# Patient Record
Sex: MALE | Race: WHITE | NOT HISPANIC OR LATINO | URBAN - METROPOLITAN AREA
[De-identification: names, ages, dates, MRNs, and addresses within clinical notes are randomized per-mention and may not be internally consistent; named-entity substitution may affect disease eponyms.]

---

## 2017-10-23 ENCOUNTER — INPATIENT (INPATIENT)
Facility: HOSPITAL | Age: 62
LOS: 9 days | Discharge: HOME | DRG: 435 | End: 2017-11-02
Attending: GENERAL ACUTE CARE HOSPITAL | Admitting: GENERAL ACUTE CARE HOSPITAL
Payer: COMMERCIAL

## 2017-10-23 VITALS
RESPIRATION RATE: 16 BRPM | OXYGEN SATURATION: 97 % | WEIGHT: 216.05 LBS | TEMPERATURE: 98 F | SYSTOLIC BLOOD PRESSURE: 128 MMHG | DIASTOLIC BLOOD PRESSURE: 72 MMHG | HEART RATE: 98 BPM

## 2017-10-23 DIAGNOSIS — R17 UNSPECIFIED JAUNDICE: ICD-10-CM

## 2017-10-23 LAB
ALBUMIN SERPL ELPH-MCNC: 3.7 G/DL — SIGNIFICANT CHANGE UP (ref 3.3–5)
ALP SERPL-CCNC: 927 U/L — HIGH (ref 40–120)
ALT FLD-CCNC: 167 U/L RC — HIGH (ref 10–45)
ANION GAP SERPL CALC-SCNC: 16 MMOL/L — SIGNIFICANT CHANGE UP (ref 5–17)
APPEARANCE UR: CLEAR — SIGNIFICANT CHANGE UP
APTT BLD: 36.2 SEC — SIGNIFICANT CHANGE UP (ref 27.5–37.4)
AST SERPL-CCNC: 154 U/L — HIGH (ref 10–40)
BASOPHILS # BLD AUTO: 0 K/UL — SIGNIFICANT CHANGE UP (ref 0–0.2)
BASOPHILS NFR BLD AUTO: 0.4 % — SIGNIFICANT CHANGE UP (ref 0–2)
BILIRUB DIRECT SERPL-MCNC: 15 MG/DL — HIGH (ref 0–0.2)
BILIRUB INDIRECT FLD-MCNC: 5.6 MG/DL — HIGH (ref 0.2–1)
BILIRUB SERPL-MCNC: 20.6 MG/DL — HIGH (ref 0.2–1.2)
BILIRUB SERPL-MCNC: 20.6 MG/DL — HIGH (ref 0.2–1.2)
BILIRUB UR-MCNC: ABNORMAL
BUN SERPL-MCNC: 15 MG/DL — SIGNIFICANT CHANGE UP (ref 7–23)
CALCIUM SERPL-MCNC: 10.2 MG/DL — SIGNIFICANT CHANGE UP (ref 8.4–10.5)
CHLORIDE SERPL-SCNC: 91 MMOL/L — LOW (ref 96–108)
CO2 SERPL-SCNC: 24 MMOL/L — SIGNIFICANT CHANGE UP (ref 22–31)
COLOR SPEC: YELLOW — SIGNIFICANT CHANGE UP
CREAT SERPL-MCNC: 1.13 MG/DL — SIGNIFICANT CHANGE UP (ref 0.5–1.3)
DIFF PNL FLD: NEGATIVE — SIGNIFICANT CHANGE UP
EOSINOPHIL # BLD AUTO: 0.1 K/UL — SIGNIFICANT CHANGE UP (ref 0–0.5)
EOSINOPHIL NFR BLD AUTO: 0.6 % — SIGNIFICANT CHANGE UP (ref 0–6)
GLUCOSE BLDC GLUCOMTR-MCNC: 255 MG/DL — HIGH (ref 70–99)
GLUCOSE SERPL-MCNC: 263 MG/DL — HIGH (ref 70–99)
GLUCOSE UR QL: 300
HCT VFR BLD CALC: 40.1 % — SIGNIFICANT CHANGE UP (ref 39–50)
HGB BLD-MCNC: 13.6 G/DL — SIGNIFICANT CHANGE UP (ref 13–17)
INR BLD: 1.29 RATIO — HIGH (ref 0.88–1.16)
KETONES UR-MCNC: NEGATIVE — SIGNIFICANT CHANGE UP
LEUKOCYTE ESTERASE UR-ACNC: NEGATIVE — SIGNIFICANT CHANGE UP
LYMPHOCYTES # BLD AUTO: 1.2 K/UL — SIGNIFICANT CHANGE UP (ref 1–3.3)
LYMPHOCYTES # BLD AUTO: 12.7 % — LOW (ref 13–44)
MCHC RBC-ENTMCNC: 32.2 PG — SIGNIFICANT CHANGE UP (ref 27–34)
MCHC RBC-ENTMCNC: 33.8 GM/DL — SIGNIFICANT CHANGE UP (ref 32–36)
MCV RBC AUTO: 95.2 FL — SIGNIFICANT CHANGE UP (ref 80–100)
MONOCYTES # BLD AUTO: 0.8 K/UL — SIGNIFICANT CHANGE UP (ref 0–0.9)
MONOCYTES NFR BLD AUTO: 8.8 % — SIGNIFICANT CHANGE UP (ref 2–14)
NEUTROPHILS # BLD AUTO: 7.5 K/UL — HIGH (ref 1.8–7.4)
NEUTROPHILS NFR BLD AUTO: 77.5 % — HIGH (ref 43–77)
NITRITE UR-MCNC: NEGATIVE — SIGNIFICANT CHANGE UP
PH UR: 6 — SIGNIFICANT CHANGE UP (ref 5–8)
PLATELET # BLD AUTO: 308 K/UL — SIGNIFICANT CHANGE UP (ref 150–400)
POTASSIUM SERPL-MCNC: 4.6 MMOL/L — SIGNIFICANT CHANGE UP (ref 3.5–5.3)
POTASSIUM SERPL-SCNC: 4.6 MMOL/L — SIGNIFICANT CHANGE UP (ref 3.5–5.3)
PROT SERPL-MCNC: 7.3 G/DL — SIGNIFICANT CHANGE UP (ref 6–8.3)
PROT UR-MCNC: NEGATIVE — SIGNIFICANT CHANGE UP
PROTHROM AB SERPL-ACNC: 14 SEC — HIGH (ref 9.8–12.7)
RBC # BLD: 4.21 M/UL — SIGNIFICANT CHANGE UP (ref 4.2–5.8)
RBC # FLD: 13.2 % — SIGNIFICANT CHANGE UP (ref 10.3–14.5)
SODIUM SERPL-SCNC: 131 MMOL/L — LOW (ref 135–145)
SP GR SPEC: 1.01 — LOW (ref 1.01–1.02)
UROBILINOGEN FLD QL: NEGATIVE — SIGNIFICANT CHANGE UP
WBC # BLD: 9.7 K/UL — SIGNIFICANT CHANGE UP (ref 3.8–10.5)
WBC # FLD AUTO: 9.7 K/UL — SIGNIFICANT CHANGE UP (ref 3.8–10.5)

## 2017-10-23 PROCEDURE — 74177 CT ABD & PELVIS W/CONTRAST: CPT | Mod: 26

## 2017-10-23 PROCEDURE — 99285 EMERGENCY DEPT VISIT HI MDM: CPT

## 2017-10-23 RX ORDER — PANTOPRAZOLE SODIUM 20 MG/1
0 TABLET, DELAYED RELEASE ORAL
Qty: 0 | Refills: 0 | COMMUNITY

## 2017-10-23 RX ORDER — DEXTROSE 50 % IN WATER 50 %
25 SYRINGE (ML) INTRAVENOUS ONCE
Qty: 0 | Refills: 0 | Status: DISCONTINUED | OUTPATIENT
Start: 2017-10-23 | End: 2017-11-02

## 2017-10-23 RX ORDER — VALSARTAN 80 MG/1
160 TABLET ORAL DAILY
Qty: 0 | Refills: 0 | Status: DISCONTINUED | OUTPATIENT
Start: 2017-10-23 | End: 2017-11-01

## 2017-10-23 RX ORDER — MOMETASONE FUROATE AND FORMOTEROL FUMARATE DIHYDRATE 200; 5 UG/1; UG/1
0 AEROSOL RESPIRATORY (INHALATION)
Qty: 0 | Refills: 0 | COMMUNITY

## 2017-10-23 RX ORDER — ALBUTEROL 90 UG/1
2 AEROSOL, METERED ORAL EVERY 6 HOURS
Qty: 0 | Refills: 0 | Status: DISCONTINUED | OUTPATIENT
Start: 2017-10-23 | End: 2017-11-02

## 2017-10-23 RX ORDER — SODIUM CHLORIDE 9 MG/ML
1000 INJECTION, SOLUTION INTRAVENOUS
Qty: 0 | Refills: 0 | Status: DISCONTINUED | OUTPATIENT
Start: 2017-10-23 | End: 2017-11-02

## 2017-10-23 RX ORDER — GLUCAGON INJECTION, SOLUTION 0.5 MG/.1ML
1 INJECTION, SOLUTION SUBCUTANEOUS ONCE
Qty: 0 | Refills: 0 | Status: DISCONTINUED | OUTPATIENT
Start: 2017-10-23 | End: 2017-11-02

## 2017-10-23 RX ORDER — INSULIN LISPRO 100/ML
VIAL (ML) SUBCUTANEOUS AT BEDTIME
Qty: 0 | Refills: 0 | Status: DISCONTINUED | OUTPATIENT
Start: 2017-10-23 | End: 2017-11-02

## 2017-10-23 RX ORDER — SODIUM CHLORIDE 9 MG/ML
1000 INJECTION INTRAMUSCULAR; INTRAVENOUS; SUBCUTANEOUS ONCE
Qty: 0 | Refills: 0 | Status: COMPLETED | OUTPATIENT
Start: 2017-10-23 | End: 2017-10-23

## 2017-10-23 RX ORDER — PANTOPRAZOLE SODIUM 20 MG/1
40 TABLET, DELAYED RELEASE ORAL
Qty: 0 | Refills: 0 | Status: DISCONTINUED | OUTPATIENT
Start: 2017-10-23 | End: 2017-11-02

## 2017-10-23 RX ORDER — VALSARTAN 80 MG/1
0 TABLET ORAL
Qty: 0 | Refills: 0 | COMMUNITY

## 2017-10-23 RX ORDER — BUDESONIDE AND FORMOTEROL FUMARATE DIHYDRATE 160; 4.5 UG/1; UG/1
2 AEROSOL RESPIRATORY (INHALATION)
Qty: 0 | Refills: 0 | Status: DISCONTINUED | OUTPATIENT
Start: 2017-10-23 | End: 2017-11-02

## 2017-10-23 RX ORDER — IPRATROPIUM/ALBUTEROL SULFATE 18-103MCG
0 AEROSOL WITH ADAPTER (GRAM) INHALATION
Qty: 0 | Refills: 0 | COMMUNITY

## 2017-10-23 RX ORDER — DEXTROSE 50 % IN WATER 50 %
1 SYRINGE (ML) INTRAVENOUS ONCE
Qty: 0 | Refills: 0 | Status: DISCONTINUED | OUTPATIENT
Start: 2017-10-23 | End: 2017-11-02

## 2017-10-23 RX ORDER — INSULIN LISPRO 100/ML
VIAL (ML) SUBCUTANEOUS
Qty: 0 | Refills: 0 | Status: DISCONTINUED | OUTPATIENT
Start: 2017-10-23 | End: 2017-11-02

## 2017-10-23 RX ORDER — TAMSULOSIN HYDROCHLORIDE 0.4 MG/1
0.4 CAPSULE ORAL AT BEDTIME
Qty: 0 | Refills: 0 | Status: DISCONTINUED | OUTPATIENT
Start: 2017-10-23 | End: 2017-11-02

## 2017-10-23 RX ORDER — DEXTROSE 50 % IN WATER 50 %
12.5 SYRINGE (ML) INTRAVENOUS ONCE
Qty: 0 | Refills: 0 | Status: DISCONTINUED | OUTPATIENT
Start: 2017-10-23 | End: 2017-11-02

## 2017-10-23 RX ADMIN — TAMSULOSIN HYDROCHLORIDE 0.4 MILLIGRAM(S): 0.4 CAPSULE ORAL at 23:53

## 2017-10-23 RX ADMIN — SODIUM CHLORIDE 1000 MILLILITER(S): 9 INJECTION INTRAMUSCULAR; INTRAVENOUS; SUBCUTANEOUS at 16:00

## 2017-10-23 RX ADMIN — Medication 1: at 23:49

## 2017-10-23 NOTE — ED PROVIDER NOTE - MEDICAL DECISION MAKING DETAILS
Misty: Patient with painless jaundice x 2.5 weeks, + weight loss, + juandice on exam, no abdominal pain. + night sweats. will get labs, CT a/p. Pmd sent patient in to be admitted and evaluated GI. will consult GI.

## 2017-10-23 NOTE — ED ADULT NURSE NOTE - OBJECTIVE STATEMENT
61 year old male presents to the ED complaining of jaundice x 2 weeks, mild nausea and abdominal pain and cramping. As per patient he's had negative gall stone and kidney stone work up in the last few months. Pt is A&O x 4, VSS, afebrile, ambulating independently. Pt denies fever, chills, NVD, SOB, or chest pain. IV placed, labs drawn, bed in low position, safety measures in place. Pt is jaundiced, wife at bedside. 61 year old male presents to the ED complaining of jaundice x 2 weeks, mild nausea and abdominal pain and cramping. As per patient he's had negative gall stone and kidney stone work up in the last few months. Pt is A&O x 4, VSS, afebrile, ambulating independently. Pt denies fever, chills, NVD, SOB, or chest pain. IV placed, labs drawn, bed in low position, safety measures in place. Pt is jaundiced, wife at bedside. AS per patient he's had elevated liver enzymes recently and has had night sweats with weight loss.

## 2017-10-23 NOTE — H&P ADULT - HISTORY OF PRESENT ILLNESS
60 y/o  male with hx of DM , HTN admitted for jaundice that has been ongoing for the past two month,.  Pt reports that he had intermittent pain in R flank and  that moved to RUQ and later localized t epigastric area however disappeared over the past two weeks.  No objective fever or chills   no N/V/d   no weight loss   Denies ETOH  recent travel   was seen as o/p and had w/u with blood work and with US .. LFT including bili were elevated howerver US neg for obstruction  no new medications ( though some of his medications were dced including Glyxsambi )    + dark urine (/frequent .  + light colored stool   CT: Mass of the head of the pancreas causing marked distention of the common   bile duct, intrahepatic biliary duct dilatation, and pancreatic ductal   dilatation. Further evaluation with contrast-enhanced MRI is recommended.

## 2017-10-23 NOTE — ED PROVIDER NOTE - OBJECTIVE STATEMENT
61 year old male with pmhx of hld, dm presents to the ER for 2.5 weeks of yellowing of skin, mild nausea, dark urine and pale stools. Patient had occasional flank and abdominal pain few months ago and had multiple US done showing no gallstones at the time per patient and no kidney stones. Currently for the past few weeks denies pain. no vomiting/ diarrhea. Patient has frequent urination. Patient denies cp, no sob.  Saw pmd for repeat blood work and has elevated T-BILI, sent in to be admitted to have GI evaluation.  Patient admits to weight loss and night sweats.

## 2017-10-23 NOTE — H&P ADULT - ASSESSMENT
60 y/o  male with hx of DM , HTN admitted for jaundice that has been ongoing for the past two month,.  Pt reports that he had intermittent pain in R flank and  that moved to RUQ and later localized t epigastric area however disappeared over the past two weeks.  No objective fever or chills   no N/V/d   no weight loss   Denies ETOH  recent travel   was seen as o/p and had w/u with blood work and with US .. LFT including bili were elevated howerver US neg for obstruction  no new medications ( though some of his medications were dced including Glyxsambi )    + dark urine (/frequent .  + light colored stool   CT: Mass of the head of the pancreas causing marked distention of the common   bile duct, intrahepatic biliary duct dilatation, and pancreatic ductal   dilatation. Further evaluation with contrast-enhanced MRI is recommended.  1- obstructive Jaundice : sec to panreatic mass   NPO for ERCP in AM   GI consult ( calleed in ER?)  2- DM bhold PO meds and monitor FS   3- HTN : monitor BP cont meds  discussed with pt and family at length 60 y/o  male with hx of DM , HTN admitted for jaundice that has been ongoing for the past two month,.  Pt reports that he had intermittent pain in R flank and  that moved to RUQ and later localized t epigastric area however disappeared over the past two weeks.  No objective fever or chills   no N/V/d   no weight loss   Denies ETOH  recent travel   was seen as o/p and had w/u with blood work and with US .. LFT including bili were elevated howerver US neg for obstruction  no new medications ( though some of his medications were dced including Glyxsambi )    + dark urine (/frequent .  + light colored stool   CT: Mass of the head of the pancreas causing marked distention of the common   bile duct, intrahepatic biliary duct dilatation, and pancreatic ductal   dilatation. Further evaluation with contrast-enhanced MRI is recommended.  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  hold DVT prophylxis for now   castro needs bx in AM   discussed with pt and family at length

## 2017-10-24 DIAGNOSIS — R17 UNSPECIFIED JAUNDICE: ICD-10-CM

## 2017-10-24 LAB
ALBUMIN SERPL ELPH-MCNC: 3 G/DL — LOW (ref 3.3–5)
ALP SERPL-CCNC: 853 U/L — HIGH (ref 40–120)
ALT FLD-CCNC: 162 U/L — HIGH (ref 10–45)
AMYLASE P1 CFR SERPL: 72 U/L — SIGNIFICANT CHANGE UP (ref 25–125)
ANION GAP SERPL CALC-SCNC: 14 MMOL/L — SIGNIFICANT CHANGE UP (ref 5–17)
AST SERPL-CCNC: 165 U/L — HIGH (ref 10–40)
BASOPHILS # BLD AUTO: 0.03 K/UL — SIGNIFICANT CHANGE UP (ref 0–0.2)
BASOPHILS NFR BLD AUTO: 0.4 % — SIGNIFICANT CHANGE UP (ref 0–2)
BILIRUB DIRECT SERPL-MCNC: 13.9 MG/DL — HIGH (ref 0–0.2)
BILIRUB INDIRECT FLD-MCNC: 4.3 MG/DL — HIGH (ref 0.2–1)
BILIRUB SERPL-MCNC: 18.2 MG/DL — HIGH (ref 0.2–1.2)
BILIRUB SERPL-MCNC: 18.2 MG/DL — HIGH (ref 0.2–1.2)
BUN SERPL-MCNC: 12 MG/DL — SIGNIFICANT CHANGE UP (ref 7–23)
CALCIUM SERPL-MCNC: 9.8 MG/DL — SIGNIFICANT CHANGE UP (ref 8.4–10.5)
CHLORIDE SERPL-SCNC: 96 MMOL/L — SIGNIFICANT CHANGE UP (ref 96–108)
CO2 SERPL-SCNC: 24 MMOL/L — SIGNIFICANT CHANGE UP (ref 22–31)
CREAT SERPL-MCNC: 1.11 MG/DL — SIGNIFICANT CHANGE UP (ref 0.5–1.3)
CULTURE RESULTS: NO GROWTH — SIGNIFICANT CHANGE UP
EOSINOPHIL # BLD AUTO: 0.17 K/UL — SIGNIFICANT CHANGE UP (ref 0–0.5)
EOSINOPHIL NFR BLD AUTO: 2.4 % — SIGNIFICANT CHANGE UP (ref 0–6)
GLUCOSE BLDC GLUCOMTR-MCNC: 188 MG/DL — HIGH (ref 70–99)
GLUCOSE BLDC GLUCOMTR-MCNC: 201 MG/DL — HIGH (ref 70–99)
GLUCOSE BLDC GLUCOMTR-MCNC: 212 MG/DL — HIGH (ref 70–99)
GLUCOSE BLDC GLUCOMTR-MCNC: 257 MG/DL — HIGH (ref 70–99)
GLUCOSE SERPL-MCNC: 197 MG/DL — HIGH (ref 70–99)
HBA1C BLD-MCNC: 8.1 % — HIGH (ref 4–5.6)
HCT VFR BLD CALC: 37.7 % — LOW (ref 39–50)
HGB BLD-MCNC: 12.2 G/DL — LOW (ref 13–17)
IMM GRANULOCYTES NFR BLD AUTO: 0.3 % — SIGNIFICANT CHANGE UP (ref 0–1.5)
LIDOCAIN IGE QN: 128 U/L — HIGH (ref 7–60)
LYMPHOCYTES # BLD AUTO: 1.1 K/UL — SIGNIFICANT CHANGE UP (ref 1–3.3)
LYMPHOCYTES # BLD AUTO: 15.8 % — SIGNIFICANT CHANGE UP (ref 13–44)
MCHC RBC-ENTMCNC: 31.3 PG — SIGNIFICANT CHANGE UP (ref 27–34)
MCHC RBC-ENTMCNC: 32.4 GM/DL — SIGNIFICANT CHANGE UP (ref 32–36)
MCV RBC AUTO: 96.7 FL — SIGNIFICANT CHANGE UP (ref 80–100)
MONOCYTES # BLD AUTO: 0.67 K/UL — SIGNIFICANT CHANGE UP (ref 0–0.9)
MONOCYTES NFR BLD AUTO: 9.6 % — SIGNIFICANT CHANGE UP (ref 2–14)
NEUTROPHILS # BLD AUTO: 4.96 K/UL — SIGNIFICANT CHANGE UP (ref 1.8–7.4)
NEUTROPHILS NFR BLD AUTO: 71.5 % — SIGNIFICANT CHANGE UP (ref 43–77)
PLATELET # BLD AUTO: 311 K/UL — SIGNIFICANT CHANGE UP (ref 150–400)
POTASSIUM SERPL-MCNC: 4.7 MMOL/L — SIGNIFICANT CHANGE UP (ref 3.5–5.3)
POTASSIUM SERPL-SCNC: 4.7 MMOL/L — SIGNIFICANT CHANGE UP (ref 3.5–5.3)
PROT SERPL-MCNC: 6.9 G/DL — SIGNIFICANT CHANGE UP (ref 6–8.3)
RBC # BLD: 3.9 M/UL — LOW (ref 4.2–5.8)
RBC # FLD: 15.1 % — HIGH (ref 10.3–14.5)
SODIUM SERPL-SCNC: 134 MMOL/L — LOW (ref 135–145)
SPECIMEN SOURCE: SIGNIFICANT CHANGE UP
WBC # BLD: 6.95 K/UL — SIGNIFICANT CHANGE UP (ref 3.8–10.5)
WBC # FLD AUTO: 6.95 K/UL — SIGNIFICANT CHANGE UP (ref 3.8–10.5)

## 2017-10-24 PROCEDURE — 99253 IP/OBS CNSLTJ NEW/EST LOW 45: CPT | Mod: GC

## 2017-10-24 PROCEDURE — 71260 CT THORAX DX C+: CPT | Mod: 26

## 2017-10-24 RX ORDER — SODIUM CHLORIDE 9 MG/ML
1000 INJECTION INTRAMUSCULAR; INTRAVENOUS; SUBCUTANEOUS
Qty: 0 | Refills: 0 | Status: DISCONTINUED | OUTPATIENT
Start: 2017-10-24 | End: 2017-10-24

## 2017-10-24 RX ADMIN — SODIUM CHLORIDE 50 MILLILITER(S): 9 INJECTION INTRAMUSCULAR; INTRAVENOUS; SUBCUTANEOUS at 10:55

## 2017-10-24 RX ADMIN — TAMSULOSIN HYDROCHLORIDE 0.4 MILLIGRAM(S): 0.4 CAPSULE ORAL at 21:03

## 2017-10-24 RX ADMIN — Medication 2: at 13:29

## 2017-10-24 RX ADMIN — Medication 3: at 18:14

## 2017-10-24 RX ADMIN — VALSARTAN 160 MILLIGRAM(S): 80 TABLET ORAL at 07:02

## 2017-10-24 RX ADMIN — PANTOPRAZOLE SODIUM 40 MILLIGRAM(S): 20 TABLET, DELAYED RELEASE ORAL at 07:02

## 2017-10-24 RX ADMIN — BUDESONIDE AND FORMOTEROL FUMARATE DIHYDRATE 2 PUFF(S): 160; 4.5 AEROSOL RESPIRATORY (INHALATION) at 18:14

## 2017-10-24 NOTE — CONSULT NOTE ADULT - ATTENDING COMMENTS
Impression:    #1.  Pancreatic head mass, without personal or family history of pancreatitis.  Concern for cancer, dDx includes autoimmune pancreatitis.    #2.  Obstructive jaundice    #3.  Diabetes    Recommendations.    #1.  IgG4 level    #2.  NPO after MN for EUS/FNA/ERCP on 10/25/17.    Patient educated about procedure.  Risks, benefits, alternatives to procedure discussed.  Risks include, but are not limited to, bleeding, infection, injury to internal organs, missed lesion, incomplete procedure, IV line problems, possible need for surgery, and risk of anesthesia.   Patient understands and agrees to proceed.

## 2017-10-24 NOTE — PROGRESS NOTE ADULT - ASSESSMENT
60 y/o  male with hx of DM , HTN admitted for jaundice that has been ongoing for the past two month,.  Pt reports that he had intermittent pain in R flank and  that moved to RUQ and later localized t epigastric area however disappeared over the past two weeks.  No objective fever or chills   no N/V/d   no weight loss   Denies ETOH  recent travel   was seen as o/p and had w/u with blood work and with US .. LFT including bili were elevated howerver US neg for obstruction  no new medications ( though some of his medications were dced including Glyxsambi )    + dark urine (/frequent .  + light colored stool   CT: Mass of the head of the pancreas causing marked distention of the common   bile duct, intrahepatic biliary duct dilatation, and pancreatic ductal   dilatation. Further evaluation with contrast-enhanced MRI is recommended.  1- obstructive Jaundice : sec to panreatic mass .. d/w GI:  - plan for EUS/FNA and ERCP tomorrow  - obtain MRI abd   - patient will need CT chest to evaluate for distant mets  - patient will need surgical consultation for possible resection - GI to call.  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  hold DVT prophylxis for now   castro needs bx in AM   discussed with pt and NP

## 2017-10-24 NOTE — CONSULT NOTE ADULT - ASSESSMENT
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

## 2017-10-24 NOTE — PROGRESS NOTE ADULT - SUBJECTIVE AND OBJECTIVE BOX
Patient is a 61y old  Male who presents with a chief complaint of jaundice (23 Oct 2017 21:50)                                                               INTERVAL HPI/OVERNIGHT EVENTS:    REVIEW OF SYSTEMS:     CONSTITUTIONAL: No weakness, fevers or chills  EYES/ENT: icterus  RESPIRATORY: No cough, wheezing,  No shortness of breath  CARDIOVASCULAR: No chest pain or palpitations  GASTROINTESTINAL: No abdominal pain  . No nausea, vomiting, or hematemesis; No diarrhea or constipation. No melena or hematochezia.  GENITOURINARY: No dysuria, frequency or hematuria  NEUROLOGICAL: No numbness or weakness  SKIN: jaundice                                                                                                                                                                                                                                                                                  Medications:  MEDICATIONS  (STANDING):  buDESOnide  80 MICROgram(s)/formoterol 4.5 MICROgram(s) Inhaler 2 Puff(s) Inhalation two times a day  dextrose 5%. 1000 milliLiter(s) (50 mL/Hr) IV Continuous <Continuous>  dextrose 50% Injectable 12.5 Gram(s) IV Push once  dextrose 50% Injectable 25 Gram(s) IV Push once  dextrose 50% Injectable 25 Gram(s) IV Push once  insulin lispro (HumaLOG) corrective regimen sliding scale   SubCutaneous three times a day before meals  insulin lispro (HumaLOG) corrective regimen sliding scale   SubCutaneous at bedtime  pantoprazole    Tablet 40 milliGRAM(s) Oral before breakfast  tamsulosin 0.4 milliGRAM(s) Oral at bedtime  valsartan 160 milliGRAM(s) Oral daily    MEDICATIONS  (PRN):  ALBUTerol    90 MICROgram(s) HFA Inhaler 2 Puff(s) Inhalation every 6 hours PRN sob  dextrose Gel 1 Dose(s) Oral once PRN Blood Glucose LESS THAN 70 milliGRAM(s)/deciliter  glucagon  Injectable 1 milliGRAM(s) IntraMuscular once PRN Glucose LESS THAN 70 milligrams/deciliter       Allergies    No Known Allergies    Intolerances      Vital Signs Last 24 Hrs  T(C): 36.8 (24 Oct 2017 16:34), Max: 36.9 (23 Oct 2017 21:11)  T(F): 98.3 (24 Oct 2017 16:34), Max: 98.5 (23 Oct 2017 23:52)  HR: 88 (24 Oct 2017 16:34) (81 - 89)  BP: 103/66 (24 Oct 2017 16:34) (91/47 - 113/67)  BP(mean): --  RR: 16 (24 Oct 2017 16:34) (16 - 18)  SpO2: 97% (24 Oct 2017 16:34) (95% - 97%)  CAPILLARY BLOOD GLUCOSE  212 (24 Oct 2017 11:55)  201 (24 Oct 2017 06:59)      POCT Blood Glucose.: 257 mg/dL (24 Oct 2017 16:34)  POCT Blood Glucose.: 212 mg/dL (24 Oct 2017 11:55)  POCT Blood Glucose.: 201 mg/dL (24 Oct 2017 06:28)  POCT Blood Glucose.: 255 mg/dL (23 Oct 2017 22:25)      10-24 @ 07:01  -  10-24 @ 19:04  --------------------------------------------------------  IN: 350 mL / OUT: 300 mL / NET: 50 mL      Physical Exam:    Daily Height in cm: 165.1 (23 Oct 2017 21:11)    General: icterus   HEENT:  Nonicteric, PERRLA  CV:  RRR, S1S2   Lungs:  CTA B/L, no wheezes, rales, rhonchi  Abdomen:  Soft, non-tender, no distended, positive BS  Extremities:  2+ pulses, no c/c, no edema  Skin:  jaundiced   :  No newberry  Neuro:  AAOx3, non-focal, grossly intact                                                                                                                                                                                                                                                                                                LABS:                               12.2   6.95  )-----------( 311      ( 24 Oct 2017 08:55 )             37.7                      10-24    134<L>  |  96  |  12  ----------------------------<  197<H>  4.7   |  24  |  1.11    Ca    9.8      24 Oct 2017 08:51    TPro  6.9  /  Alb  3.0<L>  /  TBili  18.2<H>  /  DBili  13.9<H>  /  AST  165<H>  /  ALT  162<H>  /  AlkPhos  853<H>  10-24

## 2017-10-25 ENCOUNTER — RESULT REVIEW (OUTPATIENT)
Age: 62
End: 2017-10-25

## 2017-10-25 LAB
ALBUMIN SERPL ELPH-MCNC: 3.1 G/DL — LOW (ref 3.3–5)
ALP SERPL-CCNC: 843 U/L — HIGH (ref 40–120)
ALT FLD-CCNC: 157 U/L — HIGH (ref 10–45)
ANION GAP SERPL CALC-SCNC: 16 MMOL/L — SIGNIFICANT CHANGE UP (ref 5–17)
AST SERPL-CCNC: 150 U/L — HIGH (ref 10–40)
BILIRUB SERPL-MCNC: 18.1 MG/DL — HIGH (ref 0.2–1.2)
BUN SERPL-MCNC: 19 MG/DL — SIGNIFICANT CHANGE UP (ref 7–23)
CALCIUM SERPL-MCNC: 9.9 MG/DL — SIGNIFICANT CHANGE UP (ref 8.4–10.5)
CEA SERPL-MCNC: 3.6 NG/ML — SIGNIFICANT CHANGE UP (ref 0–3.8)
CHLORIDE SERPL-SCNC: 95 MMOL/L — LOW (ref 96–108)
CO2 SERPL-SCNC: 22 MMOL/L — SIGNIFICANT CHANGE UP (ref 22–31)
CREAT SERPL-MCNC: 1.12 MG/DL — SIGNIFICANT CHANGE UP (ref 0.5–1.3)
GLUCOSE BLDC GLUCOMTR-MCNC: 197 MG/DL — HIGH (ref 70–99)
GLUCOSE BLDC GLUCOMTR-MCNC: 217 MG/DL — HIGH (ref 70–99)
GLUCOSE BLDC GLUCOMTR-MCNC: 243 MG/DL — HIGH (ref 70–99)
GLUCOSE BLDC GLUCOMTR-MCNC: 255 MG/DL — HIGH (ref 70–99)
GLUCOSE BLDC GLUCOMTR-MCNC: 265 MG/DL — HIGH (ref 70–99)
GLUCOSE SERPL-MCNC: 195 MG/DL — HIGH (ref 70–99)
HCT VFR BLD CALC: 38.5 % — LOW (ref 39–50)
HGB BLD-MCNC: 11.9 G/DL — LOW (ref 13–17)
MCHC RBC-ENTMCNC: 30.6 PG — SIGNIFICANT CHANGE UP (ref 27–34)
MCHC RBC-ENTMCNC: 30.9 GM/DL — LOW (ref 32–36)
MCV RBC AUTO: 99 FL — SIGNIFICANT CHANGE UP (ref 80–100)
PLATELET # BLD AUTO: 315 K/UL — SIGNIFICANT CHANGE UP (ref 150–400)
POTASSIUM SERPL-MCNC: 4.7 MMOL/L — SIGNIFICANT CHANGE UP (ref 3.5–5.3)
POTASSIUM SERPL-SCNC: 4.7 MMOL/L — SIGNIFICANT CHANGE UP (ref 3.5–5.3)
PROT SERPL-MCNC: 6.9 G/DL — SIGNIFICANT CHANGE UP (ref 6–8.3)
RBC # BLD: 3.89 M/UL — LOW (ref 4.2–5.8)
RBC # FLD: 15.7 % — HIGH (ref 10.3–14.5)
SODIUM SERPL-SCNC: 133 MMOL/L — LOW (ref 135–145)
WBC # BLD: 8.23 K/UL — SIGNIFICANT CHANGE UP (ref 3.8–10.5)
WBC # FLD AUTO: 8.23 K/UL — SIGNIFICANT CHANGE UP (ref 3.8–10.5)

## 2017-10-25 PROCEDURE — 88173 CYTOPATH EVAL FNA REPORT: CPT | Mod: 26

## 2017-10-25 PROCEDURE — 88305 TISSUE EXAM BY PATHOLOGIST: CPT | Mod: 26

## 2017-10-25 PROCEDURE — 43274 ERCP DUCT STENT PLACEMENT: CPT | Mod: GC

## 2017-10-25 PROCEDURE — 43242 EGD US FINE NEEDLE BX/ASPIR: CPT | Mod: 59,GC

## 2017-10-25 PROCEDURE — 74328 X-RAY BILE DUCT ENDOSCOPY: CPT | Mod: 26,GC

## 2017-10-25 RX ORDER — PIPERACILLIN AND TAZOBACTAM 4; .5 G/20ML; G/20ML
3.38 INJECTION, POWDER, LYOPHILIZED, FOR SOLUTION INTRAVENOUS EVERY 8 HOURS
Qty: 0 | Refills: 0 | Status: DISCONTINUED | OUTPATIENT
Start: 2017-10-25 | End: 2017-11-02

## 2017-10-25 RX ORDER — PIPERACILLIN AND TAZOBACTAM 4; .5 G/20ML; G/20ML
3.38 INJECTION, POWDER, LYOPHILIZED, FOR SOLUTION INTRAVENOUS ONCE
Qty: 0 | Refills: 0 | Status: COMPLETED | OUTPATIENT
Start: 2017-10-25 | End: 2017-10-27

## 2017-10-25 RX ORDER — OXYCODONE HYDROCHLORIDE 5 MG/1
5 TABLET ORAL ONCE
Qty: 0 | Refills: 0 | Status: DISCONTINUED | OUTPATIENT
Start: 2017-10-25 | End: 2017-10-25

## 2017-10-25 RX ADMIN — BUDESONIDE AND FORMOTEROL FUMARATE DIHYDRATE 2 PUFF(S): 160; 4.5 AEROSOL RESPIRATORY (INHALATION) at 06:02

## 2017-10-25 RX ADMIN — BUDESONIDE AND FORMOTEROL FUMARATE DIHYDRATE 2 PUFF(S): 160; 4.5 AEROSOL RESPIRATORY (INHALATION) at 18:30

## 2017-10-25 RX ADMIN — Medication 2: at 12:53

## 2017-10-25 RX ADMIN — Medication 1: at 22:13

## 2017-10-25 RX ADMIN — OXYCODONE HYDROCHLORIDE 5 MILLIGRAM(S): 5 TABLET ORAL at 20:00

## 2017-10-25 RX ADMIN — OXYCODONE HYDROCHLORIDE 5 MILLIGRAM(S): 5 TABLET ORAL at 18:54

## 2017-10-25 RX ADMIN — Medication 2: at 18:30

## 2017-10-25 RX ADMIN — TAMSULOSIN HYDROCHLORIDE 0.4 MILLIGRAM(S): 0.4 CAPSULE ORAL at 22:07

## 2017-10-25 RX ADMIN — PIPERACILLIN AND TAZOBACTAM 25 GRAM(S): 4; .5 INJECTION, POWDER, LYOPHILIZED, FOR SOLUTION INTRAVENOUS at 22:06

## 2017-10-25 NOTE — DIETITIAN INITIAL EVALUATION ADULT. - ENERGY NEEDS
ht: 5'5", wt:209.8 pounds, BMI:34.9 kg/m2, IBW:136 pounds +/- 10%     pt admitted c jaundice, noted per MD: CT: Mass of the head of the pancreas causing marked distention of the common   bile duct, intrahepatic biliary duct dilatation, and pancreatic ductal   dilatation. Noted plan for MRI abdomen c contrast later today.

## 2017-10-25 NOTE — PROGRESS NOTE ADULT - SUBJECTIVE AND OBJECTIVE BOX
Pre-Endoscopy Evaluation      Referring Physician:  Dr. CAMELIA Wyatt                               Procedure: EUS/FNA/ERCP    Indication for Procedure: Pancreatic Mass, Obstructive Jaundice    Pertinent History:  61 year old male with PMH below presenting with intermittent epigastric  pain, decreased appetite, early satiety and progressive jaundice. In the ED he was found to have a Bilirubin of 20.6 and CT revealed Pancreatic Head Mass with CBD obstruction/dilation.      PAST MEDICAL & SURGICAL HISTORY:  HTN (hypertension)  Diabetes  High cholesterol  No significant past surgical history      PMH of Gastroparesis [ ]  Gastric Surgery [ ]  Gastric Outlet Obstruction [ ]    Allergies:    No Known Allergies    Intolerances:    Latex allergy: [ ] yes [X] no    Medications:MEDICATIONS  (STANDING):  buDESOnide  80 MICROgram(s)/formoterol 4.5 MICROgram(s) Inhaler 2 Puff(s) Inhalation two times a day  dextrose 5%. 1000 milliLiter(s) (50 mL/Hr) IV Continuous <Continuous>  dextrose 50% Injectable 12.5 Gram(s) IV Push once  dextrose 50% Injectable 25 Gram(s) IV Push once  dextrose 50% Injectable 25 Gram(s) IV Push once  insulin lispro (HumaLOG) corrective regimen sliding scale   SubCutaneous three times a day before meals  insulin lispro (HumaLOG) corrective regimen sliding scale   SubCutaneous at bedtime  pantoprazole    Tablet 40 milliGRAM(s) Oral before breakfast  tamsulosin 0.4 milliGRAM(s) Oral at bedtime  valsartan 160 milliGRAM(s) Oral daily    MEDICATIONS  (PRN):  ALBUTerol    90 MICROgram(s) HFA Inhaler 2 Puff(s) Inhalation every 6 hours PRN sob  dextrose Gel 1 Dose(s) Oral once PRN Blood Glucose LESS THAN 70 milliGRAM(s)/deciliter  glucagon  Injectable 1 milliGRAM(s) IntraMuscular once PRN Glucose LESS THAN 70 milligrams/deciliter      Smoking: [ ] yes  [X] no    AICD/PPM: [ ] yes   [X] no    Pertinent lab data:                        11.9   8.23  )-----------( 315      ( 25 Oct 2017 07:40 )             38.5     10-25    133<L>  |  95<L>  |  19  ----------------------------<  195<H>  4.7   |  22  |  1.12    Ca    9.9      25 Oct 2017 09:00    TPro  6.9  /  Alb  3.1<L>  /  TBili  18.1<H>  /  DBili  x   /  AST  150<H>  /  ALT  157<H>  /  AlkPhos  843<H>  10-25    PT/INR - ( 23 Oct 2017 16:12 )   PT: 14.0 sec;   INR: 1.29 ratio      PTT - ( 23 Oct 2017 16:12 )  PTT:36.2 sec      Physical Examination:  Daily     Daily Weight in k.1 (25 Oct 2017 09:20)  Vital Signs Last 24 Hrs  T(C): 36.9 (25 Oct 2017 08:44), Max: 37.1 (24 Oct 2017 23:58)  T(F): 98.4 (25 Oct 2017 08:44), Max: 98.7 (24 Oct 2017 23:58)  HR: 87 (25 Oct 2017 08:44) (87 - 88)  BP: 96/57 (25 Oct 2017 08:44) (96/57 - 103/66)  BP(mean): --  RR: 18 (25 Oct 2017 08:44) (16 - 18)  SpO2: 93% (25 Oct 2017 08:44) (93% - 97%)    Constitutional: NAD    Neck:  No JVD    Respiratory: CTAB/L    Cardiovascular: S1 and S2    Gastrointestinal: BS+, soft, NT/ND    Extremities: No peripheral edema    Neurological: A/O x 3    Psychiatric: Normal mood, normal affect    : No Mariscal    Skin: No rashes    Comments:    ASA Class: I [ ]  II [ ]  III [X]  IV [ ]    The patient is a suitable candidate for the planned procedure unless box checked [ ]  No, explain:

## 2017-10-25 NOTE — DIETITIAN INITIAL EVALUATION ADULT. - OTHER INFO
pt seen for consult for unintentional wt loss. pt reports his wt has been stable at 220 pounds for a long time and only over the last few weeks has he lost some wt. Reports NKFA. No micronutrient coverage noted at home. Reports he has been having gray stool for a few weeks now. pt c some questions regarding DM and difference between type 1 and type 2, also able to state he knows he needs to make dietary changes. pt currently concerned about what is happening with his pancreas, diet education at this time may not be fruitful. Discussed basic dietary information and made pt aware RD remains available for further education as pt is ready.

## 2017-10-25 NOTE — CHART NOTE - NSCHARTNOTEFT_GEN_A_CORE
S/P ERCP. One plastic stent was placed into the ventral pancreatic duct. Biliary cannulation not possible despite various attmepts and variations in technique.                              Spoke with Dr. Cannon( GI). ERCP to be repeated on Friday. May resume diet. Zosyn started for prophylaxis as recommended by GI.   NPO after MN on Thursday. Dr. Wyatt aware.    Addis Adrian NP-C  #[id number]

## 2017-10-25 NOTE — CONSULT NOTE ADULT - ASSESSMENT
Patient with a pancreatic mass concerning for a primary pancreatic carcinoma.  he did have an EUS and ERCP today with stent placement.  he does have marked hyperbilirubinemia.  He did have a CT chest.  he is ordered for an MRCP. Based upon the current studies it does appear that the disease is localized, which hopefully would allow for a Whipple once the pathology does confirm an adenocarcinoma but need to await pathology and MRI results prior to making final recommendations.  Discussed with patient and family that at this time there are no definitive results of a malignancy.  Would trend the bilirubin.   Reportedly he still needs another stent or possibly a percutaneous drain for the liver ductal dilatation.    he has a mild anemia which is likely AOCD and for now would just monitor.

## 2017-10-25 NOTE — DIETITIAN INITIAL EVALUATION ADULT. - ADHERENCE
fair/consistent CHO, DASH diet- reports salt is used in cooking, does use sugar at times; reports he has not been checking his Finger sticks for a few months now; reports his last A1C in September 2017 was about 7.5%

## 2017-10-25 NOTE — DIETITIAN INITIAL EVALUATION ADULT. - FACTORS AFF FOOD INTAKE
pt reports yesterday he ate c good appetite in house; no chewing/swallowing issues; reports last night he had 2 BMs, he had some cramping and BMs were slightly loose; states he had more of a regular BM this AM

## 2017-10-25 NOTE — DIETITIAN INITIAL EVALUATION ADULT. - NS AS NUTRI INTERV ED CONTENT
Answered all of pt's questions as able. Discussed importance of balanced eating and avoiding concentrated sweets.

## 2017-10-25 NOTE — DIETITIAN INITIAL EVALUATION ADULT. - PHYSICAL APPEARANCE
pt agreeable to nutrition focused physical exam, no muscle or fat loss noted at this time/well nourished

## 2017-10-25 NOTE — PROGRESS NOTE ADULT - ASSESSMENT
60 y/o  male with hx of DM , HTN admitted for jaundice that has been ongoing for the past two month,.  Pt reports that he had intermittent pain in R flank and  that moved to RUQ and later localized t epigastric area however disappeared over the past two weeks.  No objective fever or chills   no N/V/d   no weight loss   Denies ETOH  recent travel   was seen as o/p and had w/u with blood work and with US .. LFT including bili were elevated howerver US neg for obstruction  no new medications ( though some of his medications were dced including Glyxsambi )    + dark urine (/frequent .  + light colored stool   CT: Mass of the head of the pancreas causing marked distention of the common   bile duct, intrahepatic biliary duct dilatation, and pancreatic ductal   dilatation. Further evaluation with contrast-enhanced MRI is recommended.  1- obstructive Jaundice : sec to panreatic mass ..  - plan for EUS/FNA and ERCP today  - f/u  MRI abd   - CT chest to evaluate for distant mets: negative   - patient will need surgical consultation for possible resection  - d/w Dr Dumont : will await official consult   2- DM: hold PO meds and monitor FS   3- HTN : monitor BP cont meds  4- HLD : off lipitor due to myoipathy >?  hold DVT prophylxis for now     discussed with pt   d/w with NP

## 2017-10-25 NOTE — DIETITIAN INITIAL EVALUATION ADULT. - NS AS NUTRI INTERV MEALS SNACK
As medically feasible, after procedure, consider resume po diet- consistent CHO, DASH as medically appropriate.

## 2017-10-25 NOTE — PROGRESS NOTE ADULT - SUBJECTIVE AND OBJECTIVE BOX
Patient is a 61y old  Male who presents with a chief complaint of jaundice (23 Oct 2017 21:50)                                                               INTERVAL HPI/OVERNIGHT EVENTS: none     REVIEW OF SYSTEMS:     CONSTITUTIONAL: No weakness, fevers or chills  RESPIRATORY: No cough, wheezing,  No shortness of breath  CARDIOVASCULAR: No chest pain or palpitations  GASTROINTESTINAL: No abdominal pain  . No nausea, vomiting, or hematemesis; light colored stool  GENITOURINARY: No dysuria, frequency or hematuria  NEUROLOGICAL: No numbness or weakness  SKIN: Jaundice   no itching                                                                                                                                                                                                                                                                             Medications:  MEDICATIONS  (STANDING):  buDESOnide  80 MICROgram(s)/formoterol 4.5 MICROgram(s) Inhaler 2 Puff(s) Inhalation two times a day  dextrose 5%. 1000 milliLiter(s) (50 mL/Hr) IV Continuous <Continuous>  dextrose 50% Injectable 12.5 Gram(s) IV Push once  dextrose 50% Injectable 25 Gram(s) IV Push once  dextrose 50% Injectable 25 Gram(s) IV Push once  insulin lispro (HumaLOG) corrective regimen sliding scale   SubCutaneous three times a day before meals  insulin lispro (HumaLOG) corrective regimen sliding scale   SubCutaneous at bedtime  pantoprazole    Tablet 40 milliGRAM(s) Oral before breakfast  piperacillin/tazobactam IVPB. 3.375 Gram(s) IV Intermittent once  piperacillin/tazobactam IVPB. 3.375 Gram(s) IV Intermittent every 8 hours  tamsulosin 0.4 milliGRAM(s) Oral at bedtime  valsartan 160 milliGRAM(s) Oral daily    MEDICATIONS  (PRN):  ALBUTerol    90 MICROgram(s) HFA Inhaler 2 Puff(s) Inhalation every 6 hours PRN sob  dextrose Gel 1 Dose(s) Oral once PRN Blood Glucose LESS THAN 70 milliGRAM(s)/deciliter  glucagon  Injectable 1 milliGRAM(s) IntraMuscular once PRN Glucose LESS THAN 70 milligrams/deciliter       Allergies    No Known Allergies    Intolerances      Vital Signs Last 24 Hrs  T(C): 37.2 (25 Oct 2017 19:22), Max: 37.2 (25 Oct 2017 19:22)  T(F): 99 (25 Oct 2017 19:22), Max: 99 (25 Oct 2017 19:22)  HR: 93 (25 Oct 2017 19:22) (87 - 93)  BP: 98/61 (25 Oct 2017 19:22) (96/57 - 100/62)  BP(mean): --  RR: 18 (25 Oct 2017 19:22) (16 - 18)  SpO2: 94% (25 Oct 2017 19:22) (93% - 95%)  CAPILLARY BLOOD GLUCOSE      POCT Blood Glucose.: 265 mg/dL (25 Oct 2017 22:09)  POCT Blood Glucose.: 255 mg/dL (25 Oct 2017 19:21)  POCT Blood Glucose.: 217 mg/dL (25 Oct 2017 17:31)  POCT Blood Glucose.: 243 mg/dL (25 Oct 2017 12:03)  POCT Blood Glucose.: 197 mg/dL (25 Oct 2017 07:58)      10-24 @ :  -  10-25 @ 07:00  --------------------------------------------------------  IN: 870 mL / OUT: 300 mL / NET: 570 mL    10-25 @ :  -  10-25 @ 22:28  --------------------------------------------------------  IN: 500 mL / OUT: 2 mL / NET: 498 mL      Physical Exam:   Daily Weight in k.1 (25 Oct 2017 09:20)  General:  Well appearing, NAD  HEENT:  Nonicteric, PERRLA  CV:  RRR, S1S2   Lungs:  CTA B/L, no wheezes, rales, rhonchi  Abdomen:  Soft, non-tender, no distended, positive BS  Extremities:  2+ pulses, no c/c, no edema  Skin:  Warm and dry, no rashes  :  No newberry  Neuro:  AAOx3, non-focal, grossly intact                                                                                                                                                                                                                                                                                                LABS:                               11.9   8.23  )-----------( 315      ( 25 Oct 2017 07:40 )             38.5                      10-25    133<L>  |  95<L>  |  19  ----------------------------<  195<H>  4.7   |  22  |  1.12    Ca    9.9      25 Oct 2017 09:00    TPro  6.9  /  Alb  3.1<L>  /  TBili  18.1<H>  /  DBili  x   /  AST  150<H>  /  ALT  157<H>  /  AlkPhos  843<H>  10-25                       RADIOLOGY & ADDITIONAL TESTS         I personally reviewed: [  ]EKG   [  ]CXR    [  ] CT      A/P:         Discussed with :     Oniel consultants' Notes   Time spent : Patient is a 61y old  Male who presents with a chief complaint of jaundice (23 Oct 2017 21:50)                                                               INTERVAL HPI/OVERNIGHT EVENTS: none     REVIEW OF SYSTEMS:     CONSTITUTIONAL: No weakness, fevers or chills  RESPIRATORY: No cough, wheezing,  No shortness of breath  CARDIOVASCULAR: No chest pain or palpitations  GASTROINTESTINAL: No abdominal pain  . No nausea, vomiting, or hematemesis; light colored stool  GENITOURINARY: No dysuria, frequency or hematuria  NEUROLOGICAL: No numbness or weakness  SKIN: Jaundice   no itching                                                                                                                                                                                                                                                                             Medications:  MEDICATIONS  (STANDING):  buDESOnide  80 MICROgram(s)/formoterol 4.5 MICROgram(s) Inhaler 2 Puff(s) Inhalation two times a day  dextrose 5%. 1000 milliLiter(s) (50 mL/Hr) IV Continuous <Continuous>  dextrose 50% Injectable 12.5 Gram(s) IV Push once  dextrose 50% Injectable 25 Gram(s) IV Push once  dextrose 50% Injectable 25 Gram(s) IV Push once  insulin lispro (HumaLOG) corrective regimen sliding scale   SubCutaneous three times a day before meals  insulin lispro (HumaLOG) corrective regimen sliding scale   SubCutaneous at bedtime  pantoprazole    Tablet 40 milliGRAM(s) Oral before breakfast  piperacillin/tazobactam IVPB. 3.375 Gram(s) IV Intermittent once  piperacillin/tazobactam IVPB. 3.375 Gram(s) IV Intermittent every 8 hours  tamsulosin 0.4 milliGRAM(s) Oral at bedtime  valsartan 160 milliGRAM(s) Oral daily    MEDICATIONS  (PRN):  ALBUTerol    90 MICROgram(s) HFA Inhaler 2 Puff(s) Inhalation every 6 hours PRN sob  dextrose Gel 1 Dose(s) Oral once PRN Blood Glucose LESS THAN 70 milliGRAM(s)/deciliter  glucagon  Injectable 1 milliGRAM(s) IntraMuscular once PRN Glucose LESS THAN 70 milligrams/deciliter       Allergies    No Known Allergies    Intolerances      Vital Signs Last 24 Hrs  T(C): 37.2 (25 Oct 2017 19:22), Max: 37.2 (25 Oct 2017 19:22)  T(F): 99 (25 Oct 2017 19:22), Max: 99 (25 Oct 2017 19:22)  HR: 93 (25 Oct 2017 19:22) (87 - 93)  BP: 98/61 (25 Oct 2017 19:22) (96/57 - 100/62)  BP(mean): --  RR: 18 (25 Oct 2017 19:22) (16 - 18)  SpO2: 94% (25 Oct 2017 19:22) (93% - 95%)  CAPILLARY BLOOD GLUCOSE      POCT Blood Glucose.: 265 mg/dL (25 Oct 2017 22:09)  POCT Blood Glucose.: 255 mg/dL (25 Oct 2017 19:21)  POCT Blood Glucose.: 217 mg/dL (25 Oct 2017 17:31)  POCT Blood Glucose.: 243 mg/dL (25 Oct 2017 12:03)  POCT Blood Glucose.: 197 mg/dL (25 Oct 2017 07:58)      10-24 @ :  -  10-25 @ 07:00  --------------------------------------------------------  IN: 870 mL / OUT: 300 mL / NET: 570 mL    10-25 @ :  -  10-25 @ 22:28  --------------------------------------------------------  IN: 500 mL / OUT: 2 mL / NET: 498 mL      Physical Exam:   Daily Weight in k.1 (25 Oct 2017 09:20)  General:  Well appearing, NAD  HEENT:  icterus  CV:  RRR, S1S2   Lungs:  CTA B/L, no wheezes, rales, rhonchi  Abdomen:  Soft, non-tender, no distended, positive BS  Extremities:  2+ pulses, no c/c, no edema  Skin:  Warm and dry, no rashes  :  No newberry  Neuro:  AAOx3, non-focal, grossly intact                                                                                                                                                                                                                                                                                                LABS:                               11.9   8.23  )-----------( 315      ( 25 Oct 2017 07:40 )             38.5                      10-25    133<L>  |  95<L>  |  19  ----------------------------<  195<H>  4.7   |  22  |  1.12    Ca    9.9      25 Oct 2017 09:00    TPro  6.9  /  Alb  3.1<L>  /  TBili  18.1<H>  /  DBili  x   /  AST  150<H>  /  ALT  157<H>  /  AlkPhos  843<H>  10-25                       RADIOLOGY & ADDITIONAL TESTS         I personally reviewed: [  ]EKG   [  ]CXR    [  ] CT      A/P:         Discussed with :     Oniel consultants' Notes   Time spent :

## 2017-10-25 NOTE — DIETITIAN INITIAL EVALUATION ADULT. - ORAL INTAKE PTA
pt reports over the last week or two his appetite was variable, at first he wasn't eating too well due to having reflux and feeling "backed up," reports giving himself an enema and feeling better and right before admission he was eating better and appetite was better; reports usual intake: breakfast: English muffin or cereal; lunch: turkey on an English muffin; dinner: meat/poultry c vegetables; reports he has been using some sugar or Splenda in his coffee lately and does consume concentrated sweets at home

## 2017-10-26 LAB
ALBUMIN SERPL ELPH-MCNC: 2.8 G/DL — LOW (ref 3.3–5)
ALP SERPL-CCNC: 728 U/L — HIGH (ref 40–120)
ALT FLD-CCNC: 143 U/L — HIGH (ref 10–45)
ANION GAP SERPL CALC-SCNC: 15 MMOL/L — SIGNIFICANT CHANGE UP (ref 5–17)
AST SERPL-CCNC: 135 U/L — HIGH (ref 10–40)
BILIRUB SERPL-MCNC: 16.8 MG/DL — HIGH (ref 0.2–1.2)
BUN SERPL-MCNC: 17 MG/DL — SIGNIFICANT CHANGE UP (ref 7–23)
CALCIUM SERPL-MCNC: 9.4 MG/DL — SIGNIFICANT CHANGE UP (ref 8.4–10.5)
CANCER AG19-9 SERPL-ACNC: 1052.2 U/ML — HIGH
CHLORIDE SERPL-SCNC: 96 MMOL/L — SIGNIFICANT CHANGE UP (ref 96–108)
CO2 SERPL-SCNC: 23 MMOL/L — SIGNIFICANT CHANGE UP (ref 22–31)
CREAT SERPL-MCNC: 1.18 MG/DL — SIGNIFICANT CHANGE UP (ref 0.5–1.3)
GLUCOSE BLDC GLUCOMTR-MCNC: 217 MG/DL — HIGH (ref 70–99)
GLUCOSE BLDC GLUCOMTR-MCNC: 261 MG/DL — HIGH (ref 70–99)
GLUCOSE BLDC GLUCOMTR-MCNC: 274 MG/DL — HIGH (ref 70–99)
GLUCOSE BLDC GLUCOMTR-MCNC: 310 MG/DL — HIGH (ref 70–99)
GLUCOSE SERPL-MCNC: 204 MG/DL — HIGH (ref 70–99)
HCT VFR BLD CALC: 37.7 % — LOW (ref 39–50)
HGB BLD-MCNC: 11.8 G/DL — LOW (ref 13–17)
MCHC RBC-ENTMCNC: 31.3 GM/DL — LOW (ref 32–36)
MCHC RBC-ENTMCNC: 31.3 PG — SIGNIFICANT CHANGE UP (ref 27–34)
MCV RBC AUTO: 100 FL — SIGNIFICANT CHANGE UP (ref 80–100)
PLATELET # BLD AUTO: 303 K/UL — SIGNIFICANT CHANGE UP (ref 150–400)
POTASSIUM SERPL-MCNC: 4.6 MMOL/L — SIGNIFICANT CHANGE UP (ref 3.5–5.3)
POTASSIUM SERPL-SCNC: 4.6 MMOL/L — SIGNIFICANT CHANGE UP (ref 3.5–5.3)
PROT SERPL-MCNC: 6.5 G/DL — SIGNIFICANT CHANGE UP (ref 6–8.3)
RBC # BLD: 3.77 M/UL — LOW (ref 4.2–5.8)
RBC # FLD: 15.8 % — HIGH (ref 10.3–14.5)
SODIUM SERPL-SCNC: 134 MMOL/L — LOW (ref 135–145)
WBC # BLD: 8.4 K/UL — SIGNIFICANT CHANGE UP (ref 3.8–10.5)
WBC # FLD AUTO: 8.4 K/UL — SIGNIFICANT CHANGE UP (ref 3.8–10.5)

## 2017-10-26 PROCEDURE — 74183 MRI ABD W/O CNTR FLWD CNTR: CPT | Mod: 26

## 2017-10-26 PROCEDURE — 99232 SBSQ HOSP IP/OBS MODERATE 35: CPT | Mod: GC

## 2017-10-26 RX ORDER — SIMETHICONE 80 MG/1
80 TABLET, CHEWABLE ORAL
Qty: 0 | Refills: 0 | Status: DISCONTINUED | OUTPATIENT
Start: 2017-10-26 | End: 2017-11-02

## 2017-10-26 RX ORDER — POLYETHYLENE GLYCOL 3350 17 G/17G
17 POWDER, FOR SOLUTION ORAL ONCE
Qty: 0 | Refills: 0 | Status: COMPLETED | OUTPATIENT
Start: 2017-10-26 | End: 2017-10-26

## 2017-10-26 RX ADMIN — PIPERACILLIN AND TAZOBACTAM 25 GRAM(S): 4; .5 INJECTION, POWDER, LYOPHILIZED, FOR SOLUTION INTRAVENOUS at 22:22

## 2017-10-26 RX ADMIN — PIPERACILLIN AND TAZOBACTAM 25 GRAM(S): 4; .5 INJECTION, POWDER, LYOPHILIZED, FOR SOLUTION INTRAVENOUS at 13:36

## 2017-10-26 RX ADMIN — Medication 1: at 22:21

## 2017-10-26 RX ADMIN — POLYETHYLENE GLYCOL 3350 17 GRAM(S): 17 POWDER, FOR SOLUTION ORAL at 22:22

## 2017-10-26 RX ADMIN — Medication 3: at 11:57

## 2017-10-26 RX ADMIN — Medication 4: at 17:00

## 2017-10-26 RX ADMIN — BUDESONIDE AND FORMOTEROL FUMARATE DIHYDRATE 2 PUFF(S): 160; 4.5 AEROSOL RESPIRATORY (INHALATION) at 05:20

## 2017-10-26 RX ADMIN — PANTOPRAZOLE SODIUM 40 MILLIGRAM(S): 20 TABLET, DELAYED RELEASE ORAL at 09:01

## 2017-10-26 RX ADMIN — Medication 2: at 08:59

## 2017-10-26 RX ADMIN — BUDESONIDE AND FORMOTEROL FUMARATE DIHYDRATE 2 PUFF(S): 160; 4.5 AEROSOL RESPIRATORY (INHALATION) at 17:03

## 2017-10-26 RX ADMIN — TAMSULOSIN HYDROCHLORIDE 0.4 MILLIGRAM(S): 0.4 CAPSULE ORAL at 22:21

## 2017-10-26 RX ADMIN — PIPERACILLIN AND TAZOBACTAM 25 GRAM(S): 4; .5 INJECTION, POWDER, LYOPHILIZED, FOR SOLUTION INTRAVENOUS at 05:20

## 2017-10-26 RX ADMIN — VALSARTAN 160 MILLIGRAM(S): 80 TABLET ORAL at 05:21

## 2017-10-26 NOTE — PROGRESS NOTE ADULT - ASSESSMENT
Pancreatic mass suspected malignancy, had a bx and results pending.  Had an ERCP with stent but requires more drainage.  Scheduled for tomorrow, but per GI they may delay procedure until pathology is obtained.  Tren bilirubin which is slightly lower today.  If he does have a malignancy, the evaluation thus far would suggest that it is localized so that hopefully he could have a Whipple.  he has been seen by surgical oncology.      Anemia likely AOCD.  Mild and would monitor for now.

## 2017-10-26 NOTE — PROGRESS NOTE ADULT - SUBJECTIVE AND OBJECTIVE BOX
Chief Complaint:  Patient is a 61y old  Male who presents with a chief complaint of jaundice (23 Oct 2017 21:50)      Interval Events: Patient had EUS with FNA yesterday. ERCP was technically difficult due to distorted anatomy. PD stent was placed but CBD could not be cannulated.     Patient with epigastric soreness today. No fevers, chills, nausea or vomiting. He tolerated liquids this AM.     Allergies:  No Known Allergies      Hospital Medications:  ALBUTerol    90 MICROgram(s) HFA Inhaler 2 Puff(s) Inhalation every 6 hours PRN  buDESOnide  80 MICROgram(s)/formoterol 4.5 MICROgram(s) Inhaler 2 Puff(s) Inhalation two times a day  dextrose 5%. 1000 milliLiter(s) IV Continuous <Continuous>  dextrose 50% Injectable 12.5 Gram(s) IV Push once  dextrose 50% Injectable 25 Gram(s) IV Push once  dextrose 50% Injectable 25 Gram(s) IV Push once  dextrose Gel 1 Dose(s) Oral once PRN  glucagon  Injectable 1 milliGRAM(s) IntraMuscular once PRN  insulin lispro (HumaLOG) corrective regimen sliding scale   SubCutaneous three times a day before meals  insulin lispro (HumaLOG) corrective regimen sliding scale   SubCutaneous at bedtime  pantoprazole    Tablet 40 milliGRAM(s) Oral before breakfast  piperacillin/tazobactam IVPB. 3.375 Gram(s) IV Intermittent once  piperacillin/tazobactam IVPB. 3.375 Gram(s) IV Intermittent every 8 hours  tamsulosin 0.4 milliGRAM(s) Oral at bedtime  valsartan 160 milliGRAM(s) Oral daily      PMHX/PSHX:  HTN (hypertension)  Diabetes  High cholesterol  No significant past surgical history      Family history:  Family history of colon cancer (Mother)  No pertinent family history in first degree relatives      ROS:     General:  No wt loss, fevers, chills, night sweats, fatigue   Eyes:  Good vision, no reported pain  ENT:  (+) sore throat, pain, runny nose  CV:  No chest pain, palpitations  Resp:  No cough, wheezing, SOB  GI:  See HPI  :  No pain, bleeding, incontinence, nocturia  Muscle:  No pain, weakness  Neuro:  No weakness, tingling, memory problems  Psych:  No fatigue, insomnia, mood problems, depression  Endocrine:  No cold/heat intolerance  Heme:  No petechiae, ecchymosis, easy bruising  Skin:  jaundice      PHYSICAL EXAM:   Vital Signs:  Vital Signs Last 24 Hrs  T(C): 36.6 (26 Oct 2017 07:52), Max: 37.2 (25 Oct 2017 19:22)  T(F): 97.8 (26 Oct 2017 07:52), Max: 99 (25 Oct 2017 19:22)  HR: 78 (26 Oct 2017 07:52) (78 - 94)  BP: 104/59 (26 Oct 2017 07:52) (98/61 - 119/70)  BP(mean): --  RR: 18 (26 Oct 2017 07:52) (16 - 18)  SpO2: 94% (26 Oct 2017 07:52) (94% - 97%)  Daily     Daily     GENERAL:  NAD  HEENT:  scleral icterus  CHEST:  no respiratory distress, CTAB  HEART:  RRR, no MRG, no edema  ABDOMEN:  Soft, non-tender, non-distended, no masses , no hepato-splenomegaly,   EXTREMITIES:  no cyanosis, no edema  SKIN:  Jaundice  NEURO:  Alert, oriented x3    LABS:                        11.8   8.4   )-----------( 303      ( 26 Oct 2017 08:40 )             37.7     10-26    134<L>  |  96  |  17  ----------------------------<  204<H>  4.6   |  23  |  1.18    Ca    9.4      26 Oct 2017 08:32    TPro  6.5  /  Alb  2.8<L>  /  TBili  16.8<H>  /  DBili  x   /  AST  135<H>  /  ALT  143<H>  /  AlkPhos  728<H>  10-26    LIVER FUNCTIONS - ( 26 Oct 2017 08:32 )  Alb: 2.8 g/dL / Pro: 6.5 g/dL / ALK PHOS: 728 U/L / ALT: 143 U/L / AST: 135 U/L / GGT: x                 Imaging:

## 2017-10-26 NOTE — PROGRESS NOTE ADULT - SUBJECTIVE AND OBJECTIVE BOX
Patient is a 61y old  Male who presents with a chief complaint of jaundice (23 Oct 2017 21:50)                                                               INTERVAL HPI/OVERNIGHT EVENTS:    REVIEW OF SYSTEMS:     CONSTITUTIONAL: No weakness, fevers or chills  EYES/ENT: No visual changes ,   RESPIRATORY: No cough, wheezing,  No shortness of breath  CARDIOVASCULAR: No chest pain or palpitations  GASTROINTESTINAL: No abdominal pain  . No nausea, vomiting, no BM today   GENITOURINARY: No dysuria, frequency or hematuria  NEUROLOGICAL: No numbness or weakness                                                                                                                                                                                                                                                                                    Medications:  MEDICATIONS  (STANDING):  buDESOnide  80 MICROgram(s)/formoterol 4.5 MICROgram(s) Inhaler 2 Puff(s) Inhalation two times a day  dextrose 5%. 1000 milliLiter(s) (50 mL/Hr) IV Continuous <Continuous>  dextrose 50% Injectable 12.5 Gram(s) IV Push once  dextrose 50% Injectable 25 Gram(s) IV Push once  dextrose 50% Injectable 25 Gram(s) IV Push once  insulin lispro (HumaLOG) corrective regimen sliding scale   SubCutaneous three times a day before meals  insulin lispro (HumaLOG) corrective regimen sliding scale   SubCutaneous at bedtime  pantoprazole    Tablet 40 milliGRAM(s) Oral before breakfast  piperacillin/tazobactam IVPB. 3.375 Gram(s) IV Intermittent once  piperacillin/tazobactam IVPB. 3.375 Gram(s) IV Intermittent every 8 hours  tamsulosin 0.4 milliGRAM(s) Oral at bedtime  valsartan 160 milliGRAM(s) Oral daily    MEDICATIONS  (PRN):  ALBUTerol    90 MICROgram(s) HFA Inhaler 2 Puff(s) Inhalation every 6 hours PRN sob  dextrose Gel 1 Dose(s) Oral once PRN Blood Glucose LESS THAN 70 milliGRAM(s)/deciliter  glucagon  Injectable 1 milliGRAM(s) IntraMuscular once PRN Glucose LESS THAN 70 milligrams/deciliter  simethicone 80 milliGRAM(s) Chew four times a day PRN Gas       Allergies    No Known Allergies    Intolerances      Vital Signs Last 24 Hrs  T(C): 36.7 (26 Oct 2017 15:06), Max: 37.2 (25 Oct 2017 19:22)  T(F): 98.1 (26 Oct 2017 15:06), Max: 99 (25 Oct 2017 19:22)  HR: 83 (26 Oct 2017 15:06) (78 - 94)  BP: 105/64 (26 Oct 2017 15:06) (98/61 - 119/70)  BP(mean): --  RR: 18 (26 Oct 2017 15:06) (16 - 18)  SpO2: 95% (26 Oct 2017 15:06) (94% - 97%)  CAPILLARY BLOOD GLUCOSE  261 (26 Oct 2017 11:50)  217 (26 Oct 2017 07:52)      POCT Blood Glucose.: 310 mg/dL (26 Oct 2017 16:57)  POCT Blood Glucose.: 261 mg/dL (26 Oct 2017 11:50)  POCT Blood Glucose.: 217 mg/dL (26 Oct 2017 07:52)  POCT Blood Glucose.: 265 mg/dL (25 Oct 2017 22:09)  POCT Blood Glucose.: 255 mg/dL (25 Oct 2017 19:21)      10-25 @ 07:01  -  10-26 @ 07:00  --------------------------------------------------------  IN: 940 mL / OUT: 302 mL / NET: 638 mL    10-26 @ 07:01  -  10-26 @ 19:15  --------------------------------------------------------  IN: 350 mL / OUT: 0 mL / NET: 350 mL      Physical Exam:   General:  Well appearing, NAD  HEENT:  icterus  PERRLA  CV:  RRR, S1S2   Lungs:  CTA B/L, no wheezes, rales, rhonchi  Abdomen:  Soft, non-tender, no distended, positive BS  Extremities:  2+ pulses, no c/c, no edema  Skin:  Warm and dry, no rashes  :  No newberry  Neuro:  AAOx3, non-focal, grossly intact                                                                                                                                                                                                                                                                                                LABS:                               11.8   8.4   )-----------( 303      ( 26 Oct 2017 08:40 )             37.7                      10-26    134<L>  |  96  |  17  ----------------------------<  204<H>  4.6   |  23  |  1.18    Ca    9.4      26 Oct 2017 08:32    TPro  6.5  /  Alb  2.8<L>  /  TBili  16.8<H>  /  DBili  x   /  AST  135<H>  /  ALT  143<H>  /  AlkPhos  728<H>  10-26

## 2017-10-26 NOTE — PROGRESS NOTE ADULT - SUBJECTIVE AND OBJECTIVE BOX
Patient is a 61y old  Male who presents with a chief complaint of jaundice (23 Oct 2017 21:50)    No new complaints today.  eating.  Urine still dark.    Denies fevers, chills, sweats, HA, dizziness, nosebleeds, sore throat, CP, SOB, cough, wheeze, N/V/D, dysuria, leg pains, swelling      Medication:   ALBUTerol    90 MICROgram(s) HFA Inhaler 2 Puff(s) Inhalation every 6 hours PRN  buDESOnide  80 MICROgram(s)/formoterol 4.5 MICROgram(s) Inhaler 2 Puff(s) Inhalation two times a day  dextrose 5%. 1000 milliLiter(s) IV Continuous <Continuous>  dextrose 50% Injectable 12.5 Gram(s) IV Push once  dextrose 50% Injectable 25 Gram(s) IV Push once  dextrose 50% Injectable 25 Gram(s) IV Push once  dextrose Gel 1 Dose(s) Oral once PRN  glucagon  Injectable 1 milliGRAM(s) IntraMuscular once PRN  insulin lispro (HumaLOG) corrective regimen sliding scale   SubCutaneous three times a day before meals  insulin lispro (HumaLOG) corrective regimen sliding scale   SubCutaneous at bedtime  pantoprazole    Tablet 40 milliGRAM(s) Oral before breakfast  piperacillin/tazobactam IVPB. 3.375 Gram(s) IV Intermittent once  piperacillin/tazobactam IVPB. 3.375 Gram(s) IV Intermittent every 8 hours  simethicone 80 milliGRAM(s) Chew four times a day PRN  tamsulosin 0.4 milliGRAM(s) Oral at bedtime  valsartan 160 milliGRAM(s) Oral daily      Physical exam    T(C): 36.7 (10-26-17 @ 15:06), Max: 36.9 (10-25-17 @ 23:57)  HR: 83 (10-26-17 @ 15:06) (78 - 94)  BP: 105/64 (10-26-17 @ 15:06) (104/59 - 119/70)  RR: 18 (10-26-17 @ 15:06) (16 - 18)  SpO2: 95% (10-26-17 @ 15:06) (94% - 97%)  Wt(kg): --    alert NAD  EOMI scleral icterus  Juandice skin  Neck Supple No LNA  Cv s1 S2 RRR  Lungs clear B/L  abd soft NT ND +BS  No LE edema or tenderness    Labs                        11.8   8.4   )-----------( 303      ( 26 Oct 2017 08:40 )             37.7       10-26    134<L>  |  96  |  17  ----------------------------<  204<H>  4.6   |  23  |  1.18    Ca    9.4      26 Oct 2017 08:32    TPro  6.5  /  Alb  2.8<L>  /  TBili  16.8<H>  /  DBili  x   /  AST  135<H>  /  ALT  143<H>  /  AlkPhos  728<H>  10-26      LIVER FUNCTIONS - ( 26 Oct 2017 08:32 )  Alb: 2.8 g/dL / Pro: 6.5 g/dL / ALK PHOS: 728 U/L / ALT: 143 U/L / AST: 135 U/L / GGT: x             [id number]

## 2017-10-26 NOTE — PROGRESS NOTE ADULT - ASSESSMENT
60 y/o  male with hx of DM , HTN admitted for jaundice that has been ongoing for the past two month,.  Pt reports that he had intermittent pain in R flank and  that moved to RUQ and later localized t epigastric area however disappeared over the past two weeks.  No objective fever or chills   no N/V/d   no weight loss   Denies ETOH  recent travel   was seen as o/p and had w/u with blood work and with US .. LFT including bili were elevated howerver US neg for obstruction  no new medications ( though some of his medications were dced including Glyxsambi )    + dark urine (/frequent .  + light colored stool   CT: Mass of the head of the pancreas causing marked distention of the common   bile duct, intrahepatic biliary duct dilatation, and pancreatic ductal   dilatation. Further evaluation with contrast-enhanced MRI is recommended.  1- obstructive Jaundice : sec to panreatic mass ..  - MRI abd: < from: MRI Abdomen w/wo Cont (10.26.17 @ 14:14) >  A long segment distal CBD stricture with severe biliary dilatation highly   suspicious for neoplasm.    - CT chest to evaluate for distant mets: negative     s/p ERCP : < from: ERCP (10.25.17 @ 16:10) >  - One plastic stent was placed into the ventral pancreatic duct. Biliary                        cannulation not possible despite various attmepts and variations in technique.  Recommendation:      - Repeat ERCP for retreatment. planned tommrrow                     - Consider EUS guided rendez vous versus external IR drain.                       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 and now with elevated LFT   hold DVT prophylxis for now     discussed with pt   d/w with NP

## 2017-10-26 NOTE — PROGRESS NOTE ADULT - PROBLEM SELECTOR PLAN 1
- f/u pathology from [facility]  - diet today as tolerated  - NPO after midnight for EUS rondezvous procedure - f/u pathology from [facility]NA  - diet today as tolerated  - NPO after midnight for EUS Rendezvous procedure

## 2017-10-26 NOTE — PROGRESS NOTE ADULT - ASSESSMENT
60 yo man with jaundice, found to have pancreatic head mass s/p EUS/FNA and ERCP with placement of PD stent but CBD could not be accessed due to distorted anatomy. Plan for EUS guided rondezvous procedure tomorrow. 60 yo man with jaundice, found to have pancreatic head mass s/p EUS/FNA and ERCP with placement of PD stent but CBD could not be accessed due to distorted anatomy. Plan for EUS guided Rendezvous procedure tomorrow.

## 2017-10-26 NOTE — CONSULT NOTE ADULT - ASSESSMENT
61M presents with two months of painless jaundice found to have a pancreatic head mass    -plan for repeat ERCP tomorrow to re-attempt biliary cannulation  -awaiting results of cytology, specimen received  -possibly candidate for a Whipple procedure pending medical clearance and cytology results  -discussed with Dr. Curry, will review imaging and pathology when it arrives  -surgical oncology will follow 61M presents with two months of painless jaundice found to have a pancreatic head mass    -plan for rendezvous procedure tomorrow  -awaiting results of cytology, specimen received  -possibly candidate for a Whipple procedure pending medical clearance and cytology results  -discussed with Dr. Curry, will review imaging and pathology when it arrives  -surgical oncology will follow

## 2017-10-27 LAB
ALBUMIN SERPL ELPH-MCNC: 3.1 G/DL — LOW (ref 3.3–5)
ALP SERPL-CCNC: 722 U/L — HIGH (ref 40–120)
ALT FLD-CCNC: 149 U/L — HIGH (ref 10–45)
ANION GAP SERPL CALC-SCNC: 14 MMOL/L — SIGNIFICANT CHANGE UP (ref 5–17)
AST SERPL-CCNC: 139 U/L — HIGH (ref 10–40)
BILIRUB SERPL-MCNC: 17.3 MG/DL — HIGH (ref 0.2–1.2)
BUN SERPL-MCNC: 13 MG/DL — SIGNIFICANT CHANGE UP (ref 7–23)
CALCIUM SERPL-MCNC: 9.4 MG/DL — SIGNIFICANT CHANGE UP (ref 8.4–10.5)
CHLORIDE SERPL-SCNC: 98 MMOL/L — SIGNIFICANT CHANGE UP (ref 96–108)
CO2 SERPL-SCNC: 24 MMOL/L — SIGNIFICANT CHANGE UP (ref 22–31)
CREAT SERPL-MCNC: 1.01 MG/DL — SIGNIFICANT CHANGE UP (ref 0.5–1.3)
GLUCOSE BLDC GLUCOMTR-MCNC: 178 MG/DL — HIGH (ref 70–99)
GLUCOSE BLDC GLUCOMTR-MCNC: 207 MG/DL — HIGH (ref 70–99)
GLUCOSE BLDC GLUCOMTR-MCNC: 214 MG/DL — HIGH (ref 70–99)
GLUCOSE BLDC GLUCOMTR-MCNC: 218 MG/DL — HIGH (ref 70–99)
GLUCOSE SERPL-MCNC: 210 MG/DL — HIGH (ref 70–99)
HCT VFR BLD CALC: 37.1 % — LOW (ref 39–50)
HGB BLD-MCNC: 11.6 G/DL — LOW (ref 13–17)
MCHC RBC-ENTMCNC: 30.9 PG — SIGNIFICANT CHANGE UP (ref 27–34)
MCHC RBC-ENTMCNC: 31.3 GM/DL — LOW (ref 32–36)
MCV RBC AUTO: 98.7 FL — SIGNIFICANT CHANGE UP (ref 80–100)
PLATELET # BLD AUTO: 326 K/UL — SIGNIFICANT CHANGE UP (ref 150–400)
POTASSIUM SERPL-MCNC: 4.2 MMOL/L — SIGNIFICANT CHANGE UP (ref 3.5–5.3)
POTASSIUM SERPL-SCNC: 4.2 MMOL/L — SIGNIFICANT CHANGE UP (ref 3.5–5.3)
PROT SERPL-MCNC: 6.7 G/DL — SIGNIFICANT CHANGE UP (ref 6–8.3)
RBC # BLD: 3.76 M/UL — LOW (ref 4.2–5.8)
RBC # FLD: 15.1 % — HIGH (ref 10.3–14.5)
SODIUM SERPL-SCNC: 136 MMOL/L — SIGNIFICANT CHANGE UP (ref 135–145)
WBC # BLD: 7.78 K/UL — SIGNIFICANT CHANGE UP (ref 3.8–10.5)
WBC # FLD AUTO: 7.78 K/UL — SIGNIFICANT CHANGE UP (ref 3.8–10.5)

## 2017-10-27 PROCEDURE — 99232 SBSQ HOSP IP/OBS MODERATE 35: CPT | Mod: GC

## 2017-10-27 RX ORDER — ENOXAPARIN SODIUM 100 MG/ML
40 INJECTION SUBCUTANEOUS DAILY
Qty: 0 | Refills: 0 | Status: COMPLETED | OUTPATIENT
Start: 2017-10-27 | End: 2017-10-29

## 2017-10-27 RX ADMIN — BUDESONIDE AND FORMOTEROL FUMARATE DIHYDRATE 2 PUFF(S): 160; 4.5 AEROSOL RESPIRATORY (INHALATION) at 18:31

## 2017-10-27 RX ADMIN — PANTOPRAZOLE SODIUM 40 MILLIGRAM(S): 20 TABLET, DELAYED RELEASE ORAL at 18:31

## 2017-10-27 RX ADMIN — PIPERACILLIN AND TAZOBACTAM 25 GRAM(S): 4; .5 INJECTION, POWDER, LYOPHILIZED, FOR SOLUTION INTRAVENOUS at 21:33

## 2017-10-27 RX ADMIN — PIPERACILLIN AND TAZOBACTAM 200 GRAM(S): 4; .5 INJECTION, POWDER, LYOPHILIZED, FOR SOLUTION INTRAVENOUS at 21:38

## 2017-10-27 RX ADMIN — Medication 2: at 08:38

## 2017-10-27 RX ADMIN — ENOXAPARIN SODIUM 40 MILLIGRAM(S): 100 INJECTION SUBCUTANEOUS at 18:35

## 2017-10-27 RX ADMIN — Medication 2: at 18:34

## 2017-10-27 RX ADMIN — VALSARTAN 160 MILLIGRAM(S): 80 TABLET ORAL at 05:59

## 2017-10-27 RX ADMIN — TAMSULOSIN HYDROCHLORIDE 0.4 MILLIGRAM(S): 0.4 CAPSULE ORAL at 21:33

## 2017-10-27 RX ADMIN — PIPERACILLIN AND TAZOBACTAM 25 GRAM(S): 4; .5 INJECTION, POWDER, LYOPHILIZED, FOR SOLUTION INTRAVENOUS at 05:59

## 2017-10-27 RX ADMIN — BUDESONIDE AND FORMOTEROL FUMARATE DIHYDRATE 2 PUFF(S): 160; 4.5 AEROSOL RESPIRATORY (INHALATION) at 05:59

## 2017-10-27 RX ADMIN — PIPERACILLIN AND TAZOBACTAM 25 GRAM(S): 4; .5 INJECTION, POWDER, LYOPHILIZED, FOR SOLUTION INTRAVENOUS at 16:05

## 2017-10-27 RX ADMIN — Medication 2: at 11:56

## 2017-10-27 NOTE — PROGRESS NOTE ADULT - ASSESSMENT
60 y/o M with obstructive jaundice.    Impression:  1) Obstructive Jaundice - due to pancreatic head mass s/p EUS/FNA and ERCP with placement of PD stent, but CBD could not be accessed due to distorted anatomy.     Plan:  - F/u cytology/path results  - Plan for EUS assisted ERCP (Rendezvous) ~ tentatively on Mon or Tues  - Keep npo after midnight on Sunday night for possible procedure  - Monitor liver enzymes

## 2017-10-27 NOTE — PROGRESS NOTE ADULT - SUBJECTIVE AND OBJECTIVE BOX
Patient is a 61y old  Male who presents with a chief complaint of jaundice (23 Oct 2017 21:50)    Feels about the same today.  Mild soreness in abdomen.  NPO today for possible biliary stent or drain placement.  Urine still dark, but slightly lighter.    Denies fevers, chill, sweats, HA, dizziness, nosebleeds, sore throat, CP, palps, SOB, cough, wheeze, hemoptysis, N/V/D, BRBPR, dysuria, hematuria, leg pain, swelling, pruritis.      Medication:   ALBUTerol    90 MICROgram(s) HFA Inhaler 2 Puff(s) Inhalation every 6 hours PRN  buDESOnide  80 MICROgram(s)/formoterol 4.5 MICROgram(s) Inhaler 2 Puff(s) Inhalation two times a day  dextrose 5%. 1000 milliLiter(s) IV Continuous <Continuous>  dextrose 50% Injectable 12.5 Gram(s) IV Push once  dextrose 50% Injectable 25 Gram(s) IV Push once  dextrose 50% Injectable 25 Gram(s) IV Push once  dextrose Gel 1 Dose(s) Oral once PRN  glucagon  Injectable 1 milliGRAM(s) IntraMuscular once PRN  insulin lispro (HumaLOG) corrective regimen sliding scale   SubCutaneous three times a day before meals  insulin lispro (HumaLOG) corrective regimen sliding scale   SubCutaneous at bedtime  pantoprazole    Tablet 40 milliGRAM(s) Oral before breakfast  piperacillin/tazobactam IVPB. 3.375 Gram(s) IV Intermittent once  piperacillin/tazobactam IVPB. 3.375 Gram(s) IV Intermittent every 8 hours  simethicone 80 milliGRAM(s) Chew four times a day PRN  tamsulosin 0.4 milliGRAM(s) Oral at bedtime  valsartan 160 milliGRAM(s) Oral daily      Physical exam    T(C): 37 (10-27-17 @ 08:19), Max: 37 (10-27-17 @ 08:19)  HR: 77 (10-27-17 @ 08:19) (72 - 83)  BP: 93/51 (10-27-17 @ 08:19) (93/51 - 113/65)  RR: 18 (10-27-17 @ 08:19) (18 - 18)  SpO2: 95% (10-27-17 @ 08:19) (94% - 95%)  Wt(kg): --    alert NAD  skin jaundiced  EOMI scleral icterus  Neck Supple No LNA  Cv s1 S2 RRR  Lungs clear B/L  abd soft NT ND +BS  No LE edema or tenderness    Labs                      11.6   7.78  )-----------( 326      ( 27 Oct 2017 08:26 )             37.1       10-26    134<L>  |  96  |  17  ----------------------------<  204<H>  4.6   |  23  |  1.18    Ca    9.4      26 Oct 2017 08:32    TPro  6.5  /  Alb  2.8<L>  /  TBili  16.8<H>  /  DBili  x   /  AST  135<H>  /  ALT  143<H>  /  AlkPhos  728<H>  10-26      LIVER FUNCTIONS - ( 26 Oct 2017 08:32 )  Alb: 2.8 g/dL / Pro: 6.5 g/dL / ALK PHOS: 728 U/L / ALT: 143 U/L / AST: 135 U/L / GGT: x             [id number]

## 2017-10-27 NOTE — PROGRESS NOTE ADULT - ASSESSMENT
62 y/o  male with hx of DM , HTN admitted for jaundice that has been ongoing for the past two month,.  Pt reports that he had intermittent pain in R flank and  that moved to RUQ and later localized t epigastric area however disappeared over the past two weeks.  No objective fever or chills   no N/V/d   no weight loss   Denies ETOH  recent travel   was seen as o/p and had w/u with blood work and with US .. LFT including bili were elevated howerver US neg for obstruction  no new medications ( though some of his medications were dced including Glyxsambi )    + dark urine (/frequent .  + light colored stool   CT: Mass of the head of the pancreas causing marked distention of the common   bile duct, intrahepatic biliary duct dilatation, and pancreatic ductal   dilatation. Further evaluation with contrast-enhanced MRI is recommended.  1- obstructive Jaundice : sec to panreatic mass ..  - MRI abd: < from: MRI Abdomen w/wo Cont (10.26.17 @ 14:14) >  A long segment distal CBD stricture with severe biliary dilatation highly   suspicious for neoplasm.    - CT chest to evaluate for distant mets: negative     s/p ERCP : < from: ERCP (10.25.17 @ 16:10) >  - One plastic stent was placed into the ventral pancreatic duct. Biliary                        cannulation not possible despite various attmepts and variations in technique.  Recommendation:      - Repeat ERCP for retreatment. planned tommrrow                     - EUS guided rendezvous planned .. d/w Dr. Verdugo at length .. will hold off till path report since this will likley change approach ( plastic stent vs metal vs no stent)                        - PPI for now                       - cpnt  Zosyn IV                   Surgical consult appreciated : plan for rendezvous procedure tomorrow  -awaiting results of cytology, specimen received  -possibly candidate for a Whipple procedure pending medical clearance and cytology results  - d/w Dr Dumont : appreeciate input    f/u pathology     2- DM: hold PO meds and monitor FS   3- HTN : monitor BP cont meds  4- HLD : off lipitor due to myoipathy >? and now with elevated LFT    DVT prophylxis   discussed with pt at length and with daughter on phone    answered all concerns/ questions   d/w with NP

## 2017-10-27 NOTE — PROGRESS NOTE ADULT - SUBJECTIVE AND OBJECTIVE BOX
Patient is a 61y old  Male who presents with a chief complaint of jaundice (23 Oct 2017 21:50)                                                               INTERVAL HPI/OVERNIGHT EVENTS:    REVIEW OF SYSTEMS:     CONSTITUTIONAL: No weakness, fevers or chills  RESPIRATORY: No cough, wheezing,  No shortness of breath  CARDIOVASCULAR: No chest pain or palpitations  GASTROINTESTINAL: No abdominal pain  . No nausea, vomiting, or hematemesis; No BM today   GENITOURINARY: No dysuria, frequency or hematuria  NEUROLOGICAL: No numbness or weakness  SKIN: No itching, burning, rashes, or lesions                                                                                                                                                                                                                                                                                   Medications:  MEDICATIONS  (STANDING):  buDESOnide  80 MICROgram(s)/formoterol 4.5 MICROgram(s) Inhaler 2 Puff(s) Inhalation two times a day  dextrose 5%. 1000 milliLiter(s) (50 mL/Hr) IV Continuous <Continuous>  dextrose 50% Injectable 12.5 Gram(s) IV Push once  dextrose 50% Injectable 25 Gram(s) IV Push once  dextrose 50% Injectable 25 Gram(s) IV Push once  insulin lispro (HumaLOG) corrective regimen sliding scale   SubCutaneous three times a day before meals  insulin lispro (HumaLOG) corrective regimen sliding scale   SubCutaneous at bedtime  pantoprazole    Tablet 40 milliGRAM(s) Oral before breakfast  piperacillin/tazobactam IVPB. 3.375 Gram(s) IV Intermittent once  piperacillin/tazobactam IVPB. 3.375 Gram(s) IV Intermittent every 8 hours  tamsulosin 0.4 milliGRAM(s) Oral at bedtime  valsartan 160 milliGRAM(s) Oral daily    MEDICATIONS  (PRN):  ALBUTerol    90 MICROgram(s) HFA Inhaler 2 Puff(s) Inhalation every 6 hours PRN sob  dextrose Gel 1 Dose(s) Oral once PRN Blood Glucose LESS THAN 70 milliGRAM(s)/deciliter  glucagon  Injectable 1 milliGRAM(s) IntraMuscular once PRN Glucose LESS THAN 70 milligrams/deciliter  simethicone 80 milliGRAM(s) Chew four times a day PRN Gas       Allergies    No Known Allergies    Intolerances      Vital Signs Last 24 Hrs  T(C): 36.7 (27 Oct 2017 16:38), Max: 37 (27 Oct 2017 08:19)  T(F): 98 (27 Oct 2017 16:38), Max: 98.6 (27 Oct 2017 08:19)  HR: 86 (27 Oct 2017 16:38) (72 - 86)  BP: 113/73 (27 Oct 2017 16:38) (93/51 - 113/73)  BP(mean): --  RR: 18 (27 Oct 2017 16:38) (18 - 18)  SpO2: 95% (27 Oct 2017 16:38) (94% - 95%)  CAPILLARY BLOOD GLUCOSE  214 (27 Oct 2017 11:46)  218 (27 Oct 2017 08:19)  257 (26 Oct 2017 22:06)      POCT Blood Glucose.: 214 mg/dL (27 Oct 2017 11:46)  POCT Blood Glucose.: 218 mg/dL (27 Oct 2017 08:19)  POCT Blood Glucose.: 274 mg/dL (26 Oct 2017 21:55)      10-26 @ 07:01  -  10-27 @ 07:00  --------------------------------------------------------  IN: 350 mL / OUT: 0 mL / NET: 350 mL    10-27 @ 07:01  -  10-27 @ 17:18  --------------------------------------------------------  IN: 500 mL / OUT: 450 mL / NET: 50 mL      Physical Exam:    General:  Well appearing, NAD.   Icterus   HEENT:  Nonicteric, PERRLA  CV:  RRR, S1S2   Lungs:  CTA B/L, no wheezes, rales, rhonchi  Abdomen:  Soft, non-tender, no distended, positive BS  Extremities:  2+ pulses, no c/c, no edema  Skin:  Warm and dry, no rashes  :  No newberry  Neuro:  AAOx3, non-focal, grossly intact                                                                                                                                                                                                                                                                                                LABS:                               11.6   7.78  )-----------( 326      ( 27 Oct 2017 08:26 )             37.1                      10-27    136  |  98  |  13  ----------------------------<  210<H>  4.2   |  24  |  1.01    Ca    9.4      27 Oct 2017 08:42    TPro  6.7  /  Alb  3.1<L>  /  TBili  17.3<H>  /  DBili  x   /  AST  139<H>  /  ALT  149<H>  /  AlkPhos  722<H>  10-27                       RADIOLOGY & ADDITIONAL TESTS         I personally reviewed: [  ]EKG   [  ]CXR    [  ] CT      A/P:         Discussed with :     Oniel consultants' Notes   Time spent :

## 2017-10-27 NOTE — PROGRESS NOTE ADULT - SUBJECTIVE AND OBJECTIVE BOX
SUBJECTIVE:  - Pt denies fever or chills  - Denies abdominal pain, nausea or vomiting    OBJECTIVE:    Vital Signs Last 24 Hrs  T(C): 36.7 (27 Oct 2017 16:38), Max: 37 (27 Oct 2017 08:19)  T(F): 98 (27 Oct 2017 16:38), Max: 98.6 (27 Oct 2017 08:19)  HR: 86 (27 Oct 2017 16:38) (72 - 86)  BP: 113/73 (27 Oct 2017 16:38) (93/51 - 113/73)  BP(mean): --  RR: 18 (27 Oct 2017 16:38) (18 - 18)  SpO2: 95% (27 Oct 2017 16:38) (94% - 95%)    PHYSICAL EXAM:  Constitutional: no acute distress  Eyes: icterus  Neck: no masses, no LAD  Respiratory: normal inspiratory effort; no wheezing or crackles  Cardiovascular: RRR, normal S1/S2, no murmurs/rubs/gallops  Gastrointestinal: soft, nondistended, nontender, +BS  Extremities: no LE edema  Neurological: AAOx3, no asterixis  Skin: jaundice    LABS:                        11.6   7.78  )-----------( 326      ( 27 Oct 2017 08:26 )             37.1     136  |  98  |  13  ----------------------------<  210<H>  4.2   |  24  |  1.01    Ca    9.4      27 Oct 2017 08:42  TPro  6.7  /  Alb  3.1<L>  /  TBili  17.3<H>  /  DBili  x   /  AST  139<H>  /  ALT  149<H>  /  AlkPhos  722<H>  10-27  LIVER FUNCTIONS - ( 27 Oct 2017 08:42 )  Alb: 3.1 g/dL / Pro: 6.7 g/dL / ALK PHOS: 722 U/L / ALT: 149 U/L / AST: 139 U/L / GGT: x

## 2017-10-27 NOTE — PROGRESS NOTE ADULT - ASSESSMENT
Patient with obstructive juandice, pancreatic head mass, suspected malignancy but still awaiting pathology of bx.  If he does have pancreatic cancer he appears to have localized disease and will have a possible Whipple.  Surg Onc is following.  Still needs further bilary drainage.  GI is following.  Anemia likely AOCD.  Hgb adequate and would monitor.

## 2017-10-28 LAB
GLUCOSE BLDC GLUCOMTR-MCNC: 185 MG/DL — HIGH (ref 70–99)
GLUCOSE BLDC GLUCOMTR-MCNC: 193 MG/DL — HIGH (ref 70–99)
GLUCOSE BLDC GLUCOMTR-MCNC: 245 MG/DL — HIGH (ref 70–99)
GLUCOSE BLDC GLUCOMTR-MCNC: 270 MG/DL — HIGH (ref 70–99)

## 2017-10-28 RX ORDER — IBUPROFEN 200 MG
400 TABLET ORAL ONCE
Qty: 0 | Refills: 0 | Status: COMPLETED | OUTPATIENT
Start: 2017-10-28 | End: 2017-10-28

## 2017-10-28 RX ADMIN — VALSARTAN 160 MILLIGRAM(S): 80 TABLET ORAL at 05:49

## 2017-10-28 RX ADMIN — Medication 400 MILLIGRAM(S): at 22:55

## 2017-10-28 RX ADMIN — PANTOPRAZOLE SODIUM 40 MILLIGRAM(S): 20 TABLET, DELAYED RELEASE ORAL at 05:49

## 2017-10-28 RX ADMIN — Medication 2: at 13:16

## 2017-10-28 RX ADMIN — Medication 2: at 17:29

## 2017-10-28 RX ADMIN — BUDESONIDE AND FORMOTEROL FUMARATE DIHYDRATE 2 PUFF(S): 160; 4.5 AEROSOL RESPIRATORY (INHALATION) at 17:04

## 2017-10-28 RX ADMIN — BUDESONIDE AND FORMOTEROL FUMARATE DIHYDRATE 2 PUFF(S): 160; 4.5 AEROSOL RESPIRATORY (INHALATION) at 05:49

## 2017-10-28 RX ADMIN — PIPERACILLIN AND TAZOBACTAM 25 GRAM(S): 4; .5 INJECTION, POWDER, LYOPHILIZED, FOR SOLUTION INTRAVENOUS at 13:17

## 2017-10-28 RX ADMIN — PIPERACILLIN AND TAZOBACTAM 25 GRAM(S): 4; .5 INJECTION, POWDER, LYOPHILIZED, FOR SOLUTION INTRAVENOUS at 22:42

## 2017-10-28 RX ADMIN — TAMSULOSIN HYDROCHLORIDE 0.4 MILLIGRAM(S): 0.4 CAPSULE ORAL at 22:42

## 2017-10-28 RX ADMIN — ENOXAPARIN SODIUM 40 MILLIGRAM(S): 100 INJECTION SUBCUTANEOUS at 11:52

## 2017-10-28 RX ADMIN — PIPERACILLIN AND TAZOBACTAM 25 GRAM(S): 4; .5 INJECTION, POWDER, LYOPHILIZED, FOR SOLUTION INTRAVENOUS at 05:49

## 2017-10-28 RX ADMIN — Medication 1: at 09:03

## 2017-10-28 RX ADMIN — Medication 400 MILLIGRAM(S): at 22:42

## 2017-10-28 NOTE — PROGRESS NOTE ADULT - SUBJECTIVE AND OBJECTIVE BOX
Patient is a 61y old  Male who presents with a chief complaint of jaundice (23 Oct 2017 21:50)    No new issues.  has mild right sided abdominal discomfort and back pain with eating.  urine remains dark.  he is ambulating.    Denies fevers, chills, sweats, HA, dizziness, nosebleeds, sore throat, CP, SOB, cough, wheeze, hemoptysis, N/V/D, dysuria, hematuria, leg pain, swelling, pruritis.      Medication:   ALBUTerol    90 MICROgram(s) HFA Inhaler 2 Puff(s) Inhalation every 6 hours PRN  buDESOnide  80 MICROgram(s)/formoterol 4.5 MICROgram(s) Inhaler 2 Puff(s) Inhalation two times a day  dextrose 5%. 1000 milliLiter(s) IV Continuous <Continuous>  dextrose 50% Injectable 12.5 Gram(s) IV Push once  dextrose 50% Injectable 25 Gram(s) IV Push once  dextrose 50% Injectable 25 Gram(s) IV Push once  dextrose Gel 1 Dose(s) Oral once PRN  enoxaparin Injectable 40 milliGRAM(s) SubCutaneous daily  glucagon  Injectable 1 milliGRAM(s) IntraMuscular once PRN  insulin lispro (HumaLOG) corrective regimen sliding scale   SubCutaneous three times a day before meals  insulin lispro (HumaLOG) corrective regimen sliding scale   SubCutaneous at bedtime  pantoprazole    Tablet 40 milliGRAM(s) Oral before breakfast  piperacillin/tazobactam IVPB. 3.375 Gram(s) IV Intermittent every 8 hours  simethicone 80 milliGRAM(s) Chew four times a day PRN  tamsulosin 0.4 milliGRAM(s) Oral at bedtime  valsartan 160 milliGRAM(s) Oral daily      Physical exam    T(C): 36.6 (10-28-17 @ 15:47), Max: 37.1 (10-28-17 @ 00:04)  HR: 84 (10-28-17 @ 15:47) (69 - 84)  BP: 102/62 (10-28-17 @ 15:47) (99/61 - 102/62)  RR: 18 (10-28-17 @ 15:47) (16 - 18)  SpO2: 97% (10-28-17 @ 15:47) (97% - 98%)  Wt(kg): --    alert NAD  Juandice  EOMI icteric sclera  Neck Supple No LNA  Cv s1 S2 RRR  Lungs clear B/L  abd soft NT ND +BS  No LE edema or tenderness    Labs                        11.6   7.78  )-----------( 326      ( 27 Oct 2017 08:26 )             37.1       10-27    136  |  98  |  13  ----------------------------<  210<H>  4.2   |  24  |  1.01    Ca    9.4      27 Oct 2017 08:42    TPro  6.7  /  Alb  3.1<L>  /  TBili  17.3<H>  /  DBili  x   /  AST  139<H>  /  ALT  149<H>  /  AlkPhos  722<H>  10-27      LIVER FUNCTIONS - ( 27 Oct 2017 08:42 )  Alb: 3.1 g/dL / Pro: 6.7 g/dL / ALK PHOS: 722 U/L / ALT: 149 U/L / AST: 139 U/L / GGT: x           CA 19-9 1052    [id number]

## 2017-10-28 NOTE — PROGRESS NOTE ADULT - ASSESSMENT
61M with pancreatic mass  - please obtain cardiac and medical clearance  - awaiting FNA, rendezvous ERCP monday  - surgical planning pending medical/cardiac clearance    Dr. Cain  [phone]

## 2017-10-28 NOTE — PROGRESS NOTE ADULT - ASSESSMENT
pancreatic mass duct stricture had ERCP awaiting pathology has elevated CA 19-9, likely malignancy but await confirmation.  Possibly a surgical candidate but needs lower bilirubin.  Likely for stent on Monday.  Trend LFT's.  Mild anemia AOCD would monitor periodic CBC

## 2017-10-28 NOTE — PROGRESS NOTE ADULT - SUBJECTIVE AND OBJECTIVE BOX
Patient is a 61y old  Male who presents with a chief complaint of jaundice (23 Oct 2017 21:50)                                                               INTERVAL HPI/OVERNIGHT EVENTS:    REVIEW OF SYSTEMS:     CONSTITUTIONAL: No weakness, fevers or chills  EYES/ENT: No visual changes  NECK: No pain or stiffness  RESPIRATORY: No cough, wheezing,  No shortness of breath  CARDIOVASCULAR: No chest pain or palpitations  GASTROINTESTINAL: mild abdominal pain  discomfort . No nausea, vomiting, or hematemesis; No diarrhea or constipation. No melena or hematochezia.  GENITOURINARY: No dysuria, frequency or hematuria  NEUROLOGICAL: No numbness or weakness                                                                                                                                                                                                                                                                                Medications:  MEDICATIONS  (STANDING):  buDESOnide  80 MICROgram(s)/formoterol 4.5 MICROgram(s) Inhaler 2 Puff(s) Inhalation two times a day  dextrose 5%. 1000 milliLiter(s) (50 mL/Hr) IV Continuous <Continuous>  dextrose 50% Injectable 12.5 Gram(s) IV Push once  dextrose 50% Injectable 25 Gram(s) IV Push once  dextrose 50% Injectable 25 Gram(s) IV Push once  enoxaparin Injectable 40 milliGRAM(s) SubCutaneous daily  insulin lispro (HumaLOG) corrective regimen sliding scale   SubCutaneous three times a day before meals  insulin lispro (HumaLOG) corrective regimen sliding scale   SubCutaneous at bedtime  pantoprazole    Tablet 40 milliGRAM(s) Oral before breakfast  piperacillin/tazobactam IVPB. 3.375 Gram(s) IV Intermittent every 8 hours  tamsulosin 0.4 milliGRAM(s) Oral at bedtime  valsartan 160 milliGRAM(s) Oral daily    MEDICATIONS  (PRN):  ALBUTerol    90 MICROgram(s) HFA Inhaler 2 Puff(s) Inhalation every 6 hours PRN sob  dextrose Gel 1 Dose(s) Oral once PRN Blood Glucose LESS THAN 70 milliGRAM(s)/deciliter  glucagon  Injectable 1 milliGRAM(s) IntraMuscular once PRN Glucose LESS THAN 70 milligrams/deciliter  simethicone 80 milliGRAM(s) Chew four times a day PRN Gas       Allergies    No Known Allergies    Intolerances      Vital Signs Last 24 Hrs  T(C): 36.6 (28 Oct 2017 15:47), Max: 37.1 (28 Oct 2017 00:04)  T(F): 97.8 (28 Oct 2017 15:47), Max: 98.8 (28 Oct 2017 00:04)  HR: 84 (28 Oct 2017 15:47) (69 - 84)  BP: 102/62 (28 Oct 2017 15:47) (99/61 - 102/62)  BP(mean): --  RR: 18 (28 Oct 2017 15:47) (16 - 18)  SpO2: 97% (28 Oct 2017 15:47) (97% - 98%)  CAPILLARY BLOOD GLUCOSE      POCT Blood Glucose.: 245 mg/dL (28 Oct 2017 17:18)  POCT Blood Glucose.: 270 mg/dL (28 Oct 2017 12:02)  POCT Blood Glucose.: 185 mg/dL (28 Oct 2017 08:33)  POCT Blood Glucose.: 178 mg/dL (27 Oct 2017 21:40)      10-27 @ 07:01  -  10-28 @ 07:00  --------------------------------------------------------  IN: 500 mL / OUT: 450 mL / NET: 50 mL    10-28 @ 07:01  -  10-28 @ 21:34  --------------------------------------------------------  IN: 500 mL / OUT: 2 mL / NET: 498 mL      Physical Exam:    General:  Well appearing, NAD  HEENT:  icterus   CV:  RRR, S1S2   Lungs:  CTA B/L, no wheezes, rales, rhonchi  Abdomen:  Soft, non-tender, no distended, positive BS  Extremities:  2+ pulses, no c/c, no edema  Skin:  Warm and dry, no rashes  :  No newberry  Neuro:  AAOx3, non-focal, grossly intact                                                                                                                                                                                                                                                                                                LABS:                               11.6   7.78  )-----------( 326      ( 27 Oct 2017 08:26 )             37.1                      10-27    136  |  98  |  13  ----------------------------<  210<H>  4.2   |  24  |  1.01    Ca    9.4      27 Oct 2017 08:42    TPro  6.7  /  Alb  3.1<L>  /  TBili  17.3<H>  /  DBili  x   /  AST  139<H>  /  ALT  149<H>  /  AlkPhos  722<H>  10-27

## 2017-10-28 NOTE — PROGRESS NOTE ADULT - SUBJECTIVE AND OBJECTIVE BOX
Blue surgery (Surg onc)    Patient feeling well. Tolerating diet. Ambulating. good GI function.    HPI:  60 y/o  male with hx of DM , HTN admitted for jaundice that has been ongoing for the past two month,.  Pt reports that he had intermittent pain in R flank and  that moved to RUQ and later localized t epigastric area however disappeared over the past two weeks.  No objective fever or chills   no N/V/d   no weight loss   Denies ETOH  recent travel   was seen as o/p and had w/u with blood work and with US .. LFT including bili were elevated howerver US neg for obstruction  no new medications ( though some of his medications were dced including Glyxsambi )    + dark urine (/frequent .  + light colored stool   CT: Mass of the head of the pancreas causing marked distention of the common   bile duct, intrahepatic biliary duct dilatation, and pancreatic ductal   dilatation. Further evaluation with contrast-enhanced MRI is recommended. (23 Oct 2017 21:50)    Vital Signs Last 24 Hrs  T(C): 36.9 (28 Oct 2017 07:39), Max: 37.1 (28 Oct 2017 00:04)  T(F): 98.4 (28 Oct 2017 07:39), Max: 98.8 (28 Oct 2017 00:04)  HR: 69 (28 Oct 2017 07:39) (69 - 86)  BP: 101/65 (28 Oct 2017 07:39) (99/61 - 113/73)  BP(mean): --  RR: 18 (28 Oct 2017 07:39) (16 - 18)  SpO2: 98% (28 Oct 2017 07:39) (95% - 98%)  Daily     Daily     PE  icterus  RRR  CTAB  soft NTND                        11.6   7.78  )-----------( 326      ( 27 Oct 2017 08:26 )             37.1     10-27    136  |  98  |  13  ----------------------------<  210<H>  4.2   |  24  |  1.01    Ca    9.4      27 Oct 2017 08:42    TPro  6.7  /  Alb  3.1<L>  /  TBili  17.3<H>  /  DBili  x   /  AST  139<H>  /  ALT  149<H>  /  AlkPhos  722<H>  10-27

## 2017-10-28 NOTE — PROGRESS NOTE ADULT - ASSESSMENT
60 y/o  male with hx of DM , HTN admitted for jaundice that has been ongoing for the past two month,.  Pt reports that he had intermittent pain in R flank and  that moved to RUQ and later localized t epigastric area however disappeared over the past two weeks.  No objective fever or chills   no N/V/d   no weight loss   Denies ETOH  recent travel   was seen as o/p and had w/u with blood work and with US .. LFT including bili were elevated howerver US neg for obstruction  no new medications ( though some of his medications were dced including Glyxsambi )    + dark urine (/frequent .  + light colored stool   CT: Mass of the head of the pancreas causing marked distention of the common   bile duct, intrahepatic biliary duct dilatation, and pancreatic ductal   dilatation. Further evaluation with contrast-enhanced MRI is recommended.  1- obstructive Jaundice : sec to panreatic mass ..  - MRI abd: < from: MRI Abdomen w/wo Cont (10.26.17 @ 14:14) >  A long segment distal CBD stricture with severe biliary dilatation highly   suspicious for neoplasm.    - CT chest to evaluate for distant mets: negative     s/p ERCP : < from: ERCP (10.25.17 @ 16:10) >  - One plastic stent was placed into the ventral pancreatic duct. Biliary                        cannulation not possible despite various attmepts and variations in technique.  Recommendation:      - Repeat ERCP for retreatment. planned tommrrow                     - EUS guided rendezvous planned ... will hold off till path report since this will likley change approach? ( plastic stent vs metal vs no stent)                        - PPI for now                       - cpnt  Zosyn IV                   Surgical consult appreciated : plan for rendezvous procedure   -awaiting results of cytology, specimen received  -possibly candidate for a Whipple procedure pending medical clearance and cytology results  - d/w Dr Manzanares :    f/u pathology     2- DM: hold PO meds and monitor FS   3- HTN : monitor BP cont meds  4- HLD : off lipitor due to myoipathy >? and now with elevated LFT    DVT prophylxis   discussed with pt at length and with daughter / family members at bedside .. answered all their questions and concerns   d/w Dr. Manzanares

## 2017-10-29 LAB
ALBUMIN SERPL ELPH-MCNC: 3 G/DL — LOW (ref 3.3–5)
ALP SERPL-CCNC: 702 U/L — HIGH (ref 40–120)
ALT FLD-CCNC: 145 U/L — HIGH (ref 10–45)
ANION GAP SERPL CALC-SCNC: 13 MMOL/L — SIGNIFICANT CHANGE UP (ref 5–17)
AST SERPL-CCNC: 138 U/L — HIGH (ref 10–40)
BILIRUB SERPL-MCNC: 18.6 MG/DL — HIGH (ref 0.2–1.2)
BUN SERPL-MCNC: 17 MG/DL — SIGNIFICANT CHANGE UP (ref 7–23)
CALCIUM SERPL-MCNC: 10.3 MG/DL — SIGNIFICANT CHANGE UP (ref 8.4–10.5)
CHLORIDE SERPL-SCNC: 94 MMOL/L — LOW (ref 96–108)
CO2 SERPL-SCNC: 22 MMOL/L — SIGNIFICANT CHANGE UP (ref 22–31)
CREAT SERPL-MCNC: 1.2 MG/DL — SIGNIFICANT CHANGE UP (ref 0.5–1.3)
GLUCOSE BLDC GLUCOMTR-MCNC: 168 MG/DL — HIGH (ref 70–99)
GLUCOSE BLDC GLUCOMTR-MCNC: 189 MG/DL — HIGH (ref 70–99)
GLUCOSE BLDC GLUCOMTR-MCNC: 209 MG/DL — HIGH (ref 70–99)
GLUCOSE BLDC GLUCOMTR-MCNC: 264 MG/DL — HIGH (ref 70–99)
GLUCOSE SERPL-MCNC: 228 MG/DL — HIGH (ref 70–99)
POTASSIUM SERPL-MCNC: 4.4 MMOL/L — SIGNIFICANT CHANGE UP (ref 3.5–5.3)
POTASSIUM SERPL-SCNC: 4.4 MMOL/L — SIGNIFICANT CHANGE UP (ref 3.5–5.3)
PROT SERPL-MCNC: 6.4 G/DL — SIGNIFICANT CHANGE UP (ref 6–8.3)
SODIUM SERPL-SCNC: 129 MMOL/L — LOW (ref 135–145)

## 2017-10-29 RX ADMIN — TAMSULOSIN HYDROCHLORIDE 0.4 MILLIGRAM(S): 0.4 CAPSULE ORAL at 22:16

## 2017-10-29 RX ADMIN — PANTOPRAZOLE SODIUM 40 MILLIGRAM(S): 20 TABLET, DELAYED RELEASE ORAL at 05:39

## 2017-10-29 RX ADMIN — BUDESONIDE AND FORMOTEROL FUMARATE DIHYDRATE 2 PUFF(S): 160; 4.5 AEROSOL RESPIRATORY (INHALATION) at 05:37

## 2017-10-29 RX ADMIN — PIPERACILLIN AND TAZOBACTAM 25 GRAM(S): 4; .5 INJECTION, POWDER, LYOPHILIZED, FOR SOLUTION INTRAVENOUS at 14:38

## 2017-10-29 RX ADMIN — Medication 1: at 17:22

## 2017-10-29 RX ADMIN — BUDESONIDE AND FORMOTEROL FUMARATE DIHYDRATE 2 PUFF(S): 160; 4.5 AEROSOL RESPIRATORY (INHALATION) at 20:01

## 2017-10-29 RX ADMIN — VALSARTAN 160 MILLIGRAM(S): 80 TABLET ORAL at 05:37

## 2017-10-29 RX ADMIN — PIPERACILLIN AND TAZOBACTAM 25 GRAM(S): 4; .5 INJECTION, POWDER, LYOPHILIZED, FOR SOLUTION INTRAVENOUS at 22:16

## 2017-10-29 RX ADMIN — Medication 2: at 08:45

## 2017-10-29 RX ADMIN — PIPERACILLIN AND TAZOBACTAM 25 GRAM(S): 4; .5 INJECTION, POWDER, LYOPHILIZED, FOR SOLUTION INTRAVENOUS at 05:37

## 2017-10-29 RX ADMIN — Medication 3: at 12:37

## 2017-10-29 NOTE — PROGRESS NOTE ADULT - ASSESSMENT
62 y/o  male with hx of DM , HTN admitted for jaundice that has been ongoing for the past two month,.  Pt reports that he had intermittent pain in R flank and  that moved to RUQ and later localized t epigastric area however disappeared over the past two weeks.  No objective fever or chills   no N/V/d   no weight loss   Denies ETOH  recent travel   was seen as o/p and had w/u with blood work and with US .. LFT including bili were elevated howerver US neg for obstruction  no new medications ( though some of his medications were dced including Glyxsambi )    + dark urine (/frequent .  + light colored stool   CT: Mass of the head of the pancreas causing marked distention of the common   bile duct, intrahepatic biliary duct dilatation, and pancreatic ductal   dilatation. Further evaluation with contrast-enhanced MRI is recommended.  1- obstructive Jaundice : sec to panreatic mass ..  - MRI abd: < from: MRI Abdomen w/wo Cont (10.26.17 @ 14:14) >  A long segment distal CBD stricture with severe biliary dilatation highly   suspicious for neoplasm.    - CT chest to evaluate for distant mets: negative     s/p ERCP : < from: ERCP (10.25.17 @ 16:10) >  - One plastic stent was placed into the ventral pancreatic duct. Biliary                        cannulation not possible despite various attmepts and variations in technique.  Recommendation:      - Repeat ERCP for retreatment. planned tommrrow                     - EUS guided rendezvous planned ... will hold off till path report since this will likley change approach? ( plastic stent vs metal vs no stent)                        - PPI for now                       - cpnt  Zosyn IV                   Surgical consult appreciated : plan for rendezvous procedure   -awaiting results of cytology, specimen received  -possibly candidate for a Whipple procedure pending medical clearance and cytology results    2- DM: hold PO meds and monitor FS   3- HTN : monitor BP cont meds  4- HLD : off lipitor due to myoipathy >? and now with elevated LFT    DVT prophylxis   discussed with pt at length and with daughter / family members at bedside .. answered all their questions and concerns   d/w Dr. Manzanares

## 2017-10-29 NOTE — PROGRESS NOTE ADULT - SUBJECTIVE AND OBJECTIVE BOX
Patient is a 61y old  Male who presents with a chief complaint of jaundice (23 Oct 2017 21:50)    No new issues today. Urine still dark.  Less abd and back discomfort.  eating and ambulating.    Denies fevers, chills, sweats, HA, dizziness, nosebleeds, sore throat, CP, SOB, cough, wheeze, hemoptysis, N/V/D, BRBPR, dysuria, hematuria, leg pain, swelling.      Medication:   ALBUTerol    90 MICROgram(s) HFA Inhaler 2 Puff(s) Inhalation every 6 hours PRN  buDESOnide  80 MICROgram(s)/formoterol 4.5 MICROgram(s) Inhaler 2 Puff(s) Inhalation two times a day  dextrose 5%. 1000 milliLiter(s) IV Continuous <Continuous>  dextrose 50% Injectable 12.5 Gram(s) IV Push once  dextrose 50% Injectable 25 Gram(s) IV Push once  dextrose 50% Injectable 25 Gram(s) IV Push once  dextrose Gel 1 Dose(s) Oral once PRN  glucagon  Injectable 1 milliGRAM(s) IntraMuscular once PRN  insulin lispro (HumaLOG) corrective regimen sliding scale   SubCutaneous three times a day before meals  insulin lispro (HumaLOG) corrective regimen sliding scale   SubCutaneous at bedtime  pantoprazole    Tablet 40 milliGRAM(s) Oral before breakfast  piperacillin/tazobactam IVPB. 3.375 Gram(s) IV Intermittent every 8 hours  simethicone 80 milliGRAM(s) Chew four times a day PRN  tamsulosin 0.4 milliGRAM(s) Oral at bedtime  valsartan 160 milliGRAM(s) Oral daily      Physical exam    T(C): 36.7 (10-29-17 @ 07:47), Max: 37.1 (10-29-17 @ 00:14)  HR: 69 (10-29-17 @ 07:47) (69 - 84)  BP: 98/65 (10-29-17 @ 07:47) (98/65 - 102/62)  RR: 18 (10-29-17 @ 07:47) (18 - 18)  SpO2: 98% (10-29-17 @ 07:47) (96% - 98%)  Wt(kg): --    alert NAD  Juandice  EOMI icteric sclera  Neck Supple No LNA  Cv s1 S2 RRR  Lungs clear B/L  abd soft NT ND +BS  No LE edema or tenderness    Labs    10-29    129<L>  |  94<L>  |  17  ----------------------------<  228<H>  4.4   |  22  |  1.20    Ca    10.3      29 Oct 2017 08:51    TPro  6.4  /  Alb  3.0<L>  /  TBili  18.6<H>  /  DBili  x   /  AST  138<H>  /  ALT  145<H>  /  AlkPhos  702<H>  10-29      LIVER FUNCTIONS - ( 29 Oct 2017 08:51 )  Alb: 3.0 g/dL / Pro: 6.4 g/dL / ALK PHOS: 702 U/L / ALT: 145 U/L / AST: 138 U/L / GGT: x             [id number]

## 2017-10-29 NOTE — PROGRESS NOTE ADULT - SUBJECTIVE AND OBJECTIVE BOX
Patient is a 61y old  Male who presents with a chief complaint of jaundice (23 Oct 2017 21:50)                                                               INTERVAL HPI/OVERNIGHT EVENTS:    REVIEW OF SYSTEMS:     CONSTITUTIONAL: No weakness, fevers or chills  RESPIRATORY: No cough, wheezing,  No shortness of breath  CARDIOVASCULAR: No chest pain or palpitations  GASTROINTESTINAL: No abdominal pain  . No nausea, vomiting,   GENITOURINARY: No dysuria, frequency or hematuria  NEUROLOGICAL: No numbness or weakness                                                                                                                                                                                                                                                                                 Medications:  MEDICATIONS  (STANDING):  buDESOnide  80 MICROgram(s)/formoterol 4.5 MICROgram(s) Inhaler 2 Puff(s) Inhalation two times a day  dextrose 5%. 1000 milliLiter(s) (50 mL/Hr) IV Continuous <Continuous>  dextrose 50% Injectable 12.5 Gram(s) IV Push once  dextrose 50% Injectable 25 Gram(s) IV Push once  dextrose 50% Injectable 25 Gram(s) IV Push once  insulin lispro (HumaLOG) corrective regimen sliding scale   SubCutaneous three times a day before meals  insulin lispro (HumaLOG) corrective regimen sliding scale   SubCutaneous at bedtime  pantoprazole    Tablet 40 milliGRAM(s) Oral before breakfast  piperacillin/tazobactam IVPB. 3.375 Gram(s) IV Intermittent every 8 hours  tamsulosin 0.4 milliGRAM(s) Oral at bedtime  valsartan 160 milliGRAM(s) Oral daily    MEDICATIONS  (PRN):  ALBUTerol    90 MICROgram(s) HFA Inhaler 2 Puff(s) Inhalation every 6 hours PRN sob  dextrose Gel 1 Dose(s) Oral once PRN Blood Glucose LESS THAN 70 milliGRAM(s)/deciliter  glucagon  Injectable 1 milliGRAM(s) IntraMuscular once PRN Glucose LESS THAN 70 milligrams/deciliter  simethicone 80 milliGRAM(s) Chew four times a day PRN Gas       Allergies    No Known Allergies    Intolerances      Vital Signs Last 24 Hrs  T(C): 36.6 (29 Oct 2017 16:02), Max: 37.1 (29 Oct 2017 00:14)  T(F): 97.8 (29 Oct 2017 16:02), Max: 98.7 (29 Oct 2017 00:14)  HR: 74 (29 Oct 2017 16:02) (69 - 75)  BP: 102/58 (29 Oct 2017 16:02) (98/65 - 102/58)  BP(mean): --  RR: 18 (29 Oct 2017 16:02) (18 - 18)  SpO2: 96% (29 Oct 2017 16:02) (96% - 98%)  CAPILLARY BLOOD GLUCOSE      POCT Blood Glucose.: 189 mg/dL (29 Oct 2017 16:50)  POCT Blood Glucose.: 264 mg/dL (29 Oct 2017 11:57)  POCT Blood Glucose.: 209 mg/dL (29 Oct 2017 07:47)  POCT Blood Glucose.: 193 mg/dL (28 Oct 2017 22:00)      10-28 @ 07:01  -  10-29 @ 07:00  --------------------------------------------------------  IN: 620 mL / OUT: 2 mL / NET: 618 mL      Physical Exam:    General:  icterus   HEENT:  Nonicteric, PERRLA  CV:  RRR, S1S2   Lungs:  CTA B/L, no wheezes, rales, rhonchi  Abdomen:  Soft, non-tender, no distended, positive BS  Extremities:  2+ pulses, no c/c, no edema  Skin:  Warm and dry, no rashes  :  No newberry  Neuro:  AAOx3, non-focal, grossly intact                                                                                                                                                                                                                                                                                                LABS:                            10-29    129<L>  |  94<L>  |  17  ----------------------------<  228<H>  4.4   |  22  |  1.20    Ca    10.3      29 Oct 2017 08:51    TPro  6.4  /  Alb  3.0<L>  /  TBili  18.6<H>  /  DBili  x   /  AST  138<H>  /  ALT  145<H>  /  AlkPhos  702<H>  10-29

## 2017-10-29 NOTE — PROGRESS NOTE ADULT - ASSESSMENT
Pancreatic mass with biliary dilatation and obstructive juandice.  Had an ERCP with stent placement but requires further stenting.  Bilirubin remains elevated.  Suspect primary pancreatic adenocarcinoma but await pathology.  Being followed by Surg onc and if confirmed to have primary pancreatic adeno he appears to be a good surgical candidate.      Mild anemia has AOCD and would monitor.      New hyponatremia and would repeat the BMP in AM.

## 2017-10-29 NOTE — CHART NOTE - NSCHARTNOTEFT_GEN_A_CORE
GI CHART NOTE    Patient planned for EUS tomorrow.    Please keep patient NPO after midnight tonight.    Further recommendations per previous GI consult/progress notes.

## 2017-10-30 LAB
ALBUMIN SERPL ELPH-MCNC: 3 G/DL — LOW (ref 3.3–5)
ALBUMIN SERPL ELPH-MCNC: 3.1 G/DL — LOW (ref 3.3–5)
ALP SERPL-CCNC: 691 U/L — HIGH (ref 40–120)
ALP SERPL-CCNC: 717 U/L — HIGH (ref 40–120)
ALT FLD-CCNC: 150 U/L — HIGH (ref 10–45)
ALT FLD-CCNC: 153 U/L RC — HIGH (ref 10–45)
ANION GAP SERPL CALC-SCNC: 13 MMOL/L — SIGNIFICANT CHANGE UP (ref 5–17)
AST SERPL-CCNC: 140 U/L — HIGH (ref 10–40)
AST SERPL-CCNC: 141 U/L — HIGH (ref 10–40)
BILIRUB DIRECT SERPL-MCNC: 13.5 MG/DL — HIGH (ref 0–0.2)
BILIRUB DIRECT SERPL-MCNC: >20 MG/DL — HIGH (ref 0–0.2)
BILIRUB INDIRECT FLD-MCNC: 4.4 MG/DL — HIGH (ref 0.2–1)
BILIRUB SERPL-MCNC: 17.6 MG/DL — HIGH (ref 0.2–1.2)
BILIRUB SERPL-MCNC: 17.9 MG/DL — HIGH (ref 0.2–1.2)
BUN SERPL-MCNC: 16 MG/DL — SIGNIFICANT CHANGE UP (ref 7–23)
CALCIUM SERPL-MCNC: 9.4 MG/DL — SIGNIFICANT CHANGE UP (ref 8.4–10.5)
CHLORIDE SERPL-SCNC: 96 MMOL/L — SIGNIFICANT CHANGE UP (ref 96–108)
CO2 SERPL-SCNC: 23 MMOL/L — SIGNIFICANT CHANGE UP (ref 22–31)
CREAT SERPL-MCNC: 1.13 MG/DL — SIGNIFICANT CHANGE UP (ref 0.5–1.3)
GLUCOSE BLDC GLUCOMTR-MCNC: 197 MG/DL — HIGH (ref 70–99)
GLUCOSE BLDC GLUCOMTR-MCNC: 198 MG/DL — HIGH (ref 70–99)
GLUCOSE BLDC GLUCOMTR-MCNC: 217 MG/DL — HIGH (ref 70–99)
GLUCOSE BLDC GLUCOMTR-MCNC: 246 MG/DL — HIGH (ref 70–99)
GLUCOSE SERPL-MCNC: 193 MG/DL — HIGH (ref 70–99)
HCT VFR BLD CALC: 36.5 % — LOW (ref 39–50)
HGB BLD-MCNC: 11.5 G/DL — LOW (ref 13–17)
IGG SERPL-MCNC: 911 MG/DL — SIGNIFICANT CHANGE UP (ref 767–1590)
IGG1 SER-MCNC: 567 MG/DL — SIGNIFICANT CHANGE UP (ref 341–894)
IGG2 SER-MCNC: 332 MG/DL — SIGNIFICANT CHANGE UP (ref 171–632)
IGG3 SER-MCNC: 31.3 MG/DL — SIGNIFICANT CHANGE UP (ref 18.4–106)
IGG4 SER-MCNC: 78.8 MG/DL — SIGNIFICANT CHANGE UP (ref 2.4–121)
MCHC RBC-ENTMCNC: 31.3 PG — SIGNIFICANT CHANGE UP (ref 27–34)
MCHC RBC-ENTMCNC: 31.5 GM/DL — LOW (ref 32–36)
MCV RBC AUTO: 99.2 FL — SIGNIFICANT CHANGE UP (ref 80–100)
PLATELET # BLD AUTO: 294 K/UL — SIGNIFICANT CHANGE UP (ref 150–400)
POTASSIUM SERPL-MCNC: 4.4 MMOL/L — SIGNIFICANT CHANGE UP (ref 3.5–5.3)
POTASSIUM SERPL-SCNC: 4.4 MMOL/L — SIGNIFICANT CHANGE UP (ref 3.5–5.3)
PROT SERPL-MCNC: 6.4 G/DL — SIGNIFICANT CHANGE UP (ref 6–8.3)
PROT SERPL-MCNC: 6.5 G/DL — SIGNIFICANT CHANGE UP (ref 6–8.3)
PSA FLD-MCNC: 0.35 NG/ML — SIGNIFICANT CHANGE UP (ref 0–4)
RBC # BLD: 3.68 M/UL — LOW (ref 4.2–5.8)
RBC # FLD: 15.9 % — HIGH (ref 10.3–14.5)
SODIUM SERPL-SCNC: 132 MMOL/L — LOW (ref 135–145)
TSH SERPL-MCNC: 1.55 UIU/ML — SIGNIFICANT CHANGE UP (ref 0.27–4.2)
WBC # BLD: 6.92 K/UL — SIGNIFICANT CHANGE UP (ref 3.8–10.5)
WBC # FLD AUTO: 6.92 K/UL — SIGNIFICANT CHANGE UP (ref 3.8–10.5)

## 2017-10-30 PROCEDURE — 99232 SBSQ HOSP IP/OBS MODERATE 35: CPT | Mod: GC

## 2017-10-30 PROCEDURE — 93010 ELECTROCARDIOGRAM REPORT: CPT

## 2017-10-30 RX ADMIN — Medication 2: at 09:17

## 2017-10-30 RX ADMIN — PIPERACILLIN AND TAZOBACTAM 25 GRAM(S): 4; .5 INJECTION, POWDER, LYOPHILIZED, FOR SOLUTION INTRAVENOUS at 21:25

## 2017-10-30 RX ADMIN — TAMSULOSIN HYDROCHLORIDE 0.4 MILLIGRAM(S): 0.4 CAPSULE ORAL at 21:25

## 2017-10-30 RX ADMIN — BUDESONIDE AND FORMOTEROL FUMARATE DIHYDRATE 2 PUFF(S): 160; 4.5 AEROSOL RESPIRATORY (INHALATION) at 17:33

## 2017-10-30 RX ADMIN — PIPERACILLIN AND TAZOBACTAM 25 GRAM(S): 4; .5 INJECTION, POWDER, LYOPHILIZED, FOR SOLUTION INTRAVENOUS at 05:40

## 2017-10-30 RX ADMIN — BUDESONIDE AND FORMOTEROL FUMARATE DIHYDRATE 2 PUFF(S): 160; 4.5 AEROSOL RESPIRATORY (INHALATION) at 05:40

## 2017-10-30 RX ADMIN — Medication 2: at 17:33

## 2017-10-30 RX ADMIN — Medication 1: at 13:21

## 2017-10-30 RX ADMIN — PIPERACILLIN AND TAZOBACTAM 25 GRAM(S): 4; .5 INJECTION, POWDER, LYOPHILIZED, FOR SOLUTION INTRAVENOUS at 13:26

## 2017-10-30 RX ADMIN — PANTOPRAZOLE SODIUM 40 MILLIGRAM(S): 20 TABLET, DELAYED RELEASE ORAL at 05:41

## 2017-10-30 NOTE — PROGRESS NOTE ADULT - SUBJECTIVE AND OBJECTIVE BOX
Patient is a 61y old  Male who presents with a chief complaint of jaundice (23 Oct 2017 21:50)                                                               INTERVAL HPI/OVERNIGHT EVENTS:    REVIEW OF SYSTEMS:     CONSTITUTIONAL: No weakness, fevers or chills  RESPIRATORY: No cough, wheezing,  No shortness of breath  CARDIOVASCULAR: No chest pain or palpitations  GASTROINTESTINAL: No abdominal pain  . No nausea, vomiting, or hematemesis; No diarrhea or constipation. No melena or hematochezia.  GENITOURINARY: No dysuria, frequency  NEUROLOGICAL: No numbness or weakness  SKIN: No itching,                                                                                                                                                                                                                                                                              Medications:  MEDICATIONS  (STANDING):  buDESOnide  80 MICROgram(s)/formoterol 4.5 MICROgram(s) Inhaler 2 Puff(s) Inhalation two times a day  dextrose 5%. 1000 milliLiter(s) (50 mL/Hr) IV Continuous <Continuous>  dextrose 50% Injectable 12.5 Gram(s) IV Push once  dextrose 50% Injectable 25 Gram(s) IV Push once  dextrose 50% Injectable 25 Gram(s) IV Push once  insulin lispro (HumaLOG) corrective regimen sliding scale   SubCutaneous three times a day before meals  insulin lispro (HumaLOG) corrective regimen sliding scale   SubCutaneous at bedtime  pantoprazole    Tablet 40 milliGRAM(s) Oral before breakfast  piperacillin/tazobactam IVPB. 3.375 Gram(s) IV Intermittent every 8 hours  tamsulosin 0.4 milliGRAM(s) Oral at bedtime  valsartan 160 milliGRAM(s) Oral daily    MEDICATIONS  (PRN):  ALBUTerol    90 MICROgram(s) HFA Inhaler 2 Puff(s) Inhalation every 6 hours PRN sob  dextrose Gel 1 Dose(s) Oral once PRN Blood Glucose LESS THAN 70 milliGRAM(s)/deciliter  glucagon  Injectable 1 milliGRAM(s) IntraMuscular once PRN Glucose LESS THAN 70 milligrams/deciliter  simethicone 80 milliGRAM(s) Chew four times a day PRN Gas       Allergies    No Known Allergies    Intolerances      Vital Signs Last 24 Hrs  T(C): 36.8 (30 Oct 2017 08:07), Max: 36.9 (29 Oct 2017 23:55)  T(F): 98.2 (30 Oct 2017 08:07), Max: 98.4 (29 Oct 2017 23:55)  HR: 82 (30 Oct 2017 08:07) (71 - 82)  BP: 96/56 (30 Oct 2017 09:22) (90/60 - 102/58)  BP(mean): --  RR: 18 (30 Oct 2017 08:07) (18 - 18)  SpO2: 94% (30 Oct 2017 08:07) (94% - 97%)  CAPILLARY BLOOD GLUCOSE      POCT Blood Glucose.: 217 mg/dL (30 Oct 2017 09:06)  POCT Blood Glucose.: 168 mg/dL (29 Oct 2017 22:21)  POCT Blood Glucose.: 189 mg/dL (29 Oct 2017 16:50)      10-30 @ 07:01  -  10-30 @ 12:10  --------------------------------------------------------  IN: 0 mL / OUT: 0 mL / NET: 0 mL      Physical Exam:    General:  Well appearing, NAD, /jaundiced icteric   HEENT: icteric, PERRLA  CV:  RRR, S1S2  2/6 SM   Lungs:  CTA B/L, no wheezes, rales, rhonchi  Abdomen:  Soft, non-tender, no distended, positive BS  Extremities:  2+ pulses, no c/c, no edema  Skin:  Warm and dry, no rashes  :  No newberry  Neuro:  AAOx3, non-focal, grossly intact                                                                                                                                                                                                                                                                                                LABS:                               11.5   6.92  )-----------( 294      ( 30 Oct 2017 09:08 )             36.5                      10-30    132<L>  |  96  |  16  ----------------------------<  193<H>  4.4   |  23  |  1.13    Ca    9.4      30 Oct 2017 08:59    TPro  6.5  /  Alb  3.0<L>  /  TBili  17.6<H>  /  DBili  x   /  AST  141<H>  /  ALT  150<H>  /  AlkPhos  691<H>  10-30

## 2017-10-30 NOTE — PROGRESS NOTE ADULT - ASSESSMENT
61M with pancreatic mass  - please obtain cardiac and medical clearance  - awaiting results of FNA,   - [facility] ERCP today  - surgical planning pending medical/cardiac clearance

## 2017-10-30 NOTE — PROGRESS NOTE ADULT - SUBJECTIVE AND OBJECTIVE BOX
SURGICAL ONCOLOGY Progress Note     S: No events overnight. Patient resting comfortably awaiting procedure with gastroenterology today. Not currently in any pain. Had been tolerating a diet prior to being made NPO at midnight.     MEDICATIONS  (STANDING):  buDESOnide  80 MICROgram(s)/formoterol 4.5 MICROgram(s) Inhaler 2 Puff(s) Inhalation two times a day  dextrose 5%. 1000 milliLiter(s) (50 mL/Hr) IV Continuous <Continuous>  dextrose 50% Injectable 12.5 Gram(s) IV Push once  dextrose 50% Injectable 25 Gram(s) IV Push once  dextrose 50% Injectable 25 Gram(s) IV Push once  insulin lispro (HumaLOG) corrective regimen sliding scale   SubCutaneous three times a day before meals  insulin lispro (HumaLOG) corrective regimen sliding scale   SubCutaneous at bedtime  pantoprazole    Tablet 40 milliGRAM(s) Oral before breakfast  piperacillin/tazobactam IVPB. 3.375 Gram(s) IV Intermittent every 8 hours  tamsulosin 0.4 milliGRAM(s) Oral at bedtime  valsartan 160 milliGRAM(s) Oral daily    MEDICATIONS  (PRN):  ALBUTerol    90 MICROgram(s) HFA Inhaler 2 Puff(s) Inhalation every 6 hours PRN sob  dextrose Gel 1 Dose(s) Oral once PRN Blood Glucose LESS THAN 70 milliGRAM(s)/deciliter  glucagon  Injectable 1 milliGRAM(s) IntraMuscular once PRN Glucose LESS THAN 70 milligrams/deciliter  simethicone 80 milliGRAM(s) Chew four times a day PRN Gas      Physical Exam:    Vital Signs Last 24 Hrs  T(C): 36.9 (29 Oct 2017 23:55), Max: 36.9 (29 Oct 2017 23:55)  T(F): 98.4 (29 Oct 2017 23:55), Max: 98.4 (29 Oct 2017 23:55)  HR: 71 (29 Oct 2017 23:55) (69 - 74)  BP: 94/57 (30 Oct 2017 05:38) (90/60 - 102/58)  BP(mean): --  RR: 18 (29 Oct 2017 23:55) (18 - 18)  SpO2: 97% (29 Oct 2017 23:55) (96% - 98%)      Gen: No acute distress, alert and oriented  HEENT: scleral icterus  Abdominal: Soft, minimally distended, non-tender    LABS:    10-29    129<L>  |  94<L>  |  17  ----------------------------<  228<H>  4.4   |  22  |  1.20    Ca    10.3      29 Oct 2017 08:51    TPro  6.4  /  Alb  3.0<L>  /  TBili  18.6<H>  /  DBili  x   /  AST  138<H>  /  ALT  145<H>  /  AlkPhos  702<H>  10-29

## 2017-10-30 NOTE — PROGRESS NOTE ADULT - ASSESSMENT
62 y/o man with obstructive jaundice. s/p ERCP. Imaging with mass in head of pancreas.  - await pathology  -surg onc following   mild anemia- h/h adequate- monitor cbc. 60 y/o man with obstructive jaundice. s/p ERCP. Imaging with mass in head of pancreas.  - await pathology  -surg onc following  - f/u with GI.   mild anemia- h/h adequate- monitor cbc.

## 2017-10-30 NOTE — PROGRESS NOTE ADULT - ATTENDING COMMENTS
As above.
As above.    Impression;  #1.  Pancreatic head mass, s/p EUS/fine needle biopsy without definitive diagnosis  #2.  Obstructive jaundice, s/p failed ERCP    Recommendations:  #1.  EUS-assisted ERCP tomorrow 10/31/17  #2.  NPO after MN.
As above.  s/p EUS/FNA of pancreas head mass yesterday.  s/p failed ERCP yesterday.    Recommend:  Await cytopathology.  EUS-assisted ERCP, possibly tomorrow or Monday.  NPO after midnight.

## 2017-10-30 NOTE — PROGRESS NOTE ADULT - SUBJECTIVE AND OBJECTIVE BOX
JENIFER NORTH  MRN-[id number]    Patient is a 61y old  Male who presents with a chief complaint of jaundice (23 Oct 2017 21:50)      Review of System    Comfortable. family at bedside    General:	Denies fatigue, fevers, chills, sweats, decreased appetite.    Skin/Breast: denies pruritis, rash  	  Ophthalmologic: no change in vision or blurring  	  HEENT	Denies dry mouth, oral sores, dysphagia,  change in hearing.    Respiratory and Thorax:  cough, sob, wheeze, hemoptysis  	  Cardiovascular:	no cp , palp, orthopnea    Gastrointestinal:	no n/v/d constipation    Genitourinary:	no dysuria of frequency, no hematuria, no flank pain    Musculoskeletal:	no bone or joint pain. no muscle aches.     Neurological:	no change in sensory or motor function. no headache. no weakness.     Psychiatric:	no depression, no anxiety, insomnia.     Hematology/Lymphatics:	no bleeding or bruising        Current Meds  MEDICATIONS  (STANDING):  buDESOnide  80 MICROgram(s)/formoterol 4.5 MICROgram(s) Inhaler 2 Puff(s) Inhalation two times a day  dextrose 5%. 1000 milliLiter(s) (50 mL/Hr) IV Continuous <Continuous>  dextrose 50% Injectable 12.5 Gram(s) IV Push once  dextrose 50% Injectable 25 Gram(s) IV Push once  dextrose 50% Injectable 25 Gram(s) IV Push once  insulin lispro (HumaLOG) corrective regimen sliding scale   SubCutaneous three times a day before meals  insulin lispro (HumaLOG) corrective regimen sliding scale   SubCutaneous at bedtime  pantoprazole    Tablet 40 milliGRAM(s) Oral before breakfast  piperacillin/tazobactam IVPB. 3.375 Gram(s) IV Intermittent every 8 hours  tamsulosin 0.4 milliGRAM(s) Oral at bedtime  valsartan 160 milliGRAM(s) Oral daily    MEDICATIONS  (PRN):  ALBUTerol    90 MICROgram(s) HFA Inhaler 2 Puff(s) Inhalation every 6 hours PRN sob  dextrose Gel 1 Dose(s) Oral once PRN Blood Glucose LESS THAN 70 milliGRAM(s)/deciliter  glucagon  Injectable 1 milliGRAM(s) IntraMuscular once PRN Glucose LESS THAN 70 milligrams/deciliter  simethicone 80 milliGRAM(s) Chew four times a day PRN Gas      Vitals  Vital Signs Last 24 Hrs  T(C): 36.8 (30 Oct 2017 08:07), Max: 36.9 (29 Oct 2017 23:55)  T(F): 98.2 (30 Oct 2017 08:07), Max: 98.4 (29 Oct 2017 23:55)  HR: 82 (30 Oct 2017 08:07) (71 - 82)  BP: 96/56 (30 Oct 2017 09:22) (90/60 - 96/56)  BP(mean): --  RR: 18 (30 Oct 2017 08:07) (18 - 18)  SpO2: 94% (30 Oct 2017 08:07) (94% - 97%)    Physical Exam    Constitutional: NAD    Eyes: PERRLA EOMI, anicteric sclera    Heent :No oral sores, no pharyngeal injection. moist mucosa.    Neck: supple, no jvd, no LAD    Respiratory: CTA b/l     Cardiovascular: s1s2, no m/g/r    Gastrointestinal: soft, nt, nd, + BS    Extremities: no c/c/e    Neurological:A&O x 3 moves all ext.    Skin: no rash on exposed skin    Lymph Nodes: no lymphadenopathy.      Lab  CBC Full  -  ( 30 Oct 2017 09:08 )  WBC Count : 6.92 K/uL  Hemoglobin : 11.5 g/dL  Hematocrit : 36.5 %  Platelet Count - Automated : 294 K/uL  Mean Cell Volume : 99.2 fl  Mean Cell Hemoglobin : 31.3 pg  Mean Cell Hemoglobin Concentration : 31.5 gm/dL  Auto Neutrophil # : x  Auto Lymphocyte # : x  Auto Monocyte # : x  Auto Eosinophil # : x  Auto Basophil # : x  Auto Neutrophil % : x  Auto Lymphocyte % : x  Auto Monocyte % : x  Auto Eosinophil % : x  Auto Basophil % : x    10-30    132<L>  |  96  |  16  ----------------------------<  193<H>  4.4   |  23  |  1.13    Ca    9.4      30 Oct 2017 08:59    TPro  6.5  /  Alb  3.0<L>  /  TBili  17.6<H>  /  DBili  x   /  AST  141<H>  /  ALT  150<H>  /  AlkPhos  691<H>  10-30        Rad:    Assessment/Plan

## 2017-10-30 NOTE — PROGRESS NOTE ADULT - SUBJECTIVE AND OBJECTIVE BOX
SUBJECTIVE:  - Pt denies fever or chills  - Denies abdominal pain, nausea or vomiting  - He is tolerating regular diet at this time  - He is receiving zosyn at this time  - Prelim path shows atypical cells but not definitively within the tissue cell block    OBJECTIVE:    MEDICATIONS  (STANDING):  buDESOnide  80 MICROgram(s)/formoterol 4.5 MICROgram(s) Inhaler 2 Puff(s) Inhalation two times a day  dextrose 5%. 1000 milliLiter(s) (50 mL/Hr) IV Continuous <Continuous>  dextrose 50% Injectable 12.5 Gram(s) IV Push once  dextrose 50% Injectable 25 Gram(s) IV Push once  dextrose 50% Injectable 25 Gram(s) IV Push once  insulin lispro (HumaLOG) corrective regimen sliding scale   SubCutaneous three times a day before meals  insulin lispro (HumaLOG) corrective regimen sliding scale   SubCutaneous at bedtime  pantoprazole    Tablet 40 milliGRAM(s) Oral before breakfast  piperacillin/tazobactam IVPB. 3.375 Gram(s) IV Intermittent every 8 hours  tamsulosin 0.4 milliGRAM(s) Oral at bedtime  valsartan 160 milliGRAM(s) Oral daily      Vital Signs Last 24 Hrs  T(C): 36.7 (27 Oct 2017 16:38), Max: 37 (27 Oct 2017 08:19)  T(F): 98 (27 Oct 2017 16:38), Max: 98.6 (27 Oct 2017 08:19)  HR: 86 (27 Oct 2017 16:38) (72 - 86)  BP: 113/73 (27 Oct 2017 16:38) (93/51 - 113/73)  BP(mean): --  RR: 18 (27 Oct 2017 16:38) (18 - 18)  SpO2: 95% (27 Oct 2017 16:38) (94% - 95%)    PHYSICAL EXAM:  Constitutional: no acute distress  Eyes: icterus  Neck: no masses, no LAD  Respiratory: normal inspiratory effort; no wheezing or crackles  Cardiovascular: RRR, normal S1/S2, no murmurs/rubs/gallops  Gastrointestinal: soft, nondistended, nontender, +BS  Extremities: no LE edema  Neurological: AAOx3, no asterixis  Skin: jaundice    LABS:                                   11.5   6.92  )-----------( 294      ( 30 Oct 2017 09:08 )             36.5     10-30    132<L>  |  96  |  16  ----------------------------<  193<H>  4.4   |  23  |  1.13    Ca    9.4      30 Oct 2017 08:59    TPro  6.5  /  Alb  3.0<L>  /  TBili  17.6<H>  /  DBili  x   /  AST  141<H>  /  ALT  150<H>  /  AlkPhos  691<H>  10-30

## 2017-10-30 NOTE — PROGRESS NOTE ADULT - ASSESSMENT
60 y/o M with obstructive jaundice.    Impression:  1) Obstructive Jaundice - due to pancreatic head mass s/p EUS/FNA and ERCP with placement of PD stent, but CBD could not be accessed due to distorted anatomy.     Plan:  - F/u cytology/path results  - Plan for EUS assisted ERCP ([facility]) tomorrow, Tuesday in the PM  - Keep npo after midnight tonight night for procedure tomorrow  - Monitor liver enzymes

## 2017-10-30 NOTE — PROGRESS NOTE ADULT - ASSESSMENT
62 y/o  male with hx of DM , HTN admitted for jaundice that has been ongoing for the past two month,.  Pt reports that he had intermittent pain in R flank and  that moved to RUQ and later localized t epigastric area however disappeared over the past two weeks.  No objective fever or chills   no N/V/d   no weight loss   Denies ETOH  recent travel   was seen as o/p and had w/u with blood work and with US .. LFT including bili were elevated howerver US neg for obstruction  no new medications ( though some of his medications were dced including Glyxsambi )    + dark urine (/frequent .  + light colored stool   CT: Mass of the head of the pancreas causing marked distention of the common   bile duct, intrahepatic biliary duct dilatation, and pancreatic ductal   dilatation. Further evaluation with contrast-enhanced MRI is recommended.    1- obstructive Jaundice : sec to panreatic mass ..  - MRI abd: < from: MRI Abdomen w/wo Cont (10.26.17 @ 14:14) >  A long segment distal CBD stricture with severe biliary dilatation highly suspicious for neoplasm.    - CT chest to evaluate for distant mets: negative     s/p ERCP : < from: ERCP (10.25.17 @ 16:10) >  - One plastic stent was placed into the ventral pancreatic duct. Biliary                        cannulation not possible despite various attmepts and variations in technique.  Recommendation:      - Repeat ERCP for retreatment. planned to[place]row                     - EUS guided rendezvous planned ... will hold off till path report since this will likley change approach? ( plastic stent vs metal vs no stent)                        - PPI for now                       - cpnt  Zosyn IV                   -- d/w Dr. Verdugo: plan for rendezvous procedure   -awaiting results of cytology: called pathologist : atypical cells on background of chronic pancreatitis found however ? adequecy ..   -possibly candidate for a Whipple procedure pending medical clearance and cytology results. D/wDr. Somekh for clearance .  will check Echo and EKG ..    2- DM: hold PO meds and monitor FS   3- HTN : monitor BP cont meds    4- HLD : off lipitor due to myoipathy >? and now with elevated LFT    DVT prophylxis   discussed with pt at length and with sister in law at bedside   d/w NP and Nursing staff

## 2017-10-31 ENCOUNTER — RESULT REVIEW (OUTPATIENT)
Age: 62
End: 2017-10-31

## 2017-10-31 LAB
ALBUMIN SERPL ELPH-MCNC: 3.2 G/DL — LOW (ref 3.3–5)
ALP SERPL-CCNC: 790 U/L — HIGH (ref 40–120)
ALT FLD-CCNC: 175 U/L RC — HIGH (ref 10–45)
ANION GAP SERPL CALC-SCNC: 14 MMOL/L — SIGNIFICANT CHANGE UP (ref 5–17)
AST SERPL-CCNC: 162 U/L — HIGH (ref 10–40)
BILIRUB SERPL-MCNC: 19.6 MG/DL — HIGH (ref 0.2–1.2)
BUN SERPL-MCNC: 17 MG/DL — SIGNIFICANT CHANGE UP (ref 7–23)
CALCIUM SERPL-MCNC: 10.1 MG/DL — SIGNIFICANT CHANGE UP (ref 8.4–10.5)
CHLORIDE SERPL-SCNC: 96 MMOL/L — SIGNIFICANT CHANGE UP (ref 96–108)
CO2 SERPL-SCNC: 22 MMOL/L — SIGNIFICANT CHANGE UP (ref 22–31)
CREAT SERPL-MCNC: 1.13 MG/DL — SIGNIFICANT CHANGE UP (ref 0.5–1.3)
GLUCOSE BLDC GLUCOMTR-MCNC: 168 MG/DL — HIGH (ref 70–99)
GLUCOSE BLDC GLUCOMTR-MCNC: 224 MG/DL — HIGH (ref 70–99)
GLUCOSE SERPL-MCNC: 188 MG/DL — HIGH (ref 70–99)
HCT VFR BLD CALC: 37.4 % — LOW (ref 39–50)
HGB BLD-MCNC: 13 G/DL — SIGNIFICANT CHANGE UP (ref 13–17)
INR BLD: 1.39 RATIO — HIGH (ref 0.88–1.16)
MCHC RBC-ENTMCNC: 33 PG — SIGNIFICANT CHANGE UP (ref 27–34)
MCHC RBC-ENTMCNC: 34.7 GM/DL — SIGNIFICANT CHANGE UP (ref 32–36)
MCV RBC AUTO: 95.2 FL — SIGNIFICANT CHANGE UP (ref 80–100)
NON-GYNECOLOGICAL CYTOLOGY STUDY: SIGNIFICANT CHANGE UP
PLATELET # BLD AUTO: 295 K/UL — SIGNIFICANT CHANGE UP (ref 150–400)
POTASSIUM SERPL-MCNC: 4.2 MMOL/L — SIGNIFICANT CHANGE UP (ref 3.5–5.3)
POTASSIUM SERPL-SCNC: 4.2 MMOL/L — SIGNIFICANT CHANGE UP (ref 3.5–5.3)
PROT SERPL-MCNC: 7.1 G/DL — SIGNIFICANT CHANGE UP (ref 6–8.3)
PROTHROM AB SERPL-ACNC: 15.1 SEC — HIGH (ref 9.8–12.7)
RBC # BLD: 3.93 M/UL — LOW (ref 4.2–5.8)
RBC # FLD: 14.2 % — SIGNIFICANT CHANGE UP (ref 10.3–14.5)
SODIUM SERPL-SCNC: 132 MMOL/L — LOW (ref 135–145)
WBC # BLD: 8.6 K/UL — SIGNIFICANT CHANGE UP (ref 3.8–10.5)
WBC # FLD AUTO: 8.6 K/UL — SIGNIFICANT CHANGE UP (ref 3.8–10.5)

## 2017-10-31 PROCEDURE — 88305 TISSUE EXAM BY PATHOLOGIST: CPT | Mod: 26

## 2017-10-31 PROCEDURE — 93018 CV STRESS TEST I&R ONLY: CPT

## 2017-10-31 PROCEDURE — 93010 ELECTROCARDIOGRAM REPORT: CPT

## 2017-10-31 PROCEDURE — 74330 X-RAY BILE/PANC ENDOSCOPY: CPT | Mod: 26,GC

## 2017-10-31 PROCEDURE — 93016 CV STRESS TEST SUPVJ ONLY: CPT

## 2017-10-31 PROCEDURE — 43275 ERCP REMOVE FORGN BODY DUCT: CPT | Mod: 59,GC

## 2017-10-31 PROCEDURE — 78452 HT MUSCLE IMAGE SPECT MULT: CPT | Mod: 26

## 2017-10-31 PROCEDURE — 88173 CYTOPATH EVAL FNA REPORT: CPT | Mod: 26

## 2017-10-31 PROCEDURE — 43242 EGD US FINE NEEDLE BX/ASPIR: CPT | Mod: GC

## 2017-10-31 PROCEDURE — 43274 ERCP DUCT STENT PLACEMENT: CPT | Mod: GC

## 2017-10-31 RX ORDER — OXYCODONE AND ACETAMINOPHEN 5; 325 MG/1; MG/1
1 TABLET ORAL ONCE
Qty: 0 | Refills: 0 | Status: DISCONTINUED | OUTPATIENT
Start: 2017-10-31 | End: 2017-10-31

## 2017-10-31 RX ADMIN — BUDESONIDE AND FORMOTEROL FUMARATE DIHYDRATE 2 PUFF(S): 160; 4.5 AEROSOL RESPIRATORY (INHALATION) at 05:29

## 2017-10-31 RX ADMIN — VALSARTAN 160 MILLIGRAM(S): 80 TABLET ORAL at 05:29

## 2017-10-31 RX ADMIN — OXYCODONE AND ACETAMINOPHEN 1 TABLET(S): 5; 325 TABLET ORAL at 21:32

## 2017-10-31 RX ADMIN — PIPERACILLIN AND TAZOBACTAM 25 GRAM(S): 4; .5 INJECTION, POWDER, LYOPHILIZED, FOR SOLUTION INTRAVENOUS at 05:29

## 2017-10-31 RX ADMIN — PIPERACILLIN AND TAZOBACTAM 25 GRAM(S): 4; .5 INJECTION, POWDER, LYOPHILIZED, FOR SOLUTION INTRAVENOUS at 21:32

## 2017-10-31 RX ADMIN — PANTOPRAZOLE SODIUM 40 MILLIGRAM(S): 20 TABLET, DELAYED RELEASE ORAL at 05:29

## 2017-10-31 RX ADMIN — TAMSULOSIN HYDROCHLORIDE 0.4 MILLIGRAM(S): 0.4 CAPSULE ORAL at 21:32

## 2017-10-31 RX ADMIN — Medication 1: at 09:16

## 2017-10-31 NOTE — CHART NOTE - NSCHARTNOTEFT_GEN_A_CORE
Per GI, pt c Obstructive Jaundice - due to pancreatic head mass s/p EUS/FNA and ERCP with placement of PD stent, but CBD could not be accessed due to distorted anatomy; Plan for EUS assisted ERCP (Rendezvous). Concern for pancreatic adenocarcinoma; noted possibility of Whipple procedure if needed. pt seen for nutrition follow up.     Source: Patient [x]      Diet : NPO for procedure; previously on consistent CHO c snack diet       Patient reports [x] other: he has been eating well during the last week, mostly having foods in house, at times some foods from home/outside. Reports he has been having BMs.     Current Weight: admit: 209.5->10/25: 199.9 pounds- further decline noted, also noted concern for malignancy, would continue to monitor; remains well nourished       Pertinent Medications: MEDICATIONS  (STANDING):  buDESOnide  80 MICROgram(s)/formoterol 4.5 MICROgram(s) Inhaler 2 Puff(s) Inhalation two times a day  dextrose 5%. 1000 milliLiter(s) (50 mL/Hr) IV Continuous <Continuous>  dextrose 50% Injectable 12.5 Gram(s) IV Push once  dextrose 50% Injectable 25 Gram(s) IV Push once  dextrose 50% Injectable 25 Gram(s) IV Push once  insulin lispro (HumaLOG) corrective regimen sliding scale   SubCutaneous three times a day before meals  insulin lispro (HumaLOG) corrective regimen sliding scale   SubCutaneous at bedtime  pantoprazole    Tablet 40 milliGRAM(s) Oral before breakfast  piperacillin/tazobactam IVPB. 3.375 Gram(s) IV Intermittent every 8 hours  tamsulosin 0.4 milliGRAM(s) Oral at bedtime  valsartan 160 milliGRAM(s) Oral daily    MEDICATIONS  (PRN):  ALBUTerol    90 MICROgram(s) HFA Inhaler 2 Puff(s) Inhalation every 6 hours PRN sob  dextrose Gel 1 Dose(s) Oral once PRN Blood Glucose LESS THAN 70 milliGRAM(s)/deciliter  glucagon  Injectable 1 milliGRAM(s) IntraMuscular once PRN Glucose LESS THAN 70 milligrams/deciliter  simethicone 80 milliGRAM(s) Chew four times a day PRN Gas    Pertinent Labs:  10-30 Na132 mmol/L<L> Glu 193 mg/dL<H> K+ 4.4 mmol/L Cr  1.13 mg/dL BUN 16 mg/dL Phos n/a   Alb 3.0 g/dL<L> PAB n/a     POCT glucose: 10/30-31: 168-246    Skin: intact, no edema noted at this time     Estimated Needs:   [x] no change since previous assessment      Previous Nutrition Diagnosis:      [x] Unintended Weight Loss     Nutrition Diagnosis is [x] ongoing           New Nutrition Diagnosis: [x] not applicable        Recommend    [x] As medically feasible, resume consistent CHO diet; made pt aware RD remains available for diet education as needed pending procedure/possible Whipple procedure. Monitor wt as pt c some wt loss in house and PTA.        Monitoring and Evaluation:     [x] PO intake [x] Tolerance to diet prescription [x] weights [x] follow up per protocol    RD remains available.   Jodie Pérez, MS, RD, , [facility] #[phone]

## 2017-10-31 NOTE — PROGRESS NOTE ADULT - SUBJECTIVE AND OBJECTIVE BOX
JENIFER [facility]  MRN-[id number]    Patient is a 61y old  Male who presents with a chief complaint of jaundice (23 Oct 2017 21:50)      Review of System    No new events    Current Meds  MEDICATIONS  (STANDING):  buDESOnide  80 MICROgram(s)/formoterol 4.5 MICROgram(s) Inhaler 2 Puff(s) Inhalation two times a day  dextrose 5%. 1000 milliLiter(s) (50 mL/Hr) IV Continuous <Continuous>  dextrose 50% Injectable 12.5 Gram(s) IV Push once  dextrose 50% Injectable 25 Gram(s) IV Push once  dextrose 50% Injectable 25 Gram(s) IV Push once  insulin lispro (HumaLOG) corrective regimen sliding scale   SubCutaneous three times a day before meals  insulin lispro (HumaLOG) corrective regimen sliding scale   SubCutaneous at bedtime  pantoprazole    Tablet 40 milliGRAM(s) Oral before breakfast  piperacillin/tazobactam IVPB. 3.375 Gram(s) IV Intermittent every 8 hours  tamsulosin 0.4 milliGRAM(s) Oral at bedtime  valsartan 160 milliGRAM(s) Oral daily    MEDICATIONS  (PRN):  ALBUTerol    90 MICROgram(s) HFA Inhaler 2 Puff(s) Inhalation every 6 hours PRN sob  dextrose Gel 1 Dose(s) Oral once PRN Blood Glucose LESS THAN 70 milliGRAM(s)/deciliter  glucagon  Injectable 1 milliGRAM(s) IntraMuscular once PRN Glucose LESS THAN 70 milligrams/deciliter  simethicone 80 milliGRAM(s) Chew four times a day PRN Gas      Vitals  Vital Signs Last 24 Hrs  T(C): 36.6 (30 Oct 2017 21:38), Max: 36.8 (30 Oct 2017 08:07)  T(F): 97.8 (30 Oct 2017 21:38), Max: 98.2 (30 Oct 2017 08:07)  HR: 90 (30 Oct 2017 21:38) (82 - 90)  BP: 106/65 (30 Oct 2017 21:38) (93/53 - 106/65)  BP(mean): --  RR: 16 (30 Oct 2017 21:38) (16 - 18)  SpO2: 97% (30 Oct 2017 21:38) (94% - 97%)    Physical Exam      Constitutional: NAD      Lab  CBC Full  -  ( 30 Oct 2017 09:08 )  WBC Count : 6.92 K/uL  Hemoglobin : 11.5 g/dL  Hematocrit : 36.5 %  Platelet Count - Automated : 294 K/uL  Mean Cell Volume : 99.2 fl  Mean Cell Hemoglobin : 31.3 pg  Mean Cell Hemoglobin Concentration : 31.5 gm/dL  Auto Neutrophil # : x  Auto Lymphocyte # : x  Auto Monocyte # : x  Auto Eosinophil # : x  Auto Basophil # : x  Auto Neutrophil % : x  Auto Lymphocyte % : x  Auto Monocyte % : x  Auto Eosinophil % : x  Auto Basophil % : x    10-30    132<L>  |  96  |  16  ----------------------------<  193<H>  4.4   |  23  |  1.13    Ca    9.4      30 Oct 2017 08:59    TPro  6.5  /  Alb  3.0<L>  /  TBili  17.6<H>  /  DBili  x   /  AST  141<H>  /  ALT  150<H>  /  AlkPhos  691<H>  10-30        Rad:    Assessment/Plan

## 2017-10-31 NOTE — PROGRESS NOTE ADULT - ASSESSMENT
60 y/o  male with hx of DM , HTN admitted for jaundice that has been ongoing for the past two month,.  Pt reports that he had intermittent pain in R flank and  that moved to RUQ and later localized t epigastric area however disappeared over the past two weeks.  No objective fever or chills   no N/V/d   no weight loss   Denies ETOH  recent travel   was seen as o/p and had w/u with blood work and with US .. LFT including bili were elevated howerver US neg for obstruction  no new medications ( though some of his medications were dced including Glyxsambi )    + dark urine (/frequent .  + light colored stool   CT: Mass of the head of the pancreas causing marked distention of the common   bile duct, intrahepatic biliary duct dilatation, and pancreatic ductal   dilatation. Further evaluation with contrast-enhanced MRI is recommended.    1- obstructive Jaundice : sec to panreatic mass ..  - MRI abd: < from: MRI Abdomen w/wo Cont (10.26.17 @ 14:14) >  A long segment distal CBD stricture with severe biliary dilatation highly suspicious for neoplasm.    - CT chest to evaluate for distant mets: negative     s/p ERCP : < from: ERCP (10.25.17 @ 16:10) >  - One plastic stent was placed into the ventral pancreatic duct. Biliary                        cannulation not possible despite various attmepts and variations in technique.     - EUS guided rendezvous planned today       - PPI for now                     - cpnt  Zosyn IV                 -awaiting results of cytology: called pathologist : atypical cells on background of chronic pancreatitis found however ? adequecy ..   f/u official report   -possibly candidate for a Whipple procedure pending medical clearance . D/wDr. Somekh for clearance : given risk factors and pt now reported exertional sx will check stress test.  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 and now with elevated LFT    DVT prophylxis   discussed with pt at length bedside   d/w GI   d/w significant other  on phone   d/w NP

## 2017-10-31 NOTE — PROGRESS NOTE ADULT - ASSESSMENT
62 y/o man with obstructive jaundice. s/p ERCP. Imaging with mass in head of pancreas.  - await pathology  -surg onc following  - f/u with GI. ERCP   mild anemia- h/h adequate- monitor cbc.

## 2017-10-31 NOTE — PROGRESS NOTE ADULT - SUBJECTIVE AND OBJECTIVE BOX
Pre-Endoscopy Evaluation      Referring Physician:  Dr. CAMELIA Wyatt                                 Procedure: EUS/ERCP    Indication for Procedure: Biliary Obstruction    Pertinent History: 61 year old male with Obstructive Jaundice - due to pancreatic head mass s/p EUS/FNA and ERCP with placement of PD stent, but CBD could not be accessed due to distorted anatomy.       PAST MEDICAL & SURGICAL HISTORY:  HTN (hypertension)  Diabetes  High cholesterol  No significant past surgical history      PMH of Gastroparesis [ ]  Gastric Surgery [ ]  Gastric Outlet Obstruction [ ]    Allergies:    No Known Allergies    Intolerances;    Latex allergy: [ ] yes [x] no    Medications:MEDICATIONS  (STANDING):  buDESOnide  80 MICROgram(s)/formoterol 4.5 MICROgram(s) Inhaler 2 Puff(s) Inhalation two times a day  dextrose 5%. 1000 milliLiter(s) (50 mL/Hr) IV Continuous <Continuous>  dextrose 50% Injectable 12.5 Gram(s) IV Push once  dextrose 50% Injectable 25 Gram(s) IV Push once  dextrose 50% Injectable 25 Gram(s) IV Push once  insulin lispro (HumaLOG) corrective regimen sliding scale   SubCutaneous three times a day before meals  insulin lispro (HumaLOG) corrective regimen sliding scale   SubCutaneous at bedtime  pantoprazole    Tablet 40 milliGRAM(s) Oral before breakfast  piperacillin/tazobactam IVPB. 3.375 Gram(s) IV Intermittent every 8 hours  tamsulosin 0.4 milliGRAM(s) Oral at bedtime  valsartan 160 milliGRAM(s) Oral daily    MEDICATIONS  (PRN):  ALBUTerol    90 MICROgram(s) HFA Inhaler 2 Puff(s) Inhalation every 6 hours PRN sob  dextrose Gel 1 Dose(s) Oral once PRN Blood Glucose LESS THAN 70 milliGRAM(s)/deciliter  glucagon  Injectable 1 milliGRAM(s) IntraMuscular once PRN Glucose LESS THAN 70 milligrams/deciliter  simethicone 80 milliGRAM(s) Chew four times a day PRN Gas      Smoking: [ ] yes  [x] no    AICD/PPM: [ ] yes   [x] no    Pertinent lab data:                        13.0   8.6   )-----------( 295      ( 31 Oct 2017 10:11 )             37.4     10-31    132<L>  |  96  |  17  ----------------------------<  188<H>  4.2   |  22  |  1.13    Ca    10.1      31 Oct 2017 10:11    TPro  7.1  /  Alb  3.2<L>  /  TBili  19.6<H>  /  DBili  x   /  AST  162<H>  /  ALT  175<H>  /  AlkPhos  790<H>  10-31    PT/INR - ( 31 Oct 2017 12:00 )   PT: 15.1 sec;   INR: 1.39 ratio        Physical Examination:      Daily   Vital Signs Last 24 Hrs  T(C): 36.9 (31 Oct 2017 07:15), Max: 36.9 (31 Oct 2017 07:15)  T(F): 98.5 (31 Oct 2017 07:15), Max: 98.5 (31 Oct 2017 07:15)  HR: 74 (31 Oct 2017 07:15) (74 - 90)  BP: 108/62 (31 Oct 2017 07:15) (106/65 - 108/62)  BP(mean): --  RR: 18 (31 Oct 2017 07:15) (16 - 18)  SpO2: 98% (31 Oct 2017 07:15) (97% - 98%)  POC blood glucose test 168mg/dl at 0848    Constitutional: NAD    Neck:  No JVD    Respiratory: CTAB/L    Cardiovascular: S1 and S2    Gastrointestinal: BS+, soft, NT/ND    Extremities: No peripheral edema    Neurological: A/O x 3, no focal deficits    : No Mariscal    Skin: No rashes    Comments:    ASA Class: I [ ]  II [ ]  III [ ]  IV [x]    The patient is a suitable candidate for the planned procedure unless box checked [ ]  No, explain: Pre-Endoscopy Evaluation      Referring Physician:  Dr. CAMELIA Wyatt                                 Procedure: EUS/ERCP    Indication for Procedure: Biliary Obstruction    Pertinent History: 61 year old male with Obstructive Jaundice - due to pancreatic head mass s/p EUS/FNA and ERCP with placement of PD stent, but CBD could not be accessed due to distorted anatomy.       PAST MEDICAL & SURGICAL HISTORY:  HTN (hypertension)  Diabetes  High cholesterol  No significant past surgical history      PMH of Gastroparesis [ ]  Gastric Surgery [ ]  Gastric Outlet Obstruction [ ]    Allergies:    No Known Allergies    Intolerances;    Latex allergy: [ ] yes [x] no    Medications:MEDICATIONS  (STANDING):  buDESOnide  80 MICROgram(s)/formoterol 4.5 MICROgram(s) Inhaler 2 Puff(s) Inhalation two times a day  dextrose 5%. 1000 milliLiter(s) (50 mL/Hr) IV Continuous <Continuous>  dextrose 50% Injectable 12.5 Gram(s) IV Push once  dextrose 50% Injectable 25 Gram(s) IV Push once  dextrose 50% Injectable 25 Gram(s) IV Push once  insulin lispro (HumaLOG) corrective regimen sliding scale   SubCutaneous three times a day before meals  insulin lispro (HumaLOG) corrective regimen sliding scale   SubCutaneous at bedtime  pantoprazole    Tablet 40 milliGRAM(s) Oral before breakfast  piperacillin/tazobactam IVPB. 3.375 Gram(s) IV Intermittent every 8 hours  tamsulosin 0.4 milliGRAM(s) Oral at bedtime  valsartan 160 milliGRAM(s) Oral daily    MEDICATIONS  (PRN):  ALBUTerol    90 MICROgram(s) HFA Inhaler 2 Puff(s) Inhalation every 6 hours PRN sob  dextrose Gel 1 Dose(s) Oral once PRN Blood Glucose LESS THAN 70 milliGRAM(s)/deciliter  glucagon  Injectable 1 milliGRAM(s) IntraMuscular once PRN Glucose LESS THAN 70 milligrams/deciliter  simethicone 80 milliGRAM(s) Chew four times a day PRN Gas      Smoking: [ ] yes  [x] no    AICD/PPM: [ ] yes   [x] no    Pertinent lab data:                        13.0   8.6   )-----------( 295      ( 31 Oct 2017 10:11 )             37.4     10-31    132<L>  |  96  |  17  ----------------------------<  188<H>  4.2   |  22  |  1.13    Ca    10.1      31 Oct 2017 10:11    TPro  7.1  /  Alb  3.2<L>  /  TBili  19.6<H>  /  DBili  x   /  AST  162<H>  /  ALT  175<H>  /  AlkPhos  790<H>  10-31    PT/INR - ( 31 Oct 2017 12:00 )   PT: 15.1 sec;   INR: 1.39 ratio        Physical Examination:      Daily   Vital Signs Last 24 Hrs  T(C): 36.9 (31 Oct 2017 07:15), Max: 36.9 (31 Oct 2017 07:15)  T(F): 98.5 (31 Oct 2017 07:15), Max: 98.5 (31 Oct 2017 07:15)  HR: 74 (31 Oct 2017 07:15) (74 - 90)  BP: 108/62 (31 Oct 2017 07:15) (106/65 - 108/62)  BP(mean): --  RR: 18 (31 Oct 2017 07:15) (16 - 18)  SpO2: 98% (31 Oct 2017 07:15) (97% - 98%)  POC blood glucose test 168mg/dl at 0848    Constitutional: NAD    Neck:  No JVD    Respiratory: CTAB/L    Cardiovascular: S1 and S2    Gastrointestinal: BS+, soft, NT/ND    Extremities: No peripheral edema    Neurological: A/O x 3, no focal deficits    : No Mariscal    Skin: No rashes    Comments: Nuclear Stress Test mildly abnormal. Discussed with Cardiologist can proceed with ERCP patient at negligible risk    ASA Class: I [ ]  II [ ]  III [ ]  IV [x]    The patient is a suitable candidate for the planned procedure unless box checked [ ]  No, explain:

## 2017-10-31 NOTE — PROGRESS NOTE ADULT - SUBJECTIVE AND OBJECTIVE BOX
Patient is a 61y old  Male who presents with a chief complaint of jaundice (23 Oct 2017 21:50)                                                               INTERVAL HPI/OVERNIGHT EVENTS:    REVIEW OF SYSTEMS:     CONSTITUTIONAL: No weakness, fevers or chills  RESPIRATORY: No cough, wheezing,  No shortness of breath  CARDIOVASCULAR: No chest pain or palpitations  GASTROINTESTINAL: No abdominal pain  . No nausea, vomiting  GENITOURINARY: No dysuria, frequency or hematuria  NEUROLOGICAL: No numbness or weakness                                                                                                                                                                                                                                                                                  Medications:  MEDICATIONS  (STANDING):  buDESOnide  80 MICROgram(s)/formoterol 4.5 MICROgram(s) Inhaler 2 Puff(s) Inhalation two times a day  dextrose 5%. 1000 milliLiter(s) (50 mL/Hr) IV Continuous <Continuous>  dextrose 50% Injectable 12.5 Gram(s) IV Push once  dextrose 50% Injectable 25 Gram(s) IV Push once  dextrose 50% Injectable 25 Gram(s) IV Push once  insulin lispro (HumaLOG) corrective regimen sliding scale   SubCutaneous three times a day before meals  insulin lispro (HumaLOG) corrective regimen sliding scale   SubCutaneous at bedtime  pantoprazole    Tablet 40 milliGRAM(s) Oral before breakfast  piperacillin/tazobactam IVPB. 3.375 Gram(s) IV Intermittent every 8 hours  tamsulosin 0.4 milliGRAM(s) Oral at bedtime  valsartan 160 milliGRAM(s) Oral daily    MEDICATIONS  (PRN):  ALBUTerol    90 MICROgram(s) HFA Inhaler 2 Puff(s) Inhalation every 6 hours PRN sob  dextrose Gel 1 Dose(s) Oral once PRN Blood Glucose LESS THAN 70 milliGRAM(s)/deciliter  glucagon  Injectable 1 milliGRAM(s) IntraMuscular once PRN Glucose LESS THAN 70 milligrams/deciliter  simethicone 80 milliGRAM(s) Chew four times a day PRN Gas       Allergies    No Known Allergies    Intolerances      Vital Signs Last 24 Hrs  T(C): 36.9 (31 Oct 2017 07:15), Max: 36.9 (31 Oct 2017 07:15)  T(F): 98.5 (31 Oct 2017 07:15), Max: 98.5 (31 Oct 2017 07:15)  HR: 74 (31 Oct 2017 07:15) (74 - 90)  BP: 108/62 (31 Oct 2017 07:15) (106/65 - 108/62)  BP(mean): --  RR: 18 (31 Oct 2017 07:15) (16 - 18)  SpO2: 98% (31 Oct 2017 07:15) (97% - 98%)  CAPILLARY BLOOD GLUCOSE  197 (30 Oct 2017 21:38)      POCT Blood Glucose.: 168 mg/dL (31 Oct 2017 08:48)  POCT Blood Glucose.: 197 mg/dL (30 Oct 2017 21:38)  POCT Blood Glucose.: 246 mg/dL (30 Oct 2017 16:49)      10-30 @ 07:01  -  10-31 @ 07:00  --------------------------------------------------------  IN: 100 mL / OUT: 0 mL / NET: 100 mL    10-31 @ 07:01  -  10-31 @ 13:28  --------------------------------------------------------  IN: 0 mL / OUT: 0 mL / NET: 0 mL      Physical Exam:    General:  Well appearing, NAD, not cachetic  HEENT:  icteric, PERRLA  CV:  RRR, S1S2   Lungs:  CTA B/L, no wheezes, rales, rhonchi  Abdomen:  Soft, non-tender, no distended, positive BS  Extremities:  2+ pulses, no c/c, no edema  Skin:  Warm and dry, no rashes  :  No newberry  Neuro:  AAOx3, non-focal, grossly intact                                                                                                                                                                                                                                                                                                LABS:                               13.0   8.6   )-----------( 295      ( 31 Oct 2017 10:11 )             37.4                      10-31    132<L>  |  96  |  17  ----------------------------<  188<H>  4.2   |  22  |  1.13    Ca    10.1      31 Oct 2017 10:11    TPro  7.1  /  Alb  3.2<L>  /  TBili  19.6<H>  /  DBili  x   /  AST  162<H>  /  ALT  175<H>  /  AlkPhos  790<H>  10-31                       RADIOLOGY & ADDITIONAL TESTS         I personally reviewed: [  ]EKG   [  ]CXR    [  ] CT

## 2017-11-01 PROBLEM — Z00.00 ENCOUNTER FOR PREVENTIVE HEALTH EXAMINATION: Status: ACTIVE | Noted: 2017-11-01

## 2017-11-01 PROBLEM — E78.00 PURE HYPERCHOLESTEROLEMIA, UNSPECIFIED: Chronic | Status: ACTIVE | Noted: 2017-10-23

## 2017-11-01 LAB
ALBUMIN SERPL ELPH-MCNC: 3 G/DL — LOW (ref 3.3–5)
ALP SERPL-CCNC: 679 U/L — HIGH (ref 40–120)
ALT FLD-CCNC: 150 U/L — HIGH (ref 10–45)
ANION GAP SERPL CALC-SCNC: 13 MMOL/L — SIGNIFICANT CHANGE UP (ref 5–17)
AST SERPL-CCNC: 132 U/L — HIGH (ref 10–40)
BILIRUB SERPL-MCNC: 12.6 MG/DL — HIGH (ref 0.2–1.2)
BUN SERPL-MCNC: 19 MG/DL — SIGNIFICANT CHANGE UP (ref 7–23)
CALCIUM SERPL-MCNC: 9.6 MG/DL — SIGNIFICANT CHANGE UP (ref 8.4–10.5)
CHLORIDE SERPL-SCNC: 95 MMOL/L — LOW (ref 96–108)
CO2 SERPL-SCNC: 23 MMOL/L — SIGNIFICANT CHANGE UP (ref 22–31)
CREAT SERPL-MCNC: 1.1 MG/DL — SIGNIFICANT CHANGE UP (ref 0.5–1.3)
GLUCOSE BLDC GLUCOMTR-MCNC: 194 MG/DL — HIGH (ref 70–99)
GLUCOSE BLDC GLUCOMTR-MCNC: 235 MG/DL — HIGH (ref 70–99)
GLUCOSE BLDC GLUCOMTR-MCNC: 245 MG/DL — HIGH (ref 70–99)
GLUCOSE BLDC GLUCOMTR-MCNC: 310 MG/DL — HIGH (ref 70–99)
GLUCOSE BLDC GLUCOMTR-MCNC: 317 MG/DL — HIGH (ref 70–99)
GLUCOSE SERPL-MCNC: 180 MG/DL — HIGH (ref 70–99)
POTASSIUM SERPL-MCNC: 4.4 MMOL/L — SIGNIFICANT CHANGE UP (ref 3.5–5.3)
POTASSIUM SERPL-SCNC: 4.4 MMOL/L — SIGNIFICANT CHANGE UP (ref 3.5–5.3)
PROT SERPL-MCNC: 6.8 G/DL — SIGNIFICANT CHANGE UP (ref 6–8.3)
SODIUM SERPL-SCNC: 131 MMOL/L — LOW (ref 135–145)

## 2017-11-01 RX ORDER — SODIUM CHLORIDE 0.65 %
1 AEROSOL, SPRAY (ML) NASAL THREE TIMES A DAY
Qty: 0 | Refills: 0 | Status: DISCONTINUED | OUTPATIENT
Start: 2017-11-01 | End: 2017-11-02

## 2017-11-01 RX ORDER — METOPROLOL TARTRATE 50 MG
25 TABLET ORAL DAILY
Qty: 0 | Refills: 0 | Status: DISCONTINUED | OUTPATIENT
Start: 2017-11-01 | End: 2017-11-02

## 2017-11-01 RX ORDER — VALSARTAN 80 MG/1
80 TABLET ORAL DAILY
Qty: 0 | Refills: 0 | Status: DISCONTINUED | OUTPATIENT
Start: 2017-11-01 | End: 2017-11-02

## 2017-11-01 RX ADMIN — BUDESONIDE AND FORMOTEROL FUMARATE DIHYDRATE 2 PUFF(S): 160; 4.5 AEROSOL RESPIRATORY (INHALATION) at 17:37

## 2017-11-01 RX ADMIN — SIMETHICONE 80 MILLIGRAM(S): 80 TABLET, CHEWABLE ORAL at 14:48

## 2017-11-01 RX ADMIN — VALSARTAN 160 MILLIGRAM(S): 80 TABLET ORAL at 05:34

## 2017-11-01 RX ADMIN — TAMSULOSIN HYDROCHLORIDE 0.4 MILLIGRAM(S): 0.4 CAPSULE ORAL at 21:49

## 2017-11-01 RX ADMIN — Medication 1: at 08:56

## 2017-11-01 RX ADMIN — BUDESONIDE AND FORMOTEROL FUMARATE DIHYDRATE 2 PUFF(S): 160; 4.5 AEROSOL RESPIRATORY (INHALATION) at 05:34

## 2017-11-01 RX ADMIN — Medication 1 SPRAY(S): at 14:47

## 2017-11-01 RX ADMIN — SIMETHICONE 80 MILLIGRAM(S): 80 TABLET, CHEWABLE ORAL at 18:00

## 2017-11-01 RX ADMIN — Medication 2: at 17:37

## 2017-11-01 RX ADMIN — Medication 2: at 12:39

## 2017-11-01 RX ADMIN — PIPERACILLIN AND TAZOBACTAM 25 GRAM(S): 4; .5 INJECTION, POWDER, LYOPHILIZED, FOR SOLUTION INTRAVENOUS at 05:34

## 2017-11-01 RX ADMIN — PANTOPRAZOLE SODIUM 40 MILLIGRAM(S): 20 TABLET, DELAYED RELEASE ORAL at 05:34

## 2017-11-01 RX ADMIN — Medication 2: at 21:48

## 2017-11-01 RX ADMIN — PIPERACILLIN AND TAZOBACTAM 25 GRAM(S): 4; .5 INJECTION, POWDER, LYOPHILIZED, FOR SOLUTION INTRAVENOUS at 14:47

## 2017-11-01 RX ADMIN — VALSARTAN 80 MILLIGRAM(S): 80 TABLET ORAL at 14:47

## 2017-11-01 RX ADMIN — SIMETHICONE 80 MILLIGRAM(S): 80 TABLET, CHEWABLE ORAL at 09:38

## 2017-11-01 RX ADMIN — PIPERACILLIN AND TAZOBACTAM 25 GRAM(S): 4; .5 INJECTION, POWDER, LYOPHILIZED, FOR SOLUTION INTRAVENOUS at 21:43

## 2017-11-01 RX ADMIN — Medication 25 MILLIGRAM(S): at 14:47

## 2017-11-01 NOTE — PROGRESS NOTE ADULT - ASSESSMENT
62 y/o man with obstructive jaundice. s/p repeatERCP. Imaging with mass in head of pancreas.  - await pathology before being able to proceed with additional medical onolocgic planning  -surg onc and GI following  - f/u cmp

## 2017-11-01 NOTE — PROGRESS NOTE ADULT - SUBJECTIVE AND OBJECTIVE BOX
SURGICAL ONCOLOGY / BLUE TEAM SURGERY (#9004) PROGRESS NOTE  ---------------------------------------------------------------------------------------------     CC: pancreatic head mass.     INTERVAL EVENTS: patient now s/p Biliary stent placement yesterday with removal of pancreatic stent.  Also had cardiac stress test yesterday.      SUBJECTIVE: Pt seen + examined.  Patient has mild abdominal pain, improving overnight.  Tolerating diet, BM this morning.  Denies nausea/emesis.      ---------------------------------------------------------------------------------------------   VITALS  T(C): 37.1 (11-01-17 @ 08:07), Max: 37.1 (11-01-17 @ 08:07)  HR: 81 (11-01-17 @ 08:07) (80 - 81)  BP: 107/69 (11-01-17 @ 08:07) (107/69 - 110/84)  RR: 18 (11-01-17 @ 08:07) (18 - 18)  SpO2: 96% (11-01-17 @ 08:07) (96% - 99%)  POCT Blood Glucose.: 194 mg/dL (01 Nov 2017 08:23)  POCT Blood Glucose.: 224 mg/dL (31 Oct 2017 21:18)    ---------------------------------------------------------------------------------------------   PHYSICAL EXAM:   General: NAD, Sitting in bed comfortably  Neuro: alert, oriented x3  GI/Abd: Soft, NT/ND, no rebound/guarding    ---------------------------------------------------------------------------------------------   LABS  CBC (10-31 @ 10:11)                              13.0                           8.6     )----------------(  295        --    % Neutrophils, --    % Lymphocytes, ANC: --                                  37.4<L>    BMP (11-01 @ 08:40)             131<L>  |  95<L>   |  19    		Ca++ --      Ca 9.6                ---------------------------------( 180<H>		Mg --                 4.4     |  23      |  1.10  			Ph --      BMP (10-31 @ 10:11)             132<L>  |  96      |  17    		Ca++ --      Ca 10.1               ---------------------------------( 188<H>		Mg --                 4.2     |  22      |  1.13  			Ph --        LFTs (11-01 @ 08:40)      TPro 6.8 / Alb 3.0<L> / TBili 12.6<H> / DBili -- / <H> / <H> / AlkPhos 679<H>  LFTs (10-31 @ 10:11)      TPro 7.1 / Alb 3.2<L> / TBili 19.6<H> / DBili -- / <H> / <H> / AlkPhos 790<H>    Coags (10-31 @ 12:00)  aPTT -- / INR 1.39<H> / PT 15.1<H>     ---------------------------------------------------------------------------------------------     < from: ERCP (10.31.17 @ 18:04) >  Impression:          1. Obstructive jaundice s/p ERCP with placement of fully covered metal stent                        in bile ductand removal of pancreatic duct stent.                       2. Pancreatic head mass s/p FNA and core biopsies.    < end of copied text >    ---------------------------------------------------------------------------------------------

## 2017-11-01 NOTE — PROGRESS NOTE ADULT - SUBJECTIVE AND OBJECTIVE BOX
JENIFER NORTH  MRN-[id number]    Patient is a 61y old  Male who presents with a chief complaint of jaundice (23 Oct 2017 21:50)      Review of System    General:	Denies fatigue, fevers, chills, sweats, decreased appetite.    Skin/Breast: denies pruritis, rash  	  Ophthalmologic: no change in vision or blurring  	  HEENT	Denies dry mouth, oral sores, dysphagia,  change in hearing.    Respiratory and Thorax:  no cough, sob, wheeze, hemoptysis  	  Cardiovascular:	no cp , palp, orthopnea    Gastrointestinal:	no n/v/d constipation    Genitourinary:	no dysuria of frequency, no hematuria, no flank pain    Musculoskeletal:	no bone or joint pain. no muscle aches.     Neurological:	no change in sensory or motor function. no headache. no weakness.     Psychiatric:	no depression, no anxiety, insomnia.     Hematology/Lymphatics:	no bleeding or bruising        Current Meds  MEDICATIONS  (STANDING):  buDESOnide  80 MICROgram(s)/formoterol 4.5 MICROgram(s) Inhaler 2 Puff(s) Inhalation two times a day  dextrose 5%. 1000 milliLiter(s) (50 mL/Hr) IV Continuous <Continuous>  dextrose 50% Injectable 12.5 Gram(s) IV Push once  dextrose 50% Injectable 25 Gram(s) IV Push once  dextrose 50% Injectable 25 Gram(s) IV Push once  insulin lispro (HumaLOG) corrective regimen sliding scale   SubCutaneous three times a day before meals  insulin lispro (HumaLOG) corrective regimen sliding scale   SubCutaneous at bedtime  pantoprazole    Tablet 40 milliGRAM(s) Oral before breakfast  piperacillin/tazobactam IVPB. 3.375 Gram(s) IV Intermittent every 8 hours  tamsulosin 0.4 milliGRAM(s) Oral at bedtime  valsartan 160 milliGRAM(s) Oral daily    MEDICATIONS  (PRN):  ALBUTerol    90 MICROgram(s) HFA Inhaler 2 Puff(s) Inhalation every 6 hours PRN sob  dextrose Gel 1 Dose(s) Oral once PRN Blood Glucose LESS THAN 70 milliGRAM(s)/deciliter  glucagon  Injectable 1 milliGRAM(s) IntraMuscular once PRN Glucose LESS THAN 70 milligrams/deciliter  simethicone 80 milliGRAM(s) Chew four times a day PRN Gas      Vitals  Vital Signs Last 24 Hrs  T(C): 36.5 (31 Oct 2017 21:11), Max: 36.9 (31 Oct 2017 07:15)  T(F): 97.7 (31 Oct 2017 21:11), Max: 98.5 (31 Oct 2017 07:15)  HR: 80 (31 Oct 2017 21:11) (74 - 80)  BP: 110/84 (31 Oct 2017 21:11) (108/62 - 110/84)  BP(mean): --  RR: 18 (31 Oct 2017 21:11) (18 - 18)  SpO2: 99% (31 Oct 2017 21:11) (98% - 99%)    Physical Exam    Constitutional: NAD    Eyes: PERRLA EOMI, anicteric sclera    Heent :No oral sores, no pharyngeal injection. moist mucosa.    Neck: supple, no jvd, no LAD    Respiratory: CTA b/l     Cardiovascular: s1s2, no m/g/r    Gastrointestinal: soft, nt, nd, + BS    Extremities: no c/c/e    Neurological:A&O x 3 moves all ext.    Skin: no rash on exposed skin    Lymph Nodes: no lymphadenopathy.      Lab  CBC Full  -  ( 31 Oct 2017 10:11 )  WBC Count : 8.6 K/uL  Hemoglobin : 13.0 g/dL  Hematocrit : 37.4 %  Platelet Count - Automated : 295 K/uL  Mean Cell Volume : 95.2 fl  Mean Cell Hemoglobin : 33.0 pg  Mean Cell Hemoglobin Concentration : 34.7 gm/dL  Auto Neutrophil # : x  Auto Lymphocyte # : x  Auto Monocyte # : x  Auto Eosinophil # : x  Auto Basophil # : x  Auto Neutrophil % : x  Auto Lymphocyte % : x  Auto Monocyte % : x  Auto Eosinophil % : x  Auto Basophil % : x    10-31    132<L>  |  96  |  17  ----------------------------<  188<H>  4.2   |  22  |  1.13    Ca    10.1      31 Oct 2017 10:11    TPro  7.1  /  Alb  3.2<L>  /  TBili  19.6<H>  /  DBili  x   /  AST  162<H>  /  ALT  175<H>  /  AlkPhos  790<H>  10-31    PT/INR - ( 31 Oct 2017 12:00 )   PT: 15.1 sec;   INR: 1.39 ratio             Rad:    Assessment/Plan JENIFER NORTH  [id number]    Patient is a 61y old  Male who presents with a chief complaint of jaundice (23 Oct 2017 21:50)      Review of System    General:	Denies fatigue, fevers, chills, sweats, decreased appetite.    Skin/Breast: denies pruritis, rash  	  Ophthalmologic: no change in vision or blurring  	  HEENT	Denies dry mouth, oral sores, dysphagia,  change in hearing.    Respiratory and Thorax:  no cough, sob, wheeze, hemoptysis  	  Cardiovascular:	no cp , palp, orthopnea    Gastrointestinal:	no n/v/d. + contipation- but now s/p bm. Also with abdominal soreness.    Genitourinary:	no dysuria of frequency, no hematuria, no flank pain    Musculoskeletal:	no bone or joint pain. no muscle aches.     Neurological:	no change in sensory or motor function. no headache. no weakness.     Psychiatric:	no depression, no anxiety, insomnia.     Hematology/Lymphatics:	no bleeding or bruising    Current Meds  MEDICATIONS  (STANDING):  buDESOnide  80 MICROgram(s)/formoterol 4.5 MICROgram(s) Inhaler 2 Puff(s) Inhalation two times a day  dextrose 5%. 1000 milliLiter(s) (50 mL/Hr) IV Continuous <Continuous>  dextrose 50% Injectable 12.5 Gram(s) IV Push once  dextrose 50% Injectable 25 Gram(s) IV Push once  dextrose 50% Injectable 25 Gram(s) IV Push once  insulin lispro (HumaLOG) corrective regimen sliding scale   SubCutaneous three times a day before meals  insulin lispro (HumaLOG) corrective regimen sliding scale   SubCutaneous at bedtime  pantoprazole    Tablet 40 milliGRAM(s) Oral before breakfast  piperacillin/tazobactam IVPB. 3.375 Gram(s) IV Intermittent every 8 hours  tamsulosin 0.4 milliGRAM(s) Oral at bedtime  valsartan 160 milliGRAM(s) Oral daily    MEDICATIONS  (PRN):  ALBUTerol    90 MICROgram(s) HFA Inhaler 2 Puff(s) Inhalation every 6 hours PRN sob  dextrose Gel 1 Dose(s) Oral once PRN Blood Glucose LESS THAN 70 milliGRAM(s)/deciliter  glucagon  Injectable 1 milliGRAM(s) IntraMuscular once PRN Glucose LESS THAN 70 milligrams/deciliter  simethicone 80 milliGRAM(s) Chew four times a day PRN Gas      Vitals  Vital Signs Last 24 Hrs  T(C): 36.5 (31 Oct 2017 21:11), Max: 36.9 (31 Oct 2017 07:15)  T(F): 97.7 (31 Oct 2017 21:11), Max: 98.5 (31 Oct 2017 07:15)  HR: 80 (31 Oct 2017 21:11) (74 - 80)  BP: 110/84 (31 Oct 2017 21:11) (108/62 - 110/84)  BP(mean): --  RR: 18 (31 Oct 2017 21:11) (18 - 18)  SpO2: 99% (31 Oct 2017 21:11) (98% - 99%)    Physical Exam    Constitutional: NAD    Eyes: PERRLA EOMI, anicteric sclera    Heent :No oral sores, no pharyngeal injection. moist mucosa.    Neck: supple, no jvd, no LAD    Respiratory: CTA b/l     Cardiovascular: s1s2, no m/g/r    Gastrointestinal: soft, nt, nd, + BS    Extremities: no c/c/e    Neurological:A&O x 3 moves all ext.    Skin: no rash on exposed skin    Lymph Nodes: no lymphadenopathy.      Lab  CBC Full  -  ( 31 Oct 2017 10:11 )  WBC Count : 8.6 K/uL  Hemoglobin : 13.0 g/dL  Hematocrit : 37.4 %  Platelet Count - Automated : 295 K/uL  Mean Cell Volume : 95.2 fl  Mean Cell Hemoglobin : 33.0 pg  Mean Cell Hemoglobin Concentration : 34.7 gm/dL  Auto Neutrophil # : x  Auto Lymphocyte # : x  Auto Monocyte # : x  Auto Eosinophil # : x  Auto Basophil # : x  Auto Neutrophil % : x  Auto Lymphocyte % : x  Auto Monocyte % : x  Auto Eosinophil % : x  Auto Basophil % : x    10-31    132<L>  |  96  |  17  ----------------------------<  188<H>  4.2   |  22  |  1.13    Ca    10.1      31 Oct 2017 10:11    TPro  7.1  /  Alb  3.2<L>  /  TBili  19.6<H>  /  DBili  x   /  AST  162<H>  /  ALT  175<H>  /  AlkPhos  790<H>  10-31    PT/INR - ( 31 Oct 2017 12:00 )   PT: 15.1 sec;   INR: 1.39 ratio             Rad:    Assessment/Plan

## 2017-11-01 NOTE — PROGRESS NOTE ADULT - SUBJECTIVE AND OBJECTIVE BOX
SUBJECTIVE:  - Pt underwent ERCP with successful placement of CBD stent yesterday  - patient with mild abd pain but no nausea or vomiting today  - no fevers overnight  - Path from first EUS shows atypical cells but not definitively within the tissue cell block    OBJECTIVE:    MEDICATIONS  (STANDING):  buDESOnide  80 MICROgram(s)/formoterol 4.5 MICROgram(s) Inhaler 2 Puff(s) Inhalation two times a day  dextrose 5%. 1000 milliLiter(s) (50 mL/Hr) IV Continuous <Continuous>  dextrose 50% Injectable 12.5 Gram(s) IV Push once  dextrose 50% Injectable 25 Gram(s) IV Push once  dextrose 50% Injectable 25 Gram(s) IV Push once  insulin lispro (HumaLOG) corrective regimen sliding scale   SubCutaneous three times a day before meals  insulin lispro (HumaLOG) corrective regimen sliding scale   SubCutaneous at bedtime  pantoprazole    Tablet 40 milliGRAM(s) Oral before breakfast  piperacillin/tazobactam IVPB. 3.375 Gram(s) IV Intermittent every 8 hours  tamsulosin 0.4 milliGRAM(s) Oral at bedtime  valsartan 160 milliGRAM(s) Oral daily      Vital Signs Last 24 Hrs  T(C): 37.1 (01 Nov 2017 08:07), Max: 37.1 (01 Nov 2017 08:07)  T(F): 98.8 (01 Nov 2017 08:07), Max: 98.8 (01 Nov 2017 08:07)  HR: 81 (01 Nov 2017 08:07) (80 - 81)  BP: 107/69 (01 Nov 2017 08:07) (107/69 - 110/84)  BP(mean): --  RR: 18 (01 Nov 2017 08:07) (18 - 18)  SpO2: 96% (01 Nov 2017 08:07) (96% - 99%)    PHYSICAL EXAM:  Constitutional: no acute distress  Eyes: icterus  Neck: no masses, no LAD  Respiratory: normal inspiratory effort; no wheezing or crackles  Cardiovascular: RRR, normal S1/S2, no murmurs/rubs/gallops  Gastrointestinal: soft, nondistended, nontender, +BS  Extremities: no LE edema  Neurological: AAOx3, no asterixis  Skin: jaundice    LABS:                                              13.0   8.6   )-----------( 295      ( 31 Oct 2017 10:11 )             37.4     11-01    131<L>  |  95<L>  |  19  ----------------------------<  180<H>  4.4   |  23  |  1.10    Ca    9.6      01 Nov 2017 08:40    TPro  6.8  /  Alb  3.0<L>  /  TBili  12.6<H>  /  DBili  x   /  AST  132<H>  /  ALT  150<H>  /  AlkPhos  679<H>  11-01    Path: Cell block displays a  fragments of pancreatic parenchyma  with chronic inflammation and mild fibrosis. Rare, isolated  atypical cells present with hyperchromatic nuclei, irregular  nuclear contour and and prominent nucleoli associated with  inflammation.

## 2017-11-01 NOTE — PROGRESS NOTE ADULT - ASSESSMENT
62 y/o  male with hx of DM , HTN admitted for jaundice that has been ongoing for the past two month,.  Pt reports that he had intermittent pain in R flank and  that moved to RUQ and later localized t epigastric area however disappeared over the past two weeks.  No objective fever or chills   no N/V/d   no weight loss   Denies ETOH  recent travel   was seen as o/p and had w/u with blood work and with US .. LFT including bili were elevated howerver US neg for obstruction  no new medications ( though some of his medications were dced including Glyxsambi )    + dark urine (/frequent .  + light colored stool   CT: Mass of the head of the pancreas causing marked distention of the common   bile duct, intrahepatic biliary duct dilatation, and pancreatic ductal   dilatation. Further evaluation with contrast-enhanced MRI is recommended.    1- obstructive Jaundice : sec to panreatic mass ..  - MRI abd: < from: MRI Abdomen w/wo Cont (10.26.17 @ 14:14) >  A long segment distal CBD stricture with severe biliary dilatation highly suspicious for neoplasm.    - CT chest to evaluate for distant mets: negative     s/p ERCP : < from: ERCP (10.25.17 @ 16:10) >  - One plastic stent was placed into the ventral pancreatic duct. Biliary cannulation not possible despite various attmepts and variations in technique.     - EUS:  < from: Upper EUS (10.25.17 @ 14:38) >  There was dilation in the common bile duct and in the cystic duct which                        measured up to 22 mm.                       - A mass was identified in the pancreatic head. Thiswas poorly defined and                        the planes of all the vessels were preserved. The SMA, SMV, and PV were                        normal. A core biopsy was performed.    Repeat ERCP : < from: ERCP (10.31.17 @ 18:04) >  Impression:          1. Obstructive jaundice s/p ERCP with placement of fully covered metal stent                        in bile ductand removal of pancreatic duct stent.                       2. Pancreatic head mass s/p FNA and core biopsies.                      -path from initial ercp : atypical cells on background of chronic pancreatitis found however ? adequecy ..  f/u repeat bx from sec ercp   -likely will undergo  Whipple procedure 2- DM: hold PO meds and monitor FS   3- HTN : monitor BP cont meds  4- HLD : off lipitor due to myoipathy >? and now with elevated LFT   5- abn stress : likely pursue medical management    will d/w Dr. Mae     would avoid asa at this time and will d/w surgery    DVT prophylxis   discussed with pt at length bedside   d/w with [facility] on phone   d/w GI fellow   d/w NP   will observe and if LFT trending down and improved pain likely will dc tmmrw

## 2017-11-01 NOTE — PROGRESS NOTE ADULT - SUBJECTIVE AND OBJECTIVE BOX
CARDIOLOGY FOLLOW UP NOTE - DR. BREANNE ARITA    Patient states no new complaints.    MEDICATIONS  (STANDING):  buDESOnide  80 MICROgram(s)/formoterol 4.5 MICROgram(s) Inhaler 2 Puff(s) Inhalation two times a day  dextrose 5%. 1000 milliLiter(s) (50 mL/Hr) IV Continuous <Continuous>  dextrose 50% Injectable 12.5 Gram(s) IV Push once  dextrose 50% Injectable 25 Gram(s) IV Push once  dextrose 50% Injectable 25 Gram(s) IV Push once  insulin lispro (HumaLOG) corrective regimen sliding scale   SubCutaneous three times a day before meals  insulin lispro (HumaLOG) corrective regimen sliding scale   SubCutaneous at bedtime  metoprolol succinate ER 25 milliGRAM(s) Oral daily  pantoprazole    Tablet 40 milliGRAM(s) Oral before breakfast  piperacillin/tazobactam IVPB. 3.375 Gram(s) IV Intermittent every 8 hours  tamsulosin 0.4 milliGRAM(s) Oral at bedtime  valsartan 80 milliGRAM(s) Oral daily        PHYSICAL EXAM:  Vital Signs Last 24 Hrs  T(C): 37.1 (01 Nov 2017 08:07), Max: 37.1 (01 Nov 2017 08:07)  T(F): 98.8 (01 Nov 2017 08:07), Max: 98.8 (01 Nov 2017 08:07)  HR: 81 (01 Nov 2017 08:07) (80 - 81)  BP: 107/69 (01 Nov 2017 08:07) (107/69 - 110/84)  BP(mean): --  RR: 18 (01 Nov 2017 08:07) (18 - 18)  SpO2: 96% (01 Nov 2017 08:07) (96% - 99%)  I&O's Summary    31 Oct 2017 07:01  -  01 Nov 2017 07:00  --------------------------------------------------------  IN: 0 mL / OUT: 0 mL / NET: 0 mL      General: NAD, A+Ox3	  HEENT:   No JVD	  Cardiovascular: RRR  Extremities: No edema      	    LABS:                          13.0   8.6   )-----------( 295      ( 31 Oct 2017 10:11 )             37.4     11-01    131<L>  |  95<L>  |  19  ----------------------------<  180<H>  4.4   |  23  |  1.10    Ca    9.6      01 Nov 2017 08:40    TPro  6.8  /  Alb  3.0<L>  /  TBili  12.6<H>  /  DBili  x   /  AST  132<H>  /  ALT  150<H>  /  AlkPhos  679<H>  11-01    PT/INR - ( 31 Oct 2017 12:00 )   PT: 15.1 sec;   INR: 1.39 ratio         < from: Nuclear Stress Test-Exercise (10.31.17 @ 15:05) >  IMPRESSIONS:Abnormal Study  * Exercise capacity: 7 METS, Poor for age and gender.  * Chest Pain: No chest pain with exercise.  * Symptom: No Symptoms.  * HR Response: Appropriate.  * BP Response: Appropriate.  * Heart Rhythm: Sinus Rhythm - Occassional VPD's - 97 BPM.  * Conduction defects: RAD.  * Baseline ECG: Nonspecific ST-T wave abnormality.  * ECG Changes: ST Depression: 0.5 mm downsloping in leads  II, III, aVF and 0.5 mm upsloping in leads V5-V6 started  at 03:00 min of exercise at HR of 137 bpm.  The changes in  leads V5 and V6 resolved by 1:00 min in recovery.  The  changes in leads II, III, aVF persisted at least 15:00 min  in recovery.  * Arrhythmia: Occasional VPDs occurred during rest, stress  and recovery.  * Review of raw data shows: Extensive splanchnic uptake.  There is a focal region of increased tracer uptake of  uncertain etiology in the regionof the intestines;  clinical correlation and correlation with radiographic  imaging is suggested as clinically indicated.  * The left ventricle was normal in size. There is a small,  mild to moderate defect in the inferoapical wall that is  mostly fixed suggestive of scar with minimal ischemia.  * There is a small, mild defect in the distal  anterolateral wall that is reversible suggestive of mild  ischemia.  * There are also mild defects in the inferior and  inferoseptal walls that are mostly fixed, predominantly  correct with prone imaging, and demonstrate normal wall  motion suggestive of attenuation artifact.  * Mildly increased right ventricular tracer uptake is  noted on stress imaging.  * Post-stress gated wall motion analysis was performed  (LVEF = 66 %;LVEDV = 90 ml.) revealing reduced systolic  thickening of the inferoapical wall with preserved overall  left ventricular ejection fraction.    < end of copied text >      HEALTH ISSUES - PROBLEM Dx:  Jaundice: Jaundice        Assessment and Plan:  62 y/o  male with hx of DM, HTN, HLD, FmHx of early CAD, with a pancreatic mass - planned for likely whipple surgery  1. Pre-operative evaluation - the patient has multiple risk factors for CAD, and has some change in exertional symptoms. He underwent an exercise nuclear stress test yesterday, which showed small area of mild ischemia, and a possible old silent MI. I discussed options with the patient. Since the ischemia is a small territory and is mild, and since he will likely need to undergo and extensive surgery very soon, of his options, his best option is to minimize his cardiac surgical risk medically. Undergoing cardiac cath and having a stent, if necessary, will delay surgery and increase surgical bleeding risk. He understands this and agrees with the plan.  2. HTN - Will decrease valsartan to 80mg and start Toprol.

## 2017-11-01 NOTE — PROGRESS NOTE ADULT - ASSESSMENT
ASSESSMENT  61y male with pancreatic head mass s/p ERCP and biliary stent with FNA and core biopsy    PLAN  - f/u Cardiology for preop risk stratification and optimization  - f/u FNA biopsy results  - Will plan for surgical resection, likely Whipple procedure, timing to be determined  - Cardiac and medical clearance.  - Will d/w attending, Dr. Curry.      José Luis Thompson MD PGY3  [facility], #[id number]

## 2017-11-01 NOTE — PROGRESS NOTE ADULT - SUBJECTIVE AND OBJECTIVE BOX
Patient is a 61y old  Male who presents with a chief complaint of jaundice (23 Oct 2017 21:50)                                                               INTERVAL HPI/OVERNIGHT EVENTS:    REVIEW OF SYSTEMS:     CONSTITUTIONAL: No weakness, fevers or chills  EYES/ENT: No visual changes , no ear ache   NECK: No pain or stiffness  RESPIRATORY: No cough, wheezing,  No shortness of breath  CARDIOVASCULAR: No chest pain or palpitations  GASTROINTESTINAL: mild  abdominal pain in epigastric/ RUQ    improved since yesterday  . No nausea, vomiting,  had bm today GENITOURINARY: No dysuria, frequency or hematuria  NEUROLOGICAL: No numbness or weakness                                                                                                                                                                                                                                                                               Medications:  MEDICATIONS  (STANDING):  buDESOnide  80 MICROgram(s)/formoterol 4.5 MICROgram(s) Inhaler 2 Puff(s) Inhalation two times a day  dextrose 5%. 1000 milliLiter(s) (50 mL/Hr) IV Continuous <Continuous>  dextrose 50% Injectable 12.5 Gram(s) IV Push once  dextrose 50% Injectable 25 Gram(s) IV Push once  dextrose 50% Injectable 25 Gram(s) IV Push once  insulin lispro (HumaLOG) corrective regimen sliding scale   SubCutaneous three times a day before meals  insulin lispro (HumaLOG) corrective regimen sliding scale   SubCutaneous at bedtime  metoprolol succinate ER 25 milliGRAM(s) Oral daily  pantoprazole    Tablet 40 milliGRAM(s) Oral before breakfast  piperacillin/tazobactam IVPB. 3.375 Gram(s) IV Intermittent every 8 hours  tamsulosin 0.4 milliGRAM(s) Oral at bedtime  valsartan 80 milliGRAM(s) Oral daily    MEDICATIONS  (PRN):  ALBUTerol    90 MICROgram(s) HFA Inhaler 2 Puff(s) Inhalation every 6 hours PRN sob  dextrose Gel 1 Dose(s) Oral once PRN Blood Glucose LESS THAN 70 milliGRAM(s)/deciliter  glucagon  Injectable 1 milliGRAM(s) IntraMuscular once PRN Glucose LESS THAN 70 milligrams/deciliter  simethicone 80 milliGRAM(s) Chew four times a day PRN Gas  sodium chloride 0.65% Nasal 1 Spray(s) Both Nostrils three times a day PRN Nasal Congestion       Allergies    No Known Allergies    Intolerances      Vital Signs Last 24 Hrs  T(C): 36.3 (2017 15:35), Max: 37.1 (2017 08:07)  T(F): 97.4 (2017 15:35), Max: 98.8 (2017 08:07)  HR: 75 (2017 15:35) (75 - 81)  BP: 109/60 (2017 15:35) (107/69 - 110/84)  BP(mean): --  RR: 18 (2017 15:35) (18 - 18)  SpO2: 99% (2017 15:35) (96% - 99%)  CAPILLARY BLOOD GLUCOSE      POCT Blood Glucose.: 245 mg/dL (2017 16:09)  POCT Blood Glucose.: 235 mg/dL (2017 12:11)  POCT Blood Glucose.: 194 mg/dL (2017 08:23)  POCT Blood Glucose.: 224 mg/dL (31 Oct 2017 21:18)      10-31 @ : @ 07:00  --------------------------------------------------------  IN: 0 mL / OUT: 0 mL / NET: 0 mL     @ : @ 17:21  --------------------------------------------------------  IN: 740 mL / OUT: 400 mL / NET: 340 mL      Physical Exam:    Daily Weight in k (2017 08:59)  General:  Well appearing, NAD  HEENT:  icteric, PERRLA  CV:  RRR, S1S2   Lungs:  CTA B/L, no wheezes, rales, rhonchi  Abdomen:  Soft, non-tender, no distended, positive BS  Extremities:  2+ pulses, no c/c, no edema  Skin:  Warm and dry, no rashes  :  No newberry  Neuro:  AAOx3, non-focal, grossly intact                                                                                                                                                                                                                                                                                                LABS:                               13.0   8.6   )-----------( 295      ( 31 Oct 2017 10:11 )             37.4                          131<L>  |  95<L>  |  19  ----------------------------<  180<H>  4.4   |  23  |  1.10    Ca    9.6      2017 08:40    TPro  6.8  /  Alb  3.0<L>  /  TBili  12.6<H>  /  DBili  x   /  AST  132<H>  /  ALT  150<H>  /  AlkPhos  679<H>

## 2017-11-01 NOTE — PROGRESS NOTE ADULT - ASSESSMENT
60 y/o M with obstructive jaundice.    Impression:  1) Obstructive Jaundice - due to pancreatic head mass s/p EUS/FNA and ERCP with placement of covered metal stent.     Plan:  - F/u repeat cytology/path results  - diet as tolerated  - follow-up with Surgical oncology team regarding surgery

## 2017-11-02 ENCOUNTER — TRANSCRIPTION ENCOUNTER (OUTPATIENT)
Age: 62
End: 2017-11-02

## 2017-11-02 VITALS
OXYGEN SATURATION: 99 % | TEMPERATURE: 99 F | HEART RATE: 89 BPM | DIASTOLIC BLOOD PRESSURE: 66 MMHG | SYSTOLIC BLOOD PRESSURE: 108 MMHG | RESPIRATION RATE: 18 BRPM

## 2017-11-02 DIAGNOSIS — E11.65 TYPE 2 DIABETES MELLITUS WITH HYPERGLYCEMIA: ICD-10-CM

## 2017-11-02 LAB
ALBUMIN SERPL ELPH-MCNC: 3.1 G/DL — LOW (ref 3.3–5)
ALP SERPL-CCNC: 603 U/L — HIGH (ref 40–120)
ALT FLD-CCNC: 135 U/L — HIGH (ref 10–45)
ANION GAP SERPL CALC-SCNC: 13 MMOL/L — SIGNIFICANT CHANGE UP (ref 5–17)
AST SERPL-CCNC: 116 U/L — HIGH (ref 10–40)
BILIRUB SERPL-MCNC: 9.5 MG/DL — HIGH (ref 0.2–1.2)
BUN SERPL-MCNC: 16 MG/DL — SIGNIFICANT CHANGE UP (ref 7–23)
CALCIUM SERPL-MCNC: 9.5 MG/DL — SIGNIFICANT CHANGE UP (ref 8.4–10.5)
CHLORIDE SERPL-SCNC: 95 MMOL/L — LOW (ref 96–108)
CO2 SERPL-SCNC: 24 MMOL/L — SIGNIFICANT CHANGE UP (ref 22–31)
CREAT SERPL-MCNC: 0.96 MG/DL — SIGNIFICANT CHANGE UP (ref 0.5–1.3)
GLUCOSE BLDC GLUCOMTR-MCNC: 222 MG/DL — HIGH (ref 70–99)
GLUCOSE BLDC GLUCOMTR-MCNC: 254 MG/DL — HIGH (ref 70–99)
GLUCOSE BLDC GLUCOMTR-MCNC: 280 MG/DL — HIGH (ref 70–99)
GLUCOSE SERPL-MCNC: 199 MG/DL — HIGH (ref 70–99)
HCT VFR BLD CALC: 37.9 % — LOW (ref 39–50)
HGB BLD-MCNC: 11.6 G/DL — LOW (ref 13–17)
MCHC RBC-ENTMCNC: 30.3 PG — SIGNIFICANT CHANGE UP (ref 27–34)
MCHC RBC-ENTMCNC: 30.6 GM/DL — LOW (ref 32–36)
MCV RBC AUTO: 99 FL — SIGNIFICANT CHANGE UP (ref 80–100)
PLATELET # BLD AUTO: 317 K/UL — SIGNIFICANT CHANGE UP (ref 150–400)
POTASSIUM SERPL-MCNC: 4.4 MMOL/L — SIGNIFICANT CHANGE UP (ref 3.5–5.3)
POTASSIUM SERPL-SCNC: 4.4 MMOL/L — SIGNIFICANT CHANGE UP (ref 3.5–5.3)
PROT SERPL-MCNC: 7 G/DL — SIGNIFICANT CHANGE UP (ref 6–8.3)
RBC # BLD: 3.83 M/UL — LOW (ref 4.2–5.8)
RBC # FLD: 15.7 % — HIGH (ref 10.3–14.5)
SODIUM SERPL-SCNC: 132 MMOL/L — LOW (ref 135–145)
WBC # BLD: 6.71 K/UL — SIGNIFICANT CHANGE UP (ref 3.8–10.5)
WBC # FLD AUTO: 6.71 K/UL — SIGNIFICANT CHANGE UP (ref 3.8–10.5)

## 2017-11-02 PROCEDURE — 82378 CARCINOEMBRYONIC ANTIGEN: CPT

## 2017-11-02 PROCEDURE — 82947 ASSAY GLUCOSE BLOOD QUANT: CPT

## 2017-11-02 PROCEDURE — 99285 EMERGENCY DEPT VISIT HI MDM: CPT | Mod: 25

## 2017-11-02 PROCEDURE — 88305 TISSUE EXAM BY PATHOLOGIST: CPT

## 2017-11-02 PROCEDURE — 85014 HEMATOCRIT: CPT

## 2017-11-02 PROCEDURE — 85610 PROTHROMBIN TIME: CPT

## 2017-11-02 PROCEDURE — 80053 COMPREHEN METABOLIC PANEL: CPT

## 2017-11-02 PROCEDURE — 80076 HEPATIC FUNCTION PANEL: CPT

## 2017-11-02 PROCEDURE — 71260 CT THORAX DX C+: CPT

## 2017-11-02 PROCEDURE — 84132 ASSAY OF SERUM POTASSIUM: CPT

## 2017-11-02 PROCEDURE — 94640 AIRWAY INHALATION TREATMENT: CPT

## 2017-11-02 PROCEDURE — 81003 URINALYSIS AUTO W/O SCOPE: CPT

## 2017-11-02 PROCEDURE — 82330 ASSAY OF CALCIUM: CPT

## 2017-11-02 PROCEDURE — 83690 ASSAY OF LIPASE: CPT

## 2017-11-02 PROCEDURE — 82247 BILIRUBIN TOTAL: CPT

## 2017-11-02 PROCEDURE — A9500: CPT

## 2017-11-02 PROCEDURE — 84443 ASSAY THYROID STIM HORMONE: CPT

## 2017-11-02 PROCEDURE — 74177 CT ABD & PELVIS W/CONTRAST: CPT

## 2017-11-02 PROCEDURE — A9585: CPT

## 2017-11-02 PROCEDURE — 82248 BILIRUBIN DIRECT: CPT

## 2017-11-02 PROCEDURE — 83036 HEMOGLOBIN GLYCOSYLATED A1C: CPT

## 2017-11-02 PROCEDURE — 88173 CYTOPATH EVAL FNA REPORT: CPT

## 2017-11-02 PROCEDURE — 93005 ELECTROCARDIOGRAM TRACING: CPT

## 2017-11-02 PROCEDURE — 78452 HT MUSCLE IMAGE SPECT MULT: CPT

## 2017-11-02 PROCEDURE — C1874: CPT

## 2017-11-02 PROCEDURE — 85027 COMPLETE CBC AUTOMATED: CPT

## 2017-11-02 PROCEDURE — 93017 CV STRESS TEST TRACING ONLY: CPT

## 2017-11-02 PROCEDURE — C1769: CPT

## 2017-11-02 PROCEDURE — 83605 ASSAY OF LACTIC ACID: CPT

## 2017-11-02 PROCEDURE — 85730 THROMBOPLASTIN TIME PARTIAL: CPT

## 2017-11-02 PROCEDURE — 82150 ASSAY OF AMYLASE: CPT

## 2017-11-02 PROCEDURE — 82787 IGG 1 2 3 OR 4 EACH: CPT

## 2017-11-02 PROCEDURE — 82803 BLOOD GASES ANY COMBINATION: CPT

## 2017-11-02 PROCEDURE — G0103: CPT

## 2017-11-02 PROCEDURE — 74183 MRI ABD W/O CNTR FLWD CNTR: CPT

## 2017-11-02 PROCEDURE — 82962 GLUCOSE BLOOD TEST: CPT

## 2017-11-02 PROCEDURE — 87086 URINE CULTURE/COLONY COUNT: CPT

## 2017-11-02 PROCEDURE — 84295 ASSAY OF SERUM SODIUM: CPT

## 2017-11-02 PROCEDURE — 82435 ASSAY OF BLOOD CHLORIDE: CPT

## 2017-11-02 PROCEDURE — 86301 IMMUNOASSAY TUMOR CA 19-9: CPT

## 2017-11-02 RX ORDER — ISOPROPYL ALCOHOL, BENZOCAINE .7; .06 ML/ML; ML/ML
0 SWAB TOPICAL
Qty: 100 | Refills: 0 | OUTPATIENT
Start: 2017-11-02

## 2017-11-02 RX ORDER — LINAGLIPTIN 5 MG/1
1 TABLET, FILM COATED ORAL
Qty: 0 | Refills: 0 | COMMUNITY

## 2017-11-02 RX ORDER — VALSARTAN 80 MG/1
1 TABLET ORAL
Qty: 0 | Refills: 0 | COMMUNITY

## 2017-11-02 RX ORDER — INSULIN GLARGINE 100 [IU]/ML
10 INJECTION, SOLUTION SUBCUTANEOUS
Qty: 100 | Refills: 0 | OUTPATIENT
Start: 2017-11-02 | End: 2017-12-02

## 2017-11-02 RX ORDER — ASPIRIN/CALCIUM CARB/MAGNESIUM 324 MG
1 TABLET ORAL
Qty: 0 | Refills: 0 | COMMUNITY

## 2017-11-02 RX ORDER — METOPROLOL TARTRATE 50 MG
1 TABLET ORAL
Qty: 30 | Refills: 0 | OUTPATIENT
Start: 2017-11-02 | End: 2017-12-02

## 2017-11-02 RX ORDER — INSULIN LISPRO 100/ML
1 VIAL (ML) SUBCUTANEOUS
Qty: 100 | Refills: 0 | OUTPATIENT
Start: 2017-11-02 | End: 2017-12-02

## 2017-11-02 RX ORDER — INSULIN GLARGINE 100 [IU]/ML
10 INJECTION, SOLUTION SUBCUTANEOUS AT BEDTIME
Qty: 0 | Refills: 0 | Status: DISCONTINUED | OUTPATIENT
Start: 2017-11-02 | End: 2017-11-02

## 2017-11-02 RX ORDER — VALSARTAN 80 MG/1
1 TABLET ORAL
Qty: 30 | Refills: 0 | OUTPATIENT
Start: 2017-11-02 | End: 2017-12-02

## 2017-11-02 RX ADMIN — Medication: at 17:00

## 2017-11-02 RX ADMIN — PANTOPRAZOLE SODIUM 40 MILLIGRAM(S): 20 TABLET, DELAYED RELEASE ORAL at 06:27

## 2017-11-02 RX ADMIN — PIPERACILLIN AND TAZOBACTAM 25 GRAM(S): 4; .5 INJECTION, POWDER, LYOPHILIZED, FOR SOLUTION INTRAVENOUS at 13:33

## 2017-11-02 RX ADMIN — BUDESONIDE AND FORMOTEROL FUMARATE DIHYDRATE 2 PUFF(S): 160; 4.5 AEROSOL RESPIRATORY (INHALATION) at 06:28

## 2017-11-02 RX ADMIN — PIPERACILLIN AND TAZOBACTAM 25 GRAM(S): 4; .5 INJECTION, POWDER, LYOPHILIZED, FOR SOLUTION INTRAVENOUS at 06:26

## 2017-11-02 RX ADMIN — Medication 3: at 13:29

## 2017-11-02 RX ADMIN — Medication 25 MILLIGRAM(S): at 06:26

## 2017-11-02 RX ADMIN — Medication: at 10:02

## 2017-11-02 RX ADMIN — VALSARTAN 80 MILLIGRAM(S): 80 TABLET ORAL at 06:31

## 2017-11-02 NOTE — CONSULT NOTE ADULT - CONSULT REASON
Jaundice
Uncontrolled Diabetes.  Pancreatic head mass
pancreatic mass
pancreatic mass
Pre-operative evaluation

## 2017-11-02 NOTE — CONSULT NOTE ADULT - PROBLEM SELECTOR RECOMMENDATION 9
No oral agents upon D/C.  Teach patient to use insulin pens.  Lantus insulin 10 units at bedtime.  Humalog insulin low scale.

## 2017-11-02 NOTE — PROGRESS NOTE ADULT - SUBJECTIVE AND OBJECTIVE BOX
Patient is a 61y old  Male who presents with a chief complaint of jaundice (02 Nov 2017 11:40)                                                               INTERVAL HPI/OVERNIGHT EVENTS:    REVIEW OF SYSTEMS:     CONSTITUTIONAL: No weakness, fevers or chills  EYES/ENT: No visual changes , no ear ache   NECK: No pain or stiffness  RESPIRATORY: No cough, wheezing,  CARDIOVASCULAR: No chest pain or palpitations  GASTROINTESTINAL: improved  abdominal discomfort . No nausea, vomiting, +BM   GENITOURINARY: No dysuria, frequency   NEUROLOGICAL: No numbness or weakness                                                                                                                                                                                                                                                                                    Medications:  MEDICATIONS  (STANDING):  buDESOnide  80 MICROgram(s)/formoterol 4.5 MICROgram(s) Inhaler 2 Puff(s) Inhalation two times a day  dextrose 5%. 1000 milliLiter(s) (50 mL/Hr) IV Continuous <Continuous>  dextrose 50% Injectable 12.5 Gram(s) IV Push once  dextrose 50% Injectable 25 Gram(s) IV Push once  dextrose 50% Injectable 25 Gram(s) IV Push once  insulin glargine Injectable (LANTUS) 10 Unit(s) SubCutaneous at bedtime  insulin lispro (HumaLOG) corrective regimen sliding scale   SubCutaneous three times a day before meals  insulin lispro (HumaLOG) corrective regimen sliding scale   SubCutaneous at bedtime  metoprolol succinate ER 25 milliGRAM(s) Oral daily  pantoprazole    Tablet 40 milliGRAM(s) Oral before breakfast  piperacillin/tazobactam IVPB. 3.375 Gram(s) IV Intermittent every 8 hours  tamsulosin 0.4 milliGRAM(s) Oral at bedtime  valsartan 80 milliGRAM(s) Oral daily    MEDICATIONS  (PRN):  ALBUTerol    90 MICROgram(s) HFA Inhaler 2 Puff(s) Inhalation every 6 hours PRN sob  dextrose Gel 1 Dose(s) Oral once PRN Blood Glucose LESS THAN 70 milliGRAM(s)/deciliter  glucagon  Injectable 1 milliGRAM(s) IntraMuscular once PRN Glucose LESS THAN 70 milligrams/deciliter  simethicone 80 milliGRAM(s) Chew four times a day PRN Gas  sodium chloride 0.65% Nasal 1 Spray(s) Both Nostrils three times a day PRN Nasal Congestion       Allergies    No Known Allergies    Intolerances      Vital Signs Last 24 Hrs  T(C): 37 (02 Nov 2017 15:13), Max: 37.1 (02 Nov 2017 09:43)  T(F): 98.6 (02 Nov 2017 15:13), Max: 98.8 (02 Nov 2017 09:43)  HR: 89 (02 Nov 2017 15:13) (80 - 89)  BP: 108/66 (02 Nov 2017 15:13) (99/62 - 108/66)  BP(mean): --  RR: 18 (02 Nov 2017 15:13) (18 - 18)  SpO2: 99% (02 Nov 2017 15:13) (96% - 99%)  CAPILLARY BLOOD GLUCOSE      POCT Blood Glucose.: 254 mg/dL (02 Nov 2017 16:13)  POCT Blood Glucose.: 280 mg/dL (02 Nov 2017 12:19)  POCT Blood Glucose.: 222 mg/dL (02 Nov 2017 08:29)      11-01 @ 07:01  -  11-02 @ 07:00  --------------------------------------------------------  IN: 740 mL / OUT: 400 mL / NET: 340 mL    11-02 @ 07:01  -  11-02 @ 23:33  --------------------------------------------------------  IN: 700 mL / OUT: 0 mL / NET: 700 mL      Physical Exam:  General:  Well appearing, NAD  HEENT:  icteric, PERRLA  CV:  RRR, S1S2   Lungs:  CTA B/L, no wheezes, rales, rhonchi  Abdomen:  Soft, non-tender, no distended, positive BS  Extremities:  2+ pulses, no c/c, no edema  Skin:  Warm and dry, no rashes  :  No newberry  Neuro:  AAOx3, non-focal, grossly intact                                                                                                                                                                                                                                                                                                LABS:                               11.6   6.71  )-----------( 317      ( 02 Nov 2017 08:40 )             37.9                      11-02    132<L>  |  95<L>  |  16  ----------------------------<  199<H>  4.4   |  24  |  0.96    Ca    9.5      02 Nov 2017 08:52    TPro  7.0  /  Alb  3.1<L>  /  TBili  9.5<H>  /  DBili  x   /  AST  116<H>  /  ALT  135<H>  /  AlkPhos  603<H>  11-02                       RADIOLOGY & ADDITIONAL TESTS         I personally reviewed: [  ]EKG   [  ]CXR    [  ] CT      A/P:         Discussed with :     Oniel consultants' Notes   Time spent :

## 2017-11-02 NOTE — DISCHARGE NOTE ADULT - MEDICATION SUMMARY - MEDICATIONS TO CHANGE
I will SWITCH the dose or number of times a day I take the medications listed below when I get home from the hospital:  None I will SWITCH the dose or number of times a day I take the medications listed below when I get home from the hospital:    valsartan 160 mg oral tablet  -- 1 tab(s) by mouth once a day

## 2017-11-02 NOTE — CONSULT NOTE ADULT - SUBJECTIVE AND OBJECTIVE BOX
CARDIOLOGY CONSULT NOTE  PROVIDER: Jose Mae MD  DATE: 10/31/17  REASON: Pre-operative evaluation    HPI:  60 y/o  male with hx of DM, HTN, HLD, FmHx of early CAD, admitted for jaundice that has been ongoing for the past two month.  He was found to have a pancreatic mass causing CBD obstruction. He is planned for likely Whipple surgery. He states that over the past few months he has noted that he has become more tired/fatigued. States he has been having some CHRISTENSEN during this time, but still able to do all his ADLs. Sometimes it is associated with dizziness. Denies CP, palpitations, diaphoresis, PND, orthopnea, leg swelling. No objective fever or chills, no N/V/D. Complains of dark urine and light stool.      PAST MEDICAL & SURGICAL HISTORY:  HTN (hypertension)  Diabetes  High cholesterol  No significant past surgical history        MEDICATIONS:  MEDICATIONS  (STANDING):  buDESOnide  80 MICROgram(s)/formoterol 4.5 MICROgram(s) Inhaler 2 Puff(s) Inhalation two times a day  dextrose 5%. 1000 milliLiter(s) (50 mL/Hr) IV Continuous <Continuous>  dextrose 50% Injectable 12.5 Gram(s) IV Push once  dextrose 50% Injectable 25 Gram(s) IV Push once  dextrose 50% Injectable 25 Gram(s) IV Push once  insulin lispro (HumaLOG) corrective regimen sliding scale   SubCutaneous three times a day before meals  insulin lispro (HumaLOG) corrective regimen sliding scale   SubCutaneous at bedtime  pantoprazole    Tablet 40 milliGRAM(s) Oral before breakfast  piperacillin/tazobactam IVPB. 3.375 Gram(s) IV Intermittent every 8 hours  tamsulosin 0.4 milliGRAM(s) Oral at bedtime  valsartan 160 milliGRAM(s) Oral daily    MEDICATIONS  (PRN):  ALBUTerol    90 MICROgram(s) HFA Inhaler 2 Puff(s) Inhalation every 6 hours PRN sob  dextrose Gel 1 Dose(s) Oral once PRN Blood Glucose LESS THAN 70 milliGRAM(s)/deciliter  glucagon  Injectable 1 milliGRAM(s) IntraMuscular once PRN Glucose LESS THAN 70 milligrams/deciliter  simethicone 80 milliGRAM(s) Chew four times a day PRN Gas      FAMILY HISTORY:  Family history of colon cancer (Mother)  Father  of MI at age 52      SOCIAL HISTORY:  no tobacco or drugs; occasional alcohol      Allergies    No Known Allergies  	      REVIEW OF SYSTEMS:  CONSTITUTIONAL: No fever, weight loss, or fatigue  EYES: No eye pain, visual disturbances, or discharge  ENMT:  No difficulty hearing, tinnitus, vertigo; No sinus or throat pain  NECK: No pain or stiffness  RESPIRATORY: No cough, wheezing, chills or hemoptysis; No Shortness of Breath  CARDIOVASCULAR: No chest pain, palpitations, loss of consciousness, dizziness, or leg swelling  GASTROINTESTINAL: No abdominal or epigastric pain. No nausea, vomiting, or hematemesis; No diarrhea or constipation. No melena or hematochezia.  GENITOURINARY: No dysuria, frequency, hematuria, or incontinence  NEUROLOGICAL: No headaches, memory loss, loss of strength, numbness, or tremors  SKIN: No itching, burning, rashes, or lesions   	    PHYSICAL EXAM:  Vital Signs Last 24 Hrs  T(C): 36.9 (31 Oct 2017 07:15), Max: 36.9 (31 Oct 2017 07:15)  T(F): 98.5 (31 Oct 2017 07:15), Max: 98.5 (31 Oct 2017 07:15)  HR: 74 (31 Oct 2017 07:15) (74 - 90)  BP: 108/62 (31 Oct 2017 07:15) (106/65 - 108/62)  BP(mean): --  RR: 18 (31 Oct 2017 07:15) (16 - 18)  SpO2: 98% (31 Oct 2017 07:15) (97% - 98%)  I&O's Summary    30 Oct 2017 07:  -  31 Oct 2017 07:00  --------------------------------------------------------  IN: 100 mL / OUT: 0 mL / NET: 100 mL    31 Oct 2017 07:  -  31 Oct 2017 11:31  --------------------------------------------------------  IN: 0 mL / OUT: 0 mL / NET: 0 mL        General: NAD, A+Ox3	  HEENT:   No JVD, Normal oral mucosa, EOMI	  Lymphatic: No lymphadenopathy  Cardiovascular: RRR, Normal S1 and S2, No murmurs/gallops/rubs  Respiratory: Lungs clear to auscultation	  Gastrointestinal:  Soft, Non-tender, + BS	  Skin: No rashes, No ecchymoses, No cyanosis	  Neurologic: Non-focal  Extremities: No clubbing, cyanosis or edema  Vascular: normal peripheral pulses palpable bilaterally    ECG: ordered     	  LABS:	 	                          13.0   8.6   )-----------( 295      ( 31 Oct 2017 10:11 )             37.4     10-31    132<L>  |  96  |  17  ----------------------------<  188<H>  4.2   |  22  |  1.13    Ca    10.1      31 Oct 2017 10:11    TPro  7.1  /  Alb  3.2<L>  /  TBili  19.6<H>  /  DBili  x   /  AST  162<H>  /  ALT  175<H>  /  AlkPhos  790<H>  10-31        HEALTH ISSUES - PROBLEM Dx:  Jaundice: Jaundice  HTN  HLD  DM  Dyspnea  Dizziness        ASSESSMENT/PLAN: 	  60 y/o  male with hx of DM, HTN, HLD, FmHx of early CAD, with a pancreatic mass - planned for likely whipple surgery  1. Pre-operative evaluation - the patient has multiple risk factors for CAD, and has some change in exertional symptoms. Will evaluate with an exercise nuclear stress test.  2. HTN - controlled on current regimen.  3. DM - per Dr. Wyatt.
Chief Complaint:  Patient is a 61y old  Male who presents with a chief complaint of jaundice (23 Oct 2017 21:50)      HPI: 60 yo man presenting with 2-3 weeks of intermittent epigastric abd pain, decreased appetite, early satiety and progressive jaundice. He also notes dark urine and acholic stool. He denies pruritis. He denies fevers or chills. He saw his PCP who ran a battery of blood tests and got a RUQ US. He sent him into the ED given the results. In the ED he was found to have a bilirubin of 20.6 and CT shows a mass in the head of the pancreas and CBD obstruction/dilation.    Allergies:  No Known Allergies      Home Medications:    Hospital Medications:  ALBUTerol    90 MICROgram(s) HFA Inhaler 2 Puff(s) Inhalation every 6 hours PRN  buDESOnide  80 MICROgram(s)/formoterol 4.5 MICROgram(s) Inhaler 2 Puff(s) Inhalation two times a day  dextrose 5%. 1000 milliLiter(s) IV Continuous <Continuous>  dextrose 50% Injectable 12.5 Gram(s) IV Push once  dextrose 50% Injectable 25 Gram(s) IV Push once  dextrose 50% Injectable 25 Gram(s) IV Push once  dextrose Gel 1 Dose(s) Oral once PRN  glucagon  Injectable 1 milliGRAM(s) IntraMuscular once PRN  insulin lispro (HumaLOG) corrective regimen sliding scale   SubCutaneous three times a day before meals  insulin lispro (HumaLOG) corrective regimen sliding scale   SubCutaneous at bedtime  pantoprazole    Tablet 40 milliGRAM(s) Oral before breakfast  tamsulosin 0.4 milliGRAM(s) Oral at bedtime  valsartan 160 milliGRAM(s) Oral daily      PMHX/PSHX:  HTN (hypertension)  Diabetes  High cholesterol  No significant past surgical history      Family history:  Family history of colon cancer (Mother)  No pertinent family history in first degree relatives      Social History:     ROS:     General:  (+) wt loss, fevers, chills, night sweats, fatigue   Eyes:  Good vision, no reported pain  ENT:  No sore throat, pain, runny nose  CV:  No chest pain, SOB, palpitations, orthopnea  Resp:  No cough, SOB, wheezing  GI:  See HPI  :  No pain, bleeding, incontinence, nocturia  Muscle:  No pain, weakness  Neuro:  No weakness, tingling, memory problems  Psych:  No fatigue, insomnia, mood problems, depression  Endocrine:  No cold/heat intolerance  Heme:  No petechiae, ecchymosis, easy bruising  Skin:  No rash, edema      PHYSICAL EXAM:     GENERAL: NAD  HEENT: scleral icterus  CHEST: CTAB  HEART:  RRR, no MRG, no edema  ABDOMEN:  Soft, non-tender, non-distended, no hepatosplenomegaly  EXTREMITIES:  no cyanosis, or edema  SKIN:  jaundice  NEURO:  Alert, orientedx3     Vital Signs:  Vital Signs Last 24 Hrs  T(C): 36.8 (24 Oct 2017 08:19), Max: 36.9 (23 Oct 2017 18:43)  T(F): 98.3 (24 Oct 2017 08:19), Max: 98.5 (23 Oct 2017 18:43)  HR: 83 (24 Oct 2017 08:19) (81 - 98)  BP: 91/47 (24 Oct 2017 08:19) (91/47 - 128/72)  BP(mean): --  RR: 18 (24 Oct 2017 08:19) (16 - 18)  SpO2: 95% (24 Oct 2017 08:19) (95% - 97%)  Daily Height in cm: 165.1 (23 Oct 2017 21:11)    Daily     LABS:                        12.2   6.95  )-----------( 311      ( 24 Oct 2017 08:55 )             37.7     10-24    134<L>  |  96  |  12  ----------------------------<  197<H>  4.7   |  24  |  1.11    Ca    9.8      24 Oct 2017 08:51    TPro  6.9  /  Alb  3.0<L>  /  TBili  18.2<H>  /  DBili  13.9<H>  /  AST  165<H>  /  ALT  162<H>  /  AlkPhos  853<H>  10-24    LIVER FUNCTIONS - ( 24 Oct 2017 08:51 )  Alb: 3.0 g/dL / Pro: 6.9 g/dL / ALK PHOS: 853 U/L / ALT: 162 U/L / AST: 165 U/L / GGT: x           PT/INR - ( 23 Oct 2017 16:12 )   PT: 14.0 sec;   INR: 1.29 ratio         PTT - ( 23 Oct 2017 16:12 )  PTT:36.2 sec  Urinalysis Basic - ( 23 Oct 2017 18:15 )    Color: Yellow / Appearance: Clear / S.007 / pH: x  Gluc: x / Ketone: Negative  / Bili: Small / Urobili: Negative   Blood: x / Protein: Negative / Nitrite: Negative   Leuk Esterase: Negative / RBC: x / WBC x   Sq Epi: x / Non Sq Epi: x / Bacteria: x      Amylase Serum72      Lipase [id number]       Ammonia--      Imaging:  < from: CT Abdomen and Pelvis w/ Oral Cont and w/ IV Cont (10.23.17 @ 19:33) >  IMPRESSION:   Mass of the head of the pancreas causing marked distention of the common   bile duct, intrahepatic biliary duct dilatation, and pancreatic ductal   dilatation. Further evaluation with contrast-enhanced MRI is recommended.    < end of copied text >
HPI:  62 y/o  male with hx of DM , HTN admitted for jaundice that has been ongoing for the past two month,.  Pt reports that he had intermittent pain in R flank and  that moved to RUQ and later localized t epigastric area however disappeared over the past two weeks.  No objective fever or chills   no N/V/d   no weight loss   Denies ETOH  recent travel   was seen as o/p and had w/u with blood work and with US .. LFT including bili were elevated howerver US neg for obstruction  no new medications ( though some of his medications were dced including Glyxsambi )    + dark urine (/frequent .  + light colored stool   CT: Mass of the head of the pancreas causing marked distention of the common   bile duct, intrahepatic biliary duct dilatation, and pancreatic ductal   dilatation. Further evaluation with contrast-enhanced MRI is recommended. (23 Oct 2017 21:50)      Admit Diagnosis  Jaundice      ENDOCRINE HPI: 62 y/o Type 2 diabetes for >5 years with extensive family history, admitted with abdominal pain and recent jaundice noted with pancreatic mass.    Type 2 diabetes for 5 years treated with Glyxsambi 2.5/5 and actos 30 mg daily. HbA1c noted to be elevated recently, 7.4% in September and Glyxsambi was changed to tradjenta. Actos was d/bear mid October due to elevated LFT's. HbAic 8.1% on admission with elevated LFT's. Lorenza meds held, and patient started on low scale humalog. -250 mg/dL, up to over 300 after meals.   Pt. reports weight loss of 20 lbs in the past two months with anorexia. Abdominal CT noted a  mass in the pancreatic head with secondary dilatation of billiary tract. He had 2 stents placed. Bilirubin trending down now.      PAST MEDICAL & SURGICAL HISTORY:  HTN (hypertension)  Diabetes  High cholesterol  No significant past surgical history      FAMILY HISTORY:amily history of colon cancer (Mother)      Social History: Denies smoking or ETOH.    Outpatient Medications:    MEDICATIONS  (STANDING):  buDESOnide  80 MICROgram(s)/formoterol 4.5 MICROgram(s) Inhaler 2 Puff(s) Inhalation two times a day  dextrose 5%. 1000 milliLiter(s) (50 mL/Hr) IV Continuous <Continuous>  dextrose 50% Injectable 12.5 Gram(s) IV Push once  dextrose 50% Injectable 25 Gram(s) IV Push once  dextrose 50% Injectable 25 Gram(s) IV Push once  insulin lispro (HumaLOG) corrective regimen sliding scale   SubCutaneous three times a day before meals  insulin lispro (HumaLOG) corrective regimen sliding scale   SubCutaneous at bedtime    metoprolol succinate ER 25 milliGRAM(s) Oral daily  pantoprazole    Tablet 40 milliGRAM(s) Oral before breakfast  piperacillin/tazobactam IVPB. 3.375 Gram(s) IV Intermittent every 8 hours  tamsulosin 0.4 milliGRAM(s) Oral at bedtime  valsartan 80 milliGRAM(s) Oral daily    MEDICATIONS  (PRN):  ALBUTerol    90 MICROgram(s) HFA Inhaler 2 Puff(s) Inhalation every 6 hours PRN sob  dextrose Gel 1 Dose(s) Oral once PRN Blood Glucose LESS THAN 70 milliGRAM(s)/deciliter  glucagon  Injectable 1 milliGRAM(s) IntraMuscular once PRN Glucose LESS THAN 70 milligrams/deciliter  simethicone 80 milliGRAM(s) Chew four times a day PRN Gas  sodium chloride 0.65% Nasal 1 Spray(s) Both Nostrils three times a day PRN Nasal Congestion      Allergies    No Known Allergies    Intolerances        Review of Systems:  Constitutional: No fever, jaundice, weight loss 20 lbs.  Eyes: No blurry vision  Neuro: No tremors  HEENT: No pain  Cardiovascular: No chest pain, palpitations  Respiratory: No SOB, no cough  GI: No nausea, Belching, abdominal pain- epigastric  : No dysuria  Skin: no rash, jaundice  Psych: no depression  Endocrine: no polyuria, polydipsia      ALL OTHER SYSTEMS REVIEWED AND NEGATIVE    PHYSICAL EXAM:  VITALS: T(C): 37 (11-02-17 @ 15:13)  T(F): 98.6 (11-02-17 @ 15:13), Max: 98.8 (11-02-17 @ 09:43)  HR: 89 (11-02-17 @ 15:13) (80 - 89)  BP: 108/66 (11-02-17 @ 15:13) (99/62 - 108/66)  RR:  (18 - 18)  SpO2:  (96% - 99%)  Wt(lbs): --198  GENERAL: NAD, well-groomed, well-developed  EYES: icteric  Atraumatic, Normocephalic, moist mucous membranes  THYROID: Normal size, no palpable nodules  RESPIRATORY: Clear to auscultation bilaterally; No rales, rhonchi, wheezing  CARDIOVASCULAR: Regular rate and rhythm; No murmurs; no peripheral edema  GI: Soft, mild epigastric tenderness, non distended, normal bowel sounds  SKIN: Dry, intact, Jaundice, No rashes or lesions  MUSCULOSKELETAL: Full range of motion, normal strength  NEURO: sensation intact, extraocular movements intact, no tremor  PSYCH: Alert and oriented x 3, normal affect, normal mood    POCT Blood Glucose.: 280 mg/dL (11-02-17 @ 12:19)  POCT Blood Glucose.: 222 mg/dL (11-02-17 @ 08:29)  POCT Blood Glucose.: 317 mg/dL (11-01-17 @ 21:47)  POCT Blood Glucose.: 310 mg/dL (11-01-17 @ 21:17)  POCT Blood Glucose.: 245 mg/dL (11-01-17 @ 16:09)  POCT Blood Glucose.: 235 mg/dL (11-01-17 @ 12:11)  POCT Blood Glucose.: 194 mg/dL (11-01-17 @ 08:23)  POCT Blood Glucose.: 224 mg/dL (10-31-17 @ 21:18)  POCT Blood Glucose.: 168 mg/dL (10-31-17 @ 08:48)  POCT Blood Glucose.: 197 mg/dL (10-30-17 @ 21:38)  POCT Blood Glucose.: 246 mg/dL (10-30-17 @ 16:49)                            11.6   6.71  )-----------( 317      ( 02 Nov 2017 08:40 )             37.9       11-02    132<L>  |  95<L>  |  16  ----------------------------<  199<H>  4.4   |  24  |  0.96    Ca    9.5      02 Nov 2017 08:52    TPro  7.0  /  Alb  3.1<L>  /  TBili  9.5<H>  /  DBili  x   /  AST  116<H>  /  ALT  135<H>  /  AlkPhos  603<H>  11-02      Thyroid Function Tests:  10-30 @ 08:59 TSH 1.55 FreeT4 -- T3 -- Anti TPO -- Anti Thyroglobulin Ab -- TSI --      Hemoglobin A1C, Whole Blood: 8.1 % <H> [4.0 - 5.6] (10-24-17 @ 08:55)
Chief Complaint:  Patient is a 61y old  Male who presents with a chief complaint of jaundice (23 Oct 2017 21:50)      HPI:  Patient is a 61-year-old male who was well up until a few weeks ago at which time he noted that he was becoming yellow and had dark urine.  he also developed some abd and back pain.  he lost his appetite and did lose about 5-10 pounds.  He did see his PCP and he was noted with elevated bilirubin and he was sent to the ED. he did have a CT of the A/P and was noted with a mass in the head of the pancreas with marked ductal dilatation.  He did have a CT scan of the chest as well.  He does not appear to have any distant disease.  he did have an ERCP and EUS today with bx and the vessels were normal per report.    Medications:  ALBUTerol    90 MICROgram(s) HFA Inhaler 2 Puff(s) Inhalation every 6 hours PRN  buDESOnide  80 MICROgram(s)/formoterol 4.5 MICROgram(s) Inhaler 2 Puff(s) Inhalation two times a day  dextrose 5%. 1000 milliLiter(s) IV Continuous <Continuous>  dextrose 50% Injectable 12.5 Gram(s) IV Push once  dextrose 50% Injectable 25 Gram(s) IV Push once  dextrose 50% Injectable 25 Gram(s) IV Push once  dextrose Gel 1 Dose(s) Oral once PRN  glucagon  Injectable 1 milliGRAM(s) IntraMuscular once PRN  insulin lispro (HumaLOG) corrective regimen sliding scale   SubCutaneous three times a day before meals  insulin lispro (HumaLOG) corrective regimen sliding scale   SubCutaneous at bedtime  pantoprazole    Tablet 40 milliGRAM(s) Oral before breakfast  piperacillin/tazobactam IVPB. 3.375 Gram(s) IV Intermittent once  piperacillin/tazobactam IVPB. 3.375 Gram(s) IV Intermittent every 8 hours  tamsulosin 0.4 milliGRAM(s) Oral at bedtime  valsartan 160 milliGRAM(s) Oral daily    Allergies:  No Known Allergies    FAMILY HISTORY:  Family history of NHL cancer (Mother)      PMH/PSurghx:    PAST MEDICAL & SURGICAL HISTORY:  HTN (hypertension)  Diabetes  High cholesterol  No significant past surgical history    REVIEW OF SYSTEMS      General: has fatigue.  occasional night sweats.  No fevers or chills.    Skin/Breast: has yellow skin.  No rash, bruising, or pruritis.  	  Ophthalmologic: No vision changes  	  ENMT:	No [place], earaches, nosebleeds, sore throat, gum bleeding.    Respiratory and Thorax: No cough, SOB, wheeze, hemoptysis  	  Cardiovascular:	No CP, orthopnea, or palpitations.    Gastrointestinal:	No N/V/D, BRBPR. has some constipation.  Mild abd soreness.    Genitourinary:	Positive dark urine,.  No hematuria, or dysuria.    Musculoskeletal:	 No leg pain, joint pain, swelling.    Neurological:	No HA, dizziness, numbness, tingling.  	    Vitals:  Vital Signs Last 24 Hrs  T(C): 37.2 (25 Oct 2017 19:22), Max: 37.2 (25 Oct 2017 19:22)  T(F): 99 (25 Oct 2017 19:22), Max: 99 (25 Oct 2017 19:22)  HR: 93 (25 Oct 2017 19:22) (87 - 93)  BP: 98/61 (25 Oct 2017 19:22) (96/57 - 100/62)  BP(mean): --  RR: 18 (25 Oct 2017 19:22) (16 - 18)  SpO2: 94% (25 Oct 2017 19:22) (93% - 95%)    Pex:  alert NAD  Skin juandiced  EOMI Positive scleral icterus  Neck Supple No LNA  Cv s1 S2 RRR  Lungs clear B/L  abd soft NT ND +BS  No LE edema or tenderness    Labs:                        11.9   8.23  )-----------( 315      ( 25 Oct 2017 07:40 )             38.5     CBC Full  -  ( 25 Oct 2017 07:40 )  WBC Count : 8.23 K/uL  Hemoglobin : 11.9 g/dL  Hematocrit : 38.5 %  Platelet Count - Automated : 315 K/uL  Mean Cell Volume : 99.0 fl  Mean Cell Hemoglobin : 30.6 pg  Mean Cell Hemoglobin Concentration : 30.9 gm/dL  Auto Neutrophil # : x  Auto Lymphocyte # : x  Auto Monocyte # : x  Auto Eosinophil # : x  Auto Basophil # : x  Auto Neutrophil % : x  Auto Lymphocyte % : x  Auto Monocyte % : x  Auto Eosinophil % : x  Auto Basophil % : x    10-25    133<L>  |  95<L>  |  19  ----------------------------<  195<H>  4.7   |  22  |  1.12    Ca    9.9      25 Oct 2017 09:00    TPro  6.9  /  Alb  3.1<L>  /  TBili  18.1<H>  /  DBili  x   /  AST  150<H>  /  ALT  157<H>  /  AlkPhos  843<H>  10-25      [id number]
SURGICAL ONCOLOGY CONSULT NOTE    61M with DM and HTN, surgery consulted for pancreatic mass. Patient reports for the past two months, he has had vague abdominal pain and fullness radiating to the back. He attests to loss of appetite and about a ten pound weight loss over this time. Three weeks ago he began noticing that his skin became yellow, his urine became dark, and his stools became light. The patient also reports constipation, for which he self administered an enema. He has had no associated vomiting or diarrhea. Has never had a colonoscopy. He went to his primary care doctor on Friday, who brandyn labs and then called him to come into the hospital when he got the results on Monday. The patient denies any history of alcohol, cigarettes, or illicit drug use.     So far, the patient has had a CT abdomen and pelvis showing a mass in the head of the pancreas measuring 3x3.6 cm with common bile duct, intrahepatic, and pancreatic ductal dilatation (10/23). He has had a CT of the chest showing no metastatic disease (10/24). He had an ERCP on 10/25 during which a stent was placed in the ventral pancreatic duct, but biliary cannulation was impossible at that time.    Today, patient reports some vague discomfort and fullness as well as constipation. He is still stunned that he has a mass and repeats that we are not sure yet that it is cancer. He seems anxious and dismayed. Labs today show T bili 16.8, Alk phos 728, , . Abdomen mildly distended with no tenderness to palpation.       HPI:  60 y/o  male with hx of DM , HTN admitted for jaundice that has been ongoing for the past two month,.  Pt reports that he had intermittent pain in R flank and  that moved to RUQ and later localized t epigastric area however disappeared over the past two weeks.  No objective fever or chills   no N/V/d   no weight loss   Denies ETOH  recent travel   was seen as o/p and had w/u with blood work and with US .. LFT including bili were elevated howerver US neg for obstruction  no new medications ( though some of his medications were dced including Glyxsambi )    + dark urine (/frequent .  + light colored stool   CT: Mass of the head of the pancreas causing marked distention of the common   bile duct, intrahepatic biliary duct dilatation, and pancreatic ductal   dilatation. Further evaluation with contrast-enhanced MRI is recommended. (23 Oct 2017 21:50)    PAST MEDICAL & SURGICAL HISTORY:  HTN (hypertension)  Diabetes  High cholesterol  No significant past surgical history    FAMILY HISTORY:  Family history of colon cancer (Mother)    SOCIAL HISTORY:    MEDICATIONS  (STANDING):  buDESOnide  80 MICROgram(s)/formoterol 4.5 MICROgram(s) Inhaler 2 Puff(s) Inhalation two times a day  dextrose 5%. 1000 milliLiter(s) (50 mL/Hr) IV Continuous <Continuous>  dextrose 50% Injectable 12.5 Gram(s) IV Push once  dextrose 50% Injectable 25 Gram(s) IV Push once  dextrose 50% Injectable 25 Gram(s) IV Push once  insulin lispro (HumaLOG) corrective regimen sliding scale   SubCutaneous three times a day before meals  insulin lispro (HumaLOG) corrective regimen sliding scale   SubCutaneous at bedtime  pantoprazole    Tablet 40 milliGRAM(s) Oral before breakfast  piperacillin/tazobactam IVPB. 3.375 Gram(s) IV Intermittent once  piperacillin/tazobactam IVPB. 3.375 Gram(s) IV Intermittent every 8 hours  tamsulosin 0.4 milliGRAM(s) Oral at bedtime  valsartan 160 milliGRAM(s) Oral daily    MEDICATIONS  (PRN):  ALBUTerol    90 MICROgram(s) HFA Inhaler 2 Puff(s) Inhalation every 6 hours PRN sob  dextrose Gel 1 Dose(s) Oral once PRN Blood Glucose LESS THAN 70 milliGRAM(s)/deciliter  glucagon  Injectable 1 milliGRAM(s) IntraMuscular once PRN Glucose LESS THAN 70 milligrams/deciliter    Allergies    No Known Allergies    Intolerances    PHYSICAL EXAM    Vital Signs Last 24 Hrs  T(C): 36.6 (26 Oct 2017 07:52), Max: 37.2 (25 Oct 2017 19:22)  T(F): 97.8 (26 Oct 2017 07:52), Max: 99 (25 Oct 2017 19:22)  HR: 78 (26 Oct 2017 07:52) (78 - 94)  BP: 104/59 (26 Oct 2017 07:52) (98/61 - 119/70)  BP(mean): --  RR: 18 (26 Oct 2017 07:52) (16 - 18)  SpO2: 94% (26 Oct 2017 07:52) (94% - 97%)  Daily     Daily     General: WN/WD NAD, visibly jaundiced  Neurology: A&Ox3, nonfocal, [place] x 4  Head:  Normocephalic, atraumatic  ENT:  Mucosa moist, no ulcerations, scleral icterus  Neck:  Supple, no sinuses or palpable masses  Lymphatic:  No palpable cervical, supraclavicular, axillary or inguinal adenopathy  Respiratory: CTA B/L  CV: RRR, S1S2, no murmur  Abdominal: Soft, NT, mildly distended no palpable mass  MSK: No edema, + peripheral pulses, FROM all 4 extremity                              11.8   8.4   )-----------( 303      ( 26 Oct 2017 08:40 )             37.7     10-26    134<L>  |  96  |  17  ----------------------------<  204<H>  4.6   |  23  |  1.18    Ca    9.4      26 Oct 2017 08:32    TPro  6.5  /  Alb  2.8<L>  /  TBili  16.8<H>  /  DBili  x   /  AST  135<H>  /  ALT  143<H>  /  AlkPhos  728<H>  10-26          IMAGING STUDIES:

## 2017-11-02 NOTE — DISCHARGE NOTE ADULT - PROVIDER TOKENS
TOKAMI:'[id number]',ALESSANDRA:'[id number]' TOKEN:'[id number]:'[id number]'

## 2017-11-02 NOTE — PROGRESS NOTE ADULT - SUBJECTIVE AND OBJECTIVE BOX
Surgical Oncology Progress Note     S: Patient notes some discomfort overnight from procedure. Able to tolerate some PO. No fevers or chills.     MEDICATIONS  (STANDING):  buDESOnide  80 MICROgram(s)/formoterol 4.5 MICROgram(s) Inhaler 2 Puff(s) Inhalation two times a day  dextrose 5%. 1000 milliLiter(s) (50 mL/Hr) IV Continuous <Continuous>  dextrose 50% Injectable 12.5 Gram(s) IV Push once  dextrose 50% Injectable 25 Gram(s) IV Push once  dextrose 50% Injectable 25 Gram(s) IV Push once  insulin lispro (HumaLOG) corrective regimen sliding scale   SubCutaneous three times a day before meals  insulin lispro (HumaLOG) corrective regimen sliding scale   SubCutaneous at bedtime  metoprolol succinate ER 25 milliGRAM(s) Oral daily  pantoprazole    Tablet 40 milliGRAM(s) Oral before breakfast  piperacillin/tazobactam IVPB. 3.375 Gram(s) IV Intermittent every 8 hours  tamsulosin 0.4 milliGRAM(s) Oral at bedtime  valsartan 80 milliGRAM(s) Oral daily    MEDICATIONS  (PRN):  ALBUTerol    90 MICROgram(s) HFA Inhaler 2 Puff(s) Inhalation every 6 hours PRN sob  dextrose Gel 1 Dose(s) Oral once PRN Blood Glucose LESS THAN 70 milliGRAM(s)/deciliter  glucagon  Injectable 1 milliGRAM(s) IntraMuscular once PRN Glucose LESS THAN 70 milligrams/deciliter  simethicone 80 milliGRAM(s) Chew four times a day PRN Gas  sodium chloride 0.65% Nasal 1 Spray(s) Both Nostrils three times a day PRN Nasal Congestion      Physical Exam:    Vital Signs Last 24 Hrs  T(C): 36.7 (01 Nov 2017 23:44), Max: 37.1 (01 Nov 2017 08:07)  T(F): 98.1 (01 Nov 2017 23:44), Max: 98.8 (01 Nov 2017 08:07)  HR: 85 (02 Nov 2017 06:21) (75 - 85)  BP: 104/61 (02 Nov 2017 06:21) (99/63 - 109/60)  BP(mean): --  RR: 18 (01 Nov 2017 23:44) (18 - 18)  SpO2: 97% (01 Nov 2017 23:44) (96% - 99%)    11-01-17 @ 07:01  -  11-02-17 @ 07:00  --------------------------------------------------------  IN: 740 mL / OUT: 400 mL / NET: 340 mL        Gen: No acute distress, alert and oriented   Abdominal: Soft, minimally distended, mild diffuse tenderness    LABS:                        13.0   8.6   )-----------( 295      ( 31 Oct 2017 10:11 )             37.4     11-01    131<L>  |  95<L>  |  19  ----------------------------<  180<H>  4.4   |  23  |  1.10    Ca    9.6      01 Nov 2017 08:40    TPro  6.8  /  Alb  3.0<L>  /  TBili  12.6<H>  /  DBili  x   /  AST  132<H>  /  ALT  150<H>  /  AlkPhos  679<H>  11-01

## 2017-11-02 NOTE — DISCHARGE NOTE ADULT - MEDICATION SUMMARY - MEDICATIONS TO TAKE
I will START or STAY ON the medications listed below when I get home from the hospital:    insulin syringe  -- Indication: For Dm2    alcohol swabs  -- Indication: For Dm2    Cialis 20 mg oral tablet  -- 1 tab(s) by mouth once a day, As Needed  -- Indication: For bph     valsartan 160 mg oral tablet  -- 1 tab(s) by mouth once a day  -- Indication: For HTN (hypertension)    Flomax 0.4 mg oral capsule  -- 1 cap(s) by mouth once a day  -- Indication: For bph    insulin lispro 100 units/mL subcutaneous solution  -- 1 unit(s) subcutaneous 3 times a day (before meals)  ; 1 Unit(s) if Glucose 151 - 200 2 Unit(s) if Glucose 201 - 250 3 Unit(s) if Glucose 251 - 300 4 Unit(s) if Glucose 301 - 350 5 Unit(s) if Glucose 351 - 400 6 Unit(s) if Glucose Greater Than 400   -- Indication: For Diabetes    Lantus 100 units/mL subcutaneous solution  -- 10 unit(s) subcutaneous once a day (at bedtime)   -- Do not drink alcoholic beverages when taking this medication.  It is very important that you take or use this exactly as directed.  Do not skip doses or discontinue unless directed by your doctor.  Keep in refrigerator.  Do not freeze.    -- Indication: For Diabetes    metoprolol succinate 25 mg oral tablet, extended release  -- 1 tab(s) by mouth once a day  -- Indication: For High cholesterol    Dulera 100 mcg-5 mcg/inh inhalation aerosol  -- 2 puff(s) inhaled 2 times a day  -- Indication: For asthma     Proventil HFA 90 mcg/inh inhalation aerosol  -- puff(s) inhaled , As Needed  -- Indication: For asthma     azelastine 137 mcg/inh (0.1%) nasal spray  -- 2 spray(s) into nose once a day (in the evening)  -- Indication: For asthma     pantoprazole 40 mg oral delayed release tablet  -- 1 tab(s) by mouth once a day  -- Indication: For gerd I will START or STAY ON the medications listed below when I get home from the hospital:    insulin syringe  -- Indication: For Dm2    alcohol swabs  -- Indication: For Dm2    glucometer meter  -- Indication: For Diabetes    lancet  -- ( FS AC)  -- Indication: For Diabetes    glucose strips  -- 1   -- Indication: For Diabetes    Cialis 20 mg oral tablet  -- 1 tab(s) by mouth once a day, As Needed  -- Indication: For bph     valsartan 80 mg oral tablet  -- 1 tab(s) by mouth once a day  -- Indication: For HTN (hypertension)    Flomax 0.4 mg oral capsule  -- 1 cap(s) by mouth once a day  -- Indication: For bph    insulin lispro 100 units/mL subcutaneous solution  -- 1 unit(s) subcutaneous 3 times a day (before meals)  ; 1 Unit(s) if Glucose 151 - 200 2 Unit(s) if Glucose 201 - 250 3 Unit(s) if Glucose 251 - 300 4 Unit(s) if Glucose 301 - 350 5 Unit(s) if Glucose 351 - 400 6 Unit(s) if Glucose Greater Than 400   -- Indication: For Diabetes    Lantus 100 units/mL subcutaneous solution  -- 10 unit(s) subcutaneous once a day (at bedtime)   -- Do not drink alcoholic beverages when taking this medication.  It is very important that you take or use this exactly as directed.  Do not skip doses or discontinue unless directed by your doctor.  Keep in refrigerator.  Do not freeze.    -- Indication: For Diabetes    metoprolol succinate 25 mg oral tablet, extended release  -- 1 tab(s) by mouth once a day  -- Indication: For High cholesterol    Dulera 100 mcg-5 mcg/inh inhalation aerosol  -- 2 puff(s) inhaled 2 times a day  -- Indication: For asthma     Proventil HFA 90 mcg/inh inhalation aerosol  -- puff(s) inhaled , As Needed  -- Indication: For asthma     azelastine 137 mcg/inh (0.1%) nasal spray  -- 2 spray(s) into nose once a day (in the evening)  -- Indication: For asthma     pantoprazole 40 mg oral delayed release tablet  -- 1 tab(s) by mouth once a day  -- Indication: For gerd

## 2017-11-02 NOTE — DISCHARGE NOTE ADULT - CARE PROVIDERS DIRECT ADDRESSES
[email or web address],DirectAddress_Unknown,DirectAddress_Unknown,DirectAddress_Unknown

## 2017-11-02 NOTE — DISCHARGE NOTE ADULT - MEDICATION SUMMARY - MEDICATIONS TO STOP TAKING
I will STOP taking the medications listed below when I get home from the hospital:    Tradjenta 5 mg oral tablet  -- 1 tab(s) by mouth once a day I will STOP taking the medications listed below when I get home from the hospital:    Tradjenta 5 mg oral tablet  -- 1 tab(s) by mouth once a day    aspirin 81 mg oral tablet  -- 1 tab(s) by mouth once a day

## 2017-11-02 NOTE — DISCHARGE NOTE ADULT - PATIENT PORTAL LINK FT
“You can access the [employer] Patient Portal, offered by [facility], by registering with the following website: [email or web address]”

## 2017-11-02 NOTE — PROGRESS NOTE ADULT - ASSESSMENT
61y male with pancreatic head mass s/p ERCP and biliary stent with FNA and core biopsy    PLAN  - f/u Cardiology for preop risk stratification and optimization  - f/u FNA biopsy results  - Will plan for surgical resection, likely Whipple procedure, likely as an outpatient. Patient already has an appointment with Dr. Curry in his [place] office for November 8.   - Cardiac and medical clearance  - Will d/w attending, Dr. Curry

## 2017-11-02 NOTE — DISCHARGE NOTE ADULT - PLAN OF CARE
Jaundice/LFT improved ---s/p ERCP with bx Follow-up with Dr. Curry on November 8, 2017 for FNA biopsy results and  plan for surgical resection, likely Whipple procedure as an outpatient-------- continue home medication Low salt diet  Activity as tolerated.  Take all medication as prescribed.  Follow up with your medical doctor for routine blood pressure monitoring at your next visit.  Notify your doctor if you have any of the following symptoms:   Dizziness, Lightheadedness, Blurry vision, Headache, Chest pain, Shortness of breath Follow-up with Dr. Curry on November 8, 2017 for FNA biopsy results and  plan for surgical resection, likely Whipple procedure as an outpatient--------  Follow-up with Oncology --call for appointment Follow-up with Dr. Curry on November 8, 2017 for FNA biopsy results and  plan for surgical resection, likely Whipple procedure as an outpatient--------  follow-up with Gastroenterology Dr. Baird next week for possible stent removal and biopsy results ---call for appointment    Follow-up with Oncology --call for appointment continue insulin regimen as prescribed Home care arrange to reinforce diabetic  teaching   Follow-up with Endocrinologist  Dr. Zarate ---Address: [address] [place] [address] [address], [place] [address]  Phone: [phone]  HgA1C 10/24/17:  8.1  Make sure you get your HgA1c checked every three months.  If you take oral diabetes medications, check your blood glucose two times a day.  If you take insulin, check your blood glucose before meals and at bedtime.  It's important not to skip any meals.  Keep a log of your blood glucose results and always take it with you to your doctor appointments.  Keep a list of your current medications including injectables and over the counter medications and bring this medication list with you to all your doctor appointments.  If you have not seen your ophthalmologist this year call for appointment.  Check your feet daily for redness, sores, or openings. Do not self treat. If no improvement in two days call your primary care physician for an appointment.  Low blood sugar (hypoglycemia) is a blood sugar below 70mg/dl. Check your blood sugar if you feel signs/symptoms of hypoglycemia. If your blood sugar is below 70 take 15 grams of carbohydrates (ex 4 oz of apple juice, 3-4 glucose tablets, or 4-6 oz of regular soda) wait 15 minutes and repeat blood sugar to make sure it comes up above 70.  If your blood sugar is above 70 and you are due for a meal, have a meal.  If you are not due for a meal have a snack.  This snack helps keeps your blood sugar at a la range. Home care arrange to reinforce diabetic  teaching   Follow-up with Endocrinologist  Dr. Zarate ---Address: [address] [place] [address] [address], [place] [address]  Phone: [phone]  HgA1C 10/24/17:  8.1  Make sure you get your HgA1c checked every three months.  If you take oral diabetes medications, If you take insulin, check your blood glucose before meals and at bedtime.It's important not to skip any meals.  Keep a log of your blood glucose results and always take it with you to your doctor appointments.  Keep a list of your current medications including injectables and over the counter medications and bring this medication list with you to all your doctor appointments.  If you have not seen your ophthalmologist this year call for appointment.  Check your feet daily for redness, sores, or openings. Do not self treat. If no improvement in two days call your primary care physician for an appointment.  Low blood sugar (hypoglycemia) is a blood sugar below 70mg/dl. Check your blood sugar if you feel signs/symptoms of hypoglycemia. If your blood sugar is below 70 take 15 grams of carbohydrates (ex 4 oz of apple juice, 3-4 glucose tablets, or 4-6 oz of regular soda) wait 15 minutes and repeat blood sugar to make sure it comes up above 70.  If your blood sugar is above 70 and you are due for a meal, have a meal.  If you are not due for a meal have a snack.  This snack helps keeps your blood sugar at a la range.

## 2017-11-02 NOTE — DISCHARGE NOTE ADULT - CARE PROVIDER_API CALL
Rene Curry), ColonRectal Surgery; Surgery  [address] [place] [address]  Phone: [phone]  Fax: [phone]    Eyal Davison), Hematology; Medical Oncology  [address] [place] [address]  Phone: [phone]  Fax: [phone] Rene Curry), ColonRectal Surgery; Surgery  [address] [place] [address]  Phone: [phone]  Fax: [phone]    Eyal Davison (MD), Hematology; Medical Oncology  [address] [facility]  [address]  [place] [address]  Phone: [phone]  Fax: [phone]    Jose Mae (MD), Cardiovascular Disease; Internal Medicine  [address] [facility] [address] [place] [address]  Phone: [phone]  Fax: [phone]    Peri Baird (MD), Gastroenterology  [address] [place] [address]  Phone: [phone]

## 2017-11-02 NOTE — PROGRESS NOTE ADULT - ASSESSMENT
60 y/o  male with hx of DM , HTN admitted for jaundice that has been ongoing for the past two month,.  Pt reports that he had intermittent pain in R flank and  that moved to RUQ and later localized t epigastric area however disappeared over the past two weeks.  No objective fever or chills   no N/V/d   no weight loss   Denies ETOH  recent travel   was seen as o/p and had w/u with blood work and with US .. LFT including bili were elevated howerver US neg for obstruction  no new medications ( though some of his medications were dced including Glyxsambi )    + dark urine (/frequent .  + light colored stool   CT: Mass of the head of the pancreas causing marked distention of the common   bile duct, intrahepatic biliary duct dilatation, and pancreatic ductal   dilatation. Further evaluation with contrast-enhanced MRI is recommended.    1- obstructive Jaundice : sec to panreatic mass ..  - MRI abd: < from: MRI Abdomen w/wo Cont (10.26.17 @ 14:14) >  A long segment distal CBD stricture with severe biliary dilatation highly suspicious for neoplasm.    - CT chest to evaluate for distant mets: negative     s/p ERCP : < from: ERCP (10.25.17 @ 16:10) >  - One plastic stent was placed into the ventral pancreatic duct. Biliary cannulation not possible despite various attmepts and variations in technique.     - EUS:  < from: Upper EUS (10.25.17 @ 14:38) >  There was dilation in the common bile duct and in the cystic duct which                        measured up to 22 mm.                       - A mass was identified in the pancreatic head. Thiswas poorly defined and                        the planes of all the vessels were preserved. The SMA, SMV, and PV were                        normal. A core biopsy was performed.    Repeat ERCP : < from: ERCP (10.31.17 @ 18:04) >  Impression:          1. Obstructive jaundice s/p ERCP with placement of fully covered metal stent                        in bile ductand removal of pancreatic duct stent.                       2. Pancreatic head mass s/p FNA and core biopsies.                      -path from initial ercp : atypical cells on background of chronic pancreatitis found however ? adequecy ..  f/u repeat bx from sec ercp   -likely will undergo  Whipple procedure 2- DM: hold PO meds and monitor FS   discusesd with  : f/u as o/p next week for discussion/decision re : surgery pending cytology   d/w Kartik : no further need for abx   cont to monitor LFT as o/p.    3- HTN : monitor BP cont meds  4- HLD : off lipitor due to myoipathy >? and now with elevated LFT   5- abn stress : cont bb , ARB  hold off on asa and lipitor    d/w Dr Mae   f/u as o./p  6: DM uncontorolled with A1c of 8    discussed with    : due to elevated LFT and pacreatic lesion /Pancreatitits : will dc on insulin and f/u as o/p   discussed with pt at length bedside   d/w with Alvarado on phone   d/w NP

## 2017-11-02 NOTE — CONSULT NOTE ADULT - ASSESSMENT
62 y/o Type 2 diabetes 5 years with extensive family history of diabetes.  Patient with recent 20 lbs. weight loss and obstructive jaundice, noted with a mass in the pancreatic head, undergoing evaluation.  LFT's still elevated, although coming down after second stent placement.  Due to that insulin is the only option for treatment of diabetes at this stage.

## 2017-11-02 NOTE — PROGRESS NOTE ADULT - PROVIDER SPECIALTY LIST ADULT
Cardiology
Gastroenterology
Heme/Onc
Internal Medicine
Surgery
Internal Medicine

## 2017-11-02 NOTE — PROGRESS NOTE ADULT - SUBJECTIVE AND OBJECTIVE BOX
JENIFER NORTH  MRN-[id number]    Patient is a 61y old  Male who presents with a chief complaint of jaundice (23 Oct 2017 21:50)      Review of System    Resting comfortably    Current Meds  MEDICATIONS  (STANDING):  buDESOnide  80 MICROgram(s)/formoterol 4.5 MICROgram(s) Inhaler 2 Puff(s) Inhalation two times a day  dextrose 5%. 1000 milliLiter(s) (50 mL/Hr) IV Continuous <Continuous>  dextrose 50% Injectable 12.5 Gram(s) IV Push once  dextrose 50% Injectable 25 Gram(s) IV Push once  dextrose 50% Injectable 25 Gram(s) IV Push once  insulin lispro (HumaLOG) corrective regimen sliding scale   SubCutaneous three times a day before meals  insulin lispro (HumaLOG) corrective regimen sliding scale   SubCutaneous at bedtime  metoprolol succinate ER 25 milliGRAM(s) Oral daily  pantoprazole    Tablet 40 milliGRAM(s) Oral before breakfast  piperacillin/tazobactam IVPB. 3.375 Gram(s) IV Intermittent every 8 hours  tamsulosin 0.4 milliGRAM(s) Oral at bedtime  valsartan 80 milliGRAM(s) Oral daily    MEDICATIONS  (PRN):  ALBUTerol    90 MICROgram(s) HFA Inhaler 2 Puff(s) Inhalation every 6 hours PRN sob  dextrose Gel 1 Dose(s) Oral once PRN Blood Glucose LESS THAN 70 milliGRAM(s)/deciliter  glucagon  Injectable 1 milliGRAM(s) IntraMuscular once PRN Glucose LESS THAN 70 milligrams/deciliter  simethicone 80 milliGRAM(s) Chew four times a day PRN Gas  sodium chloride 0.65% Nasal 1 Spray(s) Both Nostrils three times a day PRN Nasal Congestion      Vitals  Vital Signs Last 24 Hrs  T(C): 36.7 (01 Nov 2017 23:44), Max: 37.1 (01 Nov 2017 08:07)  T(F): 98.1 (01 Nov 2017 23:44), Max: 98.8 (01 Nov 2017 08:07)  HR: 85 (02 Nov 2017 06:21) (75 - 85)  BP: 104/61 (02 Nov 2017 06:21) (99/63 - 109/60)  BP(mean): --  RR: 18 (01 Nov 2017 23:44) (18 - 18)  SpO2: 97% (01 Nov 2017 23:44) (96% - 99%)    Physical Exam    Constitutional: NAD      Lab  CBC Full  -  ( 31 Oct 2017 10:11 )  WBC Count : 8.6 K/uL  Hemoglobin : 13.0 g/dL  Hematocrit : 37.4 %  Platelet Count - Automated : 295 K/uL  Mean Cell Volume : 95.2 fl  Mean Cell Hemoglobin : 33.0 pg  Mean Cell Hemoglobin Concentration : 34.7 gm/dL  Auto Neutrophil # : x  Auto Lymphocyte # : x  Auto Monocyte # : x  Auto Eosinophil # : x  Auto Basophil # : x  Auto Neutrophil % : x  Auto Lymphocyte % : x  Auto Monocyte % : x  Auto Eosinophil % : x  Auto Basophil % : x    11-01    131<L>  |  95<L>  |  19  ----------------------------<  180<H>  4.4   |  23  |  1.10    Ca    9.6      01 Nov 2017 08:40    TPro  6.8  /  Alb  3.0<L>  /  TBili  12.6<H>  /  DBili  x   /  AST  132<H>  /  ALT  150<H>  /  AlkPhos  679<H>  11-01    PT/INR - ( 31 Oct 2017 12:00 )   PT: 15.1 sec;   INR: 1.39 ratio             Rad:    Assessment/Plan

## 2017-11-02 NOTE — DISCHARGE NOTE ADULT - CARE PLAN
Principal Discharge DX:	Obstructive jaundice  Goal:	Jaundice/LFT improved ---s/p ERCP with bx  Instructions for follow-up, activity and diet:	Follow-up with Dr. Curry on November 8, 2017 for FNA biopsy results and  plan for surgical resection, likely Whipple procedure as an outpatient--------  Secondary Diagnosis:	HTN (hypertension)  Goal:	continue home medication  Instructions for follow-up, activity and diet:	Low salt diet  Activity as tolerated.  Take all medication as prescribed.  Follow up with your medical doctor for routine blood pressure monitoring at your next visit.  Notify your doctor if you have any of the following symptoms:   Dizziness, Lightheadedness, Blurry vision, Headache, Chest pain, Shortness of breath  Secondary Diagnosis:	Pancreatic mass  Instructions for follow-up, activity and diet:	Follow-up with Dr. Curry on November 8, 2017 for FNA biopsy results and  plan for surgical resection, likely Whipple procedure as an outpatient--------  Follow-up with Oncology --call for appointment Principal Discharge DX:	Obstructive jaundice  Goal:	Jaundice/LFT improved ---s/p ERCP with bx  Instructions for follow-up, activity and diet:	Follow-up with Dr. Curry on November 8, 2017 for FNA biopsy results and  plan for surgical resection, likely Whipple procedure as an outpatient--------  Secondary Diagnosis:	HTN (hypertension)  Goal:	continue home medication  Instructions for follow-up, activity and diet:	Low salt diet  Activity as tolerated.  Take all medication as prescribed.  Follow up with your medical doctor for routine blood pressure monitoring at your next visit.  Notify your doctor if you have any of the following symptoms:   Dizziness, Lightheadedness, Blurry vision, Headache, Chest pain, Shortness of breath  Secondary Diagnosis:	Pancreatic mass  Instructions for follow-up, activity and diet:	Follow-up with Dr. Curry on November 8, 2017 for FNA biopsy results and  plan for surgical resection, likely Whipple procedure as an outpatient--------  follow-up with Gastroenterology Dr. Baird next week for possible stent removal and biopsy results ---call for appointment    Follow-up with Oncology --call for appointment Principal Discharge DX:	Obstructive jaundice  Goal:	Jaundice/LFT improved ---s/p ERCP with bx  Instructions for follow-up, activity and diet:	Follow-up with Dr. Curry on November 8, 2017 for FNA biopsy results and  plan for surgical resection, likely Whipple procedure as an outpatient--------  Secondary Diagnosis:	HTN (hypertension)  Goal:	continue home medication  Instructions for follow-up, activity and diet:	Low salt diet  Activity as tolerated.  Take all medication as prescribed.  Follow up with your medical doctor for routine blood pressure monitoring at your next visit.  Notify your doctor if you have any of the following symptoms:   Dizziness, Lightheadedness, Blurry vision, Headache, Chest pain, Shortness of breath  Secondary Diagnosis:	Pancreatic mass  Instructions for follow-up, activity and diet:	Follow-up with Dr. Curry on November 8, 2017 for FNA biopsy results and  plan for surgical resection, likely Whipple procedure as an outpatient--------  follow-up with Gastroenterology Dr. Baird next week for possible stent removal and biopsy results ---call for appointment    Follow-up with Oncology --call for appointment  Secondary Diagnosis:	Diabetes  Goal:	continue insulin regimen as prescribed  Instructions for follow-up, activity and diet:	Home care arrange to reinforce diabetic  teaching   Follow-up with Endocrinologist  Dr. Zarate ---Address: [address] [place] [address] [address] [address]  Phone: [phone]  HgA1C 10/24/17:  8.1  Make sure you get your HgA1c checked every three months.  If you take oral diabetes medications, check your blood glucose two times a day.  If you take insulin, check your blood glucose before meals and at bedtime.  It's important not to skip any meals.  Keep a log of your blood glucose results and always take it with you to your doctor appointments.  Keep a list of your current medications including injectables and over the counter medications and bring this medication list with you to all your doctor appointments.  If you have not seen your ophthalmologist this year call for appointment.  Check your feet daily for redness, sores, or openings. Do not self treat. If no improvement in two days call your primary care physician for an appointment.  Low blood sugar (hypoglycemia) is a blood sugar below 70mg/dl. Check your blood sugar if you feel signs/symptoms of hypoglycemia. If your blood sugar is below 70 take 15 grams of carbohydrates (ex 4 oz of apple juice, 3-4 glucose tablets, or 4-6 oz of regular soda) wait 15 minutes and repeat blood sugar to make sure it comes up above 70.  If your blood sugar is above 70 and you are due for a meal, have a meal.  If you are not due for a meal have a snack.  This snack helps keeps your blood sugar at a la range. Principal Discharge DX:	Obstructive jaundice  Goal:	Jaundice/LFT improved ---s/p ERCP with bx  Instructions for follow-up, activity and diet:	Follow-up with Dr. Curry on November 8, 2017 for FNA biopsy results and  plan for surgical resection, likely Whipple procedure as an outpatient--------  Secondary Diagnosis:	HTN (hypertension)  Goal:	continue home medication  Instructions for follow-up, activity and diet:	Low salt diet  Activity as tolerated.  Take all medication as prescribed.  Follow up with your medical doctor for routine blood pressure monitoring at your next visit.  Notify your doctor if you have any of the following symptoms:   Dizziness, Lightheadedness, Blurry vision, Headache, Chest pain, Shortness of breath  Secondary Diagnosis:	Pancreatic mass  Instructions for follow-up, activity and diet:	Follow-up with Dr. Curry on November 8, 2017 for FNA biopsy results and  plan for surgical resection, likely Whipple procedure as an outpatient--------  follow-up with Gastroenterology Dr. Baird next week for possible stent removal and biopsy results ---call for appointment    Follow-up with Oncology --call for appointment  Secondary Diagnosis:	Diabetes  Goal:	continue insulin regimen as prescribed  Instructions for follow-up, activity and diet:	Home care arrange to reinforce diabetic  teaching   Follow-up with Endocrinologist  Dr. Zarate ---Address: [address] [place] [address] [address] [address]  Phone: [phone]  HgA1C 10/24/17:  8.1  Make sure you get your HgA1c checked every three months.  If you take oral diabetes medications, If you take insulin, check your blood glucose before meals and at bedtime.It's important not to skip any meals.  Keep a log of your blood glucose results and always take it with you to your doctor appointments.  Keep a list of your current medications including injectables and over the counter medications and bring this medication list with you to all your doctor appointments.  If you have not seen your ophthalmologist this year call for appointment.  Check your feet daily for redness, sores, or openings. Do not self treat. If no improvement in two days call your primary care physician for an appointment.  Low blood sugar (hypoglycemia) is a blood sugar below 70mg/dl. Check your blood sugar if you feel signs/symptoms of hypoglycemia. If your blood sugar is below 70 take 15 grams of carbohydrates (ex 4 oz of apple juice, 3-4 glucose tablets, or 4-6 oz of regular soda) wait 15 minutes and repeat blood sugar to make sure it comes up above 70.  If your blood sugar is above 70 and you are due for a meal, have a meal.  If you are not due for a meal have a snack.  This snack helps keeps your blood sugar at a la range.

## 2017-11-02 NOTE — PROGRESS NOTE ADULT - ASSESSMENT
62 y/o man with obstructive jaundice. s/p repeatERCP. Imaging with mass in head of pancreas.  - await pathology before being able to proceed with additional medical onolocgic planning  -surg onc and GI following. For possible Whipple, outpatient.  - f/u cmp, yesterdays lab work shows improvement

## 2017-11-02 NOTE — DISCHARGE NOTE ADULT - ADDITIONAL INSTRUCTIONS
Follow-up with Dr. Curry on November 8, 2017 for FNA biopsy results and  plan for surgical resection, likely Whipple procedure as an outpatient-------- Follow-up with Dr. Curry on November 8, 2017 for FNA biopsy results and  plan for surgical resection, likely Whipple procedure as an outpatient--------  follow-up with Gastroenterology Dr. Baird next week for possible stent removal and biopsy results ---call for appointment    Follow-p with Cardiology Dr. Singleton  for outpatient ECHO next week Follow-up with Dr. Curry on November 8, 2017 for FNA biopsy results and  plan for surgical resection, likely Whipple procedure as an outpatient--------  follow-up with Gastroenterology Dr. Baird next week for possible stent removal and biopsy results ---call for appointment    Follow-p with Cardiology Dr. Mae  for outpatient ECHO next week

## 2017-11-03 LAB — NON-GYNECOLOGICAL CYTOLOGY STUDY: SIGNIFICANT CHANGE UP

## 2017-11-08 ENCOUNTER — APPOINTMENT (OUTPATIENT)
Dept: SURGICAL ONCOLOGY | Facility: CLINIC | Age: 62
End: 2017-11-08
Payer: COMMERCIAL

## 2017-11-08 VITALS
BODY MASS INDEX: 32.15 KG/M2 | HEART RATE: 103 BPM | TEMPERATURE: 98.8 F | WEIGHT: 193 LBS | HEIGHT: 65 IN | DIASTOLIC BLOOD PRESSURE: 75 MMHG | SYSTOLIC BLOOD PRESSURE: 123 MMHG | RESPIRATION RATE: 12 BRPM | OXYGEN SATURATION: 100 %

## 2017-11-08 DIAGNOSIS — J45.909 UNSPECIFIED ASTHMA, UNCOMPLICATED: ICD-10-CM

## 2017-11-08 DIAGNOSIS — E11.9 TYPE 2 DIABETES MELLITUS W/OUT COMPLICATIONS: ICD-10-CM

## 2017-11-08 DIAGNOSIS — N40.0 BENIGN PROSTATIC HYPERPLASIA WITHOUT LOWER URINARY TRACT SYMPMS: ICD-10-CM

## 2017-11-08 DIAGNOSIS — R01.1 CARDIAC MURMUR, UNSPECIFIED: ICD-10-CM

## 2017-11-08 DIAGNOSIS — Z80.2 FAMILY HISTORY OF MALIGNANT NEOPLASM OF OTHER RESPIRATORY AND INTRATHORACIC ORGANS: ICD-10-CM

## 2017-11-08 DIAGNOSIS — I10 ESSENTIAL (PRIMARY) HYPERTENSION: ICD-10-CM

## 2017-11-08 PROCEDURE — 99245 OFF/OP CONSLTJ NEW/EST HI 55: CPT

## 2017-11-11 ENCOUNTER — INPATIENT (INPATIENT)
Facility: HOSPITAL | Age: 62
LOS: 2 days | Discharge: HOME | DRG: 445 | End: 2017-11-14
Attending: GENERAL ACUTE CARE HOSPITAL | Admitting: GENERAL ACUTE CARE HOSPITAL
Payer: COMMERCIAL

## 2017-11-11 VITALS
SYSTOLIC BLOOD PRESSURE: 140 MMHG | RESPIRATION RATE: 18 BRPM | TEMPERATURE: 99 F | DIASTOLIC BLOOD PRESSURE: 81 MMHG | HEIGHT: 65 IN | WEIGHT: 195.11 LBS | OXYGEN SATURATION: 98 % | HEART RATE: 96 BPM

## 2017-11-11 DIAGNOSIS — A41.9 SEPSIS, UNSPECIFIED ORGANISM: ICD-10-CM

## 2017-11-11 LAB
ALBUMIN SERPL ELPH-MCNC: 3.8 G/DL — SIGNIFICANT CHANGE UP (ref 3.3–5)
ALP SERPL-CCNC: 411 U/L — HIGH (ref 40–120)
ALT FLD-CCNC: 141 U/L RC — HIGH (ref 10–45)
ANION GAP SERPL CALC-SCNC: 13 MMOL/L — SIGNIFICANT CHANGE UP (ref 5–17)
APPEARANCE UR: CLEAR — SIGNIFICANT CHANGE UP
AST SERPL-CCNC: 99 U/L — HIGH (ref 10–40)
BASE EXCESS BLDV CALC-SCNC: 5.1 MMOL/L — HIGH (ref -2–2)
BASOPHILS # BLD AUTO: 0.1 K/UL — SIGNIFICANT CHANGE UP (ref 0–0.2)
BASOPHILS NFR BLD AUTO: 0.5 % — SIGNIFICANT CHANGE UP (ref 0–2)
BILIRUB DIRECT SERPL-MCNC: 3.3 MG/DL — HIGH (ref 0–0.2)
BILIRUB SERPL-MCNC: 6.5 MG/DL — HIGH (ref 0.2–1.2)
BILIRUB UR-MCNC: NEGATIVE — SIGNIFICANT CHANGE UP
BUN SERPL-MCNC: 21 MG/DL — SIGNIFICANT CHANGE UP (ref 7–23)
CA-I SERPL-SCNC: 1.19 MMOL/L — SIGNIFICANT CHANGE UP (ref 1.12–1.3)
CALCIUM SERPL-MCNC: 9.7 MG/DL — SIGNIFICANT CHANGE UP (ref 8.4–10.5)
CHLORIDE BLDV-SCNC: 99 MMOL/L — SIGNIFICANT CHANGE UP (ref 96–108)
CHLORIDE SERPL-SCNC: 96 MMOL/L — SIGNIFICANT CHANGE UP (ref 96–108)
CO2 BLDV-SCNC: 31 MMOL/L — HIGH (ref 22–30)
CO2 SERPL-SCNC: 25 MMOL/L — SIGNIFICANT CHANGE UP (ref 22–31)
COLOR SPEC: YELLOW — SIGNIFICANT CHANGE UP
CREAT SERPL-MCNC: 1.12 MG/DL — SIGNIFICANT CHANGE UP (ref 0.5–1.3)
DIFF PNL FLD: NEGATIVE — SIGNIFICANT CHANGE UP
EOSINOPHIL # BLD AUTO: 0 K/UL — SIGNIFICANT CHANGE UP (ref 0–0.5)
EOSINOPHIL NFR BLD AUTO: 0.2 % — SIGNIFICANT CHANGE UP (ref 0–6)
GAS PNL BLDV: 133 MMOL/L — LOW (ref 136–145)
GAS PNL BLDV: SIGNIFICANT CHANGE UP
GAS PNL BLDV: SIGNIFICANT CHANGE UP
GLUCOSE BLDC GLUCOMTR-MCNC: 186 MG/DL — HIGH (ref 70–99)
GLUCOSE BLDV-MCNC: 177 MG/DL — HIGH (ref 70–99)
GLUCOSE SERPL-MCNC: 189 MG/DL — HIGH (ref 70–99)
GLUCOSE UR QL: NEGATIVE — SIGNIFICANT CHANGE UP
HCO3 BLDV-SCNC: 30 MMOL/L — HIGH (ref 21–29)
HCT VFR BLD CALC: 28.9 % — LOW (ref 39–50)
HCT VFR BLDA CALC: 32 % — LOW (ref 39–50)
HGB BLD CALC-MCNC: 10.5 G/DL — LOW (ref 13–17)
HGB BLD-MCNC: 10.3 G/DL — LOW (ref 13–17)
KETONES UR-MCNC: NEGATIVE — SIGNIFICANT CHANGE UP
LACTATE BLDV-MCNC: 1.1 MMOL/L — SIGNIFICANT CHANGE UP (ref 0.7–2)
LEUKOCYTE ESTERASE UR-ACNC: NEGATIVE — SIGNIFICANT CHANGE UP
LIDOCAIN IGE QN: 39 U/L — SIGNIFICANT CHANGE UP (ref 7–60)
LYMPHOCYTES # BLD AUTO: 1.4 K/UL — SIGNIFICANT CHANGE UP (ref 1–3.3)
LYMPHOCYTES # BLD AUTO: 9.5 % — LOW (ref 13–44)
MCHC RBC-ENTMCNC: 34.4 PG — HIGH (ref 27–34)
MCHC RBC-ENTMCNC: 35.6 GM/DL — SIGNIFICANT CHANGE UP (ref 32–36)
MCV RBC AUTO: 96.6 FL — SIGNIFICANT CHANGE UP (ref 80–100)
MONOCYTES # BLD AUTO: 1.1 K/UL — HIGH (ref 0–0.9)
MONOCYTES NFR BLD AUTO: 7.7 % — SIGNIFICANT CHANGE UP (ref 2–14)
NEUTROPHILS # BLD AUTO: 12.1 K/UL — HIGH (ref 1.8–7.4)
NEUTROPHILS NFR BLD AUTO: 82.1 % — HIGH (ref 43–77)
NITRITE UR-MCNC: NEGATIVE — SIGNIFICANT CHANGE UP
PCO2 BLDV: 45 MMHG — SIGNIFICANT CHANGE UP (ref 35–50)
PH BLDV: 7.43 — SIGNIFICANT CHANGE UP (ref 7.35–7.45)
PH UR: 6 — SIGNIFICANT CHANGE UP (ref 5–8)
PLATELET # BLD AUTO: 331 K/UL — SIGNIFICANT CHANGE UP (ref 150–400)
PO2 BLDV: 20 MMHG — LOW (ref 25–45)
POTASSIUM BLDV-SCNC: 3.9 MMOL/L — SIGNIFICANT CHANGE UP (ref 3.5–5)
POTASSIUM SERPL-MCNC: 4.1 MMOL/L — SIGNIFICANT CHANGE UP (ref 3.5–5.3)
POTASSIUM SERPL-SCNC: 4.1 MMOL/L — SIGNIFICANT CHANGE UP (ref 3.5–5.3)
PROT SERPL-MCNC: 7 G/DL — SIGNIFICANT CHANGE UP (ref 6–8.3)
PROT UR-MCNC: SIGNIFICANT CHANGE UP
RAPID RVP RESULT: SIGNIFICANT CHANGE UP
RBC # BLD: 2.99 M/UL — LOW (ref 4.2–5.8)
RBC # FLD: 16.3 % — HIGH (ref 10.3–14.5)
SAO2 % BLDV: 28 % — LOW (ref 67–88)
SODIUM SERPL-SCNC: 134 MMOL/L — LOW (ref 135–145)
SP GR SPEC: 1.01 — SIGNIFICANT CHANGE UP (ref 1.01–1.02)
UROBILINOGEN FLD QL: NEGATIVE — SIGNIFICANT CHANGE UP
WBC # BLD: 14.7 K/UL — HIGH (ref 3.8–10.5)
WBC # FLD AUTO: 14.7 K/UL — HIGH (ref 3.8–10.5)

## 2017-11-11 PROCEDURE — 71010: CPT | Mod: 26

## 2017-11-11 PROCEDURE — 76705 ECHO EXAM OF ABDOMEN: CPT | Mod: 26,RT

## 2017-11-11 PROCEDURE — 99285 EMERGENCY DEPT VISIT HI MDM: CPT

## 2017-11-11 RX ORDER — INSULIN LISPRO 100/ML
VIAL (ML) SUBCUTANEOUS AT BEDTIME
Qty: 0 | Refills: 0 | Status: DISCONTINUED | OUTPATIENT
Start: 2017-11-11 | End: 2017-11-11

## 2017-11-11 RX ORDER — BUDESONIDE AND FORMOTEROL FUMARATE DIHYDRATE 160; 4.5 UG/1; UG/1
2 AEROSOL RESPIRATORY (INHALATION)
Qty: 0 | Refills: 0 | Status: DISCONTINUED | OUTPATIENT
Start: 2017-11-11 | End: 2017-11-14

## 2017-11-11 RX ORDER — INSULIN LISPRO 100/ML
VIAL (ML) SUBCUTANEOUS
Qty: 0 | Refills: 0 | Status: DISCONTINUED | OUTPATIENT
Start: 2017-11-11 | End: 2017-11-11

## 2017-11-11 RX ORDER — GLUCAGON INJECTION, SOLUTION 0.5 MG/.1ML
1 INJECTION, SOLUTION SUBCUTANEOUS ONCE
Qty: 0 | Refills: 0 | Status: DISCONTINUED | OUTPATIENT
Start: 2017-11-11 | End: 2017-11-14

## 2017-11-11 RX ORDER — VANCOMYCIN HCL 1 G
1000 VIAL (EA) INTRAVENOUS ONCE
Qty: 0 | Refills: 0 | Status: COMPLETED | OUTPATIENT
Start: 2017-11-11 | End: 2017-11-11

## 2017-11-11 RX ORDER — SIMETHICONE 80 MG/1
80 TABLET, CHEWABLE ORAL EVERY 6 HOURS
Qty: 0 | Refills: 0 | Status: DISCONTINUED | OUTPATIENT
Start: 2017-11-11 | End: 2017-11-14

## 2017-11-11 RX ORDER — ACETAMINOPHEN 500 MG
650 TABLET ORAL EVERY 6 HOURS
Qty: 0 | Refills: 0 | Status: DISCONTINUED | OUTPATIENT
Start: 2017-11-11 | End: 2017-11-14

## 2017-11-11 RX ORDER — SIMETHICONE 80 MG/1
160 TABLET, CHEWABLE ORAL ONCE
Qty: 0 | Refills: 0 | Status: DISCONTINUED | OUTPATIENT
Start: 2017-11-11 | End: 2017-11-14

## 2017-11-11 RX ORDER — INSULIN LISPRO 100/ML
VIAL (ML) SUBCUTANEOUS AT BEDTIME
Qty: 0 | Refills: 0 | Status: DISCONTINUED | OUTPATIENT
Start: 2017-11-11 | End: 2017-11-14

## 2017-11-11 RX ORDER — SODIUM CHLORIDE 9 MG/ML
1000 INJECTION, SOLUTION INTRAVENOUS
Qty: 0 | Refills: 0 | Status: DISCONTINUED | OUTPATIENT
Start: 2017-11-11 | End: 2017-11-14

## 2017-11-11 RX ORDER — ALBUTEROL 90 UG/1
0 AEROSOL, METERED ORAL
Qty: 0 | Refills: 0 | COMMUNITY

## 2017-11-11 RX ORDER — AZELASTINE 137 UG/1
2 SPRAY, METERED NASAL
Qty: 0 | Refills: 0 | COMMUNITY

## 2017-11-11 RX ORDER — TAMSULOSIN HYDROCHLORIDE 0.4 MG/1
0.4 CAPSULE ORAL AT BEDTIME
Qty: 0 | Refills: 0 | Status: DISCONTINUED | OUTPATIENT
Start: 2017-11-11 | End: 2017-11-14

## 2017-11-11 RX ORDER — DEXTROSE 50 % IN WATER 50 %
25 SYRINGE (ML) INTRAVENOUS ONCE
Qty: 0 | Refills: 0 | Status: DISCONTINUED | OUTPATIENT
Start: 2017-11-11 | End: 2017-11-14

## 2017-11-11 RX ORDER — METOPROLOL TARTRATE 50 MG
25 TABLET ORAL DAILY
Qty: 0 | Refills: 0 | Status: DISCONTINUED | OUTPATIENT
Start: 2017-11-11 | End: 2017-11-14

## 2017-11-11 RX ORDER — HEPARIN SODIUM 5000 [USP'U]/ML
5000 INJECTION INTRAVENOUS; SUBCUTANEOUS EVERY 8 HOURS
Qty: 0 | Refills: 0 | Status: DISCONTINUED | OUTPATIENT
Start: 2017-11-11 | End: 2017-11-14

## 2017-11-11 RX ORDER — PIPERACILLIN AND TAZOBACTAM 4; .5 G/20ML; G/20ML
3.38 INJECTION, POWDER, LYOPHILIZED, FOR SOLUTION INTRAVENOUS EVERY 8 HOURS
Qty: 0 | Refills: 0 | Status: DISCONTINUED | OUTPATIENT
Start: 2017-11-11 | End: 2017-11-14

## 2017-11-11 RX ORDER — TADALAFIL 10 MG/1
1 TABLET, FILM COATED ORAL
Qty: 0 | Refills: 0 | COMMUNITY

## 2017-11-11 RX ORDER — ASPIRIN/CALCIUM CARB/MAGNESIUM 324 MG
81 TABLET ORAL DAILY
Qty: 0 | Refills: 0 | Status: DISCONTINUED | OUTPATIENT
Start: 2017-11-11 | End: 2017-11-14

## 2017-11-11 RX ORDER — INSULIN GLARGINE 100 [IU]/ML
5 INJECTION, SOLUTION SUBCUTANEOUS AT BEDTIME
Qty: 0 | Refills: 0 | Status: DISCONTINUED | OUTPATIENT
Start: 2017-11-11 | End: 2017-11-14

## 2017-11-11 RX ORDER — INSULIN LISPRO 100/ML
VIAL (ML) SUBCUTANEOUS
Qty: 0 | Refills: 0 | Status: DISCONTINUED | OUTPATIENT
Start: 2017-11-11 | End: 2017-11-14

## 2017-11-11 RX ORDER — DEXTROSE 50 % IN WATER 50 %
12.5 SYRINGE (ML) INTRAVENOUS ONCE
Qty: 0 | Refills: 0 | Status: DISCONTINUED | OUTPATIENT
Start: 2017-11-11 | End: 2017-11-14

## 2017-11-11 RX ORDER — PIPERACILLIN AND TAZOBACTAM 4; .5 G/20ML; G/20ML
3.38 INJECTION, POWDER, LYOPHILIZED, FOR SOLUTION INTRAVENOUS ONCE
Qty: 0 | Refills: 0 | Status: COMPLETED | OUTPATIENT
Start: 2017-11-11 | End: 2017-11-11

## 2017-11-11 RX ORDER — DEXTROSE 50 % IN WATER 50 %
1 SYRINGE (ML) INTRAVENOUS ONCE
Qty: 0 | Refills: 0 | Status: DISCONTINUED | OUTPATIENT
Start: 2017-11-11 | End: 2017-11-14

## 2017-11-11 RX ORDER — PANTOPRAZOLE SODIUM 20 MG/1
40 TABLET, DELAYED RELEASE ORAL
Qty: 0 | Refills: 0 | Status: DISCONTINUED | OUTPATIENT
Start: 2017-11-11 | End: 2017-11-14

## 2017-11-11 RX ADMIN — SIMETHICONE 80 MILLIGRAM(S): 80 TABLET, CHEWABLE ORAL at 23:02

## 2017-11-11 RX ADMIN — PIPERACILLIN AND TAZOBACTAM 25 GRAM(S): 4; .5 INJECTION, POWDER, LYOPHILIZED, FOR SOLUTION INTRAVENOUS at 21:54

## 2017-11-11 RX ADMIN — PIPERACILLIN AND TAZOBACTAM 200 GRAM(S): 4; .5 INJECTION, POWDER, LYOPHILIZED, FOR SOLUTION INTRAVENOUS at 17:21

## 2017-11-11 RX ADMIN — INSULIN GLARGINE 5 UNIT(S): 100 INJECTION, SOLUTION SUBCUTANEOUS at 23:02

## 2017-11-11 RX ADMIN — HEPARIN SODIUM 5000 UNIT(S): 5000 INJECTION INTRAVENOUS; SUBCUTANEOUS at 21:54

## 2017-11-11 RX ADMIN — Medication 250 MILLIGRAM(S): at 17:21

## 2017-11-11 RX ADMIN — TAMSULOSIN HYDROCHLORIDE 0.4 MILLIGRAM(S): 0.4 CAPSULE ORAL at 21:54

## 2017-11-11 NOTE — CONSULT NOTE ADULT - ASSESSMENT
Newly diagnosed pancreatic cancer had obstructive juandice which is improving s/p stent.  He appears to have localized disease and he is scheduled for a Whipple later this month.  However, he is now admitted with fevers to 104 and chills.  Concern for cholangitis and he is on antibiotics.  Await cultures.  Trend bilirubin.  Anemia AOCD now lower with infection.  Hgb still adequate and would monitor.

## 2017-11-11 NOTE — ED PROVIDER NOTE - MEDICAL DECISION MAKING DETAILS
Passi, PGY-3: Admit for sepsis. Concern for GI etiology given recent ERCP w/ stent placement. F/up cultures. Empirically cover w/ broad spectrum abx pending culture results.

## 2017-11-11 NOTE — ED PROVIDER NOTE - PLAN OF CARE
Treatment Admission for sepsis in setting of recent biliary manipulation (ERCP w/ stent placement). Cultures pending. Empiric coverage w/ zosyn.  RUQ US pending. GI eval pending.

## 2017-11-11 NOTE — H&P ADULT - HISTORY OF PRESENT ILLNESS
60 y/o  male with hx of DM , HTN, BPH  admitted recently for jaundice found to have pnacreatic adenocarcinoma on bx . during that admission pt One plastic stent was placed into the ventral pancreatic duct then placement of fully covered metal stent  in bile ductand removal of pancreatic duct stent. . pt had a stress test which was abnormal and plan for f/u as o/p post surgical intervention by Dr. Curry.  since dc pt overall has been doing well except one episode of hypotension where his diovan was stopped ..   pt now admitted with acute onset of fever of 104 , chills , N/V and mild abd pain the day prior to admission ( he had called me at the time and was instructed to come right away to Er however chose not to at the time )   abd pain is mild and has been on and off since d/c , mostly localized to epig r/uq area with no radiation and no apprent exacerbating or reliveing factors...   had one episode of N/V yest  no urinary sx but feels somewhat bloated   no diarreah   no cough , CP /SOB

## 2017-11-11 NOTE — ED ADULT NURSE NOTE - OBJECTIVE STATEMENT
Patient  is  alert  and  oriented  x3.  Color is jaundiced   and  skin  warm to touch.   He  had  a fever  last  night.  Patient  had    hepaic and  pancreatic  stents  inserted   10  days  ago.

## 2017-11-11 NOTE — ED PROVIDER NOTE - PMH
Diabetes    High cholesterol    HTN (hypertension) Adenocarcinoma of pancreas    BPH (benign prostatic hyperplasia)    Diabetes    High cholesterol    HTN (hypertension)

## 2017-11-11 NOTE — CONSULT NOTE ADULT - ASSESSMENT
60 y/o  male with hx of DMII (on insulin), HTN, HLD, recently diagnosed pancreatic adenocarcinoma (10/31/17) s/p ERCP w/ bile duct stent placement (Dr. Moura, 10/31) awaiting Whipple surgery (Dr. Curry, 11/31/17) presenting from home with fever x 1 day.      1) SIRS (unclear etiology at this time)  2) Pancreatic adenocarcinoma s/p ERCP and stent awaiting whipple  3) HTN, HLD, DMII    -please obtain infectious workup--CXR, Bcx, UA, Ucx  -low threshold to start empiric IV abx if continue to be febrile with elevated WBC  -He has no GI symptoms at this time that would suggest an infection 2/2 complications from previous ERCP--no evidence of any abdominal pain, peritonitis signs on exam, diarrhea  -GI will follow 62 y/o  male with hx of DMII (on insulin), HTN, HLD, recently diagnosed pancreatic adenocarcinoma (10/31/17) s/p ERCP w/ bile duct stent placement (Dr. Moura, 10/31) awaiting Whipple surgery (Dr. Curry, 11/31/17) presenting from home with fever x 1 day.      1) SIRS (unclear etiology at this time)  2) Pancreatic adenocarcinoma s/p ERCP and stent awaiting whipple  3) HTN, HLD, DMII    -please obtain CTAP   -HIDA scan done read still pending  -please obtain infectious workup--CXR, Bcx, UA, Ucx  -low threshold to start empiric IV abx if continue to be febrile with elevated WBC  -He has no GI symptoms at this time that would suggest an infection 2/2 complications from previous ERCP--no evidence of any abdominal pain, peritonitis signs on exam, diarrhea  -GI will follow 60 y/o  male with hx of DMII (on insulin), HTN, HLD, recently diagnosed pancreatic adenocarcinoma (10/31/17) s/p ERCP w/ bile duct stent placement (Dr. Moura, 10/31) awaiting Whipple surgery (Dr. Curry, 11/31/17) presenting from home with fever x 1 day.      1) SIRS (unclear etiology at this time)  2) Pancreatic adenocarcinoma s/p ERCP and stent awaiting whipple  3) HTN, HLD, DMII    -please obtain CTAP to r/o possible perforation following recent procedure  -HIDA scan done read still pending  -please obtain infectious workup--CXR, Bcx, UA, Ucx  -low threshold to start empiric IV abx if continue to be febrile with elevated WBC  -Concern for possible perforation following procedure. He has no GI symptoms at this time that would suggest an infection 2/2 complications from previous ERCP--no evidence of any abdominal pain, back pain, peritonitis signs on exam, diarrhea   -GI will follow

## 2017-11-11 NOTE — ED PROVIDER NOTE - OBJECTIVE STATEMENT
60 y/o  male with hx of DMII (on insulin), HTN, HLD, recently diagnosed pancreatic adenocarcinoma (10/31/17) s/p ERCP w/ bile duct stent placement (Dr. Moura, 10/31) awaiting Whipple surgery (Dr. Curry, 11/31/17) presenting from home with fever x 1 day.  Of note, pt recently hospitalized (10/23 - 11/2/17) for obstructive jaundice; during that admission pt had  ERCP performed (Dr. Moura, on 10/31/17) w/ placement of fully covered metal stent in bile duct and removal of pancreatic duct stent and s/p FNA and core biopsies of pancreatic head mass (bx consistent with adenocarcinoma). Following discharge on 11/2, pt reports feeling well. Yesterday, pt reports onset of fever (T: 100.4) , shaking chills, night sweats and episode of bilious, non-bloody emesis x 1 after he was doing some heavy lifting outside. Pt denies any abdominal pain, however says his abdomen is more distended than normal. Reports improved scleral icterus and jaundice. No acholic stool, and urine is a lighter orange as compared to last admission. No current nausea, diarrhea, denies any blood in his stool. No cough, or any other URI symptoms, denies any dysuria, no urgency/frequency, no hematuria and no other localizing signs of infection. Pt tolerating PO.

## 2017-11-11 NOTE — ED PROVIDER NOTE - CARE PLAN
Principal Discharge DX:	Sepsis  Goal:	Treatment  Instructions for follow-up, activity and diet:	Admission for sepsis in setting of recent biliary manipulation (ERCP w/ stent placement). Cultures pending. Empiric coverage w/ zosyn.  RUQ US pending. GI eval pending.

## 2017-11-11 NOTE — ED PROVIDER NOTE - ATTENDING CONTRIBUTION TO CARE
I performed a history and physical exam of the patient and discussed their management with the resident. I reviewed the resident's note and agree with the documented findings and plan of care.     Patient is a 62 yo M w/ hx of recently diagnosed I performed a history and physical exam of the patient and discussed their management with the resident. I reviewed the resident's note and agree with the documented findings and plan of care.     Patient is a 62 yo M w/ hx of recently diagnosed with pancreatic adenocarcinoma s/p ERCP and bile duct stent placement on 10/31 now p/w complaint of abdominal pain and fever. Patient reports abdominal discomfort since the placement of the stent. He states he was doing some heavy lifting yesterday and felt very weak afterwards. Shortly afterwards, he reports abdominal pain and vomiting x 1. He took his temp yesterday and had a fever of 100.4. Patient with a temp of 103 today. He c/o abdominal distention and generalized discomfort. No dysuria though patient states he noticed a smell in his urine. No cough, sob.   VS noted  Gen. no acute distress, Non toxic   HEENT: EOMI, mmm, bilateral scleral icterus  Lungs: CTAB/L no C/ W /R   CVS: RRR   Abd; Soft non tender, non distended no rebound or guarding  Ext: no edema  Skin: no rash, jaundiced skin  Neuro AAOx3 non focal clear speech  a/p: fever s/p recent bile duct stent placement and new dx of pancreatic adenocarcinoma. pt scheduled to have whipple surgery at the end of the month. Given recent hospitalization - will do sepsis workup - ivf, abx, labs and cultures. Will get RUQ US to check stent placement. Patient to be admitted.

## 2017-11-11 NOTE — H&P ADULT - PMH
Adenocarcinoma of pancreas    BPH (benign prostatic hyperplasia)    Diabetes    High cholesterol    HTN (hypertension)

## 2017-11-11 NOTE — H&P ADULT - ASSESSMENT
60 y/o  male with hx of DM , HTN, BPH  admitted recently for jaundice found to have pnacreatic adenocarcinoma on bx . during that admission pt One plastic stent was placed into the ventral pancreatic duct then placement of fully covered metal stent  in bile ductand removal of pancreatic duct stent. . pt had a stress test which was abnormal and plan for f/u as o/p post surgical intervention by Dr. Curry.  since dc pt overall has been doing well except one episode of hypotension where his diovan was stopped ..   pt now admitted with acute onset of fever of 104 , chills , N/V and mild abd pain the day prior to admission ( he had called me at the time and was instructed to come right away to Er however chose not to at the time )   abd pain is mild and has been on and off since d/c , mostly localized to epig r/uq area with no radiation and no apprent exacerbating or reliveing factors...   had one episode of N/V yest  no urinary sx but feels somewhat bloated   no diarreah   no cough , CP /SOB   1- sepsis : will need to presume sec to ascending cholnagitis for now.. US : Bile duct stent is not visualized and therefore cannot exclude   possibility of stent migration. However, biliary ducts appear decompressed. Nonspecific gallbladder wall thickening.  will cont zofran , empiric abx and monitor LFT   called GI and surg onco   d/w Dr. Manzanares   2- abn stress in past : f/u with Dr. Mae   3- HTN : off diovan for hypotension   cont bb   4- BPH on flomax   $- DM: decrease Lantus since on clears at this time and if tolerates po can increase to o/p dose   d/w pt and family at length at bedside

## 2017-11-12 LAB
ALBUMIN SERPL ELPH-MCNC: 3.5 G/DL — SIGNIFICANT CHANGE UP (ref 3.3–5)
ALP SERPL-CCNC: 390 U/L — HIGH (ref 40–120)
ALT FLD-CCNC: 127 U/L — HIGH (ref 10–45)
AMYLASE P1 CFR SERPL: 40 U/L — SIGNIFICANT CHANGE UP (ref 25–125)
ANION GAP SERPL CALC-SCNC: 15 MMOL/L — SIGNIFICANT CHANGE UP (ref 5–17)
AST SERPL-CCNC: 91 U/L — HIGH (ref 10–40)
BASOPHILS # BLD AUTO: 0.06 K/UL — SIGNIFICANT CHANGE UP (ref 0–0.2)
BASOPHILS NFR BLD AUTO: 0.6 % — SIGNIFICANT CHANGE UP (ref 0–2)
BILIRUB SERPL-MCNC: 5.9 MG/DL — HIGH (ref 0.2–1.2)
BUN SERPL-MCNC: 16 MG/DL — SIGNIFICANT CHANGE UP (ref 7–23)
CALCIUM SERPL-MCNC: 10.3 MG/DL — SIGNIFICANT CHANGE UP (ref 8.4–10.5)
CHLORIDE SERPL-SCNC: 96 MMOL/L — SIGNIFICANT CHANGE UP (ref 96–108)
CO2 SERPL-SCNC: 25 MMOL/L — SIGNIFICANT CHANGE UP (ref 22–31)
CREAT SERPL-MCNC: 1.2 MG/DL — SIGNIFICANT CHANGE UP (ref 0.5–1.3)
CULTURE RESULTS: NO GROWTH — SIGNIFICANT CHANGE UP
EOSINOPHIL # BLD AUTO: 0.16 K/UL — SIGNIFICANT CHANGE UP (ref 0–0.5)
EOSINOPHIL NFR BLD AUTO: 1.6 % — SIGNIFICANT CHANGE UP (ref 0–6)
GLUCOSE BLDC GLUCOMTR-MCNC: 164 MG/DL — HIGH (ref 70–99)
GLUCOSE BLDC GLUCOMTR-MCNC: 164 MG/DL — HIGH (ref 70–99)
GLUCOSE BLDC GLUCOMTR-MCNC: 183 MG/DL — HIGH (ref 70–99)
GLUCOSE BLDC GLUCOMTR-MCNC: 191 MG/DL — HIGH (ref 70–99)
GLUCOSE BLDC GLUCOMTR-MCNC: 199 MG/DL — HIGH (ref 70–99)
GLUCOSE SERPL-MCNC: 169 MG/DL — HIGH (ref 70–99)
HCT VFR BLD CALC: 29 % — LOW (ref 39–50)
HGB BLD-MCNC: 9.7 G/DL — LOW (ref 13–17)
IMM GRANULOCYTES NFR BLD AUTO: 0.7 % — SIGNIFICANT CHANGE UP (ref 0–1.5)
LIDOCAIN IGE QN: 40 U/L — SIGNIFICANT CHANGE UP (ref 7–60)
LYMPHOCYTES # BLD AUTO: 1.6 K/UL — SIGNIFICANT CHANGE UP (ref 1–3.3)
LYMPHOCYTES # BLD AUTO: 16.2 % — SIGNIFICANT CHANGE UP (ref 13–44)
MCHC RBC-ENTMCNC: 31.6 PG — SIGNIFICANT CHANGE UP (ref 27–34)
MCHC RBC-ENTMCNC: 33.4 GM/DL — SIGNIFICANT CHANGE UP (ref 32–36)
MCV RBC AUTO: 94.5 FL — SIGNIFICANT CHANGE UP (ref 80–100)
MONOCYTES # BLD AUTO: 1.23 K/UL — HIGH (ref 0–0.9)
MONOCYTES NFR BLD AUTO: 12.5 % — SIGNIFICANT CHANGE UP (ref 2–14)
NEUTROPHILS # BLD AUTO: 6.75 K/UL — SIGNIFICANT CHANGE UP (ref 1.8–7.4)
NEUTROPHILS NFR BLD AUTO: 68.4 % — SIGNIFICANT CHANGE UP (ref 43–77)
PLATELET # BLD AUTO: 340 K/UL — SIGNIFICANT CHANGE UP (ref 150–400)
POTASSIUM SERPL-MCNC: 4.2 MMOL/L — SIGNIFICANT CHANGE UP (ref 3.5–5.3)
POTASSIUM SERPL-SCNC: 4.2 MMOL/L — SIGNIFICANT CHANGE UP (ref 3.5–5.3)
PROCALCITONIN SERPL-MCNC: 1.37 NG/ML — HIGH (ref 0–0.04)
PROT SERPL-MCNC: 7.3 G/DL — SIGNIFICANT CHANGE UP (ref 6–8.3)
RBC # BLD: 3.07 M/UL — LOW (ref 4.2–5.8)
RBC # FLD: 17.4 % — HIGH (ref 10.3–14.5)
SODIUM SERPL-SCNC: 136 MMOL/L — SIGNIFICANT CHANGE UP (ref 135–145)
SPECIMEN SOURCE: SIGNIFICANT CHANGE UP
WBC # BLD: 9.87 K/UL — SIGNIFICANT CHANGE UP (ref 3.8–10.5)
WBC # FLD AUTO: 9.87 K/UL — SIGNIFICANT CHANGE UP (ref 3.8–10.5)

## 2017-11-12 PROCEDURE — 74176 CT ABD & PELVIS W/O CONTRAST: CPT | Mod: 26

## 2017-11-12 RX ORDER — DOCUSATE SODIUM 100 MG
100 CAPSULE ORAL THREE TIMES A DAY
Qty: 0 | Refills: 0 | Status: DISCONTINUED | OUTPATIENT
Start: 2017-11-12 | End: 2017-11-14

## 2017-11-12 RX ORDER — SENNA PLUS 8.6 MG/1
2 TABLET ORAL AT BEDTIME
Qty: 0 | Refills: 0 | Status: DISCONTINUED | OUTPATIENT
Start: 2017-11-12 | End: 2017-11-14

## 2017-11-12 RX ADMIN — SIMETHICONE 80 MILLIGRAM(S): 80 TABLET, CHEWABLE ORAL at 12:16

## 2017-11-12 RX ADMIN — SIMETHICONE 80 MILLIGRAM(S): 80 TABLET, CHEWABLE ORAL at 05:45

## 2017-11-12 RX ADMIN — INSULIN GLARGINE 5 UNIT(S): 100 INJECTION, SOLUTION SUBCUTANEOUS at 22:14

## 2017-11-12 RX ADMIN — PIPERACILLIN AND TAZOBACTAM 25 GRAM(S): 4; .5 INJECTION, POWDER, LYOPHILIZED, FOR SOLUTION INTRAVENOUS at 22:14

## 2017-11-12 RX ADMIN — Medication 1: at 12:20

## 2017-11-12 RX ADMIN — PANTOPRAZOLE SODIUM 40 MILLIGRAM(S): 20 TABLET, DELAYED RELEASE ORAL at 06:15

## 2017-11-12 RX ADMIN — HEPARIN SODIUM 5000 UNIT(S): 5000 INJECTION INTRAVENOUS; SUBCUTANEOUS at 14:38

## 2017-11-12 RX ADMIN — PIPERACILLIN AND TAZOBACTAM 25 GRAM(S): 4; .5 INJECTION, POWDER, LYOPHILIZED, FOR SOLUTION INTRAVENOUS at 14:51

## 2017-11-12 RX ADMIN — Medication 1: at 08:36

## 2017-11-12 RX ADMIN — HEPARIN SODIUM 5000 UNIT(S): 5000 INJECTION INTRAVENOUS; SUBCUTANEOUS at 22:14

## 2017-11-12 RX ADMIN — Medication 81 MILLIGRAM(S): at 12:16

## 2017-11-12 RX ADMIN — SIMETHICONE 80 MILLIGRAM(S): 80 TABLET, CHEWABLE ORAL at 17:22

## 2017-11-12 RX ADMIN — Medication 1: at 17:21

## 2017-11-12 RX ADMIN — SIMETHICONE 80 MILLIGRAM(S): 80 TABLET, CHEWABLE ORAL at 22:14

## 2017-11-12 RX ADMIN — HEPARIN SODIUM 5000 UNIT(S): 5000 INJECTION INTRAVENOUS; SUBCUTANEOUS at 05:45

## 2017-11-12 RX ADMIN — PIPERACILLIN AND TAZOBACTAM 25 GRAM(S): 4; .5 INJECTION, POWDER, LYOPHILIZED, FOR SOLUTION INTRAVENOUS at 05:45

## 2017-11-12 RX ADMIN — TAMSULOSIN HYDROCHLORIDE 0.4 MILLIGRAM(S): 0.4 CAPSULE ORAL at 22:14

## 2017-11-12 NOTE — PROGRESS NOTE ADULT - ASSESSMENT
60 y/o  male with hx of DM , HTN, BPH  admitted recently for jaundice found to have pnacreatic adenocarcinoma on bx . during that admission pt One plastic stent was placed into the ventral pancreatic duct then placement of fully covered metal stent  in bile ductand removal of pancreatic duct stent. . pt had a stress test which was abnormal and plan for f/u as o/p post surgical intervention by Dr. Curry.  since dc pt overall has been doing well except one episode of hypotension where his diovan was stopped ..   pt now admitted with acute onset of fever of 104 , chills , N/V and mild abd pain the day prior to admission ( he had called me at the time and was instructed to come right away to Er however chose not to at the time )   abd pain is mild and has been on and off since d/c , mostly localized to epig r/uq area with no radiation and no apprent exacerbating or reliveing factors...   had one episode of N/V yest  no urinary sx but feels somewhat bloated   no diarreah   no cough , CP /SOB   1- sepsis : improved WBC however elevated procalcitonin ..LFT trending down .   will need to presume sec to ascending cholnagitis for now.. US : Bile duct stent is not visualized and therefore cannot exclude   possibility of stent migration. However, biliary ducts appear decompressed. Nonspecific gallbladder wall thickening.  will cont zofran , empiric abx and monitor LFT .. check CT abd   f/u with GI  f/u with sug /onco   f/u with med onco Dr. Manzanares   2- abn stress in past : f/u with Dr. Mae   3- HTN : off diovan for hypotension   cont bb   4- BPH on flomax   $- DM: decreased  Lantus.. will increase based on FS   Discussed with pt and family

## 2017-11-12 NOTE — PROGRESS NOTE ADULT - ASSESSMENT
Patient with newly diagnosed pancreatic cancer appears to be local.  He is aware and he is scheduled for a Whipple procedure in a few weeks.  He is admitted with fevers and chills.  He was started on antibiotics and fever curve is lower.  The leucocytosis is resolved.  ID is following.  Cultures are pending.    Anemia AOCD the Hgb is adequate and can monitor for now.

## 2017-11-12 NOTE — PROGRESS NOTE ADULT - SUBJECTIVE AND OBJECTIVE BOX
Patient is a 61y old  Male who presents with a chief complaint of                                                              INTERVAL HPI/OVERNIGHT EVENTS:    REVIEW OF SYSTEMS:     CONSTITUTIONAL: No weakness, fevers or chills  RESPIRATORY: No cough, wheezing,  No shortness of breath  CARDIOVASCULAR: No chest pain or palpitations  GASTROINTESTINAL: mild abdominal /epig discomfort   . No nausea, vomiting, . + constipation  GENITOURINARY: No dysuria, frequency   NEUROLOGICAL: No numbness or weakness                                                                                                                                                                                                                                                                                  Medications:  MEDICATIONS  (STANDING):  aspirin  chewable 81 milliGRAM(s) Oral daily  buDESOnide  80 MICROgram(s)/formoterol 4.5 MICROgram(s) Inhaler 2 Puff(s) Inhalation two times a day  dextrose 5%. 1000 milliLiter(s) (50 mL/Hr) IV Continuous <Continuous>  dextrose 50% Injectable 12.5 Gram(s) IV Push once  dextrose 50% Injectable 25 Gram(s) IV Push once  dextrose 50% Injectable 25 Gram(s) IV Push once  heparin  Injectable 5000 Unit(s) SubCutaneous every 8 hours  insulin glargine Injectable (LANTUS) 5 Unit(s) SubCutaneous at bedtime  insulin lispro (HumaLOG) corrective regimen sliding scale   SubCutaneous three times a day before meals  insulin lispro (HumaLOG) corrective regimen sliding scale   SubCutaneous at bedtime  metoprolol succinate ER 25 milliGRAM(s) Oral daily  pantoprazole    Tablet 40 milliGRAM(s) Oral before breakfast  piperacillin/tazobactam IVPB. 3.375 Gram(s) IV Intermittent every 8 hours  simethicone 160 milliGRAM(s) Chew once  simethicone 80 milliGRAM(s) Chew every 6 hours  tamsulosin 0.4 milliGRAM(s) Oral at bedtime    MEDICATIONS  (PRN):  acetaminophen   Tablet. 650 milliGRAM(s) Oral every 6 hours PRN Mild Pain (1 - 3)  dextrose Gel 1 Dose(s) Oral once PRN Blood Glucose LESS THAN 70 milliGRAM(s)/deciliter  glucagon  Injectable 1 milliGRAM(s) IntraMuscular once PRN Glucose LESS THAN 70 milligrams/deciliter       Allergies    No Known Allergies    Intolerances      Vital Signs Last 24 Hrs  T(C): 36.4 (12 Nov 2017 21:07), Max: 37 (12 Nov 2017 00:25)  T(F): 97.6 (12 Nov 2017 21:07), Max: 98.6 (12 Nov 2017 00:25)  HR: 106 (12 Nov 2017 21:07) (78 - 106)  BP: 103/65 (12 Nov 2017 21:07) (100/64 - 110/73)  BP(mean): --  RR: 18 (12 Nov 2017 21:07) (18 - 20)  SpO2: 98% (12 Nov 2017 21:07) (97% - 98%)  CAPILLARY BLOOD GLUCOSE      POCT Blood Glucose.: 183 mg/dL (12 Nov 2017 21:31)  POCT Blood Glucose.: 199 mg/dL (12 Nov 2017 16:40)  POCT Blood Glucose.: 191 mg/dL (12 Nov 2017 12:11)  POCT Blood Glucose.: 164 mg/dL (12 Nov 2017 08:03)  POCT Blood Glucose.: 164 mg/dL (12 Nov 2017 02:04)      11-11 @ 07:01  -  11-12 @ 07:00  --------------------------------------------------------  IN: 920 mL / OUT: 0 mL / NET: 920 mL    11-12 @ 07:01  -  11-12 @ 22:30  --------------------------------------------------------  IN: 620 mL / OUT: 0 mL / NET: 620 mL      Physical Exam:    General:  NAD   HEENT:  Nonicteric, PERRLA  CV:  RRR, S1S2   Lungs:  CTA B/L, no wheezes, rales, rhonchi  Abdomen:  Soft, mild epig tenderness to deep palpation   Extremities:  2+ pulses, no c/c, no edema  Skin:  Warm and dry, no rashes  :  No newberry  Neuro:  AAOx3, non-focal, grossly intact                                                                                                                                                                                                                                                                                                LABS:                               9.7    9.87  )-----------( 340      ( 12 Nov 2017 08:31 )             29.0                      11-12    136  |  96  |  16  ----------------------------<  169<H>  4.2   |  25  |  1.20    Ca    10.3      12 Nov 2017 08:27    TPro  7.3  /  Alb  3.5  /  TBili  5.9<H>  /  DBili  x   /  AST  91<H>  /  ALT  127<H>  /  AlkPhos  390<H>  11-12

## 2017-11-12 NOTE — PROGRESS NOTE ADULT - SUBJECTIVE AND OBJECTIVE BOX
Patient is a 61y old  Male who presents with a chief complaint of fevers and chills.    Feels okay today.  On PO clears and tolerates well.  Mild abdominal soreness.  Urine less dark.    Denies HA, dizziness, nosebleeds, sore throat, CP, SOB, cough, wheeze, hemoptysis, N/V/D, BRBPR, dysuria, hematuria, leg pain, swelling.    Medication:   acetaminophen   Tablet. 650 milliGRAM(s) Oral every 6 hours PRN  aspirin  chewable 81 milliGRAM(s) Oral daily  buDESOnide  80 MICROgram(s)/formoterol 4.5 MICROgram(s) Inhaler 2 Puff(s) Inhalation two times a day  dextrose 5%. 1000 milliLiter(s) IV Continuous <Continuous>  dextrose 50% Injectable 12.5 Gram(s) IV Push once  dextrose 50% Injectable 25 Gram(s) IV Push once  dextrose 50% Injectable 25 Gram(s) IV Push once  dextrose Gel 1 Dose(s) Oral once PRN  glucagon  Injectable 1 milliGRAM(s) IntraMuscular once PRN  heparin  Injectable 5000 Unit(s) SubCutaneous every 8 hours  insulin glargine Injectable (LANTUS) 5 Unit(s) SubCutaneous at bedtime  insulin lispro (HumaLOG) corrective regimen sliding scale   SubCutaneous three times a day before meals  insulin lispro (HumaLOG) corrective regimen sliding scale   SubCutaneous at bedtime  metoprolol succinate ER 25 milliGRAM(s) Oral daily  pantoprazole    Tablet 40 milliGRAM(s) Oral before breakfast  piperacillin/tazobactam IVPB. 3.375 Gram(s) IV Intermittent every 8 hours  simethicone 160 milliGRAM(s) Chew once  simethicone 80 milliGRAM(s) Chew every 6 hours  tamsulosin 0.4 milliGRAM(s) Oral at bedtime      Physical exam    T(C): 36.8 (11-12-17 @ 04:53), Max: 38 (11-11-17 @ 21:31)  HR: 78 (11-12-17 @ 04:53) (78 - 98)  BP: 100/64 (11-12-17 @ 05:42) (99/59 - 140/81)  RR: 18 (11-12-17 @ 04:53) (18 - 20)  SpO2: 97% (11-12-17 @ 04:53) (96% - 99%)  Wt(kg): --    alert NAD  EOMI   juandiced  Neck Supple No LNA  Cv s1 S2 RRR  Lungs clear B/L  abd soft NT ND +BS  No LE edema or tenderness    Labs                        9.7    9.87  )-----------( 340      ( 12 Nov 2017 08:31 )             29.0       11-12    136  |  96  |  16  ----------------------------<  169<H>  4.2   |  25  |  1.20    Ca    10.3      12 Nov 2017 08:27    TPro  7.3  /  Alb  3.5  /  TBili  5.9<H>  /  DBili  x   /  AST  91<H>  /  ALT  127<H>  /  AlkPhos  390<H>  11-12      LIVER FUNCTIONS - ( 12 Nov 2017 08:27 )  Alb: 3.5 g/dL / Pro: 7.3 g/dL / ALK PHOS: 390 U/L / ALT: 127 U/L / AST: 91 U/L / GGT: x             [id number]

## 2017-11-12 NOTE — CONSULT NOTE ADULT - ASSESSMENT
Recent dx of pancreatic ca, biliary stent, returns with shaking chills, fever, leukocytosis, concern for sepsis.  Despite stent, bili still elevated, and thus, risk for cholangitis.  No clues to alternative focus as present.  Now afebrile, WBC normal, on Zosyn.    Plan:  Continue Zosyn pending results of Bld Cxs  Follow temps and CBC/diff   Bili, stent, as per GI

## 2017-11-13 LAB
ALBUMIN SERPL ELPH-MCNC: 3.5 G/DL — SIGNIFICANT CHANGE UP (ref 3.3–5)
ALP SERPL-CCNC: 83 U/L — SIGNIFICANT CHANGE UP (ref 40–120)
ALT FLD-CCNC: 34 U/L — SIGNIFICANT CHANGE UP (ref 10–45)
ANION GAP SERPL CALC-SCNC: 14 MMOL/L — SIGNIFICANT CHANGE UP (ref 5–17)
AST SERPL-CCNC: 82 U/L — HIGH (ref 10–40)
BILIRUB DIRECT SERPL-MCNC: 0.1 MG/DL — SIGNIFICANT CHANGE UP (ref 0–0.2)
BILIRUB INDIRECT FLD-MCNC: 0.1 MG/DL — LOW (ref 0.2–1)
BILIRUB SERPL-MCNC: 0.2 MG/DL — SIGNIFICANT CHANGE UP (ref 0.2–1.2)
BUN SERPL-MCNC: 8 MG/DL — SIGNIFICANT CHANGE UP (ref 7–23)
CALCIUM SERPL-MCNC: 8.8 MG/DL — SIGNIFICANT CHANGE UP (ref 8.4–10.5)
CANCER AG125 SERPL-ACNC: 12 U/ML — SIGNIFICANT CHANGE UP
CHLORIDE SERPL-SCNC: 102 MMOL/L — SIGNIFICANT CHANGE UP (ref 96–108)
CO2 SERPL-SCNC: 19 MMOL/L — LOW (ref 22–31)
CREAT SERPL-MCNC: 0.68 MG/DL — SIGNIFICANT CHANGE UP (ref 0.5–1.3)
GLUCOSE BLDC GLUCOMTR-MCNC: 145 MG/DL — HIGH (ref 70–99)
GLUCOSE BLDC GLUCOMTR-MCNC: 196 MG/DL — HIGH (ref 70–99)
GLUCOSE BLDC GLUCOMTR-MCNC: 221 MG/DL — HIGH (ref 70–99)
GLUCOSE BLDC GLUCOMTR-MCNC: 244 MG/DL — HIGH (ref 70–99)
GLUCOSE SERPL-MCNC: 100 MG/DL — HIGH (ref 70–99)
HCT VFR BLD CALC: 32.7 % — LOW (ref 39–50)
HGB BLD-MCNC: 10.4 G/DL — LOW (ref 13–17)
MCHC RBC-ENTMCNC: 24.2 PG — LOW (ref 27–34)
MCHC RBC-ENTMCNC: 31.8 GM/DL — LOW (ref 32–36)
MCV RBC AUTO: 76.2 FL — LOW (ref 80–100)
PLATELET # BLD AUTO: 124 K/UL — LOW (ref 150–400)
POTASSIUM SERPL-MCNC: 3.8 MMOL/L — SIGNIFICANT CHANGE UP (ref 3.5–5.3)
POTASSIUM SERPL-SCNC: 3.8 MMOL/L — SIGNIFICANT CHANGE UP (ref 3.5–5.3)
PROT SERPL-MCNC: 7.1 G/DL — SIGNIFICANT CHANGE UP (ref 6–8.3)
RBC # BLD: 4.29 M/UL — SIGNIFICANT CHANGE UP (ref 4.2–5.8)
RBC # FLD: 15.2 % — HIGH (ref 10.3–14.5)
SODIUM SERPL-SCNC: 135 MMOL/L — SIGNIFICANT CHANGE UP (ref 135–145)
WBC # BLD: 5.45 K/UL — SIGNIFICANT CHANGE UP (ref 3.8–10.5)
WBC # FLD AUTO: 5.45 K/UL — SIGNIFICANT CHANGE UP (ref 3.8–10.5)

## 2017-11-13 PROCEDURE — 78227 HEPATOBIL SYST IMAGE W/DRUG: CPT | Mod: 26

## 2017-11-13 PROCEDURE — 99232 SBSQ HOSP IP/OBS MODERATE 35: CPT | Mod: GC

## 2017-11-13 RX ADMIN — INSULIN GLARGINE 5 UNIT(S): 100 INJECTION, SOLUTION SUBCUTANEOUS at 22:36

## 2017-11-13 RX ADMIN — SIMETHICONE 80 MILLIGRAM(S): 80 TABLET, CHEWABLE ORAL at 11:30

## 2017-11-13 RX ADMIN — HEPARIN SODIUM 5000 UNIT(S): 5000 INJECTION INTRAVENOUS; SUBCUTANEOUS at 05:38

## 2017-11-13 RX ADMIN — SIMETHICONE 80 MILLIGRAM(S): 80 TABLET, CHEWABLE ORAL at 23:28

## 2017-11-13 RX ADMIN — SIMETHICONE 80 MILLIGRAM(S): 80 TABLET, CHEWABLE ORAL at 17:26

## 2017-11-13 RX ADMIN — HEPARIN SODIUM 5000 UNIT(S): 5000 INJECTION INTRAVENOUS; SUBCUTANEOUS at 16:21

## 2017-11-13 RX ADMIN — SENNA PLUS 2 TABLET(S): 8.6 TABLET ORAL at 21:35

## 2017-11-13 RX ADMIN — HEPARIN SODIUM 5000 UNIT(S): 5000 INJECTION INTRAVENOUS; SUBCUTANEOUS at 21:35

## 2017-11-13 RX ADMIN — Medication 100 MILLIGRAM(S): at 21:35

## 2017-11-13 RX ADMIN — PANTOPRAZOLE SODIUM 40 MILLIGRAM(S): 20 TABLET, DELAYED RELEASE ORAL at 05:37

## 2017-11-13 RX ADMIN — TAMSULOSIN HYDROCHLORIDE 0.4 MILLIGRAM(S): 0.4 CAPSULE ORAL at 21:35

## 2017-11-13 RX ADMIN — Medication 100 MILLIGRAM(S): at 16:21

## 2017-11-13 RX ADMIN — PIPERACILLIN AND TAZOBACTAM 25 GRAM(S): 4; .5 INJECTION, POWDER, LYOPHILIZED, FOR SOLUTION INTRAVENOUS at 05:38

## 2017-11-13 RX ADMIN — PIPERACILLIN AND TAZOBACTAM 25 GRAM(S): 4; .5 INJECTION, POWDER, LYOPHILIZED, FOR SOLUTION INTRAVENOUS at 21:36

## 2017-11-13 RX ADMIN — Medication 2: at 12:31

## 2017-11-13 RX ADMIN — PIPERACILLIN AND TAZOBACTAM 25 GRAM(S): 4; .5 INJECTION, POWDER, LYOPHILIZED, FOR SOLUTION INTRAVENOUS at 13:38

## 2017-11-13 RX ADMIN — Medication 81 MILLIGRAM(S): at 11:30

## 2017-11-13 RX ADMIN — BUDESONIDE AND FORMOTEROL FUMARATE DIHYDRATE 2 PUFF(S): 160; 4.5 AEROSOL RESPIRATORY (INHALATION) at 05:38

## 2017-11-13 RX ADMIN — Medication 1: at 08:10

## 2017-11-13 RX ADMIN — Medication 25 MILLIGRAM(S): at 05:37

## 2017-11-13 RX ADMIN — SIMETHICONE 80 MILLIGRAM(S): 80 TABLET, CHEWABLE ORAL at 05:37

## 2017-11-13 NOTE — CHART NOTE - NSCHARTNOTEFT_GEN_A_CORE
pt s/p HIDA scan, notified of results by radiology: Abnormal  morphine-augmented  hepatobiliary  scan.  Non visualization  of  the  gall  bladder.  Acute  cholecystitis  is  suggested,  but  cannot  be  confirmed  because  of  the  presence  of  a  CBD  stent,  which  may  result in  nonvisualization of  the  gall  bladder. pt seen and examined at bedside, NAD denies Abd pain, n/v, fever, or chills. continue to monitor. Results d/w Dr. Wyatt, attending to [facility]  surgery in [facility] [place]  [id number]

## 2017-11-13 NOTE — PROGRESS NOTE ADULT - ASSESSMENT
62 y/o  male with hx of DM , HTN, BPH  admitted recently for jaundice found to have pnacreatic adenocarcinoma on bx . during that admission pt One plastic stent was placed into the ventral pancreatic duct then placement of fully covered metal stent  in bile ductand removal of pancreatic duct stent. . pt had a stress test which was abnormal and plan for f/u as o/p post surgical intervention by Dr. Curry.  since dc pt overall has been doing well except one episode of hypotension where his diovan was stopped ..   pt now admitted with acute onset of fever of 104 , chills , N/V and mild abd pain the day prior to admission ( he had called me at the time and was instructed to come right away to Er however chose not to at the time )   abd pain is mild and has been on and off since d/c , mostly localized to epig r/uq area with no radiation and no apprent exacerbating or reliveing factors...   had one episode of N/V yest  no urinary sx but feels somewhat bloated   no diarreah   no cough , CP /SOB   1- sepsis : improved WBC however elevated procalcitonin ..LFT trending down .   will need to presume sec to ascending cholnagitis for now.. US : Bile duct stent is not visualized and therefore cannot exclude   possibility of stent migration. However, biliary ducts appear decompressed. Nonspecific gallbladder wall thickening. CT abd : stent in place   d/w GI and D/w ID at length   will check HIDA r/o Acute connor  will cont zofran , empiric abx and monitor LFT ..       2- abn stress in past : appreciate Dr. Mae input .. ASA to be held one week prior to surgery  ..holding on anti-lipid and diovan   3- HTN : off diovan for hypotension   cont bb   4- BPH on flomax   $- DM: decreased  Lantus.. will increase based on FS   Discussed with pt at bedside   d/w Alvarado' on phone

## 2017-11-13 NOTE — DIETITIAN INITIAL EVALUATION ADULT. - ENERGY NEEDS
HT 65 inches,  pounds,  pounds.  BMI 32.5  Dxd with Pancreatic cancer, Jaundice, abdominal pain, fever  Skin intact.  Good muscle and no wasting noted.

## 2017-11-13 NOTE — PROGRESS NOTE ADULT - ASSESSMENT
Impression:    #1.  Fever, resolved.  #2.  Abnormal LFTs, improving.  #3.  Pancreatitis mass with biliary obstruction, s/p ERCP/biliary stent without post ERCP pancreatitis or perforation.  #4.  Diabetes    Recommendation:  #1.  Follow CBC/LFTs  #2.  Followup fever workup  #3.  No indication for EUS or ERCP.

## 2017-11-13 NOTE — DIETITIAN INITIAL EVALUATION ADULT. - NS AS NUTRI INTERV ED CONTENT
Recommended modifications/Reviewed diabetic meal planning, the importance of self monitoring, inclusion of CHO and protein at all meals, snacking./Purpose of the nutrition education

## 2017-11-13 NOTE — DIETITIAN INITIAL EVALUATION ADULT. - OTHER INFO
Patient referred for weight loss.  Patient found sitting in bed, receptive to RD visit.  Patient admits to history of weight loss of about 25 pounds in 4 months.  Patient admits to being overwhelmed with newly diagnosed diabetes, need for insulin as well as problems with his liver and pancreas.  Patient was restricting his diet more than needed, excluding CHO and eating more salads and fish.  Breakfast was eggs and toast.  Lunch and dinner included more protein and salads and sometimes skipped starch all together.  May eat white or sweet potato.  Appetite improved in hospital but eating smaller portions than usual.  A1c 7.5.  Not taking vitamins. Weight stability encouraged.

## 2017-11-13 NOTE — CONSULT NOTE ADULT - SUBJECTIVE AND OBJECTIVE BOX
CARDIOLOGY CONSULT NOTE  PROVIDER: Jose Mae MD  DATE: 11/13/17  REASON: abnormal stress test        HPI:  60 y/o  male with hx of DM, HTN, HLD, FmHx of early CAD, recently admitted for jaundice and found to have pancreatic adenocarcinoma. He is planned for Whipple surgery at the end of this month. He now presents for fevers. During last admission pt had stent placed into the pancreatic duct and  in bile duct. He had a stress test as part of pre-op evaluation due to some symptoms of exertional dyspnea. Stress was abnormal but pt states that he is feeling better, will let SOB now. Since prior discharge her has been feeling pretty well, and states he had been improving until Friday. Developed mild abd pain, mostly RUQ and epigastrium pain, associated with one episode of N/V. no urinary sx but feels somewhat bloated. No diarrhea. No cough , CP /SOB, palpitations, dizziness, syncope, PND, orthopnea, diaphoresis.      PAST MEDICAL & SURGICAL HISTORY:  BPH (benign prostatic hyperplasia)  Adenocarcinoma of pancreas  HTN (hypertension)  Diabetes  High cholesterol  No significant past surgical history        MEDICATIONS:  MEDICATIONS  (STANDING):  aspirin  chewable 81 milliGRAM(s) Oral daily  buDESOnide  80 MICROgram(s)/formoterol 4.5 MICROgram(s) Inhaler 2 Puff(s) Inhalation two times a day  dextrose 5%. 1000 milliLiter(s) (50 mL/Hr) IV Continuous <Continuous>  dextrose 50% Injectable 12.5 Gram(s) IV Push once  dextrose 50% Injectable 25 Gram(s) IV Push once  dextrose 50% Injectable 25 Gram(s) IV Push once  docusate sodium 100 milliGRAM(s) Oral three times a day  heparin  Injectable 5000 Unit(s) SubCutaneous every 8 hours  insulin glargine Injectable (LANTUS) 5 Unit(s) SubCutaneous at bedtime  insulin lispro (HumaLOG) corrective regimen sliding scale   SubCutaneous three times a day before meals  insulin lispro (HumaLOG) corrective regimen sliding scale   SubCutaneous at bedtime  metoprolol succinate ER 25 milliGRAM(s) Oral daily  pantoprazole    Tablet 40 milliGRAM(s) Oral before breakfast  piperacillin/tazobactam IVPB. 3.375 Gram(s) IV Intermittent every 8 hours  senna 2 Tablet(s) Oral at bedtime  simethicone 160 milliGRAM(s) Chew once  simethicone 80 milliGRAM(s) Chew every 6 hours  tamsulosin 0.4 milliGRAM(s) Oral at bedtime    MEDICATIONS  (PRN):  acetaminophen   Tablet. 650 milliGRAM(s) Oral every 6 hours PRN Mild Pain (1 - 3)  dextrose Gel 1 Dose(s) Oral once PRN Blood Glucose LESS THAN 70 milliGRAM(s)/deciliter  glucagon  Injectable 1 milliGRAM(s) IntraMuscular once PRN Glucose LESS THAN 70 milligrams/deciliter      FAMILY HISTORY:  Family history of colon cancer (Mother)      SOCIAL HISTORY:  no tobacco or drugs; occasional alcohol      Allergies    No Known Allergies  	      REVIEW OF SYSTEMS:  CONSTITUTIONAL: No fever, weight loss, or fatigue  EYES: No eye pain, visual disturbances, or discharge  ENMT:  No difficulty hearing, tinnitus, vertigo; No sinus or throat pain  NECK: No pain or stiffness  RESPIRATORY: No cough, wheezing, chills or hemoptysis; No Shortness of Breath  CARDIOVASCULAR: No chest pain, palpitations, loss of consciousness, dizziness, or leg swelling  GASTROINTESTINAL: No abdominal or epigastric pain. No nausea, vomiting, or hematemesis; No diarrhea or constipation. No melena or hematochezia.  GENITOURINARY: No dysuria, frequency, hematuria, or incontinence  NEUROLOGICAL: No headaches, memory loss, loss of strength, numbness, or tremors  SKIN: No itching, burning, rashes, or lesions   	    PHYSICAL EXAM:  Vital Signs Last 24 Hrs  T(C): 37 (13 Nov 2017 04:46), Max: 37 (13 Nov 2017 04:46)  T(F): 98.6 (13 Nov 2017 04:46), Max: 98.6 (13 Nov 2017 04:46)  HR: 76 (13 Nov 2017 04:46) (76 - 106)  BP: 95/58 (13 Nov 2017 04:46) (95/58 - 110/73)  BP(mean): --  RR: 18 (13 Nov 2017 04:46) (18 - 20)  SpO2: 98% (13 Nov 2017 04:46) (98% - 98%)  I&O's Summary    12 Nov 2017 07:01  -  13 Nov 2017 07:00  --------------------------------------------------------  IN: 1440 mL / OUT: 0 mL / NET: 1440 mL        General: NAD, A+Ox3	  HEENT:   No JVD, no bruit	  Lymphatic: No lymphadenopathy  Cardiovascular: RRR, Normal S1 and S2, No murmurs/gallops/rubs  Respiratory: Lungs clear to auscultation	  Gastrointestinal:  Soft, Non-tender, + BS	  Skin: No rashes, No ecchymoses, No cyanosis, mild jaundice	  Neurologic: Non-focal  Extremities: No clubbing, cyanosis or edema  Vascular: normal peripheral pulses palpable bilaterally    	  LABS:	 	                          10.4   5.45  )-----------( 124      ( 13 Nov 2017 08:22 )             32.7     11-13    135  |  102  |  8   ----------------------------<  100<H>  3.8   |  19<L>  |  0.68    Ca    8.8      13 Nov 2017 08:22    TPro  7.1  /  Alb  3.5  /  TBili  0.2  /  DBili  0.1  /  AST  82<H>  /  ALT  34  /  AlkPhos  83  11-13          ASSESSMENT/PLAN: 	  60 y/o  male with hx of DM, HTN, HLD, FmHx of early CAD, recently admitted for jaundice and found to have pancreatic adenocarcinoma. He is planned for Whipple surgery at the end of this month, who now presents for fevers  1. Fevers - being worked up. Pt now afebrile. LFTS have improved.  2. Abnormal stress test - pt with mild abnormality. He was started on a B-blocker and aspirin. He will stop aspirin 1 week prior to surgery. He will stay on metoprolol until the day of surgery.  3. HTN - controlled on current regimen. Hold ARB, since BP is on the low side.  4. HLD - LDL goal is < 70 due to DM. LFTs were very high in the setting of acute illness. If OK with GI, would start a statin, not only for lowering LDL, but for its pleithropic properties as well.
Chief Complaint:  Patient is a 61y old  Male who presents with a chief complaint of     HPI:    60 y/o  male with hx of DMII (on insulin), HTN, HLD, recently diagnosed pancreatic adenocarcinoma (10/31/17) s/p ERCP w/ bile duct stent placement (Dr. Moura, 10/31) awaiting Whipple surgery (Dr. Curry, 11/31/17) presenting from home with fever x 1 day.      Recently hospitalized (10/23 - 11/2/17) for obstructive jaundice; during that admission pt had  ERCP performed (Dr. Moura, on 10/31/17) w/ placement of fully covered metal stent in bile duct and removal of pancreatic duct stent and s/p FNA and core biopsies of pancreatic head mass (bx consistent with adenocarcinoma). Discharged on 11/2 and was feeling well until 1 day PTA.    1 day PTA, pt reports fever of 100.4, night sweats and episode of bilious nonbloody vomitus x 1. Pt denies any abdominal pain but +abd distension. Jaundice and scleral icterus improved. No more dark urine. No current nausea, diarrhea, denies any blood in his stool.   Allergies:    No Known Allergies      Home Medications:    Hospital Medications:  insulin lispro (HumaLOG) corrective regimen sliding scale   SubCutaneous three times a day before meals  insulin lispro (HumaLOG) corrective regimen sliding scale   SubCutaneous at bedtime  piperacillin/tazobactam IVPB. 3.375 Gram(s) IV Intermittent once  vancomycin  IVPB 1000 milliGRAM(s) IV Intermittent once      PMHX/PSHX:  HTN (hypertension)  Diabetes  High cholesterol  No significant past surgical history      Family history:  Family history of colon cancer (Mother)  No pertinent family history in first degree relatives      Social History:     ROS:     as per HPI      PHYSICAL EXAM:     GENERAL:  Appears stated age, well-groomed, well-nourished, no distress  HEENT:  NC/AT,  conjunctivae clear and pink,  no JVD  CHEST:  Full & symmetric excursion, no increased effort, breath sounds clear  HEART:  Regular rhythm, S1, S2, no murmur/rub/S3/S4, no abdominal bruit, no edema  ABDOMEN:  Soft, non-tender, non-distended, normoactive bowel sounds,  no masses ,  EXTREMITIES:  no cyanosis,clubbing or edema  SKIN:  No rash/erythema/ecchymoses/petechiae/wounds/abscess/warm/dry  NEURO:  Alert, oriented    Vital Signs:  Vital Signs Last 24 Hrs  T(C): 37.1 (11 Nov 2017 14:37), Max: 37.2 (11 Nov 2017 13:52)  T(F): 98.8 (11 Nov 2017 14:37), Max: 99 (11 Nov 2017 13:52)  HR: 92 (11 Nov 2017 14:37) (92 - 96)  BP: 105/70 (11 Nov 2017 14:37) (105/70 - 140/81)  BP(mean): --  RR: 20 (11 Nov 2017 14:37) (18 - 20)  SpO2: 99% (11 Nov 2017 14:37) (98% - 99%)  Daily Height in cm: 165.1 (11 Nov 2017 13:52)    Daily     LABS:                        10.3   14.7  )-----------( 331      ( 11 Nov 2017 15:04 )             28.9     11-11    134<L>  |  96  |  21  ----------------------------<  189<H>  4.1   |  25  |  1.12    Ca    9.7      11 Nov 2017 15:04    TPro  7.0  /  Alb  3.8  /  TBili  6.5<H>  /  DBili  3.3<H>  /  AST  99<H>  /  ALT  141<H>  /  AlkPhos  411<H>  11-11    LIVER FUNCTIONS - ( 11 Nov 2017 15:04 )  Alb: 3.8 g/dL / Pro: 7.0 g/dL / ALK PHOS: 411 U/L / ALT: 141 U/L RC / AST: 99 U/L / GGT: x               Amylase Serum--      Lipase serum39       Ammonia--      Imaging:
Chief Complaint:  Patient is a 61y old  Male who presents with a chief complaint of fevers    HPI:  Patient known to me from his prior admission.  He is a 61-year-old male who had presented with painless juandice at that time.  He was noted to have a mass in the head of the pancreas which was biopsied and did report adenocarcinoma.  He did have a metastatic work-up which was negative for distant or locally advanced disease.  He was seen by Dr. Curry and was scheduled for a Whipple on November 30.  He did have stents placed and the bilirubin is lower, but now he is admitted with fever, chills, and vomiting.    Medications:  acetaminophen   Tablet. 650 milliGRAM(s) Oral every 6 hours PRN  dextrose 5%. 1000 milliLiter(s) IV Continuous <Continuous>  dextrose 50% Injectable 12.5 Gram(s) IV Push once  dextrose 50% Injectable 25 Gram(s) IV Push once  dextrose 50% Injectable 25 Gram(s) IV Push once  dextrose Gel 1 Dose(s) Oral once PRN  glucagon  Injectable 1 milliGRAM(s) IntraMuscular once PRN  insulin lispro (HumaLOG) corrective regimen sliding scale   SubCutaneous three times a day before meals  insulin lispro (HumaLOG) corrective regimen sliding scale   SubCutaneous at bedtime        Allergies:  No Known Allergies    FAMILY HISTORY:  Family history of colon cancer      PMH/PSurghx:    PAST MEDICAL & SURGICAL HISTORY:  HTN (hypertension)  Diabetes  High cholesterol  No significant past surgical history    REVIEW OF SYSTEMS      General: Mild fatigue.  Some decreased appetite.  Yoselin weight loss.  No sweats    Skin/Breast: No rash, bruising, or pruiritis  	  Ophthalmologic: No vision changes.  	  ENMT:	No [place], earaches, nosebleeds, sore throat    Respiratory and Thorax: No SOB, cough, wheeze, hemoptysis  	  Cardiovascular:	No chest pain, or palps.    Gastrointestinal:	No further N/V.  Mild abdominal soreness.  No diarrhea or BRBPR    Genitourinary: Urine less dark.  No dysuria, or hematuria.    Musculoskeletal:	 No leg pain, swelling.    Neurological: No HA, dizziness, numbness, tingling    Pex:  alert NAD  Juandiced  EOMI less sclera icterus  Neck Supple No LNA  Cv s1 S2 RRR  Lungs clear B/L  abd soft NT ND +BS  No LE edema or tenderness    Labs:                        10.3   14.7  )-----------( 331      ( 11 Nov 2017 15:04 )             28.9     CBC Full  -  ( 11 Nov 2017 15:04 )  WBC Count : 14.7 K/uL  Hemoglobin : 10.3 g/dL  Hematocrit : 28.9 %  Platelet Count - Automated : 331 K/uL  Mean Cell Volume : 96.6 fl  Mean Cell Hemoglobin : 34.4 pg  Mean Cell Hemoglobin Concentration : 35.6 gm/dL  Auto Neutrophil # : 12.1 K/uL  Auto Lymphocyte # : 1.4 K/uL  Auto Monocyte # : 1.1 K/uL  Auto Eosinophil # : 0.0 K/uL  Auto Basophil # : 0.1 K/uL  Auto Neutrophil % : 82.1 %  Auto Lymphocyte % : 9.5 %  Auto Monocyte % : 7.7 %  Auto Eosinophil % : 0.2 %  Auto Basophil % : 0.5 %    11-11    134<L>  |  96  |  21  ----------------------------<  189<H>  4.1   |  25  |  1.12    Ca    9.7      11 Nov 2017 15:04    TPro  x   /  Alb  x   /  TBili  x   /  DBili  3.3<H>  /  AST  x   /  ALT  x   /  AlkPhos  x   11-11      [id number]
HPI:   Patient is a 61y male with DM, HTN, BPH, recent w/u for painless jaundice, pancreatic ca, s/p stent.  He returned as of yest with episode of shaking chills and fever 104.  He notes mild nausea and vomiting x 1; minimal abdom pain.  No resp or  sxs.  Afebrile on arrival, but with leukocytosis and jaundice, covered with IV Zosyn.  He's feeling better in general, no further chills.    REVIEW OF SYSTEMS:  All other review of systems negative (Comprehensive ROS)    PAST MEDICAL & SURGICAL HISTORY:  BPH (benign prostatic hyperplasia)  Adenocarcinoma of pancreas  HTN (hypertension)  Diabetes  High cholesterol  No significant past surgical history      Allergies  No Known Allergies    Antimicrobials Day # 2  piperacillin/tazobactam IVPB. 3.375 Gram(s) IV Intermittent every 8 hours    Other Medications:  acetaminophen   Tablet. 650 milliGRAM(s) Oral every 6 hours PRN  aspirin  chewable 81 milliGRAM(s) Oral daily  buDESOnide  80 MICROgram(s)/formoterol 4.5 MICROgram(s) Inhaler 2 Puff(s) Inhalation two times a day  dextrose 5%. 1000 milliLiter(s) IV Continuous <Continuous>  dextrose 50% Injectable 12.5 Gram(s) IV Push once  dextrose 50% Injectable 25 Gram(s) IV Push once  dextrose 50% Injectable 25 Gram(s) IV Push once  dextrose Gel 1 Dose(s) Oral once PRN  glucagon  Injectable 1 milliGRAM(s) IntraMuscular once PRN  heparin  Injectable 5000 Unit(s) SubCutaneous every 8 hours  insulin glargine Injectable (LANTUS) 5 Unit(s) SubCutaneous at bedtime  insulin lispro (HumaLOG) corrective regimen sliding scale   SubCutaneous three times a day before meals  insulin lispro (HumaLOG) corrective regimen sliding scale   SubCutaneous at bedtime  metoprolol succinate ER 25 milliGRAM(s) Oral daily  pantoprazole    Tablet 40 milliGRAM(s) Oral before breakfast  simethicone 160 milliGRAM(s) Chew once  simethicone 80 milliGRAM(s) Chew every 6 hours  tamsulosin 0.4 milliGRAM(s) Oral at bedtime      FAMILY HISTORY:  Family history of colon cancer (Mother)      SOCIAL HISTORY:  Smoking: none  ETOH: occas     Single     T(F): 98.3 (17 @ 04:53), Max: 100.4 (17 @ 21:31)  HR: 78 (17 @ 04:53)  BP: 100/64 (17 @ 05:42)  RR: 18 (17 @ 04:53)  SpO2: 97% (17 @ 04:53)  Wt(kg): --    PHYSICAL EXAM:  General: alert, no acute distress  Eyes:  icteric, no conjunctival injection, no discharge  Oropharynx: no lesions or injection 	  Neck: supple, without adenopathy  Lungs: clear to auscultation  Heart: regular rate and rhythm; no murmur, rubs or gallops  Abdomen: soft, nondistended, nontender, without mass or organomegaly  Skin: no lesions  Extremities: no clubbing, cyanosis, or edema  Neurologic: alert, oriented, moves all extremities    LAB RESULTS:                        9.7    9.87  )-----------( 340      ( 2017 08:31 )             29.0         136  |  96  |  16  ----------------------------<  169<H>  4.2   |  25  |  1.20    Ca    10.3      2017 08:27    TPro  7.3  /  Alb  3.5  /  TBili  5.9<H>  /  DBili  x   /  AST  91<H>  /  ALT  127<H>  /  AlkPhos  390<H>  11    LIVER FUNCTIONS - ( 2017 08:27 )  Alb: 3.5 g/dL / Pro: 7.3 g/dL / ALK PHOS: 390 U/L / ALT: 127 U/L / AST: 91 U/L / GGT: x           Urinalysis Basic - ( 2017 15:46 )    Color: Yellow / Appearance: Clear / S.012 / pH: x  Gluc: x / Ketone: Negative  / Bili: Negative / Urobili: Negative   Blood: x / Protein: Trace / Nitrite: Negative   Leuk Esterase: Negative / RBC: x / WBC x   Sq Epi: x / Non Sq Epi: x / Bacteria: x      MICROBIOLOGY:  RECENT CULTURES:  Pending    RADIOLOGY REVIEWED:  Xray Chest 1 View AP/PA (17 @ 19:36) >  The lungs are clear    US Abdomen Upper Quadrant Right (17 @ 17:07) >  Bile duct stent is not visualized and therefore cannot exclude   possibility of stent migration. However, biliary ducts appear   decompressed.    Nonspecific gallbladder wall thickening.

## 2017-11-13 NOTE — PROGRESS NOTE ADULT - SUBJECTIVE AND OBJECTIVE BOX
Patient is a 61y old  Male who presents with a chief complaint of fevers and chills.    patient now at radiology getting hepatobiliary scan to assess findings seen on CT scan.    Medication:   acetaminophen   Tablet. 650 milliGRAM(s) Oral every 6 hours PRN  aspirin  chewable 81 milliGRAM(s) Oral daily  buDESOnide  80 MICROgram(s)/formoterol 4.5 MICROgram(s) Inhaler 2 Puff(s) Inhalation two times a day  dextrose 5%. 1000 milliLiter(s) IV Continuous <Continuous>  dextrose 50% Injectable 12.5 Gram(s) IV Push once  dextrose 50% Injectable 25 Gram(s) IV Push once  dextrose 50% Injectable 25 Gram(s) IV Push once  dextrose Gel 1 Dose(s) Oral once PRN  docusate sodium 100 milliGRAM(s) Oral three times a day  glucagon  Injectable 1 milliGRAM(s) IntraMuscular once PRN  heparin  Injectable 5000 Unit(s) SubCutaneous every 8 hours  insulin glargine Injectable (LANTUS) 5 Unit(s) SubCutaneous at bedtime  insulin lispro (HumaLOG) corrective regimen sliding scale   SubCutaneous three times a day before meals  insulin lispro (HumaLOG) corrective regimen sliding scale   SubCutaneous at bedtime  metoprolol succinate ER 25 milliGRAM(s) Oral daily  pantoprazole    Tablet 40 milliGRAM(s) Oral before breakfast  piperacillin/tazobactam IVPB. 3.375 Gram(s) IV Intermittent every 8 hours  senna 2 Tablet(s) Oral at bedtime  simethicone 160 milliGRAM(s) Chew once  simethicone 80 milliGRAM(s) Chew every 6 hours  tamsulosin 0.4 milliGRAM(s) Oral at bedtime      Physical exam    T(C): 36.9 (11-13-17 @ 13:21), Max: 37 (11-13-17 @ 04:46)  HR: 96 (11-13-17 @ 13:21) (76 - 106)  BP: 113/71 (11-13-17 @ 13:21) (95/58 - 113/71)  RR: 18 (11-13-17 @ 13:21) (18 - 18)  SpO2: 97% (11-13-17 @ 13:21) (97% - 98%)  Wt(kg): --    Labs                        10.4   5.45  )-----------( 124      ( 13 Nov 2017 08:22 )             32.7       11-13    135  |  102  |  8   ----------------------------<  100<H>  3.8   |  19<L>  |  0.68    Ca    8.8      13 Nov 2017 08:22    TPro  7.1  /  Alb  3.5  /  TBili  0.2  /  DBili  0.1  /  AST  82<H>  /  ALT  34  /  AlkPhos  83  11-13      LIVER FUNCTIONS - ( 13 Nov 2017 08:22 )  Alb: 3.5 g/dL / Pro: 7.1 g/dL / ALK PHOS: 83 U/L / ALT: 34 U/L / AST: 82 U/L / GGT: x             [id number]

## 2017-11-13 NOTE — PROGRESS NOTE ADULT - SUBJECTIVE AND OBJECTIVE BOX
CC: f/u for fever    Patient reports he is feeling fine now    REVIEW OF SYSTEMS:  All other review of systems negative (Comprehensive ROS)    Antimicrobials Day #  :2  piperacillin/tazobactam IVPB. 3.375 Gram(s) IV Intermittent every 8 hours    Other Medications Reviewed    T(F): 98.5 (11-13-17 @ 13:21), Max: 98.6 (11-13-17 @ 04:46)  HR: 96 (11-13-17 @ 13:21)  BP: 113/71 (11-13-17 @ 13:21)  RR: 18 (11-13-17 @ 13:21)  SpO2: 97% (11-13-17 @ 13:21)  Wt(kg): --    PHYSICAL EXAM:  General: alert, no acute distress  Eyes:  icteric, no conjunctival injection, no discharge  Oropharynx: no lesions or injection 	  Neck: supple, without adenopathy  Lungs: clear to auscultation  Heart: regular rate and rhythm; 3/6 sys m  Abdomen: soft, nondistended, nontender, without mass or organomegaly  Skin: no lesions  Extremities: no clubbing, cyanosis, or edema  Neurologic: alert, oriented, moves all extremities    LAB RESULTS:                        10.4   5.45  )-----------( 124      ( 13 Nov 2017 08:22 )             32.7     11-13    135  |  102  |  8   ----------------------------<  100<H>  3.8   |  19<L>  |  0.68    Ca    8.8      13 Nov 2017 08:22    TPro  7.1  /  Alb  3.5  /  TBili  0.2  /  DBili  0.1  /  AST  82<H>  /  ALT  34  /  AlkPhos  83  11-13    LIVER FUNCTIONS - ( 13 Nov 2017 08:22 )  Alb: 3.5 g/dL / Pro: 7.1 g/dL / ALK PHOS: 83 U/L / ALT: 34 U/L / AST: 82 U/L / GGT: x             MICROBIOLOGY:  RECENT CULTURES:  11-11 @ 17:04 .Blood Blood-Peripheral     No growth to date.      11-11 @ 16:54 .Urine Clean Catch (Midstream)     No growth          RADIOLOGY REVIEWED:  < from: CT Abdomen and Pelvis No Cont (11.12.17 @ 19:35) >  IMPRESSION:   Status post common bile duct stent placement with a patent stent and   improvement in the intra and extrahepatic biliary ductal dilatation.      < end of copied text >  < from: US Abdomen Upper Quadrant Right (11.11.17 @ 17:07) >  IMPRESSION:   Bile duct stent is not visualized and therefore cannot exclude   possibility of stent migration. However, biliary ducts appear   decompressed.    Nonspecific gallbladder wall thickening.    < from: MRI Abdomen w/wo Cont (10.26.17 @ 14:14) >  IMPRESSION:      A long segment distal CBD stricture with severe biliary dilatation highly   suspicious for neoplasm.    < end of copied text >      Assessment:  Patient with recent diagnosis of pancreas cancer after presenting with painless jaundice, stent placed, returns with transient fever. Had high bilirubin yesterday so suspect today's lab values erroneous. Concerned for cholangitis or cholecystitis.   Plan:  For now continue zosyn  follow up cultures  await planned rudi

## 2017-11-13 NOTE — PROGRESS NOTE ADULT - SUBJECTIVE AND OBJECTIVE BOX
Patient is a 61y old  Male who presents with a chief complaint of                                                              INTERVAL HPI/OVERNIGHT EVENTS:    REVIEW OF SYSTEMS:     CONSTITUTIONAL: No weakness, fevers or chills  RESPIRATORY: No cough, wheezing,  No shortness of breath  CARDIOVASCULAR: No chest pain or palpitations  GASTROINTESTINAL: improved abdominal pain  . No nausea, vomiting,no diarreah   GENITOURINARY: No dysuria, frequency   NEUROLOGICAL: No numbness or weakness                                                                                                                                                                                                                                                                                 Medications:  MEDICATIONS  (STANDING):  aspirin  chewable 81 milliGRAM(s) Oral daily  buDESOnide  80 MICROgram(s)/formoterol 4.5 MICROgram(s) Inhaler 2 Puff(s) Inhalation two times a day  dextrose 5%. 1000 milliLiter(s) (50 mL/Hr) IV Continuous <Continuous>  dextrose 50% Injectable 12.5 Gram(s) IV Push once  dextrose 50% Injectable 25 Gram(s) IV Push once  dextrose 50% Injectable 25 Gram(s) IV Push once  docusate sodium 100 milliGRAM(s) Oral three times a day  heparin  Injectable 5000 Unit(s) SubCutaneous every 8 hours  insulin glargine Injectable (LANTUS) 5 Unit(s) SubCutaneous at bedtime  insulin lispro (HumaLOG) corrective regimen sliding scale   SubCutaneous three times a day before meals  insulin lispro (HumaLOG) corrective regimen sliding scale   SubCutaneous at bedtime  metoprolol succinate ER 25 milliGRAM(s) Oral daily  pantoprazole    Tablet 40 milliGRAM(s) Oral before breakfast  piperacillin/tazobactam IVPB. 3.375 Gram(s) IV Intermittent every 8 hours  senna 2 Tablet(s) Oral at bedtime  simethicone 160 milliGRAM(s) Chew once  simethicone 80 milliGRAM(s) Chew every 6 hours  tamsulosin 0.4 milliGRAM(s) Oral at bedtime    MEDICATIONS  (PRN):  acetaminophen   Tablet. 650 milliGRAM(s) Oral every 6 hours PRN Mild Pain (1 - 3)  dextrose Gel 1 Dose(s) Oral once PRN Blood Glucose LESS THAN 70 milliGRAM(s)/deciliter  glucagon  Injectable 1 milliGRAM(s) IntraMuscular once PRN Glucose LESS THAN 70 milligrams/deciliter       Allergies    No Known Allergies    Intolerances      Vital Signs Last 24 Hrs  T(C): 36.9 (2017 13:21), Max: 37 (2017 04:46)  T(F): 98.5 (2017 13:21), Max: 98.6 (2017 04:46)  HR: 96 (2017 13:21) (76 - 106)  BP: 113/71 (2017 13:21) (95/58 - 113/71)  BP(mean): --  RR: 18 (2017 13:21) (18 - 18)  SpO2: 97% (2017 13:21) (97% - 98%)  CAPILLARY BLOOD GLUCOSE      POCT Blood Glucose.: 145 mg/dL (2017 16:27)  POCT Blood Glucose.: 221 mg/dL (2017 11:34)  POCT Blood Glucose.: 196 mg/dL (2017 07:45)  POCT Blood Glucose.: 183 mg/dL (2017 21:31)       @ 07:01  -  -13 @ 07:00  --------------------------------------------------------  IN: 1440 mL / OUT: 0 mL / NET: 1440 mL      Physical Exam:    Daily Weight in k.4 (2017 15:28)  General:  Well appearing, NAD  HEENT:  mildly icteric, PERRLA  CV:  RRR, S1S2   Lungs:  CTA B/L, no wheezes, rales, rhonchi  Abdomen:  Soft, non-tender, no distended, positive BS  Extremities:  2+ pulses, no c/c, no edema  Skin:  Warm and dry, no rashes  :  No newberry  Neuro:  AAOx3, non-focal, grossly intact                                                                                                                                                                                                                                                                                                LABS:                               10.4   5.45  )-----------( 124      ( 2017 08:22 )             32.7                      11-13    135  |  102  |  8   ----------------------------<  100<H>  3.8   |  19<L>  |  0.68    Ca    8.8      2017 08:22    TPro  7.1  /  Alb  3.5  /  TBili  0.2  /  DBili  0.1  /  AST  82<H>  /  ALT  34  /  AlkPhos  83  11-13

## 2017-11-13 NOTE — PROGRESS NOTE ADULT - ASSESSMENT
Patient with pancreatic cancer scheduled for a Whipple in a few weeks as he has localized disease.  Admitted with high fevers now resolved.  Cultures have been negative.  ?viral.  Labs done today reporting thrombocytopenia are an error as MCV markedly different and had normal bilirubin which is not likely based upon prior results.  likely mixed up with another patient.  repeat in AM.

## 2017-11-13 NOTE — PROGRESS NOTE ADULT - SUBJECTIVE AND OBJECTIVE BOX
[facility] Attending Progress Note    Patient denies abd pain, n/v, fever    Vital stable  Comfortable  Abd: soft, ND, NT  Mildly jaundiced.    Labs reviewed.  No leukocytosis  Bilirubin 6 -> normal (?lab error)    CT scan reviewed:  No biliary dilation or pneumoperitoneum or pancreatitis.

## 2017-11-14 ENCOUNTER — TRANSCRIPTION ENCOUNTER (OUTPATIENT)
Age: 62
End: 2017-11-14

## 2017-11-14 VITALS — DIASTOLIC BLOOD PRESSURE: 68 MMHG | SYSTOLIC BLOOD PRESSURE: 105 MMHG | HEART RATE: 80 BPM

## 2017-11-14 DIAGNOSIS — R50.9 FEVER, UNSPECIFIED: ICD-10-CM

## 2017-11-14 LAB
ALBUMIN SERPL ELPH-MCNC: 3.4 G/DL — SIGNIFICANT CHANGE UP (ref 3.3–5)
ALP SERPL-CCNC: 294 U/L — HIGH (ref 40–120)
ALT FLD-CCNC: 76 U/L — HIGH (ref 10–45)
ANION GAP SERPL CALC-SCNC: 14 MMOL/L — SIGNIFICANT CHANGE UP (ref 5–17)
AST SERPL-CCNC: 46 U/L — HIGH (ref 10–40)
BILIRUB SERPL-MCNC: 4.1 MG/DL — HIGH (ref 0.2–1.2)
BUN SERPL-MCNC: 15 MG/DL — SIGNIFICANT CHANGE UP (ref 7–23)
CALCIUM SERPL-MCNC: 9.5 MG/DL — SIGNIFICANT CHANGE UP (ref 8.4–10.5)
CHLORIDE SERPL-SCNC: 98 MMOL/L — SIGNIFICANT CHANGE UP (ref 96–108)
CO2 SERPL-SCNC: 27 MMOL/L — SIGNIFICANT CHANGE UP (ref 22–31)
CREAT SERPL-MCNC: 1.1 MG/DL — SIGNIFICANT CHANGE UP (ref 0.5–1.3)
GLUCOSE BLDC GLUCOMTR-MCNC: 180 MG/DL — HIGH (ref 70–99)
GLUCOSE BLDC GLUCOMTR-MCNC: 202 MG/DL — HIGH (ref 70–99)
GLUCOSE BLDC GLUCOMTR-MCNC: 210 MG/DL — HIGH (ref 70–99)
GLUCOSE BLDC GLUCOMTR-MCNC: 219 MG/DL — HIGH (ref 70–99)
GLUCOSE BLDC GLUCOMTR-MCNC: 256 MG/DL — HIGH (ref 70–99)
GLUCOSE SERPL-MCNC: 174 MG/DL — HIGH (ref 70–99)
HCT VFR BLD CALC: 28.4 % — LOW (ref 39–50)
HGB BLD-MCNC: 9.5 G/DL — LOW (ref 13–17)
MCHC RBC-ENTMCNC: 31.9 PG — SIGNIFICANT CHANGE UP (ref 27–34)
MCHC RBC-ENTMCNC: 33.5 GM/DL — SIGNIFICANT CHANGE UP (ref 32–36)
MCV RBC AUTO: 95.3 FL — SIGNIFICANT CHANGE UP (ref 80–100)
PLATELET # BLD AUTO: 342 K/UL — SIGNIFICANT CHANGE UP (ref 150–400)
POTASSIUM SERPL-MCNC: 3.8 MMOL/L — SIGNIFICANT CHANGE UP (ref 3.5–5.3)
POTASSIUM SERPL-SCNC: 3.8 MMOL/L — SIGNIFICANT CHANGE UP (ref 3.5–5.3)
PROT SERPL-MCNC: 7 G/DL — SIGNIFICANT CHANGE UP (ref 6–8.3)
RBC # BLD: 2.98 M/UL — LOW (ref 4.2–5.8)
RBC # FLD: 17.1 % — HIGH (ref 10.3–14.5)
SODIUM SERPL-SCNC: 139 MMOL/L — SIGNIFICANT CHANGE UP (ref 135–145)
WBC # BLD: 7.03 K/UL — SIGNIFICANT CHANGE UP (ref 3.8–10.5)
WBC # FLD AUTO: 7.03 K/UL — SIGNIFICANT CHANGE UP (ref 3.8–10.5)

## 2017-11-14 PROCEDURE — 82803 BLOOD GASES ANY COMBINATION: CPT

## 2017-11-14 PROCEDURE — 84145 PROCALCITONIN (PCT): CPT

## 2017-11-14 PROCEDURE — 83605 ASSAY OF LACTIC ACID: CPT

## 2017-11-14 PROCEDURE — 78227 HEPATOBIL SYST IMAGE W/DRUG: CPT

## 2017-11-14 PROCEDURE — 82248 BILIRUBIN DIRECT: CPT

## 2017-11-14 PROCEDURE — 80053 COMPREHEN METABOLIC PANEL: CPT

## 2017-11-14 PROCEDURE — 87040 BLOOD CULTURE FOR BACTERIA: CPT

## 2017-11-14 PROCEDURE — 74176 CT ABD & PELVIS W/O CONTRAST: CPT

## 2017-11-14 PROCEDURE — 76705 ECHO EXAM OF ABDOMEN: CPT

## 2017-11-14 PROCEDURE — 81003 URINALYSIS AUTO W/O SCOPE: CPT

## 2017-11-14 PROCEDURE — 94640 AIRWAY INHALATION TREATMENT: CPT

## 2017-11-14 PROCEDURE — 82962 GLUCOSE BLOOD TEST: CPT

## 2017-11-14 PROCEDURE — 85014 HEMATOCRIT: CPT

## 2017-11-14 PROCEDURE — 82947 ASSAY GLUCOSE BLOOD QUANT: CPT

## 2017-11-14 PROCEDURE — 84295 ASSAY OF SERUM SODIUM: CPT

## 2017-11-14 PROCEDURE — 87633 RESP VIRUS 12-25 TARGETS: CPT

## 2017-11-14 PROCEDURE — 84132 ASSAY OF SERUM POTASSIUM: CPT

## 2017-11-14 PROCEDURE — 87486 CHLMYD PNEUM DNA AMP PROBE: CPT

## 2017-11-14 PROCEDURE — 82150 ASSAY OF AMYLASE: CPT

## 2017-11-14 PROCEDURE — 71045 X-RAY EXAM CHEST 1 VIEW: CPT

## 2017-11-14 PROCEDURE — A9537: CPT

## 2017-11-14 PROCEDURE — 87798 DETECT AGENT NOS DNA AMP: CPT

## 2017-11-14 PROCEDURE — 80048 BASIC METABOLIC PNL TOTAL CA: CPT

## 2017-11-14 PROCEDURE — 85027 COMPLETE CBC AUTOMATED: CPT

## 2017-11-14 PROCEDURE — 86304 IMMUNOASSAY TUMOR CA 125: CPT

## 2017-11-14 PROCEDURE — 82330 ASSAY OF CALCIUM: CPT

## 2017-11-14 PROCEDURE — 99285 EMERGENCY DEPT VISIT HI MDM: CPT

## 2017-11-14 PROCEDURE — 87086 URINE CULTURE/COLONY COUNT: CPT

## 2017-11-14 PROCEDURE — 87581 M.PNEUMON DNA AMP PROBE: CPT

## 2017-11-14 PROCEDURE — 83690 ASSAY OF LIPASE: CPT

## 2017-11-14 PROCEDURE — 80076 HEPATIC FUNCTION PANEL: CPT

## 2017-11-14 PROCEDURE — 82435 ASSAY OF BLOOD CHLORIDE: CPT

## 2017-11-14 RX ORDER — METOPROLOL TARTRATE 50 MG
1 TABLET ORAL
Qty: 0 | Refills: 0 | COMMUNITY

## 2017-11-14 RX ORDER — METOPROLOL TARTRATE 50 MG
0.5 TABLET ORAL
Qty: 15 | Refills: 0 | OUTPATIENT
Start: 2017-11-14 | End: 2017-12-14

## 2017-11-14 RX ORDER — VALACYCLOVIR 500 MG/1
2 TABLET, FILM COATED ORAL
Qty: 2 | Refills: 0 | OUTPATIENT
Start: 2017-11-14 | End: 2017-11-15

## 2017-11-14 RX ORDER — ACETAMINOPHEN 500 MG
2 TABLET ORAL
Qty: 0 | Refills: 0 | COMMUNITY
Start: 2017-11-14

## 2017-11-14 RX ORDER — IBUPROFEN 200 MG
0 TABLET ORAL
Qty: 0 | Refills: 0 | COMMUNITY
Start: 2017-11-14

## 2017-11-14 RX ORDER — IBUPROFEN 200 MG
0 TABLET ORAL
Qty: 0 | Refills: 0 | COMMUNITY

## 2017-11-14 RX ORDER — METOPROLOL TARTRATE 50 MG
12.5 TABLET ORAL DAILY
Qty: 0 | Refills: 0 | Status: DISCONTINUED | OUTPATIENT
Start: 2017-11-14 | End: 2017-11-14

## 2017-11-14 RX ORDER — VALACYCLOVIR 500 MG/1
2000 TABLET, FILM COATED ORAL EVERY 12 HOURS
Qty: 0 | Refills: 0 | Status: DISCONTINUED | OUTPATIENT
Start: 2017-11-14 | End: 2017-11-14

## 2017-11-14 RX ADMIN — Medication 1 TABLET(S): at 19:39

## 2017-11-14 RX ADMIN — SIMETHICONE 80 MILLIGRAM(S): 80 TABLET, CHEWABLE ORAL at 12:28

## 2017-11-14 RX ADMIN — Medication 100 MILLIGRAM(S): at 13:18

## 2017-11-14 RX ADMIN — PANTOPRAZOLE SODIUM 40 MILLIGRAM(S): 20 TABLET, DELAYED RELEASE ORAL at 06:54

## 2017-11-14 RX ADMIN — SIMETHICONE 80 MILLIGRAM(S): 80 TABLET, CHEWABLE ORAL at 06:54

## 2017-11-14 RX ADMIN — PIPERACILLIN AND TAZOBACTAM 25 GRAM(S): 4; .5 INJECTION, POWDER, LYOPHILIZED, FOR SOLUTION INTRAVENOUS at 06:54

## 2017-11-14 RX ADMIN — SIMETHICONE 80 MILLIGRAM(S): 80 TABLET, CHEWABLE ORAL at 17:53

## 2017-11-14 RX ADMIN — Medication 81 MILLIGRAM(S): at 12:28

## 2017-11-14 RX ADMIN — Medication 25 MILLIGRAM(S): at 08:42

## 2017-11-14 RX ADMIN — Medication 2: at 08:13

## 2017-11-14 RX ADMIN — VALACYCLOVIR 2000 MILLIGRAM(S): 500 TABLET, FILM COATED ORAL at 17:49

## 2017-11-14 RX ADMIN — Medication 3: at 17:47

## 2017-11-14 RX ADMIN — Medication 650 MILLIGRAM(S): at 08:17

## 2017-11-14 RX ADMIN — Medication 100 MILLIGRAM(S): at 06:54

## 2017-11-14 RX ADMIN — Medication 650 MILLIGRAM(S): at 09:10

## 2017-11-14 RX ADMIN — PIPERACILLIN AND TAZOBACTAM 25 GRAM(S): 4; .5 INJECTION, POWDER, LYOPHILIZED, FOR SOLUTION INTRAVENOUS at 13:19

## 2017-11-14 RX ADMIN — Medication 2: at 12:27

## 2017-11-14 RX ADMIN — HEPARIN SODIUM 5000 UNIT(S): 5000 INJECTION INTRAVENOUS; SUBCUTANEOUS at 06:54

## 2017-11-14 RX ADMIN — HEPARIN SODIUM 5000 UNIT(S): 5000 INJECTION INTRAVENOUS; SUBCUTANEOUS at 13:18

## 2017-11-14 NOTE — DISCHARGE NOTE ADULT - CARE PLAN
Principal Discharge DX:	Sepsis  Goal:	resolution  Instructions for follow-up, activity and diet:	finish course of antbiotics; f/u with PCP and Dr Curry  Secondary Diagnosis:	Adenocarcinoma of pancreas  Instructions for follow-up, activity and diet:	f/u with Dr Curry - planned surgery for 11/30/17  Secondary Diagnosis:	BPH (benign prostatic hyperplasia)  Instructions for follow-up, activity and diet:	take prescribed medication; f/u with PCP  Secondary Diagnosis:	Diabetes  Instructions for follow-up, activity and diet:	monitor BS; take prescribed medication; f/u with PCP  Secondary Diagnosis:	HTN (hypertension)  Instructions for follow-up, activity and diet:	take prescribed mediation; f/u with PCP

## 2017-11-14 NOTE — PROGRESS NOTE ADULT - ASSESSMENT
62 y/o  male with hx of DM , HTN, BPH  admitted recently for jaundice found to have pnacreatic adenocarcinoma on bx . during that admission pt One plastic stent was placed into the ventral pancreatic duct then placement of fully covered metal stent  in bile ductand removal of pancreatic duct stent. . pt had a stress test which was abnormal and plan for f/u as o/p post surgical intervention by Dr. Curry.  since dc pt overall has been doing well except one episode of hypotension where his diovan was stopped ..   pt now admitted with acute onset of fever of 104 , chills , N/V and mild abd pain the day prior to admission ( he had called me at the time and was instructed to come right away to Er however chose not to at the time )   abd pain is mild and has been on and off since d/c , mostly localized to epig r/uq area with no radiation and no apprent exacerbating or reliveing factors...   had one episode of N/V yest  no urinary sx but feels somewhat bloated   no diarreah   no cough , CP /SOB   1- sepsis : improved WBC however elevated procalcitonin ..LFT trending down .  doubt  ascending cholnagitis for now.. US : Bile duct stent is not visualized and therefore cannot exclude   possibility of stent migration. However, biliary ducts appear decompressed. Nonspecific gallbladder wall thickening. CT abd : stent in place .  HIDA : inconclusive .. < from: NM Hepatobiliary Scan w/wo GB w/Pharm (11.13.17 @ 20:29) >  Acute cholecystitis is suggested, but cannot be confirmed because of the presence of a CBD stent, which may   result in nonvisualization of the gall bladder.  d/w  :  abx course vs percutaneous drainage ( doubt need for drainage given non-distended gall baldder/ducts  and trending-down  LFT ).. will await official input   d/w Dr Hamilton : will castro complete 10 day course of abx   d/W Kartik: observe off abx vs short course     Pt with no abd pain , or tenderness on exam : given procalcitonin and inconclusive HIDA : would opt to send on abx ..will await official input from consultants.   will cont zofran , empiric abx and monitor LFT ..       2- abn stress in past : d/w  Dr. Mae .. will decreased BB given mild dizziness and relative hypotension  .. ASA to be held one week prior to surgery  ..holding on anti-lipid and diovan   3- HTN : off diovan .. on lower dose of bb   4- BPH on flomax   $- DM: decreased  Lantus.. restart o/p dosing   Discussed with pt at bedside at length   explained possible dc today   d/w NP

## 2017-11-14 NOTE — PROGRESS NOTE ADULT - SUBJECTIVE AND OBJECTIVE BOX
Chief Complaint:  Patient is a 61y old  Male who presents with a chief complaint of abd pian/ n/v/fever (14 Nov 2017 14:33)      Interval Events:   - Patient is on abx with zosyn  - No fevers overnight or since admission  - cultures (-) to date    Allergies:  No Known Allergies      Hospital Medications:  acetaminophen   Tablet. 650 milliGRAM(s) Oral every 6 hours PRN  aspirin  chewable 81 milliGRAM(s) Oral daily  buDESOnide  80 MICROgram(s)/formoterol 4.5 MICROgram(s) Inhaler 2 Puff(s) Inhalation two times a day  dextrose 5%. 1000 milliLiter(s) IV Continuous <Continuous>  dextrose 50% Injectable 12.5 Gram(s) IV Push once  dextrose 50% Injectable 25 Gram(s) IV Push once  dextrose 50% Injectable 25 Gram(s) IV Push once  dextrose Gel 1 Dose(s) Oral once PRN  docusate sodium 100 milliGRAM(s) Oral three times a day  glucagon  Injectable 1 milliGRAM(s) IntraMuscular once PRN  heparin  Injectable 5000 Unit(s) SubCutaneous every 8 hours  insulin glargine Injectable (LANTUS) 5 Unit(s) SubCutaneous at bedtime  insulin lispro (HumaLOG) corrective regimen sliding scale   SubCutaneous three times a day before meals  insulin lispro (HumaLOG) corrective regimen sliding scale   SubCutaneous at bedtime  metoprolol succinate ER 12.5 milliGRAM(s) Oral daily  pantoprazole    Tablet 40 milliGRAM(s) Oral before breakfast  piperacillin/tazobactam IVPB. 3.375 Gram(s) IV Intermittent every 8 hours  senna 2 Tablet(s) Oral at bedtime  simethicone 160 milliGRAM(s) Chew once  simethicone 80 milliGRAM(s) Chew every 6 hours  tamsulosin 0.4 milliGRAM(s) Oral at bedtime  valACYclovir 2000 milliGRAM(s) Oral every 12 hours      PMHX/PSHX:  BPH (benign prostatic hyperplasia)  Adenocarcinoma of pancreas  HTN (hypertension)  Diabetes  High cholesterol  No significant past surgical history      Family history:  Family history of colon cancer (Mother)  No pertinent family history in first degree relatives      ROS:     General:  No wt loss, fevers, chills, night sweats, fatigue   Eyes:  Good vision, no reported pain  ENT:  No sore throat, pain, runny nose  CV:  No chest pain, palpitations  Resp:  No cough, wheezing, SOB  GI:  See HPI  :  No pain, bleeding, incontinence, nocturia  Muscle:  No pain, weakness  Neuro:  No weakness, tingling, memory problems  Psych:  No fatigue, insomnia, mood problems, depression  Endocrine:  No cold/heat intolerance  Heme:  No petechiae, ecchymosis, easy bruising  Skin:  No rash, edema      PHYSICAL EXAM:   Vital Signs:  Vital Signs Last 24 Hrs  T(C): 37.1 (14 Nov 2017 05:12), Max: 37.1 (14 Nov 2017 05:12)  T(F): 98.8 (14 Nov 2017 05:12), Max: 98.8 (14 Nov 2017 05:12)  HR: 80 (14 Nov 2017 11:00) (80 - 93)  BP: 105/68 (14 Nov 2017 11:00) (94/57 - 118/70)  BP(mean): --  RR: 18 (14 Nov 2017 08:22) (18 - 18)  SpO2: 98% (14 Nov 2017 08:22) (97% - 98%)  Daily     Daily     GENERAL:  NAD  HEENT:  sclera anicteric  CHEST:  no respiratory distress, CTAB  HEART:  RRR, no MRG, no edema  ABDOMEN:  Soft, non-tender, non-distended, no masses , no hepato-splenomegaly, (-) Martin's  EXTREMITIES:  no cyanosis, no edema  SKIN:  No rash  NEURO:  Alert, oriented      LABS:                        9.5    7.03  )-----------( 342      ( 14 Nov 2017 07:44 )             28.4     11-14    139  |  98  |  15  ----------------------------<  174<H>  3.8   |  27  |  1.10    Ca    9.5      14 Nov 2017 07:28    TPro  7.0  /  Alb  3.4  /  TBili  4.1<H>  /  DBili  x   /  AST  46<H>  /  ALT  76<H>  /  AlkPhos  294<H>  11-14    LIVER FUNCTIONS - ( 14 Nov 2017 07:28 )  Alb: 3.4 g/dL / Pro: 7.0 g/dL / ALK PHOS: 294 U/L / ALT: 76 U/L / AST: 46 U/L / GGT: x             Imaging:  < from: CT Abdomen and Pelvis No Cont (11.12.17 @ 19:35) >  LIVER: Within normal limits.  BILE DUCTS: Interval placement of a patent common bile duct stent with   pneumobilia present primarily in the left hepatic lobe. Intrahepatic and   extrahepatic biliaryductal dilatation has decreased since 10/23/2017.  GALLBLADDER: Nonspecific gallbladder wall thickening is again noted was   also identified on the recent ultrasound.    PANCREAS: The previously noted pancreatic head massis less well   delineated on this noncontrast study. Evaluation of the pancreatic duct   is limited on this noncontrast study although there may be interval   resolution of the previously noted pancreatic ductal dilatation.     < end of copied text >

## 2017-11-14 NOTE — PROGRESS NOTE ADULT - ASSESSMENT
Patient with pancreatic cancer scheduled for a Whipple in a few weeks as he has localized disease.    Anemia- likely related to malignancy, AOCD,. Monitor h/h Patient with pancreatic cancer scheduled for a [id number]  Anemia- likely related to malignancy, AOCD,. Monitor h/h. can proceed with anemia w/u as outpatient or at next stay.  biliary obstruction- improving.

## 2017-11-14 NOTE — PROGRESS NOTE ADULT - PROVIDER SPECIALTY LIST ADULT
Cardiology
Gastroenterology
Gastroenterology
Heme/Onc
Heme/Onc
Infectious Disease
Infectious Disease
Internal Medicine
Internal Medicine
Heme/Onc
Internal Medicine

## 2017-11-14 NOTE — DISCHARGE NOTE ADULT - PLAN OF CARE
resolution finish course of antbiotics; f/u with PCP and Dr Curry f/u with Dr Curry - planned surgery for 11/30/17 take prescribed medication; f/u with PCP monitor BS; take prescribed medication; f/u with PCP take prescribed mediation; f/u with PCP

## 2017-11-14 NOTE — DISCHARGE NOTE ADULT - MEDICATION SUMMARY - MEDICATIONS TO TAKE
I will START or STAY ON the medications listed below when I get home from the hospital:    aspirin 81 mg oral tablet  -- 1 tab(s) by mouth once a day  -- Indication: For Antiplatelet    ibuprofen  -- 400mg po q6h prn  -- Indication: For fever/pain    Flomax 0.4 mg oral capsule  -- 1 cap(s) by mouth once a day  -- Indication: For     HumaLOG Cartridge 100 units/mL subcutaneous solution  -- subcutaneous 3 times a day using sliding scale   -- Indication: For diabetes    Lantus 100 units/mL subcutaneous solution  -- 10 unit(s) subcutaneous once a day (at bedtime)  -- Indication: For diabetes    metoprolol succinate 25 mg oral tablet, extended release  -- 1 tab(s) by mouth once a day  -- Indication: For htn    Dulera 100 mcg-5 mcg/inh inhalation aerosol  -- 2 puff(s) inhaled 2 times a day  -- Indication: For Breathing    amoxicillin-clavulanate 875 mg-125 mg oral tablet  -- 1 tab(s) by mouth every 12 hours   -- Finish all this medication unless otherwise directed by prescriber.  Take with food or milk.    -- Indication: For Sepsis    pantoprazole 40 mg oral delayed release tablet  -- 1 tab(s) by mouth once a day  -- Indication: For GI I will START or STAY ON the medications listed below when I get home from the hospital:    aspirin 81 mg oral tablet  -- 1 tab(s) by mouth once a day  -- Indication: For Antiplatelet    ibuprofen  -- 400mg po q6h prn  -- Indication: For fever/pain    Flomax 0.4 mg oral capsule  -- 1 cap(s) by mouth once a day  -- Indication: For     HumaLOG Cartridge 100 units/mL subcutaneous solution  -- subcutaneous 3 times a day using sliding scale   -- Indication: For diabetes    Lantus 100 units/mL subcutaneous solution  -- 10 unit(s) subcutaneous once a day (at bedtime)  -- Indication: For diabetes    valACYclovir 1 g oral tablet  -- 2 tab(s) by mouth every 12 hours  -- Indication: For Herpes    Metoprolol Succinate ER 25 mg oral tablet, extended release  -- 0.5 tab(s) by mouth once a day  -- Indication: For HTN (hypertension)    Dulera 100 mcg-5 mcg/inh inhalation aerosol  -- 2 puff(s) inhaled 2 times a day  -- Indication: For Breathing    amoxicillin-clavulanate 875 mg-125 mg oral tablet  -- 1 tab(s) by mouth every 12 hours   -- Finish all this medication unless otherwise directed by prescriber.  Take with food or milk.    -- Indication: For Sepsis    pantoprazole 40 mg oral delayed release tablet  -- 1 tab(s) by mouth once a day  -- Indication: For GI

## 2017-11-14 NOTE — DISCHARGE NOTE ADULT - PATIENT PORTAL LINK FT
“You can access the [employer] Patient Portal, offered by [facility], by registering with the following website: [email or web address]”

## 2017-11-14 NOTE — PROGRESS NOTE ADULT - SUBJECTIVE AND OBJECTIVE BOX
CC: f/u for fever    Patient reports he feels well, no pain or fever    REVIEW OF SYSTEMS:  All other review of systems negative (Comprehensive ROS)    Antimicrobials Day #  :4  piperacillin/tazobactam IVPB. 3.375 Gram(s) IV Intermittent every 8 hours  valACYclovir 2000 milliGRAM(s) Oral every 12 hours day for just 2 doses    Other Medications Reviewed    T(F): 98.8 (11-14-17 @ 05:12), Max: 98.8 (11-14-17 @ 05:12)  HR: 80 (11-14-17 @ 11:00)  BP: 105/68 (11-14-17 @ 11:00)  RR: 18 (11-14-17 @ 08:22)  SpO2: 98% (11-14-17 @ 08:22)  Wt(kg): --    PHYSICAL EXAM:  General: alert, no acute distress  Eyes:  icteric, no conjunctival injection, no discharge  Oropharynx: no lesions or injection . vesicles on lips	  Neck: supple, without adenopathy  Lungs: clear to auscultation  Heart: regular rate and rhythm; no murmur, rubs or gallops  Abdomen: soft, nondistended, nontender, without mass or organomegaly  Skin: no lesions  Extremities: no clubbing, cyanosis, or edema  Neurologic: alert, oriented, moves all extremities    LAB RESULTS:                        9.5    7.03  )-----------( 342      ( 14 Nov 2017 07:44 )             28.4     11-14    139  |  98  |  15  ----------------------------<  174<H>  3.8   |  27  |  1.10    Ca    9.5      14 Nov 2017 07:28    TPro  7.0  /  Alb  3.4  /  TBili  4.1<H>  /  DBili  x   /  AST  46<H>  /  ALT  76<H>  /  AlkPhos  294<H>  11-14    LIVER FUNCTIONS - ( 14 Nov 2017 07:28 )  Alb: 3.4 g/dL / Pro: 7.0 g/dL / ALK PHOS: 294 U/L / ALT: 76 U/L / AST: 46 U/L / GGT: x             MICROBIOLOGY:  RECENT CULTURES:  11-11 @ 17:04 .Blood Blood-Peripheral     No growth to date.      11-11 @ 16:54 .Urine Clean Catch (Midstream)     No growth          RADIOLOGY REVIEWED:    < from: NM Hepatobiliary Scan w/wo GB w/Pharm (11.13.17 @ 20:29) >    IMPRESSION: Abnormal morphine-augmented hepatobiliary scan. Non   visualization of the gall bladder. Acute cholecystitis is suggested, but   cannot be confirmed because of the presence of a CBD stent, which may   result in nonvisualization of the gall bladder.    OUSMANE Harper was informed of these results by Dr. Clark with read   back at about 9:05 PM on 11/13/2017      < end of copied text >    < from: Xray Chest 1 View AP/PA (11.11.17 @ 19:36) >  Impression:    The heart is slightly enlarged. The lungs are clear. Degenerative changes   of the thoracic spine.        < end of copied text >  < from: CT Abdomen and Pelvis No Cont (11.12.17 @ 19:35) >  NTERPRETATION:  CLINICAL INFORMATION: Epigastric pain, evaluate gastric   stent position.    COMPARISON: CT abdomen and pelvis 10/23/2017. MR abdomen 10/26/2017.   Right upper quadrant ultrasound from 11/11/2017.    PROCEDURE:   CT of the Abdomen and Pelvis was performed without intravenous contrast.   Intravenous contrast: None.  Oral contrast: None.  Sagittal and coronal reformats were performed.    FINDINGS:    LOWER CHEST: Within normal limits.    LIVER: Within normal limits.  BILE DUCTS: Interval placement of a patent common bile duct stent with   pneumobilia present primarily in the left hepatic lobe. Intrahepatic and   extrahepatic biliaryductal dilatation has decreased since 10/23/2017.  GALLBLADDER: Nonspecific gallbladder wall thickening is again noted was   also identified on the recent ultrasound.  SPLEEN: Within normal limits.  PANCREAS: The previously noted pancreatic head massis less well   delineated on this noncontrast study. Evaluation of the pancreatic duct   is limited on this noncontrast study although there may be interval   resolution of the previously noted pancreatic ductal dilatation.   ADRENALS: Within normal limits.  KIDNEYS/URETERS: Horseshoe kidney without evidence of obstruction.    BLADDER: Within normal limits.  REPRODUCTIVE ORGANS: The prostate is within normal limits.    BOWEL: No bowel obstruction. Appendix is normal  PERITONEUM: No ascites.  VESSELS:  Atheromatous change of the aorta and its branches.  RETROPERITONEUM: Nonspecific subcentimeter para-aortic lymph nodes.    ABDOMINAL WALL: Small bilateral fat-containing inguinal hernias.  BONES: Mild degenerative spinal changes.    IMPRESSION:   Status post common bile duct stent placement with a patent stent and   improvement in the intra and extrahepatic biliary ductal dilatation.      < end of copied text >          Assessment:  Patient with newly diagnosed obstruction pancreas cancer s/p stent returned to hospital with transient fever and found to have thick gb raising concern for cholecystitis. hida shows nonvisualization so not clear if from stent or true connor. Patient to have whipple including cholecystectomy in the near future anyway and he feels well now. He also has bad hsv on lips.   Plan: can change to augmentin  await evaluation by dr cardona for gb  give valtrex 2 g q 12 h for 2 doses for oral hsv  r/w Dr. Wyatt

## 2017-11-14 NOTE — PROGRESS NOTE ADULT - SUBJECTIVE AND OBJECTIVE BOX
Patient is a 61y old  Male who presents with a chief complaint of abd pian/ n/v/fever (14 Nov 2017 14:33)                                                               INTERVAL HPI/OVERNIGHT EVENTS:    REVIEW OF SYSTEMS:     CONSTITUTIONAL: No weakness, fevers or chills  RESPIRATORY: No cough, wheezing,  No shortness of breath  CARDIOVASCULAR: No chest pain or palpitations  GASTROINTESTINAL: No abdominal pain  . No nausea, vomiting,   GENITOURINARY: No dysuria, frequency   NEUROLOGICAL: No numbness or weakness                                                                                                                                                                                                                                                                                  Medications:  MEDICATIONS  (STANDING):  aspirin  chewable 81 milliGRAM(s) Oral daily  buDESOnide  80 MICROgram(s)/formoterol 4.5 MICROgram(s) Inhaler 2 Puff(s) Inhalation two times a day  dextrose 5%. 1000 milliLiter(s) (50 mL/Hr) IV Continuous <Continuous>  dextrose 50% Injectable 12.5 Gram(s) IV Push once  dextrose 50% Injectable 25 Gram(s) IV Push once  dextrose 50% Injectable 25 Gram(s) IV Push once  docusate sodium 100 milliGRAM(s) Oral three times a day  heparin  Injectable 5000 Unit(s) SubCutaneous every 8 hours  insulin glargine Injectable (LANTUS) 5 Unit(s) SubCutaneous at bedtime  insulin lispro (HumaLOG) corrective regimen sliding scale   SubCutaneous three times a day before meals  insulin lispro (HumaLOG) corrective regimen sliding scale   SubCutaneous at bedtime  metoprolol succinate ER 12.5 milliGRAM(s) Oral daily  pantoprazole    Tablet 40 milliGRAM(s) Oral before breakfast  piperacillin/tazobactam IVPB. 3.375 Gram(s) IV Intermittent every 8 hours  senna 2 Tablet(s) Oral at bedtime  simethicone 160 milliGRAM(s) Chew once  simethicone 80 milliGRAM(s) Chew every 6 hours  tamsulosin 0.4 milliGRAM(s) Oral at bedtime    MEDICATIONS  (PRN):  acetaminophen   Tablet. 650 milliGRAM(s) Oral every 6 hours PRN Mild Pain (1 - 3)  dextrose Gel 1 Dose(s) Oral once PRN Blood Glucose LESS THAN 70 milliGRAM(s)/deciliter  glucagon  Injectable 1 milliGRAM(s) IntraMuscular once PRN Glucose LESS THAN 70 milligrams/deciliter       Allergies    No Known Allergies    Intolerances      Vital Signs Last 24 Hrs  T(C): 37.1 (14 Nov 2017 05:12), Max: 37.1 (14 Nov 2017 05:12)  T(F): 98.8 (14 Nov 2017 05:12), Max: 98.8 (14 Nov 2017 05:12)  HR: 80 (14 Nov 2017 11:00) (80 - 93)  BP: 105/68 (14 Nov 2017 11:00) (94/57 - 118/70)  BP(mean): --  RR: 18 (14 Nov 2017 08:22) (18 - 18)  SpO2: 98% (14 Nov 2017 08:22) (97% - 98%)  CAPILLARY BLOOD GLUCOSE      POCT Blood Glucose.: 210 mg/dL (14 Nov 2017 11:58)  POCT Blood Glucose.: 202 mg/dL (14 Nov 2017 08:00)  POCT Blood Glucose.: 219 mg/dL (14 Nov 2017 01:35)  POCT Blood Glucose.: 244 mg/dL (13 Nov 2017 22:01)  POCT Blood Glucose.: 145 mg/dL (13 Nov 2017 16:27)      11-13 @ 07:01 - 11-14 @ 07:00  --------------------------------------------------------  IN: 1260 mL / OUT: 0 mL / NET: 1260 mL    11-14 @ 07:01 - 11-14 @ 15:39  --------------------------------------------------------  IN: 600 mL / OUT: 0 mL / NET: 600 mL      Physical Exam:   General:  NAD   HEENT: mildly icteric, PERRLA  CV:  RRR, S1S2   Lungs:  CTA B/L, no wheezes, rales, rhonchi  Abdomen:  Soft, non-tender, no distended, positive BS  Extremities:  2+ pulses, no c/c, no edema  Skin:  Warm and dry, no rashes  :  No newberry  Neuro:  AAOx3, non-focal, grossly intact                                                                                                                                                                                                                                                                                                LABS:                               9.5    7.03  )-----------( 342      ( 14 Nov 2017 07:44 )             28.4                      11-14    139  |  98  |  15  ----------------------------<  174<H>  3.8   |  27  |  1.10    Ca    9.5      14 Nov 2017 07:28    TPro  7.0  /  Alb  3.4  /  TBili  4.1<H>  /  DBili  x   /  AST  46<H>  /  ALT  76<H>  /  AlkPhos  294<H>  11-14                       RADIOLOGY & ADDITIONAL TESTS         I personally reviewed: [  ]EKG   [  ]CXR    [  ] CT

## 2017-11-14 NOTE — DISCHARGE NOTE ADULT - CARE PROVIDERS DIRECT ADDRESSES
[email or web address]

## 2017-11-14 NOTE — PROGRESS NOTE ADULT - SUBJECTIVE AND OBJECTIVE BOX
JENIFER NORTH  MRN-[id number]    Patient is a 61y old  Male who presents with a chief complaint of abd pian/ n/v/fever (14 Nov 2017 14:33)      Review of System  REVIEW OF SYSTEMS      General:	Denies fatigue, fevers, chills, sweats, decreased appetite.    Skin/Breast: denies pruritis, rash  	  Ophthalmologic: no change in vision or blurring  	  HEENT	Denies dry mouth, oral sores, dysphagia,  change in hearing.    Respiratory and Thorax:  cough, sob, wheeze, hemoptysis  	  Cardiovascular:	no cp , palp, orthopnea    Gastrointestinal:	no n/v/d constipation    Genitourinary:	no dysuria of frequency, no hematuria, no flank pain    Musculoskeletal:	no bone or joint pain. no muscle aches.     Neurological:	no change in sensory or motor function. no headache. no weakness.     Psychiatric:	no depression, no anxiety, insomnia.     Hematology/Lymphatics:	no bleeding or bruising        Current Meds  MEDICATIONS  (STANDING):  aspirin  chewable 81 milliGRAM(s) Oral daily  buDESOnide  80 MICROgram(s)/formoterol 4.5 MICROgram(s) Inhaler 2 Puff(s) Inhalation two times a day  dextrose 5%. 1000 milliLiter(s) (50 mL/Hr) IV Continuous <Continuous>  dextrose 50% Injectable 12.5 Gram(s) IV Push once  dextrose 50% Injectable 25 Gram(s) IV Push once  dextrose 50% Injectable 25 Gram(s) IV Push once  docusate sodium 100 milliGRAM(s) Oral three times a day  heparin  Injectable 5000 Unit(s) SubCutaneous every 8 hours  insulin glargine Injectable (LANTUS) 5 Unit(s) SubCutaneous at bedtime  insulin lispro (HumaLOG) corrective regimen sliding scale   SubCutaneous three times a day before meals  insulin lispro (HumaLOG) corrective regimen sliding scale   SubCutaneous at bedtime  metoprolol succinate ER 12.5 milliGRAM(s) Oral daily  pantoprazole    Tablet 40 milliGRAM(s) Oral before breakfast  piperacillin/tazobactam IVPB. 3.375 Gram(s) IV Intermittent every 8 hours  senna 2 Tablet(s) Oral at bedtime  simethicone 160 milliGRAM(s) Chew once  simethicone 80 milliGRAM(s) Chew every 6 hours  tamsulosin 0.4 milliGRAM(s) Oral at bedtime  valACYclovir 2000 milliGRAM(s) Oral every 12 hours    MEDICATIONS  (PRN):  acetaminophen   Tablet. 650 milliGRAM(s) Oral every 6 hours PRN Mild Pain (1 - 3)  dextrose Gel 1 Dose(s) Oral once PRN Blood Glucose LESS THAN 70 milliGRAM(s)/deciliter  glucagon  Injectable 1 milliGRAM(s) IntraMuscular once PRN Glucose LESS THAN 70 milligrams/deciliter      Vitals  Vital Signs Last 24 Hrs  T(C): 37.1 (14 Nov 2017 05:12), Max: 37.1 (14 Nov 2017 05:12)  T(F): 98.8 (14 Nov 2017 05:12), Max: 98.8 (14 Nov 2017 05:12)  HR: 80 (14 Nov 2017 11:00) (80 - 93)  BP: 105/68 (14 Nov 2017 11:00) (94/57 - 118/70)  BP(mean): --  RR: 18 (14 Nov 2017 08:22) (18 - 18)  SpO2: 98% (14 Nov 2017 08:22) (97% - 98%)    Physical Exam  PHYSICAL EXAM:      Constitutional: NAD    Eyes: PERRLA EOMI, anicteric sclera    Heent :No oral sores, no pharyngeal injection. moist mucosa.    Neck: supple, no jvd, no LAD    Respiratory: CTA b/l     Cardiovascular: s1s2, no m/g/r    Gastrointestinal: soft, nt, nd, + BS    Extremities: no c/c/e    Neurological:A&O x 3 moves all ext.    Skin: no rash on exposed skin    Lymph Nodes: no lymphadenopathy.              Lab  CBC Full  -  ( 14 Nov 2017 07:44 )  WBC Count : 7.03 K/uL  Hemoglobin : 9.5 g/dL  Hematocrit : 28.4 %  Platelet Count - Automated : 342 K/uL  Mean Cell Volume : 95.3 fl  Mean Cell Hemoglobin : 31.9 pg  Mean Cell Hemoglobin Concentration : 33.5 gm/dL  Auto Neutrophil # : x  Auto Lymphocyte # : x  Auto Monocyte # : x  Auto Eosinophil # : x  Auto Basophil # : x  Auto Neutrophil % : x  Auto Lymphocyte % : x  Auto Monocyte % : x  Auto Eosinophil % : x  Auto Basophil % : x    11-14    139  |  98  |  15  ----------------------------<  174<H>  3.8   |  27  |  1.10    Ca    9.5      14 Nov 2017 07:28    TPro  7.0  /  Alb  3.4  /  TBili  4.1<H>  /  DBili  x   /  AST  46<H>  /  ALT  76<H>  /  AlkPhos  294<H>  11-14        Rad:    Assessment/Plan JENIFER NORTH  [id number]    Patient is a 61y old  Male who presents with a chief complaint of abd pian/ n/v/fever (14 Nov 2017 14:33)      Review of System    General:	Denies fatigue, fevers, chills, sweats, decreased appetite.    Skin/Breast: denies pruritis, rash  	  Ophthalmologic: no change in vision or blurring  	  HEENT	Denies dry mouth, oral sores, dysphagia,  change in hearing.    Respiratory and Thorax:  no cough, sob, wheeze, hemoptysis  	  Cardiovascular:	no cp , palp, orthopnea    Gastrointestinal:	no n/v/d constipation    Genitourinary:	no dysuria of frequency, no hematuria, no flank pain    Musculoskeletal:	no bone or joint pain. no muscle aches.     Neurological:	no change in sensory or motor function. no headache. no weakness.     Psychiatric:	no depression, no anxiety, insomnia.     Hematology/Lymphatics:	no bleeding or bruising        Current Meds  MEDICATIONS  (STANDING):  aspirin  chewable 81 milliGRAM(s) Oral daily  buDESOnide  80 MICROgram(s)/formoterol 4.5 MICROgram(s) Inhaler 2 Puff(s) Inhalation two times a day  dextrose 5%. 1000 milliLiter(s) (50 mL/Hr) IV Continuous <Continuous>  dextrose 50% Injectable 12.5 Gram(s) IV Push once  dextrose 50% Injectable 25 Gram(s) IV Push once  dextrose 50% Injectable 25 Gram(s) IV Push once  docusate sodium 100 milliGRAM(s) Oral three times a day  heparin  Injectable 5000 Unit(s) SubCutaneous every 8 hours  insulin glargine Injectable (LANTUS) 5 Unit(s) SubCutaneous at bedtime  insulin lispro (HumaLOG) corrective regimen sliding scale   SubCutaneous three times a day before meals  insulin lispro (HumaLOG) corrective regimen sliding scale   SubCutaneous at bedtime  metoprolol succinate ER 12.5 milliGRAM(s) Oral daily  pantoprazole    Tablet 40 milliGRAM(s) Oral before breakfast  piperacillin/tazobactam IVPB. 3.375 Gram(s) IV Intermittent every 8 hours  senna 2 Tablet(s) Oral at bedtime  simethicone 160 milliGRAM(s) Chew once  simethicone 80 milliGRAM(s) Chew every 6 hours  tamsulosin 0.4 milliGRAM(s) Oral at bedtime  valACYclovir 2000 milliGRAM(s) Oral every 12 hours    MEDICATIONS  (PRN):  acetaminophen   Tablet. 650 milliGRAM(s) Oral every 6 hours PRN Mild Pain (1 - 3)  dextrose Gel 1 Dose(s) Oral once PRN Blood Glucose LESS THAN 70 milliGRAM(s)/deciliter  glucagon  Injectable 1 milliGRAM(s) IntraMuscular once PRN Glucose LESS THAN 70 milligrams/deciliter      Vitals  Vital Signs Last 24 Hrs  T(C): 37.1 (14 Nov 2017 05:12), Max: 37.1 (14 Nov 2017 05:12)  T(F): 98.8 (14 Nov 2017 05:12), Max: 98.8 (14 Nov 2017 05:12)  HR: 80 (14 Nov 2017 11:00) (80 - 93)  BP: 105/68 (14 Nov 2017 11:00) (94/57 - 118/70)  BP(mean): --  RR: 18 (14 Nov 2017 08:22) (18 - 18)  SpO2: 98% (14 Nov 2017 08:22) (97% - 98%)    Physical Exam  PHYSICAL EXAM:      Constitutional: NAD    Eyes: PERRLA EOMI, anicteric sclera    Heent :No oral sores, no pharyngeal injection. moist mucosa.    Neck: supple, no jvd, no LAD    Respiratory: CTA b/l     Cardiovascular: s1s2, no m/g/r    Gastrointestinal: soft, nt, nd, + BS    Extremities: no c/c/e    Neurological:A&O x 3 moves all ext.    Skin: no rash on exposed skin    Lymph Nodes: no lymphadenopathy.              Lab  CBC Full  -  ( 14 Nov 2017 07:44 )  WBC Count : 7.03 K/uL  Hemoglobin : 9.5 g/dL  Hematocrit : 28.4 %  Platelet Count - Automated : 342 K/uL  Mean Cell Volume : 95.3 fl  Mean Cell Hemoglobin : 31.9 pg  Mean Cell Hemoglobin Concentration : 33.5 gm/dL  Auto Neutrophil # : x  Auto Lymphocyte # : x  Auto Monocyte # : x  Auto Eosinophil # : x  Auto Basophil # : x  Auto Neutrophil % : x  Auto Lymphocyte % : x  Auto Monocyte % : x  Auto Eosinophil % : x  Auto Basophil % : x    11-14    139  |  98  |  15  ----------------------------<  174<H>  3.8   |  27  |  1.10    Ca    9.5      14 Nov 2017 07:28    TPro  7.0  /  Alb  3.4  /  TBili  4.1<H>  /  DBili  x   /  AST  46<H>  /  ALT  76<H>  /  AlkPhos  294<H>  11-14        Rad:    Assessment/Plan

## 2017-11-14 NOTE — PROGRESS NOTE ADULT - ASSESSMENT
60 yo man with pancreatic adenoCa, planned for Whipple on 11/30, presented with fever to 104 with no localizing sx, cultures to date (-). CT from 11/12 shows gallbladder wall edema but patient without any RUQ tenderness on exam, no fevers and normal WBC. He had leukocytosis on presentation and may have had cholecystitis that resolved with abx treatment. At this time he is doing well clinically.

## 2017-11-14 NOTE — PROGRESS NOTE ADULT - SUBJECTIVE AND OBJECTIVE BOX
CARDIOLOGY FOLLOW UP NOTE - DR. BREANNE ARITA    Patient states he felt a little dizzy when his BP was low.    MEDICATIONS  (STANDING):  aspirin  chewable 81 milliGRAM(s) Oral daily  buDESOnide  80 MICROgram(s)/formoterol 4.5 MICROgram(s) Inhaler 2 Puff(s) Inhalation two times a day  dextrose 5%. 1000 milliLiter(s) (50 mL/Hr) IV Continuous <Continuous>  dextrose 50% Injectable 12.5 Gram(s) IV Push once  dextrose 50% Injectable 25 Gram(s) IV Push once  dextrose 50% Injectable 25 Gram(s) IV Push once  docusate sodium 100 milliGRAM(s) Oral three times a day  heparin  Injectable 5000 Unit(s) SubCutaneous every 8 hours  insulin glargine Injectable (LANTUS) 5 Unit(s) SubCutaneous at bedtime  insulin lispro (HumaLOG) corrective regimen sliding scale   SubCutaneous three times a day before meals  insulin lispro (HumaLOG) corrective regimen sliding scale   SubCutaneous at bedtime  metoprolol succinate ER 12.5 milliGRAM(s) Oral daily  pantoprazole    Tablet 40 milliGRAM(s) Oral before breakfast  piperacillin/tazobactam IVPB. 3.375 Gram(s) IV Intermittent every 8 hours  senna 2 Tablet(s) Oral at bedtime  simethicone 160 milliGRAM(s) Chew once  simethicone 80 milliGRAM(s) Chew every 6 hours  tamsulosin 0.4 milliGRAM(s) Oral at bedtime        PHYSICAL EXAM:  Vital Signs Last 24 Hrs  T(C): 37.1 (14 Nov 2017 05:12), Max: 37.1 (14 Nov 2017 05:12)  T(F): 98.8 (14 Nov 2017 05:12), Max: 98.8 (14 Nov 2017 05:12)  HR: 90 (14 Nov 2017 08:37) (82 - 96)  BP: 108/64 (14 Nov 2017 08:37) (94/57 - 118/70)  BP(mean): --  RR: 18 (14 Nov 2017 08:22) (18 - 18)  SpO2: 98% (14 Nov 2017 08:22) (97% - 98%)  I&O's Summary    13 Nov 2017 07:01  -  14 Nov 2017 07:00  --------------------------------------------------------  IN: 1260 mL / OUT: 0 mL / NET: 1260 mL      General: NAD	  HEENT:   No JVD  Cardiovascular: RRR, Normal S1 and S2, No murmurs/gallops/rubs  Respiratory: Lungs clear to auscultation	  Gastrointestinal:  Soft, Non-tender, + BS	  Extremities: No edema      	    LABS:                          9.5    7.03  )-----------( 342      ( 14 Nov 2017 07:44 )             28.4     11-13    135  |  102  |  8   ----------------------------<  100<H>  3.8   |  19<L>  |  0.68    Ca    8.8      13 Nov 2017 08:22    TPro  7.1  /  Alb  3.5  /  TBili  0.2  /  DBili  0.1  /  AST  82<H>  /  ALT  34  /  AlkPhos  83  11-13          Assessment and Plan:  60 y/o  male with hx of DM, HTN, HLD, FmHx of early CAD, recently admitted for jaundice and found to have pancreatic adenocarcinoma. He is planned for Whipple surgery at the end of this month, who now presents for fevers  1. Fevers - being worked up. Pt now afebrile - on ABx. LFTS improving. ID on board.  2. Abnormal stress test - pt with mild abnormality. He was started on a B-blocker and aspirin. He will stop aspirin 1 week prior to surgery. He will stay on metoprolol until the day of surgery.  3. HTN - since BP is on the low side, and pt felt dizziness x 1, will decrease Toprol dose to 12.5mg daily. Holding ARB, since BP is on the low side.  4. HLD - LDL goal is < 70 due to DM. LFTs were very high in the setting of acute illness - have improved, but likely not really normalized (lab error). When it will be OK with GI, would start a statin, not only for lowering LDL, but for its pleiothropic properties as well.

## 2017-11-14 NOTE — PROVIDER CONTACT NOTE (OTHER) - ASSESSMENT
Pt A&Ox4, with VS as charted. /73 electronic, HR 91 at 0823. /64 manual, HR 90 at 0837. Pt sitting in bed, asymptomatic.

## 2017-11-14 NOTE — DISCHARGE NOTE ADULT - HOSPITAL COURSE
60 y/o  male with hx of DM , HTN, BPH  admitted recently for jaundice found to have pnacreatic adenocarcinoma on bx . during that admission pt One plastic stent was placed into the ventral pancreatic duct then placement of fully covered metal stent  in bile ductand removal of pancreatic duct stent. . pt had a stress test which was abnormal and plan for f/u as o/p post surgical intervention by Dr. Curry. 60 y/o  male with hx of DM , HTN, BPH  admitted recently for jaundice found to have pnacreatic adenocarcinoma on bx . during that admission pt One plastic stent was placed into the ventral pancreatic duct then placement of fully covered metal stent  in bile ductand removal of pancreatic duct stent. . pt had a stress test which was abnormal and plan for f/u as o/p post surgical intervention by Dr. Curry.Pt was treated with iv antibiotics and d/c home on po augmentin for another 6 days. LFT's trended during admisssion

## 2017-11-14 NOTE — PROVIDER CONTACT NOTE (OTHER) - BACKGROUND
Pt admitted w/ fever chills, sweats, emesis. Dx of sepsis w/ PMH of DM2, HTN, HLD, Pancreatic CA s/p ERCP w/ bile duct stent placement, pending Whipple surgery 11/31/17.

## 2017-11-15 ENCOUNTER — OUTPATIENT (OUTPATIENT)
Dept: OUTPATIENT SERVICES | Facility: HOSPITAL | Age: 62
LOS: 1 days | End: 2017-11-15

## 2017-11-15 VITALS
HEIGHT: 64.25 IN | WEIGHT: 194.01 LBS | SYSTOLIC BLOOD PRESSURE: 122 MMHG | OXYGEN SATURATION: 98 % | DIASTOLIC BLOOD PRESSURE: 70 MMHG | TEMPERATURE: 97 F | HEART RATE: 92 BPM | RESPIRATION RATE: 14 BRPM

## 2017-11-15 DIAGNOSIS — Z98.890 OTHER SPECIFIED POSTPROCEDURAL STATES: Chronic | ICD-10-CM

## 2017-11-15 DIAGNOSIS — C25.9 MALIGNANT NEOPLASM OF PANCREAS, UNSPECIFIED: ICD-10-CM

## 2017-11-15 DIAGNOSIS — C80.1 MALIGNANT (PRIMARY) NEOPLASM, UNSPECIFIED: ICD-10-CM

## 2017-11-15 DIAGNOSIS — J45.909 UNSPECIFIED ASTHMA, UNCOMPLICATED: ICD-10-CM

## 2017-11-15 DIAGNOSIS — E11.9 TYPE 2 DIABETES MELLITUS WITHOUT COMPLICATIONS: ICD-10-CM

## 2017-11-15 LAB
ALBUMIN SERPL ELPH-MCNC: 3.9 G/DL — SIGNIFICANT CHANGE UP (ref 3.3–5)
ALP SERPL-CCNC: 297 U/L — HIGH (ref 40–120)
ALT FLD-CCNC: 73 U/L — HIGH (ref 4–41)
APTT BLD: 32.2 SEC — SIGNIFICANT CHANGE UP (ref 27.5–37.4)
AST SERPL-CCNC: 52 U/L — HIGH (ref 4–40)
BILIRUB SERPL-MCNC: 4.3 MG/DL — HIGH (ref 0.2–1.2)
BLD GP AB SCN SERPL QL: NEGATIVE — SIGNIFICANT CHANGE UP
BUN SERPL-MCNC: 14 MG/DL — SIGNIFICANT CHANGE UP (ref 7–23)
CALCIUM SERPL-MCNC: 9.5 MG/DL — SIGNIFICANT CHANGE UP (ref 8.4–10.5)
CHLORIDE SERPL-SCNC: 97 MMOL/L — LOW (ref 98–107)
CO2 SERPL-SCNC: 24 MMOL/L — SIGNIFICANT CHANGE UP (ref 22–31)
CREAT SERPL-MCNC: 1.07 MG/DL — SIGNIFICANT CHANGE UP (ref 0.5–1.3)
GLUCOSE SERPL-MCNC: 165 MG/DL — HIGH (ref 70–99)
HBA1C BLD-MCNC: 6.9 % — HIGH (ref 4–5.6)
HCT VFR BLD CALC: 31.4 % — LOW (ref 39–50)
HGB BLD-MCNC: 10.2 G/DL — LOW (ref 13–17)
INR BLD: 1.14 — SIGNIFICANT CHANGE UP (ref 0.88–1.17)
MCHC RBC-ENTMCNC: 31.6 PG — SIGNIFICANT CHANGE UP (ref 27–34)
MCHC RBC-ENTMCNC: 32.5 % — SIGNIFICANT CHANGE UP (ref 32–36)
MCV RBC AUTO: 97.2 FL — SIGNIFICANT CHANGE UP (ref 80–100)
NRBC # FLD: 0.02 — SIGNIFICANT CHANGE UP
PLATELET # BLD AUTO: 393 K/UL — SIGNIFICANT CHANGE UP (ref 150–400)
PMV BLD: 11 FL — SIGNIFICANT CHANGE UP (ref 7–13)
POTASSIUM SERPL-MCNC: 4.1 MMOL/L — SIGNIFICANT CHANGE UP (ref 3.5–5.3)
POTASSIUM SERPL-SCNC: 4.1 MMOL/L — SIGNIFICANT CHANGE UP (ref 3.5–5.3)
PROT SERPL-MCNC: 7.3 G/DL — SIGNIFICANT CHANGE UP (ref 6–8.3)
PROTHROM AB SERPL-ACNC: 12.8 SEC — SIGNIFICANT CHANGE UP (ref 9.8–13.1)
RBC # BLD: 3.23 M/UL — LOW (ref 4.2–5.8)
RBC # FLD: 17.9 % — HIGH (ref 10.3–14.5)
RH IG SCN BLD-IMP: POSITIVE — SIGNIFICANT CHANGE UP
SODIUM SERPL-SCNC: 137 MMOL/L — SIGNIFICANT CHANGE UP (ref 135–145)
WBC # BLD: 8.59 K/UL — SIGNIFICANT CHANGE UP (ref 3.8–10.5)
WBC # FLD AUTO: 8.59 K/UL — SIGNIFICANT CHANGE UP (ref 3.8–10.5)

## 2017-11-15 RX ORDER — TAMSULOSIN HYDROCHLORIDE 0.4 MG/1
1 CAPSULE ORAL
Qty: 0 | Refills: 0 | COMMUNITY

## 2017-11-15 RX ORDER — PANTOPRAZOLE SODIUM 20 MG/1
1 TABLET, DELAYED RELEASE ORAL
Qty: 0 | Refills: 0 | COMMUNITY

## 2017-11-15 RX ORDER — ASPIRIN/CALCIUM CARB/MAGNESIUM 324 MG
1 TABLET ORAL
Qty: 0 | Refills: 0 | COMMUNITY

## 2017-11-15 NOTE — H&P PST ADULT - ATTENDING COMMENTS
Planned procedure including risks and benefits discussed with patient.    Past Medical History:  Adenocarcinoma of pancreas    Asthma    BPH (benign prostatic hyperplasia)    Bronchitis  1 month ago  Diabetes   @ 10 :40 this am  Fever  104  11/11/17 ; pt admitted to [facility]  GERD (gastroesophageal reflux disease)    Herpes  lips  High cholesterol    HTN (hypertension).     Past Surgical History:  H/O circumcision    S/P ERCP  11/17.

## 2017-11-15 NOTE — H&P PST ADULT - RS GEN PE MLT RESP DETAILS PC
good air movement/breath sounds equal/respirations non-labored/clear to auscultation bilaterally/airway patent

## 2017-11-15 NOTE — H&P PST ADULT - NSANTHOSAYNRD_GEN_A_CORE
No. BERNARDO screening performed.  STOP BANG Legend: 0-2 = LOW Risk; 3-4 = INTERMEDIATE Risk; 5-8 = HIGH Risk

## 2017-11-15 NOTE — H&P PST ADULT - HISTORY OF PRESENT ILLNESS
Pt is a 61 y.o. male ; pt states 9/17 ; noted back pain ; pt to pcp ; a sonogram and a c/t scam were done ; pt and partner spouse ; jaundice noted 3 weeks ago ; " there is an elevation of liver enzymes " Pt admitted to [facility] ; a w/u was done " there was a blockage in the pancreas; an ERCP was done ; a biopsy was done ; pt to surgeon ; pt now presents for Whipple     Pt reports initial decrease in appetite ; pt reports 25 lb weight loss

## 2017-11-15 NOTE — H&P PST ADULT - PROBLEM SELECTOR PLAN 1
Ernestina    pre op instructions including Pepcid and Hibiclens given to pt pt appears to have a good understanding of pre op instructions    Pt to Dr Grider for pre op m/c     pt to  Ernestina    pre op instructions including Pantaprazole and Hibiclens given to pt pt appears to have a good understanding of pre op instructions    Pt to Dr Grider for pre op m/c     Pt to Dr Mae for pre op cardiac clearance ; echo scheduled 11/17/17    Pt reports FH factor V Leiden ; sisters x 2; call to pcp message left with Kassandra regarding pre op hematology evaluation  Call to Dr Bui office message left with Boyd ; Boyd to make surgeon aware Ernestina    pre op instructions including Pantaprazole and Hibiclens given to pt pt appears to have a good understanding of pre op instructions    Pt to Dr Grider for pre op m/c ; pt to review pre op asa plan with Dr Grider    Pt to Dr Mae for pre op cardiac clearance ; echo scheduled 11/17/17    Pt reports FH factor V Leiden ; sisters x 2; call to pcp message left with Kassandra regarding pre op hematology evaluation  Call to Dr Bui office message left with Boyd Nix to make surgeon aware Ernestina    Pre op instructions including Pantoprazole and Hibiclens given to pt pt appears to have a good understanding of pre op instructions    Pt to Dr Grider for pre op m/c ; pt to review pre op asa plan with Dr Grider    Pt to Dr Mae for pre op cardiac clearance ; echo scheduled 11/17/17    Pt reports FH factor V Leiden ; sisters x 2; call to pcp message left with Kassandra regarding pre op hematology evaluation  Call to Dr Bui office message left with Boyd Nix to make surgeon aware Ernestina    Pre op instructions including Pantoprazole and Hibiclens given to pt pt appears to have a good understanding of pre op instructions    Pt to Dr Grider for pre op m/c ; pt to review pre op asa plan with Dr Grider    Pt to Dr Mae for pre op cardiac clearance ; echo scheduled 11/17/17        Pt reports FH factor V Leiden ; sisters x 2; call to pcp message left with Kassandra regarding pre op hematology evaluation  Call to Dr Bui office message left with Boyd Nix to make surgeon aware    Pt to review asa plan with pcp/ cardiologist/ hematologist

## 2017-11-15 NOTE — H&P PST ADULT - LYMPHATIC
anterior cervical L/supraclavicular L/posterior cervical L/posterior cervical R/anterior cervical R/supraclavicular R

## 2017-11-15 NOTE — H&P PST ADULT - PROBLEM SELECTOR PLAN 3
KORY, HGBA1C done     Pt to hold Novolog dos   regarding lantus as per guidelines ; pt to decrease by 20 % evening prior to surgery  pt to review plan with Dr Cheo DELATORRE dos

## 2017-11-15 NOTE — H&P PST ADULT - PMH
Adenocarcinoma of pancreas    Asthma    BPH (benign prostatic hyperplasia)    Bronchitis  1 month ago  Diabetes   @ 10 :40 this am  Fever  104  11/11/17 ; pt admitted to [facility]  GERD (gastroesophageal reflux disease)    Herpes  lips  High cholesterol    HTN (hypertension)

## 2017-11-16 LAB
CANCER AG19-9 SERPL-ACNC: 217.8 U/ML — HIGH
CULTURE RESULTS: SIGNIFICANT CHANGE UP
CULTURE RESULTS: SIGNIFICANT CHANGE UP
SPECIMEN SOURCE: SIGNIFICANT CHANGE UP
SPECIMEN SOURCE: SIGNIFICANT CHANGE UP

## 2017-11-20 ENCOUNTER — OUTPATIENT (OUTPATIENT)
Dept: OUTPATIENT SERVICES | Facility: HOSPITAL | Age: 62
LOS: 1 days | Discharge: HOME | End: 2017-11-20

## 2017-11-20 DIAGNOSIS — Z98.890 OTHER SPECIFIED POSTPROCEDURAL STATES: Chronic | ICD-10-CM

## 2017-11-20 DIAGNOSIS — D68.51 ACTIVATED PROTEIN C RESISTANCE: ICD-10-CM

## 2017-11-21 ENCOUNTER — APPOINTMENT (OUTPATIENT)
Dept: HEMATOLOGY ONCOLOGY | Facility: CLINIC | Age: 62
End: 2017-11-21
Payer: COMMERCIAL

## 2017-11-21 ENCOUNTER — RESULT REVIEW (OUTPATIENT)
Age: 62
End: 2017-11-21

## 2017-11-21 ENCOUNTER — OUTPATIENT (OUTPATIENT)
Dept: OUTPATIENT SERVICES | Facility: HOSPITAL | Age: 62
LOS: 1 days | End: 2017-11-21
Payer: COMMERCIAL

## 2017-11-21 VITALS
HEIGHT: 64.17 IN | OXYGEN SATURATION: 99 % | WEIGHT: 195.33 LBS | HEART RATE: 93 BPM | SYSTOLIC BLOOD PRESSURE: 121 MMHG | DIASTOLIC BLOOD PRESSURE: 72 MMHG | BODY MASS INDEX: 33.35 KG/M2 | TEMPERATURE: 98.1 F

## 2017-11-21 DIAGNOSIS — Z63.4 DISAPPEARANCE AND DEATH OF FAMILY MEMBER: ICD-10-CM

## 2017-11-21 DIAGNOSIS — Z98.890 OTHER SPECIFIED POSTPROCEDURAL STATES: Chronic | ICD-10-CM

## 2017-11-21 DIAGNOSIS — C25.9 MALIGNANT NEOPLASM OF PANCREAS, UNSPECIFIED: ICD-10-CM

## 2017-11-21 DIAGNOSIS — D68.51 ACTIVATED PROTEIN C RESISTANCE: ICD-10-CM

## 2017-11-21 LAB
BASOPHILS # BLD AUTO: 0.1 K/UL — SIGNIFICANT CHANGE UP (ref 0–0.2)
BASOPHILS NFR BLD AUTO: 0.9 % — SIGNIFICANT CHANGE UP (ref 0–2)
EOSINOPHIL # BLD AUTO: 0.1 K/UL — SIGNIFICANT CHANGE UP (ref 0–0.5)
EOSINOPHIL NFR BLD AUTO: 1.4 % — SIGNIFICANT CHANGE UP (ref 0–6)
HCT VFR BLD CALC: 35.6 % — LOW (ref 39–50)
HGB BLD-MCNC: 12.1 G/DL — LOW (ref 13–17)
LYMPHOCYTES # BLD AUTO: 1.7 K/UL — SIGNIFICANT CHANGE UP (ref 1–3.3)
LYMPHOCYTES # BLD AUTO: 20.1 % — SIGNIFICANT CHANGE UP (ref 13–44)
MCHC RBC-ENTMCNC: 33.3 PG — SIGNIFICANT CHANGE UP (ref 27–34)
MCHC RBC-ENTMCNC: 34 GM/DL — SIGNIFICANT CHANGE UP (ref 32–36)
MCV RBC AUTO: 98 FL — SIGNIFICANT CHANGE UP (ref 80–100)
MONOCYTES # BLD AUTO: 0.7 K/UL — SIGNIFICANT CHANGE UP (ref 0–0.9)
MONOCYTES NFR BLD AUTO: 8.6 % — SIGNIFICANT CHANGE UP (ref 2–14)
NEUTROPHILS # BLD AUTO: 5.7 K/UL — SIGNIFICANT CHANGE UP (ref 1.8–7.4)
NEUTROPHILS NFR BLD AUTO: 69 % — SIGNIFICANT CHANGE UP (ref 43–77)
PLATELET # BLD AUTO: 350 K/UL — SIGNIFICANT CHANGE UP (ref 150–400)
RBC # BLD: 3.63 M/UL — LOW (ref 4.2–5.8)
RBC # FLD: 18.4 % — HIGH (ref 10.3–14.5)
WBC # BLD: 8.2 K/UL — SIGNIFICANT CHANGE UP (ref 3.8–10.5)
WBC # FLD AUTO: 8.2 K/UL — SIGNIFICANT CHANGE UP (ref 3.8–10.5)

## 2017-11-21 PROCEDURE — 81241 F5 GENE: CPT

## 2017-11-21 PROCEDURE — G0452: CPT | Mod: 26

## 2017-11-21 PROCEDURE — 99205 OFFICE O/P NEW HI 60 MIN: CPT

## 2017-11-21 RX ORDER — MONTELUKAST 10 MG/1
10 TABLET, FILM COATED ORAL
Qty: 30 | Refills: 0 | Status: DISCONTINUED | COMMUNITY
Start: 2017-10-10

## 2017-11-21 RX ORDER — LINAGLIPTIN 5 MG/1
5 TABLET, FILM COATED ORAL
Qty: 90 | Refills: 0 | Status: DISCONTINUED | COMMUNITY
Start: 2017-05-18

## 2017-11-21 RX ORDER — CYCLOBENZAPRINE HYDROCHLORIDE 5 MG/1
5 TABLET, FILM COATED ORAL
Qty: 30 | Refills: 0 | Status: DISCONTINUED | COMMUNITY
Start: 2017-08-24

## 2017-11-21 RX ORDER — LEVOFLOXACIN 500 MG/1
500 TABLET, FILM COATED ORAL
Qty: 10 | Refills: 0 | Status: DISCONTINUED | COMMUNITY
Start: 2017-10-10

## 2017-11-21 RX ORDER — VALSARTAN 160 MG/1
160 TABLET, COATED ORAL
Qty: 90 | Refills: 0 | Status: DISCONTINUED | COMMUNITY
Start: 2017-08-11

## 2017-11-21 RX ORDER — METOPROLOL SUCCINATE 25 MG/1
25 TABLET, EXTENDED RELEASE ORAL
Qty: 30 | Refills: 0 | Status: DISCONTINUED | COMMUNITY
Start: 2017-11-02

## 2017-11-21 RX ORDER — NAPROXEN 500 MG/1
500 TABLET ORAL
Qty: 30 | Refills: 0 | Status: DISCONTINUED | COMMUNITY
Start: 2017-08-24

## 2017-11-21 RX ORDER — VALSARTAN 80 MG/1
80 TABLET, COATED ORAL
Refills: 0 | Status: DISCONTINUED | COMMUNITY
End: 2017-11-21

## 2017-11-21 RX ORDER — AMOXICILLIN AND CLAVULANATE POTASSIUM 875; 125 MG/1; MG/1
875-125 TABLET, COATED ORAL
Qty: 12 | Refills: 0 | Status: DISCONTINUED | COMMUNITY
Start: 2017-11-14

## 2017-11-21 RX ORDER — BLOOD-GLUCOSE METER
W/DEVICE EACH MISCELLANEOUS
Qty: 1 | Refills: 0 | Status: DISCONTINUED | COMMUNITY
Start: 2017-11-02

## 2017-11-21 RX ORDER — TADALAFIL 20 MG/1
20 TABLET, FILM COATED ORAL
Qty: 10 | Refills: 0 | Status: DISCONTINUED | COMMUNITY
Start: 2017-01-23

## 2017-11-21 RX ORDER — ATORVASTATIN CALCIUM 40 MG/1
40 TABLET, FILM COATED ORAL
Qty: 90 | Refills: 0 | Status: DISCONTINUED | COMMUNITY
Start: 2017-08-09

## 2017-11-21 RX ORDER — VALACYCLOVIR 1 G/1
1 TABLET, FILM COATED ORAL
Qty: 2 | Refills: 0 | Status: DISCONTINUED | COMMUNITY
Start: 2017-11-14

## 2017-11-21 RX ORDER — PIOGLITAZONE HYDROCHLORIDE 30 MG/1
30 TABLET ORAL
Qty: 90 | Refills: 0 | Status: DISCONTINUED | COMMUNITY
Start: 2017-05-18

## 2017-11-21 RX ORDER — SODIUM SULFATE, POTASSIUM SULFATE, MAGNESIUM SULFATE 17.5; 3.13; 1.6 G/ML; G/ML; G/ML
17.5-3.13-1.6 SOLUTION, CONCENTRATE ORAL
Qty: 354 | Refills: 0 | Status: DISCONTINUED | COMMUNITY
Start: 2017-09-13

## 2017-11-21 RX ORDER — BLOOD SUGAR DIAGNOSTIC
STRIP MISCELLANEOUS
Qty: 100 | Refills: 0 | Status: DISCONTINUED | COMMUNITY
Start: 2017-11-02

## 2017-11-21 RX ORDER — LANCETS 33 GAUGE
EACH MISCELLANEOUS
Qty: 100 | Refills: 0 | Status: DISCONTINUED | COMMUNITY
Start: 2017-11-02

## 2017-11-21 SDOH — SOCIAL STABILITY - SOCIAL INSECURITY: DISSAPEARANCE AND DEATH OF FAMILY MEMBER: Z63.4

## 2017-11-29 NOTE — ASU PATIENT PROFILE, ADULT - PMH
Adenocarcinoma of pancreas    Asthma    BPH (benign prostatic hyperplasia)    Bronchitis  1 month ago  Diabetes   @ 10 :40 this am  Fever  104  11/11/17 ; pt admitted to [facility]  GERD (gastroesophageal reflux disease)    Herpes  lips  High cholesterol    HTN (hypertension)

## 2017-11-30 ENCOUNTER — RESULT REVIEW (OUTPATIENT)
Age: 62
End: 2017-11-30

## 2017-11-30 ENCOUNTER — INPATIENT (INPATIENT)
Facility: HOSPITAL | Age: 62
LOS: 14 days | Discharge: HOME HEALTH | End: 2017-12-15
Attending: SPECIALIST | Admitting: SPECIALIST
Payer: COMMERCIAL

## 2017-11-30 ENCOUNTER — APPOINTMENT (OUTPATIENT)
Dept: SURGICAL ONCOLOGY | Facility: HOSPITAL | Age: 62
End: 2017-11-30

## 2017-11-30 VITALS
RESPIRATION RATE: 16 BRPM | HEIGHT: 64.25 IN | TEMPERATURE: 98 F | WEIGHT: 194.01 LBS | SYSTOLIC BLOOD PRESSURE: 115 MMHG | DIASTOLIC BLOOD PRESSURE: 72 MMHG | HEART RATE: 99 BPM | OXYGEN SATURATION: 97 %

## 2017-11-30 DIAGNOSIS — Z98.890 OTHER SPECIFIED POSTPROCEDURAL STATES: Chronic | ICD-10-CM

## 2017-11-30 DIAGNOSIS — C25.9 MALIGNANT NEOPLASM OF PANCREAS, UNSPECIFIED: ICD-10-CM

## 2017-11-30 LAB
ALBUMIN SERPL ELPH-MCNC: 3.2 G/DL — LOW (ref 3.3–5)
ALP SERPL-CCNC: 90 U/L — SIGNIFICANT CHANGE UP (ref 40–120)
ALT FLD-CCNC: 38 U/L — SIGNIFICANT CHANGE UP (ref 4–41)
AST SERPL-CCNC: 41 U/L — HIGH (ref 4–40)
BASE EXCESS BLDA CALC-SCNC: -0.1 MMOL/L — SIGNIFICANT CHANGE UP
BASE EXCESS BLDA CALC-SCNC: -0.2 MMOL/L — SIGNIFICANT CHANGE UP
BASE EXCESS BLDA CALC-SCNC: -1.6 MMOL/L — SIGNIFICANT CHANGE UP
BASE EXCESS BLDA CALC-SCNC: 0 MMOL/L — SIGNIFICANT CHANGE UP
BASE EXCESS BLDA CALC-SCNC: 0.1 MMOL/L — SIGNIFICANT CHANGE UP
BASE EXCESS BLDA CALC-SCNC: 1.3 MMOL/L — SIGNIFICANT CHANGE UP
BILIRUB SERPL-MCNC: 2.8 MG/DL — HIGH (ref 0.2–1.2)
BUN SERPL-MCNC: 10 MG/DL — SIGNIFICANT CHANGE UP (ref 7–23)
CA-I BLDA-SCNC: 1.13 MMOL/L — LOW (ref 1.15–1.29)
CA-I BLDA-SCNC: 1.19 MMOL/L — SIGNIFICANT CHANGE UP (ref 1.15–1.29)
CA-I BLDA-SCNC: 1.2 MMOL/L — SIGNIFICANT CHANGE UP (ref 1.15–1.29)
CA-I BLDA-SCNC: 1.23 MMOL/L — SIGNIFICANT CHANGE UP (ref 1.15–1.29)
CA-I BLDA-SCNC: 1.36 MMOL/L — HIGH (ref 1.15–1.29)
CALCIUM SERPL-MCNC: 8.7 MG/DL — SIGNIFICANT CHANGE UP (ref 8.4–10.5)
CHLORIDE SERPL-SCNC: 102 MMOL/L — SIGNIFICANT CHANGE UP (ref 98–107)
CO2 SERPL-SCNC: 22 MMOL/L — SIGNIFICANT CHANGE UP (ref 22–31)
CREAT SERPL-MCNC: 0.92 MG/DL — SIGNIFICANT CHANGE UP (ref 0.5–1.3)
GLUCOSE BLDA-MCNC: 105 MG/DL — HIGH (ref 70–99)
GLUCOSE BLDA-MCNC: 140 MG/DL — HIGH (ref 70–99)
GLUCOSE BLDA-MCNC: 174 MG/DL — HIGH (ref 70–99)
GLUCOSE BLDA-MCNC: 197 MG/DL — HIGH (ref 70–99)
GLUCOSE BLDA-MCNC: 203 MG/DL — HIGH (ref 70–99)
GLUCOSE BLDC GLUCOMTR-MCNC: 124 MG/DL — HIGH (ref 70–99)
GLUCOSE SERPL-MCNC: 130 MG/DL — HIGH (ref 70–99)
HCO3 BLDA-SCNC: 23 MMOL/L — SIGNIFICANT CHANGE UP (ref 22–26)
HCO3 BLDA-SCNC: 25 MMOL/L — SIGNIFICANT CHANGE UP (ref 22–26)
HCO3 BLDA-SCNC: 26 MMOL/L — SIGNIFICANT CHANGE UP (ref 22–26)
HCT VFR BLD CALC: 35.9 % — LOW (ref 39–50)
HCT VFR BLDA CALC: 32.4 % — LOW (ref 39–51)
HCT VFR BLDA CALC: 33 % — LOW (ref 39–51)
HCT VFR BLDA CALC: 33.7 % — LOW (ref 39–51)
HCT VFR BLDA CALC: 36.5 % — LOW (ref 39–51)
HCT VFR BLDA CALC: 38.2 % — LOW (ref 39–51)
HGB BLD-MCNC: 12.5 G/DL — LOW (ref 13–17)
HGB BLDA-MCNC: 10.5 G/DL — LOW (ref 13–17)
HGB BLDA-MCNC: 10.7 G/DL — LOW (ref 13–17)
HGB BLDA-MCNC: 10.9 G/DL — LOW (ref 13–17)
HGB BLDA-MCNC: 11.9 G/DL — LOW (ref 13–17)
HGB BLDA-MCNC: 12.4 G/DL — LOW (ref 13–17)
LACTATE BLDA-SCNC: 2.3 MMOL/L — HIGH (ref 0.5–2)
MCHC RBC-ENTMCNC: 33.6 PG — SIGNIFICANT CHANGE UP (ref 27–34)
MCHC RBC-ENTMCNC: 34.8 % — SIGNIFICANT CHANGE UP (ref 32–36)
MCV RBC AUTO: 96.5 FL — SIGNIFICANT CHANGE UP (ref 80–100)
NRBC # FLD: 0 — SIGNIFICANT CHANGE UP
PCO2 BLDA: 32 MMHG — LOW (ref 35–48)
PCO2 BLDA: 35 MMHG — SIGNIFICANT CHANGE UP (ref 35–48)
PCO2 BLDA: 37 MMHG — SIGNIFICANT CHANGE UP (ref 35–48)
PCO2 BLDA: 37 MMHG — SIGNIFICANT CHANGE UP (ref 35–48)
PH BLDA: 7.4 PH — SIGNIFICANT CHANGE UP (ref 7.35–7.45)
PH BLDA: 7.43 PH — SIGNIFICANT CHANGE UP (ref 7.35–7.45)
PH BLDA: 7.44 PH — SIGNIFICANT CHANGE UP (ref 7.35–7.45)
PH BLDA: 7.45 PH — SIGNIFICANT CHANGE UP (ref 7.35–7.45)
PH BLDA: 7.47 PH — HIGH (ref 7.35–7.45)
PH BLDA: 7.47 PH — HIGH (ref 7.35–7.45)
PLATELET # BLD AUTO: 208 K/UL — SIGNIFICANT CHANGE UP (ref 150–400)
PMV BLD: 10.2 FL — SIGNIFICANT CHANGE UP (ref 7–13)
PO2 BLDA: 108 MMHG — SIGNIFICANT CHANGE UP (ref 83–108)
PO2 BLDA: 155 MMHG — HIGH (ref 83–108)
PO2 BLDA: 180 MMHG — HIGH (ref 83–108)
PO2 BLDA: 211 MMHG — HIGH (ref 83–108)
PO2 BLDA: 217 MMHG — HIGH (ref 83–108)
PO2 BLDA: 278 MMHG — HIGH (ref 83–108)
POTASSIUM BLDA-SCNC: 3.2 MMOL/L — LOW (ref 3.4–4.5)
POTASSIUM BLDA-SCNC: 3.5 MMOL/L — SIGNIFICANT CHANGE UP (ref 3.4–4.5)
POTASSIUM BLDA-SCNC: 3.5 MMOL/L — SIGNIFICANT CHANGE UP (ref 3.4–4.5)
POTASSIUM BLDA-SCNC: 3.6 MMOL/L — SIGNIFICANT CHANGE UP (ref 3.4–4.5)
POTASSIUM BLDA-SCNC: 3.6 MMOL/L — SIGNIFICANT CHANGE UP (ref 3.4–4.5)
POTASSIUM SERPL-MCNC: 4 MMOL/L — SIGNIFICANT CHANGE UP (ref 3.5–5.3)
POTASSIUM SERPL-SCNC: 4 MMOL/L — SIGNIFICANT CHANGE UP (ref 3.5–5.3)
PROT SERPL-MCNC: 5.3 G/DL — LOW (ref 6–8.3)
RBC # BLD: 3.72 M/UL — LOW (ref 4.2–5.8)
RBC # FLD: 18 % — HIGH (ref 10.3–14.5)
RH IG SCN BLD-IMP: POSITIVE — SIGNIFICANT CHANGE UP
SAO2 % BLDA: 100 % — HIGH (ref 95–99)
SAO2 % BLDA: 100 % — HIGH (ref 95–99)
SAO2 % BLDA: 98.8 % — SIGNIFICANT CHANGE UP (ref 95–99)
SAO2 % BLDA: 99.6 % — HIGH (ref 95–99)
SAO2 % BLDA: 99.8 % — HIGH (ref 95–99)
SAO2 % BLDA: 99.8 % — HIGH (ref 95–99)
SODIUM BLDA-SCNC: 136 MMOL/L — SIGNIFICANT CHANGE UP (ref 136–146)
SODIUM BLDA-SCNC: 136 MMOL/L — SIGNIFICANT CHANGE UP (ref 136–146)
SODIUM BLDA-SCNC: 137 MMOL/L — SIGNIFICANT CHANGE UP (ref 136–146)
SODIUM BLDA-SCNC: 138 MMOL/L — SIGNIFICANT CHANGE UP (ref 136–146)
SODIUM BLDA-SCNC: 139 MMOL/L — SIGNIFICANT CHANGE UP (ref 136–146)
SODIUM SERPL-SCNC: 137 MMOL/L — SIGNIFICANT CHANGE UP (ref 135–145)
WBC # BLD: 8.34 K/UL — SIGNIFICANT CHANGE UP (ref 3.8–10.5)
WBC # FLD AUTO: 8.34 K/UL — SIGNIFICANT CHANGE UP (ref 3.8–10.5)

## 2017-11-30 PROCEDURE — 88300 SURGICAL PATH GROSS: CPT | Mod: 26,59

## 2017-11-30 PROCEDURE — 49203: CPT

## 2017-11-30 PROCEDURE — 38747 REMOVE ABDOMINAL LYMPH NODES: CPT | Mod: 59

## 2017-11-30 PROCEDURE — 44015 INSERT NEEDLE CATH BOWEL: CPT

## 2017-11-30 PROCEDURE — 88309 TISSUE EXAM BY PATHOLOGIST: CPT | Mod: 26

## 2017-11-30 PROCEDURE — 44120 REMOVAL OF SMALL INTESTINE: CPT | Mod: 59

## 2017-11-30 PROCEDURE — 48150 PARTIAL REMOVAL OF PANCREAS: CPT

## 2017-11-30 PROCEDURE — 88304 TISSUE EXAM BY PATHOLOGIST: CPT | Mod: 26

## 2017-11-30 PROCEDURE — 88307 TISSUE EXAM BY PATHOLOGIST: CPT | Mod: 26

## 2017-11-30 PROCEDURE — 88305 TISSUE EXAM BY PATHOLOGIST: CPT | Mod: 26

## 2017-11-30 PROCEDURE — 88331 PATH CONSLTJ SURG 1 BLK 1SPC: CPT | Mod: 26

## 2017-11-30 PROCEDURE — 93010 ELECTROCARDIOGRAM REPORT: CPT

## 2017-11-30 RX ORDER — ONDANSETRON 8 MG/1
4 TABLET, FILM COATED ORAL EVERY 6 HOURS
Qty: 0 | Refills: 0 | Status: DISCONTINUED | OUTPATIENT
Start: 2017-11-30 | End: 2017-12-04

## 2017-11-30 RX ORDER — SODIUM CHLORIDE 9 MG/ML
1000 INJECTION, SOLUTION INTRAVENOUS ONCE
Qty: 0 | Refills: 0 | Status: COMPLETED | OUTPATIENT
Start: 2017-11-30 | End: 2017-11-30

## 2017-11-30 RX ORDER — FENTANYL CITRATE 50 UG/ML
25 INJECTION INTRAVENOUS
Qty: 0 | Refills: 0 | Status: DISCONTINUED | OUTPATIENT
Start: 2017-11-30 | End: 2017-12-01

## 2017-11-30 RX ORDER — SODIUM CHLORIDE 9 MG/ML
1000 INJECTION, SOLUTION INTRAVENOUS
Qty: 0 | Refills: 0 | Status: DISCONTINUED | OUTPATIENT
Start: 2017-11-30 | End: 2017-12-01

## 2017-11-30 RX ORDER — SODIUM CHLORIDE 9 MG/ML
1000 INJECTION, SOLUTION INTRAVENOUS
Qty: 0 | Refills: 0 | Status: DISCONTINUED | OUTPATIENT
Start: 2017-11-30 | End: 2017-12-04

## 2017-11-30 RX ORDER — GLUCAGON INJECTION, SOLUTION 0.5 MG/.1ML
1 INJECTION, SOLUTION SUBCUTANEOUS ONCE
Qty: 0 | Refills: 0 | Status: DISCONTINUED | OUTPATIENT
Start: 2017-11-30 | End: 2017-12-15

## 2017-11-30 RX ORDER — ACETAMINOPHEN 500 MG
1000 TABLET ORAL ONCE
Qty: 0 | Refills: 0 | Status: COMPLETED | OUTPATIENT
Start: 2017-11-30 | End: 2017-11-30

## 2017-11-30 RX ORDER — CEFOTETAN DISODIUM 1 G
2 VIAL (EA) INJECTION ONCE
Qty: 0 | Refills: 0 | Status: COMPLETED | OUTPATIENT
Start: 2017-11-30 | End: 2017-11-30

## 2017-11-30 RX ORDER — DEXTROSE 50 % IN WATER 50 %
1 SYRINGE (ML) INTRAVENOUS ONCE
Qty: 0 | Refills: 0 | Status: DISCONTINUED | OUTPATIENT
Start: 2017-11-30 | End: 2017-12-15

## 2017-11-30 RX ORDER — HYDROMORPHONE HYDROCHLORIDE 2 MG/ML
0.5 INJECTION INTRAMUSCULAR; INTRAVENOUS; SUBCUTANEOUS
Qty: 0 | Refills: 0 | Status: DISCONTINUED | OUTPATIENT
Start: 2017-11-30 | End: 2017-12-01

## 2017-11-30 RX ORDER — HEPARIN SODIUM 5000 [USP'U]/ML
5000 INJECTION INTRAVENOUS; SUBCUTANEOUS ONCE
Qty: 0 | Refills: 0 | Status: COMPLETED | OUTPATIENT
Start: 2017-11-30 | End: 2017-11-30

## 2017-11-30 RX ORDER — INSULIN LISPRO 100/ML
VIAL (ML) SUBCUTANEOUS EVERY 6 HOURS
Qty: 0 | Refills: 0 | Status: DISCONTINUED | OUTPATIENT
Start: 2017-11-30 | End: 2017-12-06

## 2017-11-30 RX ORDER — DEXTROSE 50 % IN WATER 50 %
25 SYRINGE (ML) INTRAVENOUS ONCE
Qty: 0 | Refills: 0 | Status: DISCONTINUED | OUTPATIENT
Start: 2017-11-30 | End: 2017-12-15

## 2017-11-30 RX ORDER — PANTOPRAZOLE SODIUM 20 MG/1
40 TABLET, DELAYED RELEASE ORAL DAILY
Qty: 0 | Refills: 0 | Status: DISCONTINUED | OUTPATIENT
Start: 2017-11-30 | End: 2017-12-12

## 2017-11-30 RX ORDER — ONDANSETRON 8 MG/1
4 TABLET, FILM COATED ORAL ONCE
Qty: 0 | Refills: 0 | Status: DISCONTINUED | OUTPATIENT
Start: 2017-11-30 | End: 2017-12-15

## 2017-11-30 RX ORDER — NALOXONE HYDROCHLORIDE 4 MG/.1ML
0.1 SPRAY NASAL
Qty: 0 | Refills: 0 | Status: DISCONTINUED | OUTPATIENT
Start: 2017-11-30 | End: 2017-12-04

## 2017-11-30 RX ORDER — METOPROLOL TARTRATE 50 MG
5 TABLET ORAL EVERY 6 HOURS
Qty: 0 | Refills: 0 | Status: DISCONTINUED | OUTPATIENT
Start: 2017-11-30 | End: 2017-12-12

## 2017-11-30 RX ORDER — INSULIN LISPRO 100/ML
VIAL (ML) SUBCUTANEOUS
Qty: 0 | Refills: 0 | Status: DISCONTINUED | OUTPATIENT
Start: 2017-11-30 | End: 2017-11-30

## 2017-11-30 RX ORDER — DEXTROSE 50 % IN WATER 50 %
12.5 SYRINGE (ML) INTRAVENOUS ONCE
Qty: 0 | Refills: 0 | Status: DISCONTINUED | OUTPATIENT
Start: 2017-11-30 | End: 2017-12-15

## 2017-11-30 RX ORDER — HEPARIN SODIUM 5000 [USP'U]/ML
5000 INJECTION INTRAVENOUS; SUBCUTANEOUS EVERY 8 HOURS
Qty: 0 | Refills: 0 | Status: DISCONTINUED | OUTPATIENT
Start: 2017-11-30 | End: 2017-12-01

## 2017-11-30 RX ADMIN — Medication 100 GRAM(S): at 19:45

## 2017-11-30 RX ADMIN — SODIUM CHLORIDE 100 MILLILITER(S): 9 INJECTION, SOLUTION INTRAVENOUS at 16:10

## 2017-11-30 RX ADMIN — Medication 400 MILLIGRAM(S): at 21:40

## 2017-11-30 RX ADMIN — Medication 1000 MILLIGRAM(S): at 22:20

## 2017-11-30 RX ADMIN — SODIUM CHLORIDE 2000 MILLILITER(S): 9 INJECTION, SOLUTION INTRAVENOUS at 19:39

## 2017-11-30 RX ADMIN — HEPARIN SODIUM 5000 UNIT(S): 5000 INJECTION INTRAVENOUS; SUBCUTANEOUS at 22:17

## 2017-11-30 RX ADMIN — SODIUM CHLORIDE 2000 MILLILITER(S): 9 INJECTION, SOLUTION INTRAVENOUS at 20:50

## 2017-11-30 RX ADMIN — Medication 5 MILLIGRAM(S): at 23:02

## 2017-11-30 NOTE — ASU PREOP CHECKLIST - SELECT TESTS ORDERED
Type and Screen/Type and Cross/CBC/BMP/EKG Type and Screen/BMP/CBC/Type and Cross/EKG/POCT Blood Glucose/124

## 2017-11-30 NOTE — CHART NOTE - NSCHARTNOTEFT_GEN_A_CORE
Surgery Post-Op Note    Pre-Op Dx:   Procedure: Whipple   Surgeon:     Subjective:   Pt seen and examined at the bedside. Pt w/ no complaints. Denies F/C/N/V. Pain controlled with medication.     Vital Signs Last 24 Hrs  T(C): 37 (30 Nov 2017 19:30), Max: 37.2 (30 Nov 2017 18:00)  T(F): 98.6 (30 Nov 2017 19:30), Max: 99 (30 Nov 2017 18:00)  HR: 106 (30 Nov 2017 20:30) (98 - 120)  BP: 110/65 (30 Nov 2017 20:30) (94/56 - 116/76)  BP(mean): --  RR: 22 (30 Nov 2017 20:30) (12 - 26)  SpO2: 97% (30 Nov 2017 20:30) (96% - 100%)    Physical Exam:  General: NAD, resting comfortably in bed  Neuro: A/O x 3, no focal deficits  Pulmonary: Nonlabored breathing, no respiratory distress, on 2L NC  Cardiovascular: sinus tachycardia   Abdominal: NGT collecting bilious fluid, appropriately tender, moderately distended   Incision: laparotomy with some strikethrough   Drains: R MUNA collecting minimal serosanguinous fluid   Extremities: WWP        LABS:                        12.5   8.34  )-----------( 208      ( 30 Nov 2017 16:30 )             35.9     11-30    137  |  102  |  10  ----------------------------<  130<H>  4.0   |  22  |  0.92    Ca    8.7      30 Nov 2017 16:30    TPro  5.3<L>  /  Alb  3.2<L>  /  TBili  2.8<H>  /  DBili  x   /  AST  41<H>  /  ALT  38  /  AlkPhos  90  11-30      CAPILLARY BLOOD GLUCOSE      POCT Blood Glucose.: 124 mg/dL (30 Nov 2017 06:43)      LIVER FUNCTIONS - ( 30 Nov 2017 16:30 )  Alb: 3.2 g/dL / Pro: 5.3 g/dL / ALK PHOS: 90 u/L / ALT: 38 u/L / AST: 41 u/L / GGT: x           ABO Interpretation: A (11-30 @ 06:42)        Radiology and Additional Studies:    Assessment:61y Male s/p above procedure    Plan:  IVF, Diet: NPO/NGT  Pain/nausea control PRN  Incentive spirometer/OOB/Ambulate  H/H stable  EKG - sinus tachycardia  1L LR bolus ordered, UOP 40cc in last hour   Pain control  will continue to monitor in PACU

## 2017-12-01 PROBLEM — D68.51 FACTOR 5 LEIDEN MUTATION, HETEROZYGOUS: Status: ACTIVE | Noted: 2017-12-01

## 2017-12-01 LAB
ALBUMIN SERPL ELPH-MCNC: 3.1 G/DL — LOW (ref 3.3–5)
ALP SERPL-CCNC: 86 U/L — SIGNIFICANT CHANGE UP (ref 40–120)
ALT FLD-CCNC: 45 U/L — HIGH (ref 4–41)
APTT BLD: 32.5 SEC — SIGNIFICANT CHANGE UP (ref 27.5–37.4)
APTT BLD: 38.6 SEC — HIGH (ref 27.5–37.4)
AST SERPL-CCNC: 50 U/L — HIGH (ref 4–40)
BASOPHILS # BLD AUTO: 0.03 K/UL — SIGNIFICANT CHANGE UP (ref 0–0.2)
BASOPHILS NFR BLD AUTO: 0.4 % — SIGNIFICANT CHANGE UP (ref 0–2)
BILIRUB DIRECT SERPL-MCNC: 0.8 MG/DL — HIGH (ref 0.1–0.2)
BILIRUB SERPL-MCNC: 2.6 MG/DL — HIGH (ref 0.2–1.2)
BUN SERPL-MCNC: 10 MG/DL — SIGNIFICANT CHANGE UP (ref 7–23)
BUN SERPL-MCNC: 11 MG/DL — SIGNIFICANT CHANGE UP (ref 7–23)
CALCIUM SERPL-MCNC: 8.7 MG/DL — SIGNIFICANT CHANGE UP (ref 8.4–10.5)
CALCIUM SERPL-MCNC: 8.8 MG/DL — SIGNIFICANT CHANGE UP (ref 8.4–10.5)
CHLORIDE SERPL-SCNC: 101 MMOL/L — SIGNIFICANT CHANGE UP (ref 98–107)
CHLORIDE SERPL-SCNC: 101 MMOL/L — SIGNIFICANT CHANGE UP (ref 98–107)
CO2 SERPL-SCNC: 27 MMOL/L — SIGNIFICANT CHANGE UP (ref 22–31)
CO2 SERPL-SCNC: 29 MMOL/L — SIGNIFICANT CHANGE UP (ref 22–31)
CREAT SERPL-MCNC: 1.08 MG/DL — SIGNIFICANT CHANGE UP (ref 0.5–1.3)
CREAT SERPL-MCNC: 1.11 MG/DL — SIGNIFICANT CHANGE UP (ref 0.5–1.3)
EOSINOPHIL # BLD AUTO: 0 K/UL — SIGNIFICANT CHANGE UP (ref 0–0.5)
EOSINOPHIL NFR BLD AUTO: 0 % — SIGNIFICANT CHANGE UP (ref 0–6)
GLUCOSE BLDC GLUCOMTR-MCNC: 119 MG/DL — HIGH (ref 70–99)
GLUCOSE BLDC GLUCOMTR-MCNC: 120 MG/DL — HIGH (ref 70–99)
GLUCOSE BLDC GLUCOMTR-MCNC: 137 MG/DL — HIGH (ref 70–99)
GLUCOSE BLDC GLUCOMTR-MCNC: 146 MG/DL — HIGH (ref 70–99)
GLUCOSE SERPL-MCNC: 133 MG/DL — HIGH (ref 70–99)
GLUCOSE SERPL-MCNC: 151 MG/DL — HIGH (ref 70–99)
HCT VFR BLD CALC: 38.2 % — LOW (ref 39–50)
HCT VFR BLD CALC: 39.9 % — SIGNIFICANT CHANGE UP (ref 39–50)
HGB BLD-MCNC: 12.4 G/DL — LOW (ref 13–17)
HGB BLD-MCNC: 13 G/DL — SIGNIFICANT CHANGE UP (ref 13–17)
IMM GRANULOCYTES # BLD AUTO: 0.03 # — SIGNIFICANT CHANGE UP
IMM GRANULOCYTES NFR BLD AUTO: 0.4 % — SIGNIFICANT CHANGE UP (ref 0–1.5)
INR BLD: 1.4 — HIGH (ref 0.88–1.17)
INR BLD: 1.48 — HIGH (ref 0.88–1.17)
LYMPHOCYTES # BLD AUTO: 0.86 K/UL — LOW (ref 1–3.3)
LYMPHOCYTES # BLD AUTO: 10.2 % — LOW (ref 13–44)
MAGNESIUM SERPL-MCNC: 1.4 MG/DL — LOW (ref 1.6–2.6)
MCHC RBC-ENTMCNC: 32.3 PG — SIGNIFICANT CHANGE UP (ref 27–34)
MCHC RBC-ENTMCNC: 32.3 PG — SIGNIFICANT CHANGE UP (ref 27–34)
MCHC RBC-ENTMCNC: 32.5 % — SIGNIFICANT CHANGE UP (ref 32–36)
MCHC RBC-ENTMCNC: 32.6 % — SIGNIFICANT CHANGE UP (ref 32–36)
MCV RBC AUTO: 99.3 FL — SIGNIFICANT CHANGE UP (ref 80–100)
MCV RBC AUTO: 99.5 FL — SIGNIFICANT CHANGE UP (ref 80–100)
MONOCYTES # BLD AUTO: 0.57 K/UL — SIGNIFICANT CHANGE UP (ref 0–0.9)
MONOCYTES NFR BLD AUTO: 6.8 % — SIGNIFICANT CHANGE UP (ref 2–14)
NEUTROPHILS # BLD AUTO: 6.94 K/UL — SIGNIFICANT CHANGE UP (ref 1.8–7.4)
NEUTROPHILS NFR BLD AUTO: 82.2 % — HIGH (ref 43–77)
NRBC # FLD: 0 — SIGNIFICANT CHANGE UP
NRBC # FLD: 0 — SIGNIFICANT CHANGE UP
PHOSPHATE SERPL-MCNC: 4.8 MG/DL — HIGH (ref 2.5–4.5)
PLATELET # BLD AUTO: 200 K/UL — SIGNIFICANT CHANGE UP (ref 150–400)
PLATELET # BLD AUTO: 236 K/UL — SIGNIFICANT CHANGE UP (ref 150–400)
PMV BLD: 10.1 FL — SIGNIFICANT CHANGE UP (ref 7–13)
PMV BLD: 10.2 FL — SIGNIFICANT CHANGE UP (ref 7–13)
POTASSIUM SERPL-MCNC: 4.2 MMOL/L — SIGNIFICANT CHANGE UP (ref 3.5–5.3)
POTASSIUM SERPL-MCNC: 4.6 MMOL/L — SIGNIFICANT CHANGE UP (ref 3.5–5.3)
POTASSIUM SERPL-SCNC: 4.2 MMOL/L — SIGNIFICANT CHANGE UP (ref 3.5–5.3)
POTASSIUM SERPL-SCNC: 4.6 MMOL/L — SIGNIFICANT CHANGE UP (ref 3.5–5.3)
PROT SERPL-MCNC: 5.4 G/DL — LOW (ref 6–8.3)
PROTHROM AB SERPL-ACNC: 15.8 SEC — HIGH (ref 9.8–13.1)
PROTHROM AB SERPL-ACNC: 16.7 SEC — HIGH (ref 9.8–13.1)
RBC # BLD: 3.84 M/UL — LOW (ref 4.2–5.8)
RBC # BLD: 4.02 M/UL — LOW (ref 4.2–5.8)
RBC # FLD: 18.3 % — HIGH (ref 10.3–14.5)
RBC # FLD: 18.5 % — HIGH (ref 10.3–14.5)
SODIUM SERPL-SCNC: 136 MMOL/L — SIGNIFICANT CHANGE UP (ref 135–145)
SODIUM SERPL-SCNC: 139 MMOL/L — SIGNIFICANT CHANGE UP (ref 135–145)
WBC # BLD: 8.43 K/UL — SIGNIFICANT CHANGE UP (ref 3.8–10.5)
WBC # BLD: 9.25 K/UL — SIGNIFICANT CHANGE UP (ref 3.8–10.5)
WBC # FLD AUTO: 8.43 K/UL — SIGNIFICANT CHANGE UP (ref 3.8–10.5)
WBC # FLD AUTO: 9.25 K/UL — SIGNIFICANT CHANGE UP (ref 3.8–10.5)

## 2017-12-01 PROCEDURE — 74000: CPT | Mod: 26

## 2017-12-01 RX ORDER — HYDROMORPHONE HYDROCHLORIDE 2 MG/ML
0.5 INJECTION INTRAMUSCULAR; INTRAVENOUS; SUBCUTANEOUS
Qty: 0 | Refills: 0 | Status: DISCONTINUED | OUTPATIENT
Start: 2017-12-01 | End: 2017-12-04

## 2017-12-01 RX ORDER — MAGNESIUM SULFATE 500 MG/ML
2 VIAL (ML) INJECTION ONCE
Qty: 0 | Refills: 0 | Status: COMPLETED | OUTPATIENT
Start: 2017-12-01 | End: 2017-12-01

## 2017-12-01 RX ORDER — SODIUM CHLORIDE 9 MG/ML
1000 INJECTION, SOLUTION INTRAVENOUS
Qty: 0 | Refills: 0 | Status: DISCONTINUED | OUTPATIENT
Start: 2017-12-01 | End: 2017-12-03

## 2017-12-01 RX ORDER — ACETAMINOPHEN 500 MG
1000 TABLET ORAL EVERY 6 HOURS
Qty: 0 | Refills: 0 | Status: COMPLETED | OUTPATIENT
Start: 2017-12-01 | End: 2017-12-04

## 2017-12-01 RX ORDER — PHYTONADIONE (VIT K1) 5 MG
5 TABLET ORAL ONCE
Qty: 0 | Refills: 0 | Status: COMPLETED | OUTPATIENT
Start: 2017-12-01 | End: 2017-12-01

## 2017-12-01 RX ORDER — HEPARIN SODIUM 5000 [USP'U]/ML
5000 INJECTION INTRAVENOUS; SUBCUTANEOUS EVERY 8 HOURS
Qty: 0 | Refills: 0 | Status: DISCONTINUED | OUTPATIENT
Start: 2017-12-01 | End: 2017-12-04

## 2017-12-01 RX ORDER — SIMETHICONE 80 MG/1
80 TABLET, CHEWABLE ORAL DAILY
Qty: 0 | Refills: 0 | Status: DISCONTINUED | OUTPATIENT
Start: 2017-12-01 | End: 2017-12-05

## 2017-12-01 RX ORDER — SODIUM CHLORIDE 9 MG/ML
1000 INJECTION, SOLUTION INTRAVENOUS ONCE
Qty: 0 | Refills: 0 | Status: COMPLETED | OUTPATIENT
Start: 2017-12-01 | End: 2017-12-01

## 2017-12-01 RX ADMIN — HEPARIN SODIUM 5000 UNIT(S): 5000 INJECTION INTRAVENOUS; SUBCUTANEOUS at 05:02

## 2017-12-01 RX ADMIN — SODIUM CHLORIDE 100 MILLILITER(S): 9 INJECTION, SOLUTION INTRAVENOUS at 17:58

## 2017-12-01 RX ADMIN — Medication 400 MILLIGRAM(S): at 12:35

## 2017-12-01 RX ADMIN — HEPARIN SODIUM 5000 UNIT(S): 5000 INJECTION INTRAVENOUS; SUBCUTANEOUS at 22:30

## 2017-12-01 RX ADMIN — Medication 5 MILLIGRAM(S): at 08:11

## 2017-12-01 RX ADMIN — Medication 5 MILLIGRAM(S): at 12:35

## 2017-12-01 RX ADMIN — HEPARIN SODIUM 5000 UNIT(S): 5000 INJECTION INTRAVENOUS; SUBCUTANEOUS at 15:32

## 2017-12-01 RX ADMIN — Medication 400 MILLIGRAM(S): at 17:58

## 2017-12-01 RX ADMIN — SODIUM CHLORIDE 1000 MILLILITER(S): 9 INJECTION, SOLUTION INTRAVENOUS at 11:29

## 2017-12-01 RX ADMIN — SODIUM CHLORIDE 100 MILLILITER(S): 9 INJECTION, SOLUTION INTRAVENOUS at 00:58

## 2017-12-01 RX ADMIN — PANTOPRAZOLE SODIUM 40 MILLIGRAM(S): 20 TABLET, DELAYED RELEASE ORAL at 12:35

## 2017-12-01 RX ADMIN — SODIUM CHLORIDE 100 MILLILITER(S): 9 INJECTION, SOLUTION INTRAVENOUS at 08:11

## 2017-12-01 RX ADMIN — Medication 1000 MILLIGRAM(S): at 18:00

## 2017-12-01 RX ADMIN — Medication 101 MILLIGRAM(S): at 10:54

## 2017-12-01 RX ADMIN — Medication 50 GRAM(S): at 05:03

## 2017-12-01 NOTE — PROGRESS NOTE ADULT - SUBJECTIVE AND OBJECTIVE BOX
Surgery Team D (Surgery Oncology) Progress Note:    Post-Operative Day: 1    Subjective: Pt seen this AM. Pain controlled. Denies N/V. Remains afebrile.           Objective:  T(C): 37.1 (12-01-17 @ 05:02), Max: 37.4 (12-01-17 @ 00:00)  HR: 113 (12-01-17 @ 05:02) (98 - 120)  BP: 107/71 (12-01-17 @ 05:02) (94/56 - 121/60)  RR: 19 (12-01-17 @ 05:02) (12 - 26)  SpO2: 95% (12-01-17 @ 05:02) (91% - 100%)    Labs:                      13.0   8.43  )-----------( 200      ( 01 Dec 2017 05:19 )             39.9       12-01    136  |  101  |  10  ----------------------------<  133<H>  4.2   |  27  |  1.08    Ca    8.8      01 Dec 2017 05:19  Phos  4.8     12-01  Mg     1.4     12-01    TPro  5.4<L>  /  Alb  3.1<L>  /  TBili  2.6<H>  /  DBili  0.8<H>  /  AST  50<H>  /  ALT  45<H>  /  AlkPhos  86  12-01      I&O's Detail    30 Nov 2017 07:01  -  01 Dec 2017 07:00  --------------------------------------------------------  IN:    Lactated Ringers IV Bolus: 1000 mL    lactated ringers.: 1150 mL    Solution: 50 mL  Total IN: 2200 mL    OUT:    Bulb: 70 mL    Indwelling Catheter - Urethral: 1020 mL    Nasoenteral Tube: 750 mL  Total OUT: 1840 mL    Total NET: 360 mL          Focused Physical Exam:  General: NAD; mildy jaundice   Respiratory: Nonlabored breathing  Abdomen: soft, appropriately tender, mildy distended. No rebound/ guarding  Extremities: FROM x 4  : Mariscal in place    Medications:  dextrose 5%. 1000 milliLiter(s) IV Continuous <Continuous>  dextrose 50% Injectable 12.5 Gram(s) IV Push once  dextrose 50% Injectable 25 Gram(s) IV Push once  dextrose 50% Injectable 25 Gram(s) IV Push once  dextrose Gel 1 Dose(s) Oral once PRN  fentaNYL    Injectable 25 MICROGram(s) IV Push every 5 minutes PRN  glucagon  Injectable 1 milliGRAM(s) IntraMuscular once PRN  heparin  Injectable 5000 Unit(s) SubCutaneous every 8 hours  HYDROmorphone  Injectable 0.5 milliGRAM(s) IV Push every 10 minutes PRN  HYDROmorphone (10 MICROgram(s)/mL) + BUpivacaine 0.0625% in 0.9% Sodium Chloride PCEA 250 milliLiter(s) Epidural PCA Continuous  HYDROmorphone (10 MICROgram(s)/mL) + BUpivacaine 0.0625% in 0.9% Sodium Chloride PCEA Rescue Clinician  Bolus 4 milliLiter(s) Epidural every 15 minutes PRN  insulin lispro (HumaLOG) corrective regimen sliding scale   SubCutaneous every 6 hours  lactated ringers. 1000 milliLiter(s) IV Continuous <Continuous>  metoprolol    tartrate Injectable 5 milliGRAM(s) IV Push every 6 hours  naloxone Injectable 0.1 milliGRAM(s) IV Push every 3 minutes PRN  ondansetron Injectable 4 milliGRAM(s) IV Push every 6 hours PRN  ondansetron Injectable 4 milliGRAM(s) IV Push once PRN  pantoprazole  Injectable 40 milliGRAM(s) IV Push daily

## 2017-12-01 NOTE — CONSULT NOTE ADULT - ASSESSMENT
Patient with pancreatic cancer , now s/p Whipple  - f/u pathology.  -further oncologic management will take place as outpatient  Anemia- likely related to malignancy, AOCD,. Monitor h/h.  Transfusional support as needed    Management as per surgery.

## 2017-12-01 NOTE — CONSULT NOTE ADULT - ASSESSMENT
60 y/o  male with hx of DM , HTN, BPH  admitted recently for jaundice found to have pnacreatic adenocarcinoma on bx . during that admission pt One plastic stent was placed into the ventral pancreatic duct then placement of fully covered metal stent  in bile duct and removal of pancreatic duct stent. . pt had a stress test which was abnormal and plan for f/u as o/p post surgical intervention by Dr. Curry.  Pt is a  pt now presents for Whipple and s/p surgical intervention   1- Pancreatic CA : s/p whipple   oob , npo for now and diet per surgery    IS   / DVT proph  called DR. Dumont from onco for f/u    2- DM: monitor FS while npo ...     3- HTN: had epiisodes of hypotension while on ARB/ACE... now only on BB cont current meds     4- abn stress : need f/u as o/p.   5-BPH: flomax

## 2017-12-01 NOTE — PROGRESS NOTE ADULT - ASSESSMENT
62 yo M s/p Whipple Procedure, POD1:  - NPO/IVF  - Pain control w/ PCEA  - J tube study  - Trend LFTs   - F/u UOP  - OOB/ambulate

## 2017-12-01 NOTE — PROGRESS NOTE ADULT - SUBJECTIVE AND OBJECTIVE BOX
Day __2_ of Anesthesia Pain Management Service    Allergies    No Known Allergies    Intolerances        SUBJECTIVE: " I'm in pain when I move"  Pain Scale Score	At rest: __5_ 	With Activity: __9_ 	[  ] Refer to charted pain scores    THERAPY:  [X] Epidural Bupivacaine 0.0625% and Hydromorphone  		[X] 10 micrograms/mL	[ ] 5 micrograms/mL  [ ] Epidural Bupivacaine 0.0625% and Fentanyl - 2 micrograms/mL  [ ] Epidural Ropivacaine 0.1% plain – 1 mg/mL  [ ] Patient Controlled Regional Anesthesia (PCRA) Ropivacaine  		[ ] 0.2%			[ ] 0.1%    Demand dose _3mL_ lockout _15min_ (minutes) Continuous Rate _6mL_ Total: _138.75ml__ Daily      MEDICATIONS  (STANDING):  acetaminophen  IVPB. 1000 milliGRAM(s) IV Intermittent every 6 hours  dextrose 5%. 1000 milliLiter(s) (50 mL/Hr) IV Continuous <Continuous>  dextrose 50% Injectable 12.5 Gram(s) IV Push once  dextrose 50% Injectable 25 Gram(s) IV Push once  dextrose 50% Injectable 25 Gram(s) IV Push once  heparin  Injectable 5000 Unit(s) SubCutaneous every 8 hours  HYDROmorphone (10 MICROgram(s)/mL) + BUpivacaine 0.0625% in 0.9% Sodium Chloride PCEA 250 milliLiter(s) Epidural PCA Continuous  insulin lispro (HumaLOG) corrective regimen sliding scale   SubCutaneous every 6 hours  lactated ringers. 1000 milliLiter(s) (100 mL/Hr) IV Continuous <Continuous>  metoprolol    tartrate Injectable 5 milliGRAM(s) IV Push every 6 hours  pantoprazole  Injectable 40 milliGRAM(s) IV Push daily  phytonadione  IVPB 5 milliGRAM(s) IV Intermittent once    MEDICATIONS  (PRN):  dextrose Gel 1 Dose(s) Oral once PRN Blood Glucose LESS THAN 70 milliGRAM(s)/deciliter  fentaNYL    Injectable 25 MICROGram(s) IV Push every 5 minutes PRN Moderate Pain  glucagon  Injectable 1 milliGRAM(s) IntraMuscular once PRN Glucose LESS THAN 70 milligrams/deciliter  HYDROmorphone  Injectable 0.5 milliGRAM(s) IV Push every 10 minutes PRN Severe Pain (7 - 10)  HYDROmorphone (10 MICROgram(s)/mL) + BUpivacaine 0.0625% in 0.9% Sodium Chloride PCEA Rescue Clinician  Bolus 4 milliLiter(s) Epidural every 15 minutes PRN for Pain Score greater than 6  naloxone Injectable 0.1 milliGRAM(s) IV Push every 3 minutes PRN For ANY of the following changes in patient status:  A. RR LESS THAN 10 breaths per minute, B. Oxygen saturation LESS THAN 90%, C. Sedation score of 6  ondansetron Injectable 4 milliGRAM(s) IV Push every 6 hours PRN Nausea  ondansetron Injectable 4 milliGRAM(s) IV Push once PRN Nausea and/or Vomiting      OBJECTIVE: Patient A&Ox3, NAD,  supine in bed.    Assessment of Catheter Site:	[ ] Left	[ ] Right  [X] Epidural 	[ ] Femoral	      [ ] Saphenous   [ ] Supraclavicular   [ ] Other:    [X] Dressing intact	[X] Site non-tender	[X] Site without erythema, discharge, edema  [ ] Epidural tubing and connection checked	[X] Gross neurological exam within normal limits  [ ] Catheter removed – tip intact		    [X ] Temperatue:  ___ [TMax: ]    Sedation Score:	[X] Alert	[ ] Drowsy	[ ] Arousable	[ ] Asleep	[ ] Unresponsive    Side Effects:	[X] None	[ ] Nausea	[ ] Vomiting	[ ] Pruritus  		[ ] Weakness		[ ] Numbness	[ ] Other:    PT/INR - ( 01 Dec 2017 08:46 )   PT: 16.7 SEC;   INR: 1.48          PTT - ( 01 Dec 2017 08:46 )  PTT:32.5 SEC                          13.0   8.43  )-----------( 200      ( 01 Dec 2017 05:19 )             39.9       12-01    136  |  101  |  10  ----------------------------<  133<H>  4.2   |  27  |  1.08    Ca    8.8      01 Dec 2017 05:19  Phos  4.8     12-01  Mg     1.4     12-01    TPro  5.4<L>  /  Alb  3.1<L>  /  TBili  2.6<H>  /  DBili  0.8<H>  /  AST  50<H>  /  ALT  45<H>  /  AlkPhos  86  12-01      ASSESSMENT/ PLAN:    Therapy to  be:	[X] Continue   [ ] Discontinued   [ ] Change to prn Analgesics    Documentation and Verification of current medications:  [X] Done	[ ] Not done, not eligible  [ ] Not done, reason not given    [ ]  [place]  Reviewed and Copied to Chart    COMMENTS: pt.denies any numbness or tingling in lower extremities, basal rate increased to 6ml/hr,Team D made aware of elevated INR and to order stat dose of vit. K IV to bring down INR. Nurse made aware of nuero checks Q2hrs until INR is is below 1.3  Dressing reinforced.  systemic anticoagulant sign placed above bed.

## 2017-12-01 NOTE — PROGRESS NOTE ADULT - SUBJECTIVE AND OBJECTIVE BOX
Day 2___ of Anesthesia Pain Management Service    SUBJECTIVE:    Therapy:	  [ ] IV PCA	   [ x] Epidural           [ ] s/p Spinal Opoid              [ ] Postpartum infusion	  [ ] Patient controlled regional anesthesia (PCRA)    [ ] prn Analgesics    OBJECTIVE:   [x ] No new signs     [ ] Other:    Side Effects:  [x ] None			[ ] Other:    Assessment of Catheter Site:		[x ] Intact		[ ] Other:    ASSESSMENT/PLAN  [x ] Continue current therapy    [ ] Therapy changed to:    [ ] IV PCA       [ ] Epidural     [ ] prn Analgesics     Comments:

## 2017-12-01 NOTE — PROVIDER CONTACT NOTE (OTHER) - SITUATION
Per pain management NP increase PCEA to 6ml/hr continuously and give patient bolus. Pain NP increased PCEA pump.

## 2017-12-02 LAB
ALBUMIN SERPL ELPH-MCNC: 2.9 G/DL — LOW (ref 3.3–5)
ALP SERPL-CCNC: 85 U/L — SIGNIFICANT CHANGE UP (ref 40–120)
ALT FLD-CCNC: 43 U/L — HIGH (ref 4–41)
APTT BLD: 32.7 SEC — SIGNIFICANT CHANGE UP (ref 27.5–37.4)
AST SERPL-CCNC: 41 U/L — HIGH (ref 4–40)
BILIRUB SERPL-MCNC: 1.9 MG/DL — HIGH (ref 0.2–1.2)
BUN SERPL-MCNC: 12 MG/DL — SIGNIFICANT CHANGE UP (ref 7–23)
CALCIUM SERPL-MCNC: 8.7 MG/DL — SIGNIFICANT CHANGE UP (ref 8.4–10.5)
CHLORIDE SERPL-SCNC: 100 MMOL/L — SIGNIFICANT CHANGE UP (ref 98–107)
CO2 SERPL-SCNC: 27 MMOL/L — SIGNIFICANT CHANGE UP (ref 22–31)
CREAT SERPL-MCNC: 0.95 MG/DL — SIGNIFICANT CHANGE UP (ref 0.5–1.3)
GLUCOSE BLDC GLUCOMTR-MCNC: 139 MG/DL — HIGH (ref 70–99)
GLUCOSE BLDC GLUCOMTR-MCNC: 146 MG/DL — HIGH (ref 70–99)
GLUCOSE BLDC GLUCOMTR-MCNC: 146 MG/DL — HIGH (ref 70–99)
GLUCOSE BLDC GLUCOMTR-MCNC: 150 MG/DL — HIGH (ref 70–99)
GLUCOSE SERPL-MCNC: 164 MG/DL — HIGH (ref 70–99)
HCT VFR BLD CALC: 34.8 % — LOW (ref 39–50)
HGB BLD-MCNC: 11.8 G/DL — LOW (ref 13–17)
INR BLD: 1.39 — HIGH (ref 0.88–1.17)
MAGNESIUM SERPL-MCNC: 1.9 MG/DL — SIGNIFICANT CHANGE UP (ref 1.6–2.6)
MCHC RBC-ENTMCNC: 33.7 PG — SIGNIFICANT CHANGE UP (ref 27–34)
MCHC RBC-ENTMCNC: 33.9 % — SIGNIFICANT CHANGE UP (ref 32–36)
MCV RBC AUTO: 99.4 FL — SIGNIFICANT CHANGE UP (ref 80–100)
NRBC # FLD: 0 — SIGNIFICANT CHANGE UP
PHOSPHATE SERPL-MCNC: 2.2 MG/DL — LOW (ref 2.5–4.5)
PLATELET # BLD AUTO: 188 K/UL — SIGNIFICANT CHANGE UP (ref 150–400)
PMV BLD: 10.7 FL — SIGNIFICANT CHANGE UP (ref 7–13)
POTASSIUM SERPL-MCNC: 3.7 MMOL/L — SIGNIFICANT CHANGE UP (ref 3.5–5.3)
POTASSIUM SERPL-SCNC: 3.7 MMOL/L — SIGNIFICANT CHANGE UP (ref 3.5–5.3)
PROT SERPL-MCNC: 5.6 G/DL — LOW (ref 6–8.3)
PROTHROM AB SERPL-ACNC: 15.7 SEC — HIGH (ref 9.8–13.1)
RBC # BLD: 3.5 M/UL — LOW (ref 4.2–5.8)
RBC # FLD: 17.4 % — HIGH (ref 10.3–14.5)
SODIUM SERPL-SCNC: 138 MMOL/L — SIGNIFICANT CHANGE UP (ref 135–145)
WBC # BLD: 8.79 K/UL — SIGNIFICANT CHANGE UP (ref 3.8–10.5)
WBC # FLD AUTO: 8.79 K/UL — SIGNIFICANT CHANGE UP (ref 3.8–10.5)

## 2017-12-02 RX ADMIN — HEPARIN SODIUM 5000 UNIT(S): 5000 INJECTION INTRAVENOUS; SUBCUTANEOUS at 22:30

## 2017-12-02 RX ADMIN — Medication 1000 MILLIGRAM(S): at 12:26

## 2017-12-02 RX ADMIN — Medication 400 MILLIGRAM(S): at 00:19

## 2017-12-02 RX ADMIN — Medication 400 MILLIGRAM(S): at 05:42

## 2017-12-02 RX ADMIN — HEPARIN SODIUM 5000 UNIT(S): 5000 INJECTION INTRAVENOUS; SUBCUTANEOUS at 12:26

## 2017-12-02 RX ADMIN — HEPARIN SODIUM 5000 UNIT(S): 5000 INJECTION INTRAVENOUS; SUBCUTANEOUS at 05:42

## 2017-12-02 RX ADMIN — Medication 5 MILLIGRAM(S): at 12:26

## 2017-12-02 RX ADMIN — Medication 1000 MILLIGRAM(S): at 17:48

## 2017-12-02 RX ADMIN — Medication 5 MILLIGRAM(S): at 00:19

## 2017-12-02 RX ADMIN — Medication 1000 MILLIGRAM(S): at 05:43

## 2017-12-02 RX ADMIN — Medication 400 MILLIGRAM(S): at 12:26

## 2017-12-02 RX ADMIN — PANTOPRAZOLE SODIUM 40 MILLIGRAM(S): 20 TABLET, DELAYED RELEASE ORAL at 12:26

## 2017-12-02 RX ADMIN — Medication 5 MILLIGRAM(S): at 17:48

## 2017-12-02 RX ADMIN — Medication 400 MILLIGRAM(S): at 17:48

## 2017-12-02 RX ADMIN — SODIUM CHLORIDE 100 MILLILITER(S): 9 INJECTION, SOLUTION INTRAVENOUS at 08:40

## 2017-12-02 RX ADMIN — Medication 1000 MILLIGRAM(S): at 00:51

## 2017-12-02 NOTE — PROGRESS NOTE ADULT - ATTENDING COMMENTS
Recuperating from a Whipple procedure.    Afebrile.  Slight tachycardia, heart rate approximately 110.  Normotensive.  Adequate urine output.  Nasogastric tube drainage 550cc.  Abdominal drain approximately 140 cc    White blood cell count 8.8 hematocrit 35% electrolytes normal.    Increase activity as tolerated, await return of gastrointestinal function Recuperating from a Whipple procedure.    Awake, comfortable    Afebrile.  Slight tachycardia, heart rate approximately 110.  Normotensive.  Adequate urine output.  Nasogastric tube drainage 550cc.  Abdominal drain approximately 140 cc    Abd is nontender    White blood cell count 8.8 hematocrit 35% electrolytes normal.    Increase activity as tolerated, await return of gastrointestinal function

## 2017-12-02 NOTE — PROGRESS NOTE ADULT - SUBJECTIVE AND OBJECTIVE BOX
Patient is a 62y old  Male who presents with a chief complaint of "I have a growth on my pancreas" (15 Nov 2017 14:39)      REVIEW OF SYSTEMS:     CONSTITUTIONAL: No weakness, fevers or chills  RESPIRATORY: No cough, wheezing,  No shortness of breath  CARDIOVASCULAR: No chest pain or palpitations  GASTROINTESTINAL:  abdominal pain under erasonable control  . No nausea, vomiting,no flutuance or BM  GENITOURINARY: newberry in place   NEUROLOGICAL: No numbness or weakness                                                                                                                                                                                                                                                                               Medications:  MEDICATIONS  (STANDING):  acetaminophen  IVPB. 1000 milliGRAM(s) IV Intermittent every 6 hours  dextrose 5% + sodium chloride 0.45%. 1000 milliLiter(s) (75 mL/Hr) IV Continuous <Continuous>  dextrose 5%. 1000 milliLiter(s) (50 mL/Hr) IV Continuous <Continuous>  dextrose 50% Injectable 12.5 Gram(s) IV Push once  dextrose 50% Injectable 25 Gram(s) IV Push once  dextrose 50% Injectable 25 Gram(s) IV Push once  heparin  Injectable 5000 Unit(s) SubCutaneous every 8 hours  HYDROmorphone (10 MICROgram(s)/mL) + BUpivacaine 0.0625% in 0.9% Sodium Chloride PCEA 250 milliLiter(s) Epidural PCA Continuous  insulin lispro (HumaLOG) corrective regimen sliding scale   SubCutaneous every 6 hours  metoprolol    tartrate Injectable 5 milliGRAM(s) IV Push every 6 hours  pantoprazole  Injectable 40 milliGRAM(s) IV Push daily    MEDICATIONS  (PRN):  dextrose Gel 1 Dose(s) Oral once PRN Blood Glucose LESS THAN 70 milliGRAM(s)/deciliter  glucagon  Injectable 1 milliGRAM(s) IntraMuscular once PRN Glucose LESS THAN 70 milligrams/deciliter  HYDROmorphone  Injectable 0.5 milliGRAM(s) IV Push every 3 hours PRN Severe Pain unrelieved by PCEA  HYDROmorphone (10 MICROgram(s)/mL) + BUpivacaine 0.0625% in 0.9% Sodium Chloride PCEA Rescue Clinician  Bolus 4 milliLiter(s) Epidural every 15 minutes PRN for Pain Score greater than 6  naloxone Injectable 0.1 milliGRAM(s) IV Push every 3 minutes PRN For ANY of the following changes in patient status:  A. RR LESS THAN 10 breaths per minute, B. Oxygen saturation LESS THAN 90%, C. Sedation score of 6  ondansetron Injectable 4 milliGRAM(s) IV Push every 6 hours PRN Nausea  ondansetron Injectable 4 milliGRAM(s) IV Push once PRN Nausea and/or Vomiting  simethicone 80 milliGRAM(s) Chew daily PRN Gas       Allergies    No Known Allergies    Intolerances      Vital Signs Last 24 Hrs  T(C): 36.7 (02 Dec 2017 20:35), Max: 36.9 (02 Dec 2017 04:53)  T(F): 98 (02 Dec 2017 20:35), Max: 98.5 (02 Dec 2017 08:29)  HR: 104 (02 Dec 2017 20:35) (104 - 124)  BP: 104/64 (02 Dec 2017 20:35) (101/61 - 122/70)  BP(mean): --  RR: 18 (02 Dec 2017 20:35) (18 - 18)  SpO2: 93% (02 Dec 2017 20:35) (93% - 98%)  CAPILLARY BLOOD GLUCOSE      POCT Blood Glucose.: 146 mg/dL (02 Dec 2017 16:46)  POCT Blood Glucose.: 139 mg/dL (02 Dec 2017 12:02)  POCT Blood Glucose.: 150 mg/dL (02 Dec 2017 05:43)  POCT Blood Glucose.: 146 mg/dL (02 Dec 2017 00:35)       @ :  -   @ 07:00  --------------------------------------------------------  IN: 3560 mL / OUT: 1922.5 mL / NET: 1637.5 mL     @ 07:  -   @ 21:44  --------------------------------------------------------  IN: 1545 mL / OUT: 1520 mL / NET: 25 mL      Physical Exam:    Daily Weight in k.9 (02 Dec 2017 13:47)  General: NAD   HEENT:  Nonicteric, PERRLA  NGT in plcae   CV:  RRR, S1S2   Lungs:  CTA B/L, no wheezes, rales, rhonchi  Abdomen:  Soft, mod distention post op    tendern at site of incision   Extremities: no edema   Skin:  Warm and dry, no rashes  :  rohith  Neuro:  AAOx3, non-focal, grossly intact                                                                                                                                                                                                                                                                                                LABS:                               11.8   8.79  )-----------( 188      ( 02 Dec 2017 05:39 )             34.8                      12    138  |  100  |  12  ----------------------------<  164<H>  3.7   |  27  |  0.95    Ca    8.7      02 Dec 2017 05:39  Phos  2.2     12  Mg     1.9         TPro  5.6<L>  /  Alb  2.9<L>  /  TBili  1.9<H>  /  DBili  x   /  AST  41<H>  /  ALT  43<H>  /  AlkPhos  85

## 2017-12-02 NOTE — PROGRESS NOTE ADULT - SUBJECTIVE AND OBJECTIVE BOX
Surgery Team D (Surgery Oncology) Progress Note:    Subjective: Pt seen this AM. Pain controlled. No events overnight. Tolerating TFs via J tube. Denies N/V. + OOB.          Objective:  T(C): 36.7 (12-02-17 @ 00:17), Max: 37.2 (12-01-17 @ 12:04)  HR: 124 (12-02-17 @ 00:17) (94 - 124)  BP: 117/74 (12-02-17 @ 00:17) (98/66 - 127/70)  RR: 18 (12-02-17 @ 00:17) (18 - 19)  SpO2: 98% (12-02-17 @ 00:17) (90% - 98%)    Labs:                      13.0   8.43  )-----------( 200      ( 01 Dec 2017 05:19 )             39.9       12-01    136  |  101  |  10  ----------------------------<  133<H>  4.2   |  27  |  1.08    Ca    8.8      01 Dec 2017 05:19  Phos  4.8     12-01  Mg     1.4     12-01    TPro  5.4<L>  /  Alb  3.1<L>  /  TBili  2.6<H>  /  DBili  0.8<H>  /  AST  50<H>  /  ALT  45<H>  /  AlkPhos  86  12-01      I&O's Detail    30 Nov 2017 07:01  -  01 Dec 2017 07:00  --------------------------------------------------------  IN:    Lactated Ringers IV Bolus: 1000 mL    lactated ringers.: 1150 mL    Solution: 50 mL  Total IN: 2200 mL    OUT:    Bulb: 70 mL    Indwelling Catheter - Urethral: 1020 mL    Nasoenteral Tube: 750 mL  Total OUT: 1840 mL    Total NET: 360 mL      01 Dec 2017 07:01  -  02 Dec 2017 03:42  --------------------------------------------------------  IN:    dextrose 5% + sodium chloride 0.45%.: 1000 mL    Glucerna: 90 mL    IV PiggyBack: 1250 mL    lactated ringers.: 1000 mL  Total IN: 3340 mL    OUT:    Bulb: 67.5 mL    Indwelling Catheter - Urethral: 1235 mL    Nasoenteral Tube: 500 mL  Total OUT: 1802.5 mL    Total NET: 1537.5 mL          Focused Physical Exam:  General: NAD  Respiratory: Nonlabored breathing  Abdomen: soft, appropriately tender.   Extremities:    Medications:  acetaminophen  IVPB. 1000 milliGRAM(s) IV Intermittent every 6 hours  dextrose 5% + sodium chloride 0.45%. 1000 milliLiter(s) IV Continuous <Continuous>  dextrose 5%. 1000 milliLiter(s) IV Continuous <Continuous>  dextrose 50% Injectable 12.5 Gram(s) IV Push once  dextrose 50% Injectable 25 Gram(s) IV Push once  dextrose 50% Injectable 25 Gram(s) IV Push once  dextrose Gel 1 Dose(s) Oral once PRN  glucagon  Injectable 1 milliGRAM(s) IntraMuscular once PRN  heparin  Injectable 5000 Unit(s) SubCutaneous every 8 hours  HYDROmorphone  Injectable 0.5 milliGRAM(s) IV Push every 3 hours PRN  HYDROmorphone (10 MICROgram(s)/mL) + BUpivacaine 0.0625% in 0.9% Sodium Chloride PCEA 250 milliLiter(s) Epidural PCA Continuous  HYDROmorphone (10 MICROgram(s)/mL) + BUpivacaine 0.0625% in 0.9% Sodium Chloride PCEA Rescue Clinician  Bolus 4 milliLiter(s) Epidural every 15 minutes PRN  insulin lispro (HumaLOG) corrective regimen sliding scale   SubCutaneous every 6 hours  metoprolol    tartrate Injectable 5 milliGRAM(s) IV Push every 6 hours  naloxone Injectable 0.1 milliGRAM(s) IV Push every 3 minutes PRN  ondansetron Injectable 4 milliGRAM(s) IV Push every 6 hours PRN  ondansetron Injectable 4 milliGRAM(s) IV Push once PRN  pantoprazole  Injectable 40 milliGRAM(s) IV Push daily  simethicone 80 milliGRAM(s) Chew daily PRN

## 2017-12-02 NOTE — DIETITIAN INITIAL EVALUATION ADULT. - SIGNS/SYMPTOMS
as evidenced by whipple procedure, new J-tube, NPO with suctioning as evidenced by >7.5% weight loss in 3 months, <50% nutrition needs >5 days, 2+ edema

## 2017-12-02 NOTE — PROGRESS NOTE ADULT - ASSESSMENT
62 y/o  male with hx of DM , HTN, BPH  admitted recently for jaundice found to have pnacreatic adenocarcinoma on bx . during that admission pt One plastic stent was placed into the ventral pancreatic duct then placement of fully covered metal stent  in bile duct and removal of pancreatic duct stent. . pt had a stress test which was abnormal and plan for f/u as o/p post surgical intervention by Dr. Curry.  Pt is a  pt now presents for Whipple and s/p surgical intervention   1- Pancreatic CA : s/p whipple   oob , npo for now and diet per surgery    IS   / DVT proph  f/u with Dr. Manzanares  : likely adjuvant chemo     2- DM: monitor FS while npo ... acceptable FS     3- HTN: had episodes of hypotension while on ARB/ACE... now only on BB cont current meds     4- abn stress : need f/u as o/p.   5-BPH: flomax

## 2017-12-02 NOTE — PROGRESS NOTE ADULT - ASSESSMENT
Pancreatic cancer s/p Whipple await final pathology for further Oncology plan but he will likely require adjuvant chemo.      Mild anemia AOCD and post-op.  hgb adequate for now and would monitor.    Care per surgical team.

## 2017-12-02 NOTE — PROGRESS NOTE ADULT - ASSESSMENT
62 yo M s/p Whipple Procedure, POD2:  - NPO/IVF  - Cont TFs via J tube  - Pain control  - Trend LFTs   - Will need Lovenox for factor V leiden; potentially after PCEA removed  - Moniter INR; treat if supra/sub therapy  - F/u MUNA output   - F/u UOP  - OOB/ambulate

## 2017-12-02 NOTE — DIETITIAN INITIAL EVALUATION ADULT. - FACTORS AFF FOOD INTAKE
pancreatic cancer, s/p Whipple procedure, s/p new J-tube placement with NGT in place for suctioning/other (specify)

## 2017-12-02 NOTE — DIETITIAN INITIAL EVALUATION ADULT. - NS AS NUTRI INTERV ENTERAL NUTRITION3
Rate/Composition/1) When medically feasible, recommend gradual advancement of Glucerna 1.2 J-tube feeds to reach a goal rate of 63ml/hr x 24 hours. This would provide a total volume of 1512ml, 1814 kcal, 90.7g protein to meet estimated nutritional needs for weight maintenance.     2) Monitor weights, labs, BM's, skin integrity, TF tolerance and reconsult RD for further recommendations as needed    3) Further adjustments to rate/volume/duration/free water provision of enteral feeds will be determined in accordance with long term patient tolerance, needs and weight trends./Concentration/Volume

## 2017-12-02 NOTE — DIETITIAN INITIAL EVALUATION ADULT. - DIET TYPE
Glucerna 1.2 via J-tube @10ml/hr x 18 hours = 180ml total volume, 216 kcal, 10.8g protein/NPO with tube feedings

## 2017-12-02 NOTE — DIETITIAN INITIAL EVALUATION ADULT. - OTHER INFO
Nutrition consult received for unintentional weight loss; admitted for Whipple Procedure. Met with patient, reports appetite/PO was good for meals up until late October when he experienced changes in appetite. Reports h/o DM diagnosed in his "30's". Weight loss of "over 30 pounds" total cumulative from previous multiple hospital admissions per patient (see Dietitian Initial Evaluations from 11/13 and 10/25). At this time patient is only receiving J-tube feeds @10ml/hr x 18 hours. Patient states that he is more comfortable with very slow TF advancement as he has "all these tubes sticking out of me" and in overall uncomfortable state. No gastric residuals or remarkable discomfort reported at this time, although patient c/o heartburn and throat/nose discomfort due to NGT for suctioning.

## 2017-12-02 NOTE — PROGRESS NOTE ADULT - SUBJECTIVE AND OBJECTIVE BOX
Anesthesia Pain Management Service: Day 2__ of Epidural    SUBJECTIVE: Patient doing well with PCEA and no problems.  Pain Scale Score:   Refer to charted pain scores    THERAPY:  [x ] Epidural Bupivacaine 0.0625% and Hydromorphone  		x[ ] 10 micrograms/mL	[ ] 5 micrograms/mL  [ ] Epidural Bupivacaine 0.0625% and Fentanyl - 2 micrograms/mL  [ ] Epidural Ropivacaine 0.1% plain – 1 mg/mL  [ ] Patient Controlled Regional Anesthesia (PCRA) Ropivacaine  		[ ] 0.2%			[ ] 0.1%    Demand dose __3_ lockout __15_ (minutes) Continuous Rate 6___ Total: 145cc___ Daily      MEDICATIONS  (STANDING):  acetaminophen  IVPB. 1000 milliGRAM(s) IV Intermittent every 6 hours  dextrose 5% + sodium chloride 0.45%. 1000 milliLiter(s) (100 mL/Hr) IV Continuous <Continuous>  dextrose 5%. 1000 milliLiter(s) (50 mL/Hr) IV Continuous <Continuous>  dextrose 50% Injectable 12.5 Gram(s) IV Push once  dextrose 50% Injectable 25 Gram(s) IV Push once  dextrose 50% Injectable 25 Gram(s) IV Push once  heparin  Injectable 5000 Unit(s) SubCutaneous every 8 hours  HYDROmorphone (10 MICROgram(s)/mL) + BUpivacaine 0.0625% in 0.9% Sodium Chloride PCEA 250 milliLiter(s) Epidural PCA Continuous  insulin lispro (HumaLOG) corrective regimen sliding scale   SubCutaneous every 6 hours  metoprolol    tartrate Injectable 5 milliGRAM(s) IV Push every 6 hours  pantoprazole  Injectable 40 milliGRAM(s) IV Push daily    MEDICATIONS  (PRN):  dextrose Gel 1 Dose(s) Oral once PRN Blood Glucose LESS THAN 70 milliGRAM(s)/deciliter  glucagon  Injectable 1 milliGRAM(s) IntraMuscular once PRN Glucose LESS THAN 70 milligrams/deciliter  HYDROmorphone  Injectable 0.5 milliGRAM(s) IV Push every 3 hours PRN Severe Pain unrelieved by PCEA  HYDROmorphone (10 MICROgram(s)/mL) + BUpivacaine 0.0625% in 0.9% Sodium Chloride PCEA Rescue Clinician  Bolus 4 milliLiter(s) Epidural every 15 minutes PRN for Pain Score greater than 6  naloxone Injectable 0.1 milliGRAM(s) IV Push every 3 minutes PRN For ANY of the following changes in patient status:  A. RR LESS THAN 10 breaths per minute, B. Oxygen saturation LESS THAN 90%, C. Sedation score of 6  ondansetron Injectable 4 milliGRAM(s) IV Push every 6 hours PRN Nausea  ondansetron Injectable 4 milliGRAM(s) IV Push once PRN Nausea and/or Vomiting  simethicone 80 milliGRAM(s) Chew daily PRN Gas      OBJECTIVE:    Assessment of Catheter Site:	[ ] Left	[ ] Right  [x ] Epidural 	[ ] Femoral	      [ ] Saphenous   [ ] Supraclavicular   [ ] Other:    [x ] Dressing intact	[x ] Site non-tender	[ x] Site without erythema, discharge, edema  [x ] Epidural tubing and connection checked	[x] Gross neurological exam within normal limits  [ ] Catheter removed – tip intact		[x ] Afebrile	[ ] Febrile: ___    PT/INR - ( 02 Dec 2017 05:39 )   PT: 15.7 SEC;   INR: 1.39          PTT - ( 02 Dec 2017 05:39 )  PTT:32.7 SEC                      11.8   8.79  )-----------( 188      ( 02 Dec 2017 05:39 )             34.8     Vital Signs Last 24 Hrs  T(C): 36.9 (12-02-17 @ 08:29), Max: 37.2 (12-01-17 @ 12:04)  T(F): 98.5 (12-02-17 @ 08:29), Max: 99 (12-01-17 @ 12:04)  HR: 110 (12-02-17 @ 08:29) (94 - 124)  BP: 116/72 (12-02-17 @ 08:29) (101/61 - 127/70)  BP(mean): --  RR: 18 (12-02-17 @ 08:29) (18 - 18)  SpO2: 97% (12-02-17 @ 08:29) (91% - 98%)      Sedation Score:	[x ] Alert	[ ] Drowsy	[ ] Arousable	[ ] Asleep	[ ] Unresponsive    Side Effects:	[x ] None	[ ] Nausea	[ ] Vomiting	[ ] Pruritus  		[ ] Weakness		[ ] Numbness	[ ] Other:    ASSESSMENT/ PLAN:    Therapy to  be:	[x ] Continue   [ ] Discontinued   [ ] Change to prn Analgesics    Documentation and Verification of current medications:  [ X ] Done	[ ] Not done, not eligible, reason:    Comments: Doing OK with epidural and may continue. some improvement in coags noted

## 2017-12-02 NOTE — PROGRESS NOTE ADULT - SUBJECTIVE AND OBJECTIVE BOX
Patient is a 62y old  Male who presents with a chief complaint of "I have a growth on my pancreas" (15 Nov 2017 14:39)    has some abd soreness but mild.  he is on Tube feeds.  No N/V/  No CP or SOB.  No fevers or chills.      Medication:   acetaminophen  IVPB. 1000 milliGRAM(s) IV Intermittent every 6 hours  dextrose 5% + sodium chloride 0.45%. 1000 milliLiter(s) IV Continuous <Continuous>  dextrose 5%. 1000 milliLiter(s) IV Continuous <Continuous>  dextrose 50% Injectable 12.5 Gram(s) IV Push once  dextrose 50% Injectable 25 Gram(s) IV Push once  dextrose 50% Injectable 25 Gram(s) IV Push once  dextrose Gel 1 Dose(s) Oral once PRN  glucagon  Injectable 1 milliGRAM(s) IntraMuscular once PRN  heparin  Injectable 5000 Unit(s) SubCutaneous every 8 hours  HYDROmorphone  Injectable 0.5 milliGRAM(s) IV Push every 3 hours PRN  HYDROmorphone (10 MICROgram(s)/mL) + BUpivacaine 0.0625% in 0.9% Sodium Chloride PCEA 250 milliLiter(s) Epidural PCA Continuous  HYDROmorphone (10 MICROgram(s)/mL) + BUpivacaine 0.0625% in 0.9% Sodium Chloride PCEA Rescue Clinician  Bolus 4 milliLiter(s) Epidural every 15 minutes PRN  insulin lispro (HumaLOG) corrective regimen sliding scale   SubCutaneous every 6 hours  metoprolol    tartrate Injectable 5 milliGRAM(s) IV Push every 6 hours  naloxone Injectable 0.1 milliGRAM(s) IV Push every 3 minutes PRN  ondansetron Injectable 4 milliGRAM(s) IV Push every 6 hours PRN  ondansetron Injectable 4 milliGRAM(s) IV Push once PRN  pantoprazole  Injectable 40 milliGRAM(s) IV Push daily  simethicone 80 milliGRAM(s) Chew daily PRN      Physical exam    T(C): 36.8 (12-02-17 @ 16:13), Max: 36.9 (12-02-17 @ 04:53)  HR: 110 (12-02-17 @ 17:38) (107 - 124)  BP: 112/70 (12-02-17 @ 17:38) (101/61 - 122/70)  RR: 18 (12-02-17 @ 17:38) (18 - 18)  SpO2: 94% (12-02-17 @ 17:38) (93% - 98%)  Wt(kg): --    alert NAD  EOMI anicteric sclera  Cv  RRR  NGT to suction    Labs                        11.8   8.79  )-----------( 188      ( 02 Dec 2017 05:39 )             34.8       12-02    138  |  100  |  12  ----------------------------<  164<H>  3.7   |  27  |  0.95    Ca    8.7      02 Dec 2017 05:39  Phos  2.2     12-02  Mg     1.9     12-02    TPro  5.6<L>  /  Alb  2.9<L>  /  TBili  1.9<H>  /  DBili  x   /  AST  41<H>  /  ALT  43<H>  /  AlkPhos  85  12-02      LIVER FUNCTIONS - ( 02 Dec 2017 05:39 )  Alb: 2.9 g/dL / Pro: 5.6 g/dL / ALK PHOS: 85 u/L / ALT: 43 u/L / AST: 41 u/L / GGT: x             [id number]

## 2017-12-02 NOTE — CHART NOTE - NSCHARTNOTEFT_GEN_A_CORE
NUTRITION SERVICES                                                                                  MALNUTRITION ALERT     Attention Health Care Provider: Upon nutritional assessment by the Registered Dietitian your patient was determined to meet criteria / has evidence of the following diagnosis/diagnoses:    [ ] Mild Protein Calorie Malnutrition   [ ] Moderate Protein Calorie Malnutrition   [ X ] Severe Protein Calorie Malnutrition   [ ] Unspecified Protein Calorie Malnutrition   [ ] Underweight / BMI <19  [ ] Morbid Obesity / BMI >40      By signing this assessment you are acknowledging the diagnosis/diagnoses.       PLAN OF CARE: Refer to Initial Dietitian Evaluation or Nutrition Follow-Up Documentation for Nutritional Recommendations.     1) When medically feasible, recommend gradual advancement of Glucerna 1.2 J-tube feeds to reach a goal rate of 63ml/hr x 24 hours. This would provide a total volume of 1512ml, 1814 kcal, 90.7g protein to meet estimated nutritional needs for weight maintenance.         2) Monitor weights, labs, BM's, skin integrity, TF tolerance and reconsult RD for further recommendations as needed        3) Further adjustments to rate/volume/duration/free water provision of enteral feeds will be determined in accordance with long term patient tolerance, needs and weight trends.

## 2017-12-03 LAB
ALBUMIN SERPL ELPH-MCNC: 2.6 G/DL — LOW (ref 3.3–5)
ALP SERPL-CCNC: 92 U/L — SIGNIFICANT CHANGE UP (ref 40–120)
ALT FLD-CCNC: 28 U/L — SIGNIFICANT CHANGE UP (ref 4–41)
APTT BLD: 30.4 SEC — SIGNIFICANT CHANGE UP (ref 27.5–37.4)
AST SERPL-CCNC: 22 U/L — SIGNIFICANT CHANGE UP (ref 4–40)
BILIRUB DIRECT SERPL-MCNC: 0.5 MG/DL — HIGH (ref 0.1–0.2)
BILIRUB SERPL-MCNC: 1.2 MG/DL — SIGNIFICANT CHANGE UP (ref 0.2–1.2)
BUN SERPL-MCNC: 10 MG/DL — SIGNIFICANT CHANGE UP (ref 7–23)
CALCIUM SERPL-MCNC: 8.4 MG/DL — SIGNIFICANT CHANGE UP (ref 8.4–10.5)
CHLORIDE SERPL-SCNC: 100 MMOL/L — SIGNIFICANT CHANGE UP (ref 98–107)
CO2 SERPL-SCNC: 24 MMOL/L — SIGNIFICANT CHANGE UP (ref 22–31)
CREAT SERPL-MCNC: 0.82 MG/DL — SIGNIFICANT CHANGE UP (ref 0.5–1.3)
GLUCOSE BLDC GLUCOMTR-MCNC: 133 MG/DL — HIGH (ref 70–99)
GLUCOSE BLDC GLUCOMTR-MCNC: 134 MG/DL — HIGH (ref 70–99)
GLUCOSE BLDC GLUCOMTR-MCNC: 134 MG/DL — HIGH (ref 70–99)
GLUCOSE BLDC GLUCOMTR-MCNC: 160 MG/DL — HIGH (ref 70–99)
GLUCOSE SERPL-MCNC: 154 MG/DL — HIGH (ref 70–99)
HCT VFR BLD CALC: 34.9 % — LOW (ref 39–50)
HGB BLD-MCNC: 11.2 G/DL — LOW (ref 13–17)
INR BLD: 1.13 — SIGNIFICANT CHANGE UP (ref 0.88–1.17)
MAGNESIUM SERPL-MCNC: 2 MG/DL — SIGNIFICANT CHANGE UP (ref 1.6–2.6)
MCHC RBC-ENTMCNC: 32 PG — SIGNIFICANT CHANGE UP (ref 27–34)
MCHC RBC-ENTMCNC: 32.1 % — SIGNIFICANT CHANGE UP (ref 32–36)
MCV RBC AUTO: 99.7 FL — SIGNIFICANT CHANGE UP (ref 80–100)
NRBC # FLD: 0 — SIGNIFICANT CHANGE UP
PHOSPHATE SERPL-MCNC: 2.1 MG/DL — LOW (ref 2.5–4.5)
PLATELET # BLD AUTO: 214 K/UL — SIGNIFICANT CHANGE UP (ref 150–400)
PMV BLD: 10.6 FL — SIGNIFICANT CHANGE UP (ref 7–13)
POTASSIUM SERPL-MCNC: 3.6 MMOL/L — SIGNIFICANT CHANGE UP (ref 3.5–5.3)
POTASSIUM SERPL-SCNC: 3.6 MMOL/L — SIGNIFICANT CHANGE UP (ref 3.5–5.3)
PROT SERPL-MCNC: 5.7 G/DL — LOW (ref 6–8.3)
PROTHROM AB SERPL-ACNC: 12.7 SEC — SIGNIFICANT CHANGE UP (ref 9.8–13.1)
RBC # BLD: 3.5 M/UL — LOW (ref 4.2–5.8)
RBC # FLD: 17 % — HIGH (ref 10.3–14.5)
SODIUM SERPL-SCNC: 137 MMOL/L — SIGNIFICANT CHANGE UP (ref 135–145)
WBC # BLD: 7.98 K/UL — SIGNIFICANT CHANGE UP (ref 3.8–10.5)
WBC # FLD AUTO: 7.98 K/UL — SIGNIFICANT CHANGE UP (ref 3.8–10.5)

## 2017-12-03 RX ORDER — SODIUM CHLORIDE 9 MG/ML
1000 INJECTION, SOLUTION INTRAVENOUS
Qty: 0 | Refills: 0 | Status: DISCONTINUED | OUTPATIENT
Start: 2017-12-03 | End: 2017-12-05

## 2017-12-03 RX ORDER — POTASSIUM PHOSPHATE, MONOBASIC POTASSIUM PHOSPHATE, DIBASIC 236; 224 MG/ML; MG/ML
15 INJECTION, SOLUTION INTRAVENOUS ONCE
Qty: 0 | Refills: 0 | Status: COMPLETED | OUTPATIENT
Start: 2017-12-03 | End: 2017-12-03

## 2017-12-03 RX ADMIN — Medication 5 MILLIGRAM(S): at 11:39

## 2017-12-03 RX ADMIN — Medication 1000 MILLIGRAM(S): at 18:29

## 2017-12-03 RX ADMIN — Medication 1000 MILLIGRAM(S): at 01:33

## 2017-12-03 RX ADMIN — Medication 400 MILLIGRAM(S): at 17:25

## 2017-12-03 RX ADMIN — HEPARIN SODIUM 5000 UNIT(S): 5000 INJECTION INTRAVENOUS; SUBCUTANEOUS at 05:22

## 2017-12-03 RX ADMIN — Medication 1: at 12:34

## 2017-12-03 RX ADMIN — HEPARIN SODIUM 5000 UNIT(S): 5000 INJECTION INTRAVENOUS; SUBCUTANEOUS at 13:19

## 2017-12-03 RX ADMIN — HEPARIN SODIUM 5000 UNIT(S): 5000 INJECTION INTRAVENOUS; SUBCUTANEOUS at 22:13

## 2017-12-03 RX ADMIN — Medication 1000 MILLIGRAM(S): at 12:33

## 2017-12-03 RX ADMIN — Medication 1000 MILLIGRAM(S): at 05:30

## 2017-12-03 RX ADMIN — Medication 400 MILLIGRAM(S): at 05:22

## 2017-12-03 RX ADMIN — SODIUM CHLORIDE 75 MILLILITER(S): 9 INJECTION, SOLUTION INTRAVENOUS at 13:18

## 2017-12-03 RX ADMIN — Medication 400 MILLIGRAM(S): at 00:47

## 2017-12-03 RX ADMIN — Medication 5 MILLIGRAM(S): at 05:22

## 2017-12-03 RX ADMIN — POTASSIUM PHOSPHATE, MONOBASIC POTASSIUM PHOSPHATE, DIBASIC 62.5 MILLIMOLE(S): 236; 224 INJECTION, SOLUTION INTRAVENOUS at 11:39

## 2017-12-03 RX ADMIN — Medication 400 MILLIGRAM(S): at 11:38

## 2017-12-03 RX ADMIN — PANTOPRAZOLE SODIUM 40 MILLIGRAM(S): 20 TABLET, DELAYED RELEASE ORAL at 11:39

## 2017-12-03 RX ADMIN — SODIUM CHLORIDE 75 MILLILITER(S): 9 INJECTION, SOLUTION INTRAVENOUS at 19:23

## 2017-12-03 RX ADMIN — Medication 5 MILLIGRAM(S): at 00:46

## 2017-12-03 NOTE — PROGRESS NOTE ADULT - ATTENDING COMMENTS
Recuperating from Whipple procedure    Afebrile with stable vital signs.  Tolerating tube feeds.  Nasogastric drainage 425cc yesterday  Urine output 1485 cc.  Volume from abdominal drain 355 cc    White blood cell count 8.0.  Hematocrit stable at 35%.  No significant electrolyte abnormalities    Awaiting return of gastrointestinal function.  Continue present care. Recuperating from Whipple procedure  Awake, alert and comfortable    Afebrile with stable vital signs.  Tolerating tube feeds.  Nasogastric drainage 425cc yesterday  Urine output 1485 cc.  Volume from abdominal drain 355 cc - serosanguinous    White blood cell count 8.0.  Hematocrit stable at 35%.  No significant electrolyte abnormalities    Awaiting return of gastrointestinal function.  Continue present care.

## 2017-12-03 NOTE — PROGRESS NOTE ADULT - SUBJECTIVE AND OBJECTIVE BOX
JENIFER NORTH  MRN-[id number]    Patient is a 62y old  Male who presents with a chief complaint of "I have a growth on my pancreas" (15 Nov 2017 14:39)      Review of System      General:	Denies fatigue, fevers, chills, sweats, decreased appetite.    Skin/Breast: denies pruritis, rash  	  Ophthalmologic: no change in vision or blurring  	  HEENT	NGT in place.     Respiratory and Thorax:  cough, sob, wheeze, hemoptysis  	  Cardiovascular:	no cp , palp, orthopnea    Gastrointestinal:	no n/v/d constipation. + pain at wound site.     Genitourinary:	no dysuria of frequency, no hematuria, no flank pain    Musculoskeletal:	no bone or joint pain. no muscle aches.     Neurological:	no change in sensory or motor function. no headache. no weakness.     Psychiatric:	no depression, no anxiety, insomnia.     Hematology/Lymphatics:	no bleeding or bruising        Current Meds  MEDICATIONS  (STANDING):  acetaminophen  IVPB. 1000 milliGRAM(s) IV Intermittent every 6 hours  dextrose 5% + sodium chloride 0.45%. 1000 milliLiter(s) (75 mL/Hr) IV Continuous <Continuous>  dextrose 5%. 1000 milliLiter(s) (50 mL/Hr) IV Continuous <Continuous>  dextrose 50% Injectable 12.5 Gram(s) IV Push once  dextrose 50% Injectable 25 Gram(s) IV Push once  dextrose 50% Injectable 25 Gram(s) IV Push once  heparin  Injectable 5000 Unit(s) SubCutaneous every 8 hours  HYDROmorphone (10 MICROgram(s)/mL) + BUpivacaine 0.0625% in 0.9% Sodium Chloride PCEA 250 milliLiter(s) Epidural PCA Continuous  insulin lispro (HumaLOG) corrective regimen sliding scale   SubCutaneous every 6 hours  metoprolol    tartrate Injectable 5 milliGRAM(s) IV Push every 6 hours  pantoprazole  Injectable 40 milliGRAM(s) IV Push daily    MEDICATIONS  (PRN):  dextrose Gel 1 Dose(s) Oral once PRN Blood Glucose LESS THAN 70 milliGRAM(s)/deciliter  glucagon  Injectable 1 milliGRAM(s) IntraMuscular once PRN Glucose LESS THAN 70 milligrams/deciliter  HYDROmorphone  Injectable 0.5 milliGRAM(s) IV Push every 3 hours PRN Severe Pain unrelieved by PCEA  HYDROmorphone (10 MICROgram(s)/mL) + BUpivacaine 0.0625% in 0.9% Sodium Chloride PCEA Rescue Clinician  Bolus 4 milliLiter(s) Epidural every 15 minutes PRN for Pain Score greater than 6  naloxone Injectable 0.1 milliGRAM(s) IV Push every 3 minutes PRN For ANY of the following changes in patient status:  A. RR LESS THAN 10 breaths per minute, B. Oxygen saturation LESS THAN 90%, C. Sedation score of 6  ondansetron Injectable 4 milliGRAM(s) IV Push every 6 hours PRN Nausea  ondansetron Injectable 4 milliGRAM(s) IV Push once PRN Nausea and/or Vomiting  simethicone 80 milliGRAM(s) Chew daily PRN Gas      Vitals  Vital Signs Last 24 Hrs  T(C): 36.7 (03 Dec 2017 08:50), Max: 37.1 (03 Dec 2017 00:44)  T(F): 98.1 (03 Dec 2017 08:50), Max: 98.7 (03 Dec 2017 00:44)  HR: 95 (03 Dec 2017 08:50) (95 - 114)  BP: 115/73 (03 Dec 2017 08:50) (104/64 - 122/70)  BP(mean): --  RR: 18 (03 Dec 2017 08:50) (18 - 18)  SpO2: 100% (03 Dec 2017 08:50) (91% - 100%)    Physical Exam    Constitutional: NAD    Eyes: PERRLA EOMI, anicteric sclera    Heent :No oral sores, no pharyngeal injection. moist mucosa.    Neck: supple, no jvd, no LAD    Respiratory: CTA b/l     Cardiovascular: s1s2, no m/g/r    Gastrointestinal: soft, nt, nd, + BS    Extremities: no c/c/e    Neurological:A&O x 3 moves all ext.    Skin: no rash on exposed skin    Lymph Nodes: no lymphadenopathy.      Lab  CBC Full  -  ( 03 Dec 2017 05:31 )  WBC Count : 7.98 K/uL  Hemoglobin : 11.2 g/dL  Hematocrit : 34.9 %  Platelet Count - Automated : 214 K/uL  Mean Cell Volume : 99.7 fL  Mean Cell Hemoglobin : 32.0 pg  Mean Cell Hemoglobin Concentration : 32.1 %  Auto Neutrophil # : x  Auto Lymphocyte # : x  Auto Monocyte # : x  Auto Eosinophil # : x  Auto Basophil # : x  Auto Neutrophil % : x  Auto Lymphocyte % : x  Auto Monocyte % : x  Auto Eosinophil % : x  Auto Basophil % : x    12-03    137  |  100  |  10  ----------------------------<  154<H>  3.6   |  24  |  0.82    Ca    8.4      03 Dec 2017 05:31  Phos  2.1     12-03  Mg     2.0     12-03    TPro  5.7<L>  /  Alb  2.6<L>  /  TBili  1.2  /  DBili  0.5<H>  /  AST  22  /  ALT  28  /  AlkPhos  92  12-03    PT/INR - ( 03 Dec 2017 05:31 )   PT: 12.7 SEC;   INR: 1.13          PTT - ( 03 Dec 2017 05:31 )  PTT:30.4 SEC    Rad:    Assessment/Plan

## 2017-12-03 NOTE — PROGRESS NOTE ADULT - SUBJECTIVE AND OBJECTIVE BOX
Patient is a 62y old  Male who presents with a chief complaint of "I have a growth on my pancreas" (15 Nov 2017 14:39)      REVIEW OF SYSTEMS:     CONSTITUTIONAL: No weakness, fevers or chills  RESPIRATORY: No cough, wheezing,  No shortness of breath  CARDIOVASCULAR: No chest pain or palpitations  GASTROINTESTINAL:  abdominal pain reasonablly controlled with pain meds  . No nausea, vomiting, no flutuance   GENITOURINARY: No dysuria, frequency   NEUROLOGICAL: No numbness or weakness                                                                                                                                                                                                                                                                                  Medications:  MEDICATIONS  (STANDING):  acetaminophen  IVPB. 1000 milliGRAM(s) IV Intermittent every 6 hours  dextrose 5% + sodium chloride 0.3%. 1000 milliLiter(s) (75 mL/Hr) IV Continuous <Continuous>  dextrose 5%. 1000 milliLiter(s) (50 mL/Hr) IV Continuous <Continuous>  dextrose 50% Injectable 12.5 Gram(s) IV Push once  dextrose 50% Injectable 25 Gram(s) IV Push once  dextrose 50% Injectable 25 Gram(s) IV Push once  heparin  Injectable 5000 Unit(s) SubCutaneous every 8 hours  HYDROmorphone (10 MICROgram(s)/mL) + BUpivacaine 0.0625% in 0.9% Sodium Chloride PCEA 250 milliLiter(s) Epidural PCA Continuous  insulin lispro (HumaLOG) corrective regimen sliding scale   SubCutaneous every 6 hours  metoprolol    tartrate Injectable 5 milliGRAM(s) IV Push every 6 hours  pantoprazole  Injectable 40 milliGRAM(s) IV Push daily    MEDICATIONS  (PRN):  dextrose Gel 1 Dose(s) Oral once PRN Blood Glucose LESS THAN 70 milliGRAM(s)/deciliter  glucagon  Injectable 1 milliGRAM(s) IntraMuscular once PRN Glucose LESS THAN 70 milligrams/deciliter  HYDROmorphone  Injectable 0.5 milliGRAM(s) IV Push every 3 hours PRN Severe Pain unrelieved by PCEA  HYDROmorphone (10 MICROgram(s)/mL) + BUpivacaine 0.0625% in 0.9% Sodium Chloride PCEA Rescue Clinician  Bolus 4 milliLiter(s) Epidural every 15 minutes PRN for Pain Score greater than 6  naloxone Injectable 0.1 milliGRAM(s) IV Push every 3 minutes PRN For ANY of the following changes in patient status:  A. RR LESS THAN 10 breaths per minute, B. Oxygen saturation LESS THAN 90%, C. Sedation score of 6  ondansetron Injectable 4 milliGRAM(s) IV Push every 6 hours PRN Nausea  ondansetron Injectable 4 milliGRAM(s) IV Push once PRN Nausea and/or Vomiting  simethicone 80 milliGRAM(s) Chew daily PRN Gas       Allergies    No Known Allergies    Intolerances      Vital Signs Last 24 Hrs  T(C): 36.3 (03 Dec 2017 16:40), Max: 37.1 (03 Dec 2017 00:44)  T(F): 97.3 (03 Dec 2017 16:40), Max: 98.7 (03 Dec 2017 00:44)  HR: 94 (03 Dec 2017 16:40) (94 - 104)  BP: 100/75 (03 Dec 2017 16:40) (100/75 - 120/82)  BP(mean): --  RR: 18 (03 Dec 2017 16:40) (18 - 18)  SpO2: 99% (03 Dec 2017 16:40) (91% - 100%)  CAPILLARY BLOOD GLUCOSE      POCT Blood Glucose.: 133 mg/dL (03 Dec 2017 16:43)  POCT Blood Glucose.: 160 mg/dL (03 Dec 2017 12:21)  POCT Blood Glucose.: 134 mg/dL (03 Dec 2017 01:27)      12-02 @ 07:01  -  12-03 @ 07:00  --------------------------------------------------------  IN: 2585 mL / OUT: 2265 mL / NET: 320 mL    12-03 @ 07:01  -  12-03 @ 20:58  --------------------------------------------------------  IN: 1505 mL / OUT: 1135 mL / NET: 370 mL      Physical Exam:    General:  NAD   HEENT:  Nonicteric, PERRLA NGT to succtioning   CV:  RRR, S1S2   Lungs:  CTA B/L  Abdomen:  soft tender over the incision sites  otherwise + BS   Extremities:  no edema   Skin:  Warm and dry, no rashes  :  No newberry  Neuro:  AAOx3, non-focal, grossly intact                                                                                                                                                                                                                                                                                                LABS:                               11.2   7.98  )-----------( 214      ( 03 Dec 2017 05:31 )             34.9                      12-03    137  |  100  |  10  ----------------------------<  154<H>  3.6   |  24  |  0.82    Ca    8.4      03 Dec 2017 05:31  Phos  2.1     12-03  Mg     2.0     12-03    TPro  5.7<L>  /  Alb  2.6<L>  /  TBili  1.2  /  DBili  0.5<H>  /  AST  22  /  ALT  28  /  AlkPhos  92  12-03

## 2017-12-03 NOTE — PROGRESS NOTE ADULT - SUBJECTIVE AND OBJECTIVE BOX
Surgery Team D (Surgery Oncology) Progress Note:    Post-Operative Day: 3    Subjective: Pt seen this AM. Tolerating TFs at 30, did not advance higher because of noted distention. Denies N/V. Remains afebrile. -/- GI function.          Objective:  T(C): 36.7 (12-03-17 @ 08:50), Max: 37.1 (12-03-17 @ 00:44)  HR: 95 (12-03-17 @ 08:50) (95 - 114)  BP: 115/73 (12-03-17 @ 08:50) (104/64 - 122/70)  RR: 18 (12-03-17 @ 08:50) (18 - 18)  SpO2: 100% (12-03-17 @ 08:50) (91% - 100%)    Labs:                      11.2   7.98  )-----------( 214      ( 03 Dec 2017 05:31 )             34.9       12-03    137  |  100  |  10  ----------------------------<  154<H>  3.6   |  24  |  0.82    Ca    8.4      03 Dec 2017 05:31  Phos  2.1     12-03  Mg     2.0     12-03    TPro  5.7<L>  /  Alb  2.6<L>  /  TBili  1.2  /  DBili  0.5<H>  /  AST  22  /  ALT  28  /  AlkPhos  92  12-03      I&O's Detail    02 Dec 2017 07:01  -  03 Dec 2017 07:00  --------------------------------------------------------  IN:    dextrose 5% + sodium chloride 0.45%.: 1725 mL    Glucerna: 460 mL    IV PiggyBack: 400 mL  Total IN: 2585 mL    OUT:    Bulb: 355 mL    Indwelling Catheter - Urethral: 1485 mL    Nasoenteral Tube: 425 mL  Total OUT: 2265 mL    Total NET: 320 mL      03 Dec 2017 07:01  -  03 Dec 2017 11:03  --------------------------------------------------------  IN:    dextrose 5% + sodium chloride 0.45%.: 150 mL    Glucerna: 60 mL  Total IN: 210 mL    OUT:    Bulb: 30 mL    Indwelling Catheter - Urethral: 200 mL    Nasoenteral Tube: 100 mL  Total OUT: 330 mL    Total NET: -120 mL          Focused Physical Exam:  General: NAD  Respiratory: Nonlabored breathing  Abdomen: soft, appropriately tender, mildy distended. No rebound/ guarding  Extremities: FROM x 4  : Mariscal in place    Medications:  acetaminophen  IVPB. 1000 milliGRAM(s) IV Intermittent every 6 hours  dextrose 5% + sodium chloride 0.45%. 1000 milliLiter(s) IV Continuous <Continuous>  dextrose 5%. 1000 milliLiter(s) IV Continuous <Continuous>  dextrose 50% Injectable 12.5 Gram(s) IV Push once  dextrose 50% Injectable 25 Gram(s) IV Push once  dextrose 50% Injectable 25 Gram(s) IV Push once  dextrose Gel 1 Dose(s) Oral once PRN  glucagon  Injectable 1 milliGRAM(s) IntraMuscular once PRN  heparin  Injectable 5000 Unit(s) SubCutaneous every 8 hours  HYDROmorphone  Injectable 0.5 milliGRAM(s) IV Push every 3 hours PRN  HYDROmorphone (10 MICROgram(s)/mL) + BUpivacaine 0.0625% in 0.9% Sodium Chloride PCEA 250 milliLiter(s) Epidural PCA Continuous  HYDROmorphone (10 MICROgram(s)/mL) + BUpivacaine 0.0625% in 0.9% Sodium Chloride PCEA Rescue Clinician  Bolus 4 milliLiter(s) Epidural every 15 minutes PRN  insulin lispro (HumaLOG) corrective regimen sliding scale   SubCutaneous every 6 hours  metoprolol    tartrate Injectable 5 milliGRAM(s) IV Push every 6 hours  naloxone Injectable 0.1 milliGRAM(s) IV Push every 3 minutes PRN  ondansetron Injectable 4 milliGRAM(s) IV Push every 6 hours PRN  ondansetron Injectable 4 milliGRAM(s) IV Push once PRN  pantoprazole  Injectable 40 milliGRAM(s) IV Push daily  simethicone 80 milliGRAM(s) Chew daily PRN

## 2017-12-03 NOTE — PROGRESS NOTE ADULT - ASSESSMENT
62 y/o  male with hx of DM , HTN, BPH  admitted recently for jaundice found to have pnacreatic adenocarcinoma on bx . during that admission pt One plastic stent was placed into the ventral pancreatic duct then placement of fully covered metal stent  in bile duct and removal of pancreatic duct stent. . pt had a stress test which was abnormal and plan for f/u as o/p post surgical intervention by Dr. Curry.  Pt is a  pt now presents for Whipple and s/p surgical intervention   1- Pancreatic CA : s/p whipple   oob , npo for now, NGT to suctioning . monitor lytes closely       IS   / DVT proph  f/u with Dr. Manzanares  : likely adjuvant chemo     2- DM: monitor FS while npo ... acceptable FS     3- HTN: had episodes of hypotension while on ARB/ACE... now only on BB cont current meds     4- abn stress : need f/u as o/p.   5-BPH: flomax    6- factor.V. 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     f/u with hem cont heparin sc

## 2017-12-03 NOTE — PROGRESS NOTE ADULT - ASSESSMENT
62 yo M s/p Whipple Procedure, POD3:  - NPO/IVF  - Cont TFs via J tube  - Pain control  - Will need Lovenox for factor V leiden; potentially after PCEA removed  - Moniter INR; treat if supra/sub therapeutic  - F/u MUNA output   - F/u GI function  - F/u UOP  - OOB/ambulate

## 2017-12-03 NOTE — PROGRESS NOTE ADULT - SUBJECTIVE AND OBJECTIVE BOX
Anesthesia Pain Management Service: Day __ of Epidural    SUBJECTIVE: Patient doing well with PCEA and no problems.  Pain Scale Score:   Refer to charted pain scores    THERAPY:  [x ] Epidural Bupivacaine 0.0625% and Hydromorphone  		[ ] 10 micrograms/mL	[ ] 5 micrograms/mL  [ ] Epidural Bupivacaine 0.0625% and Fentanyl - 2 micrograms/mL  [ ] Epidural Ropivacaine 0.1% plain – 1 mg/mL  [ ] Patient Controlled Regional Anesthesia (PCRA) Ropivacaine  		[ ] 0.2%			[ ] 0.1%    Demand dose __3_ lockout __15_ (minutes) Continuous Rate 6__ Total: _136__ Daily      MEDICATIONS  (STANDING):  acetaminophen  IVPB. 1000 milliGRAM(s) IV Intermittent every 6 hours  dextrose 5% + sodium chloride 0.45%. 1000 milliLiter(s) (75 mL/Hr) IV Continuous <Continuous>  dextrose 5%. 1000 milliLiter(s) (50 mL/Hr) IV Continuous <Continuous>  dextrose 50% Injectable 12.5 Gram(s) IV Push once  dextrose 50% Injectable 25 Gram(s) IV Push once  dextrose 50% Injectable 25 Gram(s) IV Push once  heparin  Injectable 5000 Unit(s) SubCutaneous every 8 hours  HYDROmorphone (10 MICROgram(s)/mL) + BUpivacaine 0.0625% in 0.9% Sodium Chloride PCEA 250 milliLiter(s) Epidural PCA Continuous  insulin lispro (HumaLOG) corrective regimen sliding scale   SubCutaneous every 6 hours  metoprolol    tartrate Injectable 5 milliGRAM(s) IV Push every 6 hours  pantoprazole  Injectable 40 milliGRAM(s) IV Push daily    MEDICATIONS  (PRN):  dextrose Gel 1 Dose(s) Oral once PRN Blood Glucose LESS THAN 70 milliGRAM(s)/deciliter  glucagon  Injectable 1 milliGRAM(s) IntraMuscular once PRN Glucose LESS THAN 70 milligrams/deciliter  HYDROmorphone  Injectable 0.5 milliGRAM(s) IV Push every 3 hours PRN Severe Pain unrelieved by PCEA  HYDROmorphone (10 MICROgram(s)/mL) + BUpivacaine 0.0625% in 0.9% Sodium Chloride PCEA Rescue Clinician  Bolus 4 milliLiter(s) Epidural every 15 minutes PRN for Pain Score greater than 6  naloxone Injectable 0.1 milliGRAM(s) IV Push every 3 minutes PRN For ANY of the following changes in patient status:  A. RR LESS THAN 10 breaths per minute, B. Oxygen saturation LESS THAN 90%, C. Sedation score of 6  ondansetron Injectable 4 milliGRAM(s) IV Push every 6 hours PRN Nausea  ondansetron Injectable 4 milliGRAM(s) IV Push once PRN Nausea and/or Vomiting  simethicone 80 milliGRAM(s) Chew daily PRN Gas      OBJECTIVE:    Assessment of Catheter Site:	[ ] Left	[ ] Right  [x ] Epidural 	[ ] Femoral	      [ ] Saphenous   [ ] Supraclavicular   [ ] Other:    [x ] Dressing intact	[x ] Site non-tender	[ x] Site without erythema, discharge, edema  [x ] Epidural tubing and connection checked	[x] Gross neurological exam within normal limits  [ ] Catheter removed – tip intact		[x ] Afebrile	[ ] Febrile: ___    PT/INR - ( 03 Dec 2017 05:31 )   PT: 12.7 SEC;   INR: 1.13          PTT - ( 03 Dec 2017 05:31 )  PTT:30.4 SEC                      11.2   7.98  )-----------( 214      ( 03 Dec 2017 05:31 )             34.9     Vital Signs Last 24 Hrs  T(C): 36.7 (12-03-17 @ 08:50), Max: 37.1 (12-03-17 @ 00:44)  T(F): 98.1 (12-03-17 @ 08:50), Max: 98.7 (12-03-17 @ 00:44)  HR: 95 (12-03-17 @ 08:50) (95 - 114)  BP: 115/73 (12-03-17 @ 08:50) (104/64 - 122/70)  BP(mean): --  RR: 18 (12-03-17 @ 08:50) (18 - 18)  SpO2: 100% (12-03-17 @ 08:50) (91% - 100%)      Sedation Score:	[x ] Alert	[ ] Drowsy	[ ] Arousable	[ ] Asleep	[ ] Unresponsive    Side Effects:	[x ] None	[ ] Nausea	[ ] Vomiting	[ ] Pruritus  		[ ] Weakness		[ ] Numbness	[ ] Other:    ASSESSMENT/ PLAN:    Therapy to  be:	[x ] Continue   [ ] Discontinued   [ ] Change to prn Analgesics    Documentation and Verification of current medications:  [ X ] Done	[ ] Not done, not eligible, reason:    Comments: Doing OK with epidural and may continue.

## 2017-12-04 LAB
ALBUMIN SERPL ELPH-MCNC: 2.8 G/DL — LOW (ref 3.3–5)
ALP SERPL-CCNC: 106 U/L — SIGNIFICANT CHANGE UP (ref 40–120)
ALT FLD-CCNC: 21 U/L — SIGNIFICANT CHANGE UP (ref 4–41)
AMYLASE FLD-CCNC: 13 U/L — SIGNIFICANT CHANGE UP
APTT BLD: 33.3 SEC — SIGNIFICANT CHANGE UP (ref 27.5–37.4)
AST SERPL-CCNC: 17 U/L — SIGNIFICANT CHANGE UP (ref 4–40)
BILIRUB DIRECT SERPL-MCNC: 0.5 MG/DL — HIGH (ref 0.1–0.2)
BILIRUB SERPL-MCNC: 1.2 MG/DL — SIGNIFICANT CHANGE UP (ref 0.2–1.2)
BUN SERPL-MCNC: 9 MG/DL — SIGNIFICANT CHANGE UP (ref 7–23)
CALCIUM SERPL-MCNC: 8.5 MG/DL — SIGNIFICANT CHANGE UP (ref 8.4–10.5)
CHLORIDE SERPL-SCNC: 100 MMOL/L — SIGNIFICANT CHANGE UP (ref 98–107)
CO2 SERPL-SCNC: 25 MMOL/L — SIGNIFICANT CHANGE UP (ref 22–31)
CREAT SERPL-MCNC: 0.79 MG/DL — SIGNIFICANT CHANGE UP (ref 0.5–1.3)
GLUCOSE BLDC GLUCOMTR-MCNC: 151 MG/DL — HIGH (ref 70–99)
GLUCOSE BLDC GLUCOMTR-MCNC: 165 MG/DL — HIGH (ref 70–99)
GLUCOSE SERPL-MCNC: 184 MG/DL — HIGH (ref 70–99)
HCT VFR BLD CALC: 38.2 % — LOW (ref 39–50)
HGB BLD-MCNC: 12.2 G/DL — LOW (ref 13–17)
INR BLD: 1.14 — SIGNIFICANT CHANGE UP (ref 0.88–1.17)
LIDOCAIN IGE QN: 13.4 U/L — SIGNIFICANT CHANGE UP (ref 7–60)
MAGNESIUM SERPL-MCNC: 1.9 MG/DL — SIGNIFICANT CHANGE UP (ref 1.6–2.6)
MCHC RBC-ENTMCNC: 31.9 % — LOW (ref 32–36)
MCHC RBC-ENTMCNC: 31.9 PG — SIGNIFICANT CHANGE UP (ref 27–34)
MCV RBC AUTO: 99.7 FL — SIGNIFICANT CHANGE UP (ref 80–100)
NRBC # FLD: 0 — SIGNIFICANT CHANGE UP
PHOSPHATE SERPL-MCNC: 2.7 MG/DL — SIGNIFICANT CHANGE UP (ref 2.5–4.5)
PLATELET # BLD AUTO: 256 K/UL — SIGNIFICANT CHANGE UP (ref 150–400)
PMV BLD: 10.3 FL — SIGNIFICANT CHANGE UP (ref 7–13)
POTASSIUM SERPL-MCNC: 3.6 MMOL/L — SIGNIFICANT CHANGE UP (ref 3.5–5.3)
POTASSIUM SERPL-SCNC: 3.6 MMOL/L — SIGNIFICANT CHANGE UP (ref 3.5–5.3)
PROT SERPL-MCNC: 6.1 G/DL — SIGNIFICANT CHANGE UP (ref 6–8.3)
PROTHROM AB SERPL-ACNC: 12.8 SEC — SIGNIFICANT CHANGE UP (ref 9.8–13.1)
RBC # BLD: 3.83 M/UL — LOW (ref 4.2–5.8)
RBC # FLD: 16.3 % — HIGH (ref 10.3–14.5)
SODIUM SERPL-SCNC: 137 MMOL/L — SIGNIFICANT CHANGE UP (ref 135–145)
WBC # BLD: 7.38 K/UL — SIGNIFICANT CHANGE UP (ref 3.8–10.5)
WBC # FLD AUTO: 7.38 K/UL — SIGNIFICANT CHANGE UP (ref 3.8–10.5)

## 2017-12-04 RX ORDER — BUTORPHANOL TARTRATE 2 MG/ML
0.25 INJECTION, SOLUTION INTRAMUSCULAR; INTRAVENOUS EVERY 6 HOURS
Qty: 0 | Refills: 0 | Status: DISCONTINUED | OUTPATIENT
Start: 2017-12-04 | End: 2017-12-07

## 2017-12-04 RX ORDER — ENOXAPARIN SODIUM 100 MG/ML
40 INJECTION SUBCUTANEOUS DAILY
Qty: 0 | Refills: 0 | Status: DISCONTINUED | OUTPATIENT
Start: 2017-12-04 | End: 2017-12-15

## 2017-12-04 RX ORDER — ONDANSETRON 8 MG/1
4 TABLET, FILM COATED ORAL EVERY 6 HOURS
Qty: 0 | Refills: 0 | Status: DISCONTINUED | OUTPATIENT
Start: 2017-12-04 | End: 2017-12-12

## 2017-12-04 RX ORDER — HYDROMORPHONE HYDROCHLORIDE 2 MG/ML
0.5 INJECTION INTRAMUSCULAR; INTRAVENOUS; SUBCUTANEOUS
Qty: 0 | Refills: 0 | Status: DISCONTINUED | OUTPATIENT
Start: 2017-12-04 | End: 2017-12-07

## 2017-12-04 RX ORDER — HYDROMORPHONE HYDROCHLORIDE 2 MG/ML
30 INJECTION INTRAMUSCULAR; INTRAVENOUS; SUBCUTANEOUS
Qty: 0 | Refills: 0 | Status: DISCONTINUED | OUTPATIENT
Start: 2017-12-04 | End: 2017-12-07

## 2017-12-04 RX ORDER — POTASSIUM CHLORIDE 20 MEQ
10 PACKET (EA) ORAL
Qty: 0 | Refills: 0 | Status: COMPLETED | OUTPATIENT
Start: 2017-12-04 | End: 2017-12-04

## 2017-12-04 RX ORDER — NALOXONE HYDROCHLORIDE 4 MG/.1ML
0.1 SPRAY NASAL
Qty: 0 | Refills: 0 | Status: DISCONTINUED | OUTPATIENT
Start: 2017-12-04 | End: 2017-12-07

## 2017-12-04 RX ORDER — OXYCODONE HYDROCHLORIDE 5 MG/1
10 TABLET ORAL
Qty: 0 | Refills: 0 | Status: DISCONTINUED | OUTPATIENT
Start: 2017-12-04 | End: 2017-12-06

## 2017-12-04 RX ORDER — OXYCODONE HYDROCHLORIDE 5 MG/1
10 TABLET ORAL
Qty: 0 | Refills: 0 | Status: DISCONTINUED | OUTPATIENT
Start: 2017-12-04 | End: 2017-12-04

## 2017-12-04 RX ORDER — POTASSIUM PHOSPHATE, MONOBASIC POTASSIUM PHOSPHATE, DIBASIC 236; 224 MG/ML; MG/ML
15 INJECTION, SOLUTION INTRAVENOUS ONCE
Qty: 0 | Refills: 0 | Status: COMPLETED | OUTPATIENT
Start: 2017-12-04 | End: 2017-12-04

## 2017-12-04 RX ADMIN — Medication 5 MILLIGRAM(S): at 17:32

## 2017-12-04 RX ADMIN — Medication 1: at 19:01

## 2017-12-04 RX ADMIN — Medication 400 MILLIGRAM(S): at 06:04

## 2017-12-04 RX ADMIN — HYDROMORPHONE HYDROCHLORIDE 0.5 MILLIGRAM(S): 2 INJECTION INTRAMUSCULAR; INTRAVENOUS; SUBCUTANEOUS at 19:01

## 2017-12-04 RX ADMIN — Medication 100 MILLIEQUIVALENT(S): at 10:10

## 2017-12-04 RX ADMIN — Medication 100 MILLIEQUIVALENT(S): at 09:06

## 2017-12-04 RX ADMIN — Medication 1000 MILLIGRAM(S): at 01:00

## 2017-12-04 RX ADMIN — Medication 5 MILLIGRAM(S): at 06:05

## 2017-12-04 RX ADMIN — Medication 1000 MILLIGRAM(S): at 07:00

## 2017-12-04 RX ADMIN — Medication 5 MILLIGRAM(S): at 11:48

## 2017-12-04 RX ADMIN — HEPARIN SODIUM 5000 UNIT(S): 5000 INJECTION INTRAVENOUS; SUBCUTANEOUS at 16:33

## 2017-12-04 RX ADMIN — POTASSIUM PHOSPHATE, MONOBASIC POTASSIUM PHOSPHATE, DIBASIC 62.5 MILLIMOLE(S): 236; 224 INJECTION, SOLUTION INTRAVENOUS at 12:49

## 2017-12-04 RX ADMIN — HYDROMORPHONE HYDROCHLORIDE 30 MILLILITER(S): 2 INJECTION INTRAMUSCULAR; INTRAVENOUS; SUBCUTANEOUS at 19:14

## 2017-12-04 RX ADMIN — SODIUM CHLORIDE 75 MILLILITER(S): 9 INJECTION, SOLUTION INTRAVENOUS at 09:28

## 2017-12-04 RX ADMIN — Medication 5 MILLIGRAM(S): at 00:39

## 2017-12-04 RX ADMIN — Medication 400 MILLIGRAM(S): at 00:39

## 2017-12-04 RX ADMIN — HYDROMORPHONE HYDROCHLORIDE 30 MILLILITER(S): 2 INJECTION INTRAMUSCULAR; INTRAVENOUS; SUBCUTANEOUS at 11:40

## 2017-12-04 RX ADMIN — PANTOPRAZOLE SODIUM 40 MILLIGRAM(S): 20 TABLET, DELAYED RELEASE ORAL at 11:48

## 2017-12-04 RX ADMIN — SODIUM CHLORIDE 75 MILLILITER(S): 9 INJECTION, SOLUTION INTRAVENOUS at 22:16

## 2017-12-04 RX ADMIN — HEPARIN SODIUM 5000 UNIT(S): 5000 INJECTION INTRAVENOUS; SUBCUTANEOUS at 06:05

## 2017-12-04 RX ADMIN — ENOXAPARIN SODIUM 40 MILLIGRAM(S): 100 INJECTION SUBCUTANEOUS at 22:17

## 2017-12-04 RX ADMIN — Medication 1: at 06:05

## 2017-12-04 RX ADMIN — Medication 1: at 12:38

## 2017-12-04 RX ADMIN — Medication 100 MILLIEQUIVALENT(S): at 11:15

## 2017-12-04 NOTE — PROGRESS NOTE ADULT - SUBJECTIVE AND OBJECTIVE BOX
Patient is a 62y old  Male who presents with a chief complaint of "I have a growth on my pancreas" (15 Nov 2017 14:39)    Remains on Tube feed and with NGT in place.  No BM or flatulence.  Has no N/V but has heartburn.  Some abd discomfort but manageable.  No CP, or SOB.      Medication:   dextrose 5% + sodium chloride 0.3%. 1000 milliLiter(s) IV Continuous <Continuous>  dextrose 5%. 1000 milliLiter(s) IV Continuous <Continuous>  dextrose 50% Injectable 12.5 Gram(s) IV Push once  dextrose 50% Injectable 25 Gram(s) IV Push once  dextrose 50% Injectable 25 Gram(s) IV Push once  dextrose Gel 1 Dose(s) Oral once PRN  glucagon  Injectable 1 milliGRAM(s) IntraMuscular once PRN  heparin  Injectable 5000 Unit(s) SubCutaneous every 8 hours  HYDROmorphone  Injectable 0.5 milliGRAM(s) IV Push every 3 hours PRN  HYDROmorphone (10 MICROgram(s)/mL) + BUpivacaine 0.0625% in 0.9% Sodium Chloride PCEA 250 milliLiter(s) Epidural PCA Continuous  HYDROmorphone (10 MICROgram(s)/mL) + BUpivacaine 0.0625% in 0.9% Sodium Chloride PCEA Rescue Clinician  Bolus 4 milliLiter(s) Epidural every 15 minutes PRN  insulin lispro (HumaLOG) corrective regimen sliding scale   SubCutaneous every 6 hours  metoprolol    tartrate Injectable 5 milliGRAM(s) IV Push every 6 hours  naloxone Injectable 0.1 milliGRAM(s) IV Push every 3 minutes PRN  ondansetron Injectable 4 milliGRAM(s) IV Push every 6 hours PRN  ondansetron Injectable 4 milliGRAM(s) IV Push once PRN  pantoprazole  Injectable 40 milliGRAM(s) IV Push daily  simethicone 80 milliGRAM(s) Chew daily PRN      Physical exam    T(C): 37.1 (12-04-17 @ 05:00), Max: 37.6 (12-04-17 @ 00:00)  HR: 99 (12-04-17 @ 05:00) (94 - 112)  BP: 120/67 (12-04-17 @ 05:00) (100/75 - 128/85)  RR: 17 (12-04-17 @ 05:00) (17 - 18)  SpO2: 96% (12-04-17 @ 05:00) (93% - 100%)  Wt(kg): --    alert NAD  EOMI anicteric sclera  NGT in place  Cv RRR  No LE edema or tenderness    Labs                        12.2   7.38  )-----------( 256      ( 04 Dec 2017 06:04 )             38.2       12-04    137  |  100  |  9   ----------------------------<  184<H>  3.6   |  25  |  0.79    Ca    8.5      04 Dec 2017 06:04  Phos  2.7     12-04  Mg     1.9     12-04    TPro  6.1  /  Alb  2.8<L>  /  TBili  1.2  /  DBili  0.5<H>  /  AST  17  /  ALT  21  /  AlkPhos  106  12-04      LIVER FUNCTIONS - ( 04 Dec 2017 06:04 )  Alb: 2.8 g/dL / Pro: 6.1 g/dL / ALK PHOS: 106 u/L / ALT: 21 u/L / AST: 17 u/L / GGT: x             [id number]

## 2017-12-04 NOTE — PROGRESS NOTE ADULT - SUBJECTIVE AND OBJECTIVE BOX
Surgery Team D (Surgery Oncology) Progress Note:    Post-Operative Day: 4    Subjective: Pt seen this AM. No acute events overnight. Pain controlled. -/- GI function.          Objective:  T(C): 37 (12-04-17 @ 07:33), Max: 37.6 (12-04-17 @ 00:00)  HR: 92 (12-04-17 @ 07:33) (92 - 112)  BP: 128/78 (12-04-17 @ 07:33) (100/75 - 128/85)  RR: 18 (12-04-17 @ 07:33) (17 - 18)  SpO2: 96% (12-04-17 @ 07:33) (93% - 100%)    Labs:                      12.2   7.38  )-----------( 256      ( 04 Dec 2017 06:04 )             38.2       12-04    137  |  100  |  9   ----------------------------<  184<H>  3.6   |  25  |  0.79    Ca    8.5      04 Dec 2017 06:04  Phos  2.7     12-04  Mg     1.9     12-04    TPro  6.1  /  Alb  2.8<L>  /  TBili  1.2  /  DBili  0.5<H>  /  AST  17  /  ALT  21  /  AlkPhos  106  12-04      I&O's Detail    03 Dec 2017 07:01  -  04 Dec 2017 07:00  --------------------------------------------------------  IN:    dextrose 5% + sodium chloride 0.3%.: 1125 mL    dextrose 5% + sodium chloride 0.45%.: 375 mL    Glucerna: 600 mL    IV PiggyBack: 350 mL  Total IN: 2450 mL    OUT:    Bulb: 303 mL    Indwelling Catheter - Urethral: 2100 mL    Nasoenteral Tube: 250 mL  Total OUT: 2653 mL    Total NET: -203 mL      04 Dec 2017 07:01  -  04 Dec 2017 07:59  --------------------------------------------------------  IN:  Total IN: 0 mL    OUT:    Bulb: 30 mL  Total OUT: 30 mL    Total NET: -30 mL          Focused Physical Exam:  General: NAD  Respiratory: Nonlabored breathing  Abdomen: soft, appropriately tender, more distended. No rebound/ guarding  Extremities: FROM x 4  : Mariscal in place    Medications:  dextrose 5% + sodium chloride 0.3%. 1000 milliLiter(s) IV Continuous <Continuous>  dextrose 5%. 1000 milliLiter(s) IV Continuous <Continuous>  dextrose 50% Injectable 12.5 Gram(s) IV Push once  dextrose 50% Injectable 25 Gram(s) IV Push once  dextrose 50% Injectable 25 Gram(s) IV Push once  dextrose Gel 1 Dose(s) Oral once PRN  glucagon  Injectable 1 milliGRAM(s) IntraMuscular once PRN  heparin  Injectable 5000 Unit(s) SubCutaneous every 8 hours  HYDROmorphone  Injectable 0.5 milliGRAM(s) IV Push every 3 hours PRN  HYDROmorphone (10 MICROgram(s)/mL) + BUpivacaine 0.0625% in 0.9% Sodium Chloride PCEA 250 milliLiter(s) Epidural PCA Continuous  HYDROmorphone (10 MICROgram(s)/mL) + BUpivacaine 0.0625% in 0.9% Sodium Chloride PCEA Rescue Clinician  Bolus 4 milliLiter(s) Epidural every 15 minutes PRN  insulin lispro (HumaLOG) corrective regimen sliding scale   SubCutaneous every 6 hours  metoprolol    tartrate Injectable 5 milliGRAM(s) IV Push every 6 hours  naloxone Injectable 0.1 milliGRAM(s) IV Push every 3 minutes PRN  ondansetron Injectable 4 milliGRAM(s) IV Push every 6 hours PRN  ondansetron Injectable 4 milliGRAM(s) IV Push once PRN  pantoprazole  Injectable 40 milliGRAM(s) IV Push daily  simethicone 80 milliGRAM(s) Chew daily PRN

## 2017-12-04 NOTE — PROGRESS NOTE ADULT - ASSESSMENT
Pancreatic cancer s/p Whipple.  Await final pathology report.  Further Onc management as out-pt.    Mild anemia AOCD and now Hgb near normal.    Hyperbilirubinemia essentially resolved.    On DVT prophylaxis with SQ Heparin

## 2017-12-04 NOTE — PROGRESS NOTE ADULT - ASSESSMENT
60 y/o  male with hx of DM , HTN, BPH  admitted recently for jaundice found to have pnacreatic adenocarcinoma on bx . during that admission pt One plastic stent was placed into the ventral pancreatic duct then placement of fully covered metal stent  in bile duct and removal of pancreatic duct stent. . pt had a stress test which was abnormal and plan for f/u as o/p post surgical intervention by Dr. Curry.  Pt is a  pt now presents for Whipple and s/p surgical intervention   1- Pancreatic CA : s/p whipple   oob , npo for now, NGT in place . monitor lytes closely       IS   / DVT proph  f/u with Dr. Manzanares  : likely adjuvant chemo     2- DM: monitor FS while npo ... acceptable FS     3- HTN: had episodes of hypotension while on ARB/ACE... now only on BB cont current meds     4- abn stress : need f/u as o/p.   5-BPH: flomax    6- factor.V. 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     f/u with heme  cont DVT prophy  oob to chair   PT   d/w pt at length

## 2017-12-04 NOTE — PROGRESS NOTE ADULT - ASSESSMENT
62 yo M s/p Whipple Procedure, POD4:  - NPO/IVF  - Cont TFs via J tube; will decrease rate to 10 due to increased distention  - Pain control  - Will need Lovenox for factor V leiden; potentially after PCEA removed  - Moniter INR; treat if supra/sub therapeutic  - F/u MUNA output   - F/u GI function  - F/u UOP  - OOB/ambulate 62 yo M s/p Whipple Procedure, POD4:  - NPO/IVF  - Cont TFs via J tube; will decrease rate to 10 due to increased distention  - Pain control  - F/u MUNA amylase   - Will need Lovenox for factor V leiden; potentially after PCEA removed  - Moniter INR; treat if supra/sub therapeutic  - F/u MUNA output   - F/u GI function  - F/u UOP  - OOB/ambulate

## 2017-12-04 NOTE — PROGRESS NOTE ADULT - SUBJECTIVE AND OBJECTIVE BOX
Anesthesia Pain Management Service    SUBJECTIVE: Pt doing well with PCEA removed & on IV PCA & no problems reported.    Therapy:	  [X ] IV PCA	   [ X] Epidural stopped          [ ] s/p Spinal Opoid              [ ] Postpartum infusion	  [ ] Patient controlled regional anesthesia (PCRA)    [ ] prn Analgesics    Allergies    No Known Allergies    Intolerances      MEDICATIONS  (STANDING):  dextrose 5% + sodium chloride 0.3%. 1000 milliLiter(s) (75 mL/Hr) IV Continuous <Continuous>  dextrose 5%. 1000 milliLiter(s) (50 mL/Hr) IV Continuous <Continuous>  dextrose 50% Injectable 12.5 Gram(s) IV Push once  dextrose 50% Injectable 25 Gram(s) IV Push once  dextrose 50% Injectable 25 Gram(s) IV Push once  heparin  Injectable 5000 Unit(s) SubCutaneous every 8 hours  HYDROmorphone PCA (1 mG/mL) 30 milliLiter(s) PCA Continuous PCA Continuous  insulin lispro (HumaLOG) corrective regimen sliding scale   SubCutaneous every 6 hours  metoprolol    tartrate Injectable 5 milliGRAM(s) IV Push every 6 hours  oxyCODONE    Solution 10 milliGRAM(s) Oral <User Schedule>  pantoprazole  Injectable 40 milliGRAM(s) IV Push daily    MEDICATIONS  (PRN):  butorphanol Injectable 0.25 milliGRAM(s) IV Push every 6 hours PRN Pruritus  dextrose Gel 1 Dose(s) Oral once PRN Blood Glucose LESS THAN 70 milliGRAM(s)/deciliter  glucagon  Injectable 1 milliGRAM(s) IntraMuscular once PRN Glucose LESS THAN 70 milligrams/deciliter  HYDROmorphone PCA (1 mG/mL) Rescue Clinician Bolus 0.5 milliGRAM(s) IV Push every 15 minutes PRN for Pain Scale GREATER THAN 6  naloxone Injectable 0.1 milliGRAM(s) IV Push every 3 minutes PRN For ANY of the following changes in patient status:  A. RR LESS THAN 10 breaths per minute, B. Oxygen saturation LESS THAN 90%, C. Sedation score of 6  ondansetron Injectable 4 milliGRAM(s) IV Push every 6 hours PRN Nausea  ondansetron Injectable 4 milliGRAM(s) IV Push once PRN Nausea and/or Vomiting  simethicone 80 milliGRAM(s) Chew daily PRN Gas      OBJECTIVE:   [X] No new signs     [ ] Other:    Side Effects:  [X ] None			[ ] Other:    Assessment of Catheter Site:		[ ] Intact		[ ] Other:    ASSESSMENT/PLAN  [ ] Continue current therapy    [X ] Therapy changed to:    [ ] IV PCA       [ ] Epidural     [ X] prn Analgesics     Comments: Epidural stopped & now to go on oral/IV opioids & adjuvant medication as needed. Pt agrees with plan.    Progress Note written now but Patient was seen earlier.

## 2017-12-04 NOTE — PROGRESS NOTE ADULT - SUBJECTIVE AND OBJECTIVE BOX
Day _5_ of Anesthesia Pain Management Service    Allergies  No Known Allergies    SUBJECTIVE: "I have pain. It's a combination of gas pain and the surgery. I had a small BM this morning, but haven't been walking much. I am sleepy from the medicine."    Pain Scale Score	At rest: _5-6/10_ 	With Activity: ___ 	[  ] Refer to charted pain scores    THERAPY:  [X] Epidural Bupivacaine 0.0625% and Hydromorphone  		[X] 10 micrograms/mL	[ ] 5 micrograms/mL  [ ] Epidural Bupivacaine 0.0625% and Fentanyl - 2 micrograms/mL  [ ] Epidural Ropivacaine 0.1% plain – 1 mg/mL  [ ] Patient Controlled Regional Anesthesia (PCRA) Ropivacaine  		[ ] 0.2%			[ ] 0.1%    Demand dose _3mL_ lockout _15min_ (minutes) Continuous Rate _6mL_ Total: _253ml_ Daily      MEDICATIONS  (STANDING):  dextrose 5% + sodium chloride 0.3%. 1000 milliLiter(s) (75 mL/Hr) IV Continuous <Continuous>  dextrose 5%. 1000 milliLiter(s) (50 mL/Hr) IV Continuous <Continuous>  dextrose 50% Injectable 12.5 Gram(s) IV Push once  dextrose 50% Injectable 25 Gram(s) IV Push once  dextrose 50% Injectable 25 Gram(s) IV Push once  heparin  Injectable 5000 Unit(s) SubCutaneous every 8 hours  HYDROmorphone PCA (1 mG/mL) 30 milliLiter(s) PCA Continuous PCA Continuous  insulin lispro (HumaLOG) corrective regimen sliding scale   SubCutaneous every 6 hours  metoprolol    tartrate Injectable 5 milliGRAM(s) IV Push every 6 hours  oxyCODONE    Solution 10 milliGRAM(s) Oral <User Schedule>  pantoprazole  Injectable 40 milliGRAM(s) IV Push daily  potassium chloride  10 mEq/100 mL IVPB 10 milliEquivalent(s) IV Intermittent every 1 hour  potassium phosphate IVPB 15 milliMole(s) IV Intermittent once    MEDICATIONS  (PRN):  butorphanol Injectable 0.25 milliGRAM(s) IV Push every 6 hours PRN Pruritus  dextrose Gel 1 Dose(s) Oral once PRN Blood Glucose LESS THAN 70 milliGRAM(s)/deciliter  glucagon  Injectable 1 milliGRAM(s) IntraMuscular once PRN Glucose LESS THAN 70 milligrams/deciliter  HYDROmorphone PCA (1 mG/mL) Rescue Clinician Bolus 0.5 milliGRAM(s) IV Push every 15 minutes PRN for Pain Scale GREATER THAN 6  naloxone Injectable 0.1 milliGRAM(s) IV Push every 3 minutes PRN For ANY of the following changes in patient status:  A. RR LESS THAN 10 breaths per minute, B. Oxygen saturation LESS THAN 90%, C. Sedation score of 6  ondansetron Injectable 4 milliGRAM(s) IV Push every 6 hours PRN Nausea  ondansetron Injectable 4 milliGRAM(s) IV Push once PRN Nausea and/or Vomiting  simethicone 80 milliGRAM(s) Chew daily PRN Gas      OBJECTIVE: Patient A&Ox3, NAD, sitting up in chair. Family members at bedside.    Assessment of Catheter Site:	[ ] Left	[ ] Right  [X] Epidural 	[ ] Femoral	      [ ] Saphenous   [ ] Supraclavicular   [ ] Other:    [X] Dressing intact	[X] Site non-tender	[X] Site without erythema, discharge, edema  [x] Epidural tubing and connection checked	[X] Gross neurological exam within normal limits  [x] Catheter removed – tip intact		    [X ] Temperatue:  _37_     Sedation Score:	[X] Alert	[ ] Drowsy	[ ] Arousable	[ ] Asleep	[ ] Unresponsive    Side Effects:	[X] None	[ ] Nausea	[ ] Vomiting	[ ] Pruritus  		[ ] Weakness		[ ] Numbness	[ ] Other:    PT/INR - ( 04 Dec 2017 06:04 )   PT: 12.8 SEC;   INR: 1.14     PTT - ( 04 Dec 2017 06:04 )  PTT:33.3 SEC                          12.2   7.38  )-----------( 256      ( 04 Dec 2017 06:04 )             38.2       12-04    137  |  100  |  9   ----------------------------<  184<H>  3.6   |  25  |  0.79    Ca    8.5      04 Dec 2017 06:04  Phos  2.7     12-04  Mg     1.9     12-04    TPro  6.1  /  Alb  2.8<L>  /  TBili  1.2  /  DBili  0.5<H>  /  AST  17  /  ALT  21  /  AlkPhos  106  12-04      ASSESSMENT/ PLAN:    Therapy to  be:	[] Continue   [x] Discontinued   [X] Change to IV PCA    Documentation and Verification of current medications:  [X] Done	[ ] Not done, not eligible  [ ] Not done, reason not given      COMMENTS:  Changed to IV PCA as discussed with D-Team  Use reviewed with patient and family  Add Oxycodone 10mg via J-Tube at bedtime x 3 doses  Maximize use of non-opioid adjuncts, such as ketorolac or IV Tylenol.

## 2017-12-05 LAB
AMYLASE FLD-CCNC: 7 U/L — SIGNIFICANT CHANGE UP
BUN SERPL-MCNC: 9 MG/DL — SIGNIFICANT CHANGE UP (ref 7–23)
CALCIUM SERPL-MCNC: 8.6 MG/DL — SIGNIFICANT CHANGE UP (ref 8.4–10.5)
CHLORIDE SERPL-SCNC: 101 MMOL/L — SIGNIFICANT CHANGE UP (ref 98–107)
CO2 SERPL-SCNC: 26 MMOL/L — SIGNIFICANT CHANGE UP (ref 22–31)
CREAT SERPL-MCNC: 0.79 MG/DL — SIGNIFICANT CHANGE UP (ref 0.5–1.3)
GLUCOSE BLDC GLUCOMTR-MCNC: 156 MG/DL — HIGH (ref 70–99)
GLUCOSE BLDC GLUCOMTR-MCNC: 159 MG/DL — HIGH (ref 70–99)
GLUCOSE BLDC GLUCOMTR-MCNC: 177 MG/DL — HIGH (ref 70–99)
GLUCOSE BLDC GLUCOMTR-MCNC: 189 MG/DL — HIGH (ref 70–99)
GLUCOSE SERPL-MCNC: 198 MG/DL — HIGH (ref 70–99)
HCT VFR BLD CALC: 38.2 % — LOW (ref 39–50)
HGB BLD-MCNC: 12.7 G/DL — LOW (ref 13–17)
MAGNESIUM SERPL-MCNC: 2 MG/DL — SIGNIFICANT CHANGE UP (ref 1.6–2.6)
MCHC RBC-ENTMCNC: 32.8 PG — SIGNIFICANT CHANGE UP (ref 27–34)
MCHC RBC-ENTMCNC: 33.2 % — SIGNIFICANT CHANGE UP (ref 32–36)
MCV RBC AUTO: 98.7 FL — SIGNIFICANT CHANGE UP (ref 80–100)
NRBC # FLD: 0 — SIGNIFICANT CHANGE UP
PHOSPHATE SERPL-MCNC: 3.2 MG/DL — SIGNIFICANT CHANGE UP (ref 2.5–4.5)
PLATELET # BLD AUTO: 258 K/UL — SIGNIFICANT CHANGE UP (ref 150–400)
PMV BLD: 10.6 FL — SIGNIFICANT CHANGE UP (ref 7–13)
POTASSIUM SERPL-MCNC: 3.9 MMOL/L — SIGNIFICANT CHANGE UP (ref 3.5–5.3)
POTASSIUM SERPL-SCNC: 3.9 MMOL/L — SIGNIFICANT CHANGE UP (ref 3.5–5.3)
RBC # BLD: 3.87 M/UL — LOW (ref 4.2–5.8)
RBC # FLD: 16 % — HIGH (ref 10.3–14.5)
SODIUM SERPL-SCNC: 138 MMOL/L — SIGNIFICANT CHANGE UP (ref 135–145)
WBC # BLD: 8.69 K/UL — SIGNIFICANT CHANGE UP (ref 3.8–10.5)
WBC # FLD AUTO: 8.69 K/UL — SIGNIFICANT CHANGE UP (ref 3.8–10.5)

## 2017-12-05 RX ORDER — DEXTROSE MONOHYDRATE, SODIUM CHLORIDE, AND POTASSIUM CHLORIDE 50; .745; 4.5 G/1000ML; G/1000ML; G/1000ML
1000 INJECTION, SOLUTION INTRAVENOUS
Qty: 0 | Refills: 0 | Status: DISCONTINUED | OUTPATIENT
Start: 2017-12-05 | End: 2017-12-12

## 2017-12-05 RX ORDER — SIMETHICONE 80 MG/1
80 TABLET, CHEWABLE ORAL
Qty: 0 | Refills: 0 | Status: DISCONTINUED | OUTPATIENT
Start: 2017-12-05 | End: 2017-12-15

## 2017-12-05 RX ORDER — ACETAMINOPHEN 500 MG
1000 TABLET ORAL EVERY 6 HOURS
Qty: 0 | Refills: 0 | Status: COMPLETED | OUTPATIENT
Start: 2017-12-05 | End: 2017-12-06

## 2017-12-05 RX ORDER — FUROSEMIDE 40 MG
20 TABLET ORAL ONCE
Qty: 0 | Refills: 0 | Status: COMPLETED | OUTPATIENT
Start: 2017-12-05 | End: 2017-12-05

## 2017-12-05 RX ADMIN — HYDROMORPHONE HYDROCHLORIDE 30 MILLILITER(S): 2 INJECTION INTRAMUSCULAR; INTRAVENOUS; SUBCUTANEOUS at 07:10

## 2017-12-05 RX ADMIN — Medication 400 MILLIGRAM(S): at 11:10

## 2017-12-05 RX ADMIN — HYDROMORPHONE HYDROCHLORIDE 30 MILLILITER(S): 2 INJECTION INTRAMUSCULAR; INTRAVENOUS; SUBCUTANEOUS at 19:04

## 2017-12-05 RX ADMIN — Medication 400 MILLIGRAM(S): at 18:23

## 2017-12-05 RX ADMIN — Medication 1000 MILLIGRAM(S): at 11:40

## 2017-12-05 RX ADMIN — Medication 5 MILLIGRAM(S): at 11:10

## 2017-12-05 RX ADMIN — Medication 5 MILLIGRAM(S): at 00:08

## 2017-12-05 RX ADMIN — DEXTROSE MONOHYDRATE, SODIUM CHLORIDE, AND POTASSIUM CHLORIDE 30 MILLILITER(S): 50; .745; 4.5 INJECTION, SOLUTION INTRAVENOUS at 22:24

## 2017-12-05 RX ADMIN — DEXTROSE MONOHYDRATE, SODIUM CHLORIDE, AND POTASSIUM CHLORIDE 30 MILLILITER(S): 50; .745; 4.5 INJECTION, SOLUTION INTRAVENOUS at 14:07

## 2017-12-05 RX ADMIN — Medication 5 MILLIGRAM(S): at 18:22

## 2017-12-05 RX ADMIN — Medication 1: at 12:05

## 2017-12-05 RX ADMIN — Medication 1: at 18:23

## 2017-12-05 RX ADMIN — PANTOPRAZOLE SODIUM 40 MILLIGRAM(S): 20 TABLET, DELAYED RELEASE ORAL at 11:10

## 2017-12-05 RX ADMIN — OXYCODONE HYDROCHLORIDE 10 MILLIGRAM(S): 5 TABLET ORAL at 00:38

## 2017-12-05 RX ADMIN — Medication 20 MILLIGRAM(S): at 14:05

## 2017-12-05 RX ADMIN — ENOXAPARIN SODIUM 40 MILLIGRAM(S): 100 INJECTION SUBCUTANEOUS at 11:11

## 2017-12-05 RX ADMIN — SIMETHICONE 80 MILLIGRAM(S): 80 TABLET, CHEWABLE ORAL at 01:18

## 2017-12-05 RX ADMIN — ONDANSETRON 4 MILLIGRAM(S): 8 TABLET, FILM COATED ORAL at 00:37

## 2017-12-05 RX ADMIN — OXYCODONE HYDROCHLORIDE 10 MILLIGRAM(S): 5 TABLET ORAL at 00:08

## 2017-12-05 RX ADMIN — Medication 1: at 00:38

## 2017-12-05 RX ADMIN — Medication 5 MILLIGRAM(S): at 06:22

## 2017-12-05 RX ADMIN — Medication 1: at 06:22

## 2017-12-05 RX ADMIN — Medication 1000 MILLIGRAM(S): at 18:44

## 2017-12-05 NOTE — PROGRESS NOTE ADULT - ASSESSMENT
62 y/o  male with hx of DM , HTN, BPH  admitted recently for jaundice found to have pnacreatic adenocarcinoma on bx . during that admission pt One plastic stent was placed into the ventral pancreatic duct then placement of fully covered metal stent  in bile duct and removal of pancreatic duct stent. . pt had a stress test which was abnormal and plan for f/u as o/p post surgical intervention by Dr. Curry.  Pt is a  pt now presents for Whipple and s/p surgical intervention   1- Pancreatic CA : s/p whipple   oob , npo for now, NGT in place  now clamped  . monitor lytes closely       IS   / DVT proph  f/u with Dr. Manzanares  : likely adjuvant chemo     2- DM: monitor FS while npo ... acceptable FS     3- HTN: had episodes of hypotension while on ARB/ACE... now only on BB cont current meds     4- abn stress : need f/u as o/p.   5-BPH: flomax    6- factor.V. 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     f/u with heme  7 fever : low grade ...d/w surgical resident ... would hold off on abx .. would check CXR , blood cutlure   UA and US duplex r/o dvt   also please note position of NGT on CXR when done   cont DVT prophy  oob to chair   PT   d/w pt at length at bedside , d/w his Fiance' on phone   d/w nurse and sugery resident

## 2017-12-05 NOTE — PROGRESS NOTE ADULT - SUBJECTIVE AND OBJECTIVE BOX
D TEAM SURGERY DAILY PROGRESS NOTE:    S: Patient seen and examined. Overnight, the J tube was clogged and we were able to open it with a central line catheter set this morning. Tube feeds can be re-started. He is currently positive for flatus and bowel movements.     O:  Vital Signs Last 24 Hrs  T(C): 36.2 (05 Dec 2017 09:14), Max: 37.9 (05 Dec 2017 00:07)  T(F): 97.2 (05 Dec 2017 09:14), Max: 100.2 (05 Dec 2017 00:07)  HR: 95 (05 Dec 2017 09:14) (95 - 107)  BP: 127/72 (05 Dec 2017 09:14) (124/73 - 140/85)  BP(mean): --  RR: 20 (05 Dec 2017 09:14) (18 - 20)  SpO2: 97% (05 Dec 2017 09:14) (95% - 98%)    I&O's Detail    04 Dec 2017 07:01  -  05 Dec 2017 07:00  --------------------------------------------------------  IN:    dextrose 5% + sodium chloride 0.3%.: 1725 mL    Glucerna: 190 mL    IV PiggyBack: 550 mL  Total IN: 2465 mL    OUT:    Bulb: 414 mL    Indwelling Catheter - Urethral: 1875 mL    Nasoenteral Tube: 150 mL  Total OUT: 2439 mL    Total NET: 26 mL      05 Dec 2017 07:01  -  05 Dec 2017 10:09  --------------------------------------------------------  IN:  Total IN: 0 mL    OUT:    Bulb: 50 mL    Voided: 700 mL  Total OUT: 750 mL    Total NET: -750 mL    MEDICATIONS  (STANDING):  acetaminophen  IVPB. 1000 milliGRAM(s) IV Intermittent every 6 hours  dextrose 5% + sodium chloride 0.3%. 1000 milliLiter(s) (75 mL/Hr) IV Continuous <Continuous>  dextrose 50% Injectable 12.5 Gram(s) IV Push once  dextrose 50% Injectable 25 Gram(s) IV Push once  dextrose 50% Injectable 25 Gram(s) IV Push once  enoxaparin Injectable 40 milliGRAM(s) SubCutaneous daily  HYDROmorphone PCA (1 mG/mL) 30 milliLiter(s) PCA Continuous PCA Continuous  insulin lispro (HumaLOG) corrective regimen sliding scale   SubCutaneous every 6 hours  metoprolol    tartrate Injectable 5 milliGRAM(s) IV Push every 6 hours  oxyCODONE    Solution 10 milliGRAM(s) Oral <User Schedule>  pantoprazole  Injectable 40 milliGRAM(s) IV Push daily    MEDICATIONS  (PRN):  butorphanol Injectable 0.25 milliGRAM(s) IV Push every 6 hours PRN Pruritus  dextrose Gel 1 Dose(s) Oral once PRN Blood Glucose LESS THAN 70 milliGRAM(s)/deciliter  glucagon  Injectable 1 milliGRAM(s) IntraMuscular once PRN Glucose LESS THAN 70 milligrams/deciliter  HYDROmorphone PCA (1 mG/mL) Rescue Clinician Bolus 0.5 milliGRAM(s) IV Push every 15 minutes PRN for Pain Scale GREATER THAN 6  naloxone Injectable 0.1 milliGRAM(s) IV Push every 3 minutes PRN For ANY of the following changes in patient status:  A. RR LESS THAN 10 breaths per minute, B. Oxygen saturation LESS THAN 90%, C. Sedation score of 6  ondansetron Injectable 4 milliGRAM(s) IV Push every 6 hours PRN Nausea  ondansetron Injectable 4 milliGRAM(s) IV Push once PRN Nausea and/or Vomiting  simethicone 80 milliGRAM(s) Chew daily PRN Gas                        12.7   8.69  )-----------( 258      ( 05 Dec 2017 06:04 )             38.2     12-05    138  |  101  |  9   ----------------------------<  198<H>  3.9   |  26  |  0.79    Ca    8.6      05 Dec 2017 06:04  Phos  3.2     12-05  Mg     2.0     12-05    TPro  6.1  /  Alb  2.8<L>  /  TBili  1.2  /  DBili  0.5<H>  /  AST  17  /  ALT  21  /  AlkPhos  106  12-04    Physical Exam:  Gen: Laying in bed, NAD, alert  Resp: Unlabored breathing  Abd: softly distended, serous drain fluid    Lines:   IV: patent, in place.

## 2017-12-05 NOTE — PROGRESS NOTE ADULT - SUBJECTIVE AND OBJECTIVE BOX
Anesthesia Pain Management Service- Attending Addendum    SUBJECTIVE: Pt doing well with IV PCA without problems reported.    Therapy:	  [ X] IV PCA	   [ ] Epidural           [ ] s/p Spinal Opoid              [ ] Postpartum infusion	  [ ] Patient controlled regional anesthesia (PCRA)    [ ] prn Analgesics    Allergies    No Known Allergies    Intolerances      MEDICATIONS  (STANDING):  acetaminophen  IVPB. 1000 milliGRAM(s) IV Intermittent every 6 hours  dextrose 5% + sodium chloride 0.45% with potassium chloride 20 mEq/L 1000 milliLiter(s) (30 mL/Hr) IV Continuous <Continuous>  dextrose 50% Injectable 12.5 Gram(s) IV Push once  dextrose 50% Injectable 25 Gram(s) IV Push once  dextrose 50% Injectable 25 Gram(s) IV Push once  enoxaparin Injectable 40 milliGRAM(s) SubCutaneous daily  HYDROmorphone PCA (1 mG/mL) 30 milliLiter(s) PCA Continuous PCA Continuous  insulin lispro (HumaLOG) corrective regimen sliding scale   SubCutaneous every 6 hours  metoprolol    tartrate Injectable 5 milliGRAM(s) IV Push every 6 hours  oxyCODONE    Solution 10 milliGRAM(s) Oral <User Schedule>  pantoprazole  Injectable 40 milliGRAM(s) IV Push daily    MEDICATIONS  (PRN):  butorphanol Injectable 0.25 milliGRAM(s) IV Push every 6 hours PRN Pruritus  dextrose Gel 1 Dose(s) Oral once PRN Blood Glucose LESS THAN 70 milliGRAM(s)/deciliter  glucagon  Injectable 1 milliGRAM(s) IntraMuscular once PRN Glucose LESS THAN 70 milligrams/deciliter  HYDROmorphone PCA (1 mG/mL) Rescue Clinician Bolus 0.5 milliGRAM(s) IV Push every 15 minutes PRN for Pain Scale GREATER THAN 6  naloxone Injectable 0.1 milliGRAM(s) IV Push every 3 minutes PRN For ANY of the following changes in patient status:  A. RR LESS THAN 10 breaths per minute, B. Oxygen saturation LESS THAN 90%, C. Sedation score of 6  ondansetron Injectable 4 milliGRAM(s) IV Push every 6 hours PRN Nausea  ondansetron Injectable 4 milliGRAM(s) IV Push once PRN Nausea and/or Vomiting  simethicone 80 milliGRAM(s) Chew two times a day PRN Gas      OBJECTIVE:   [X] No new signs     [ ] Other:    Side Effects:  [X ] None			[ ] Other:    Assessment of Catheter Site:		[ ] Intact		[ ] Other:    ASSESSMENT/PLAN  [ X] Continue current therapy    [ ] Therapy changed to:    [ ] IV PCA       [ ] Epidural     [ ] prn Analgesics     Comments:

## 2017-12-05 NOTE — PROGRESS NOTE ADULT - ASSESSMENT
A: 62 yo M s/p Whipple Procedure, POD#5:    P:  - NPO/NGT- clamp trial  - Cont TFs via J tube  - Pain control  - Transitioned to Lovenox after PCEA removed  - F/u GI function  - Due to void  - RTC IV Tylenol  - OOB/ambulate

## 2017-12-05 NOTE — PROGRESS NOTE ADULT - SUBJECTIVE AND OBJECTIVE BOX
Patient is a 62y old  Male who presents with a chief complaint of "I have a growth on my pancreas" (15 Nov 2017 14:39)    On Tube feeds.  No BM yet.  has abd gas pains on and off.  No N/V.  urinating okay.  No CP or SOB.      Medication:   acetaminophen  IVPB. 1000 milliGRAM(s) IV Intermittent every 6 hours  butorphanol Injectable 0.25 milliGRAM(s) IV Push every 6 hours PRN  dextrose 5% + sodium chloride 0.3%. 1000 milliLiter(s) IV Continuous <Continuous>  dextrose 50% Injectable 12.5 Gram(s) IV Push once  dextrose 50% Injectable 25 Gram(s) IV Push once  dextrose 50% Injectable 25 Gram(s) IV Push once  dextrose Gel 1 Dose(s) Oral once PRN  enoxaparin Injectable 40 milliGRAM(s) SubCutaneous daily  glucagon  Injectable 1 milliGRAM(s) IntraMuscular once PRN  HYDROmorphone PCA (1 mG/mL) 30 milliLiter(s) PCA Continuous PCA Continuous  HYDROmorphone PCA (1 mG/mL) Rescue Clinician Bolus 0.5 milliGRAM(s) IV Push every 15 minutes PRN  insulin lispro (HumaLOG) corrective regimen sliding scale   SubCutaneous every 6 hours  metoprolol    tartrate Injectable 5 milliGRAM(s) IV Push every 6 hours  naloxone Injectable 0.1 milliGRAM(s) IV Push every 3 minutes PRN  ondansetron Injectable 4 milliGRAM(s) IV Push every 6 hours PRN  ondansetron Injectable 4 milliGRAM(s) IV Push once PRN  oxyCODONE    Solution 10 milliGRAM(s) Oral <User Schedule>  pantoprazole  Injectable 40 milliGRAM(s) IV Push daily  simethicone 80 milliGRAM(s) Chew two times a day PRN      Physical exam    T(C): 36.2 (12-05-17 @ 09:14), Max: 37.9 (12-05-17 @ 00:07)  HR: 95 (12-05-17 @ 09:14) (95 - 107)  BP: 127/72 (12-05-17 @ 09:14) (124/73 - 140/85)  RR: 20 (12-05-17 @ 09:14) (18 - 20)  SpO2: 97% (12-05-17 @ 09:14) (95% - 98%)  Wt(kg): --    alert NAD  EOMI anicteric sclera  Cv s1 S2 RRR  Lungs clear B/L  abd soft     Labs                        12.7   8.69  )-----------( 258      ( 05 Dec 2017 06:04 )             38.2       12-05    138  |  101  |  9   ----------------------------<  198<H>  3.9   |  26  |  0.79    Ca    8.6      05 Dec 2017 06:04  Phos  3.2     12-05  Mg     2.0     12-05    TPro  6.1  /  Alb  2.8<L>  /  TBili  1.2  /  DBili  0.5<H>  /  AST  17  /  ALT  21  /  AlkPhos  106  12-04      LIVER FUNCTIONS - ( 04 Dec 2017 06:04 )  Alb: 2.8 g/dL / Pro: 6.1 g/dL / ALK PHOS: 106 u/L / ALT: 21 u/L / AST: 17 u/L / GGT: x             [id number]

## 2017-12-05 NOTE — PROGRESS NOTE ADULT - SUBJECTIVE AND OBJECTIVE BOX
Day _6__ of Anesthesia Pain Management Service    Allergies    No Known Allergies    Intolerances        SUBJECTIVE: "I'm doing ok, the pump helps"    Pain Scale Score	At rest: _3__ 	With Activity: ___ 	[ ] Refer to charted pain scores    THERAPY:    [ ] IV PCA Morphine		[ ] 5 mg/mL	[ ] 1 mg/mL  [X] IV PCA Hydromorphone	[ ] 5 mg/mL	[X] 1 mg/mL  [ ] IV PCA Fentanyl		[ ] 50 micrograms/mL    Demand dose _0.2mg_ lockout _6_ (minutes) Continuous Rate _0_ Total: __7.10mg_  Daily      MEDICATIONS  (STANDING):  acetaminophen  IVPB. 1000 milliGRAM(s) IV Intermittent every 6 hours  dextrose 5% + sodium chloride 0.3%. 1000 milliLiter(s) (75 mL/Hr) IV Continuous <Continuous>  dextrose 50% Injectable 12.5 Gram(s) IV Push once  dextrose 50% Injectable 25 Gram(s) IV Push once  dextrose 50% Injectable 25 Gram(s) IV Push once  enoxaparin Injectable 40 milliGRAM(s) SubCutaneous daily  HYDROmorphone PCA (1 mG/mL) 30 milliLiter(s) PCA Continuous PCA Continuous  insulin lispro (HumaLOG) corrective regimen sliding scale   SubCutaneous every 6 hours  metoprolol    tartrate Injectable 5 milliGRAM(s) IV Push every 6 hours  oxyCODONE    Solution 10 milliGRAM(s) Oral <User Schedule>  pantoprazole  Injectable 40 milliGRAM(s) IV Push daily    MEDICATIONS  (PRN):  butorphanol Injectable 0.25 milliGRAM(s) IV Push every 6 hours PRN Pruritus  dextrose Gel 1 Dose(s) Oral once PRN Blood Glucose LESS THAN 70 milliGRAM(s)/deciliter  glucagon  Injectable 1 milliGRAM(s) IntraMuscular once PRN Glucose LESS THAN 70 milligrams/deciliter  HYDROmorphone PCA (1 mG/mL) Rescue Clinician Bolus 0.5 milliGRAM(s) IV Push every 15 minutes PRN for Pain Scale GREATER THAN 6  naloxone Injectable 0.1 milliGRAM(s) IV Push every 3 minutes PRN For ANY of the following changes in patient status:  A. RR LESS THAN 10 breaths per minute, B. Oxygen saturation LESS THAN 90%, C. Sedation score of 6  ondansetron Injectable 4 milliGRAM(s) IV Push every 6 hours PRN Nausea  ondansetron Injectable 4 milliGRAM(s) IV Push once PRN Nausea and/or Vomiting  simethicone 80 milliGRAM(s) Chew two times a day PRN Gas      OBJECTIVE: A&Ox3, NAD, sitting up in chair    Sedation Score:	[X] Alert	[ ] Drowsy	[ ] Arousable	[ ] Asleep	[ ] Unresponsive    Side Effects:	[X] None	[ ] Nausea	[ ] Vomiting	[ ] Pruritus  		  [ ] Weakness		[ ] Numbness	[ ] Other:    PT/INR - ( 04 Dec 2017 06:04 )   PT: 12.8 SEC;   INR: 1.14          PTT - ( 04 Dec 2017 06:04 )  PTT:33.3 SEC                          12.7   8.69  )-----------( 258      ( 05 Dec 2017 06:04 )             38.2       12-05    138  |  101  |  9   ----------------------------<  198<H>  3.9   |  26  |  0.79    Ca    8.6      05 Dec 2017 06:04  Phos  3.2     12-05  Mg     2.0     12-05    TPro  6.1  /  Alb  2.8<L>  /  TBili  1.2  /  DBili  0.5<H>  /  AST  17  /  ALT  21  /  AlkPhos  106  12-04      ASSESSMENT/ PLAN    Therapy to  be:	[X] Continue   [ ] Discontinued   [ ] Change to prn Analgesics    Documentation and Verification of current medications:  [X] Done	[ ] Not done, not eligible  [ ] Not done, reason not given    [ ]  [place]  Reviewed and Copied to Chart    Comments: NPO continue current therapy

## 2017-12-05 NOTE — PROGRESS NOTE ADULT - ASSESSMENT
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  plan for further out-pt management with possibility of adjuvant treatment.    Post-op care per surgical team.    Anemia and hyperbilirubinemia are better

## 2017-12-05 NOTE — PROVIDER CONTACT NOTE (OTHER) - SITUATION
after diluting ordered oxycodone with sterile water and administering it via J tube, I attempted to flush remaining small amount and noted resistance in J tube.

## 2017-12-05 NOTE — PROGRESS NOTE ADULT - SUBJECTIVE AND OBJECTIVE BOX
Patient is a 62y old  Male who presents with a chief complaint of "I have a growth on my pancreas" (15 Nov 2017 14:39)                                                               INTERVAL HPI/OVERNIGHT EVENTS: low grade fever  cough     REVIEW OF SYSTEMS:     CONSTITUTIONAL: No weakness, fevers or chills  RESPIRATORY:mild cough   had one episode of coughing sputum ( clear ) with small amout of blood   CARDIOVASCULAR: No chest pain or palpitations  GASTROINTESTINAL: abd pain under reasonable control    otherwise no N/V   no flutuance today   GENITOURINARY: No dysuria, frequency   NEUROLOGICAL: No numbness or weakness                                                                                                                                                                                                                                                                                 Medications:  MEDICATIONS  (STANDING):  acetaminophen  IVPB. 1000 milliGRAM(s) IV Intermittent every 6 hours  dextrose 5% + sodium chloride 0.3%. 1000 milliLiter(s) (75 mL/Hr) IV Continuous <Continuous>  dextrose 50% Injectable 12.5 Gram(s) IV Push once  dextrose 50% Injectable 25 Gram(s) IV Push once  dextrose 50% Injectable 25 Gram(s) IV Push once  enoxaparin Injectable 40 milliGRAM(s) SubCutaneous daily  HYDROmorphone PCA (1 mG/mL) 30 milliLiter(s) PCA Continuous PCA Continuous  insulin lispro (HumaLOG) corrective regimen sliding scale   SubCutaneous every 6 hours  metoprolol    tartrate Injectable 5 milliGRAM(s) IV Push every 6 hours  oxyCODONE    Solution 10 milliGRAM(s) Oral <User Schedule>  pantoprazole  Injectable 40 milliGRAM(s) IV Push daily    MEDICATIONS  (PRN):  butorphanol Injectable 0.25 milliGRAM(s) IV Push every 6 hours PRN Pruritus  dextrose Gel 1 Dose(s) Oral once PRN Blood Glucose LESS THAN 70 milliGRAM(s)/deciliter  glucagon  Injectable 1 milliGRAM(s) IntraMuscular once PRN Glucose LESS THAN 70 milligrams/deciliter  HYDROmorphone PCA (1 mG/mL) Rescue Clinician Bolus 0.5 milliGRAM(s) IV Push every 15 minutes PRN for Pain Scale GREATER THAN 6  naloxone Injectable 0.1 milliGRAM(s) IV Push every 3 minutes PRN For ANY of the following changes in patient status:  A. RR LESS THAN 10 breaths per minute, B. Oxygen saturation LESS THAN 90%, C. Sedation score of 6  ondansetron Injectable 4 milliGRAM(s) IV Push every 6 hours PRN Nausea  ondansetron Injectable 4 milliGRAM(s) IV Push once PRN Nausea and/or Vomiting  simethicone 80 milliGRAM(s) Chew daily PRN Gas       Allergies    No Known Allergies    Intolerances      Vital Signs Last 24 Hrs  T(C): 36.2 (05 Dec 2017 09:14), Max: 37.9 (05 Dec 2017 00:07)  T(F): 97.2 (05 Dec 2017 09:14), Max: 100.2 (05 Dec 2017 00:07)  HR: 95 (05 Dec 2017 09:14) (95 - 107)  BP: 127/72 (05 Dec 2017 09:14) (124/73 - 140/85)  BP(mean): --  RR: 20 (05 Dec 2017 09:14) (18 - 20)  SpO2: 97% (05 Dec 2017 09:14) (95% - 98%)  CAPILLARY BLOOD GLUCOSE      POCT Blood Glucose.: 159 mg/dL (05 Dec 2017 06:20)  POCT Blood Glucose.: 177 mg/dL (05 Dec 2017 00:36)  POCT Blood Glucose.: 165 mg/dL (04 Dec 2017 19:00)  POCT Blood Glucose.: 151 mg/dL (04 Dec 2017 12:01)      12-04 @ 07:01  -  12-05 @ 07:00  --------------------------------------------------------  IN: 2465 mL / OUT: 2439 mL / NET: 26 mL    12-05 @ 07:01  -  12-05 @ 09:26  --------------------------------------------------------  IN: 0 mL / OUT: 750 mL / NET: -750 mL      Physical Exam:   General:  NAD   HEENT:  Nonicteric, PERRLA  CV:  RRR, S1S2   Lungs:  decreased BS on L base otheriwse CTAB   Abdomen:  Soft, tender over incision otherwise nt   Extremities:  2+ pulses, no c/c, no edema  :  No newberry  Neuro:  AAOx3, non-focal, grossly intact                                                                                                                                                                                                                                                                                                LABS:                               12.7   8.69  )-----------( 258      ( 05 Dec 2017 06:04 )             38.2                      12-05    138  |  101  |  9   ----------------------------<  198<H>  3.9   |  26  |  0.79    Ca    8.6      05 Dec 2017 06:04  Phos  3.2     12-05  Mg     2.0     12-05    TPro  6.1  /  Alb  2.8<L>  /  TBili  1.2  /  DBili  0.5<H>  /  AST  17  /  ALT  21  /  AlkPhos  106  12-04

## 2017-12-05 NOTE — PROVIDER CONTACT NOTE (OTHER) - RECOMMENDATIONS
will recheck temp in an hour. Zofran given for nausea. oxycodone 10mg via jejunostomy tube given. Simethicone given also. will monitor
Continue to monitor. If pt has decreased output or feels bloated team will revisit options
Give 0600 lopressor IV now sine pt is within parameters, continue to monitor.
as per MD hold tube feedings until team re-evaluates in AM.
hold off on bolus

## 2017-12-06 LAB
ALBUMIN SERPL ELPH-MCNC: 2.7 G/DL — LOW (ref 3.3–5)
ALP SERPL-CCNC: 135 U/L — HIGH (ref 40–120)
ALT FLD-CCNC: 21 U/L — SIGNIFICANT CHANGE UP (ref 4–41)
AST SERPL-CCNC: 28 U/L — SIGNIFICANT CHANGE UP (ref 4–40)
BILIRUB DIRECT SERPL-MCNC: 0.5 MG/DL — HIGH (ref 0.1–0.2)
BILIRUB SERPL-MCNC: 1.3 MG/DL — HIGH (ref 0.2–1.2)
BUN SERPL-MCNC: 14 MG/DL — SIGNIFICANT CHANGE UP (ref 7–23)
CALCIUM SERPL-MCNC: 8.7 MG/DL — SIGNIFICANT CHANGE UP (ref 8.4–10.5)
CHLORIDE SERPL-SCNC: 100 MMOL/L — SIGNIFICANT CHANGE UP (ref 98–107)
CO2 SERPL-SCNC: 29 MMOL/L — SIGNIFICANT CHANGE UP (ref 22–31)
CREAT SERPL-MCNC: 0.93 MG/DL — SIGNIFICANT CHANGE UP (ref 0.5–1.3)
GLUCOSE BLDC GLUCOMTR-MCNC: 135 MG/DL — HIGH (ref 70–99)
GLUCOSE BLDC GLUCOMTR-MCNC: 151 MG/DL — HIGH (ref 70–99)
GLUCOSE BLDC GLUCOMTR-MCNC: 169 MG/DL — HIGH (ref 70–99)
GLUCOSE BLDC GLUCOMTR-MCNC: 169 MG/DL — HIGH (ref 70–99)
GLUCOSE BLDC GLUCOMTR-MCNC: 182 MG/DL — HIGH (ref 70–99)
GLUCOSE SERPL-MCNC: 185 MG/DL — HIGH (ref 70–99)
HCT VFR BLD CALC: 38 % — LOW (ref 39–50)
HGB BLD-MCNC: 12.2 G/DL — LOW (ref 13–17)
MAGNESIUM SERPL-MCNC: 2 MG/DL — SIGNIFICANT CHANGE UP (ref 1.6–2.6)
MCHC RBC-ENTMCNC: 31.6 PG — SIGNIFICANT CHANGE UP (ref 27–34)
MCHC RBC-ENTMCNC: 32.1 % — SIGNIFICANT CHANGE UP (ref 32–36)
MCV RBC AUTO: 98.4 FL — SIGNIFICANT CHANGE UP (ref 80–100)
NRBC # FLD: 0 — SIGNIFICANT CHANGE UP
PHOSPHATE SERPL-MCNC: 3.4 MG/DL — SIGNIFICANT CHANGE UP (ref 2.5–4.5)
PLATELET # BLD AUTO: 290 K/UL — SIGNIFICANT CHANGE UP (ref 150–400)
PMV BLD: 10.5 FL — SIGNIFICANT CHANGE UP (ref 7–13)
POTASSIUM SERPL-MCNC: 4.8 MMOL/L — SIGNIFICANT CHANGE UP (ref 3.5–5.3)
POTASSIUM SERPL-SCNC: 4.8 MMOL/L — SIGNIFICANT CHANGE UP (ref 3.5–5.3)
PROT SERPL-MCNC: 5.9 G/DL — LOW (ref 6–8.3)
RBC # BLD: 3.86 M/UL — LOW (ref 4.2–5.8)
RBC # FLD: 16.3 % — HIGH (ref 10.3–14.5)
SODIUM SERPL-SCNC: 139 MMOL/L — SIGNIFICANT CHANGE UP (ref 135–145)
WBC # BLD: 9.18 K/UL — SIGNIFICANT CHANGE UP (ref 3.8–10.5)
WBC # FLD AUTO: 9.18 K/UL — SIGNIFICANT CHANGE UP (ref 3.8–10.5)

## 2017-12-06 RX ORDER — TAMSULOSIN HYDROCHLORIDE 0.4 MG/1
0.4 CAPSULE ORAL AT BEDTIME
Qty: 0 | Refills: 0 | Status: DISCONTINUED | OUTPATIENT
Start: 2017-12-06 | End: 2017-12-15

## 2017-12-06 RX ORDER — INSULIN LISPRO 100/ML
VIAL (ML) SUBCUTANEOUS AT BEDTIME
Qty: 0 | Refills: 0 | Status: DISCONTINUED | OUTPATIENT
Start: 2017-12-06 | End: 2017-12-15

## 2017-12-06 RX ORDER — MAGNESIUM SULFATE 500 MG/ML
2 VIAL (ML) INJECTION ONCE
Qty: 0 | Refills: 0 | Status: COMPLETED | OUTPATIENT
Start: 2017-12-06 | End: 2017-12-06

## 2017-12-06 RX ORDER — INSULIN LISPRO 100/ML
VIAL (ML) SUBCUTANEOUS
Qty: 0 | Refills: 0 | Status: DISCONTINUED | OUTPATIENT
Start: 2017-12-06 | End: 2017-12-08

## 2017-12-06 RX ADMIN — Medication 400 MILLIGRAM(S): at 12:07

## 2017-12-06 RX ADMIN — ENOXAPARIN SODIUM 40 MILLIGRAM(S): 100 INJECTION SUBCUTANEOUS at 12:08

## 2017-12-06 RX ADMIN — HYDROMORPHONE HYDROCHLORIDE 30 MILLILITER(S): 2 INJECTION INTRAMUSCULAR; INTRAVENOUS; SUBCUTANEOUS at 19:05

## 2017-12-06 RX ADMIN — Medication 400 MILLIGRAM(S): at 05:16

## 2017-12-06 RX ADMIN — Medication: at 12:14

## 2017-12-06 RX ADMIN — Medication 1000 MILLIGRAM(S): at 05:16

## 2017-12-06 RX ADMIN — PANTOPRAZOLE SODIUM 40 MILLIGRAM(S): 20 TABLET, DELAYED RELEASE ORAL at 12:08

## 2017-12-06 RX ADMIN — Medication 1000 MILLIGRAM(S): at 12:40

## 2017-12-06 RX ADMIN — Medication 50 GRAM(S): at 10:56

## 2017-12-06 RX ADMIN — Medication 400 MILLIGRAM(S): at 01:05

## 2017-12-06 RX ADMIN — Medication: at 06:36

## 2017-12-06 RX ADMIN — OXYCODONE HYDROCHLORIDE 10 MILLIGRAM(S): 5 TABLET ORAL at 01:06

## 2017-12-06 RX ADMIN — TAMSULOSIN HYDROCHLORIDE 0.4 MILLIGRAM(S): 0.4 CAPSULE ORAL at 21:35

## 2017-12-06 RX ADMIN — Medication 5 MILLIGRAM(S): at 05:16

## 2017-12-06 RX ADMIN — Medication 5 MILLIGRAM(S): at 01:06

## 2017-12-06 RX ADMIN — Medication 400 MILLIGRAM(S): at 17:45

## 2017-12-06 RX ADMIN — Medication 5 MILLIGRAM(S): at 12:08

## 2017-12-06 RX ADMIN — HYDROMORPHONE HYDROCHLORIDE 30 MILLILITER(S): 2 INJECTION INTRAMUSCULAR; INTRAVENOUS; SUBCUTANEOUS at 07:08

## 2017-12-06 RX ADMIN — Medication 5 MILLIGRAM(S): at 17:44

## 2017-12-06 RX ADMIN — Medication 1000 MILLIGRAM(S): at 19:16

## 2017-12-06 RX ADMIN — Medication 1: at 01:06

## 2017-12-06 RX ADMIN — Medication 1000 MILLIGRAM(S): at 01:06

## 2017-12-06 NOTE — PROGRESS NOTE ADULT - SUBJECTIVE AND OBJECTIVE BOX
D TEAM SURGERY DAILY PROGRESS NOTE:    S: Patient seen and examined. His NGT was removed yesterday and J tube was un-clogged. He has a PCA for pain control. We could possibly try to advance his diet today.    O:  Vital Signs Last 24 Hrs  T(C): 36.9 (06 Dec 2017 01:16), Max: 37.2 (05 Dec 2017 21:38)  T(F): 98.5 (06 Dec 2017 01:16), Max: 98.9 (05 Dec 2017 21:38)  HR: 88 (06 Dec 2017 01:16) (81 - 96)  BP: 125/82 (06 Dec 2017 01:16) (114/68 - 127/74)  BP(mean): --  RR: 18 (06 Dec 2017 01:16) (18 - 20)  SpO2: 93% (06 Dec 2017 01:16) (93% - 97%)    I&O's Detail    04 Dec 2017 07:01  -  05 Dec 2017 07:00  --------------------------------------------------------  IN:    dextrose 5% + sodium chloride 0.33%: 1725 mL    Glucerna: 190 mL    IV PiggyBack: 550 mL  Total IN: 2465 mL    OUT:    Bulb: 414 mL    Indwelling Catheter - Urethral: 1875 mL    Nasoenteral Tube: 150 mL  Total OUT: 2439 mL    Total NET: 26 mL      05 Dec 2017 07:01  -  06 Dec 2017 04:00  --------------------------------------------------------  IN:    dextrose 5% + sodium chloride 0.33%: 375 mL    dextrose 5% + sodium chloride 0.45% with potassium chloride 20 mEq/L: 240 mL    Glucerna: 210 mL  Total IN: 825 mL    OUT:    Bulb: 170 mL    Voided: 1900 mL  Total OUT: 2070 mL    Total NET: -1245 mL    MEDICATIONS  (STANDING):  acetaminophen  IVPB. 1000 milliGRAM(s) IV Intermittent every 6 hours  dextrose 5% + sodium chloride 0.45% with potassium chloride 20 mEq/L 1000 milliLiter(s) (30 mL/Hr) IV Continuous <Continuous>  dextrose 50% Injectable 12.5 Gram(s) IV Push once  dextrose 50% Injectable 25 Gram(s) IV Push once  dextrose 50% Injectable 25 Gram(s) IV Push once  enoxaparin Injectable 40 milliGRAM(s) SubCutaneous daily  HYDROmorphone PCA (1 mG/mL) 30 milliLiter(s) PCA Continuous PCA Continuous  insulin lispro (HumaLOG) corrective regimen sliding scale   SubCutaneous every 6 hours  metoprolol    tartrate Injectable 5 milliGRAM(s) IV Push every 6 hours  pantoprazole  Injectable 40 milliGRAM(s) IV Push daily    MEDICATIONS  (PRN):  butorphanol Injectable 0.25 milliGRAM(s) IV Push every 6 hours PRN Pruritus  dextrose Gel 1 Dose(s) Oral once PRN Blood Glucose LESS THAN 70 milliGRAM(s)/deciliter  glucagon  Injectable 1 milliGRAM(s) IntraMuscular once PRN Glucose LESS THAN 70 milligrams/deciliter  HYDROmorphone PCA (1 mG/mL) Rescue Clinician Bolus 0.5 milliGRAM(s) IV Push every 15 minutes PRN for Pain Scale GREATER THAN 6  naloxone Injectable 0.1 milliGRAM(s) IV Push every 3 minutes PRN For ANY of the following changes in patient status:  A. RR LESS THAN 10 breaths per minute, B. Oxygen saturation LESS THAN 90%, C. Sedation score of 6  ondansetron Injectable 4 milliGRAM(s) IV Push every 6 hours PRN Nausea  ondansetron Injectable 4 milliGRAM(s) IV Push once PRN Nausea and/or Vomiting  simethicone 80 milliGRAM(s) Chew two times a day PRN Gas    12-05    138  |  101  |  9   ----------------------------<  198<H>  3.9   |  26  |  0.79    Ca    8.6      05 Dec 2017 06:04  Phos  3.2     12-05  Mg     2.0     12-05    TPro  6.1  /  Alb  2.8<L>  /  TBili  1.2  /  DBili  0.5<H>  /  AST  17  /  ALT  21  /  AlkPhos  106  12-04    Physical Exam:  Gen: Laying in bed, NAD, alert  Resp: Unlabored breathing  Abd: softly distended, serous drain fluid    Lines:   IV: patent, in place.

## 2017-12-06 NOTE — PROGRESS NOTE ADULT - SUBJECTIVE AND OBJECTIVE BOX
Patient is a 62y old  Male who presents with a chief complaint of "I have a growth on my pancreas" (15 Nov 2017 14:39)                                                       REVIEW OF SYSTEMS:     CONSTITUTIONAL: No weakness, fevers or chills  RESPIRATORY: No cough,   No shortness of breath  CARDIOVASCULAR: No chest pain or palpitations  GASTROINTESTINAL: abdominal pain at incision site   . No nausea, vomiting, + flutuance   GENITOURINARY: No dysuria, frequency                                                                                                                                                                                                                                                                                Medications:  MEDICATIONS  (STANDING):  acetaminophen  IVPB. 1000 milliGRAM(s) IV Intermittent every 6 hours  dextrose 5% + sodium chloride 0.45% with potassium chloride 20 mEq/L 1000 milliLiter(s) (30 mL/Hr) IV Continuous <Continuous>  dextrose 50% Injectable 12.5 Gram(s) IV Push once  dextrose 50% Injectable 25 Gram(s) IV Push once  dextrose 50% Injectable 25 Gram(s) IV Push once  enoxaparin Injectable 40 milliGRAM(s) SubCutaneous daily  HYDROmorphone PCA (1 mG/mL) 30 milliLiter(s) PCA Continuous PCA Continuous  insulin lispro (HumaLOG) corrective regimen sliding scale   SubCutaneous every 6 hours  metoprolol    tartrate Injectable 5 milliGRAM(s) IV Push every 6 hours  pantoprazole  Injectable 40 milliGRAM(s) IV Push daily    MEDICATIONS  (PRN):  butorphanol Injectable 0.25 milliGRAM(s) IV Push every 6 hours PRN Pruritus  dextrose Gel 1 Dose(s) Oral once PRN Blood Glucose LESS THAN 70 milliGRAM(s)/deciliter  glucagon  Injectable 1 milliGRAM(s) IntraMuscular once PRN Glucose LESS THAN 70 milligrams/deciliter  HYDROmorphone PCA (1 mG/mL) Rescue Clinician Bolus 0.5 milliGRAM(s) IV Push every 15 minutes PRN for Pain Scale GREATER THAN 6  naloxone Injectable 0.1 milliGRAM(s) IV Push every 3 minutes PRN For ANY of the following changes in patient status:  A. RR LESS THAN 10 breaths per minute, B. Oxygen saturation LESS THAN 90%, C. Sedation score of 6  ondansetron Injectable 4 milliGRAM(s) IV Push every 6 hours PRN Nausea  ondansetron Injectable 4 milliGRAM(s) IV Push once PRN Nausea and/or Vomiting  simethicone 80 milliGRAM(s) Chew two times a day PRN Gas       Allergies    No Known Allergies    Intolerances      Vital Signs Last 24 Hrs  T(C): 36.8 (06 Dec 2017 05:13), Max: 37.2 (05 Dec 2017 21:38)  T(F): 98.3 (06 Dec 2017 05:13), Max: 98.9 (05 Dec 2017 21:38)  HR: 89 (06 Dec 2017 05:13) (81 - 92)  BP: 142/84 (06 Dec 2017 05:13) (114/68 - 142/84)  BP(mean): --  RR: 18 (06 Dec 2017 05:13) (18 - 18)  SpO2: 96% (06 Dec 2017 05:13) (93% - 97%)  CAPILLARY BLOOD GLUCOSE      POCT Blood Glucose.: 169 mg/dL (06 Dec 2017 06:15)  POCT Blood Glucose.: 169 mg/dL (06 Dec 2017 00:34)  POCT Blood Glucose.: 156 mg/dL (05 Dec 2017 18:13)  POCT Blood Glucose.: 189 mg/dL (05 Dec 2017 12:19)      12-05 @ 07:01  -  12-06 @ 07:00  --------------------------------------------------------  IN: 1175 mL / OUT: 2730 mL / NET: -1555 mL      Physical Exam:    General:  Well appearing, NAD  HEENT:  Nonicteric, PERRLA  CV:  RRR, S1S2   Lungs:  CTA  Abdomen:  Soft, non-tender, no distended, positive BS  Extremities:  2+ pulses, no c/c, no edema  Skin:  Warm and dry, no rashes  Neuro:  AAOx3, non-focal, grossly intact                                                                                                                                                                                                                                                                                                LABS:                               12.2   9.18  )-----------( 290      ( 06 Dec 2017 06:13 )             38.0                      12-06    139  |  100  |  14  ----------------------------<  185<H>  4.8   |  29  |  0.93    Ca    8.7      06 Dec 2017 06:13  Phos  3.4     12-06  Mg     2.0     12-06    TPro  5.9<L>  /  Alb  2.7<L>  /  TBili  1.3<H>  /  DBili  0.5<H>  /  AST  28  /  ALT  21  /  AlkPhos  135<H>  12-06

## 2017-12-06 NOTE — PROGRESS NOTE ADULT - SUBJECTIVE AND OBJECTIVE BOX
Day _7_ of Anesthesia Pain Management Service    Allergies  No Known Allergies      SUBJECTIVE: "When I wake up, I have pain. I also have heartburn."    Pain Scale Score	At rest: _4-5/10_ 	With Activity: ___ 	[ ] Refer to charted pain scores    THERAPY:    [ ] IV PCA Morphine		[ ] 5 mg/mL	[ ] 1 mg/mL  [X] IV PCA Hydromorphone	[ ] 5 mg/mL	[X] 1 mg/mL  [ ] IV PCA Fentanyl		[ ] 50 micrograms/mL    Demand dose _0.2mg_ lockout _6_ (minutes) Continuous Rate _0_ Total: _9.2mg_  Daily      MEDICATIONS  (STANDING):  acetaminophen  IVPB. 1000 milliGRAM(s) IV Intermittent every 6 hours  dextrose 5% + sodium chloride 0.45% with potassium chloride 20 mEq/L 1000 milliLiter(s) (30 mL/Hr) IV Continuous <Continuous>  dextrose 50% Injectable 12.5 Gram(s) IV Push once  dextrose 50% Injectable 25 Gram(s) IV Push once  dextrose 50% Injectable 25 Gram(s) IV Push once  enoxaparin Injectable 40 milliGRAM(s) SubCutaneous daily  HYDROmorphone PCA (1 mG/mL) 30 milliLiter(s) PCA Continuous PCA Continuous  insulin lispro (HumaLOG) corrective regimen sliding scale   SubCutaneous every 6 hours  metoprolol    tartrate Injectable 5 milliGRAM(s) IV Push every 6 hours  pantoprazole  Injectable 40 milliGRAM(s) IV Push daily    MEDICATIONS  (PRN):  butorphanol Injectable 0.25 milliGRAM(s) IV Push every 6 hours PRN Pruritus  dextrose Gel 1 Dose(s) Oral once PRN Blood Glucose LESS THAN 70 milliGRAM(s)/deciliter  glucagon  Injectable 1 milliGRAM(s) IntraMuscular once PRN Glucose LESS THAN 70 milligrams/deciliter  HYDROmorphone PCA (1 mG/mL) Rescue Clinician Bolus 0.5 milliGRAM(s) IV Push every 15 minutes PRN for Pain Scale GREATER THAN 6  naloxone Injectable 0.1 milliGRAM(s) IV Push every 3 minutes PRN For ANY of the following changes in patient status:  A. RR LESS THAN 10 breaths per minute, B. Oxygen saturation LESS THAN 90%, C. Sedation score of 6  ondansetron Injectable 4 milliGRAM(s) IV Push every 6 hours PRN Nausea  ondansetron Injectable 4 milliGRAM(s) IV Push once PRN Nausea and/or Vomiting  simethicone 80 milliGRAM(s) Chew two times a day PRN Gas      OBJECTIVE: A&Ox3, NAD, sitting up in chair    Sedation Score:	[X] Alert	[ ] Drowsy	[ ] Arousable	[ ] Asleep	[ ] Unresponsive    Side Effects:	[X] None	[ ] Nausea	[ ] Vomiting	[ ] Pruritus  		  [ ] Weakness		[ ] Numbness	[ ] Other:                            12.2   9.18  )-----------( 290      ( 06 Dec 2017 06:13 )             38.0       12-06    139  |  100  |  14  ----------------------------<  185<H>  4.8   |  29  |  0.93    Ca    8.7      06 Dec 2017 06:13  Phos  3.4     12-06  Mg     2.0     12-06    TPro  5.9<L>  /  Alb  2.7<L>  /  TBili  1.3<H>  /  DBili  0.5<H>  /  AST  28  /  ALT  21  /  AlkPhos  135<H>  12-06      ASSESSMENT/ PLAN    Therapy to  be:	[X] Continue   [ ] Discontinued   [ ] Change to prn Analgesics    Documentation and Verification of current medications:  [X] Done	[ ] Not done, not eligible  [ ] Not done, reason not given    Comments:  Advanced to clear liquids  Oral analgesics 12/7, if liquids tolerated without n/v  Continue IV Tylenol as ordered.

## 2017-12-06 NOTE — PROGRESS NOTE ADULT - ASSESSMENT
60 y/o  male with hx of DM , HTN, BPH  admitted recently for jaundice found to have pnacreatic adenocarcinoma on bx . during that admission pt One plastic stent was placed into the ventral pancreatic duct then placement of fully covered metal stent  in bile duct and removal of pancreatic duct stent. . pt had a stress test which was abnormal and plan for f/u as o/p post surgical intervention by Dr. Curry.  Pt is a  pt now presents for Whipple and s/p surgical intervention   1- Pancreatic CA : s/p whipple   oob , ngt removed        diet per surgery    . monitor lytes closely       IS   / DVT proph  f/u with Dr. Manzanares  : likely adjuvant chemo     2- DM: monitor FS while npo ... acceptable FS .. would start NPH in the next 24 hours based on requirements  pt was on lantus as o/p.    3- HTN: had episodes of hypotension while on ARB/ACE... now only on BB cont current meds     4- abn stress : need f/u as o/p.   5-BPH: flomax    6- factor.V. 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     f/u with heme  7 fever : no further episodes   .. would hold off on abx . if recurrs  would check CXR , blood cutlure   UA and US duplex r/o dvt     cont DVT prophy  oob to chair   PT   d/w pt at length at bedside ,

## 2017-12-06 NOTE — PROGRESS NOTE ADULT - ASSESSMENT
A: 62 yo M s/p Whipple Procedure, POD#6:    P:  - Consider advancing to [facility]D  - Cont TFs via J tube  - Pain control  - F/u GI function  - RTC IV Tylenol  - OOB/ambulate

## 2017-12-06 NOTE — PROGRESS NOTE ADULT - SUBJECTIVE AND OBJECTIVE BOX
Anesthesia Pain Management Service- Attending Addendum    SUBJECTIVE: Pt doing well with IV PCA without problems reported.    Therapy:	  [ X] IV PCA	   [ ] Epidural           [ ] s/p Spinal Opoid              [ ] Postpartum infusion	  [ ] Patient controlled regional anesthesia (PCRA)    [ ] prn Analgesics    Allergies    No Known Allergies    Intolerances      MEDICATIONS  (STANDING):  dextrose 5% + sodium chloride 0.45% with potassium chloride 20 mEq/L 1000 milliLiter(s) (30 mL/Hr) IV Continuous <Continuous>  dextrose 50% Injectable 12.5 Gram(s) IV Push once  dextrose 50% Injectable 25 Gram(s) IV Push once  dextrose 50% Injectable 25 Gram(s) IV Push once  enoxaparin Injectable 40 milliGRAM(s) SubCutaneous daily  HYDROmorphone PCA (1 mG/mL) 30 milliLiter(s) PCA Continuous PCA Continuous  insulin lispro (HumaLOG) corrective regimen sliding scale   SubCutaneous every 6 hours  metoprolol    tartrate Injectable 5 milliGRAM(s) IV Push every 6 hours  pantoprazole  Injectable 40 milliGRAM(s) IV Push daily  tamsulosin 0.4 milliGRAM(s) Oral at bedtime    MEDICATIONS  (PRN):  butorphanol Injectable 0.25 milliGRAM(s) IV Push every 6 hours PRN Pruritus  dextrose Gel 1 Dose(s) Oral once PRN Blood Glucose LESS THAN 70 milliGRAM(s)/deciliter  glucagon  Injectable 1 milliGRAM(s) IntraMuscular once PRN Glucose LESS THAN 70 milligrams/deciliter  HYDROmorphone PCA (1 mG/mL) Rescue Clinician Bolus 0.5 milliGRAM(s) IV Push every 15 minutes PRN for Pain Scale GREATER THAN 6  naloxone Injectable 0.1 milliGRAM(s) IV Push every 3 minutes PRN For ANY of the following changes in patient status:  A. RR LESS THAN 10 breaths per minute, B. Oxygen saturation LESS THAN 90%, C. Sedation score of 6  ondansetron Injectable 4 milliGRAM(s) IV Push every 6 hours PRN Nausea  ondansetron Injectable 4 milliGRAM(s) IV Push once PRN Nausea and/or Vomiting  simethicone 80 milliGRAM(s) Chew two times a day PRN Gas      OBJECTIVE:   [X] No new signs     [ ] Other:    Side Effects:  [X ] None			[ ] Other:    Assessment of Catheter Site:		[ ] Intact		[ ] Other:    ASSESSMENT/PLAN  [ X] Continue current therapy    [ ] Therapy changed to:    [ ] IV PCA       [ ] Epidural     [ ] prn Analgesics     Comments:

## 2017-12-07 LAB
ALBUMIN SERPL ELPH-MCNC: 2.9 G/DL — LOW (ref 3.3–5)
ALP SERPL-CCNC: 148 U/L — HIGH (ref 40–120)
ALT FLD-CCNC: 15 U/L — SIGNIFICANT CHANGE UP (ref 4–41)
AMYLASE FLD-CCNC: SIGNIFICANT CHANGE UP U/L
AST SERPL-CCNC: 19 U/L — SIGNIFICANT CHANGE UP (ref 4–40)
BILIRUB SERPL-MCNC: 1.3 MG/DL — HIGH (ref 0.2–1.2)
BUN SERPL-MCNC: 11 MG/DL — SIGNIFICANT CHANGE UP (ref 7–23)
CALCIUM SERPL-MCNC: 8.7 MG/DL — SIGNIFICANT CHANGE UP (ref 8.4–10.5)
CHLORIDE SERPL-SCNC: 98 MMOL/L — SIGNIFICANT CHANGE UP (ref 98–107)
CO2 SERPL-SCNC: 28 MMOL/L — SIGNIFICANT CHANGE UP (ref 22–31)
CREAT SERPL-MCNC: 0.87 MG/DL — SIGNIFICANT CHANGE UP (ref 0.5–1.3)
GLUCOSE BLDC GLUCOMTR-MCNC: 152 MG/DL — HIGH (ref 70–99)
GLUCOSE BLDC GLUCOMTR-MCNC: 152 MG/DL — HIGH (ref 70–99)
GLUCOSE BLDC GLUCOMTR-MCNC: 160 MG/DL — HIGH (ref 70–99)
GLUCOSE BLDC GLUCOMTR-MCNC: 185 MG/DL — HIGH (ref 70–99)
GLUCOSE SERPL-MCNC: 174 MG/DL — HIGH (ref 70–99)
POTASSIUM SERPL-MCNC: 3.9 MMOL/L — SIGNIFICANT CHANGE UP (ref 3.5–5.3)
POTASSIUM SERPL-SCNC: 3.9 MMOL/L — SIGNIFICANT CHANGE UP (ref 3.5–5.3)
PROT SERPL-MCNC: 6.1 G/DL — SIGNIFICANT CHANGE UP (ref 6–8.3)
SODIUM SERPL-SCNC: 137 MMOL/L — SIGNIFICANT CHANGE UP (ref 135–145)

## 2017-12-07 RX ORDER — POTASSIUM CHLORIDE 20 MEQ
20 PACKET (EA) ORAL EVERY 4 HOURS
Qty: 0 | Refills: 0 | Status: COMPLETED | OUTPATIENT
Start: 2017-12-07 | End: 2017-12-08

## 2017-12-07 RX ORDER — FUROSEMIDE 40 MG
20 TABLET ORAL ONCE
Qty: 0 | Refills: 0 | Status: COMPLETED | OUTPATIENT
Start: 2017-12-07 | End: 2017-12-07

## 2017-12-07 RX ORDER — GABAPENTIN 400 MG/1
100 CAPSULE ORAL EVERY 8 HOURS
Qty: 0 | Refills: 0 | Status: DISCONTINUED | OUTPATIENT
Start: 2017-12-07 | End: 2017-12-14

## 2017-12-07 RX ORDER — POTASSIUM CHLORIDE 20 MEQ
20 PACKET (EA) ORAL ONCE
Qty: 0 | Refills: 0 | Status: COMPLETED | OUTPATIENT
Start: 2017-12-07 | End: 2017-12-07

## 2017-12-07 RX ORDER — GABAPENTIN 400 MG/1
100 CAPSULE ORAL EVERY 8 HOURS
Qty: 0 | Refills: 0 | Status: DISCONTINUED | OUTPATIENT
Start: 2017-12-07 | End: 2017-12-07

## 2017-12-07 RX ORDER — OXYCODONE HYDROCHLORIDE 5 MG/1
10 TABLET ORAL
Qty: 0 | Refills: 0 | Status: DISCONTINUED | OUTPATIENT
Start: 2017-12-07 | End: 2017-12-13

## 2017-12-07 RX ORDER — HYDROMORPHONE HYDROCHLORIDE 2 MG/ML
1 INJECTION INTRAMUSCULAR; INTRAVENOUS; SUBCUTANEOUS
Qty: 0 | Refills: 0 | Status: DISCONTINUED | OUTPATIENT
Start: 2017-12-07 | End: 2017-12-12

## 2017-12-07 RX ADMIN — DEXTROSE MONOHYDRATE, SODIUM CHLORIDE, AND POTASSIUM CHLORIDE 30 MILLILITER(S): 50; .745; 4.5 INJECTION, SOLUTION INTRAVENOUS at 01:13

## 2017-12-07 RX ADMIN — ENOXAPARIN SODIUM 40 MILLIGRAM(S): 100 INJECTION SUBCUTANEOUS at 12:14

## 2017-12-07 RX ADMIN — Medication 1: at 12:14

## 2017-12-07 RX ADMIN — Medication 1: at 18:17

## 2017-12-07 RX ADMIN — DEXTROSE MONOHYDRATE, SODIUM CHLORIDE, AND POTASSIUM CHLORIDE 30 MILLILITER(S): 50; .745; 4.5 INJECTION, SOLUTION INTRAVENOUS at 21:32

## 2017-12-07 RX ADMIN — OXYCODONE HYDROCHLORIDE 10 MILLIGRAM(S): 5 TABLET ORAL at 14:39

## 2017-12-07 RX ADMIN — Medication 5 MILLIGRAM(S): at 05:17

## 2017-12-07 RX ADMIN — Medication 5 MILLIGRAM(S): at 00:50

## 2017-12-07 RX ADMIN — GABAPENTIN 100 MILLIGRAM(S): 400 CAPSULE ORAL at 14:56

## 2017-12-07 RX ADMIN — OXYCODONE HYDROCHLORIDE 10 MILLIGRAM(S): 5 TABLET ORAL at 18:16

## 2017-12-07 RX ADMIN — Medication 5 MILLIGRAM(S): at 12:13

## 2017-12-07 RX ADMIN — Medication 20 MILLIEQUIVALENT(S): at 18:17

## 2017-12-07 RX ADMIN — Medication 20 MILLIEQUIVALENT(S): at 14:39

## 2017-12-07 RX ADMIN — HYDROMORPHONE HYDROCHLORIDE 30 MILLILITER(S): 2 INJECTION INTRAMUSCULAR; INTRAVENOUS; SUBCUTANEOUS at 07:13

## 2017-12-07 RX ADMIN — Medication 1: at 08:29

## 2017-12-07 RX ADMIN — OXYCODONE HYDROCHLORIDE 10 MILLIGRAM(S): 5 TABLET ORAL at 19:07

## 2017-12-07 RX ADMIN — PANTOPRAZOLE SODIUM 40 MILLIGRAM(S): 20 TABLET, DELAYED RELEASE ORAL at 12:15

## 2017-12-07 RX ADMIN — Medication 20 MILLIGRAM(S): at 14:39

## 2017-12-07 RX ADMIN — Medication 20 MILLIEQUIVALENT(S): at 21:32

## 2017-12-07 RX ADMIN — DEXTROSE MONOHYDRATE, SODIUM CHLORIDE, AND POTASSIUM CHLORIDE 30 MILLILITER(S): 50; .745; 4.5 INJECTION, SOLUTION INTRAVENOUS at 14:38

## 2017-12-07 RX ADMIN — OXYCODONE HYDROCHLORIDE 10 MILLIGRAM(S): 5 TABLET ORAL at 22:56

## 2017-12-07 RX ADMIN — OXYCODONE HYDROCHLORIDE 10 MILLIGRAM(S): 5 TABLET ORAL at 21:32

## 2017-12-07 RX ADMIN — GABAPENTIN 100 MILLIGRAM(S): 400 CAPSULE ORAL at 21:32

## 2017-12-07 RX ADMIN — Medication 5 MILLIGRAM(S): at 18:17

## 2017-12-07 RX ADMIN — TAMSULOSIN HYDROCHLORIDE 0.4 MILLIGRAM(S): 0.4 CAPSULE ORAL at 21:32

## 2017-12-07 RX ADMIN — HYDROMORPHONE HYDROCHLORIDE 30 MILLILITER(S): 2 INJECTION INTRAMUSCULAR; INTRAVENOUS; SUBCUTANEOUS at 06:39

## 2017-12-07 RX ADMIN — OXYCODONE HYDROCHLORIDE 10 MILLIGRAM(S): 5 TABLET ORAL at 15:10

## 2017-12-07 NOTE — PROGRESS NOTE ADULT - ASSESSMENT
62 yo gentleman s/p Whipple Procedure, POD#7:  - CLD  - Cont TFs via J tube  - Pain control  - F/u GI function  - OOB/ambulate

## 2017-12-07 NOTE — PROGRESS NOTE ADULT - SUBJECTIVE AND OBJECTIVE BOX
Anesthesia Pain Management Service- Attending Addendum    SUBJECTIVE: Patient's pain control adequate    Therapy:	  [ X] IV PCA	   [ ] Epidural           [ ] s/p Spinal Opoid              [ ] Postpartum infusion	  [ ] Patient controlled regional anesthesia (PCRA)    [ ] prn Analgesics    Allergies    No Known Allergies    Intolerances      MEDICATIONS  (STANDING):  dextrose 5% + sodium chloride 0.45% with potassium chloride 20 mEq/L 1000 milliLiter(s) (30 mL/Hr) IV Continuous <Continuous>  dextrose 50% Injectable 12.5 Gram(s) IV Push once  dextrose 50% Injectable 25 Gram(s) IV Push once  dextrose 50% Injectable 25 Gram(s) IV Push once  enoxaparin Injectable 40 milliGRAM(s) SubCutaneous daily  gabapentin   Solution 100 milliGRAM(s) Oral every 8 hours  insulin lispro (HumaLOG) corrective regimen sliding scale   SubCutaneous three times a day before meals  insulin lispro (HumaLOG) corrective regimen sliding scale   SubCutaneous at bedtime  metoprolol    tartrate Injectable 5 milliGRAM(s) IV Push every 6 hours  pantoprazole  Injectable 40 milliGRAM(s) IV Push daily  potassium chloride    Tablet ER 20 milliEquivalent(s) Oral every 4 hours  tamsulosin 0.4 milliGRAM(s) Oral at bedtime    MEDICATIONS  (PRN):  dextrose Gel 1 Dose(s) Oral once PRN Blood Glucose LESS THAN 70 milliGRAM(s)/deciliter  glucagon  Injectable 1 milliGRAM(s) IntraMuscular once PRN Glucose LESS THAN 70 milligrams/deciliter  HYDROmorphone  Injectable 1 milliGRAM(s) IV Push every 3 hours PRN Severe Pain unrelieved by Oral opioids  ondansetron Injectable 4 milliGRAM(s) IV Push every 6 hours PRN Nausea  ondansetron Injectable 4 milliGRAM(s) IV Push once PRN Nausea and/or Vomiting  oxyCODONE    Solution 10 milliGRAM(s) Oral every 3 hours PRN Moderate and Severe Pain  simethicone 80 milliGRAM(s) Chew two times a day PRN Gas      OBJECTIVE:   [X] No new signs     [ ] Other:    Side Effects:  [X ] None			[ ] Other:      ASSESSMENT/PLAN  -Discontinue current therapy    [ ] Therapy changed to:    [ ] IV PCA       [ ] Epidural     [ X] prn Analgesics     Comments: Pain management per primary team, APS to sign off

## 2017-12-07 NOTE — PROGRESS NOTE ADULT - SUBJECTIVE AND OBJECTIVE BOX
Surgery Team D (Surgery Oncology) Progress Note:    Post-Operative Day: 7    Subjective: Pt seen this AM. J tube clogged yesterday evening but was unclogged during morning rounds. Denies N/V. Pain controlled. +/+ GI function. Remains afebrile.           Objective:  T(C): 37.1 (12-07-17 @ 12:00), Max: 37.2 (12-06-17 @ 19:10)  HR: 90 (12-07-17 @ 12:00) (81 - 98)  BP: 136/71 (12-07-17 @ 12:00) (114/75 - 136/80)  RR: 18 (12-07-17 @ 12:00) (16 - 18)  SpO2: 98% (12-07-17 @ 12:00) (95% - 98%)    Labs:                      12.2   9.18  )-----------( 290      ( 06 Dec 2017 06:13 )             38.0       12-07    137  |  98  |  11  ----------------------------<  174<H>  3.9   |  28  |  0.87    Ca    8.7      07 Dec 2017 06:53  Phos  3.4     12-06  Mg     2.0     12-06    TPro  6.1  /  Alb  2.9<L>  /  TBili  1.3<H>  /  DBili  x   /  AST  19  /  ALT  15  /  AlkPhos  148<H>  12-07      I&O's Detail    06 Dec 2017 07:01  -  07 Dec 2017 07:00  --------------------------------------------------------  IN:    dextrose 5% + sodium chloride 0.45% with potassium chloride 20 mEq/L: 510 mL    Glucerna: 220 mL    IV PiggyBack: 100 mL    Oral Fluid: 600 mL  Total IN: 1430 mL    OUT:    Bulb: 140 mL    Voided: 1200 mL  Total OUT: 1340 mL    Total NET: 90 mL      07 Dec 2017 07:01  -  07 Dec 2017 13:26  --------------------------------------------------------  IN:    dextrose 5% + sodium chloride 0.45% with potassium chloride 20 mEq/L: 120 mL    Glucerna: 80 mL  Total IN: 200 mL    OUT:    Bulb: 80 mL    Voided: 600 mL  Total OUT: 680 mL    Total NET: -480 mL          Focused Physical Exam:  Gen: Laying in bed, NAD, alert  Resp: Unlabored breathing  Abd: softly distended, serous drain fluid    Medications:  dextrose 5% + sodium chloride 0.45% with potassium chloride 20 mEq/L 1000 milliLiter(s) IV Continuous <Continuous>  dextrose 50% Injectable 12.5 Gram(s) IV Push once  dextrose 50% Injectable 25 Gram(s) IV Push once  dextrose 50% Injectable 25 Gram(s) IV Push once  dextrose Gel 1 Dose(s) Oral once PRN  enoxaparin Injectable 40 milliGRAM(s) SubCutaneous daily  gabapentin   Solution 100 milliGRAM(s) Oral every 8 hours  glucagon  Injectable 1 milliGRAM(s) IntraMuscular once PRN  HYDROmorphone  Injectable 1 milliGRAM(s) IV Push every 3 hours PRN  insulin lispro (HumaLOG) corrective regimen sliding scale   SubCutaneous three times a day before meals  insulin lispro (HumaLOG) corrective regimen sliding scale   SubCutaneous at bedtime  metoprolol    tartrate Injectable 5 milliGRAM(s) IV Push every 6 hours  ondansetron Injectable 4 milliGRAM(s) IV Push every 6 hours PRN  ondansetron Injectable 4 milliGRAM(s) IV Push once PRN  oxyCODONE    Solution 10 milliGRAM(s) Oral every 3 hours PRN  pantoprazole  Injectable 40 milliGRAM(s) IV Push daily  potassium chloride    Tablet ER 20 milliEquivalent(s) Oral once  simethicone 80 milliGRAM(s) Chew two times a day PRN  tamsulosin 0.4 milliGRAM(s) Oral at bedtime

## 2017-12-07 NOTE — PROGRESS NOTE ADULT - SUBJECTIVE AND OBJECTIVE BOX
Day _8_ of Anesthesia Pain Management Service    Allergies  No Known Allergies    SUBJECTIVE: "I have pain on the right side."    Pain Scale Score	At rest: _3-4/10_ 	With Activity: ___ 	[ ] Refer to charted pain scores    THERAPY:    [ ] IV PCA Morphine		[ ] 5 mg/mL	[ ] 1 mg/mL  [X] IV PCA Hydromorphone	[ ] 5 mg/mL	[X] 1 mg/mL  [ ] IV PCA Fentanyl		[ ] 50 micrograms/mL    Demand dose _0.2mg_ lockout _6_ (minutes) Continuous Rate _0_ Total: _10.2mg_  Daily      MEDICATIONS  (STANDING):  dextrose 5% + sodium chloride 0.45% with potassium chloride 20 mEq/L 1000 milliLiter(s) (30 mL/Hr) IV Continuous <Continuous>  dextrose 50% Injectable 12.5 Gram(s) IV Push once  dextrose 50% Injectable 25 Gram(s) IV Push once  dextrose 50% Injectable 25 Gram(s) IV Push once  enoxaparin Injectable 40 milliGRAM(s) SubCutaneous daily  gabapentin   Solution 100 milliGRAM(s) Oral every 8 hours  insulin lispro (HumaLOG) corrective regimen sliding scale   SubCutaneous three times a day before meals  insulin lispro (HumaLOG) corrective regimen sliding scale   SubCutaneous at bedtime  metoprolol    tartrate Injectable 5 milliGRAM(s) IV Push every 6 hours  pantoprazole  Injectable 40 milliGRAM(s) IV Push daily  tamsulosin 0.4 milliGRAM(s) Oral at bedtime    MEDICATIONS  (PRN):  dextrose Gel 1 Dose(s) Oral once PRN Blood Glucose LESS THAN 70 milliGRAM(s)/deciliter  glucagon  Injectable 1 milliGRAM(s) IntraMuscular once PRN Glucose LESS THAN 70 milligrams/deciliter  HYDROmorphone  Injectable 1 milliGRAM(s) IV Push every 3 hours PRN Severe Pain unrelieved by Oral opioids  ondansetron Injectable 4 milliGRAM(s) IV Push every 6 hours PRN Nausea  ondansetron Injectable 4 milliGRAM(s) IV Push once PRN Nausea and/or Vomiting  oxyCODONE    Solution 10 milliGRAM(s) Oral every 3 hours PRN Moderate and Severe Pain  simethicone 80 milliGRAM(s) Chew two times a day PRN Gas      OBJECTIVE: A&Ox3, NAD, sitting up in chair    Sedation Score:	[X] Alert	[ ] Drowsy	[ ] Arousable	[ ] Asleep	[ ] Unresponsive    Side Effects:	[X] None	[ ] Nausea	[ ] Vomiting	[ ] Pruritus  		  [ ] Weakness		[ ] Numbness	[ ] Other:                              12.2   9.18  )-----------( 290      ( 06 Dec 2017 06:13 )             38.0       12-07    137  |  98  |  11  ----------------------------<  174<H>  3.9   |  28  |  0.87    Ca    8.7      07 Dec 2017 06:53  Phos  3.4     12-06  Mg     2.0     12-06    TPro  6.1  /  Alb  2.9<L>  /  TBili  1.3<H>  /  DBili  x   /  AST  19  /  ALT  15  /  AlkPhos  148<H>  12-07      ASSESSMENT/ PLAN    Therapy to  be:	[] Continue   [x] Discontinued   [x] Change to prn Analgesics    Documentation and Verification of current medications:  [X] Done	[ ] Not done, not eligible  [ ] Not done, reason not given    Comments:  Changed to oral analgesics as discussed with D-Team

## 2017-12-07 NOTE — PROGRESS NOTE ADULT - ASSESSMENT
patient with pancreatic cancer s/p Whipple final pathology still pending.  Will need further out-pt care including possibly adjuvant therapy.    Post-op care per surgical team.

## 2017-12-07 NOTE — PROGRESS NOTE ADULT - SUBJECTIVE AND OBJECTIVE BOX
Patient is a 62y old  Male who presents with a chief complaint of "I have a growth on my pancreas" (15 Nov 2017 14:39)      REVIEW OF SYSTEMS:     CONSTITUTIONAL: No weakness, fevers or chills  RESPIRATORY: No cough, wheezing,  No shortness of breath  CARDIOVASCULAR: No chest pain or palpitations  GASTROINTESTINAL: abdominal pain under reasonable control .... No nausea, vomiting     GENITOURINARY: No dysuria, frequency   NEUROLOGICAL: No numbness or weakness                                                                                                                                                                                                                                                                                 Medications:  MEDICATIONS  (STANDING):  dextrose 5% + sodium chloride 0.45% with potassium chloride 20 mEq/L 1000 milliLiter(s) (30 mL/Hr) IV Continuous <Continuous>  dextrose 50% Injectable 12.5 Gram(s) IV Push once  dextrose 50% Injectable 25 Gram(s) IV Push once  dextrose 50% Injectable 25 Gram(s) IV Push once  enoxaparin Injectable 40 milliGRAM(s) SubCutaneous daily  gabapentin   Solution 100 milliGRAM(s) Oral every 8 hours  insulin lispro (HumaLOG) corrective regimen sliding scale   SubCutaneous three times a day before meals  insulin lispro (HumaLOG) corrective regimen sliding scale   SubCutaneous at bedtime  metoprolol    tartrate Injectable 5 milliGRAM(s) IV Push every 6 hours  pantoprazole  Injectable 40 milliGRAM(s) IV Push daily  potassium chloride    Tablet ER 20 milliEquivalent(s) Oral every 4 hours  tamsulosin 0.4 milliGRAM(s) Oral at bedtime    MEDICATIONS  (PRN):  dextrose Gel 1 Dose(s) Oral once PRN Blood Glucose LESS THAN 70 milliGRAM(s)/deciliter  glucagon  Injectable 1 milliGRAM(s) IntraMuscular once PRN Glucose LESS THAN 70 milligrams/deciliter  HYDROmorphone  Injectable 1 milliGRAM(s) IV Push every 3 hours PRN Severe Pain unrelieved by Oral opioids  ondansetron Injectable 4 milliGRAM(s) IV Push every 6 hours PRN Nausea  ondansetron Injectable 4 milliGRAM(s) IV Push once PRN Nausea and/or Vomiting  oxyCODONE    Solution 10 milliGRAM(s) Oral every 3 hours PRN Moderate and Severe Pain  simethicone 80 milliGRAM(s) Chew two times a day PRN Gas       Allergies    No Known Allergies    Intolerances      Vital Signs Last 24 Hrs  T(C): 37.4 (07 Dec 2017 17:39), Max: 37.4 (07 Dec 2017 17:39)  T(F): 99.3 (07 Dec 2017 17:39), Max: 99.3 (07 Dec 2017 17:39)  HR: 92 (07 Dec 2017 17:39) (90 - 98)  BP: 130/60 (07 Dec 2017 17:39) (117/74 - 136/80)  BP(mean): --  RR: 17 (07 Dec 2017 17:39) (17 - 18)  SpO2: 97% (07 Dec 2017 17:39) (95% - 98%)  CAPILLARY BLOOD GLUCOSE      POCT Blood Glucose.: 152 mg/dL (07 Dec 2017 17:33)  POCT Blood Glucose.: 185 mg/dL (07 Dec 2017 12:06)  POCT Blood Glucose.: 152 mg/dL (07 Dec 2017 08:20)  POCT Blood Glucose.: 135 mg/dL (06 Dec 2017 21:56)      12-06 @ 07:01  -  12-07 @ 07:00  --------------------------------------------------------  IN: 1430 mL / OUT: 1340 mL / NET: 90 mL    12-07 @ 07:01  -  12-07 @ 19:51  --------------------------------------------------------  IN: 650 mL / OUT: 1260 mL / NET: -610 mL      Physical Exam:    General:  NAD   HEENT:  Nonicteric, PERRLA  CV:  RRR, S1S2   Lungs:  CTA   Abdomen:  Soft, tender at incision site   Extremities: + edema   Skin:  Warm and dry, no rashes    Neuro:  AAOx3, non-focal, grossly intact                                                                                                                                                                                                                                                                                                LABS:                               12.2   9.18  )-----------( 290      ( 06 Dec 2017 06:13 )             38.0                      12-07    137  |  98  |  11  ----------------------------<  174<H>  3.9   |  28  |  0.87    Ca    8.7      07 Dec 2017 06:53  Phos  3.4     12-06  Mg     2.0     12-06    TPro  6.1  /  Alb  2.9<L>  /  TBili  1.3<H>  /  DBili  x   /  AST  19  /  ALT  15  /  AlkPhos  148<H>  12-07

## 2017-12-07 NOTE — PROGRESS NOTE ADULT - SUBJECTIVE AND OBJECTIVE BOX
STATUS POST:      POST OPERATIVE DAY #:     Vital Signs Last 24 Hrs  T(C): 37.1 (07 Dec 2017 12:00), Max: 37.2 (06 Dec 2017 19:10)  T(F): 98.7 (07 Dec 2017 12:00), Max: 98.9 (06 Dec 2017 19:10)  HR: 90 (07 Dec 2017 12:00) (81 - 98)  BP: 136/71 (07 Dec 2017 12:00) (114/75 - 136/80)  BP(mean): --  RR: 18 (07 Dec 2017 12:00) (16 - 18)  SpO2: 98% (07 Dec 2017 12:00) (95% - 98%)      SUBJECTIVE: Pt seen  SOB:  [ ] YES [ ] NO  Dyspnea: [ ]YES [ ]NO  Chest Discomfort: [ ] YES [ ] NO    Nausea: [ ] YES [ ] NO           Vomiting: [ ] YES [ ] NO  Flatus: [ ] YES [ ] NO             Bowel Movement: [ ] YES [ ] NO  Diarrhea: [ ] YES [ ] NO         Void: [ ]YES [ ]No  Constipation: [ ] YES [ ] NO     Pain (0-10):              Pain Control Adequate: [ ] YES [ ] NO  Mariscal:  NGT:    PHYSICAL EXAM:  Constitutional: Patient well nourish. well developed.  Eyes:  PERRL, EOMI, Conjunctiva clear.  ENMT:  WNL  Neck:  Supple.  Respiratory:  Lungs CTA, B/L, no rales , no wheezing, no rhonchi.  Cardiovascular:  S1, S2, RRR  Gastrointestinal: Abdomen soft, non distended, + BS, non tenderness  Wound: C/D/I  Genitourinary:  Normal.  Rectal:   Extremities:  No edema, no calf tenderrness,  Neurological: AxAxOx3                    I&O's Summary    06 Dec 2017 07:01  -  07 Dec 2017 07:00  --------------------------------------------------------  IN: 1430 mL / OUT: 1340 mL / NET: 90 mL    07 Dec 2017 07:01  -  07 Dec 2017 14:06  --------------------------------------------------------  IN: 320 mL / OUT: 680 mL / NET: -360 mL      I&O's Detail    06 Dec 2017 07:01  -  07 Dec 2017 07:00  --------------------------------------------------------  IN:    dextrose 5% + sodium chloride 0.45% with potassium chloride 20 mEq/L: 510 mL    Glucerna: 220 mL    IV PiggyBack: 100 mL    Oral Fluid: 600 mL  Total IN: 1430 mL    OUT:    Bulb: 140 mL    Voided: 1200 mL  Total OUT: 1340 mL    Total NET: 90 mL      07 Dec 2017 07:01  -  07 Dec 2017 14:06  --------------------------------------------------------  IN:    dextrose 5% + sodium chloride 0.45% with potassium chloride 20 mEq/L: 180 mL    Glucerna: 140 mL  Total IN: 320 mL    OUT:    Bulb: 80 mL    Voided: 600 mL  Total OUT: 680 mL    Total NET: -360 mL          MEDICATIONS  (STANDING):  dextrose 5% + sodium chloride 0.45% with potassium chloride 20 mEq/L 1000 milliLiter(s) (30 mL/Hr) IV Continuous <Continuous>  dextrose 50% Injectable 12.5 Gram(s) IV Push once  dextrose 50% Injectable 25 Gram(s) IV Push once  dextrose 50% Injectable 25 Gram(s) IV Push once  enoxaparin Injectable 40 milliGRAM(s) SubCutaneous daily  furosemide   Injectable 20 milliGRAM(s) IV Push once  gabapentin   Solution 100 milliGRAM(s) Oral every 8 hours  insulin lispro (HumaLOG) corrective regimen sliding scale   SubCutaneous three times a day before meals  insulin lispro (HumaLOG) corrective regimen sliding scale   SubCutaneous at bedtime  metoprolol    tartrate Injectable 5 milliGRAM(s) IV Push every 6 hours  pantoprazole  Injectable 40 milliGRAM(s) IV Push daily  potassium chloride    Tablet ER 20 milliEquivalent(s) Oral once  potassium chloride    Tablet ER 20 milliEquivalent(s) Oral every 4 hours  tamsulosin 0.4 milliGRAM(s) Oral at bedtime    MEDICATIONS  (PRN):  dextrose Gel 1 Dose(s) Oral once PRN Blood Glucose LESS THAN 70 milliGRAM(s)/deciliter  glucagon  Injectable 1 milliGRAM(s) IntraMuscular once PRN Glucose LESS THAN 70 milligrams/deciliter  HYDROmorphone  Injectable 1 milliGRAM(s) IV Push every 3 hours PRN Severe Pain unrelieved by Oral opioids  ondansetron Injectable 4 milliGRAM(s) IV Push every 6 hours PRN Nausea  ondansetron Injectable 4 milliGRAM(s) IV Push once PRN Nausea and/or Vomiting  oxyCODONE    Solution 10 milliGRAM(s) Oral every 3 hours PRN Moderate and Severe Pain  simethicone 80 milliGRAM(s) Chew two times a day PRN Gas      LABS:                        12.2   9.18  )-----------( 290      ( 06 Dec 2017 06:13 )             38.0     12-07    137  |  98  |  11  ----------------------------<  174<H>  3.9   |  28  |  0.87    Ca    8.7      07 Dec 2017 06:53  Phos  3.4     12-06  Mg     2.0     12-06    TPro  6.1  /  Alb  2.9<L>  /  TBili  1.3<H>  /  DBili  x   /  AST  19  /  ALT  15  /  AlkPhos  148<H>  12-07          RADIOLOGY & ADDITIONAL STUDIES:      Pathology:    lasix 20 mg IVP   replace potassium

## 2017-12-07 NOTE — PROGRESS NOTE ADULT - ASSESSMENT
60 y/o  male with hx of DM , HTN, BPH  admitted recently for jaundice found to have pnacreatic adenocarcinoma on bx . during that admission pt One plastic stent was placed into the ventral pancreatic duct then placement of fully covered metal stent  in bile duct and removal of pancreatic duct stent. . pt had a stress test which was abnormal and plan for f/u as o/p post surgical intervention by Dr. Curry.  Pt is a  pt now presents for Whipple and s/p surgical intervention   1- Pancreatic CA : s/p whipple   oob , ngt removed        diet per surgery    . monitor lytes closely       IS   / DVT proph  f/u with Dr. Manzanares  : likely adjuvant chemo     2- DM: monitor FS while npo ... acceptable FS ..  might need NPH  3- HTN: had episodes of hypotension while on ARB/ACE... now only on BB cont current meds     4- abn stress : need f/u as o/p.   5-BPH: flomax    6- factor.V. 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     f/u with heme  7 fever : no further episodes   .. would hold off on abx . if recurrs  would check CXR , blood cutlure   UA and US duplex r/o dvt   8- edema: likely sec to hypoalbuminemia    agree with one dose of lasix     d/w   ..   would check US duplex if persistent         cont DVT prophy  oob to chair   PT   d/w pt at length at bedside ,

## 2017-12-07 NOTE — PROGRESS NOTE ADULT - SUBJECTIVE AND OBJECTIVE BOX
Patient is a 62y old  Male who presents with a chief complaint of "I have a growth on my pancreas" (15 Nov 2017 14:39)    Patient resting comfortably.  The tube feeds have been on hold due to that is is clogged.      Medication:   butorphanol Injectable 0.25 milliGRAM(s) IV Push every 6 hours PRN  dextrose 5% + sodium chloride 0.45% with potassium chloride 20 mEq/L 1000 milliLiter(s) IV Continuous <Continuous>  dextrose 50% Injectable 12.5 Gram(s) IV Push once  dextrose 50% Injectable 25 Gram(s) IV Push once  dextrose 50% Injectable 25 Gram(s) IV Push once  dextrose Gel 1 Dose(s) Oral once PRN  enoxaparin Injectable 40 milliGRAM(s) SubCutaneous daily  glucagon  Injectable 1 milliGRAM(s) IntraMuscular once PRN  HYDROmorphone PCA (1 mG/mL) 30 milliLiter(s) PCA Continuous PCA Continuous  HYDROmorphone PCA (1 mG/mL) Rescue Clinician Bolus 0.5 milliGRAM(s) IV Push every 15 minutes PRN  insulin lispro (HumaLOG) corrective regimen sliding scale   SubCutaneous three times a day before meals  insulin lispro (HumaLOG) corrective regimen sliding scale   SubCutaneous at bedtime  metoprolol    tartrate Injectable 5 milliGRAM(s) IV Push every 6 hours  naloxone Injectable 0.1 milliGRAM(s) IV Push every 3 minutes PRN  ondansetron Injectable 4 milliGRAM(s) IV Push every 6 hours PRN  ondansetron Injectable 4 milliGRAM(s) IV Push once PRN  pantoprazole  Injectable 40 milliGRAM(s) IV Push daily  simethicone 80 milliGRAM(s) Chew two times a day PRN  tamsulosin 0.4 milliGRAM(s) Oral at bedtime      Physical exam    T(C): 37 (12-07-17 @ 05:15), Max: 37.2 (12-06-17 @ 19:10)  HR: 98 (12-07-17 @ 05:15) (81 - 98)  BP: 117/74 (12-07-17 @ 05:15) (114/75 - 129/77)  RR: 18 (12-07-17 @ 05:15) (16 - 18)  SpO2: 96% (12-07-17 @ 05:15) (95% - 98%)  Wt(kg): --    NAD  Cv  RRR  Resp non labored    Labs                        12.2   9.18  )-----------( 290      ( 06 Dec 2017 06:13 )             38.0       12-06    139  |  100  |  14  ----------------------------<  185<H>  4.8   |  29  |  0.93    Ca    8.7      06 Dec 2017 06:13  Phos  3.4     12-06  Mg     2.0     12-06    TPro  5.9<L>  /  Alb  2.7<L>  /  TBili  1.3<H>  /  DBili  0.5<H>  /  AST  28  /  ALT  21  /  AlkPhos  135<H>  12-06      LIVER FUNCTIONS - ( 06 Dec 2017 06:13 )  Alb: 2.7 g/dL / Pro: 5.9 g/dL / ALK PHOS: 135 u/L / ALT: 21 u/L / AST: 28 u/L / GGT: x             [id number]

## 2017-12-08 ENCOUNTER — RESULT REVIEW (OUTPATIENT)
Age: 62
End: 2017-12-08

## 2017-12-08 LAB
ALBUMIN SERPL ELPH-MCNC: 3 G/DL — LOW (ref 3.3–5)
ALP SERPL-CCNC: 138 U/L — HIGH (ref 40–120)
ALT FLD-CCNC: 16 U/L — SIGNIFICANT CHANGE UP (ref 4–41)
AST SERPL-CCNC: 15 U/L — SIGNIFICANT CHANGE UP (ref 4–40)
BILIRUB DIRECT SERPL-MCNC: 0.5 MG/DL — HIGH (ref 0.1–0.2)
BILIRUB SERPL-MCNC: 1.2 MG/DL — SIGNIFICANT CHANGE UP (ref 0.2–1.2)
BUN SERPL-MCNC: 9 MG/DL — SIGNIFICANT CHANGE UP (ref 7–23)
CALCIUM SERPL-MCNC: 8.5 MG/DL — SIGNIFICANT CHANGE UP (ref 8.4–10.5)
CHLORIDE SERPL-SCNC: 96 MMOL/L — LOW (ref 98–107)
CO2 SERPL-SCNC: 28 MMOL/L — SIGNIFICANT CHANGE UP (ref 22–31)
CREAT SERPL-MCNC: 0.81 MG/DL — SIGNIFICANT CHANGE UP (ref 0.5–1.3)
GLUCOSE BLDC GLUCOMTR-MCNC: 155 MG/DL — HIGH (ref 70–99)
GLUCOSE BLDC GLUCOMTR-MCNC: 179 MG/DL — HIGH (ref 70–99)
GLUCOSE BLDC GLUCOMTR-MCNC: 179 MG/DL — HIGH (ref 70–99)
GLUCOSE BLDC GLUCOMTR-MCNC: 200 MG/DL — HIGH (ref 70–99)
GLUCOSE SERPL-MCNC: 220 MG/DL — HIGH (ref 70–99)
HCT VFR BLD CALC: 37.6 % — LOW (ref 39–50)
HCT VFR BLD CALC: 38 % — LOW (ref 39–50)
HGB BLD-MCNC: 12.2 G/DL — LOW (ref 13–17)
HGB BLD-MCNC: 12.4 G/DL — LOW (ref 13–17)
MAGNESIUM SERPL-MCNC: 2 MG/DL — SIGNIFICANT CHANGE UP (ref 1.6–2.6)
MCHC RBC-ENTMCNC: 31.6 PG — SIGNIFICANT CHANGE UP (ref 27–34)
MCHC RBC-ENTMCNC: 31.6 PG — SIGNIFICANT CHANGE UP (ref 27–34)
MCHC RBC-ENTMCNC: 32.1 % — SIGNIFICANT CHANGE UP (ref 32–36)
MCHC RBC-ENTMCNC: 33 % — SIGNIFICANT CHANGE UP (ref 32–36)
MCV RBC AUTO: 95.7 FL — SIGNIFICANT CHANGE UP (ref 80–100)
MCV RBC AUTO: 98.4 FL — SIGNIFICANT CHANGE UP (ref 80–100)
NRBC # FLD: 0 — SIGNIFICANT CHANGE UP
NRBC # FLD: 0 — SIGNIFICANT CHANGE UP
PHOSPHATE SERPL-MCNC: 3.1 MG/DL — SIGNIFICANT CHANGE UP (ref 2.5–4.5)
PLATELET # BLD AUTO: 290 K/UL — SIGNIFICANT CHANGE UP (ref 150–400)
PLATELET # BLD AUTO: 441 K/UL — HIGH (ref 150–400)
PMV BLD: 10.5 FL — SIGNIFICANT CHANGE UP (ref 7–13)
PMV BLD: 9.9 FL — SIGNIFICANT CHANGE UP (ref 7–13)
POTASSIUM SERPL-MCNC: 4.7 MMOL/L — SIGNIFICANT CHANGE UP (ref 3.5–5.3)
POTASSIUM SERPL-SCNC: 4.7 MMOL/L — SIGNIFICANT CHANGE UP (ref 3.5–5.3)
PROT SERPL-MCNC: 6.2 G/DL — SIGNIFICANT CHANGE UP (ref 6–8.3)
RBC # BLD: 3.86 M/UL — LOW (ref 4.2–5.8)
RBC # BLD: 3.93 M/UL — LOW (ref 4.2–5.8)
RBC # FLD: 15.5 % — HIGH (ref 10.3–14.5)
RBC # FLD: 16.3 % — HIGH (ref 10.3–14.5)
SODIUM SERPL-SCNC: 133 MMOL/L — LOW (ref 135–145)
SURGICAL PATHOLOGY STUDY: SIGNIFICANT CHANGE UP
WBC # BLD: 12.17 K/UL — HIGH (ref 3.8–10.5)
WBC # BLD: 9.18 K/UL — SIGNIFICANT CHANGE UP (ref 3.8–10.5)
WBC # FLD AUTO: 12.17 K/UL — HIGH (ref 3.8–10.5)
WBC # FLD AUTO: 9.18 K/UL — SIGNIFICANT CHANGE UP (ref 3.8–10.5)

## 2017-12-08 PROCEDURE — 71010: CPT | Mod: 26

## 2017-12-08 RX ORDER — INSULIN LISPRO 100/ML
VIAL (ML) SUBCUTANEOUS
Qty: 0 | Refills: 0 | Status: DISCONTINUED | OUTPATIENT
Start: 2017-12-08 | End: 2017-12-15

## 2017-12-08 RX ADMIN — Medication 20 MILLIEQUIVALENT(S): at 01:14

## 2017-12-08 RX ADMIN — OXYCODONE HYDROCHLORIDE 10 MILLIGRAM(S): 5 TABLET ORAL at 03:44

## 2017-12-08 RX ADMIN — OXYCODONE HYDROCHLORIDE 10 MILLIGRAM(S): 5 TABLET ORAL at 18:00

## 2017-12-08 RX ADMIN — Medication 2: at 17:33

## 2017-12-08 RX ADMIN — OXYCODONE HYDROCHLORIDE 10 MILLIGRAM(S): 5 TABLET ORAL at 13:25

## 2017-12-08 RX ADMIN — Medication 5 MILLIGRAM(S): at 01:14

## 2017-12-08 RX ADMIN — DEXTROSE MONOHYDRATE, SODIUM CHLORIDE, AND POTASSIUM CHLORIDE 30 MILLILITER(S): 50; .745; 4.5 INJECTION, SOLUTION INTRAVENOUS at 22:18

## 2017-12-08 RX ADMIN — OXYCODONE HYDROCHLORIDE 10 MILLIGRAM(S): 5 TABLET ORAL at 22:18

## 2017-12-08 RX ADMIN — Medication: at 08:47

## 2017-12-08 RX ADMIN — OXYCODONE HYDROCHLORIDE 10 MILLIGRAM(S): 5 TABLET ORAL at 07:32

## 2017-12-08 RX ADMIN — GABAPENTIN 100 MILLIGRAM(S): 400 CAPSULE ORAL at 22:18

## 2017-12-08 RX ADMIN — TAMSULOSIN HYDROCHLORIDE 0.4 MILLIGRAM(S): 0.4 CAPSULE ORAL at 22:00

## 2017-12-08 RX ADMIN — PANTOPRAZOLE SODIUM 40 MILLIGRAM(S): 20 TABLET, DELAYED RELEASE ORAL at 12:20

## 2017-12-08 RX ADMIN — Medication 5 MILLIGRAM(S): at 12:20

## 2017-12-08 RX ADMIN — OXYCODONE HYDROCHLORIDE 10 MILLIGRAM(S): 5 TABLET ORAL at 04:08

## 2017-12-08 RX ADMIN — GABAPENTIN 100 MILLIGRAM(S): 400 CAPSULE ORAL at 05:22

## 2017-12-08 RX ADMIN — Medication 5 MILLIGRAM(S): at 05:22

## 2017-12-08 RX ADMIN — GABAPENTIN 100 MILLIGRAM(S): 400 CAPSULE ORAL at 14:00

## 2017-12-08 RX ADMIN — Medication 5 MILLIGRAM(S): at 17:32

## 2017-12-08 RX ADMIN — OXYCODONE HYDROCHLORIDE 10 MILLIGRAM(S): 5 TABLET ORAL at 08:25

## 2017-12-08 RX ADMIN — OXYCODONE HYDROCHLORIDE 10 MILLIGRAM(S): 5 TABLET ORAL at 22:50

## 2017-12-08 RX ADMIN — Medication 2: at 12:19

## 2017-12-08 RX ADMIN — OXYCODONE HYDROCHLORIDE 10 MILLIGRAM(S): 5 TABLET ORAL at 16:56

## 2017-12-08 RX ADMIN — SIMETHICONE 80 MILLIGRAM(S): 80 TABLET, CHEWABLE ORAL at 01:14

## 2017-12-08 RX ADMIN — SIMETHICONE 80 MILLIGRAM(S): 80 TABLET, CHEWABLE ORAL at 07:40

## 2017-12-08 RX ADMIN — ENOXAPARIN SODIUM 40 MILLIGRAM(S): 100 INJECTION SUBCUTANEOUS at 12:20

## 2017-12-08 RX ADMIN — OXYCODONE HYDROCHLORIDE 10 MILLIGRAM(S): 5 TABLET ORAL at 12:47

## 2017-12-08 NOTE — PROGRESS NOTE ADULT - ASSESSMENT
60 y/o  male with hx of DM , HTN, BPH  admitted recently for jaundice found to have pnacreatic adenocarcinoma on bx . during that admission pt One plastic stent was placed into the ventral pancreatic duct then placement of fully covered metal stent  in bile duct and removal of pancreatic duct stent. . pt had a stress test which was abnormal and plan for f/u as o/p post surgical intervention by Dr. Curry.  Pt is a  pt now presents for Whipple and s/p surgical intervention   1- Pancreatic CA : s/p whipple   oob , ngt removed        diet per surgery    . monitor lytes closely       IS   / DVT proph  f/u with Dr. Manzanares  : likely adjuvant chemo     2- DM: monitor FS while npo ... acceptable FS ..  would start NPH 4 units TID   3- HTN: had episodes of hypotension while on ARB/ACE... now only on BB cont current meds     4- abn stress : need f/u as o/p.   5-BPH: flomax    6- factor.V. 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     f/u with heme  7 fever : no further episodes   .. would hold off on abx . if recurrs  would check CXR , blood cutlure   UA and US duplex r/o dvt   pt now leukocytoiss however no signs of infection... suspect sec to mild dehydration (intravasculary ) s/p lasix ... monitor for now ... if febrile would pan culture   8- edema: likely sec to hypoalbuminemia   however would check US duplex r/o DVT   9- hyponatremia : mild  likley sec to mild dehydrtion and element of psuedohyponatremia ... if worsen/ persistent would check urine lytes     cont DVT prophy  oob to chair   PT   d/w pt

## 2017-12-08 NOTE — PROGRESS NOTE ADULT - SUBJECTIVE AND OBJECTIVE BOX
Surgery Team D (Surgery Oncology) Progress Note:    Post-Operative Day: 8    Subjective: Pt seen this AM. No events overnight. Pain controlled. Denies N/V. +/+ GI function. Remains afebrile.           Objective:  T(C): 37.7 (12-08-17 @ 05:20), Max: 37.7 (12-07-17 @ 20:36)  HR: 101 (12-08-17 @ 05:20) (90 - 105)  BP: 137/73 (12-08-17 @ 05:20) (112/68 - 137/73)  RR: 18 (12-08-17 @ 05:20) (17 - 18)  SpO2: 98% (12-08-17 @ 05:20) (95% - 98%)    Labs:                      12.2   9.18  )-----------( 290      ( 06 Dec 2017 06:13 )             38.0       12-07    137  |  98  |  11  ----------------------------<  174<H>  3.9   |  28  |  0.87    Ca    8.7      07 Dec 2017 06:53  Phos  3.4     12-06  Mg     2.0     12-06    TPro  6.1  /  Alb  2.9<L>  /  TBili  1.3<H>  /  DBili  x   /  AST  19  /  ALT  15  /  AlkPhos  148<H>  12-07      I&O's Detail    06 Dec 2017 07:01  -  07 Dec 2017 07:00  --------------------------------------------------------  IN:    dextrose 5% + sodium chloride 0.45% with potassium chloride 20 mEq/L: 510 mL    Glucerna: 220 mL    IV PiggyBack: 100 mL    Oral Fluid: 600 mL  Total IN: 1430 mL    OUT:    Bulb: 140 mL    Voided: 1200 mL  Total OUT: 1340 mL    Total NET: 90 mL      07 Dec 2017 07:01  -  08 Dec 2017 06:01  --------------------------------------------------------  IN:    dextrose 5% + sodium chloride 0.45% with potassium chloride 20 mEq/L: 690 mL    Glucerna: 910 mL  Total IN: 1600 mL    OUT:    Bulb: 320 mL    Voided: 2010 mL  Total OUT: 2330 mL    Total NET: -730 mL          Focused Physical Exam:  Gen: Laying in bed, NAD, alert  Resp: Unlabored breathing  Abd: softly distended, serous drain fluid  Ext: FROM x 4    Medications:  dextrose 5% + sodium chloride 0.45% with potassium chloride 20 mEq/L 1000 milliLiter(s) IV Continuous <Continuous>  dextrose 50% Injectable 12.5 Gram(s) IV Push once  dextrose 50% Injectable 25 Gram(s) IV Push once  dextrose 50% Injectable 25 Gram(s) IV Push once  dextrose Gel 1 Dose(s) Oral once PRN  enoxaparin Injectable 40 milliGRAM(s) SubCutaneous daily  gabapentin   Solution 100 milliGRAM(s) Oral every 8 hours  glucagon  Injectable 1 milliGRAM(s) IntraMuscular once PRN  HYDROmorphone  Injectable 1 milliGRAM(s) IV Push every 3 hours PRN  insulin lispro (HumaLOG) corrective regimen sliding scale   SubCutaneous three times a day before meals  insulin lispro (HumaLOG) corrective regimen sliding scale   SubCutaneous at bedtime  metoprolol    tartrate Injectable 5 milliGRAM(s) IV Push every 6 hours  ondansetron Injectable 4 milliGRAM(s) IV Push every 6 hours PRN  ondansetron Injectable 4 milliGRAM(s) IV Push once PRN  oxyCODONE    Solution 10 milliGRAM(s) Oral every 3 hours PRN  pantoprazole  Injectable 40 milliGRAM(s) IV Push daily  simethicone 80 milliGRAM(s) Chew two times a day PRN  tamsulosin 0.4 milliGRAM(s) Oral at bedtime

## 2017-12-08 NOTE — PROGRESS NOTE ADULT - SUBJECTIVE AND OBJECTIVE BOX
Patient is a 62y old  Male who presents with a chief complaint of "I have a growth on my pancreas" (15 Nov 2017 14:39)   Now on Po clears.  pain is less.      Medication:   dextrose 5% + sodium chloride 0.45% with potassium chloride 20 mEq/L 1000 milliLiter(s) IV Continuous <Continuous>  dextrose 50% Injectable 12.5 Gram(s) IV Push once  dextrose 50% Injectable 25 Gram(s) IV Push once  dextrose 50% Injectable 25 Gram(s) IV Push once  dextrose Gel 1 Dose(s) Oral once PRN  enoxaparin Injectable 40 milliGRAM(s) SubCutaneous daily  gabapentin   Solution 100 milliGRAM(s) Oral every 8 hours  glucagon  Injectable 1 milliGRAM(s) IntraMuscular once PRN  HYDROmorphone  Injectable 1 milliGRAM(s) IV Push every 3 hours PRN  insulin lispro (HumaLOG) corrective regimen sliding scale   SubCutaneous three times a day before meals  insulin lispro (HumaLOG) corrective regimen sliding scale   SubCutaneous at bedtime  metoprolol    tartrate Injectable 5 milliGRAM(s) IV Push every 6 hours  ondansetron Injectable 4 milliGRAM(s) IV Push every 6 hours PRN  ondansetron Injectable 4 milliGRAM(s) IV Push once PRN  oxyCODONE    Solution 10 milliGRAM(s) Oral every 3 hours PRN  pantoprazole  Injectable 40 milliGRAM(s) IV Push daily  simethicone 80 milliGRAM(s) Chew two times a day PRN  tamsulosin 0.4 milliGRAM(s) Oral at bedtime      Physical exam    T(C): 37.1 (12-08-17 @ 12:01), Max: 37.7 (12-07-17 @ 20:36)  HR: 101 (12-08-17 @ 12:01) (92 - 108)  BP: 120/73 (12-08-17 @ 12:01) (112/68 - 144/73)  RR: 18 (12-08-17 @ 12:01) (17 - 18)  SpO2: 98% (12-08-17 @ 12:01) (95% - 98%)  Wt(kg): --     NAD  Cv RRR  abd mild distended  B/l LE edema    Labs                      12.4   12.17 )-----------( 441      ( 08 Dec 2017 05:50 )             37.6       12-08    133<L>  |  96<L>  |  9   ----------------------------<  220<H>  4.7   |  28  |  0.81    Ca    8.5      08 Dec 2017 05:50  Phos  3.1     12-08  Mg     2.0     12-08    TPro  6.2  /  Alb  3.0<L>  /  TBili  1.2  /  DBili  0.5<H>  /  AST  15  /  ALT  16  /  AlkPhos  138<H>  12-08      LIVER FUNCTIONS - ( 08 Dec 2017 05:50 )  Alb: 3.0 g/dL / Pro: 6.2 g/dL / ALK PHOS: 138 u/L / ALT: 16 u/L / AST: 15 u/L / GGT: x             [id number]

## 2017-12-08 NOTE — CHART NOTE - NSCHARTNOTEFT_GEN_A_CORE
Patient was seen and examined at the bedside by Dr. Francois. Two ruslan were removed from the distal end of the surgical wound, wound was explored, small hematoma and fat necrosis expressed. Open area of wound was packed with gauze and covered with tape. Patient tolerated procedure without complication.

## 2017-12-08 NOTE — PROGRESS NOTE ADULT - ASSESSMENT
pancreatic cancer pathology noted T3 N1 and will need out-pt adjuvant therapy.  Will discuss with patient when family present.    Thrombocytosis likely reactive and would monitor for now.

## 2017-12-08 NOTE — PROGRESS NOTE ADULT - ASSESSMENT
60 yo gentleman s/p Whipple Procedure, POD#7:  - Reg diet  - Cont TFs via J tube; will think of transitioning  - Pain control  - F/u GI function  - OOB/ambulate  - DVT ppx

## 2017-12-08 NOTE — PROGRESS NOTE ADULT - SUBJECTIVE AND OBJECTIVE BOX
Patient is a 62y old  Male who presents with a chief complaint of "I have a growth on my pancreas" (15 Nov 2017 14:39)                                                               INTERVAL HPI/OVERNIGHT EVENTS:    REVIEW OF SYSTEMS:     CONSTITUTIONAL: No weakness, fevers or chills  RESPIRATORY: No cough, wheezing,  No shortness of breath  CARDIOVASCULAR: No chest pain or palpitations  GASTROINTESTINAL: No abdominal pain  . No nausea, vomiting,+ flutuance and reports gas   GENITOURINARY: No dysuria, frequency or hematuria  NEUROLOGICAL: No numbness or weakness                                                                                                                                                                                                                                                                               Medications:  MEDICATIONS  (STANDING):  dextrose 5% + sodium chloride 0.45% with potassium chloride 20 mEq/L 1000 milliLiter(s) (30 mL/Hr) IV Continuous <Continuous>  dextrose 50% Injectable 12.5 Gram(s) IV Push once  dextrose 50% Injectable 25 Gram(s) IV Push once  dextrose 50% Injectable 25 Gram(s) IV Push once  enoxaparin Injectable 40 milliGRAM(s) SubCutaneous daily  gabapentin   Solution 100 milliGRAM(s) Oral every 8 hours  insulin lispro (HumaLOG) corrective regimen sliding scale   SubCutaneous three times a day before meals  insulin lispro (HumaLOG) corrective regimen sliding scale   SubCutaneous at bedtime  metoprolol    tartrate Injectable 5 milliGRAM(s) IV Push every 6 hours  pantoprazole  Injectable 40 milliGRAM(s) IV Push daily  tamsulosin 0.4 milliGRAM(s) Oral at bedtime    MEDICATIONS  (PRN):  dextrose Gel 1 Dose(s) Oral once PRN Blood Glucose LESS THAN 70 milliGRAM(s)/deciliter  glucagon  Injectable 1 milliGRAM(s) IntraMuscular once PRN Glucose LESS THAN 70 milligrams/deciliter  HYDROmorphone  Injectable 1 milliGRAM(s) IV Push every 3 hours PRN Severe Pain unrelieved by Oral opioids  ondansetron Injectable 4 milliGRAM(s) IV Push every 6 hours PRN Nausea  ondansetron Injectable 4 milliGRAM(s) IV Push once PRN Nausea and/or Vomiting  oxyCODONE    Solution 10 milliGRAM(s) Oral every 3 hours PRN Moderate and Severe Pain  simethicone 80 milliGRAM(s) Chew two times a day PRN Gas       Allergies    No Known Allergies    Intolerances      Vital Signs Last 24 Hrs  T(C): 37.1 (08 Dec 2017 12:01), Max: 37.7 (07 Dec 2017 20:36)  T(F): 98.7 (08 Dec 2017 12:01), Max: 99.8 (07 Dec 2017 20:36)  HR: 101 (08 Dec 2017 12:01) (92 - 108)  BP: 120/73 (08 Dec 2017 12:01) (112/68 - 144/73)  BP(mean): 92 (08 Dec 2017 07:38) (92 - 92)  RR: 18 (08 Dec 2017 12:01) (17 - 18)  SpO2: 98% (08 Dec 2017 12:01) (95% - 98%)  CAPILLARY BLOOD GLUCOSE      POCT Blood Glucose.: 179 mg/dL (08 Dec 2017 12:00)  POCT Blood Glucose.: 200 mg/dL (08 Dec 2017 08:20)  POCT Blood Glucose.: 160 mg/dL (07 Dec 2017 22:09)  POCT Blood Glucose.: 152 mg/dL (07 Dec 2017 17:33)      12-07 @ 07:01  -  12-08 @ 07:00  --------------------------------------------------------  IN: 1600 mL / OUT: 2330 mL / NET: -730 mL    12-08 @ 07:01  -  12-08 @ 15:17  --------------------------------------------------------  IN: 0 mL / OUT: 70 mL / NET: -70 mL      Physical Exam:    General: NAD   HEENT:  Nonicteric, PERRLA  CV:  RRR, S1S2   Lungs:  CTA B/L, no wheezes, rales, rhonchi  Abdomen:  Soft, tender over the incision site   Extremities: + BLE edema   Skin:  Warm and dry, no rashes  :  No newberry  Neuro:  AAOx3, non-focal, grossly intact                                                                                                                                                                                                                                                                                                LABS:                               12.4   12.17 )-----------( 441      ( 08 Dec 2017 05:50 )             37.6                      12-08    133<L>  |  96<L>  |  9   ----------------------------<  220<H>  4.7   |  28  |  0.81    Ca    8.5      08 Dec 2017 05:50  Phos  3.1     12-08  Mg     2.0     12-08    TPro  6.2  /  Alb  3.0<L>  /  TBili  1.2  /  DBili  0.5<H>  /  AST  15  /  ALT  16  /  AlkPhos  138<H>  12-08

## 2017-12-09 LAB
ALBUMIN SERPL ELPH-MCNC: 2.7 G/DL — LOW (ref 3.3–5)
ALP SERPL-CCNC: 134 U/L — HIGH (ref 40–120)
ALT FLD-CCNC: 20 U/L — SIGNIFICANT CHANGE UP (ref 4–41)
AST SERPL-CCNC: 21 U/L — SIGNIFICANT CHANGE UP (ref 4–40)
BASOPHILS # BLD AUTO: 0.04 K/UL — SIGNIFICANT CHANGE UP (ref 0–0.2)
BASOPHILS NFR BLD AUTO: 0.3 % — SIGNIFICANT CHANGE UP (ref 0–2)
BILIRUB DIRECT SERPL-MCNC: 0.5 MG/DL — HIGH (ref 0.1–0.2)
BILIRUB SERPL-MCNC: 1.2 MG/DL — SIGNIFICANT CHANGE UP (ref 0.2–1.2)
BUN SERPL-MCNC: 8 MG/DL — SIGNIFICANT CHANGE UP (ref 7–23)
CALCIUM SERPL-MCNC: 8.5 MG/DL — SIGNIFICANT CHANGE UP (ref 8.4–10.5)
CHLORIDE SERPL-SCNC: 98 MMOL/L — SIGNIFICANT CHANGE UP (ref 98–107)
CO2 SERPL-SCNC: 24 MMOL/L — SIGNIFICANT CHANGE UP (ref 22–31)
CREAT SERPL-MCNC: 0.77 MG/DL — SIGNIFICANT CHANGE UP (ref 0.5–1.3)
EOSINOPHIL # BLD AUTO: 0.07 K/UL — SIGNIFICANT CHANGE UP (ref 0–0.5)
EOSINOPHIL NFR BLD AUTO: 0.6 % — SIGNIFICANT CHANGE UP (ref 0–6)
GLUCOSE BLDC GLUCOMTR-MCNC: 155 MG/DL — HIGH (ref 70–99)
GLUCOSE BLDC GLUCOMTR-MCNC: 161 MG/DL — HIGH (ref 70–99)
GLUCOSE BLDC GLUCOMTR-MCNC: 175 MG/DL — HIGH (ref 70–99)
GLUCOSE BLDC GLUCOMTR-MCNC: 208 MG/DL — HIGH (ref 70–99)
GLUCOSE SERPL-MCNC: 214 MG/DL — HIGH (ref 70–99)
HCT VFR BLD CALC: 36.7 % — LOW (ref 39–50)
HGB BLD-MCNC: 12 G/DL — LOW (ref 13–17)
IMM GRANULOCYTES # BLD AUTO: 0.09 # — SIGNIFICANT CHANGE UP
IMM GRANULOCYTES NFR BLD AUTO: 0.7 % — SIGNIFICANT CHANGE UP (ref 0–1.5)
LYMPHOCYTES # BLD AUTO: 1.07 K/UL — SIGNIFICANT CHANGE UP (ref 1–3.3)
LYMPHOCYTES # BLD AUTO: 8.8 % — LOW (ref 13–44)
MAGNESIUM SERPL-MCNC: 2.1 MG/DL — SIGNIFICANT CHANGE UP (ref 1.6–2.6)
MCHC RBC-ENTMCNC: 31.9 PG — SIGNIFICANT CHANGE UP (ref 27–34)
MCHC RBC-ENTMCNC: 32.7 % — SIGNIFICANT CHANGE UP (ref 32–36)
MCV RBC AUTO: 97.6 FL — SIGNIFICANT CHANGE UP (ref 80–100)
MONOCYTES # BLD AUTO: 0.88 K/UL — SIGNIFICANT CHANGE UP (ref 0–0.9)
MONOCYTES NFR BLD AUTO: 7.2 % — SIGNIFICANT CHANGE UP (ref 2–14)
NEUTROPHILS # BLD AUTO: 9.99 K/UL — HIGH (ref 1.8–7.4)
NEUTROPHILS NFR BLD AUTO: 82.4 % — HIGH (ref 43–77)
NRBC # FLD: 0 — SIGNIFICANT CHANGE UP
PHOSPHATE SERPL-MCNC: 3.4 MG/DL — SIGNIFICANT CHANGE UP (ref 2.5–4.5)
PLATELET # BLD AUTO: 486 K/UL — HIGH (ref 150–400)
PMV BLD: 10.2 FL — SIGNIFICANT CHANGE UP (ref 7–13)
POTASSIUM SERPL-MCNC: 4 MMOL/L — SIGNIFICANT CHANGE UP (ref 3.5–5.3)
POTASSIUM SERPL-SCNC: 4 MMOL/L — SIGNIFICANT CHANGE UP (ref 3.5–5.3)
PROT SERPL-MCNC: 6 G/DL — SIGNIFICANT CHANGE UP (ref 6–8.3)
RBC # BLD: 3.76 M/UL — LOW (ref 4.2–5.8)
RBC # FLD: 15.4 % — HIGH (ref 10.3–14.5)
SODIUM SERPL-SCNC: 135 MMOL/L — SIGNIFICANT CHANGE UP (ref 135–145)
WBC # BLD: 12.14 K/UL — HIGH (ref 3.8–10.5)
WBC # FLD AUTO: 12.14 K/UL — HIGH (ref 3.8–10.5)

## 2017-12-09 RX ADMIN — SIMETHICONE 80 MILLIGRAM(S): 80 TABLET, CHEWABLE ORAL at 05:00

## 2017-12-09 RX ADMIN — Medication 5 MILLIGRAM(S): at 01:50

## 2017-12-09 RX ADMIN — GABAPENTIN 100 MILLIGRAM(S): 400 CAPSULE ORAL at 15:37

## 2017-12-09 RX ADMIN — OXYCODONE HYDROCHLORIDE 10 MILLIGRAM(S): 5 TABLET ORAL at 16:30

## 2017-12-09 RX ADMIN — Medication 2: at 17:29

## 2017-12-09 RX ADMIN — Medication 5 MILLIGRAM(S): at 12:48

## 2017-12-09 RX ADMIN — PANTOPRAZOLE SODIUM 40 MILLIGRAM(S): 20 TABLET, DELAYED RELEASE ORAL at 12:48

## 2017-12-09 RX ADMIN — Medication 5 MILLIGRAM(S): at 17:28

## 2017-12-09 RX ADMIN — OXYCODONE HYDROCHLORIDE 10 MILLIGRAM(S): 5 TABLET ORAL at 05:35

## 2017-12-09 RX ADMIN — Medication 2: at 12:48

## 2017-12-09 RX ADMIN — Medication 5 MILLIGRAM(S): at 05:36

## 2017-12-09 RX ADMIN — OXYCODONE HYDROCHLORIDE 10 MILLIGRAM(S): 5 TABLET ORAL at 15:37

## 2017-12-09 RX ADMIN — OXYCODONE HYDROCHLORIDE 10 MILLIGRAM(S): 5 TABLET ORAL at 11:51

## 2017-12-09 RX ADMIN — OXYCODONE HYDROCHLORIDE 10 MILLIGRAM(S): 5 TABLET ORAL at 06:00

## 2017-12-09 RX ADMIN — GABAPENTIN 100 MILLIGRAM(S): 400 CAPSULE ORAL at 22:25

## 2017-12-09 RX ADMIN — TAMSULOSIN HYDROCHLORIDE 0.4 MILLIGRAM(S): 0.4 CAPSULE ORAL at 22:25

## 2017-12-09 RX ADMIN — Medication 4: at 08:52

## 2017-12-09 RX ADMIN — ENOXAPARIN SODIUM 40 MILLIGRAM(S): 100 INJECTION SUBCUTANEOUS at 12:48

## 2017-12-09 RX ADMIN — OXYCODONE HYDROCHLORIDE 10 MILLIGRAM(S): 5 TABLET ORAL at 10:51

## 2017-12-09 RX ADMIN — GABAPENTIN 100 MILLIGRAM(S): 400 CAPSULE ORAL at 05:35

## 2017-12-09 RX ADMIN — OXYCODONE HYDROCHLORIDE 10 MILLIGRAM(S): 5 TABLET ORAL at 22:25

## 2017-12-09 RX ADMIN — OXYCODONE HYDROCHLORIDE 10 MILLIGRAM(S): 5 TABLET ORAL at 23:00

## 2017-12-09 NOTE — PROGRESS NOTE ADULT - ASSESSMENT
A: 60 yo jorge s/p Whipple Procedure, POD#9:    P:  - CLD  - TFs via J tube to goal  - Pain control  - F/u GI function  - OOB/ambulate  - DVT ppx  - Patient seen and examined with Dr. Kimbrough

## 2017-12-09 NOTE — PROGRESS NOTE ADULT - ASSESSMENT
Pancreatic cancer had a Whipple will need out-pt adjuvant tx as was pT3 and N1.  Have not yet discussed with pt.  Possibly maryam when family is present.    has a mild thrombocytosis developed post-op and likely reactive and will monitor.

## 2017-12-09 NOTE — PROGRESS NOTE ADULT - ASSESSMENT
60 y/o  male with hx of DM , HTN, BPH  admitted recently for jaundice found to have pnacreatic adenocarcinoma on bx . during that admission pt One plastic stent was placed into the ventral pancreatic duct then placement of fully covered metal stent  in bile duct and removal of pancreatic duct stent. . pt had a stress test which was abnormal and plan for f/u as o/p post surgical intervention by Dr. Curry.  Pt is a  pt now presents for Whipple and s/p surgical intervention   1- Pancreatic CA : s/p whipple   oob , ngt removed        diet per surgery    . monitor lytes closely       IS   / DVT proph  f/u with Dr. Manzanares  : likely adjuvant chemo     2- DM: monitor FS while npo ... acceptable FS ..  would start NPH 4 units TID   3- HTN: had episodes of hypotension while on ARB/ACE... now off BB . sec to hypotension ... ? etiology ?     monitor on midodrine      4- abn stress : need f/u as o/p.   5-BPH: flomax    6- factor.V. 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     f/u with heme  7 fever : no further episodes   .. would hold off on abx . if recurrs  would check CXR , blood cutlure   UA and US duplex r/o dvt   pt now leukocytoiss however no signs of infection... suspect sec to mild dehydration (intravasculary ) s/p lasix ... monitor for now ... if febrile would pan culture   8- edema: likely sec to hypoalbuminemia   however would check US duplex r/o DVT   9- hyponatremia : improved   monitor       cont DVT prophy  oob to chair   PT   d/w pt and family at length at bedside

## 2017-12-09 NOTE — PROGRESS NOTE ADULT - SUBJECTIVE AND OBJECTIVE BOX
D TEAM SURGERY DAILY PROGRESS NOTE:    S: Patient seen and examined. The lower staples of his abdominal wound were removed this morning and we packed it with 1/4" packing.    O:  Vital Signs Last 24 Hrs  T(C): 36.6 (09 Dec 2017 08:55), Max: 37.3 (08 Dec 2017 17:44)  T(F): 97.8 (09 Dec 2017 08:55), Max: 99.1 (08 Dec 2017 17:44)  HR: 92 (09 Dec 2017 08:55) (92 - 102)  BP: 126/70 (09 Dec 2017 08:55) (116/68 - 126/70)  BP(mean): --  RR: 18 (09 Dec 2017 08:55) (18 - 18)  SpO2: 97% (09 Dec 2017 08:55) (95% - 97%)    I&O's Detail    08 Dec 2017 07:01  -  09 Dec 2017 07:00  --------------------------------------------------------  IN:    dextrose 5% + sodium chloride 0.45% with potassium chloride 20 mEq/L: 360 mL    Glucerna: 120 mL    Oral Fluid: 120 mL  Total IN: 600 mL    OUT:    Bulb: 260 mL    Voided: 3700 mL  Total OUT: 3960 mL    Total NET: -3360 mL      09 Dec 2017 07:01  -  09 Dec 2017 12:02  --------------------------------------------------------  IN:  Total IN: 0 mL    OUT:    Bulb: 50 mL    Voided: 600 mL  Total OUT: 650 mL    Total NET: -650 mL    MEDICATIONS  (STANDING):  dextrose 5% + sodium chloride 0.45% with potassium chloride 20 mEq/L 1000 milliLiter(s) (30 mL/Hr) IV Continuous <Continuous>  dextrose 50% Injectable 12.5 Gram(s) IV Push once  dextrose 50% Injectable 25 Gram(s) IV Push once  dextrose 50% Injectable 25 Gram(s) IV Push once  enoxaparin Injectable 40 milliGRAM(s) SubCutaneous daily  gabapentin   Solution 100 milliGRAM(s) Oral every 8 hours  insulin lispro (HumaLOG) corrective regimen sliding scale   SubCutaneous three times a day before meals  insulin lispro (HumaLOG) corrective regimen sliding scale   SubCutaneous at bedtime  metoprolol    tartrate Injectable 5 milliGRAM(s) IV Push every 6 hours  pantoprazole  Injectable 40 milliGRAM(s) IV Push daily  tamsulosin 0.4 milliGRAM(s) Oral at bedtime    MEDICATIONS  (PRN):  dextrose Gel 1 Dose(s) Oral once PRN Blood Glucose LESS THAN 70 milliGRAM(s)/deciliter  glucagon  Injectable 1 milliGRAM(s) IntraMuscular once PRN Glucose LESS THAN 70 milligrams/deciliter  HYDROmorphone  Injectable 1 milliGRAM(s) IV Push every 3 hours PRN Severe Pain unrelieved by Oral opioids  ondansetron Injectable 4 milliGRAM(s) IV Push every 6 hours PRN Nausea  ondansetron Injectable 4 milliGRAM(s) IV Push once PRN Nausea and/or Vomiting  oxyCODONE    Solution 10 milliGRAM(s) Oral every 3 hours PRN Moderate and Severe Pain  simethicone 80 milliGRAM(s) Chew two times a day PRN Gas                        12.0   12.14 )-----------( 486      ( 09 Dec 2017 06:30 )             36.7     12-09    135  |  98  |  8   ----------------------------<  214<H>  4.0   |  24  |  0.77    Ca    8.5      09 Dec 2017 06:30  Phos  3.4     12-09  Mg     2.1     12-09    TPro  6.0  /  Alb  2.7<L>  /  TBili  1.2  /  DBili  0.5<H>  /  AST  21  /  ALT  20  /  AlkPhos  134<H>  12-09    Physical Exam:  Gen: Laying in bed, NAD, alert and oriented.   Resp: Unlabored breathing  Abd: soft, midline incision with lower staples removed and 1/4" packing placed    Lines:   IV: patent, in place.

## 2017-12-09 NOTE — PROGRESS NOTE ADULT - SUBJECTIVE AND OBJECTIVE BOX
Patient is a 62y old  Male who presents with a chief complaint of "I have a growth on my pancreas" (15 Nov 2017 14:39)                                                               INTERVAL HPI/OVERNIGHT EVENTS:    REVIEW OF SYSTEMS:     CONSTITUTIONAL: No weakness, fevers or chills  RESPIRATORY: No cough, wheezing,  No shortness of breath  CARDIOVASCULAR: No chest pain or palpitations  GASTROINTESTINAL: No abdominal pain  . No nausea, vomiting, + BM today   GENITOURINARY: No dysuria, frequency   NEUROLOGICAL: No numbness or weakness                                                                                                                                                                                                                                                                           Medications:  MEDICATIONS  (STANDING):  dextrose 5% + sodium chloride 0.45% with potassium chloride 20 mEq/L 1000 milliLiter(s) (30 mL/Hr) IV Continuous <Continuous>  dextrose 50% Injectable 12.5 Gram(s) IV Push once  dextrose 50% Injectable 25 Gram(s) IV Push once  dextrose 50% Injectable 25 Gram(s) IV Push once  enoxaparin Injectable 40 milliGRAM(s) SubCutaneous daily  gabapentin   Solution 100 milliGRAM(s) Oral every 8 hours  insulin lispro (HumaLOG) corrective regimen sliding scale   SubCutaneous three times a day before meals  insulin lispro (HumaLOG) corrective regimen sliding scale   SubCutaneous at bedtime  metoprolol    tartrate Injectable 5 milliGRAM(s) IV Push every 6 hours  pantoprazole  Injectable 40 milliGRAM(s) IV Push daily  tamsulosin 0.4 milliGRAM(s) Oral at bedtime    MEDICATIONS  (PRN):  dextrose Gel 1 Dose(s) Oral once PRN Blood Glucose LESS THAN 70 milliGRAM(s)/deciliter  glucagon  Injectable 1 milliGRAM(s) IntraMuscular once PRN Glucose LESS THAN 70 milligrams/deciliter  HYDROmorphone  Injectable 1 milliGRAM(s) IV Push every 3 hours PRN Severe Pain unrelieved by Oral opioids  ondansetron Injectable 4 milliGRAM(s) IV Push every 6 hours PRN Nausea  ondansetron Injectable 4 milliGRAM(s) IV Push once PRN Nausea and/or Vomiting  oxyCODONE    Solution 10 milliGRAM(s) Oral every 3 hours PRN Moderate and Severe Pain  simethicone 80 milliGRAM(s) Chew two times a day PRN Gas       Allergies    No Known Allergies    Intolerances      Vital Signs Last 24 Hrs  T(C): 36.9 (09 Dec 2017 19:38), Max: 37.1 (09 Dec 2017 12:16)  T(F): 98.5 (09 Dec 2017 19:38), Max: 98.7 (09 Dec 2017 12:16)  HR: 90 (09 Dec 2017 19:38) (90 - 96)  BP: 115/72 (09 Dec 2017 19:38) (110/63 - 126/70)  BP(mean): --  RR: 18 (09 Dec 2017 19:38) (18 - 18)  SpO2: 98% (09 Dec 2017 19:38) (96% - 98%)  CAPILLARY BLOOD GLUCOSE      POCT Blood Glucose.: 161 mg/dL (09 Dec 2017 17:07)  POCT Blood Glucose.: 155 mg/dL (09 Dec 2017 12:31)  POCT Blood Glucose.: 208 mg/dL (09 Dec 2017 08:20)  POCT Blood Glucose.: 155 mg/dL (08 Dec 2017 21:41)      12-08 @ 07:01  -  12-09 @ 07:00  --------------------------------------------------------  IN: 600 mL / OUT: 3960 mL / NET: -3360 mL    12-09 @ 07:01  -  12-09 @ 20:46  --------------------------------------------------------  IN: 0 mL / OUT: 1800 mL / NET: -1800 mL      Physical Exam:    General:  NAD   HEENT:  Nonicteric, PERRLA  CV:  RRR, S1S2   Lungs:  CTA B/L,  Abdomen:  Soft, drainage in place     Extremities:  edema     :  No newberry  Neuro:  AAOx3, non-focal, grossly intact                                                                                                                                                                                                                                                                                                LABS:                               12.0   12.14 )-----------( 486      ( 09 Dec 2017 06:30 )             36.7                      12-09    135  |  98  |  8   ----------------------------<  214<H>  4.0   |  24  |  0.77    Ca    8.5      09 Dec 2017 06:30  Phos  3.4     12-09  Mg     2.1     12-09    TPro  6.0  /  Alb  2.7<L>  /  TBili  1.2  /  DBili  0.5<H>  /  AST  21  /  ALT  20  /  AlkPhos  134<H>  12-09

## 2017-12-09 NOTE — PROGRESS NOTE ADULT - SUBJECTIVE AND OBJECTIVE BOX
Patient is a 62y old  Male who presents with a chief complaint of "I have a growth on my pancreas" (15 Nov 2017 14:39)    Feels gradually better each day.  Remains on oral clears. less abd discomfort.  had a good BM today.    Denies fevers, chills, sweats, HA, dizziness, nosebleeds, sore throat, CP, SOB, cough, wheeze, hemoptysis, N/V/D, dysuria, hematuria, leg pain  Medication:   dextrose 5% + sodium chloride 0.45% with potassium chloride 20 mEq/L 1000 milliLiter(s) IV Continuous <Continuous>  dextrose 50% Injectable 12.5 Gram(s) IV Push once  dextrose 50% Injectable 25 Gram(s) IV Push once  dextrose 50% Injectable 25 Gram(s) IV Push once  dextrose Gel 1 Dose(s) Oral once PRN  enoxaparin Injectable 40 milliGRAM(s) SubCutaneous daily  gabapentin   Solution 100 milliGRAM(s) Oral every 8 hours  glucagon  Injectable 1 milliGRAM(s) IntraMuscular once PRN  HYDROmorphone  Injectable 1 milliGRAM(s) IV Push every 3 hours PRN  insulin lispro (HumaLOG) corrective regimen sliding scale   SubCutaneous three times a day before meals  insulin lispro (HumaLOG) corrective regimen sliding scale   SubCutaneous at bedtime  metoprolol    tartrate Injectable 5 milliGRAM(s) IV Push every 6 hours  ondansetron Injectable 4 milliGRAM(s) IV Push every 6 hours PRN  ondansetron Injectable 4 milliGRAM(s) IV Push once PRN  oxyCODONE    Solution 10 milliGRAM(s) Oral every 3 hours PRN  pantoprazole  Injectable 40 milliGRAM(s) IV Push daily  simethicone 80 milliGRAM(s) Chew two times a day PRN  tamsulosin 0.4 milliGRAM(s) Oral at bedtime      Physical exam    T(C): 36.9 (12-09-17 @ 19:38), Max: 37.1 (12-09-17 @ 12:16)  HR: 90 (12-09-17 @ 19:38) (90 - 96)  BP: 115/72 (12-09-17 @ 19:38) (110/63 - 126/70)  RR: 18 (12-09-17 @ 19:38) (18 - 18)  SpO2: 98% (12-09-17 @ 19:38) (96% - 98%)  Wt(kg): --    alert NAD  EOMI anicteric sclera  Neck Supple No LNA  Cv s1 S2 RRR  Lungs clear B/L  abd soft NT   trace LE edema or tenderness    Labs                      12.0   12.14 )-----------( 486      ( 09 Dec 2017 06:30 )             36.7       12-09    135  |  98  |  8   ----------------------------<  214<H>  4.0   |  24  |  0.77    Ca    8.5      09 Dec 2017 06:30  Phos  3.4     12-09  Mg     2.1     12-09    TPro  6.0  /  Alb  2.7<L>  /  TBili  1.2  /  DBili  0.5<H>  /  AST  21  /  ALT  20  /  AlkPhos  134<H>  12-09      LIVER FUNCTIONS - ( 09 Dec 2017 06:30 )  Alb: 2.7 g/dL / Pro: 6.0 g/dL / ALK PHOS: 134 u/L / ALT: 20 u/L / AST: 21 u/L / GGT: x             [id number]

## 2017-12-10 LAB
ALBUMIN SERPL ELPH-MCNC: 3.3 G/DL — SIGNIFICANT CHANGE UP (ref 3.3–5)
ALP SERPL-CCNC: 156 U/L — HIGH (ref 40–120)
ALT FLD-CCNC: 26 U/L — SIGNIFICANT CHANGE UP (ref 4–41)
AST SERPL-CCNC: 22 U/L — SIGNIFICANT CHANGE UP (ref 4–40)
BILIRUB DIRECT SERPL-MCNC: 0.5 MG/DL — HIGH (ref 0.1–0.2)
BILIRUB SERPL-MCNC: 1 MG/DL — SIGNIFICANT CHANGE UP (ref 0.2–1.2)
BUN SERPL-MCNC: 6 MG/DL — LOW (ref 7–23)
CALCIUM SERPL-MCNC: 9.2 MG/DL — SIGNIFICANT CHANGE UP (ref 8.4–10.5)
CHLORIDE SERPL-SCNC: 97 MMOL/L — LOW (ref 98–107)
CO2 SERPL-SCNC: 29 MMOL/L — SIGNIFICANT CHANGE UP (ref 22–31)
CREAT SERPL-MCNC: 0.77 MG/DL — SIGNIFICANT CHANGE UP (ref 0.5–1.3)
GLUCOSE BLDC GLUCOMTR-MCNC: 170 MG/DL — HIGH (ref 70–99)
GLUCOSE BLDC GLUCOMTR-MCNC: 172 MG/DL — HIGH (ref 70–99)
GLUCOSE BLDC GLUCOMTR-MCNC: 176 MG/DL — HIGH (ref 70–99)
GLUCOSE BLDC GLUCOMTR-MCNC: 182 MG/DL — HIGH (ref 70–99)
GLUCOSE SERPL-MCNC: 199 MG/DL — HIGH (ref 70–99)
HCT VFR BLD CALC: 38.7 % — LOW (ref 39–50)
HGB BLD-MCNC: 12.6 G/DL — LOW (ref 13–17)
MAGNESIUM SERPL-MCNC: 2.1 MG/DL — SIGNIFICANT CHANGE UP (ref 1.6–2.6)
MCHC RBC-ENTMCNC: 31.1 PG — SIGNIFICANT CHANGE UP (ref 27–34)
MCHC RBC-ENTMCNC: 32.6 % — SIGNIFICANT CHANGE UP (ref 32–36)
MCV RBC AUTO: 95.6 FL — SIGNIFICANT CHANGE UP (ref 80–100)
NRBC # FLD: 0 — SIGNIFICANT CHANGE UP
PHOSPHATE SERPL-MCNC: 3.5 MG/DL — SIGNIFICANT CHANGE UP (ref 2.5–4.5)
PLATELET # BLD AUTO: 587 K/UL — HIGH (ref 150–400)
PMV BLD: 9.6 FL — SIGNIFICANT CHANGE UP (ref 7–13)
POTASSIUM SERPL-MCNC: 4.3 MMOL/L — SIGNIFICANT CHANGE UP (ref 3.5–5.3)
POTASSIUM SERPL-SCNC: 4.3 MMOL/L — SIGNIFICANT CHANGE UP (ref 3.5–5.3)
PROT SERPL-MCNC: 6.6 G/DL — SIGNIFICANT CHANGE UP (ref 6–8.3)
RBC # BLD: 4.05 M/UL — LOW (ref 4.2–5.8)
RBC # FLD: 15.3 % — HIGH (ref 10.3–14.5)
SODIUM SERPL-SCNC: 136 MMOL/L — SIGNIFICANT CHANGE UP (ref 135–145)
WBC # BLD: 10.57 K/UL — HIGH (ref 3.8–10.5)
WBC # FLD AUTO: 10.57 K/UL — HIGH (ref 3.8–10.5)

## 2017-12-10 RX ADMIN — GABAPENTIN 100 MILLIGRAM(S): 400 CAPSULE ORAL at 22:19

## 2017-12-10 RX ADMIN — ENOXAPARIN SODIUM 40 MILLIGRAM(S): 100 INJECTION SUBCUTANEOUS at 11:46

## 2017-12-10 RX ADMIN — GABAPENTIN 100 MILLIGRAM(S): 400 CAPSULE ORAL at 13:49

## 2017-12-10 RX ADMIN — OXYCODONE HYDROCHLORIDE 10 MILLIGRAM(S): 5 TABLET ORAL at 05:50

## 2017-12-10 RX ADMIN — PANTOPRAZOLE SODIUM 40 MILLIGRAM(S): 20 TABLET, DELAYED RELEASE ORAL at 11:46

## 2017-12-10 RX ADMIN — OXYCODONE HYDROCHLORIDE 10 MILLIGRAM(S): 5 TABLET ORAL at 05:07

## 2017-12-10 RX ADMIN — Medication 5 MILLIGRAM(S): at 17:01

## 2017-12-10 RX ADMIN — Medication 2: at 17:02

## 2017-12-10 RX ADMIN — TAMSULOSIN HYDROCHLORIDE 0.4 MILLIGRAM(S): 0.4 CAPSULE ORAL at 22:19

## 2017-12-10 RX ADMIN — OXYCODONE HYDROCHLORIDE 10 MILLIGRAM(S): 5 TABLET ORAL at 17:01

## 2017-12-10 RX ADMIN — OXYCODONE HYDROCHLORIDE 10 MILLIGRAM(S): 5 TABLET ORAL at 17:26

## 2017-12-10 RX ADMIN — Medication 2: at 11:57

## 2017-12-10 RX ADMIN — OXYCODONE HYDROCHLORIDE 10 MILLIGRAM(S): 5 TABLET ORAL at 23:00

## 2017-12-10 RX ADMIN — SIMETHICONE 80 MILLIGRAM(S): 80 TABLET, CHEWABLE ORAL at 22:19

## 2017-12-10 RX ADMIN — OXYCODONE HYDROCHLORIDE 10 MILLIGRAM(S): 5 TABLET ORAL at 08:30

## 2017-12-10 RX ADMIN — DEXTROSE MONOHYDRATE, SODIUM CHLORIDE, AND POTASSIUM CHLORIDE 30 MILLILITER(S): 50; .745; 4.5 INJECTION, SOLUTION INTRAVENOUS at 08:18

## 2017-12-10 RX ADMIN — OXYCODONE HYDROCHLORIDE 10 MILLIGRAM(S): 5 TABLET ORAL at 22:19

## 2017-12-10 RX ADMIN — OXYCODONE HYDROCHLORIDE 10 MILLIGRAM(S): 5 TABLET ORAL at 08:20

## 2017-12-10 RX ADMIN — GABAPENTIN 100 MILLIGRAM(S): 400 CAPSULE ORAL at 05:07

## 2017-12-10 RX ADMIN — Medication 2: at 08:19

## 2017-12-10 RX ADMIN — SIMETHICONE 80 MILLIGRAM(S): 80 TABLET, CHEWABLE ORAL at 00:53

## 2017-12-10 NOTE — PROGRESS NOTE ADULT - SUBJECTIVE AND OBJECTIVE BOX
Patient is a 62y old  Male who presents with a chief complaint of "I have a growth on my pancreas" (15 Nov 2017 14:39)    Remains on clears and tube feeds.  Ambulating.    Denies fevers, chills, sweats, rash, pruritis, HA, dizziness, nosebleeds, sore throat, CP, SOB, cough, wheeze, hemoptysis, N/V/D, dysuria, hematuria, leg pain.    Medication:   dextrose 5% + sodium chloride 0.45% with potassium chloride 20 mEq/L 1000 milliLiter(s) IV Continuous <Continuous>  dextrose 50% Injectable 12.5 Gram(s) IV Push once  dextrose 50% Injectable 25 Gram(s) IV Push once  dextrose 50% Injectable 25 Gram(s) IV Push once  dextrose Gel 1 Dose(s) Oral once PRN  enoxaparin Injectable 40 milliGRAM(s) SubCutaneous daily  gabapentin   Solution 100 milliGRAM(s) Oral every 8 hours  glucagon  Injectable 1 milliGRAM(s) IntraMuscular once PRN  HYDROmorphone  Injectable 1 milliGRAM(s) IV Push every 3 hours PRN  insulin lispro (HumaLOG) corrective regimen sliding scale   SubCutaneous three times a day before meals  insulin lispro (HumaLOG) corrective regimen sliding scale   SubCutaneous at bedtime  metoprolol    tartrate Injectable 5 milliGRAM(s) IV Push every 6 hours  ondansetron Injectable 4 milliGRAM(s) IV Push every 6 hours PRN  ondansetron Injectable 4 milliGRAM(s) IV Push once PRN  oxyCODONE    Solution 10 milliGRAM(s) Oral every 3 hours PRN  pantoprazole  Injectable 40 milliGRAM(s) IV Push daily  simethicone 80 milliGRAM(s) Chew two times a day PRN  tamsulosin 0.4 milliGRAM(s) Oral at bedtime      Physical exam    T(C): 36.8 (12-10-17 @ 11:43), Max: 36.9 (12-09-17 @ 19:38)  HR: 78 (12-10-17 @ 11:43) (78 - 95)  BP: 108/63 (12-10-17 @ 11:43) (106/61 - 123/71)  RR: 18 (12-10-17 @ 11:43) (18 - 18)  SpO2: 97% (12-10-17 @ 11:43) (95% - 98%)  Wt(kg): --    alert NAD  EOMI anicteric sclera  Neck Supple No LNA  Cv s1 S2 RRR  Lungs clear B/L  abd soft NT +BS  B/L LE edema no tenderness    Labs                        12.6   10.57 )-----------( 587      ( 10 Dec 2017 06:03 )             38.7       12-10    136  |  97<L>  |  6<L>  ----------------------------<  199<H>  4.3   |  29  |  0.77    Ca    9.2      10 Dec 2017 06:03  Phos  3.5     12-10  Mg     2.1     12-10    TPro  6.6  /  Alb  3.3  /  TBili  1.0  /  DBili  0.5<H>  /  AST  22  /  ALT  26  /  AlkPhos  156<H>  12-10      LIVER FUNCTIONS - ( 10 Dec 2017 06:03 )  Alb: 3.3 g/dL / Pro: 6.6 g/dL / ALK PHOS: 156 u/L / ALT: 26 u/L / AST: 22 u/L / GGT: x             [id number]

## 2017-12-10 NOTE — PROGRESS NOTE ADULT - SUBJECTIVE AND OBJECTIVE BOX
D TEAM SURGERY DAILY PROGRESS NOTE:    S: Patient seen and examined. Overnight, he had some drainage from his lower incision and tube feeds were increased. He denies any nausea/vomiting and is positive for flatus and bowel movements.    O:  Vital Signs Last 24 Hrs  T(C): 36.8 (10 Dec 2017 00:52), Max: 37.1 (09 Dec 2017 12:16)  T(F): 98.2 (10 Dec 2017 00:52), Max: 98.7 (09 Dec 2017 12:16)  HR: 90 (10 Dec 2017 00:52) (90 - 96)  BP: 108/57 (10 Dec 2017 00:52) (108/57 - 126/70)  BP(mean): --  RR: 18 (10 Dec 2017 00:52) (18 - 18)  SpO2: 97% (10 Dec 2017 00:52) (96% - 98%)    I&O's Detail    08 Dec 2017 07:01  -  09 Dec 2017 07:00  --------------------------------------------------------  IN:    dextrose 5% + sodium chloride 0.45% with potassium chloride 20 mEq/L: 360 mL    Glucerna: 120 mL    Oral Fluid: 120 mL  Total IN: 600 mL    OUT:    Bulb: 260 mL    Voided: 3700 mL  Total OUT: 3960 mL    Total NET: -3360 mL      09 Dec 2017 07:01  -  10 Dec 2017 04:44  --------------------------------------------------------  IN:    dextrose 5% + sodium chloride 0.45% with potassium chloride 20 mEq/L: 120 mL    Glucerna: 160 mL    Oral Fluid: 720 mL  Total IN: 1000 mL    OUT:    Bulb: 195 mL    Voided: 4300 mL  Total OUT: 4495 mL    Total NET: -3495 mL    MEDICATIONS  (STANDING):  dextrose 5% + sodium chloride 0.45% with potassium chloride 20 mEq/L 1000 milliLiter(s) (30 mL/Hr) IV Continuous <Continuous>  dextrose 50% Injectable 12.5 Gram(s) IV Push once  dextrose 50% Injectable 25 Gram(s) IV Push once  dextrose 50% Injectable 25 Gram(s) IV Push once  enoxaparin Injectable 40 milliGRAM(s) SubCutaneous daily  gabapentin   Solution 100 milliGRAM(s) Oral every 8 hours  insulin lispro (HumaLOG) corrective regimen sliding scale   SubCutaneous three times a day before meals  insulin lispro (HumaLOG) corrective regimen sliding scale   SubCutaneous at bedtime  metoprolol    tartrate Injectable 5 milliGRAM(s) IV Push every 6 hours  pantoprazole  Injectable 40 milliGRAM(s) IV Push daily  tamsulosin 0.4 milliGRAM(s) Oral at bedtime    MEDICATIONS  (PRN):  dextrose Gel 1 Dose(s) Oral once PRN Blood Glucose LESS THAN 70 milliGRAM(s)/deciliter  glucagon  Injectable 1 milliGRAM(s) IntraMuscular once PRN Glucose LESS THAN 70 milligrams/deciliter  HYDROmorphone  Injectable 1 milliGRAM(s) IV Push every 3 hours PRN Severe Pain unrelieved by Oral opioids  ondansetron Injectable 4 milliGRAM(s) IV Push every 6 hours PRN Nausea  ondansetron Injectable 4 milliGRAM(s) IV Push once PRN Nausea and/or Vomiting  oxyCODONE    Solution 10 milliGRAM(s) Oral every 3 hours PRN Moderate and Severe Pain  simethicone 80 milliGRAM(s) Chew two times a day PRN Gas                        12.0   12.14 )-----------( 486      ( 09 Dec 2017 06:30 )             36.7       12-09    135  |  98  |  8   ----------------------------<  214<H>  4.0   |  24  |  0.77    Ca    8.5      09 Dec 2017 06:30  Phos  3.4     12-09  Mg     2.1     12-09    TPro  6.0  /  Alb  2.7<L>  /  TBili  1.2  /  DBili  0.5<H>  /  AST  21  /  ALT  20  /  AlkPhos  134<H>  12-09    Physical Exam:  Gen: Laying in bed, NAD, alert and oriented.   Resp: Unlabored breathing  Abd: soft, midline incision with lower staples removed and 1/4" packing placed, MUNA with what appears to be pancreatic fluid    Lines:   IV: patent, in place.

## 2017-12-10 NOTE — PROGRESS NOTE ADULT - ASSESSMENT
A: 62 yo gentleman s/p Whipple Procedure, POD#10:    P:  - Consider advancing to LRD  - TFs via J tube to goal  - Pain control  - F/u GI function  - OOB/ambulate  - DVT ppx  - Patient seen and examined with Dr. Kimbrough

## 2017-12-10 NOTE — PROGRESS NOTE ADULT - ASSESSMENT
Pancreatic cancer s/p Whipple had positive margins and nose positive.  He will need adjuvant treatment with both chemo and RT as out-pt.  Started discussing this with the patient.  Will also discuss with his family when they are present.    Continued care per surgical team.    Thrombocytosis is reactive and would monitor for now.

## 2017-12-10 NOTE — PROGRESS NOTE ADULT - SUBJECTIVE AND OBJECTIVE BOX
Patient is a 62y old  Male who presents with a chief complaint of "I have a growth on my pancreas" (15 Nov 2017 14:39)                                                               INTERVAL HPI/OVERNIGHT EVENTS:    REVIEW OF SYSTEMS:     CONSTITUTIONAL: No weakness, fevers or chills  EYES/ENT: No visual changes , no ear ache   NECK: No pain or stiffness  RESPIRATORY: No cough, wheezing,  No shortness of breath  CARDIOVASCULAR: No chest pain or palpitations  GASTROINTESTINAL: No abdominal pain  . No nausea, vomiting, or hematemesis; No diarrhea or constipation. No melena or hematochezia.  GENITOURINARY: No dysuria, frequency or hematuria  NEUROLOGICAL: No numbness or weakness  SKIN: No itching, burning, rashes, or lesions                                                                                                                                                                                                                                                                                 Medications:  MEDICATIONS  (STANDING):  dextrose 5% + sodium chloride 0.45% with potassium chloride 20 mEq/L 1000 milliLiter(s) (30 mL/Hr) IV Continuous <Continuous>  dextrose 50% Injectable 12.5 Gram(s) IV Push once  dextrose 50% Injectable 25 Gram(s) IV Push once  dextrose 50% Injectable 25 Gram(s) IV Push once  enoxaparin Injectable 40 milliGRAM(s) SubCutaneous daily  gabapentin   Solution 100 milliGRAM(s) Oral every 8 hours  insulin lispro (HumaLOG) corrective regimen sliding scale   SubCutaneous three times a day before meals  insulin lispro (HumaLOG) corrective regimen sliding scale   SubCutaneous at bedtime  metoprolol    tartrate Injectable 5 milliGRAM(s) IV Push every 6 hours  pantoprazole  Injectable 40 milliGRAM(s) IV Push daily  tamsulosin 0.4 milliGRAM(s) Oral at bedtime    MEDICATIONS  (PRN):  dextrose Gel 1 Dose(s) Oral once PRN Blood Glucose LESS THAN 70 milliGRAM(s)/deciliter  glucagon  Injectable 1 milliGRAM(s) IntraMuscular once PRN Glucose LESS THAN 70 milligrams/deciliter  HYDROmorphone  Injectable 1 milliGRAM(s) IV Push every 3 hours PRN Severe Pain unrelieved by Oral opioids  ondansetron Injectable 4 milliGRAM(s) IV Push every 6 hours PRN Nausea  ondansetron Injectable 4 milliGRAM(s) IV Push once PRN Nausea and/or Vomiting  oxyCODONE    Solution 10 milliGRAM(s) Oral every 3 hours PRN Moderate and Severe Pain  simethicone 80 milliGRAM(s) Chew two times a day PRN Gas       Allergies    No Known Allergies    Intolerances      Vital Signs Last 24 Hrs  T(C): 37.1 (10 Dec 2017 16:58), Max: 37.1 (10 Dec 2017 16:58)  T(F): 98.8 (10 Dec 2017 16:58), Max: 98.8 (10 Dec 2017 16:58)  HR: 96 (10 Dec 2017 16:58) (78 - 96)  BP: 135/72 (10 Dec 2017 16:58) (106/61 - 135/72)  BP(mean): --  RR: 18 (10 Dec 2017 16:58) (18 - 18)  SpO2: 98% (10 Dec 2017 16:58) (95% - 98%)  CAPILLARY BLOOD GLUCOSE      POCT Blood Glucose.: 176 mg/dL (10 Dec 2017 17:00)  POCT Blood Glucose.: 170 mg/dL (10 Dec 2017 11:53)  POCT Blood Glucose.: 182 mg/dL (10 Dec 2017 08:12)  POCT Blood Glucose.: 175 mg/dL (09 Dec 2017 21:45)      12-09 @ 07:01  -  12-10 @ 07:00  --------------------------------------------------------  IN: 1800 mL / OUT: 5365 mL / NET: -3565 mL    12-10 @ 07:01  -  12-10 @ 20:43  --------------------------------------------------------  IN: 1055 mL / OUT: 2628 mL / NET: -1573 mL      Physical Exam:    Daily     Daily   General:  Well appearing, NAD, not cachetic  HEENT:  Nonicteric, PERRLA  CV:  RRR, S1S2   Lungs:  CTA B/L, no wheezes, rales, rhonchi  Abdomen:  Soft, non-tender, no distended, positive BS  Extremities:  2+ pulses, no c/c, no edema  Skin:  Warm and dry, no rashes  :  No newberry  Neuro:  AAOx3, non-focal, grossly intact                                                                                                                                                                                                                                                                                                LABS:                               12.6   10.57 )-----------( 587      ( 10 Dec 2017 06:03 )             38.7                      12-10    136  |  97<L>  |  6<L>  ----------------------------<  199<H>  4.3   |  29  |  0.77    Ca    9.2      10 Dec 2017 06:03  Phos  3.5     12-10  Mg     2.1     12-10    TPro  6.6  /  Alb  3.3  /  TBili  1.0  /  DBili  0.5<H>  /  AST  22  /  ALT  26  /  AlkPhos  156<H>  12-10                       RADIOLOGY & ADDITIONAL TESTS         I personally reviewed: [  ]EKG   [  ]CXR    [  ] CT      A/P:         Discussed with :     Oniel consultants' Notes   Time spent : Patient is a 62y old  Male who presents with a chief complaint of "I have a growth on my pancreas" (15 Nov 2017 14:39)                                                               INTERVAL HPI/OVERNIGHT EVENTS:    REVIEW OF SYSTEMS:     CONSTITUTIONAL: No weakness, fevers or chills  EYES/ENT: No visual changes , no ear ache   NECK: No pain or stiffness  RESPIRATORY: No cough, wheezing,  No shortness of breath  CARDIOVASCULAR: No chest pain or palpitations  GASTROINTESTINAL: No abdominal pain  . No nausea, vomiting, or hematemesis  GENITOURINARY: No dysuria, frequency or hematuria  NEUROLOGICAL: No numbness or weakness                                                                                                                                                                                                                                                                                  Medications:  MEDICATIONS  (STANDING):  dextrose 5% + sodium chloride 0.45% with potassium chloride 20 mEq/L 1000 milliLiter(s) (30 mL/Hr) IV Continuous <Continuous>  dextrose 50% Injectable 12.5 Gram(s) IV Push once  dextrose 50% Injectable 25 Gram(s) IV Push once  dextrose 50% Injectable 25 Gram(s) IV Push once  enoxaparin Injectable 40 milliGRAM(s) SubCutaneous daily  gabapentin   Solution 100 milliGRAM(s) Oral every 8 hours  insulin lispro (HumaLOG) corrective regimen sliding scale   SubCutaneous three times a day before meals  insulin lispro (HumaLOG) corrective regimen sliding scale   SubCutaneous at bedtime  metoprolol    tartrate Injectable 5 milliGRAM(s) IV Push every 6 hours  pantoprazole  Injectable 40 milliGRAM(s) IV Push daily  tamsulosin 0.4 milliGRAM(s) Oral at bedtime    MEDICATIONS  (PRN):  dextrose Gel 1 Dose(s) Oral once PRN Blood Glucose LESS THAN 70 milliGRAM(s)/deciliter  glucagon  Injectable 1 milliGRAM(s) IntraMuscular once PRN Glucose LESS THAN 70 milligrams/deciliter  HYDROmorphone  Injectable 1 milliGRAM(s) IV Push every 3 hours PRN Severe Pain unrelieved by Oral opioids  ondansetron Injectable 4 milliGRAM(s) IV Push every 6 hours PRN Nausea  ondansetron Injectable 4 milliGRAM(s) IV Push once PRN Nausea and/or Vomiting  oxyCODONE    Solution 10 milliGRAM(s) Oral every 3 hours PRN Moderate and Severe Pain  simethicone 80 milliGRAM(s) Chew two times a day PRN Gas       Allergies    No Known Allergies    Intolerances      Vital Signs Last 24 Hrs  T(C): 37.1 (10 Dec 2017 16:58), Max: 37.1 (10 Dec 2017 16:58)  T(F): 98.8 (10 Dec 2017 16:58), Max: 98.8 (10 Dec 2017 16:58)  HR: 96 (10 Dec 2017 16:58) (78 - 96)  BP: 135/72 (10 Dec 2017 16:58) (106/61 - 135/72)  BP(mean): --  RR: 18 (10 Dec 2017 16:58) (18 - 18)  SpO2: 98% (10 Dec 2017 16:58) (95% - 98%)  CAPILLARY BLOOD GLUCOSE      POCT Blood Glucose.: 176 mg/dL (10 Dec 2017 17:00)  POCT Blood Glucose.: 170 mg/dL (10 Dec 2017 11:53)  POCT Blood Glucose.: 182 mg/dL (10 Dec 2017 08:12)  POCT Blood Glucose.: 175 mg/dL (09 Dec 2017 21:45)      12-09 @ 07:01  -  12-10 @ 07:00  --------------------------------------------------------  IN: 1800 mL / OUT: 5365 mL / NET: -3565 mL    12-10 @ 07:01  -  12-10 @ 20:43  --------------------------------------------------------  IN: 1055 mL / OUT: 2628 mL / NET: -1573 mL      Physical Exam:   General:  NAD  HEENT:  Nonicteric, PERRLA  CV:  RRR, S1S2   Lungs:  CTA B/L,   Abdomen:  Soft,  drainage in place   Extremities: edema      :  No newberry  Neuro:  AAOx3, non-focal, grossly intact                                                                                                                                                                                                                                                                                                LABS:                               12.6   10.57 )-----------( 587      ( 10 Dec 2017 06:03 )             38.7                      12-10    136  |  97<L>  |  6<L>  ----------------------------<  199<H>  4.3   |  29  |  0.77    Ca    9.2      10 Dec 2017 06:03  Phos  3.5     12-10  Mg     2.1     12-10    TPro  6.6  /  Alb  3.3  /  TBili  1.0  /  DBili  0.5<H>  /  AST  22  /  ALT  26  /  AlkPhos  156<H>  12-10

## 2017-12-10 NOTE — PROGRESS NOTE ADULT - ASSESSMENT
62 y/o  male with hx of DM , HTN, BPH  admitted recently for jaundice found to have pnacreatic adenocarcinoma on bx . during that admission pt One plastic stent was placed into the ventral pancreatic duct then placement of fully covered metal stent  in bile duct and removal of pancreatic duct stent. . pt had a stress test which was abnormal and plan for f/u as o/p post surgical intervention by Dr. Curry.  Pt is a  pt now presents for Whipple and s/p surgical intervention   1- Pancreatic CA : s/p whipple   oob , ngt removed        diet per surgery    . monitor lytes closely       IS   / DVT proph  f/u with Dr. Manzanares  : will need adjuvant chemo given positive margins     2- DM: monitor FS while npo ... acceptable FS ..  start NPH   3- HTN: had episodes of hypotension while on ARB/ACE... now off BB . sec to hypotension ... ? etiology ?     monitor on midodrine ....     4- abn stress : need f/u as o/p.   5-BPH: flomax    6- factor.V. 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     f/u with heme  7 fever : no further episodes   .. would hold off on abx . if recurrs  would check CXR , blood cutlure   UA and US duplex r/o dvt   pt now leukocytoiss however no signs of infection... suspect sec to mild dehydration (intravasculary ) s/p lasix ... monitor for now ... if febrile would pan culture   8- edema: likely sec to hypoalbuminemia   however would check US duplex r/o DVT   9- hyponatremia : improved   monitor       cont DVT prophy  oob to chair   PT   d/w pt at bedside   and later with fam

## 2017-12-11 ENCOUNTER — TRANSCRIPTION ENCOUNTER (OUTPATIENT)
Age: 62
End: 2017-12-11

## 2017-12-11 LAB
ALBUMIN SERPL ELPH-MCNC: 3 G/DL — LOW (ref 3.3–5)
ALP SERPL-CCNC: 158 U/L — HIGH (ref 40–120)
ALT FLD-CCNC: 33 U/L — SIGNIFICANT CHANGE UP (ref 4–41)
AST SERPL-CCNC: 30 U/L — SIGNIFICANT CHANGE UP (ref 4–40)
BILIRUB SERPL-MCNC: 0.8 MG/DL — SIGNIFICANT CHANGE UP (ref 0.2–1.2)
BUN SERPL-MCNC: 8 MG/DL — SIGNIFICANT CHANGE UP (ref 7–23)
CALCIUM SERPL-MCNC: 9.1 MG/DL — SIGNIFICANT CHANGE UP (ref 8.4–10.5)
CHLORIDE SERPL-SCNC: 95 MMOL/L — LOW (ref 98–107)
CO2 SERPL-SCNC: 26 MMOL/L — SIGNIFICANT CHANGE UP (ref 22–31)
CREAT SERPL-MCNC: 0.77 MG/DL — SIGNIFICANT CHANGE UP (ref 0.5–1.3)
GLUCOSE BLDC GLUCOMTR-MCNC: 160 MG/DL — HIGH (ref 70–99)
GLUCOSE BLDC GLUCOMTR-MCNC: 174 MG/DL — HIGH (ref 70–99)
GLUCOSE BLDC GLUCOMTR-MCNC: 202 MG/DL — HIGH (ref 70–99)
GLUCOSE BLDC GLUCOMTR-MCNC: 241 MG/DL — HIGH (ref 70–99)
GLUCOSE SERPL-MCNC: 182 MG/DL — HIGH (ref 70–99)
HCT VFR BLD CALC: 37.5 % — LOW (ref 39–50)
HGB BLD-MCNC: 12.2 G/DL — LOW (ref 13–17)
MAGNESIUM SERPL-MCNC: 2.1 MG/DL — SIGNIFICANT CHANGE UP (ref 1.6–2.6)
MCHC RBC-ENTMCNC: 31.2 PG — SIGNIFICANT CHANGE UP (ref 27–34)
MCHC RBC-ENTMCNC: 32.5 % — SIGNIFICANT CHANGE UP (ref 32–36)
MCV RBC AUTO: 95.9 FL — SIGNIFICANT CHANGE UP (ref 80–100)
NRBC # FLD: 0 — SIGNIFICANT CHANGE UP
PHOSPHATE SERPL-MCNC: 3.4 MG/DL — SIGNIFICANT CHANGE UP (ref 2.5–4.5)
PLATELET # BLD AUTO: 692 K/UL — HIGH (ref 150–400)
PMV BLD: 9.7 FL — SIGNIFICANT CHANGE UP (ref 7–13)
POTASSIUM SERPL-MCNC: 4.5 MMOL/L — SIGNIFICANT CHANGE UP (ref 3.5–5.3)
POTASSIUM SERPL-SCNC: 4.5 MMOL/L — SIGNIFICANT CHANGE UP (ref 3.5–5.3)
PROT SERPL-MCNC: 6.6 G/DL — SIGNIFICANT CHANGE UP (ref 6–8.3)
RBC # BLD: 3.91 M/UL — LOW (ref 4.2–5.8)
RBC # FLD: 15.3 % — HIGH (ref 10.3–14.5)
SODIUM SERPL-SCNC: 136 MMOL/L — SIGNIFICANT CHANGE UP (ref 135–145)
WBC # BLD: 10.95 K/UL — HIGH (ref 3.8–10.5)
WBC # FLD AUTO: 10.95 K/UL — HIGH (ref 3.8–10.5)

## 2017-12-11 PROCEDURE — 74177 CT ABD & PELVIS W/CONTRAST: CPT | Mod: 26

## 2017-12-11 RX ORDER — HUMAN INSULIN 100 [IU]/ML
4 INJECTION, SUSPENSION SUBCUTANEOUS THREE TIMES A DAY
Qty: 0 | Refills: 0 | Status: DISCONTINUED | OUTPATIENT
Start: 2017-12-11 | End: 2017-12-13

## 2017-12-11 RX ORDER — SODIUM CHLORIDE 9 MG/ML
1000 INJECTION, SOLUTION INTRAVENOUS
Qty: 0 | Refills: 0 | Status: DISCONTINUED | OUTPATIENT
Start: 2017-12-11 | End: 2017-12-15

## 2017-12-11 RX ADMIN — OXYCODONE HYDROCHLORIDE 10 MILLIGRAM(S): 5 TABLET ORAL at 23:10

## 2017-12-11 RX ADMIN — GABAPENTIN 100 MILLIGRAM(S): 400 CAPSULE ORAL at 05:14

## 2017-12-11 RX ADMIN — TAMSULOSIN HYDROCHLORIDE 0.4 MILLIGRAM(S): 0.4 CAPSULE ORAL at 21:47

## 2017-12-11 RX ADMIN — Medication 5 MILLIGRAM(S): at 00:33

## 2017-12-11 RX ADMIN — Medication 4: at 08:22

## 2017-12-11 RX ADMIN — Medication 5 MILLIGRAM(S): at 05:14

## 2017-12-11 RX ADMIN — OXYCODONE HYDROCHLORIDE 10 MILLIGRAM(S): 5 TABLET ORAL at 05:14

## 2017-12-11 RX ADMIN — ENOXAPARIN SODIUM 40 MILLIGRAM(S): 100 INJECTION SUBCUTANEOUS at 12:03

## 2017-12-11 RX ADMIN — PANTOPRAZOLE SODIUM 40 MILLIGRAM(S): 20 TABLET, DELAYED RELEASE ORAL at 12:04

## 2017-12-11 RX ADMIN — OXYCODONE HYDROCHLORIDE 10 MILLIGRAM(S): 5 TABLET ORAL at 14:03

## 2017-12-11 RX ADMIN — Medication 2: at 12:05

## 2017-12-11 RX ADMIN — Medication 5 MILLIGRAM(S): at 18:01

## 2017-12-11 RX ADMIN — OXYCODONE HYDROCHLORIDE 10 MILLIGRAM(S): 5 TABLET ORAL at 06:00

## 2017-12-11 RX ADMIN — GABAPENTIN 100 MILLIGRAM(S): 400 CAPSULE ORAL at 14:03

## 2017-12-11 RX ADMIN — DEXTROSE MONOHYDRATE, SODIUM CHLORIDE, AND POTASSIUM CHLORIDE 30 MILLILITER(S): 50; .745; 4.5 INJECTION, SOLUTION INTRAVENOUS at 22:05

## 2017-12-11 RX ADMIN — OXYCODONE HYDROCHLORIDE 10 MILLIGRAM(S): 5 TABLET ORAL at 14:35

## 2017-12-11 RX ADMIN — Medication 5 MILLIGRAM(S): at 12:04

## 2017-12-11 RX ADMIN — GABAPENTIN 100 MILLIGRAM(S): 400 CAPSULE ORAL at 21:47

## 2017-12-11 RX ADMIN — Medication 2: at 17:16

## 2017-12-11 RX ADMIN — OXYCODONE HYDROCHLORIDE 10 MILLIGRAM(S): 5 TABLET ORAL at 22:16

## 2017-12-11 NOTE — PROGRESS NOTE ADULT - ASSESSMENT
62 yo gentleman s/p Whipple Procedure, POD#11:  - Due to cont abd distention, will get CT PO/IV contrast   - Tolerating CLD  - TFs via J tube @ 30  - Pain control  - F/u GI function  - OOB/ambulate  - DVT ppx

## 2017-12-11 NOTE — PROGRESS NOTE ADULT - SUBJECTIVE AND OBJECTIVE BOX
Patient is a 62y old  Male who presents with a chief complaint of "I have a growth on my pancreas" (15 Nov 2017 14:39)    has more gas pains.  Tube feeds have been decreased.  reportedly for a CT scan today.  No N/V.  No bleeding.  No HA.  No CP, or SOB.  No fevers, or chills.      Medication:   dextrose 5% + sodium chloride 0.45% with potassium chloride 20 mEq/L 1000 milliLiter(s) IV Continuous <Continuous>  dextrose 50% Injectable 12.5 Gram(s) IV Push once  dextrose 50% Injectable 25 Gram(s) IV Push once  dextrose 50% Injectable 25 Gram(s) IV Push once  dextrose Gel 1 Dose(s) Oral once PRN  enoxaparin Injectable 40 milliGRAM(s) SubCutaneous daily  gabapentin   Solution 100 milliGRAM(s) Oral every 8 hours  glucagon  Injectable 1 milliGRAM(s) IntraMuscular once PRN  HYDROmorphone  Injectable 1 milliGRAM(s) IV Push every 3 hours PRN  insulin lispro (HumaLOG) corrective regimen sliding scale   SubCutaneous three times a day before meals  insulin lispro (HumaLOG) corrective regimen sliding scale   SubCutaneous at bedtime  metoprolol    tartrate Injectable 5 milliGRAM(s) IV Push every 6 hours  ondansetron Injectable 4 milliGRAM(s) IV Push every 6 hours PRN  ondansetron Injectable 4 milliGRAM(s) IV Push once PRN  oxyCODONE    Solution 10 milliGRAM(s) Oral every 3 hours PRN  pantoprazole  Injectable 40 milliGRAM(s) IV Push daily  simethicone 80 milliGRAM(s) Chew two times a day PRN  tamsulosin 0.4 milliGRAM(s) Oral at bedtime      Physical exam    T(C): 36.9 (12-11-17 @ 04:53), Max: 37.1 (12-10-17 @ 16:58)  HR: 91 (12-11-17 @ 04:53) (78 - 96)  BP: 119/73 (12-11-17 @ 04:53) (108/63 - 135/72)  RR: 18 (12-11-17 @ 04:53) (18 - 18)  SpO2: 96% (12-11-17 @ 04:53) (96% - 100%)  Wt(kg): --    alert NAD  EOMI anicteric sclera  Neck Supple No LNA  Cv s1 S2 RRR  Lungs clear B/L  abd soft NT, mild distended, +BS  No LE edema or tenderness    Labs                        12.2   10.95 )-----------( 692      ( 11 Dec 2017 05:00 )             37.5       12-11    136  |  95<L>  |  8   ----------------------------<  182<H>  4.5   |  26  |  0.77    Ca    9.1      11 Dec 2017 05:00  Phos  3.4     12-11  Mg     2.1     12-11    TPro  6.6  /  Alb  3.0<L>  /  TBili  0.8  /  DBili  x   /  AST  30  /  ALT  33  /  AlkPhos  158<H>  12-11      LIVER FUNCTIONS - ( 11 Dec 2017 05:00 )  Alb: 3.0 g/dL / Pro: 6.6 g/dL / ALK PHOS: 158 u/L / ALT: 33 u/L / AST: 30 u/L / GGT: x             [id number]

## 2017-12-11 NOTE — PROGRESS NOTE ADULT - SUBJECTIVE AND OBJECTIVE BOX
Patient is a 62y old  Male who presents with a chief complaint of "I have a growth on my pancreas" (15 Nov 2017 14:39)                                                                REVIEW OF SYSTEMS:     CONSTITUTIONAL: No weakness, fevers or chills  RESPIRATORY: No cough, wheezing,  No shortness of breath  CARDIOVASCULAR: No chest pain or palpitations  GASTROINTESTINAL:  abdominal pain under reasonable control   . nausea but no vomitting    had had passed gas    GENITOURINARY: No dysuria, frequency   NEUROLOGICAL: No numbness or weakness                                                                                                                                                                                                                                                                                  Medications:  MEDICATIONS  (STANDING):  dextrose 5% + sodium chloride 0.45% with potassium chloride 20 mEq/L 1000 milliLiter(s) (30 mL/Hr) IV Continuous <Continuous>  dextrose 50% Injectable 12.5 Gram(s) IV Push once  dextrose 50% Injectable 25 Gram(s) IV Push once  dextrose 50% Injectable 25 Gram(s) IV Push once  enoxaparin Injectable 40 milliGRAM(s) SubCutaneous daily  gabapentin   Solution 100 milliGRAM(s) Oral every 8 hours  insulin lispro (HumaLOG) corrective regimen sliding scale   SubCutaneous three times a day before meals  insulin lispro (HumaLOG) corrective regimen sliding scale   SubCutaneous at bedtime  metoprolol    tartrate Injectable 5 milliGRAM(s) IV Push every 6 hours  pantoprazole  Injectable 40 milliGRAM(s) IV Push daily  tamsulosin 0.4 milliGRAM(s) Oral at bedtime    MEDICATIONS  (PRN):  dextrose Gel 1 Dose(s) Oral once PRN Blood Glucose LESS THAN 70 milliGRAM(s)/deciliter  glucagon  Injectable 1 milliGRAM(s) IntraMuscular once PRN Glucose LESS THAN 70 milligrams/deciliter  HYDROmorphone  Injectable 1 milliGRAM(s) IV Push every 3 hours PRN Severe Pain unrelieved by Oral opioids  ondansetron Injectable 4 milliGRAM(s) IV Push every 6 hours PRN Nausea  ondansetron Injectable 4 milliGRAM(s) IV Push once PRN Nausea and/or Vomiting  oxyCODONE    Solution 10 milliGRAM(s) Oral every 3 hours PRN Moderate and Severe Pain  simethicone 80 milliGRAM(s) Chew two times a day PRN Gas       Allergies    No Known Allergies    Intolerances      Vital Signs Last 24 Hrs  T(C): 36.9 (11 Dec 2017 20:06), Max: 37.1 (11 Dec 2017 00:13)  T(F): 98.4 (11 Dec 2017 20:06), Max: 98.7 (11 Dec 2017 00:13)  HR: 89 (11 Dec 2017 20:06) (72 - 98)  BP: 112/67 (11 Dec 2017 20:06) (104/68 - 126/72)  BP(mean): --  RR: 18 (11 Dec 2017 20:06) (18 - 18)  SpO2: 95% (11 Dec 2017 20:06) (95% - 99%)  CAPILLARY BLOOD GLUCOSE      POCT Blood Glucose.: 241 mg/dL (11 Dec 2017 21:59)  POCT Blood Glucose.: 174 mg/dL (11 Dec 2017 16:35)  POCT Blood Glucose.: 160 mg/dL (11 Dec 2017 11:49)  POCT Blood Glucose.: 202 mg/dL (11 Dec 2017 08:03)      12-10 @ 07:01  -  12-11 @ 07:00  --------------------------------------------------------  IN: 2475 mL / OUT: 4888 mL / NET: -2413 mL    12-11 @ 07:01 - 12-11 @ 22:41  --------------------------------------------------------  IN: 1560 mL / OUT: 3048 mL / NET: -1488 mL      Physical Exam:    General:  NAD   HEENT:  Nonicteric, PERRLA  CV:  RRR, S1S2   Lungs:  CTA B/L, no wheezes, rales, rhonchi  Abdomen:  soft mild tenderness   drain in place   feeing tube in place  Extremities:  edema  Skin:  Warm and dry, no rashes  :  No newberry  Neuro:  AAOx3, non-focal, grossly intact                                                                                                                                                                                                                                                                                                LABS:                               12.2   10.95 )-----------( 692      ( 11 Dec 2017 05:00 )             37.5                      12-11    136  |  95<L>  |  8   ----------------------------<  182<H>  4.5   |  26  |  0.77    Ca    9.1      11 Dec 2017 05:00  Phos  3.4     12-11  Mg     2.1     12-11    TPro  6.6  /  Alb  3.0<L>  /  TBili  0.8  /  DBili  x   /  AST  30  /  ALT  33  /  AlkPhos  158<H>  12-11

## 2017-12-11 NOTE — PROGRESS NOTE ADULT - ASSESSMENT
62 y/o  male with hx of DM , HTN, BPH  admitted recently for jaundice found to have pnacreatic adenocarcinoma on bx . during that admission pt One plastic stent was placed into the ventral pancreatic duct then placement of fully covered metal stent  in bile duct and removal of pancreatic duct stent. . pt had a stress test which was abnormal and plan for f/u as o/p post surgical intervention by Dr. Curry.  Pt is a  pt now presents for Whipple and s/p surgical intervention   1- Pancreatic CA : s/p whipple   oob , tube feeding per surgery       diet per surgery    . monitor lytes closely       IS   / DVT proph  pt with Nausea and abd discomfort    planned CT a/p..  f/u with Dr. Manzanares  : will need adjuvant chemo given positive margins     2- DM: monitor FS while npo ... acceptable FS ..  start NPH   3- HTN: had episodes of hypotension while on ARB/ACE... cont meds with bb        4- abn stress : need f/u as o/p.   5-BPH: flomax    6- factor.V. 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     f/u with heme  7 fever : no further episodes   .. would hold off on abx . if recurrs  would check CXR , blood cutlure   UA and US duplex r/o dvt   pt now leukocytoiss however no signs of infection... suspect sec to mild dehydration (intravasculary ) s/p lasix ... monitor for now ... if febrile would pan culture   8- edema: likely sec to hypoalbuminemia   however would check US duplex r/o DVT   9- hyponatremia : improved   monitor       cont DVT prophy  oob to chair   PT   d/w pt at bedside   , with surgical pa  and with significant other on the phone

## 2017-12-11 NOTE — PROGRESS NOTE ADULT - SUBJECTIVE AND OBJECTIVE BOX
Surgery Team D (Surgery Oncology) Progress Note:     Post-Operative Day: 11    Subjective: Pt seen this AM. Overnight, episodes of abdominal fullness with feeds going at 55. Reduced rate to 30. "Bloated" feeling improved during rounds. Still tolerating clears PO. Remains afebrile.          Objective:  T(C): 36.8 (12-11-17 @ 07:34), Max: 37.1 (12-10-17 @ 16:58)  HR: 86 (12-11-17 @ 07:34) (78 - 96)  BP: 121/68 (12-11-17 @ 07:34) (108/63 - 135/72)  RR: 18 (12-11-17 @ 07:34) (18 - 18)  SpO2: 98% (12-11-17 @ 07:34) (96% - 100%)    Labs:                      12.2   10.95 )-----------( 692      ( 11 Dec 2017 05:00 )             37.5       12-11    136  |  95<L>  |  8   ----------------------------<  182<H>  4.5   |  26  |  0.77    Ca    9.1      11 Dec 2017 05:00  Phos  3.4     12-11  Mg     2.1     12-11    TPro  6.6  /  Alb  3.0<L>  /  TBili  0.8  /  DBili  x   /  AST  30  /  ALT  33  /  AlkPhos  158<H>  12-11      I&O's Detail    10 Dec 2017 07:01  -  11 Dec 2017 07:00  --------------------------------------------------------  IN:    dextrose 5% + sodium chloride 0.45% with potassium chloride 20 mEq/L: 600 mL    Glucerna: 915 mL    Oral Fluid: 960 mL  Total IN: 2475 mL    OUT:    Bulb: 368 mL    Voided: 4520 mL  Total OUT: 4888 mL    Total NET: -2413 mL      11 Dec 2017 07:01  -  11 Dec 2017 09:11  --------------------------------------------------------  IN:    dextrose 5% + sodium chloride 0.45% with potassium chloride 20 mEq/L: 60 mL    Glucerna: 60 mL    Oral Fluid: 240 mL  Total IN: 360 mL    OUT:    Bulb: 70 mL    Voided: 320 mL  Total OUT: 390 mL    Total NET: -30 mL          Focused Physical Exam:  Gen: Laying in bed, NAD, alert and oriented.   Resp: Unlabored breathing  Abd: soft, midline incision with lower staples removed and 1/4" packing placed, MUNA with what appears to be pancreatic fluid      Medications:  dextrose 5% + sodium chloride 0.45% with potassium chloride 20 mEq/L 1000 milliLiter(s) IV Continuous <Continuous>  dextrose 50% Injectable 12.5 Gram(s) IV Push once  dextrose 50% Injectable 25 Gram(s) IV Push once  dextrose 50% Injectable 25 Gram(s) IV Push once  dextrose Gel 1 Dose(s) Oral once PRN  enoxaparin Injectable 40 milliGRAM(s) SubCutaneous daily  gabapentin   Solution 100 milliGRAM(s) Oral every 8 hours  glucagon  Injectable 1 milliGRAM(s) IntraMuscular once PRN  HYDROmorphone  Injectable 1 milliGRAM(s) IV Push every 3 hours PRN  insulin lispro (HumaLOG) corrective regimen sliding scale   SubCutaneous three times a day before meals  insulin lispro (HumaLOG) corrective regimen sliding scale   SubCutaneous at bedtime  metoprolol    tartrate Injectable 5 milliGRAM(s) IV Push every 6 hours  ondansetron Injectable 4 milliGRAM(s) IV Push every 6 hours PRN  ondansetron Injectable 4 milliGRAM(s) IV Push once PRN  oxyCODONE    Solution 10 milliGRAM(s) Oral every 3 hours PRN  pantoprazole  Injectable 40 milliGRAM(s) IV Push daily  simethicone 80 milliGRAM(s) Chew two times a day PRN  tamsulosin 0.4 milliGRAM(s) Oral at bedtime

## 2017-12-11 NOTE — PROGRESS NOTE ADULT - ASSESSMENT
pancreatic cancer localized disease on scans had a Whipple and is pT3 N1 with a positive margin.  Have started to discuss need for adjuvant treatment with him.  Likely will need both chemo and Rt as out-pt.  Will reach out to the family today as well.      Remains on clears and tube feeds but has gas pains.  care per surgical team.    has rising thrombocytosis which is reactive.  he is afebrile.  Would still trend for now.

## 2017-12-12 LAB
BUN SERPL-MCNC: 6 MG/DL — LOW (ref 7–23)
CALCIUM SERPL-MCNC: 8.9 MG/DL — SIGNIFICANT CHANGE UP (ref 8.4–10.5)
CHLORIDE SERPL-SCNC: 94 MMOL/L — LOW (ref 98–107)
CO2 SERPL-SCNC: 26 MMOL/L — SIGNIFICANT CHANGE UP (ref 22–31)
CREAT SERPL-MCNC: 0.74 MG/DL — SIGNIFICANT CHANGE UP (ref 0.5–1.3)
GLUCOSE BLDC GLUCOMTR-MCNC: 166 MG/DL — HIGH (ref 70–99)
GLUCOSE BLDC GLUCOMTR-MCNC: 176 MG/DL — HIGH (ref 70–99)
GLUCOSE BLDC GLUCOMTR-MCNC: 193 MG/DL — HIGH (ref 70–99)
GLUCOSE BLDC GLUCOMTR-MCNC: 193 MG/DL — HIGH (ref 70–99)
GLUCOSE SERPL-MCNC: 181 MG/DL — HIGH (ref 70–99)
HCT VFR BLD CALC: 40 % — SIGNIFICANT CHANGE UP (ref 39–50)
HGB BLD-MCNC: 13 G/DL — SIGNIFICANT CHANGE UP (ref 13–17)
MAGNESIUM SERPL-MCNC: 2.2 MG/DL — SIGNIFICANT CHANGE UP (ref 1.6–2.6)
MCHC RBC-ENTMCNC: 31.5 PG — SIGNIFICANT CHANGE UP (ref 27–34)
MCHC RBC-ENTMCNC: 32.5 % — SIGNIFICANT CHANGE UP (ref 32–36)
MCV RBC AUTO: 96.9 FL — SIGNIFICANT CHANGE UP (ref 80–100)
NRBC # FLD: 0 — SIGNIFICANT CHANGE UP
PHOSPHATE SERPL-MCNC: 4 MG/DL — SIGNIFICANT CHANGE UP (ref 2.5–4.5)
PLATELET # BLD AUTO: 715 K/UL — HIGH (ref 150–400)
PMV BLD: 9.7 FL — SIGNIFICANT CHANGE UP (ref 7–13)
POTASSIUM SERPL-MCNC: 4.6 MMOL/L — SIGNIFICANT CHANGE UP (ref 3.5–5.3)
POTASSIUM SERPL-SCNC: 4.6 MMOL/L — SIGNIFICANT CHANGE UP (ref 3.5–5.3)
RBC # BLD: 4.13 M/UL — LOW (ref 4.2–5.8)
RBC # FLD: 15.1 % — HIGH (ref 10.3–14.5)
SODIUM SERPL-SCNC: 134 MMOL/L — LOW (ref 135–145)
WBC # BLD: 9.07 K/UL — SIGNIFICANT CHANGE UP (ref 3.8–10.5)
WBC # FLD AUTO: 9.07 K/UL — SIGNIFICANT CHANGE UP (ref 3.8–10.5)

## 2017-12-12 PROCEDURE — 93970 EXTREMITY STUDY: CPT | Mod: 26

## 2017-12-12 RX ORDER — METOPROLOL TARTRATE 50 MG
12.5 TABLET ORAL DAILY
Qty: 0 | Refills: 0 | Status: DISCONTINUED | OUTPATIENT
Start: 2017-12-12 | End: 2017-12-15

## 2017-12-12 RX ORDER — PANTOPRAZOLE SODIUM 20 MG/1
40 TABLET, DELAYED RELEASE ORAL
Qty: 0 | Refills: 0 | Status: DISCONTINUED | OUTPATIENT
Start: 2017-12-12 | End: 2017-12-15

## 2017-12-12 RX ORDER — POLYETHYLENE GLYCOL 3350 17 G/17G
17 POWDER, FOR SOLUTION ORAL
Qty: 0 | Refills: 0 | Status: DISCONTINUED | OUTPATIENT
Start: 2017-12-12 | End: 2017-12-15

## 2017-12-12 RX ORDER — VALACYCLOVIR 500 MG/1
2000 TABLET, FILM COATED ORAL
Qty: 0 | Refills: 0 | Status: DISCONTINUED | OUTPATIENT
Start: 2017-12-12 | End: 2017-12-15

## 2017-12-12 RX ORDER — ASPIRIN/CALCIUM CARB/MAGNESIUM 324 MG
81 TABLET ORAL DAILY
Qty: 0 | Refills: 0 | Status: DISCONTINUED | OUTPATIENT
Start: 2017-12-12 | End: 2017-12-15

## 2017-12-12 RX ORDER — BUDESONIDE AND FORMOTEROL FUMARATE DIHYDRATE 160; 4.5 UG/1; UG/1
2 AEROSOL RESPIRATORY (INHALATION)
Qty: 0 | Refills: 0 | Status: DISCONTINUED | OUTPATIENT
Start: 2017-12-12 | End: 2017-12-15

## 2017-12-12 RX ADMIN — HUMAN INSULIN 4 UNIT(S): 100 INJECTION, SUSPENSION SUBCUTANEOUS at 22:10

## 2017-12-12 RX ADMIN — GABAPENTIN 100 MILLIGRAM(S): 400 CAPSULE ORAL at 13:00

## 2017-12-12 RX ADMIN — Medication 5 MILLIGRAM(S): at 11:16

## 2017-12-12 RX ADMIN — Medication 81 MILLIGRAM(S): at 17:28

## 2017-12-12 RX ADMIN — SIMETHICONE 80 MILLIGRAM(S): 80 TABLET, CHEWABLE ORAL at 00:42

## 2017-12-12 RX ADMIN — HUMAN INSULIN 4 UNIT(S): 100 INJECTION, SUSPENSION SUBCUTANEOUS at 13:01

## 2017-12-12 RX ADMIN — Medication 2: at 12:49

## 2017-12-12 RX ADMIN — TAMSULOSIN HYDROCHLORIDE 0.4 MILLIGRAM(S): 0.4 CAPSULE ORAL at 22:10

## 2017-12-12 RX ADMIN — PANTOPRAZOLE SODIUM 40 MILLIGRAM(S): 20 TABLET, DELAYED RELEASE ORAL at 11:16

## 2017-12-12 RX ADMIN — VALACYCLOVIR 2000 MILLIGRAM(S): 500 TABLET, FILM COATED ORAL at 17:28

## 2017-12-12 RX ADMIN — ENOXAPARIN SODIUM 40 MILLIGRAM(S): 100 INJECTION SUBCUTANEOUS at 11:16

## 2017-12-12 RX ADMIN — Medication 5 MILLIGRAM(S): at 00:19

## 2017-12-12 RX ADMIN — OXYCODONE HYDROCHLORIDE 10 MILLIGRAM(S): 5 TABLET ORAL at 14:00

## 2017-12-12 RX ADMIN — Medication 2: at 08:31

## 2017-12-12 RX ADMIN — BUDESONIDE AND FORMOTEROL FUMARATE DIHYDRATE 2 PUFF(S): 160; 4.5 AEROSOL RESPIRATORY (INHALATION) at 21:30

## 2017-12-12 RX ADMIN — Medication 5 MILLIGRAM(S): at 05:22

## 2017-12-12 RX ADMIN — HUMAN INSULIN 4 UNIT(S): 100 INJECTION, SUSPENSION SUBCUTANEOUS at 05:25

## 2017-12-12 RX ADMIN — OXYCODONE HYDROCHLORIDE 10 MILLIGRAM(S): 5 TABLET ORAL at 18:45

## 2017-12-12 RX ADMIN — GABAPENTIN 100 MILLIGRAM(S): 400 CAPSULE ORAL at 05:21

## 2017-12-12 RX ADMIN — GABAPENTIN 100 MILLIGRAM(S): 400 CAPSULE ORAL at 22:10

## 2017-12-12 RX ADMIN — Medication 2: at 16:52

## 2017-12-12 RX ADMIN — POLYETHYLENE GLYCOL 3350 17 GRAM(S): 17 POWDER, FOR SOLUTION ORAL at 18:52

## 2017-12-12 RX ADMIN — POLYETHYLENE GLYCOL 3350 17 GRAM(S): 17 POWDER, FOR SOLUTION ORAL at 11:02

## 2017-12-12 RX ADMIN — OXYCODONE HYDROCHLORIDE 10 MILLIGRAM(S): 5 TABLET ORAL at 13:00

## 2017-12-12 NOTE — PROGRESS NOTE ADULT - ASSESSMENT
62 y/o  male with hx of DM , HTN, BPH  admitted recently for jaundice found to have pnacreatic adenocarcinoma on bx . during that admission pt One plastic stent was placed into the ventral pancreatic duct then placement of fully covered metal stent  in bile duct and removal of pancreatic duct stent. . pt had a stress test which was abnormal and plan for f/u as o/p post surgical intervention by Dr. Curry.  Pt is a  pt now presents for Whipple and s/p surgical intervention   1- Pancreatic CA : s/p whipple   oob , tube feeding per surgery       diet per surgery    . monitor lytes closely       IS   / DVT proph  pt with Nausea and abd discomfort    CT done : no acute pathology   f/u with Dr. Manzanares  : will need adjuvant chemo given positive margins     2- DM: monitor FS while npo ... acceptable FS .. cont NPH .. likely needs to be increased   3- HTN: had episodes of hypotension while on ARB/ACE... cont meds with bb        4- abn stress : need f/u as o/p.   5-BPH: flomax    6- factor.V. 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     f/u with heme  7 fever : no further episodes   .. would hold off on abx . if recurrs  would check CXR , blood cutlure   UA and US duplex r/o dvt   pt now leukocytoiss however no signs of infection... suspect sec to mild dehydration (intravasculary ) s/p lasix ... monitor for now ... if febrile would pan culture   8- edema: likely sec to hypoalbuminemia   .  US duplex: neg for DVT   9- hyponatremia : improved   monitor       cont DVT prophy  oob to chair   PT   d/w pt at bedside  at length

## 2017-12-12 NOTE — PROGRESS NOTE ADULT - ASSESSMENT
pancreatic cancer s/p whipple.  Will need out-pt adjuvant treatment.  Spoke with daughter Eulogio who understands.    Thrombocytosis still rising.  reactive process.  Continue to trend

## 2017-12-12 NOTE — PROGRESS NOTE ADULT - ASSESSMENT
A: 60 yo jorge s/p Whipple Procedure, POD#12:    P:  - Miralax bid  - Tolerating CLD  - TFs via J tube @ 30  - Pain control  - F/u GI function  - OOB/ambulate  - DVT ppx

## 2017-12-12 NOTE — PROVIDER CONTACT NOTE (OTHER) - BACKGROUND
s/p jamar
11/30 BLADE roldan-tube placement
Unable to flush any more liquid via tube.
pt is s/p Ernestina
pt s/p whipple. pt on tube feeds increased to 55 (goal)
s/p jamar

## 2017-12-12 NOTE — PROGRESS NOTE ADULT - SUBJECTIVE AND OBJECTIVE BOX
Patient is a 62y old  Male who presents with a chief complaint of "I have a growth on my pancreas" (15 Nov 2017 14:39)    Resting comfortably.  remains on tube feeds.      Medication:   aspirin  chewable 81 milliGRAM(s) Oral daily  buDESOnide  80 MICROgram(s)/formoterol 4.5 MICROgram(s) Inhaler 2 Puff(s) Inhalation two times a day  dextrose 5%. 1000 milliLiter(s) IV Continuous <Continuous>  dextrose 50% Injectable 12.5 Gram(s) IV Push once  dextrose 50% Injectable 25 Gram(s) IV Push once  dextrose 50% Injectable 25 Gram(s) IV Push once  dextrose Gel 1 Dose(s) Oral once PRN  enoxaparin Injectable 40 milliGRAM(s) SubCutaneous daily  gabapentin   Solution 100 milliGRAM(s) Oral every 8 hours  glucagon  Injectable 1 milliGRAM(s) IntraMuscular once PRN  insulin lispro (HumaLOG) corrective regimen sliding scale   SubCutaneous three times a day before meals  insulin lispro (HumaLOG) corrective regimen sliding scale   SubCutaneous at bedtime  insulin NPH human recombinant 4 Unit(s) SubCutaneous three times a day  metoprolol succinate ER 12.5 milliGRAM(s) Oral daily  ondansetron Injectable 4 milliGRAM(s) IV Push once PRN  oxyCODONE    Solution 10 milliGRAM(s) Oral every 3 hours PRN  pantoprazole    Tablet 40 milliGRAM(s) Oral before breakfast  polyethylene glycol 3350 17 Gram(s) Oral two times a day  simethicone 80 milliGRAM(s) Chew two times a day PRN  tamsulosin 0.4 milliGRAM(s) Oral at bedtime  valACYclovir 2000 milliGRAM(s) Oral two times a day      Physical exam    T(C): 36.8 (12-12-17 @ 16:24), Max: 37.3 (12-12-17 @ 11:20)  HR: 87 (12-12-17 @ 16:24) (87 - 97)  BP: 109/66 (12-12-17 @ 16:24) (108/64 - 123/65)  RR: 18 (12-12-17 @ 16:24) (18 - 18)  SpO2: 98% (12-12-17 @ 16:24) (95% - 98%)  Wt(kg): --     NAD  Resp non labored  B/L LE edema    Labs                      13.0   9.07  )-----------( 715      ( 12 Dec 2017 06:30 )             40.0       12-12    134<L>  |  94<L>  |  6<L>  ----------------------------<  181<H>  4.6   |  26  |  0.74    Ca    8.9      12 Dec 2017 06:30  Phos  4.0     12-12  Mg     2.2     12-12    TPro  6.6  /  Alb  3.0<L>  /  TBili  0.8  /  DBili  x   /  AST  30  /  ALT  33  /  AlkPhos  158<H>  12-11      LIVER FUNCTIONS - ( 11 Dec 2017 05:00 )  Alb: 3.0 g/dL / Pro: 6.6 g/dL / ALK PHOS: 158 u/L / ALT: 33 u/L / AST: 30 u/L / GGT: x             [id number]

## 2017-12-12 NOTE — PROGRESS NOTE ADULT - SUBJECTIVE AND OBJECTIVE BOX
Patient is a 62y old  Male who presents with a chief complaint of "I have a growth on my pancreas" (15 Nov 2017 14:39)                                                                  REVIEW OF SYSTEMS:     CONSTITUTIONAL: No weakness, fevers or chills  RESPIRATORY: No cough, wheezing,  No shortness of breath  CARDIOVASCULAR: No chest pain or palpitations  GASTROINTESTINAL: No abdominal pain  . No nausea, vomiting  GENITOURINARY: No dysuria, frequency   NEUROLOGICAL: No numbness or weakness                                                                                                                                                                                                                                                                                   Medications:  MEDICATIONS  (STANDING):  dextrose 5% + sodium chloride 0.45% with potassium chloride 20 mEq/L 1000 milliLiter(s) (30 mL/Hr) IV Continuous <Continuous>  dextrose 5%. 1000 milliLiter(s) (50 mL/Hr) IV Continuous <Continuous>  dextrose 50% Injectable 12.5 Gram(s) IV Push once  dextrose 50% Injectable 25 Gram(s) IV Push once  dextrose 50% Injectable 25 Gram(s) IV Push once  enoxaparin Injectable 40 milliGRAM(s) SubCutaneous daily  gabapentin   Solution 100 milliGRAM(s) Oral every 8 hours  insulin lispro (HumaLOG) corrective regimen sliding scale   SubCutaneous three times a day before meals  insulin lispro (HumaLOG) corrective regimen sliding scale   SubCutaneous at bedtime  insulin NPH human recombinant 4 Unit(s) SubCutaneous three times a day  metoprolol    tartrate Injectable 5 milliGRAM(s) IV Push every 6 hours  pantoprazole  Injectable 40 milliGRAM(s) IV Push daily  polyethylene glycol 3350 17 Gram(s) Oral two times a day  tamsulosin 0.4 milliGRAM(s) Oral at bedtime    MEDICATIONS  (PRN):  dextrose Gel 1 Dose(s) Oral once PRN Blood Glucose LESS THAN 70 milliGRAM(s)/deciliter  glucagon  Injectable 1 milliGRAM(s) IntraMuscular once PRN Glucose LESS THAN 70 milligrams/deciliter  HYDROmorphone  Injectable 1 milliGRAM(s) IV Push every 3 hours PRN Severe Pain unrelieved by Oral opioids  ondansetron Injectable 4 milliGRAM(s) IV Push every 6 hours PRN Nausea  ondansetron Injectable 4 milliGRAM(s) IV Push once PRN Nausea and/or Vomiting  oxyCODONE    Solution 10 milliGRAM(s) Oral every 3 hours PRN Moderate and Severe Pain  simethicone 80 milliGRAM(s) Chew two times a day PRN Gas       Allergies    No Known Allergies    Intolerances      Vital Signs Last 24 Hrs  T(C): 37.3 (12 Dec 2017 11:20), Max: 37.3 (12 Dec 2017 11:20)  T(F): 99.2 (12 Dec 2017 11:20), Max: 99.2 (12 Dec 2017 11:20)  HR: 97 (12 Dec 2017 11:20) (89 - 98)  BP: 123/65 (12 Dec 2017 11:20) (108/64 - 126/72)  BP(mean): 75 (12 Dec 2017 07:57) (75 - 75)  RR: 18 (12 Dec 2017 11:20) (18 - 18)  SpO2: 98% (12 Dec 2017 11:20) (95% - 99%)  CAPILLARY BLOOD GLUCOSE      POCT Blood Glucose.: 193 mg/dL (12 Dec 2017 12:47)  POCT Blood Glucose.: 166 mg/dL (12 Dec 2017 08:07)  POCT Blood Glucose.: 241 mg/dL (11 Dec 2017 21:59)  POCT Blood Glucose.: 174 mg/dL (11 Dec 2017 16:35)      12-11 @ 07:01  -  12-12 @ 07:00  --------------------------------------------------------  IN: 2040 mL / OUT: 4818 mL / NET: -2778 mL    12-12 @ 07:01  -  12-12 @ 14:44  --------------------------------------------------------  IN: 480 mL / OUT: 210 mL / NET: 270 mL      Physical Exam:    General: NAD   HEENT:  Nonicteric, PERRLA  CV:  RRR, S1S2   Lungs:  CTA B/L  Abdomen:  Soft, mild diffuse tenderness   Extremities:  edema   Skin:  Warm and dry, no rashes  :  No newberry  Neuro:  AAOx3, non-focal, grossly intact                                                                                                                                                                                                                                                                                                LABS:                               13.0   9.07  )-----------( 715      ( 12 Dec 2017 06:30 )             40.0                      12-12    134<L>  |  94<L>  |  6<L>  ----------------------------<  181<H>  4.6   |  26  |  0.74    Ca    8.9      12 Dec 2017 06:30  Phos  4.0     12-12  Mg     2.2     12-12    TPro  6.6  /  Alb  3.0<L>  /  TBili  0.8  /  DBili  x   /  AST  30  /  ALT  33  /  AlkPhos  158<H>  12-11

## 2017-12-12 NOTE — PROVIDER CONTACT NOTE (OTHER) - ACTION/TREATMENT ORDERED:
Hold feeds until Team can assess tube and de-clog
Surg D team MD Nancy Vizcarra made aware that patient is refusing enema. No new orders given at this time.
decrease tube feeds to 30 for now. will collaborate with primary team in the am for further interventions.
pt reconnected after returning from bathroom to suction. MD adjusted wall suction, output from NG now noted, as prior there was minimal output.
Give 0600 lopressor now. Continue to MOnitor. Given per EMR. Pt HR to 104. Safety maintained, will continue to monitor.
did not attempt to continue to flush tube.
hold off on bolus, continue to monitor.

## 2017-12-12 NOTE — PROGRESS NOTE ADULT - SUBJECTIVE AND OBJECTIVE BOX
D TEAM SURGERY DAILY PROGRESS NOTE:    S: Patient seen and examined. Overnight, he refused an enema. CT showed no bowel obstruction. He is on clears and getting TFs at 30cc/h.    O:  Vital Signs Last 24 Hrs  T(C): 36.6 (12 Dec 2017 05:18), Max: 36.9 (11 Dec 2017 20:06)  T(F): 97.9 (12 Dec 2017 05:18), Max: 98.4 (11 Dec 2017 20:06)  HR: 89 (12 Dec 2017 05:18) (72 - 98)  BP: 112/65 (12 Dec 2017 05:18) (104/68 - 126/72)  BP(mean): --  RR: 18 (12 Dec 2017 05:18) (18 - 18)  SpO2: 97% (12 Dec 2017 05:18) (95% - 99%)    I&O's Detail    11 Dec 2017 07:01  -  12 Dec 2017 07:00  --------------------------------------------------------  IN:    dextrose 5% + sodium chloride 0.45% with potassium chloride 20 mEq/L: 660 mL    Glucerna: 660 mL    Oral Fluid: 720 mL  Total IN: 2040 mL    OUT:    Bulb: 173 mL    Voided: 4645 mL  Total OUT: 4818 mL    Total NET: -2778 mL    MEDICATIONS  (STANDING):  dextrose 5% + sodium chloride 0.45% with potassium chloride 20 mEq/L 1000 milliLiter(s) (30 mL/Hr) IV Continuous <Continuous>  dextrose 5%. 1000 milliLiter(s) (50 mL/Hr) IV Continuous <Continuous>  dextrose 50% Injectable 12.5 Gram(s) IV Push once  dextrose 50% Injectable 25 Gram(s) IV Push once  dextrose 50% Injectable 25 Gram(s) IV Push once  enoxaparin Injectable 40 milliGRAM(s) SubCutaneous daily  gabapentin   Solution 100 milliGRAM(s) Oral every 8 hours  insulin lispro (HumaLOG) corrective regimen sliding scale   SubCutaneous three times a day before meals  insulin lispro (HumaLOG) corrective regimen sliding scale   SubCutaneous at bedtime  insulin NPH human recombinant 4 Unit(s) SubCutaneous three times a day  metoprolol    tartrate Injectable 5 milliGRAM(s) IV Push every 6 hours  pantoprazole  Injectable 40 milliGRAM(s) IV Push daily  tamsulosin 0.4 milliGRAM(s) Oral at bedtime    MEDICATIONS  (PRN):  dextrose Gel 1 Dose(s) Oral once PRN Blood Glucose LESS THAN 70 milliGRAM(s)/deciliter  glucagon  Injectable 1 milliGRAM(s) IntraMuscular once PRN Glucose LESS THAN 70 milligrams/deciliter  HYDROmorphone  Injectable 1 milliGRAM(s) IV Push every 3 hours PRN Severe Pain unrelieved by Oral opioids  ondansetron Injectable 4 milliGRAM(s) IV Push every 6 hours PRN Nausea  ondansetron Injectable 4 milliGRAM(s) IV Push once PRN Nausea and/or Vomiting  oxyCODONE    Solution 10 milliGRAM(s) Oral every 3 hours PRN Moderate and Severe Pain  simethicone 80 milliGRAM(s) Chew two times a day PRN Gas                        13.0   9.07  )-----------( 715      ( 12 Dec 2017 06:30 )             40.0     12-11    136  |  95<L>  |  8   ----------------------------<  182<H>  4.5   |  26  |  0.77    Ca    9.1      11 Dec 2017 05:00  Phos  3.4     12-11  Mg     2.1     12-11    TPro  6.6  /  Alb  3.0<L>  /  TBili  0.8  /  DBili  x   /  AST  30  /  ALT  33  /  AlkPhos  158<H>  12-11    Physical Exam:  Gen: Laying in bed, NAD, alert and oriented.   Resp: Unlabored breathing  Abd: softly distended, 1/4" packing changed, MUNA with pancreatic-looking fluid    Lines:   IV: patent, in place.

## 2017-12-13 ENCOUNTER — TRANSCRIPTION ENCOUNTER (OUTPATIENT)
Age: 62
End: 2017-12-13

## 2017-12-13 DIAGNOSIS — C25.9 MALIGNANT NEOPLASM OF PANCREAS, UNSPECIFIED: ICD-10-CM

## 2017-12-13 DIAGNOSIS — I10 ESSENTIAL (PRIMARY) HYPERTENSION: ICD-10-CM

## 2017-12-13 LAB
BILIRUB DIRECT SERPL-MCNC: 0.4 MG/DL — HIGH (ref 0.1–0.2)
BILIRUB FLD-MCNC: 1.6 MG/DL — SIGNIFICANT CHANGE UP
BILIRUB SERPL-MCNC: 0.8 MG/DL — SIGNIFICANT CHANGE UP (ref 0.2–1.2)
BUN SERPL-MCNC: 8 MG/DL — SIGNIFICANT CHANGE UP (ref 7–23)
CALCIUM SERPL-MCNC: 8.9 MG/DL — SIGNIFICANT CHANGE UP (ref 8.4–10.5)
CHLORIDE SERPL-SCNC: 95 MMOL/L — LOW (ref 98–107)
CO2 SERPL-SCNC: 28 MMOL/L — SIGNIFICANT CHANGE UP (ref 22–31)
CREAT SERPL-MCNC: 0.89 MG/DL — SIGNIFICANT CHANGE UP (ref 0.5–1.3)
GLUCOSE BLDC GLUCOMTR-MCNC: 171 MG/DL — HIGH (ref 70–99)
GLUCOSE BLDC GLUCOMTR-MCNC: 174 MG/DL — HIGH (ref 70–99)
GLUCOSE SERPL-MCNC: 211 MG/DL — HIGH (ref 70–99)
HCT VFR BLD CALC: 40.9 % — SIGNIFICANT CHANGE UP (ref 39–50)
HGB BLD-MCNC: 13.6 G/DL — SIGNIFICANT CHANGE UP (ref 13–17)
MAGNESIUM SERPL-MCNC: 2.1 MG/DL — SIGNIFICANT CHANGE UP (ref 1.6–2.6)
MCHC RBC-ENTMCNC: 32.3 PG — SIGNIFICANT CHANGE UP (ref 27–34)
MCHC RBC-ENTMCNC: 33.3 % — SIGNIFICANT CHANGE UP (ref 32–36)
MCV RBC AUTO: 97.1 FL — SIGNIFICANT CHANGE UP (ref 80–100)
NRBC # FLD: 0 — SIGNIFICANT CHANGE UP
PHOSPHATE SERPL-MCNC: 3.5 MG/DL — SIGNIFICANT CHANGE UP (ref 2.5–4.5)
PLATELET # BLD AUTO: 763 K/UL — HIGH (ref 150–400)
PMV BLD: 9.6 FL — SIGNIFICANT CHANGE UP (ref 7–13)
POTASSIUM SERPL-MCNC: 4.9 MMOL/L — SIGNIFICANT CHANGE UP (ref 3.5–5.3)
POTASSIUM SERPL-SCNC: 4.9 MMOL/L — SIGNIFICANT CHANGE UP (ref 3.5–5.3)
RBC # BLD: 4.21 M/UL — SIGNIFICANT CHANGE UP (ref 4.2–5.8)
RBC # FLD: 15.2 % — HIGH (ref 10.3–14.5)
SODIUM SERPL-SCNC: 136 MMOL/L — SIGNIFICANT CHANGE UP (ref 135–145)
WBC # BLD: 11.64 K/UL — HIGH (ref 3.8–10.5)
WBC # FLD AUTO: 11.64 K/UL — HIGH (ref 3.8–10.5)

## 2017-12-13 RX ORDER — OXYCODONE HYDROCHLORIDE 5 MG/1
1 TABLET ORAL
Qty: 12 | Refills: 0 | OUTPATIENT
Start: 2017-12-13 | End: 2017-12-14

## 2017-12-13 RX ORDER — POLYETHYLENE GLYCOL 3350 17 G/17G
17 POWDER, FOR SOLUTION ORAL
Qty: 0 | Refills: 0 | DISCHARGE
Start: 2017-12-13

## 2017-12-13 RX ORDER — SENNA PLUS 8.6 MG/1
2 TABLET ORAL AT BEDTIME
Qty: 0 | Refills: 0 | Status: DISCONTINUED | OUTPATIENT
Start: 2017-12-13 | End: 2017-12-15

## 2017-12-13 RX ORDER — HUMAN INSULIN 100 [IU]/ML
6 INJECTION, SUSPENSION SUBCUTANEOUS EVERY 8 HOURS
Qty: 0 | Refills: 0 | Status: COMPLETED | OUTPATIENT
Start: 2017-12-13 | End: 2017-12-13

## 2017-12-13 RX ORDER — ENOXAPARIN SODIUM 100 MG/ML
40 INJECTION SUBCUTANEOUS
Qty: 30 | Refills: 0 | OUTPATIENT
Start: 2017-12-13 | End: 2018-01-11

## 2017-12-13 RX ORDER — SENNA PLUS 8.6 MG/1
2 TABLET ORAL
Qty: 0 | Refills: 0 | DISCHARGE
Start: 2017-12-13

## 2017-12-13 RX ORDER — SENNA PLUS 8.6 MG/1
2 TABLET ORAL
Qty: 0 | Refills: 0 | COMMUNITY
Start: 2017-12-13

## 2017-12-13 RX ORDER — DOCUSATE SODIUM 100 MG
100 CAPSULE ORAL THREE TIMES A DAY
Qty: 0 | Refills: 0 | Status: DISCONTINUED | OUTPATIENT
Start: 2017-12-13 | End: 2017-12-15

## 2017-12-13 RX ORDER — SIMETHICONE 80 MG/1
1 TABLET, CHEWABLE ORAL
Qty: 0 | Refills: 0 | COMMUNITY
Start: 2017-12-13

## 2017-12-13 RX ORDER — DOCUSATE SODIUM 100 MG
1 CAPSULE ORAL
Qty: 0 | Refills: 0 | DISCHARGE
Start: 2017-12-13

## 2017-12-13 RX ADMIN — OXYCODONE HYDROCHLORIDE 10 MILLIGRAM(S): 5 TABLET ORAL at 22:08

## 2017-12-13 RX ADMIN — POLYETHYLENE GLYCOL 3350 17 GRAM(S): 17 POWDER, FOR SOLUTION ORAL at 05:14

## 2017-12-13 RX ADMIN — Medication 100 MILLIGRAM(S): at 22:09

## 2017-12-13 RX ADMIN — SENNA PLUS 2 TABLET(S): 8.6 TABLET ORAL at 22:09

## 2017-12-13 RX ADMIN — OXYCODONE HYDROCHLORIDE 10 MILLIGRAM(S): 5 TABLET ORAL at 22:38

## 2017-12-13 RX ADMIN — GABAPENTIN 100 MILLIGRAM(S): 400 CAPSULE ORAL at 22:09

## 2017-12-13 RX ADMIN — Medication 2: at 08:21

## 2017-12-13 RX ADMIN — VALACYCLOVIR 2000 MILLIGRAM(S): 500 TABLET, FILM COATED ORAL at 18:31

## 2017-12-13 RX ADMIN — PANTOPRAZOLE SODIUM 40 MILLIGRAM(S): 20 TABLET, DELAYED RELEASE ORAL at 05:13

## 2017-12-13 RX ADMIN — HUMAN INSULIN 6 UNIT(S): 100 INJECTION, SUSPENSION SUBCUTANEOUS at 13:52

## 2017-12-13 RX ADMIN — GABAPENTIN 100 MILLIGRAM(S): 400 CAPSULE ORAL at 13:59

## 2017-12-13 RX ADMIN — ENOXAPARIN SODIUM 40 MILLIGRAM(S): 100 INJECTION SUBCUTANEOUS at 13:17

## 2017-12-13 RX ADMIN — TAMSULOSIN HYDROCHLORIDE 0.4 MILLIGRAM(S): 0.4 CAPSULE ORAL at 22:09

## 2017-12-13 RX ADMIN — Medication 2: at 18:31

## 2017-12-13 RX ADMIN — GABAPENTIN 100 MILLIGRAM(S): 400 CAPSULE ORAL at 05:14

## 2017-12-13 RX ADMIN — OXYCODONE HYDROCHLORIDE 10 MILLIGRAM(S): 5 TABLET ORAL at 17:29

## 2017-12-13 RX ADMIN — VALACYCLOVIR 2000 MILLIGRAM(S): 500 TABLET, FILM COATED ORAL at 05:14

## 2017-12-13 RX ADMIN — Medication 100 MILLIGRAM(S): at 13:59

## 2017-12-13 RX ADMIN — HUMAN INSULIN 4 UNIT(S): 100 INJECTION, SUSPENSION SUBCUTANEOUS at 08:22

## 2017-12-13 RX ADMIN — Medication 81 MILLIGRAM(S): at 13:17

## 2017-12-13 RX ADMIN — Medication 12.5 MILLIGRAM(S): at 05:13

## 2017-12-13 RX ADMIN — BUDESONIDE AND FORMOTEROL FUMARATE DIHYDRATE 2 PUFF(S): 160; 4.5 AEROSOL RESPIRATORY (INHALATION) at 22:08

## 2017-12-13 RX ADMIN — OXYCODONE HYDROCHLORIDE 10 MILLIGRAM(S): 5 TABLET ORAL at 16:24

## 2017-12-13 RX ADMIN — BUDESONIDE AND FORMOTEROL FUMARATE DIHYDRATE 2 PUFF(S): 160; 4.5 AEROSOL RESPIRATORY (INHALATION) at 09:48

## 2017-12-13 NOTE — DISCHARGE NOTE ADULT - PATIENT PORTAL LINK FT
“You can access the Follow[employer] Patient Portal, offered by [facility], by registering with the following website: [email or web address]”

## 2017-12-13 NOTE — DISCHARGE NOTE ADULT - HOME CARE AGENCY
[facility] [phone] initial visit will be day after discharge home. A nurse will call prior to the home visit.

## 2017-12-13 NOTE — CHART NOTE - NSCHARTNOTEFT_GEN_A_CORE
NUTRITION FOLLOW-UP: Pt is s/p Whipple procedure and new J tube placement. Pt is currently consuming a po diet and tube feeding of Glucerna 1.2 via J tube nocturnally. Pt   would like to stop the tube feeds and rely on po intake with supplement. Reviewed a consistent carb diet with small frequent feeds with Pt. Pt verbalized good understanding.    Weight: 88 kg (11/30/17)    Labs: POCT-176, 193, 193    Current Diet: Consistent Carb, Glucerna 1.2 via J tube 440 ml/24 hrs, goal rte 55 ml/hr 8 hrs on and 16 hrs off.    Enteral Recommendations:    RD to Remain Available: Anila Hidalgo MS, RDN, CDN pager [phone]      Additional Recommendations:   1) Monitor weight, labs, po intake, tube feeding intake and skin integrity.

## 2017-12-13 NOTE — PROGRESS NOTE ADULT - ASSESSMENT
60 yo gentleman s/p Whipple Procedure, POD#13:  - Tolerating diet  - Pain control  - Miralax bid  - TFs via J tube @ 55  - F/u GI function  - OOB/ambulate  - DVT ppx

## 2017-12-13 NOTE — DISCHARGE NOTE ADULT - CARE PROVIDERS DIRECT ADDRESSES
[email or web address],DirectAddress_Unknown,DirectAddress_Unknown,DirectAddress_Unknown

## 2017-12-13 NOTE — DISCHARGE NOTE ADULT - CARE PLAN
Principal Discharge DX:	Malignant neoplasm  Goal:	recovery from surgery  Instructions for follow-up, activity and diet:	You have had surgery on your bowels. You have a midline incision that is getting packed with 1/4" packing. You may take Motrin for mild pain and oxycodone for moderate pain, but do not drive while taking it. You can shower, but do not bathe or soak in a tub. Should you experience a change in the discharge from your incision, a fever >101F, the inability to tolerate food, or any other concerns, please call the office. Otherwise, please call to schedule a follow-up appointment with Dr. Curry in 1-2 weeks.  Secondary Diagnosis:	Diabetes  Goal:	blood sugar control  Instructions for follow-up, activity and diet:	Please continue your home Humalog and Lantus and follow-up with the provider who prescribes it.  Secondary Diagnosis:	Asthma  Goal:	prevention of breathing difficulties  Instructions for follow-up, activity and diet:	Please continue you home Dulera and follow-up with the provider who prescribes it,  Secondary Diagnosis:	GERD (gastroesophageal reflux disease)  Goal:	reflux reduction  Instructions for follow-up, activity and diet:	Please continue your home Protonix and follow-up with the provider who prescribes it.  Secondary Diagnosis:	Herpes  Goal:	prophylaxis  Instructions for follow-up, activity and diet:	Please continue your home Valtrex and follow-up with the provider who prescribes it.

## 2017-12-13 NOTE — PROGRESS NOTE ADULT - SUBJECTIVE AND OBJECTIVE BOX
Patient is a 62y old  Male who presents with a chief complaint of Whipple (13 Dec 2017 09:06)    Now on a regular diet.  Tolerating well.  No N/V.  Only mild abd soreness.  has BM.  No CP, or SOB      Medication:   aspirin  chewable 81 milliGRAM(s) Oral daily  buDESOnide  80 MICROgram(s)/formoterol 4.5 MICROgram(s) Inhaler 2 Puff(s) Inhalation two times a day  dextrose 5%. 1000 milliLiter(s) IV Continuous <Continuous>  dextrose 50% Injectable 12.5 Gram(s) IV Push once  dextrose 50% Injectable 25 Gram(s) IV Push once  dextrose 50% Injectable 25 Gram(s) IV Push once  dextrose Gel 1 Dose(s) Oral once PRN  docusate sodium 100 milliGRAM(s) Oral three times a day  enoxaparin Injectable 40 milliGRAM(s) SubCutaneous daily  gabapentin   Solution 100 milliGRAM(s) Oral every 8 hours  glucagon  Injectable 1 milliGRAM(s) IntraMuscular once PRN  insulin lispro (HumaLOG) corrective regimen sliding scale   SubCutaneous three times a day before meals  insulin lispro (HumaLOG) corrective regimen sliding scale   SubCutaneous at bedtime  metoprolol succinate ER 12.5 milliGRAM(s) Oral daily  ondansetron Injectable 4 milliGRAM(s) IV Push once PRN  oxyCODONE    Solution 10 milliGRAM(s) Oral every 3 hours PRN  pantoprazole    Tablet 40 milliGRAM(s) Oral before breakfast  polyethylene glycol 3350 17 Gram(s) Oral two times a day  senna 2 Tablet(s) Oral at bedtime  simethicone 80 milliGRAM(s) Chew two times a day PRN  tamsulosin 0.4 milliGRAM(s) Oral at bedtime  valACYclovir 2000 milliGRAM(s) Oral two times a day      Physical exam    T(C): 36.7 (12-13-17 @ 16:36), Max: 37.1 (12-12-17 @ 19:43)  HR: 99 (12-13-17 @ 16:36) (84 - 99)  BP: 110/81 (12-13-17 @ 16:36) (110/71 - 119/66)  RR: 18 (12-13-17 @ 16:36) (18 - 18)  SpO2: 99% (12-13-17 @ 16:36) (96% - 99%)  Wt(kg): --    alert NAD  EOMI anicteric sclera  Cv s1 S2 RRR  Lungs clear B/L  abd soft   trace LE edema no tenderness    Labs                      13.6   11.64 )-----------( 763      ( 13 Dec 2017 05:59 )             40.9       12-13    136  |  95<L>  |  8   ----------------------------<  211<H>  4.9   |  28  |  0.89    Ca    8.9      13 Dec 2017 05:59  Phos  3.5     12-13  Mg     2.1     12-13    TPro  x   /  Alb  x   /  TBili  0.8  /  DBili  0.4<H>  /  AST  x   /  ALT  x   /  AlkPhos  x   12-13          [id number]

## 2017-12-13 NOTE — DISCHARGE NOTE ADULT - CARE PROVIDER_API CALL
Rene Curry), ColonRectal Surgery; Surgery  [address] [place] [address]  Phone: [phone]  Fax: [phone] Rene Curry), ColonRectal Surgery; Surgery  [address] [place] [address]  Phone: [phone]  Fax: [phone]    Andres Manzanares), Hematology; Internal Medicine; Medical Oncology  [address]  [place] [address]  Phone: [phone]  Fax: [phone]    Mariah Bonner), EndocrinologyMetabDiabetes; Internal Medicine  [address] [place] [address]  Phone: [phone]  Fax: [phone]    Rivas Wyatt), Internal Medicine  [address] [place] [address]  Phone: [phone]  Fax: [phone]

## 2017-12-13 NOTE — DISCHARGE NOTE ADULT - PLAN OF CARE
recovery from surgery You have had surgery on your bowels. You have a midline incision that is getting packed with 1/4" packing. You may take Motrin for mild pain and oxycodone for moderate pain, but do not drive while taking it. You can shower, but do not bathe or soak in a tub. Should you experience a change in the discharge from your incision, a fever >101F, the inability to tolerate food, or any other concerns, please call the office. Otherwise, please call to schedule a follow-up appointment with Dr. Curry in 1-2 weeks. blood sugar control Please continue your home Humalog and Lantus and follow-up with the provider who prescribes it. prevention of breathing difficulties Please continue you home Dulera and follow-up with the provider who prescribes it, reflux reduction Please continue your home Protonix and follow-up with the provider who prescribes it. prophylaxis Please continue your home Valtrex and follow-up with the provider who prescribes it.

## 2017-12-13 NOTE — DISCHARGE NOTE ADULT - HOSPITAL COURSE
HPI: "Pt is a 61 y.o. male ; pt states 9/17 ; noted back pain ; pt to pcp ; a sonogram and a c/t scan were done ; pt and partner spouse ; jaundice noted 3 weeks ago ; " there is an elevation of liver enzymes " Pt admitted to [facility] ; a w/u was done " there was a blockage in the pancreas; an ERCP was done ; a biopsy was done ; pt to surgeon ; pt now presents for Whipple   Pt reports initial decrease in appetite ; pt reports 25 lb weight loss"    Patient presented to the hospital for his Whipple procedure on 11/30/17. HPI: "Pt is a 61 y.o. male ; pt states 9/17 ; noted back pain ; pt to pcp ; a sonogram and a c/t scan were done ; pt and partner spouse ; jaundice noted 3 weeks ago ; " there is an elevation of liver enzymes " Pt admitted to [facility] ; a w/u was done " there was a blockage in the pancreas; an ERCP was done ; a biopsy was done ; pt to surgeon ; pt now presents for Whipple   Pt reports initial decrease in appetite ; pt reports 25 lb weight loss"    Patient presented to the hospital for his Whipple procedure on 11/30/17. Patient tolerated procedure well and was transferred to the recovery room and then the floor without incidence. Over the next few days, patient's GI function was monitored for any signs of activity. His tube feeds were started after he had a J tube study showing no leaks. Initially, the rate of feeds was minimal before being advanced to his goal TF rate. The patient had a delayed return of bowel function but did eventually start to pass flatus and have BMs. His NGT was removed when he had positive GI function. His pain was initially controlled with a PCEA and then with PO pain medications. The patient's diet was advanced as tolerated until he was ultimately given regular food, which he did well with. There were times during his hospital stay where he had issues with abdominal distention, at these times his feeds were held and he was monitored. A CT scan of the abd/pelvis was done which did not show any acute findings except for constipation. He was given stool softeners to help him have BMs.    Internal medicine and the hematologist were both on board throughout the care of the patient. Due to being heterozygous for Factor V leiden, it was recommended for the patient to be on Lovenox. After his PCEA was removed, the patient was started on Lovenox.        Patient was mainly managed for postoperative pain, wound care, as well as close monitoring of fluid resuscitation and return of GI function. Diet was advanced as tolerated as GI function returned.  Patient has been tolerating a diet, voiding, ambulating, and the pain is now well controlled. Patient is ready for discharge home in stable condition, and will follow up within one to two weeks as an outpatient with  .      You will be discharged with MUNA drains. You will need to empty them and record outputs accurately. This will be taught to you by the nursing staff. Please do not remove the MUNA drains. They will be removed in the office. Please bring to the office accurate records of output.   Flush with 10cc normal saline daily. do not aspirate back.   NOTIFY YOUR SURGEON IF: You have any bleeding that does not stop, any pus draining from your wound, any fever (over 100.4 F) or chills, persistent nausea/vomiting, persistent diarrhea, or if your pain is not controlled on your discharge pain medications. HPI: "Pt is a 61 y.o. male ; pt states 9/17 ; noted back pain ; pt to pcp ; a sonogram and a c/t scan were done ; pt and partner spouse ; jaundice noted 3 weeks ago ; " there is an elevation of liver enzymes " Pt admitted to [facility] ; a w/u was done " there was a blockage in the pancreas; an ERCP was done ; a biopsy was done ; pt to surgeon ; pt now presents for Whipple   Pt reports initial decrease in appetite ; pt reports 25 lb weight loss"    Patient presented to the hospital for his Whipple procedure on 11/30/17. Patient tolerated procedure well and was transferred to the recovery room and then the floor without incidence. Over the next few days, patient's GI function was monitored for any signs of activity. His tube feeds were started after he had a J tube study showing no leaks. Initially, the rate of feeds was minimal before being advanced to his goal TF rate. The patient had a delayed return of bowel function but did eventually start to pass flatus and have BMs. His NGT was removed when he had positive GI function. His pain was initially controlled with a PCEA and then with PO pain medications. The patient's diet was advanced as tolerated until he was ultimately given regular food, which he did well with. There were times during his hospital stay where he had issues with abdominal distention, at these times his feeds were held and he was monitored. A CT scan of the abd/pelvis was done which did not show any acute findings except for constipation. He was given stool softeners to help him have BMs. His tube feeds were restarted after his abdominal distention resolved with him having BMs. Tube feeds were shifted to only nocturnal feeds as his PO diet intake increased. The patient will not be going home with any TFs. He will supplement his meals with glucerna shakes and will have to have his J tube flushed every day. Pt will need to follow-up with his PCP/endocrinologist to make sure his sugars are regulated.    Internal medicine and the hematologist were both on board throughout the care of the patient. Due to being heterozygous for Factor V leiden, it was recommended for the patient to be on Lovenox. After his PCEA was removed, the patient was started on Lovenox.     Along with his J tube and MUNA drain, patient also has the inferior portion of his midline wound packed. The wound can be packed with iodoform packing strip and covered by gauze and tape. To be changed daily.    In short, patient was mainly managed for postoperative pain, wound care, as well as close monitoring of fluid resuscitation and return of GI function. Diet was advanced as tolerated as GI function returned.  Patient has been tolerating a diet, voiding, ambulating, and the pain is now well controlled. Patient is ready for discharge home in stable condition, and will follow up within one to two weeks as an outpatient with Dr. Curry. He will also need to follow-up with the rest of his medical care team.           NOTIFY YOUR SURGEON IF: You have any bleeding that does not stop, any pus draining from your wound, any fever (over 100.4 F) or chills, persistent nausea/vomiting, persistent diarrhea, or if your pain is not controlled on your discharge pain medications. HPI: "Pt is a 61 y.o. male ; pt states 9/17 ; noted back pain ; pt to pcp ; a sonogram and a c/t scan were done ; pt and partner spouse ; jaundice noted 3 weeks ago ; " there is an elevation of liver enzymes " Pt admitted to [facility] ; a w/u was done " there was a blockage in the pancreas; an ERCP was done ; a biopsy was done ; pt to surgeon ; pt now presents for Whipple   Pt reports initial decrease in appetite ; pt reports 25 lb weight loss."    Patient presented to the hospital for his Whipple procedure on 11/30/17. Patient tolerated procedure well and was transferred to the recovery room and then the floor without incidence. Over the next few days, patient's GI function was monitored for any signs of activity. His tube feeds were started after he had a J tube study showing no leaks. Initially, the rate of feeds was minimal before being advanced to his goal TF rate. The patient had a delayed return of bowel function but did eventually start to pass flatus and have BMs. His NGT was removed when he had positive GI function. His pain was initially controlled with a PCEA and then with PO pain medications. The patient's diet was advanced as tolerated until he was ultimately given regular food, which he did well with. There were times during his hospital stay where he had issues with abdominal distention, at these times his feeds were held and he was monitored. A CT scan of the abd/pelvis was done which did not show any acute findings except for constipation. He was given stool softeners to help him have BMs. His tube feeds were restarted after his abdominal distention resolved with him having BMs. Tube feeds were shifted to only nocturnal feeds as his PO diet intake increased. The patient will not be going home with any TFs. He will supplement his meals with glucerna shakes and will have to have his J tube flushed every day. Pt will need to follow-up with his PCP/endocrinologist to make sure his sugars are regulated.    Internal medicine and the hematologist were both on board throughout the care of the patient. Due to being heterozygous for Factor V leiden, it was recommended for the patient to be on Lovenox. After his PCEA was removed, the patient was started on Lovenox.     Along with his J tube and MUNA drain, patient also has the inferior portion of his midline wound packed with 1/4" packing. The wound can be packed with iodoform packing strip and covered by gauze and tape. To be changed daily.    In short, patient was mainly managed for postoperative pain, wound care, as well as close monitoring of fluid resuscitation and return of GI function. Diet was advanced as tolerated as GI function returned.  Patient has been tolerating a diet, voiding, ambulating, and the pain is now well controlled. Patient is ready for discharge home in stable condition, and will follow up within one to two weeks as an outpatient with Dr. Curry. He will also need to follow-up with the rest of his medical care team. HPI: "Pt is a 61 y.o. male ; pt states 9/17 ; noted back pain ; pt to pcp ; a sonogram and a c/t scan were done ; pt and partner spouse ; jaundice noted 3 weeks ago ; " there is an elevation of liver enzymes " Pt admitted to [facility] ; a w/u was done " there was a blockage in the pancreas; an ERCP was done ; a biopsy was done ; pt to surgeon ; pt now presents for Whipple   Pt reports initial decrease in appetite ; pt reports 25 lb weight loss."    Patient presented to the hospital for his Whipple procedure on 11/30/17. Patient tolerated procedure well and was transferred to the recovery room and then the floor without incidence. Over the next few days, patient's GI function was monitored for any signs of activity. His tube feeds were started after he had a J tube study showing no leaks. Initially, the rate of feeds was minimal before being advanced to his goal TF rate. The patient had a delayed return of bowel function but did eventually start to pass flatus and have BMs. His NGT was removed when he had positive GI function. His pain was initially controlled with a PCEA and then with PO pain medications. The patient's diet was advanced as tolerated until he was ultimately given regular food, which he did well with. There were times during his hospital stay where he had issues with abdominal distention, at these times his feeds were held and he was monitored. A CT scan of the abd/pelvis was done which did not show any acute findings except for constipation. He was given stool softeners to help him have BMs. His tube feeds were restarted after his abdominal distention resolved with him having BMs. Tube feeds were shifted to only nocturnal feeds as his PO diet intake increased. The patient will not be going home with any TFs. He will supplement his meals with glucerna shakes and will have to have his J tube flushed every day. Pt will need to follow-up with his PCP/endocrinologist to make sure his sugars are regulated.    Internal medicine and the hematologist were both on board throughout the care of the patient. Due to being heterozygous for Factor V leiden, it was recommended for the patient to be on Lovenox. After his PCEA was removed, the patient was started on Lovenox.  Along with his J tube and MUNA drain, patient also has the inferior portion of his midline wound packed with 1/4" packing. The wound can be packed with iodoform packing strip and covered by gauze and tape. To be changed daily.    In short, patient was mainly managed for postoperative pain, wound care, as well as close monitoring of fluid resuscitation and return of GI function. Diet was advanced as tolerated as GI function returned.  Patient has been tolerating a diet, voiding, ambulating, and the pain is now well controlled. Patient is ready for discharge home in stable condition, and will follow up within one to two weeks as an outpatient with Dr. Curry. He will also need to follow-up with the rest of his medical care team.

## 2017-12-13 NOTE — DISCHARGE NOTE ADULT - PROVIDER TOKENS
ALESSANDRA:'[id number]' TOKEN:'[id number]:'[id number]:'[id number]:'[id number]'

## 2017-12-13 NOTE — CONSULT NOTE ADULT - ASSESSMENT
Assessment  DMT2: 62y Male with DM T2 with hyperglycemia on insulin, blood sugars running  high fluctuating , had no hypoglycemia,  eating meals,  non compliant with low carb diet.  Adenocarcinoma of Pancreas: S/P Ernestina, fern PEG tube, eating partial meal in addition to PEG/JT feeds.  HTN: Controlled, On med.  Asthma: Stable. Assessment  DMT2: 62y Male with DM T2 with hyperglycemia on insulin, blood sugars running not at target. no hypoglycemia,  eating meals,  compliant with low carb diet.  Adenocarcinoma of Pancreas: S/P Ernestina, fern PEG tube, eating partial meal in addition to PEG/JT feeds.  HTN: Controlled, On med.  Asthma: Stable.

## 2017-12-13 NOTE — PROGRESS NOTE ADULT - ASSESSMENT
Pancreatic cancer had a Whipple.  Plan fro out-pt adjuvant therapy.    Reactive thrombocytosis and would monitor for now.

## 2017-12-13 NOTE — DISCHARGE NOTE ADULT - INSTRUCTIONS
You may continue your regular diet with nocturnal tube feed supplementation. You may continue your regular diet with glucerna supplementation. Take medication as ordered, follow up with MD as advised, monitor incision for signs of infection, call MD if any problem or fever over 101F, gradually increase activity, ask MD about flu shot in office

## 2017-12-13 NOTE — DISCHARGE NOTE ADULT - MEDICATION SUMMARY - MEDICATIONS TO TAKE
I will START or STAY ON the medications listed below when I get home from the hospital:    Oxaydo 5 mg oral tablet  -- 1 tab(s) by mouth every 4 hours, As Needed for pain. MDD:6 tabs  -- Caution federal law prohibits the transfer of this drug to any person other  than the person for whom it was prescribed.  It is very important that you take or use this exactly as directed.  Do not skip doses or discontinue unless directed by your doctor.  May cause drowsiness.  Alcohol may intensify this effect.  Use care when operating dangerous machinery.  This prescription cannot be refilled.  Using more of this medication than prescribed may cause serious breathing problems.    -- Indication: For Moderate pain    aspirin 81 mg oral tablet  -- 1 tab(s) by mouth once a day    -- Indication: For Blood thinner    ibuprofen  -- 400mg po q6h prn  last dose 11/23  -- Indication: For Mild pain    Flomax 0.4 mg oral capsule  -- 1 cap(s) by mouth once a day (at bedtime)  -- Indication: For BPH    Lovenox 40 mg/0.4 mL injectable solution  -- 40 milligram(s) injectable once a day   -- It is very important that you take or use this exactly as directed.  Do not skip doses or discontinue unless directed by your doctor.    -- Indication: For Blood thinner    HumaLOG Cartridge 100 units/mL subcutaneous solution  -- subcutaneous 3 times a day using sliding scale   -- Indication: For diabetes    Lantus 100 units/mL subcutaneous solution  -- 10 unit(s) subcutaneous once a day (at bedtime)  -- Indication: For diabetes    valACYclovir 1 g oral tablet  -- 2 tab(s) by mouth every 12 hours  -- Indication: For Herpes prophylaxis    Metoprolol Succinate ER 25 mg oral tablet, extended release  -- 0.5 tab(s) by mouth once a day  -- Indication: For Hypertension    Dulera 100 mcg-5 mcg/inh inhalation aerosol  -- 2 puff(s) inhaled 2 times a day  -- Indication: For Breathing    docusate sodium 100 mg oral capsule  -- 1 cap(s) by mouth 3 times a day, As Needed for constipation  -- Indication: For Stool softener    polyethylene glycol 3350 oral powder for reconstitution  -- 17 gram(s) by mouth 2 times a day, As Needed for constipation.  -- Indication: For Stool softener    senna oral tablet  -- 2 tab(s) by mouth once a day (at bedtime), As Needed for constipation.  -- Indication: For Stool softener    simethicone 80 mg oral tablet, chewable  -- 1 tab(s) by mouth 2 times a day, As needed, Gas  -- Indication: For Gas pain    pantoprazole 40 mg oral delayed release tablet  -- 1 tab(s) by mouth once a day in am  -- Indication: For reflux

## 2017-12-13 NOTE — PROGRESS NOTE ADULT - SUBJECTIVE AND OBJECTIVE BOX
Patient is a 62y old  Male who presents with a chief complaint of "I have a growth on my pancreas" (15 Nov 2017 14:39)                                                               INTERVAL HPI/OVERNIGHT EVENTS:    REVIEW OF SYSTEMS:     CONSTITUTIONAL: No weakness, fevers or chills  EYES/ENT: No visual changes , no ear ache   NECK: No pain or stiffness  RESPIRATORY: No cough, wheezing,  No shortness of breath  CARDIOVASCULAR: No chest pain or palpitations  GASTROINTESTINAL: No abdominal pain  . No nausea, vomiting, or hematemesis; No diarrhea or constipation. No melena or hematochezia.  GENITOURINARY: No dysuria, frequency or hematuria  NEUROLOGICAL: No numbness or weakness  SKIN: No itching, burning, rashes, or lesions                                                                                                                                                                                                                                                                                 Medications:  MEDICATIONS  (STANDING):  aspirin  chewable 81 milliGRAM(s) Oral daily  buDESOnide  80 MICROgram(s)/formoterol 4.5 MICROgram(s) Inhaler 2 Puff(s) Inhalation two times a day  dextrose 5%. 1000 milliLiter(s) (50 mL/Hr) IV Continuous <Continuous>  dextrose 50% Injectable 12.5 Gram(s) IV Push once  dextrose 50% Injectable 25 Gram(s) IV Push once  dextrose 50% Injectable 25 Gram(s) IV Push once  docusate sodium 100 milliGRAM(s) Oral three times a day  enoxaparin Injectable 40 milliGRAM(s) SubCutaneous daily  gabapentin   Solution 100 milliGRAM(s) Oral every 8 hours  insulin lispro (HumaLOG) corrective regimen sliding scale   SubCutaneous three times a day before meals  insulin lispro (HumaLOG) corrective regimen sliding scale   SubCutaneous at bedtime  metoprolol succinate ER 12.5 milliGRAM(s) Oral daily  pantoprazole    Tablet 40 milliGRAM(s) Oral before breakfast  polyethylene glycol 3350 17 Gram(s) Oral two times a day  senna 2 Tablet(s) Oral at bedtime  tamsulosin 0.4 milliGRAM(s) Oral at bedtime  valACYclovir 2000 milliGRAM(s) Oral two times a day    MEDICATIONS  (PRN):  dextrose Gel 1 Dose(s) Oral once PRN Blood Glucose LESS THAN 70 milliGRAM(s)/deciliter  glucagon  Injectable 1 milliGRAM(s) IntraMuscular once PRN Glucose LESS THAN 70 milligrams/deciliter  ondansetron Injectable 4 milliGRAM(s) IV Push once PRN Nausea and/or Vomiting  oxyCODONE    Solution 10 milliGRAM(s) Oral every 3 hours PRN Moderate and Severe Pain  simethicone 80 milliGRAM(s) Chew two times a day PRN Gas       Allergies    No Known Allergies    Intolerances      Vital Signs Last 24 Hrs  T(C): 36.7 (13 Dec 2017 05:05), Max: 37.3 (12 Dec 2017 11:20)  T(F): 98.1 (13 Dec 2017 05:05), Max: 99.2 (12 Dec 2017 11:20)  HR: 98 (13 Dec 2017 05:05) (84 - 99)  BP: 116/72 (13 Dec 2017 05:05) (109/66 - 123/65)  BP(mean): --  RR: 18 (13 Dec 2017 09:08) (18 - 18)  SpO2: 96% (13 Dec 2017 05:05) (96% - 99%)  CAPILLARY BLOOD GLUCOSE      POCT Blood Glucose.: 176 mg/dL (12 Dec 2017 21:30)  POCT Blood Glucose.: 193 mg/dL (12 Dec 2017 16:22)  POCT Blood Glucose.: 193 mg/dL (12 Dec 2017 12:47)      12-12 @ 07:01 - 12-13 @ 07:00  --------------------------------------------------------  IN: 1550 mL / OUT: 3380 mL / NET: -1830 mL    12-13 @ 07:01 - 12-13 @ 11:11  --------------------------------------------------------  IN: 310 mL / OUT: 540 mL / NET: -230 mL      Physical Exam:    Daily     Daily   General:  Well appearing, NAD, not cachetic  HEENT:  Nonicteric, PERRLA  CV:  RRR, S1S2   Lungs:  CTA B/L, no wheezes, rales, rhonchi  Abdomen:  Soft, non-tender, no distended, positive BS  Extremities:  2+ pulses, no c/c, no edema  Skin:  Warm and dry, no rashes  :  No newberry  Neuro:  AAOx3, non-focal, grossly intact                                                                                                                                                                                                                                                                                                LABS:                               13.6   11.64 )-----------( 763      ( 13 Dec 2017 05:59 )             40.9                      12-13    136  |  95<L>  |  8   ----------------------------<  211<H>  4.9   |  28  |  0.89    Ca    8.9      13 Dec 2017 05:59  Phos  3.5     12-13  Mg     2.1     12-13    TPro  x   /  Alb  x   /  TBili  0.8  /  DBili  0.4<H>  /  AST  x   /  ALT  x   /  AlkPhos  x   12-13                       RADIOLOGY & ADDITIONAL TESTS         I personally reviewed: [  ]EKG   [  ]CXR    [  ] CT      A/P:         Discussed with :     Oniel consultants' Notes   Time spent : Patient is a 62y old  Male who presents with a chief complaint of "I have a growth on my pancreas" (15 Nov 2017 14:39)                                                               INTERVAL HPI/OVERNIGHT EVENTS:    REVIEW OF SYSTEMS:     CONSTITUTIONAL: No weakness, fevers or chills  RESPIRATORY: No cough,   No shortness of breath  CARDIOVASCULAR: No chest pain or palpitations  GASTROINTESTINAL: No abdominal pain  . No nausea, vomiting  GENITOURINARY: No dysuria, frequency  NEUROLOGICAL: No numbness or weakness                                                                                                                                                                                                                                                                                 Medications:  MEDICATIONS  (STANDING):  aspirin  chewable 81 milliGRAM(s) Oral daily  buDESOnide  80 MICROgram(s)/formoterol 4.5 MICROgram(s) Inhaler 2 Puff(s) Inhalation two times a day  dextrose 5%. 1000 milliLiter(s) (50 mL/Hr) IV Continuous <Continuous>  dextrose 50% Injectable 12.5 Gram(s) IV Push once  dextrose 50% Injectable 25 Gram(s) IV Push once  dextrose 50% Injectable 25 Gram(s) IV Push once  docusate sodium 100 milliGRAM(s) Oral three times a day  enoxaparin Injectable 40 milliGRAM(s) SubCutaneous daily  gabapentin   Solution 100 milliGRAM(s) Oral every 8 hours  insulin lispro (HumaLOG) corrective regimen sliding scale   SubCutaneous three times a day before meals  insulin lispro (HumaLOG) corrective regimen sliding scale   SubCutaneous at bedtime  metoprolol succinate ER 12.5 milliGRAM(s) Oral daily  pantoprazole    Tablet 40 milliGRAM(s) Oral before breakfast  polyethylene glycol 3350 17 Gram(s) Oral two times a day  senna 2 Tablet(s) Oral at bedtime  tamsulosin 0.4 milliGRAM(s) Oral at bedtime  valACYclovir 2000 milliGRAM(s) Oral two times a day    MEDICATIONS  (PRN):  dextrose Gel 1 Dose(s) Oral once PRN Blood Glucose LESS THAN 70 milliGRAM(s)/deciliter  glucagon  Injectable 1 milliGRAM(s) IntraMuscular once PRN Glucose LESS THAN 70 milligrams/deciliter  ondansetron Injectable 4 milliGRAM(s) IV Push once PRN Nausea and/or Vomiting  oxyCODONE    Solution 10 milliGRAM(s) Oral every 3 hours PRN Moderate and Severe Pain  simethicone 80 milliGRAM(s) Chew two times a day PRN Gas       Allergies    No Known Allergies    Intolerances      Vital Signs Last 24 Hrs  T(C): 36.7 (13 Dec 2017 05:05), Max: 37.3 (12 Dec 2017 11:20)  T(F): 98.1 (13 Dec 2017 05:05), Max: 99.2 (12 Dec 2017 11:20)  HR: 98 (13 Dec 2017 05:05) (84 - 99)  BP: 116/72 (13 Dec 2017 05:05) (109/66 - 123/65)  BP(mean): --  RR: 18 (13 Dec 2017 09:08) (18 - 18)  SpO2: 96% (13 Dec 2017 05:05) (96% - 99%)  CAPILLARY BLOOD GLUCOSE      POCT Blood Glucose.: 176 mg/dL (12 Dec 2017 21:30)  POCT Blood Glucose.: 193 mg/dL (12 Dec 2017 16:22)  POCT Blood Glucose.: 193 mg/dL (12 Dec 2017 12:47)      12-12 @ 07:01  -  12-13 @ 07:00  --------------------------------------------------------  IN: 1550 mL / OUT: 3380 mL / NET: -1830 mL    12-13 @ 07:01 - 12-13 @ 11:11  --------------------------------------------------------  IN: 310 mL / OUT: 540 mL / NET: -230 mL      Physical Exam:    General:  NAD   HEENT:  Nonicteric, PERRLA  CV:  RRR, S1S2   Lungs:  CTA B/L, no wheezes, rales, rhonchi  Abdomen:  Soft, mild tenderness   Extremities: + improving edema   Skin:  Warm and dry, no rashes  :  No newberry  Neuro:  AAOx3, non-focal, grossly intact                                                                                                                                                                                                                                                                                                LABS:                               13.6   11.64 )-----------( 763      ( 13 Dec 2017 05:59 )             40.9                      12-13    136  |  95<L>  |  8   ----------------------------<  211<H>  4.9   |  28  |  0.89    Ca    8.9      13 Dec 2017 05:59  Phos  3.5     12-13  Mg     2.1     12-13    TPro  x   /  Alb  x   /  TBili  0.8  /  DBili  0.4<H>  /  AST  x   /  ALT  x   /  AlkPhos  x   12-13

## 2017-12-13 NOTE — PROGRESS NOTE ADULT - ASSESSMENT
60 y/o  male with hx of DM , HTN, BPH  admitted recently for jaundice found to have pnacreatic adenocarcinoma on bx . during that admission pt One plastic stent was placed into the ventral pancreatic duct then placement of fully covered metal stent  in bile duct and removal of pancreatic duct stent. . pt had a stress test which was abnormal and plan for f/u as o/p post surgical intervention by Dr. Curry.  Pt is a  pt now presents for Whipple and s/p surgical intervention   1- Pancreatic CA : s/p whipple   oob ,   discussed with PA : at this time pt is on J tube feeds with Oral feeds ... possible dc homme on ora feeds only however this is still to be determined       pt with improved  Nausea and abd discomfort    CT done : no acute pathology   f/u with Dr. Manzanares  : will need adjuvant chemo given positive margins     2- DM: monitor FS while npo ... acceptable FS .. increased NPH  called endo consult .. cont NPH and will need to determine d/c regimen based on diet     3- HTN: had episodes of hypotension while on ARB/ACE... cont meds with bb        4- abn stress : need f/u as o/p.   5-BPH: flomax    6- factor.V. 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     f/u with heme  7 fever : no further episodes   .. would hold off on abx . if recurrs  would check CXR , blood cutlure   UA and US duplex r/o dvt   pt now mild  leukocytoiss however no signs of infection... suspect sec to mild dehydration (intravasculary ) ... monitor for now ... if febrile would pan culture     8- edema: likely sec to hypoalbuminemia   .  US duplex: neg for DVT   9- hyponatremia : improved   monitor       cont DVT prophy,IS   oob to chair   PT   d/w pt and son at length at bedside   d/w Endo   D/w Onco and D/w Surgery PA

## 2017-12-13 NOTE — PROGRESS NOTE ADULT - SUBJECTIVE AND OBJECTIVE BOX
Surgery Team D (Surgery Oncology) Progress Note:    Post-Operative Day: 13    Subjective: Pt seen this AM. No events overnight. Advanced to regular diet; tolerating. Denies N/V. + BMs. Remains afebrile.       Objective:  T(C): 36.7 (12-13-17 @ 00:26), Max: 37.3 (12-12-17 @ 11:20)  HR: 84 (12-13-17 @ 00:26) (84 - 99)  BP: 119/66 (12-13-17 @ 00:26) (108/64 - 123/65)  RR: 18 (12-13-17 @ 00:26) (18 - 18)  SpO2: 99% (12-13-17 @ 00:26) (97% - 99%)    Labs:                      13.0   9.07  )-----------( 715      ( 12 Dec 2017 06:30 )             40.0       12-12    134<L>  |  94<L>  |  6<L>  ----------------------------<  181<H>  4.6   |  26  |  0.74    Ca    8.9      12 Dec 2017 06:30  Phos  4.0     12-12  Mg     2.2     12-12    TPro  6.6  /  Alb  3.0<L>  /  TBili  0.8  /  DBili  x   /  AST  30  /  ALT  33  /  AlkPhos  158<H>  12-11      I&O's Detail    11 Dec 2017 07:01  -  12 Dec 2017 07:00  --------------------------------------------------------  IN:    dextrose 5% + sodium chloride 0.45% with potassium chloride 20 mEq/L: 660 mL    Glucerna: 660 mL    Oral Fluid: 720 mL  Total IN: 2040 mL    OUT:    Bulb: 173 mL    Voided: 4645 mL  Total OUT: 4818 mL    Total NET: -2778 mL      12 Dec 2017 07:01  -  13 Dec 2017 01:57  --------------------------------------------------------  IN:    dextrose 5% + sodium chloride 0.45% with potassium chloride 20 mEq/L: 150 mL    Glucerna: 710 mL    Oral Fluid: 360 mL  Total IN: 1220 mL    OUT:    Bulb: 70 mL    Voided: 2300 mL  Total OUT: 2370 mL    Total NET: -1150 mL          Focused Physical Exam:  Gen: Laying in bed, NAD, alert and oriented.   Resp: Unlabored breathing  Abd: softly distended, 1/4" packing changed, MUNA with pancreatic-looking fluid      Medications:  aspirin  chewable 81 milliGRAM(s) Oral daily  buDESOnide  80 MICROgram(s)/formoterol 4.5 MICROgram(s) Inhaler 2 Puff(s) Inhalation two times a day  dextrose 5%. 1000 milliLiter(s) IV Continuous <Continuous>  dextrose 50% Injectable 12.5 Gram(s) IV Push once  dextrose 50% Injectable 25 Gram(s) IV Push once  dextrose 50% Injectable 25 Gram(s) IV Push once  dextrose Gel 1 Dose(s) Oral once PRN  enoxaparin Injectable 40 milliGRAM(s) SubCutaneous daily  gabapentin   Solution 100 milliGRAM(s) Oral every 8 hours  glucagon  Injectable 1 milliGRAM(s) IntraMuscular once PRN  insulin lispro (HumaLOG) corrective regimen sliding scale   SubCutaneous three times a day before meals  insulin lispro (HumaLOG) corrective regimen sliding scale   SubCutaneous at bedtime  insulin NPH human recombinant 4 Unit(s) SubCutaneous three times a day  metoprolol succinate ER 12.5 milliGRAM(s) Oral daily  ondansetron Injectable 4 milliGRAM(s) IV Push once PRN  oxyCODONE    Solution 10 milliGRAM(s) Oral every 3 hours PRN  pantoprazole    Tablet 40 milliGRAM(s) Oral before breakfast  polyethylene glycol 3350 17 Gram(s) Oral two times a day  simethicone 80 milliGRAM(s) Chew two times a day PRN  tamsulosin 0.4 milliGRAM(s) Oral at bedtime  valACYclovir 2000 milliGRAM(s) Oral two times a day

## 2017-12-14 DIAGNOSIS — R10.9 UNSPECIFIED ABDOMINAL PAIN: ICD-10-CM

## 2017-12-14 LAB
GLUCOSE BLDC GLUCOMTR-MCNC: 157 MG/DL — HIGH (ref 70–99)
GLUCOSE BLDC GLUCOMTR-MCNC: 195 MG/DL — HIGH (ref 70–99)
GLUCOSE BLDC GLUCOMTR-MCNC: 201 MG/DL — HIGH (ref 70–99)
GLUCOSE BLDC GLUCOMTR-MCNC: 222 MG/DL — HIGH (ref 70–99)
HCT VFR BLD CALC: 40.5 % — SIGNIFICANT CHANGE UP (ref 39–50)
HGB BLD-MCNC: 13.7 G/DL — SIGNIFICANT CHANGE UP (ref 13–17)
MCHC RBC-ENTMCNC: 32.3 PG — SIGNIFICANT CHANGE UP (ref 27–34)
MCHC RBC-ENTMCNC: 33.8 % — SIGNIFICANT CHANGE UP (ref 32–36)
MCV RBC AUTO: 95.5 FL — SIGNIFICANT CHANGE UP (ref 80–100)
NRBC # FLD: 0 — SIGNIFICANT CHANGE UP
PLATELET # BLD AUTO: 784 K/UL — HIGH (ref 150–400)
PMV BLD: 9.5 FL — SIGNIFICANT CHANGE UP (ref 7–13)
RBC # BLD: 4.24 M/UL — SIGNIFICANT CHANGE UP (ref 4.2–5.8)
RBC # FLD: 15.4 % — HIGH (ref 10.3–14.5)
WBC # BLD: 10.54 K/UL — HIGH (ref 3.8–10.5)
WBC # FLD AUTO: 10.54 K/UL — HIGH (ref 3.8–10.5)

## 2017-12-14 RX ORDER — SUCRALFATE 1 G
1 TABLET ORAL EVERY 12 HOURS
Qty: 0 | Refills: 0 | Status: DISCONTINUED | OUTPATIENT
Start: 2017-12-14 | End: 2017-12-15

## 2017-12-14 RX ORDER — ACETAMINOPHEN 500 MG
650 TABLET ORAL EVERY 4 HOURS
Qty: 0 | Refills: 0 | Status: DISCONTINUED | OUTPATIENT
Start: 2017-12-14 | End: 2017-12-15

## 2017-12-14 RX ORDER — SUCRALFATE 1 G
1 TABLET ORAL
Qty: 90 | Refills: 0 | OUTPATIENT
Start: 2017-12-14

## 2017-12-14 RX ORDER — INSULIN GLARGINE 100 [IU]/ML
7 INJECTION, SOLUTION SUBCUTANEOUS AT BEDTIME
Qty: 0 | Refills: 0 | Status: DISCONTINUED | OUTPATIENT
Start: 2017-12-14 | End: 2017-12-15

## 2017-12-14 RX ORDER — GABAPENTIN 400 MG/1
100 CAPSULE ORAL EVERY 8 HOURS
Qty: 0 | Refills: 0 | Status: DISCONTINUED | OUTPATIENT
Start: 2017-12-14 | End: 2017-12-15

## 2017-12-14 RX ORDER — INSULIN LISPRO 100/ML
5 VIAL (ML) SUBCUTANEOUS
Qty: 0 | Refills: 0 | Status: DISCONTINUED | OUTPATIENT
Start: 2017-12-14 | End: 2017-12-15

## 2017-12-14 RX ORDER — OXYCODONE HYDROCHLORIDE 5 MG/1
10 TABLET ORAL EVERY 4 HOURS
Qty: 0 | Refills: 0 | Status: DISCONTINUED | OUTPATIENT
Start: 2017-12-14 | End: 2017-12-15

## 2017-12-14 RX ORDER — OXYCODONE HYDROCHLORIDE 5 MG/1
5 TABLET ORAL EVERY 4 HOURS
Qty: 0 | Refills: 0 | Status: DISCONTINUED | OUTPATIENT
Start: 2017-12-14 | End: 2017-12-15

## 2017-12-14 RX ADMIN — PANTOPRAZOLE SODIUM 40 MILLIGRAM(S): 20 TABLET, DELAYED RELEASE ORAL at 05:51

## 2017-12-14 RX ADMIN — Medication 100 MILLIGRAM(S): at 05:51

## 2017-12-14 RX ADMIN — OXYCODONE HYDROCHLORIDE 10 MILLIGRAM(S): 5 TABLET ORAL at 20:50

## 2017-12-14 RX ADMIN — VALACYCLOVIR 2000 MILLIGRAM(S): 500 TABLET, FILM COATED ORAL at 05:54

## 2017-12-14 RX ADMIN — SENNA PLUS 2 TABLET(S): 8.6 TABLET ORAL at 22:18

## 2017-12-14 RX ADMIN — Medication 5 UNIT(S): at 17:09

## 2017-12-14 RX ADMIN — GABAPENTIN 100 MILLIGRAM(S): 400 CAPSULE ORAL at 13:00

## 2017-12-14 RX ADMIN — Medication 4: at 17:09

## 2017-12-14 RX ADMIN — Medication 81 MILLIGRAM(S): at 11:22

## 2017-12-14 RX ADMIN — Medication 1 GRAM(S): at 11:24

## 2017-12-14 RX ADMIN — Medication 100 MILLIGRAM(S): at 11:23

## 2017-12-14 RX ADMIN — POLYETHYLENE GLYCOL 3350 17 GRAM(S): 17 POWDER, FOR SOLUTION ORAL at 05:54

## 2017-12-14 RX ADMIN — Medication 2: at 08:31

## 2017-12-14 RX ADMIN — GABAPENTIN 100 MILLIGRAM(S): 400 CAPSULE ORAL at 22:19

## 2017-12-14 RX ADMIN — Medication 4: at 11:55

## 2017-12-14 RX ADMIN — Medication 100 MILLIGRAM(S): at 22:18

## 2017-12-14 RX ADMIN — TAMSULOSIN HYDROCHLORIDE 0.4 MILLIGRAM(S): 0.4 CAPSULE ORAL at 22:18

## 2017-12-14 RX ADMIN — BUDESONIDE AND FORMOTEROL FUMARATE DIHYDRATE 2 PUFF(S): 160; 4.5 AEROSOL RESPIRATORY (INHALATION) at 22:18

## 2017-12-14 RX ADMIN — POLYETHYLENE GLYCOL 3350 17 GRAM(S): 17 POWDER, FOR SOLUTION ORAL at 17:09

## 2017-12-14 RX ADMIN — GABAPENTIN 100 MILLIGRAM(S): 400 CAPSULE ORAL at 05:54

## 2017-12-14 RX ADMIN — INSULIN GLARGINE 7 UNIT(S): 100 INJECTION, SOLUTION SUBCUTANEOUS at 22:18

## 2017-12-14 RX ADMIN — Medication 30 MILLILITER(S): at 20:07

## 2017-12-14 RX ADMIN — OXYCODONE HYDROCHLORIDE 10 MILLIGRAM(S): 5 TABLET ORAL at 20:07

## 2017-12-14 RX ADMIN — Medication 30 MILLILITER(S): at 09:08

## 2017-12-14 RX ADMIN — ENOXAPARIN SODIUM 40 MILLIGRAM(S): 100 INJECTION SUBCUTANEOUS at 11:22

## 2017-12-14 RX ADMIN — BUDESONIDE AND FORMOTEROL FUMARATE DIHYDRATE 2 PUFF(S): 160; 4.5 AEROSOL RESPIRATORY (INHALATION) at 08:35

## 2017-12-14 RX ADMIN — VALACYCLOVIR 2000 MILLIGRAM(S): 500 TABLET, FILM COATED ORAL at 17:09

## 2017-12-14 NOTE — CONSULT NOTE ADULT - SUBJECTIVE AND OBJECTIVE BOX
HPI:  Pt is a 61 y.o. male ; pt states 9/17 ; noted back pain ; pt to pcp ; a sonogram and a c/t scam were done ; pt and partner spouse ; jaundice noted 3 weeks ago ; " there is an elevation of liver enzymes " Pt admitted to [facility] ; a w/u was done " there was a blockage in the pancreas; an ERCP was done ; a biopsy was done ; pt to surgeon ; pt now presents for Whipple     Pt reports initial decrease in appetite ; pt reports 25 lb weight loss (15 Nov 2017 14:39)  Patient has history of diabetes, on insulin at home, no recent hypoglycemic episodes, no polyuria polydipsia. Patient follows up with PCP for diabetes management.    PAST MEDICAL & SURGICAL HISTORY:  Bronchitis: 1 month ago  Herpes: lips  Fever: 104  11/11/17 ; pt admitted to [facility]  Asthma  GERD (gastroesophageal reflux disease)  BPH (benign prostatic hyperplasia)  Adenocarcinoma of pancreas  HTN (hypertension)  Diabetes:  @ 10 :40 this am  High cholesterol  H/O circumcision  S/P ERCP: 11/17      FAMILY HISTORY:  Family history of colon cancer (Mother)      Social History:    Outpatient Medications:    MEDICATIONS  (STANDING):  aspirin  chewable 81 milliGRAM(s) Oral daily  buDESOnide  80 MICROgram(s)/formoterol 4.5 MICROgram(s) Inhaler 2 Puff(s) Inhalation two times a day  dextrose 5%. 1000 milliLiter(s) (50 mL/Hr) IV Continuous <Continuous>  dextrose 50% Injectable 12.5 Gram(s) IV Push once  dextrose 50% Injectable 25 Gram(s) IV Push once  dextrose 50% Injectable 25 Gram(s) IV Push once  docusate sodium 100 milliGRAM(s) Oral three times a day  enoxaparin Injectable 40 milliGRAM(s) SubCutaneous daily  gabapentin   Solution 100 milliGRAM(s) Oral every 8 hours  insulin lispro (HumaLOG) corrective regimen sliding scale   SubCutaneous three times a day before meals  insulin lispro (HumaLOG) corrective regimen sliding scale   SubCutaneous at bedtime  metoprolol succinate ER 12.5 milliGRAM(s) Oral daily  pantoprazole    Tablet 40 milliGRAM(s) Oral before breakfast  polyethylene glycol 3350 17 Gram(s) Oral two times a day  senna 2 Tablet(s) Oral at bedtime  tamsulosin 0.4 milliGRAM(s) Oral at bedtime  valACYclovir 2000 milliGRAM(s) Oral two times a day    MEDICATIONS  (PRN):  dextrose Gel 1 Dose(s) Oral once PRN Blood Glucose LESS THAN 70 milliGRAM(s)/deciliter  glucagon  Injectable 1 milliGRAM(s) IntraMuscular once PRN Glucose LESS THAN 70 milligrams/deciliter  ondansetron Injectable 4 milliGRAM(s) IV Push once PRN Nausea and/or Vomiting  oxyCODONE    Solution 10 milliGRAM(s) Oral every 3 hours PRN Moderate and Severe Pain  simethicone 80 milliGRAM(s) Chew two times a day PRN Gas      Allergies    No Known Allergies    Intolerances      Review of Systems:  Constitutional: No fever, no chills  Eyes: No blurry vision  Neuro: No tremors  HEENT: No pain, no neck swelling  Cardiovascular: No chest pain, no palpitations  Respiratory: Has SOB, no cough  GI: PEG No nausea, vomiting, abdominal pain  : No dysuria  Skin: no rash  MSK: Has leg swelling, no foot ulcers.  Psych: no depression  Endocrine: no polyuria, polydipsia    ALL OTHER SYSTEMS REVIEWED AND NEGATIVE    UNABLE TO OBTAIN    PHYSICAL EXAM:  VITALS: T(C): 36.7 (12-13-17 @ 16:36)  T(F): 98.1 (12-13-17 @ 16:36), Max: 98.7 (12-12-17 @ 19:43)  HR: 99 (12-13-17 @ 16:36) (84 - 99)  BP: 110/81 (12-13-17 @ 16:36) (110/71 - 119/66)  RR:  (18 - 18)  SpO2:  (96% - 99%)  Wt(kg): --  GENERAL: NAD, well-groomed, well-developed  EYES: No proptosis, no lid lag  HEENT:  Atraumatic, Normocephalic  THYROID: Normal size, no palpable nodules  RESPIRATORY: Clear to auscultation bilaterally; No rales, rhonchi, wheezing  CARDIOVASCULAR: Si S2, No murmurs;  GI: Soft, non distended, normal bowel sounds  SKIN: Dry, intact, No rashes or lesions  MUSCULOSKELETAL: Has BL lower extremity edema.  NEURO:  no tremor, sensation decreased in feet BL,    POCT Blood Glucose.: 171 mg/dL (12-13-17 @ 16:32)  POCT Blood Glucose.: 176 mg/dL (12-12-17 @ 21:30)  POCT Blood Glucose.: 193 mg/dL (12-12-17 @ 16:22)  POCT Blood Glucose.: 193 mg/dL (12-12-17 @ 12:47)  POCT Blood Glucose.: 166 mg/dL (12-12-17 @ 08:07)  POCT Blood Glucose.: 241 mg/dL (12-11-17 @ 21:59)  POCT Blood Glucose.: 174 mg/dL (12-11-17 @ 16:35)  POCT Blood Glucose.: 160 mg/dL (12-11-17 @ 11:49)  POCT Blood Glucose.: 202 mg/dL (12-11-17 @ 08:03)  POCT Blood Glucose.: 172 mg/dL (12-10-17 @ 21:40)  POCT Blood Glucose.: 176 mg/dL (12-10-17 @ 17:00)                            13.6   11.64 )-----------( 763      ( 13 Dec 2017 05:59 )             40.9       12-13    136  |  95<L>  |  8   ----------------------------<  211<H>  4.9   |  28  |  0.89    EGFR if : 108  EGFR if non : 93    Ca    8.9      12-13  Mg     2.1     12-13  Phos  3.5     12-13    TPro  x   /  Alb  x   /  TBili  0.8  /  DBili  0.4<H>  /  AST  x   /  ALT  x   /  AlkPhos  x   12-13      Thyroid Function Tests:      Hemoglobin A1C, Whole Blood: 6.9 % <H> [4.0 - 5.6] (11-15-17 @ 14:30)  Hemoglobin A1C, Whole Blood: 8.1 % <H> [4.0 - 5.6] (10-24-17 @ 08:55)          Radiology:
Chief Complaint:  Patient is a 62y old  Male s/p Whipple procedure with abd pain      HPI: 60 y/o  male with hx of DM , HTN, BPH  admitted recently for jaundice found to have pnacreatic adenocarcinoma on bx . during that admission pt One plastic stent was placed into the ventral pancreatic duct then placement of fully covered metal stent  in bile duct and removal of pancreatic duct stent. . pt had a stress test which was abnormal and plan for f/u as o/p post surgical intervention by Dr. Curry.  Pt is a  pt now presents for Whipple and s/p surgical intervention 11/30/17  Pt states he experiences abd pain which is attributed to change in position/ambulation  denies nausea/vomiting/dysphagia/blood in stools. (+) BMs without difficulty    Allergies:  No Known Allergies      Medications:  acetaminophen   Tablet. 650 milliGRAM(s) Oral every 4 hours PRN  aluminum hydroxide/magnesium hydroxide/simethicone Suspension 30 milliLiter(s) Oral every 4 hours PRN  aspirin  chewable 81 milliGRAM(s) Oral daily  buDESOnide  80 MICROgram(s)/formoterol 4.5 MICROgram(s) Inhaler 2 Puff(s) Inhalation two times a day  dextrose 5%. 1000 milliLiter(s) IV Continuous <Continuous>  dextrose 50% Injectable 12.5 Gram(s) IV Push once  dextrose 50% Injectable 25 Gram(s) IV Push once  dextrose 50% Injectable 25 Gram(s) IV Push once  dextrose Gel 1 Dose(s) Oral once PRN  docusate sodium 100 milliGRAM(s) Oral three times a day  enoxaparin Injectable 40 milliGRAM(s) SubCutaneous daily  gabapentin 100 milliGRAM(s) Oral every 8 hours  glucagon  Injectable 1 milliGRAM(s) IntraMuscular once PRN  insulin glargine Injectable (LANTUS) 7 Unit(s) SubCutaneous at bedtime  insulin lispro (HumaLOG) corrective regimen sliding scale   SubCutaneous three times a day before meals  insulin lispro (HumaLOG) corrective regimen sliding scale   SubCutaneous at bedtime  metoprolol succinate ER 12.5 milliGRAM(s) Oral daily  ondansetron Injectable 4 milliGRAM(s) IV Push once PRN  oxyCODONE    IR 5 milliGRAM(s) Oral every 4 hours PRN  oxyCODONE    IR 10 milliGRAM(s) Oral every 4 hours PRN  oxyCODONE    Solution 10 milliGRAM(s) Oral every 3 hours PRN  pantoprazole    Tablet 40 milliGRAM(s) Oral before breakfast  polyethylene glycol 3350 17 Gram(s) Oral two times a day  senna 2 Tablet(s) Oral at bedtime  simethicone 80 milliGRAM(s) Chew two times a day PRN  sucralfate 1 Gram(s) Oral every 12 hours  tamsulosin 0.4 milliGRAM(s) Oral at bedtime  valACYclovir 2000 milliGRAM(s) Oral two times a day      PMHX/PSHX:  Bronchitis  Herpes  Fever  Asthma  GERD (gastroesophageal reflux disease)  BPH (benign prostatic hyperplasia)  Adenocarcinoma of pancreas  HTN (hypertension)  Diabetes  High cholesterol  H/O circumcision  S/P ERCP  No significant past surgical history      Family history:  Family history of colon cancer (Mother)  No pertinent family history in first degree relatives      Social History:     ROS:     General:  No wt loss, fevers, chills, night sweats, fatigue,   Eyes:  Good vision, no reported pain  ENT:  No sore throat, pain, runny nose, dysphagia  CV:  No pain, palpitations, hypo/hypertension  Resp:  No dyspnea, cough, tachypnea, wheezing  GI:  + pain, No nausea, No vomiting, No diarrhea, No constipation, No weight loss, No fever, No pruritis, No rectal bleeding, No tarry stools, No dysphagia,  :  No pain, bleeding, incontinence, nocturia  Muscle:  No pain, weakness  Neuro:  No weakness, tingling, memory problems  Psych:  No fatigue, insomnia, mood problems, depression  Endocrine:  No polyuria, polydipsia, cold/heat intolerance  Heme:  No petechiae, ecchymosis, easy bruisability  Skin:  No rash, tattoos, scars, edema      PHYSICAL EXAM:   Vital Signs:  Vital Signs Last 24 Hrs  T(C): 36.7 (14 Dec 2017 12:00), Max: 36.9 (14 Dec 2017 07:44)  T(F): 98 (14 Dec 2017 12:00), Max: 98.4 (14 Dec 2017 07:44)  HR: 100 (14 Dec 2017 12:00) (77 - 100)  BP: 112/67 (14 Dec 2017 12:00) (106/64 - 123/68)  BP(mean): --  RR: 16 (14 Dec 2017 12:00) (16 - 18)  SpO2: 98% (14 Dec 2017 12:00) (97% - 100%)  Daily     Daily     GENERAL:  Appears stated age, well-groomed, well-nourished, no distress  HEENT:  NC/AT,  conjunctivae clear and pink, no thyromegaly, nodules, adenopathy, no JVD, sclera -anicteric  CHEST:  Full & symmetric excursion, no increased effort, breath sounds clear  HEART:  Regular rhythm, S1, S2, no murmur/rub/S3/S4, no abdominal bruit, no edema  ABDOMEN:  Soft, mild tenderness at incision site, non-distended, normoactive bowel sounds,  no masses ,no hepato-splenomegaly, no signs of chronic liver disease, (+) isidoro, (+) j tube  EXTEREMITIES:  no cyanosis,clubbing or edema  SKIN:  No rash/erythema/ecchymoses/petechiae/wounds/abscess/warm/dry  NEURO:  Alert, oriented, no asterixis, no tremor, no encephalopathy    LABS:                        13.7   10.54 )-----------( 784      ( 14 Dec 2017 05:32 )             40.5     12-13    136  |  95<L>  |  8   ----------------------------<  211<H>  4.9   |  28  |  0.89    Ca    8.9      13 Dec 2017 05:59  Phos  3.5     12-13  Mg     2.1     12-13    TPro  x   /  Alb  x   /  TBili  0.8  /  DBili  0.4<H>  /  AST  x   /  ALT  x   /  AlkPhos  x   12-13              Imaging:
HPI: 60 y/o  male with hx of DM , HTN, BPH  admitted recently for jaundice found to have pnacreatic adenocarcinoma on bx . during that admission pt One plastic stent was placed into the ventral pancreatic duct then placement of fully covered metal stent  in bile duct and removal of pancreatic duct stent. . pt had a stress test which was abnormal and plan for f/u as o/p post surgical intervention by Dr. Curry.  Pt is a  pt now presents for Whipple and s/p surgical intervention           REVIEW OF SYSTEMS:    CONSTITUTIONAL: No weakness, fevers or chills  RESPIRATORY: No cough, wheezing, hemoptysis; No shortness of breath  CARDIOVASCULAR: No chest pain or palpitations  GASTROINTESTINAL: + abdo pain   distention  no gas and NO n/V   GENITOURINARY: No dysuria, frequency or hematuria  NEUROLOGICAL: No numbness or weakness  SKIN: No itching, burning, rashes, or lesions         Medications:   MEDICATIONS  (STANDING):  acetaminophen  IVPB. 1000 milliGRAM(s) IV Intermittent every 6 hours  dextrose 5%. 1000 milliLiter(s) (50 mL/Hr) IV Continuous <Continuous>  dextrose 50% Injectable 12.5 Gram(s) IV Push once  dextrose 50% Injectable 25 Gram(s) IV Push once  dextrose 50% Injectable 25 Gram(s) IV Push once  heparin  Injectable 5000 Unit(s) SubCutaneous every 8 hours  HYDROmorphone (10 MICROgram(s)/mL) + BUpivacaine 0.0625% in 0.9% Sodium Chloride PCEA 250 milliLiter(s) Epidural PCA Continuous  insulin lispro (HumaLOG) corrective regimen sliding scale   SubCutaneous every 6 hours  lactated ringers. 1000 milliLiter(s) (100 mL/Hr) IV Continuous <Continuous>  metoprolol    tartrate Injectable 5 milliGRAM(s) IV Push every 6 hours  pantoprazole  Injectable 40 milliGRAM(s) IV Push daily    MEDICATIONS  (PRN):  dextrose Gel 1 Dose(s) Oral once PRN Blood Glucose LESS THAN 70 milliGRAM(s)/deciliter  glucagon  Injectable 1 milliGRAM(s) IntraMuscular once PRN Glucose LESS THAN 70 milligrams/deciliter  HYDROmorphone  Injectable 0.5 milliGRAM(s) IV Push every 3 hours PRN Severe Pain unrelieved by PCEA  HYDROmorphone (10 MICROgram(s)/mL) + BUpivacaine 0.0625% in 0.9% Sodium Chloride PCEA Rescue Clinician  Bolus 4 milliLiter(s) Epidural every 15 minutes PRN for Pain Score greater than 6  naloxone Injectable 0.1 milliGRAM(s) IV Push every 3 minutes PRN For ANY of the following changes in patient status:  A. RR LESS THAN 10 breaths per minute, B. Oxygen saturation LESS THAN 90%, C. Sedation score of 6  ondansetron Injectable 4 milliGRAM(s) IV Push every 6 hours PRN Nausea  ondansetron Injectable 4 milliGRAM(s) IV Push once PRN Nausea and/or Vomiting      Allergies    No Known Allergies    Intolerances        PAST MEDICAL & SURGICAL HISTORY:  Bronchitis: 1 month ago  Herpes: lips  Fever: 104  11/11/17 ; pt admitted to [facility]  Asthma  GERD (gastroesophageal reflux disease)  BPH (benign prostatic hyperplasia)  Adenocarcinoma of pancreas  HTN (hypertension)  Diabetes:  @ 10 :40 this am  High cholesterol  H/O circumcision  S/P ERCP: 11/17      Social :    No smoking       No ETOH use            Vital Signs Last 24 Hrs  T(C): 37.2 (01 Dec 2017 12:04), Max: 37.4 (01 Dec 2017 00:00)  T(F): 99 (01 Dec 2017 12:04), Max: 99.3 (01 Dec 2017 00:00)  HR: 94 (01 Dec 2017 12:41) (94 - 121)  BP: 119/74 (01 Dec 2017 12:41) (94/56 - 127/70)  BP(mean): 74 (01 Dec 2017 08:18) (74 - 78)  RR: 18 (01 Dec 2017 12:04) (12 - 26)  SpO2: 91% (01 Dec 2017 12:04) (90% - 100%)  CAPILLARY BLOOD GLUCOSE      POCT Blood Glucose.: 146 mg/dL (01 Dec 2017 12:02)  POCT Blood Glucose.: 120 mg/dL (01 Dec 2017 05:41)  POCT Blood Glucose.: 137 mg/dL (01 Dec 2017 00:05)      11-30 @ 07:01  -  12-01 @ 07:00  --------------------------------------------------------  IN: 2200 mL / OUT: 1840 mL / NET: 360 mL    12-01 @ 07:01  -  12-01 @ 15:14  --------------------------------------------------------  IN: 1750 mL / OUT: 677.5 mL / NET: 1072.5 mL        Physical Exam:    General:  NAD   HEENT:  Nonicteric, PERRLA  CV:  RRR, no murmur, no JVD  Lungs:  CTA B/L, no wheezes, rales, rhonchi  Abdomen:  distended  soft   tender  Extremities:  2+ pulses, no c/c, no edema  Skin:  Warm and dry, no rashes  :  No newberry  Neuro:  AAOx3, non-focal, CN II-XII grossly intact  No Restraints    LABS:                        13.0   8.43  )-----------( 200      ( 01 Dec 2017 05:19 )             39.9     12-01    136  |  101  |  10  ----------------------------<  133<H>  4.2   |  27  |  1.08    Ca    8.8      01 Dec 2017 05:19  Phos  4.8     12-01  Mg     1.4     12-01    TPro  5.4<L>  /  Alb  3.1<L>  /  TBili  2.6<H>  /  DBili  0.8<H>  /  AST  50<H>  /  ALT  45<H>  /  AlkPhos  86  12-01    PT/INR - ( 01 Dec 2017 08:46 )   PT: 16.7 SEC;   INR: 1.48          PTT - ( 01 Dec 2017 08:46 )  PTT:32.5 SEC            RADIOLOGY & ADDITIONAL TESTS:    ---------------------------------------------------------------------------  I personally reviewed: [  ]EKG   [  ]CXR    [  ] CT    [  ]Other  ---------------------------------------------------------------------------
JENIFER NORTH  MRN-[id number]    Patient is a 61y old  Male who presents with a chief complaint of "I have a growth on my pancreas" (15 Nov 2017 14:39)      HPI  Patient known to me from his prior admission.  He is a 61-year-old male who had presented with painless juandice at that time.  He was noted to have a mass in the head of the pancreas which was biopsied and did report adenocarcinoma.  He did have a metastatic work-up which was negative for distant or locally advanced disease.   He did have stents placed and the bilirubin is lower, patient subsequently admitted with fever and chills. Treated with abx.  Patient admitted for surgery. Now s/p Whipple.   Patient reports discomfort in area of incision. Otherwise without complaints,.       Past Medical History  Bronchitis: 1 month ago  Herpes: lips  Fever: 104  11/11/17 ; pt admitted to [facility]  Asthma  GERD (gastroesophageal reflux disease)  BPH (benign prostatic hyperplasia)  Adenocarcinoma of pancreas  HTN (hypertension)  Diabetes:  @ 10 :40 this am  High cholesterol  H/O circumcision  S/P ERCP: 11/17      Current Meds  MEDICATIONS  (STANDING):  dextrose 5%. 1000 milliLiter(s) (50 mL/Hr) IV Continuous <Continuous>  dextrose 50% Injectable 12.5 Gram(s) IV Push once  dextrose 50% Injectable 25 Gram(s) IV Push once  dextrose 50% Injectable 25 Gram(s) IV Push once  heparin  Injectable 5000 Unit(s) SubCutaneous every 8 hours  HYDROmorphone (10 MICROgram(s)/mL) + BUpivacaine 0.0625% in 0.9% Sodium Chloride PCEA 250 milliLiter(s) Epidural PCA Continuous  insulin lispro (HumaLOG) corrective regimen sliding scale   SubCutaneous every 6 hours  lactated ringers. 1000 milliLiter(s) (100 mL/Hr) IV Continuous <Continuous>  metoprolol    tartrate Injectable 5 milliGRAM(s) IV Push every 6 hours  pantoprazole  Injectable 40 milliGRAM(s) IV Push daily    MEDICATIONS  (PRN):  dextrose Gel 1 Dose(s) Oral once PRN Blood Glucose LESS THAN 70 milliGRAM(s)/deciliter  fentaNYL    Injectable 25 MICROGram(s) IV Push every 5 minutes PRN Moderate Pain  glucagon  Injectable 1 milliGRAM(s) IntraMuscular once PRN Glucose LESS THAN 70 milligrams/deciliter  HYDROmorphone  Injectable 0.5 milliGRAM(s) IV Push every 10 minutes PRN Severe Pain (7 - 10)  HYDROmorphone (10 MICROgram(s)/mL) + BUpivacaine 0.0625% in 0.9% Sodium Chloride PCEA Rescue Clinician  Bolus 4 milliLiter(s) Epidural every 15 minutes PRN for Pain Score greater than 6  naloxone Injectable 0.1 milliGRAM(s) IV Push every 3 minutes PRN For ANY of the following changes in patient status:  A. RR LESS THAN 10 breaths per minute, B. Oxygen saturation LESS THAN 90%, C. Sedation score of 6  ondansetron Injectable 4 milliGRAM(s) IV Push every 6 hours PRN Nausea  ondansetron Injectable 4 milliGRAM(s) IV Push once PRN Nausea and/or Vomiting      Allergies    No Known Allergies    Intolerances        Social History  , Retired. No tob.  No etoh    Family History  Family history of colon cancer (Mother)      Review of System      General:	Denies fatigue, fevers, chills, sweats, decreased appetite.    Skin/Breast: denies pruritis, rash  	  Ophthalmologic: no change in vision or blurring  	  HEENT	Denies dry mouth, oral sores, dysphagia,  change in hearing. NGT in place    Respiratory and Thorax:  no cough, sob, wheeze, hemoptysis  	  Cardiovascular:	no cp , palp, orthopnea    Gastrointestinal:	post-op abdominal discomfort    Genitourinary:	no dysuria of frequency, no hematuria, no flank pain    Musculoskeletal:	no bone or joint pain. no muscle aches.     Neurological:	no change in sensory or motor function. no headache. no weakness.     Psychiatric:	no depression, no anxiety, insomnia.     Hematology/Lymphatics:	no bleeding or bruising        Vitals  Vital Signs Last 24 Hrs  T(C): 37.1 (01 Dec 2017 05:02), Max: 37.4 (01 Dec 2017 00:00)  T(F): 98.8 (01 Dec 2017 05:02), Max: 99.3 (01 Dec 2017 00:00)  HR: 113 (01 Dec 2017 05:02) (98 - 120)  BP: 107/71 (01 Dec 2017 05:02) (94/56 - 121/60)  BP(mean): --  RR: 19 (01 Dec 2017 05:02) (12 - 26)  SpO2: 95% (01 Dec 2017 05:02) (91% - 100%)    Physical Exam    Constitutional: NAD    Eyes: PERRLA EOMI, anicteric sclera    Heent :No oral sores, no pharyngeal injection. moist mucosa. NGT in place    Neck: supple, no jvd, no LAD    Respiratory: CTA b/l     Cardiovascular: s1s2, no m/g/r    Gastrointestinal: soft, nt, nd, + BS    Extremities: no c/c/e    Neurological:A&O x 3 moves all ext.    Skin: no rash on exposed skin    Lymph Nodes: no lymphadenopathy.    Lab  CBC Full  -  ( 01 Dec 2017 05:19 )  WBC Count : 8.43 K/uL  Hemoglobin : 13.0 g/dL  Hematocrit : 39.9 %  Platelet Count - Automated : 200 K/uL  Mean Cell Volume : 99.3 fL  Mean Cell Hemoglobin : 32.3 pg  Mean Cell Hemoglobin Concentration : 32.6 %  Auto Neutrophil # : 6.94 K/uL  Auto Lymphocyte # : 0.86 K/uL  Auto Monocyte # : 0.57 K/uL  Auto Eosinophil # : 0.00 K/uL  Auto Basophil # : 0.03 K/uL  Auto Neutrophil % : 82.2 %  Auto Lymphocyte % : 10.2 %  Auto Monocyte % : 6.8 %  Auto Eosinophil % : 0.0 %  Auto Basophil % : 0.4 %    12-01    136  |  101  |  10  ----------------------------<  133<H>  4.2   |  27  |  1.08    Ca    8.8      01 Dec 2017 05:19  Phos  4.8     12-01  Mg     1.4     12-01    TPro  5.4<L>  /  Alb  3.1<L>  /  TBili  2.6<H>  /  DBili  0.8<H>  /  AST  50<H>  /  ALT  45<H>  /  AlkPhos  86  12-01        Rad:    Assessment/Plan

## 2017-12-14 NOTE — PROGRESS NOTE ADULT - SUBJECTIVE AND OBJECTIVE BOX
Chief complaint  Patient is a 62y old  Male who presents with a chief complaint of Whipple procedure (13 Dec 2017 09:06)   Review of systems  Patient in bed, looks comfortable, no fever, no hypoglycemia.    Labs and Fingersticks  CAPILLARY BLOOD GLUCOSE      POCT Blood Glucose.: 201 mg/dL (14 Dec 2017 11:46)  POCT Blood Glucose.: 195 mg/dL (14 Dec 2017 08:21)  POCT Blood Glucose.: 174 mg/dL (13 Dec 2017 21:46)  POCT Blood Glucose.: 171 mg/dL (13 Dec 2017 16:32)      Calcium, Total Serum: 8.9 (12-13 @ 05:59)      12-13    136  |  95<L>  |  8   ----------------------------<  211<H>  4.9   |  28  |  0.89    Ca    8.9      13 Dec 2017 05:59  Phos  3.5     12-13  Mg     2.1     12-13    TPro  x   /  Alb  x   /  TBili  0.8  /  DBili  0.4<H>  /  AST  x   /  ALT  x   /  AlkPhos  x   12-13                        13.7   10.54 )-----------( 784      ( 14 Dec 2017 05:32 )             40.5     Medications  MEDICATIONS  (STANDING):  aspirin  chewable 81 milliGRAM(s) Oral daily  buDESOnide  80 MICROgram(s)/formoterol 4.5 MICROgram(s) Inhaler 2 Puff(s) Inhalation two times a day  dextrose 5%. 1000 milliLiter(s) (50 mL/Hr) IV Continuous <Continuous>  dextrose 50% Injectable 12.5 Gram(s) IV Push once  dextrose 50% Injectable 25 Gram(s) IV Push once  dextrose 50% Injectable 25 Gram(s) IV Push once  docusate sodium 100 milliGRAM(s) Oral three times a day  enoxaparin Injectable 40 milliGRAM(s) SubCutaneous daily  gabapentin 100 milliGRAM(s) Oral every 8 hours  insulin glargine Injectable (LANTUS) 7 Unit(s) SubCutaneous at bedtime  insulin lispro (HumaLOG) corrective regimen sliding scale   SubCutaneous three times a day before meals  insulin lispro (HumaLOG) corrective regimen sliding scale   SubCutaneous at bedtime  metoprolol succinate ER 12.5 milliGRAM(s) Oral daily  pantoprazole    Tablet 40 milliGRAM(s) Oral before breakfast  polyethylene glycol 3350 17 Gram(s) Oral two times a day  senna 2 Tablet(s) Oral at bedtime  sucralfate 1 Gram(s) Oral every 12 hours  tamsulosin 0.4 milliGRAM(s) Oral at bedtime  valACYclovir 2000 milliGRAM(s) Oral two times a day      Physical Exam  General: Patient comfortable in bed  Vital Signs Last 12 Hrs  T(F): 98 (12-14-17 @ 12:00), Max: 98.4 (12-14-17 @ 07:44)  HR: 100 (12-14-17 @ 12:00) (77 - 100)  BP: 112/67 (12-14-17 @ 12:00) (106/64 - 123/68)  BP(mean): --  RR: 16 (12-14-17 @ 12:00) (16 - 16)  SpO2: 98% (12-14-17 @ 12:00) (97% - 98%)  Neck: No palpable thyroid nodules.  CVS: S1S2, No murmurs  Respiratory: No wheezing, no crepitations  GI: Abdomen soft, bowel sounds positive  Musculoskeletal: Positive edema lower extremities.   Skin: No skin rashes, no ecchymosis    Diagnostics

## 2017-12-14 NOTE — PROGRESS NOTE ADULT - ASSESSMENT
Assessment  DMT2: 62y Male with DM T2 with hyperglycemia on insulin, blood sugars running not at target. no hypoglycemia,  eating meals,  compliant with low carb diet.  Adenocarcinoma of Pancreas: S/P Ernestina, fern PEG tube, eating partial meal in addition to PEG/JT feeds.  HTN: Controlled, On med.  Asthma: Stable.

## 2017-12-14 NOTE — PROGRESS NOTE ADULT - SUBJECTIVE AND OBJECTIVE BOX
D TEAM SURGERY DAILY PROGRESS NOTE:    S: Patient seen and examined. No acute events overnight. Yesterday we changed his tube feeds to run only overnight. He is tolerating a regular diet PO. He is positive for flatus and bowel movements and endorses a "sore belly."    O:  Vital Signs Last 24 Hrs  T(C): 36.8 (13 Dec 2017 23:57), Max: 36.9 (13 Dec 2017 09:08)  T(F): 98.2 (13 Dec 2017 23:57), Max: 98.5 (13 Dec 2017 09:08)  HR: 86 (13 Dec 2017 23:57) (86 - 99)  BP: 108/73 (13 Dec 2017 23:57) (108/73 - 122/72)  BP(mean): --  RR: 18 (13 Dec 2017 23:57) (16 - 19)  SpO2: 97% (13 Dec 2017 23:57) (96% - 100%)    I&O's Detail    12 Dec 2017 07:01  -  13 Dec 2017 07:00  --------------------------------------------------------  IN:    dextrose 5% + sodium chloride 0.45% with potassium chloride 20 mEq/L: 150 mL    Glucerna: 1040 mL    Oral Fluid: 360 mL  Total IN: 1550 mL    OUT:    Bulb: 80 mL    Voided: 3300 mL  Total OUT: 3380 mL    Total NET: -1830 mL      13 Dec 2017 07:01  -  14 Dec 2017 03:55  --------------------------------------------------------  IN:    Glucerna: 495 mL    Oral Fluid: 940 mL  Total IN: 1435 mL    OUT:    Bulb: 330 mL    Voided: 1825 mL  Total OUT: 2155 mL    Total NET: -720 mL    MEDICATIONS  (STANDING):  aspirin  chewable 81 milliGRAM(s) Oral daily  buDESOnide  80 MICROgram(s)/formoterol 4.5 MICROgram(s) Inhaler 2 Puff(s) Inhalation two times a day  dextrose 5%. 1000 milliLiter(s) (50 mL/Hr) IV Continuous <Continuous>  dextrose 50% Injectable 12.5 Gram(s) IV Push once  dextrose 50% Injectable 25 Gram(s) IV Push once  dextrose 50% Injectable 25 Gram(s) IV Push once  docusate sodium 100 milliGRAM(s) Oral three times a day  enoxaparin Injectable 40 milliGRAM(s) SubCutaneous daily  gabapentin   Solution 100 milliGRAM(s) Oral every 8 hours  insulin lispro (HumaLOG) corrective regimen sliding scale   SubCutaneous three times a day before meals  insulin lispro (HumaLOG) corrective regimen sliding scale   SubCutaneous at bedtime  metoprolol succinate ER 12.5 milliGRAM(s) Oral daily  pantoprazole    Tablet 40 milliGRAM(s) Oral before breakfast  polyethylene glycol 3350 17 Gram(s) Oral two times a day  senna 2 Tablet(s) Oral at bedtime  tamsulosin 0.4 milliGRAM(s) Oral at bedtime  valACYclovir 2000 milliGRAM(s) Oral two times a day    MEDICATIONS  (PRN):  dextrose Gel 1 Dose(s) Oral once PRN Blood Glucose LESS THAN 70 milliGRAM(s)/deciliter  glucagon  Injectable 1 milliGRAM(s) IntraMuscular once PRN Glucose LESS THAN 70 milligrams/deciliter  ondansetron Injectable 4 milliGRAM(s) IV Push once PRN Nausea and/or Vomiting  oxyCODONE    Solution 10 milliGRAM(s) Oral every 3 hours PRN Moderate and Severe Pain  simethicone 80 milliGRAM(s) Chew two times a day PRN Gas                        13.6   11.64 )-----------( 763      ( 13 Dec 2017 05:59 )             40.9     12-13    136  |  95<L>  |  8   ----------------------------<  211<H>  4.9   |  28  |  0.89    Ca    8.9      13 Dec 2017 05:59  Phos  3.5     12-13  Mg     2.1     12-13    TPro  x   /  Alb  x   /  TBili  0.8  /  DBili  0.4<H>  /  AST  x   /  ALT  x   /  AlkPhos  x   12-13    Physical Exam:  Gen: Laying in bed, NAD, alert and oriented.   Resp: Unlabored breathing  Abd: softly distended, 1/4" packing changed, MUNA with pancreatic fluid    Lines:   IV: patent, in place.

## 2017-12-14 NOTE — PROGRESS NOTE ADULT - ASSESSMENT
Patient will need out-pt chemo and RT for pancreatic cancer.  he is aware.    Thrombocytosis reactive.  Should resolve over next few weeks.  Will monitor.

## 2017-12-14 NOTE — PROGRESS NOTE ADULT - ASSESSMENT
60 y/o  male with hx of DM , HTN, BPH  admitted recently for jaundice found to have pnacreatic adenocarcinoma on bx . during that admission pt One plastic stent was placed into the ventral pancreatic duct then placement of fully covered metal stent  in bile duct and removal of pancreatic duct stent. . pt had a stress test which was abnormal and plan for f/u as o/p post surgical intervention by Dr. Curry.  Pt is a  pt now presents for Whipple and s/p surgical intervention   1- Pancreatic CA : s/p whipple   oob ,   discussed with PA : at this time pt is on J tube feeds with Oral feeds ... d/w  : plan for  dc homme on oral feeds only   pt with improved  Nausea and abd discomfort    CT done : no acute pathology   f/u with Dr. Manzanares  : will need adjuvant chemo given positive margins     2- DM: monitor FS while npo ... acceptable FS ..  cont lantus and humalog    d/w      3- HTN: had episodes of hypotension while on ARB/ACE... cont meds with bb        4- abn stress : need f/u as o/p.   5-BPH: flomax    6- factor.V. 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     f/u with heme  7 fever : no further episodes   .. would hold off on abx . if recurrs  would check CXR , blood cutlure   UA and US duplex r/o dvt   pt now mild  leukocytoiss however no signs of infection... suspect sec to mild dehydration (intravasculary ) ... monitor for now ... if febrile would pan culture     8- edema: likely sec to hypoalbuminemia   .  US duplex: neg for DVT   9- hyponatremia : improved   monitor   10-  thrombocytoiss   : likely reactive   monitor       cont DVT prophy,IS   oob to chair   PT   d/w pt at bedside   d/w    D/w

## 2017-12-14 NOTE — PROGRESS NOTE ADULT - SUBJECTIVE AND OBJECTIVE BOX
Patient is a 62y old  Male who presents with a chief complaint of Whipple procedure (13 Dec 2017 09:06)    Some fatigue.  mild abd soreness.  Positive PO.  Positive BM.  No fevers, CP, SOB, N/V      Medication:   acetaminophen   Tablet. 650 milliGRAM(s) Oral every 4 hours PRN  aluminum hydroxide/magnesium hydroxide/simethicone Suspension 30 milliLiter(s) Oral every 4 hours PRN  aspirin  chewable 81 milliGRAM(s) Oral daily  buDESOnide  80 MICROgram(s)/formoterol 4.5 MICROgram(s) Inhaler 2 Puff(s) Inhalation two times a day  dextrose 5%. 1000 milliLiter(s) IV Continuous <Continuous>  dextrose 50% Injectable 12.5 Gram(s) IV Push once  dextrose 50% Injectable 25 Gram(s) IV Push once  dextrose 50% Injectable 25 Gram(s) IV Push once  dextrose Gel 1 Dose(s) Oral once PRN  docusate sodium 100 milliGRAM(s) Oral three times a day  enoxaparin Injectable 40 milliGRAM(s) SubCutaneous daily  gabapentin 100 milliGRAM(s) Oral every 8 hours  glucagon  Injectable 1 milliGRAM(s) IntraMuscular once PRN  insulin glargine Injectable (LANTUS) 7 Unit(s) SubCutaneous at bedtime  insulin lispro (HumaLOG) corrective regimen sliding scale   SubCutaneous three times a day before meals  insulin lispro (HumaLOG) corrective regimen sliding scale   SubCutaneous at bedtime  insulin lispro Injectable (HumaLOG) 5 Unit(s) SubCutaneous three times a day before meals  metoprolol succinate ER 12.5 milliGRAM(s) Oral daily  ondansetron Injectable 4 milliGRAM(s) IV Push once PRN  oxyCODONE    IR 5 milliGRAM(s) Oral every 4 hours PRN  oxyCODONE    IR 10 milliGRAM(s) Oral every 4 hours PRN  oxyCODONE    Solution 10 milliGRAM(s) Oral every 3 hours PRN  pantoprazole    Tablet 40 milliGRAM(s) Oral before breakfast  polyethylene glycol 3350 17 Gram(s) Oral two times a day  senna 2 Tablet(s) Oral at bedtime  simethicone 80 milliGRAM(s) Chew two times a day PRN  sucralfate 1 Gram(s) Oral every 12 hours  tamsulosin 0.4 milliGRAM(s) Oral at bedtime  valACYclovir 2000 milliGRAM(s) Oral two times a day      Physical exam    T(C): 37 (12-14-17 @ 16:18), Max: 37 (12-14-17 @ 16:18)  HR: 105 (12-14-17 @ 16:18) (77 - 105)  BP: 120/73 (12-14-17 @ 16:18) (106/64 - 123/68)  RR: 17 (12-14-17 @ 16:18) (16 - 18)  SpO2: 99% (12-14-17 @ 16:18) (97% - 100%)  Wt(kg): --    alert NAD  EOMI anicteric sclera  Cv s1 S2 RRR  Lungs clear B/L    Labs                      13.7   10.54 )-----------( 784      ( 14 Dec 2017 05:32 )             40.5       12-13    136  |  95<L>  |  8   ----------------------------<  211<H>  4.9   |  28  |  0.89    Ca    8.9      13 Dec 2017 05:59  Phos  3.5     12-13  Mg     2.1     12-13    TPro  x   /  Alb  x   /  TBili  0.8  /  DBili  0.4<H>  /  AST  x   /  ALT  x   /  AlkPhos  x   12-13          [id number]

## 2017-12-14 NOTE — CHART NOTE - NSCHARTNOTEFT_GEN_A_CORE
NUTRITION FOLLOW-UP: Pt states that he tolerated the overnight tube feeding well. Pt was able to eat his breakfast and tolerated that as well. Reviewed diet with Pt. Answered all questions. Pt states that the tube feeding is being discontinued for discharge.     Weight: 88 kg    Labs: POCT blood glucose: 201, 195, 174, 171    Current Diet: Consistent Carbohydrate, Glucerna 1.2 @ 55 ml/hour for 8 hours and off 16 hours    Enteral Recommendations: Small frequent meals    RD to Remain Available: Anila Hidalgo MS, RDN, CDN pager [phone]     Additional Recommendations:   1) Monitor weight, labs, po intake and skin integrity.

## 2017-12-14 NOTE — PROGRESS NOTE ADULT - ASSESSMENT
A: 60 yo gentleman s/p Whipple Procedure, POD#14:    P:  - Tolerating diet  - Pain control  - Miralax bid  - TFs via J tube @ night  - F/u GI function  - OOB/ambulate  - DVT ppx  - d/c planning

## 2017-12-14 NOTE — CONSULT NOTE ADULT - PROBLEM SELECTOR RECOMMENDATION 9
Will start him on Lantus 7units qhs, continue Humalog coverage, monitor FS FU  If sugars remain in acceptable range may DC home on current insulin regimen to FU
positional in nature; pain management  continue protonix 40 mg daily, carafate  reglan prn if patient develops symptoms of gastroparesis

## 2017-12-14 NOTE — PROGRESS NOTE ADULT - SUBJECTIVE AND OBJECTIVE BOX
Patient is a 62y old  Male who presents with a chief complaint of Whipple procedure (13 Dec 2017 09:06)                                                               INTERVAL HPI/OVERNIGHT EVENTS:    REVIEW OF SYSTEMS:     CONSTITUTIONAL: No weakness, fevers or chills  RESPIRATORY: No cough, wheezing,  No shortness of breath  CARDIOVASCULAR: No chest pain or palpitations  GASTROINTESTINAL: No abdominal pain  . No nausea, vomiting  GENITOURINARY: No dysuria, frequency or hematuria  NEUROLOGICAL: No numbness or weakness                                                                                                                                                                                                                                                                                Medications:  MEDICATIONS  (STANDING):  aspirin  chewable 81 milliGRAM(s) Oral daily  buDESOnide  80 MICROgram(s)/formoterol 4.5 MICROgram(s) Inhaler 2 Puff(s) Inhalation two times a day  dextrose 5%. 1000 milliLiter(s) (50 mL/Hr) IV Continuous <Continuous>  dextrose 50% Injectable 12.5 Gram(s) IV Push once  dextrose 50% Injectable 25 Gram(s) IV Push once  dextrose 50% Injectable 25 Gram(s) IV Push once  docusate sodium 100 milliGRAM(s) Oral three times a day  enoxaparin Injectable 40 milliGRAM(s) SubCutaneous daily  gabapentin 100 milliGRAM(s) Oral every 8 hours  insulin glargine Injectable (LANTUS) 7 Unit(s) SubCutaneous at bedtime  insulin lispro (HumaLOG) corrective regimen sliding scale   SubCutaneous three times a day before meals  insulin lispro (HumaLOG) corrective regimen sliding scale   SubCutaneous at bedtime  insulin lispro Injectable (HumaLOG) 5 Unit(s) SubCutaneous three times a day before meals  metoprolol succinate ER 12.5 milliGRAM(s) Oral daily  pantoprazole    Tablet 40 milliGRAM(s) Oral before breakfast  polyethylene glycol 3350 17 Gram(s) Oral two times a day  senna 2 Tablet(s) Oral at bedtime  sucralfate 1 Gram(s) Oral every 12 hours  tamsulosin 0.4 milliGRAM(s) Oral at bedtime  valACYclovir 2000 milliGRAM(s) Oral two times a day    MEDICATIONS  (PRN):  acetaminophen   Tablet. 650 milliGRAM(s) Oral every 4 hours PRN Mild Pain (1 - 3)  aluminum hydroxide/magnesium hydroxide/simethicone Suspension 30 milliLiter(s) Oral every 4 hours PRN Dyspepsia  dextrose Gel 1 Dose(s) Oral once PRN Blood Glucose LESS THAN 70 milliGRAM(s)/deciliter  glucagon  Injectable 1 milliGRAM(s) IntraMuscular once PRN Glucose LESS THAN 70 milligrams/deciliter  ondansetron Injectable 4 milliGRAM(s) IV Push once PRN Nausea and/or Vomiting  oxyCODONE    IR 5 milliGRAM(s) Oral every 4 hours PRN Moderate Pain (4 - 6)  oxyCODONE    IR 10 milliGRAM(s) Oral every 4 hours PRN Severe Pain (7 - 10)  oxyCODONE    Solution 10 milliGRAM(s) Oral every 3 hours PRN Moderate and Severe Pain  simethicone 80 milliGRAM(s) Chew two times a day PRN Gas       Allergies    No Known Allergies    Intolerances      Vital Signs Last 24 Hrs  T(C): 37.2 (14 Dec 2017 20:01), Max: 37.2 (14 Dec 2017 20:01)  T(F): 98.9 (14 Dec 2017 20:01), Max: 98.9 (14 Dec 2017 20:01)  HR: 102 (14 Dec 2017 20:01) (77 - 105)  BP: 125/74 (14 Dec 2017 20:01) (106/64 - 125/74)  BP(mean): --  RR: 18 (14 Dec 2017 20:01) (16 - 18)  SpO2: 95% (14 Dec 2017 20:01) (95% - 99%)  CAPILLARY BLOOD GLUCOSE      POCT Blood Glucose.: 157 mg/dL (14 Dec 2017 22:01)  POCT Blood Glucose.: 222 mg/dL (14 Dec 2017 16:39)  POCT Blood Glucose.: 201 mg/dL (14 Dec 2017 11:46)  POCT Blood Glucose.: 195 mg/dL (14 Dec 2017 08:21)      12-13 @ 07:01  -  12-14 @ 07:00  --------------------------------------------------------  IN: 1545 mL / OUT: 3105 mL / NET: -1560 mL    12-14 @ 07:01  -  12-14 @ 22:10  --------------------------------------------------------  IN: 0 mL / OUT: 1045 mL / NET: -1045 mL      Physical Exam:    General:  Well appearing, NAD, not cachetic  HEENT:  Nonicteric, PERRLA  CV:  RRR, S1S2   Lungs:  CTA B/L, no wheezes, rales, rhonchi  Abdomen:  Soft, non-tender  Extremities:  trace edema   Skin:  Warm and dry, no rashes  :  No newberry  Neuro:  AAOx3, non-focal, grossly intact                                                                                                                                                                                                                                                                                                LABS:                               13.7   10.54 )-----------( 784      ( 14 Dec 2017 05:32 )             40.5                      12-13    136  |  95<L>  |  8   ----------------------------<  211<H>  4.9   |  28  |  0.89    Ca    8.9      13 Dec 2017 05:59  Phos  3.5     12-13  Mg     2.1     12-13    TPro  x   /  Alb  x   /  TBili  0.8  /  DBili  0.4<H>  /  AST  x   /  ALT  x   /  AlkPhos  x   12-13

## 2017-12-15 VITALS
SYSTOLIC BLOOD PRESSURE: 116 MMHG | HEART RATE: 98 BPM | OXYGEN SATURATION: 97 % | DIASTOLIC BLOOD PRESSURE: 72 MMHG | RESPIRATION RATE: 16 BRPM | TEMPERATURE: 98 F

## 2017-12-15 DIAGNOSIS — K21.9 GASTRO-ESOPHAGEAL REFLUX DISEASE WITHOUT ESOPHAGITIS: ICD-10-CM

## 2017-12-15 LAB
ALBUMIN SERPL ELPH-MCNC: 3.1 G/DL — LOW (ref 3.3–5)
ALP SERPL-CCNC: 135 U/L — HIGH (ref 40–120)
ALT FLD-CCNC: 20 U/L — SIGNIFICANT CHANGE UP (ref 4–41)
AST SERPL-CCNC: 15 U/L — SIGNIFICANT CHANGE UP (ref 4–40)
BILIRUB SERPL-MCNC: 0.8 MG/DL — SIGNIFICANT CHANGE UP (ref 0.2–1.2)
BUN SERPL-MCNC: 8 MG/DL — SIGNIFICANT CHANGE UP (ref 7–23)
CALCIUM SERPL-MCNC: 8.9 MG/DL — SIGNIFICANT CHANGE UP (ref 8.4–10.5)
CHLORIDE SERPL-SCNC: 96 MMOL/L — LOW (ref 98–107)
CO2 SERPL-SCNC: 26 MMOL/L — SIGNIFICANT CHANGE UP (ref 22–31)
CREAT SERPL-MCNC: 0.86 MG/DL — SIGNIFICANT CHANGE UP (ref 0.5–1.3)
GLUCOSE BLDC GLUCOMTR-MCNC: 109 MG/DL — HIGH (ref 70–99)
GLUCOSE BLDC GLUCOMTR-MCNC: 201 MG/DL — HIGH (ref 70–99)
GLUCOSE SERPL-MCNC: 195 MG/DL — HIGH (ref 70–99)
HCT VFR BLD CALC: 39.9 % — SIGNIFICANT CHANGE UP (ref 39–50)
HGB BLD-MCNC: 12.9 G/DL — LOW (ref 13–17)
MAGNESIUM SERPL-MCNC: 2.3 MG/DL — SIGNIFICANT CHANGE UP (ref 1.6–2.6)
MCHC RBC-ENTMCNC: 30.9 PG — SIGNIFICANT CHANGE UP (ref 27–34)
MCHC RBC-ENTMCNC: 32.3 % — SIGNIFICANT CHANGE UP (ref 32–36)
MCV RBC AUTO: 95.5 FL — SIGNIFICANT CHANGE UP (ref 80–100)
NRBC # FLD: 0 — SIGNIFICANT CHANGE UP
PHOSPHATE SERPL-MCNC: 3.2 MG/DL — SIGNIFICANT CHANGE UP (ref 2.5–4.5)
PLATELET # BLD AUTO: 725 K/UL — HIGH (ref 150–400)
PMV BLD: 9.5 FL — SIGNIFICANT CHANGE UP (ref 7–13)
POTASSIUM SERPL-MCNC: 4.1 MMOL/L — SIGNIFICANT CHANGE UP (ref 3.5–5.3)
POTASSIUM SERPL-SCNC: 4.1 MMOL/L — SIGNIFICANT CHANGE UP (ref 3.5–5.3)
PROT SERPL-MCNC: 6.7 G/DL — SIGNIFICANT CHANGE UP (ref 6–8.3)
RBC # BLD: 4.18 M/UL — LOW (ref 4.2–5.8)
RBC # FLD: 15 % — HIGH (ref 10.3–14.5)
SODIUM SERPL-SCNC: 137 MMOL/L — SIGNIFICANT CHANGE UP (ref 135–145)
WBC # BLD: 7.95 K/UL — SIGNIFICANT CHANGE UP (ref 3.8–10.5)
WBC # FLD AUTO: 7.95 K/UL — SIGNIFICANT CHANGE UP (ref 3.8–10.5)

## 2017-12-15 RX ADMIN — GABAPENTIN 100 MILLIGRAM(S): 400 CAPSULE ORAL at 05:18

## 2017-12-15 RX ADMIN — Medication 30 MILLILITER(S): at 00:44

## 2017-12-15 RX ADMIN — Medication 1 GRAM(S): at 17:00

## 2017-12-15 RX ADMIN — Medication 30 MILLILITER(S): at 12:19

## 2017-12-15 RX ADMIN — POLYETHYLENE GLYCOL 3350 17 GRAM(S): 17 POWDER, FOR SOLUTION ORAL at 05:18

## 2017-12-15 RX ADMIN — GABAPENTIN 100 MILLIGRAM(S): 400 CAPSULE ORAL at 12:19

## 2017-12-15 RX ADMIN — OXYCODONE HYDROCHLORIDE 10 MILLIGRAM(S): 5 TABLET ORAL at 12:19

## 2017-12-15 RX ADMIN — BUDESONIDE AND FORMOTEROL FUMARATE DIHYDRATE 2 PUFF(S): 160; 4.5 AEROSOL RESPIRATORY (INHALATION) at 09:04

## 2017-12-15 RX ADMIN — OXYCODONE HYDROCHLORIDE 10 MILLIGRAM(S): 5 TABLET ORAL at 12:20

## 2017-12-15 RX ADMIN — VALACYCLOVIR 2000 MILLIGRAM(S): 500 TABLET, FILM COATED ORAL at 17:00

## 2017-12-15 RX ADMIN — Medication 4: at 09:04

## 2017-12-15 RX ADMIN — OXYCODONE HYDROCHLORIDE 10 MILLIGRAM(S): 5 TABLET ORAL at 05:18

## 2017-12-15 RX ADMIN — Medication 100 MILLIGRAM(S): at 05:18

## 2017-12-15 RX ADMIN — Medication 81 MILLIGRAM(S): at 12:22

## 2017-12-15 RX ADMIN — Medication 100 MILLIGRAM(S): at 12:19

## 2017-12-15 RX ADMIN — Medication 5 UNIT(S): at 09:04

## 2017-12-15 RX ADMIN — Medication 1 GRAM(S): at 05:18

## 2017-12-15 RX ADMIN — Medication 12.5 MILLIGRAM(S): at 05:18

## 2017-12-15 RX ADMIN — PANTOPRAZOLE SODIUM 40 MILLIGRAM(S): 20 TABLET, DELAYED RELEASE ORAL at 05:18

## 2017-12-15 RX ADMIN — Medication 30 MILLILITER(S): at 06:00

## 2017-12-15 RX ADMIN — Medication 5 UNIT(S): at 17:00

## 2017-12-15 RX ADMIN — ENOXAPARIN SODIUM 40 MILLIGRAM(S): 100 INJECTION SUBCUTANEOUS at 12:19

## 2017-12-15 RX ADMIN — VALACYCLOVIR 2000 MILLIGRAM(S): 500 TABLET, FILM COATED ORAL at 05:18

## 2017-12-15 RX ADMIN — OXYCODONE HYDROCHLORIDE 10 MILLIGRAM(S): 5 TABLET ORAL at 06:00

## 2017-12-15 RX ADMIN — Medication 5 UNIT(S): at 12:20

## 2017-12-15 NOTE — PROGRESS NOTE ADULT - SUBJECTIVE AND OBJECTIVE BOX
Patient is a 62y old  Male who presents with a chief complaint of Whipple procedure (13 Dec 2017 09:06)                                                               INTERVAL HPI/OVERNIGHT EVENTS:  mild N   no vo    REVIEW OF SYSTEMS:     CONSTITUTIONAL: No weakness, fevers or chills  RESPIRATORY: No cough, wheezing,  No shortness of breath  CARDIOVASCULAR: No chest pain or palpitations  GASTROINTESTINAL: No abdominal pain  . No nausea, vomiting, or hematemesis  GENITOURINARY: No dysuria, frequency or hematuria  NEUROLOGICAL: No numbness or weakness                                                                                                                                                                                                                                                                               Medications:  MEDICATIONS  (STANDING):  aspirin  chewable 81 milliGRAM(s) Oral daily  buDESOnide  80 MICROgram(s)/formoterol 4.5 MICROgram(s) Inhaler 2 Puff(s) Inhalation two times a day  dextrose 5%. 1000 milliLiter(s) (50 mL/Hr) IV Continuous <Continuous>  dextrose 50% Injectable 12.5 Gram(s) IV Push once  dextrose 50% Injectable 25 Gram(s) IV Push once  dextrose 50% Injectable 25 Gram(s) IV Push once  docusate sodium 100 milliGRAM(s) Oral three times a day  enoxaparin Injectable 40 milliGRAM(s) SubCutaneous daily  gabapentin 100 milliGRAM(s) Oral every 8 hours  insulin glargine Injectable (LANTUS) 7 Unit(s) SubCutaneous at bedtime  insulin lispro (HumaLOG) corrective regimen sliding scale   SubCutaneous three times a day before meals  insulin lispro (HumaLOG) corrective regimen sliding scale   SubCutaneous at bedtime  insulin lispro Injectable (HumaLOG) 5 Unit(s) SubCutaneous three times a day before meals  metoprolol succinate ER 12.5 milliGRAM(s) Oral daily  pantoprazole    Tablet 40 milliGRAM(s) Oral before breakfast  polyethylene glycol 3350 17 Gram(s) Oral two times a day  senna 2 Tablet(s) Oral at bedtime  sucralfate 1 Gram(s) Oral every 12 hours  tamsulosin 0.4 milliGRAM(s) Oral at bedtime  valACYclovir 2000 milliGRAM(s) Oral two times a day    MEDICATIONS  (PRN):  acetaminophen   Tablet. 650 milliGRAM(s) Oral every 4 hours PRN Mild Pain (1 - 3)  aluminum hydroxide/magnesium hydroxide/simethicone Suspension 30 milliLiter(s) Oral every 4 hours PRN Dyspepsia  dextrose Gel 1 Dose(s) Oral once PRN Blood Glucose LESS THAN 70 milliGRAM(s)/deciliter  glucagon  Injectable 1 milliGRAM(s) IntraMuscular once PRN Glucose LESS THAN 70 milligrams/deciliter  ondansetron Injectable 4 milliGRAM(s) IV Push once PRN Nausea and/or Vomiting  oxyCODONE    IR 5 milliGRAM(s) Oral every 4 hours PRN Moderate Pain (4 - 6)  oxyCODONE    IR 10 milliGRAM(s) Oral every 4 hours PRN Severe Pain (7 - 10)  simethicone 80 milliGRAM(s) Chew two times a day PRN Gas       Allergies    No Known Allergies    Intolerances      Vital Signs Last 24 Hrs  T(C): 36.6 (15 Dec 2017 12:14), Max: 37.2 (14 Dec 2017 20:01)  T(F): 97.8 (15 Dec 2017 12:14), Max: 98.9 (14 Dec 2017 20:01)  HR: 98 (15 Dec 2017 12:14) (94 - 105)  BP: 116/72 (15 Dec 2017 12:14) (112/70 - 127/74)  BP(mean): --  RR: 16 (15 Dec 2017 12:14) (16 - 18)  SpO2: 97% (15 Dec 2017 12:14) (95% - 99%)  CAPILLARY BLOOD GLUCOSE      POCT Blood Glucose.: 109 mg/dL (15 Dec 2017 12:20)  POCT Blood Glucose.: 201 mg/dL (15 Dec 2017 08:23)  POCT Blood Glucose.: 157 mg/dL (14 Dec 2017 22:01)  POCT Blood Glucose.: 222 mg/dL (14 Dec 2017 16:39)      12-14 @ 07:01  -  12-15 @ 07:00  --------------------------------------------------------  IN: 440 mL / OUT: 2595 mL / NET: -2155 mL      Physical Exam:    General:  not cachetic  HEENT:  Nonicteric, PERRLA  CV:  RRR, S1S2   Lungs:  CTA B/L  Abdomen:  Soft, non-tender drain in place   Extremities:  2+ pulses, no c/c, no edema  Skin:  Warm and dry, no rashes  :  No newberry  Neuro:  AAOx3, non-focal, grossly intact                                                                                                                                                                                                                                                                                                LABS:                               12.9   7.95  )-----------( 725      ( 15 Dec 2017 05:52 )             39.9                      12-15    137  |  96<L>  |  8   ----------------------------<  195<H>  4.1   |  26  |  0.86    Ca    8.9      15 Dec 2017 05:52  Phos  3.2     12-15  Mg     2.3     12-15    TPro  6.7  /  Alb  3.1<L>  /  TBili  0.8  /  DBili  x   /  AST  15  /  ALT  20  /  AlkPhos  135<H>  12-15

## 2017-12-15 NOTE — PROGRESS NOTE ADULT - PROVIDER SPECIALTY LIST ADULT
Anesthesia
Anesthesia
Endocrinology
Endocrinology
Gastroenterology
Heme/Onc
Internal Medicine
Pain Medicine
Surgery
Heme/Onc
Surgery
Heme/Onc
Internal Medicine
Surgery
Internal Medicine
Internal Medicine

## 2017-12-15 NOTE — PROGRESS NOTE ADULT - ASSESSMENT
62 y/o  male with hx of DM , HTN, BPH  admitted recently for jaundice found to have pnacreatic adenocarcinoma on bx . during that admission pt One plastic stent was placed into the ventral pancreatic duct then placement of fully covered metal stent  in bile duct and removal of pancreatic duct stent. . pt had a stress test which was abnormal and plan for f/u as o/p post surgical intervention by Dr. Curry.  Pt is a  pt now presents for Whipple and s/p surgical intervention   1- Pancreatic CA : s/p whipple   oob ,    d/w  : plan for  dc homme on oral feeds only   pt with improved  Nausea and abd discomfort    CT done : no acute pathology   f/u with Dr. Manzanares  : will need adjuvant chemo given positive margins     2- DM: monitor FS while npo ... acceptable FS ..  cont lantus and humalog        3- HTN: had episodes of hypotension while on ARB/ACE... cont meds with bb        4- abn stress : need f/u as o/p.     5-BPH: flomax      6- factor.V. 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      heme    7 fever : no further episodes   .. would hold off on abx . if recurrs  would check CXR , blood cutlure   UA and US duplex r/o dvt   pt now mild  leukocytoiss however no signs of infection... suspect sec to mild dehydration (intravasculary ) ... monitor for now ... if febrile would pan culture     8- edema: likely sec to hypoalbuminemia   .  US duplex: neg for DVT     9- hyponatremia : improved   monitor     10-  thrombocytoiss   : likely reactive   monitor       cont DVT prophy,  IS   oob to chair   PT   d/w pt at bedside . explained need to f/u with surgery , onco and endocrine

## 2017-12-15 NOTE — PROGRESS NOTE ADULT - ASSESSMENT
Pancreatic cancer had a Whipple needs adjuvant treatment as out-pt.  Patient and family are aware.  they may f/u with me or see an oncologist closer to where they live.    Thrombocytosis reactive and slightly better today.    Patient for taya,me today.

## 2017-12-15 NOTE — PROGRESS NOTE ADULT - PROBLEM SELECTOR PLAN 1
continue protonix 40 mg daily  increase carafate to 1 gm tid  h2 blocker at bedtime  head of bed elevation
Will continue current insulin regimen for now. Will continue monitoring FS, log, will Follow up.   DC home on current insulin regimen, FU with endo in 2 weeks. Discussed with patient/team.  Patient counseled for compliance with consistent low carb diet.
Will continue current insulin regimen for now. Will continue monitoring FS, log, will Follow up.   DC home on current insulin regimen, FU with endo in 2 weeks. Discussed with patient/team.  Patient counseled for compliance with consistent low carb diet.

## 2017-12-15 NOTE — PROGRESS NOTE ADULT - ASSESSMENT
60 yo gentleman s/p Whipple Procedure, POD#15:  - Tolerating diet  - Pain control  - Miralax bid  - F/u GI function  - OOB/ambulate  - DVT ppx  - d/c planning

## 2017-12-15 NOTE — PROGRESS NOTE ADULT - SUBJECTIVE AND OBJECTIVE BOX
Surgery Team D (Surgery Oncology) Progress Note:    Post-Operative Day: 15    Subjective: Pt seen this AM. Pain controlled. Tolerating PO intake. Denies N/V. Remains afebrile.           Objective:  T(C): 36.3 (12-15-17 @ 04:41), Max: 37.2 (12-14-17 @ 20:01)  HR: 94 (12-15-17 @ 04:41) (94 - 105)  BP: 127/74 (12-15-17 @ 04:41) (112/67 - 127/74)  RR: 18 (12-15-17 @ 04:41) (16 - 18)  SpO2: 97% (12-15-17 @ 04:41) (95% - 99%)    Labs:                      12.9   7.95  )-----------( 725      ( 15 Dec 2017 05:52 )             39.9       12-15    137  |  96<L>  |  8   ----------------------------<  195<H>  4.1   |  26  |  0.86    Ca    8.9      15 Dec 2017 05:52  Phos  3.2     12-15  Mg     2.3     12-15    TPro  6.7  /  Alb  3.1<L>  /  TBili  0.8  /  DBili  x   /  AST  15  /  ALT  20  /  AlkPhos  135<H>  12-15      I&O's Detail    14 Dec 2017 07:01  -  15 Dec 2017 07:00  --------------------------------------------------------  IN:    Glucerna: 440 mL  Total IN: 440 mL    OUT:    Bulb: 95 mL    Voided: 2500 mL  Total OUT: 2595 mL    Total NET: -2155 mL          Focused Physical Exam:  Gen: Laying in bed, NAD, alert and oriented  Resp: Unlabored breathing  Abd: softly distended, 1/4" packing changed, MUNA with pancreatic fluid    Medications:  acetaminophen   Tablet. 650 milliGRAM(s) Oral every 4 hours PRN  aluminum hydroxide/magnesium hydroxide/simethicone Suspension 30 milliLiter(s) Oral every 4 hours PRN  aspirin  chewable 81 milliGRAM(s) Oral daily  buDESOnide  80 MICROgram(s)/formoterol 4.5 MICROgram(s) Inhaler 2 Puff(s) Inhalation two times a day  dextrose 5%. 1000 milliLiter(s) IV Continuous <Continuous>  dextrose 50% Injectable 12.5 Gram(s) IV Push once  dextrose 50% Injectable 25 Gram(s) IV Push once  dextrose 50% Injectable 25 Gram(s) IV Push once  dextrose Gel 1 Dose(s) Oral once PRN  docusate sodium 100 milliGRAM(s) Oral three times a day  enoxaparin Injectable 40 milliGRAM(s) SubCutaneous daily  gabapentin 100 milliGRAM(s) Oral every 8 hours  glucagon  Injectable 1 milliGRAM(s) IntraMuscular once PRN  insulin glargine Injectable (LANTUS) 7 Unit(s) SubCutaneous at bedtime  insulin lispro (HumaLOG) corrective regimen sliding scale   SubCutaneous three times a day before meals  insulin lispro (HumaLOG) corrective regimen sliding scale   SubCutaneous at bedtime  insulin lispro Injectable (HumaLOG) 5 Unit(s) SubCutaneous three times a day before meals  metoprolol succinate ER 12.5 milliGRAM(s) Oral daily  ondansetron Injectable 4 milliGRAM(s) IV Push once PRN  oxyCODONE    IR 5 milliGRAM(s) Oral every 4 hours PRN  oxyCODONE    IR 10 milliGRAM(s) Oral every 4 hours PRN  pantoprazole    Tablet 40 milliGRAM(s) Oral before breakfast  polyethylene glycol 3350 17 Gram(s) Oral two times a day  senna 2 Tablet(s) Oral at bedtime  simethicone 80 milliGRAM(s) Chew two times a day PRN  sucralfate 1 Gram(s) Oral every 12 hours  tamsulosin 0.4 milliGRAM(s) Oral at bedtime  valACYclovir 2000 milliGRAM(s) Oral two times a day

## 2017-12-15 NOTE — PROGRESS NOTE ADULT - SUBJECTIVE AND OBJECTIVE BOX
Chief complaint  Patient is a 62y old  Male who presents with a chief complaint of Whipple procedure (13 Dec 2017 09:06)   Review of systems  Patient in bed, looks comfortable, no fever, no hypoglycemia.    Labs and Fingersticks  CAPILLARY BLOOD GLUCOSE      POCT Blood Glucose.: 109 mg/dL (15 Dec 2017 12:20)  POCT Blood Glucose.: 201 mg/dL (15 Dec 2017 08:23)  POCT Blood Glucose.: 157 mg/dL (14 Dec 2017 22:01)  POCT Blood Glucose.: 222 mg/dL (14 Dec 2017 16:39)          Calcium, Total Serum: 8.9 (12-15 @ 05:52)  Albumin, Serum: 3.1 <L> (12-15 @ 05:52)    Alanine Aminotransferase (ALT/SGPT): 20 (12-15 @ 05:52)  Alkaline Phosphatase, Serum: 135 <H> (12-15 @ 05:52)  Aspartate Aminotransferase (AST/SGOT): 15 (12-15 @ 05:52)        12-15    137  |  96<L>  |  8   ----------------------------<  195<H>  4.1   |  26  |  0.86    Ca    8.9      15 Dec 2017 05:52  Phos  3.2     12-15  Mg     2.3     12-15    TPro  6.7  /  Alb  3.1<L>  /  TBili  0.8  /  DBili  x   /  AST  15  /  ALT  20  /  AlkPhos  135<H>  12-15                        12.9   7.95  )-----------( 725      ( 15 Dec 2017 05:52 )             39.9     Medications  MEDICATIONS  (STANDING):  aspirin  chewable 81 milliGRAM(s) Oral daily  buDESOnide  80 MICROgram(s)/formoterol 4.5 MICROgram(s) Inhaler 2 Puff(s) Inhalation two times a day  dextrose 5%. 1000 milliLiter(s) (50 mL/Hr) IV Continuous <Continuous>  dextrose 50% Injectable 12.5 Gram(s) IV Push once  dextrose 50% Injectable 25 Gram(s) IV Push once  dextrose 50% Injectable 25 Gram(s) IV Push once  docusate sodium 100 milliGRAM(s) Oral three times a day  enoxaparin Injectable 40 milliGRAM(s) SubCutaneous daily  gabapentin 100 milliGRAM(s) Oral every 8 hours  insulin glargine Injectable (LANTUS) 7 Unit(s) SubCutaneous at bedtime  insulin lispro (HumaLOG) corrective regimen sliding scale   SubCutaneous three times a day before meals  insulin lispro (HumaLOG) corrective regimen sliding scale   SubCutaneous at bedtime  insulin lispro Injectable (HumaLOG) 5 Unit(s) SubCutaneous three times a day before meals  metoprolol succinate ER 12.5 milliGRAM(s) Oral daily  pantoprazole    Tablet 40 milliGRAM(s) Oral before breakfast  polyethylene glycol 3350 17 Gram(s) Oral two times a day  senna 2 Tablet(s) Oral at bedtime  sucralfate 1 Gram(s) Oral every 12 hours  tamsulosin 0.4 milliGRAM(s) Oral at bedtime  valACYclovir 2000 milliGRAM(s) Oral two times a day      Physical Exam  General: Patient comfortable in bed  Vital Signs Last 12 Hrs  T(F): 97.8 (12-15-17 @ 12:14), Max: 97.8 (12-15-17 @ 12:14)  HR: 98 (12-15-17 @ 12:14) (94 - 102)  BP: 116/72 (12-15-17 @ 12:14) (112/70 - 127/74)  BP(mean): --  RR: 16 (12-15-17 @ 12:14) (16 - 18)  SpO2: 97% (12-15-17 @ 12:14) (97% - 98%)  Neck: No palpable thyroid nodules.  CVS: S1S2, No murmurs  Respiratory: No wheezing, no crepitations  GI: Abdomen soft, bowel sounds positive  Musculoskeletal: Positive edema lower extremities.   Skin: No skin rashes, no ecchymosis    Diagnostics

## 2017-12-15 NOTE — PROGRESS NOTE ADULT - SUBJECTIVE AND OBJECTIVE BOX
Patient is a 62y old  Male who presents with a chief complaint of Whipple procedure (13 Dec 2017 09:06)    Has heartburn otherwise feels okay.  Eating well.  No abd pain, N/V. No HA.  No CP, or SOB      Medication:   acetaminophen   Tablet. 650 milliGRAM(s) Oral every 4 hours PRN  aluminum hydroxide/magnesium hydroxide/simethicone Suspension 30 milliLiter(s) Oral every 4 hours PRN  aspirin  chewable 81 milliGRAM(s) Oral daily  buDESOnide  80 MICROgram(s)/formoterol 4.5 MICROgram(s) Inhaler 2 Puff(s) Inhalation two times a day  dextrose 5%. 1000 milliLiter(s) IV Continuous <Continuous>  dextrose 50% Injectable 12.5 Gram(s) IV Push once  dextrose 50% Injectable 25 Gram(s) IV Push once  dextrose 50% Injectable 25 Gram(s) IV Push once  dextrose Gel 1 Dose(s) Oral once PRN  docusate sodium 100 milliGRAM(s) Oral three times a day  enoxaparin Injectable 40 milliGRAM(s) SubCutaneous daily  gabapentin 100 milliGRAM(s) Oral every 8 hours  glucagon  Injectable 1 milliGRAM(s) IntraMuscular once PRN  insulin glargine Injectable (LANTUS) 7 Unit(s) SubCutaneous at bedtime  insulin lispro (HumaLOG) corrective regimen sliding scale   SubCutaneous three times a day before meals  insulin lispro (HumaLOG) corrective regimen sliding scale   SubCutaneous at bedtime  insulin lispro Injectable (HumaLOG) 5 Unit(s) SubCutaneous three times a day before meals  metoprolol succinate ER 12.5 milliGRAM(s) Oral daily  ondansetron Injectable 4 milliGRAM(s) IV Push once PRN  oxyCODONE    IR 5 milliGRAM(s) Oral every 4 hours PRN  oxyCODONE    IR 10 milliGRAM(s) Oral every 4 hours PRN  pantoprazole    Tablet 40 milliGRAM(s) Oral before breakfast  polyethylene glycol 3350 17 Gram(s) Oral two times a day  senna 2 Tablet(s) Oral at bedtime  simethicone 80 milliGRAM(s) Chew two times a day PRN  sucralfate 1 Gram(s) Oral every 12 hours  tamsulosin 0.4 milliGRAM(s) Oral at bedtime  valACYclovir 2000 milliGRAM(s) Oral two times a day      Physical exam    T(C): 36.6 (12-15-17 @ 12:14), Max: 37.2 (12-14-17 @ 20:01)  HR: 98 (12-15-17 @ 12:14) (94 - 105)  BP: 116/72 (12-15-17 @ 12:14) (112/70 - 127/74)  RR: 16 (12-15-17 @ 12:14) (16 - 18)  SpO2: 97% (12-15-17 @ 12:14) (95% - 99%)  Wt(kg): --    alert NAD  EOMI anicteric sclera  No LE edema or tenderness    Labs                        12.9   7.95  )-----------( 725      ( 15 Dec 2017 05:52 )             39.9       12-15    137  |  96<L>  |  8   ----------------------------<  195<H>  4.1   |  26  |  0.86    Ca    8.9      15 Dec 2017 05:52  Phos  3.2     12-15  Mg     2.3     12-15    TPro  6.7  /  Alb  3.1<L>  /  TBili  0.8  /  DBili  x   /  AST  15  /  ALT  20  /  AlkPhos  135<H>  12-15      LIVER FUNCTIONS - ( 15 Dec 2017 05:52 )  Alb: 3.1 g/dL / Pro: 6.7 g/dL / ALK PHOS: 135 u/L / ALT: 20 u/L / AST: 15 u/L / GGT: x             [id number]

## 2017-12-20 ENCOUNTER — APPOINTMENT (OUTPATIENT)
Dept: SURGICAL ONCOLOGY | Facility: CLINIC | Age: 62
End: 2017-12-20
Payer: COMMERCIAL

## 2017-12-20 ENCOUNTER — LABORATORY RESULT (OUTPATIENT)
Age: 62
End: 2017-12-20

## 2017-12-20 VITALS
HEIGHT: 64 IN | BODY MASS INDEX: 32.59 KG/M2 | RESPIRATION RATE: 12 BRPM | DIASTOLIC BLOOD PRESSURE: 73 MMHG | TEMPERATURE: 98.1 F | HEART RATE: 90 BPM | OXYGEN SATURATION: 98 % | WEIGHT: 190.92 LBS | SYSTOLIC BLOOD PRESSURE: 108 MMHG

## 2017-12-20 PROCEDURE — 99024 POSTOP FOLLOW-UP VISIT: CPT

## 2017-12-20 RX ORDER — FLUTICASONE PROPIONATE 50 UG/1
50 SPRAY, METERED NASAL
Qty: 48 | Refills: 0 | Status: ACTIVE | COMMUNITY
Start: 2017-01-23

## 2017-12-21 ENCOUNTER — OUTPATIENT (OUTPATIENT)
Dept: OUTPATIENT SERVICES | Facility: HOSPITAL | Age: 62
LOS: 1 days | Discharge: HOME | End: 2017-12-21

## 2017-12-21 DIAGNOSIS — Z98.890 OTHER SPECIFIED POSTPROCEDURAL STATES: Chronic | ICD-10-CM

## 2017-12-21 DIAGNOSIS — D68.51 ACTIVATED PROTEIN C RESISTANCE: ICD-10-CM

## 2017-12-28 ENCOUNTER — APPOINTMENT (OUTPATIENT)
Dept: HEMATOLOGY ONCOLOGY | Facility: CLINIC | Age: 62
End: 2017-12-28
Payer: COMMERCIAL

## 2017-12-28 ENCOUNTER — RESULT REVIEW (OUTPATIENT)
Age: 62
End: 2017-12-28

## 2017-12-28 VITALS
RESPIRATION RATE: 97 BRPM | HEIGHT: 64 IN | WEIGHT: 186 LBS | TEMPERATURE: 98 F | DIASTOLIC BLOOD PRESSURE: 78 MMHG | HEART RATE: 94 BPM | SYSTOLIC BLOOD PRESSURE: 119 MMHG | BODY MASS INDEX: 31.76 KG/M2

## 2017-12-28 LAB
BASOPHILS # BLD AUTO: 0.1 K/UL — SIGNIFICANT CHANGE UP (ref 0–0.2)
BASOPHILS NFR BLD AUTO: 0.9 % — SIGNIFICANT CHANGE UP (ref 0–2)
EOSINOPHIL # BLD AUTO: 0.1 K/UL — SIGNIFICANT CHANGE UP (ref 0–0.5)
EOSINOPHIL NFR BLD AUTO: 1.8 % — SIGNIFICANT CHANGE UP (ref 0–6)
HCT VFR BLD CALC: 42.7 % — SIGNIFICANT CHANGE UP (ref 39–50)
HGB BLD-MCNC: 13.8 G/DL — SIGNIFICANT CHANGE UP (ref 13–17)
LYMPHOCYTES # BLD AUTO: 1.5 K/UL — SIGNIFICANT CHANGE UP (ref 1–3.3)
LYMPHOCYTES # BLD AUTO: 17.4 % — SIGNIFICANT CHANGE UP (ref 13–44)
MCHC RBC-ENTMCNC: 30.5 PG — SIGNIFICANT CHANGE UP (ref 27–34)
MCHC RBC-ENTMCNC: 32.4 GM/DL — SIGNIFICANT CHANGE UP (ref 32–36)
MCV RBC AUTO: 94 FL — SIGNIFICANT CHANGE UP (ref 80–100)
MONOCYTES # BLD AUTO: 0.7 K/UL — SIGNIFICANT CHANGE UP (ref 0–0.9)
MONOCYTES NFR BLD AUTO: 8.3 % — SIGNIFICANT CHANGE UP (ref 2–14)
NEUTROPHILS # BLD AUTO: 6 K/UL — SIGNIFICANT CHANGE UP (ref 1.8–7.4)
NEUTROPHILS NFR BLD AUTO: 71.6 % — SIGNIFICANT CHANGE UP (ref 43–77)
PLATELET # BLD AUTO: 352 K/UL — SIGNIFICANT CHANGE UP (ref 150–400)
RBC # BLD: 4.54 M/UL — SIGNIFICANT CHANGE UP (ref 4.2–5.8)
RBC # FLD: 14.7 % — HIGH (ref 10.3–14.5)
WBC # BLD: 8.4 K/UL — SIGNIFICANT CHANGE UP (ref 3.8–10.5)
WBC # FLD AUTO: 8.4 K/UL — SIGNIFICANT CHANGE UP (ref 3.8–10.5)

## 2017-12-28 PROCEDURE — 99215 OFFICE O/P EST HI 40 MIN: CPT

## 2017-12-29 LAB
ALBUMIN SERPL ELPH-MCNC: 3.9 G/DL
ALP BLD-CCNC: 119 U/L
ALT SERPL-CCNC: 20 U/L
ANION GAP SERPL CALC-SCNC: 15 MMOL/L
AST SERPL-CCNC: 20 U/L
BILIRUB SERPL-MCNC: 0.7 MG/DL
BUN SERPL-MCNC: 13 MG/DL
CALCIUM SERPL-MCNC: 10 MG/DL
CHLORIDE SERPL-SCNC: 98 MMOL/L
CO2 SERPL-SCNC: 27 MMOL/L
CREAT SERPL-MCNC: 0.99 MG/DL
HBV CORE IGM SER QL: NONREACTIVE
HBV SURFACE AB SER QL: NONREACTIVE
HBV SURFACE AB SERPL IA-ACNC: <3 MIU/ML
HBV SURFACE AG SER QL: NONREACTIVE
HCV AB SER QL: NONREACTIVE
HCV S/CO RATIO: 0.15 S/CO
MAGNESIUM SERPL-MCNC: 2.1 MG/DL
POTASSIUM SERPL-SCNC: 4.3 MMOL/L
PROT SERPL-MCNC: 7 G/DL
SODIUM SERPL-SCNC: 140 MMOL/L

## 2018-01-03 ENCOUNTER — APPOINTMENT (OUTPATIENT)
Dept: SURGICAL ONCOLOGY | Facility: CLINIC | Age: 63
End: 2018-01-03
Payer: COMMERCIAL

## 2018-01-03 VITALS
HEIGHT: 64 IN | WEIGHT: 187.28 LBS | BODY MASS INDEX: 31.97 KG/M2 | HEART RATE: 84 BPM | OXYGEN SATURATION: 98 % | TEMPERATURE: 98.4 F | SYSTOLIC BLOOD PRESSURE: 112 MMHG | RESPIRATION RATE: 12 BRPM | DIASTOLIC BLOOD PRESSURE: 76 MMHG

## 2018-01-03 PROCEDURE — 99024 POSTOP FOLLOW-UP VISIT: CPT

## 2018-01-09 ENCOUNTER — OUTPATIENT (OUTPATIENT)
Dept: OUTPATIENT SERVICES | Facility: HOSPITAL | Age: 63
LOS: 1 days | Discharge: HOME | End: 2018-01-09

## 2018-01-09 DIAGNOSIS — Z98.890 OTHER SPECIFIED POSTPROCEDURAL STATES: Chronic | ICD-10-CM

## 2018-01-10 ENCOUNTER — APPOINTMENT (OUTPATIENT)
Dept: RADIATION ONCOLOGY | Facility: CLINIC | Age: 63
End: 2018-01-10
Payer: COMMERCIAL

## 2018-01-10 ENCOUNTER — APPOINTMENT (OUTPATIENT)
Dept: SURGICAL ONCOLOGY | Facility: CLINIC | Age: 63
End: 2018-01-10

## 2018-01-10 ENCOUNTER — FORM ENCOUNTER (OUTPATIENT)
Age: 63
End: 2018-01-10

## 2018-01-10 VITALS
DIASTOLIC BLOOD PRESSURE: 74 MMHG | RESPIRATION RATE: 16 BRPM | HEIGHT: 65 IN | OXYGEN SATURATION: 98 % | TEMPERATURE: 98.1 F | WEIGHT: 183 LBS | BODY MASS INDEX: 30.49 KG/M2 | SYSTOLIC BLOOD PRESSURE: 108 MMHG | HEART RATE: 101 BPM

## 2018-01-10 PROCEDURE — 99244 OFF/OP CNSLTJ NEW/EST MOD 40: CPT | Mod: 25

## 2018-01-11 ENCOUNTER — OUTPATIENT (OUTPATIENT)
Dept: OUTPATIENT SERVICES | Facility: HOSPITAL | Age: 63
LOS: 1 days | End: 2018-01-11
Payer: COMMERCIAL

## 2018-01-11 VITALS
TEMPERATURE: 98 F | WEIGHT: 176.37 LBS | HEART RATE: 94 BPM | HEIGHT: 65 IN | SYSTOLIC BLOOD PRESSURE: 120 MMHG | RESPIRATION RATE: 16 BRPM | DIASTOLIC BLOOD PRESSURE: 77 MMHG

## 2018-01-11 DIAGNOSIS — C25.9 MALIGNANT NEOPLASM OF PANCREAS, UNSPECIFIED: ICD-10-CM

## 2018-01-11 DIAGNOSIS — R01.1 CARDIAC MURMUR, UNSPECIFIED: ICD-10-CM

## 2018-01-11 DIAGNOSIS — I10 ESSENTIAL (PRIMARY) HYPERTENSION: ICD-10-CM

## 2018-01-11 DIAGNOSIS — D68.51 ACTIVATED PROTEIN C RESISTANCE: ICD-10-CM

## 2018-01-11 DIAGNOSIS — K90.81 WHIPPLE'S DISEASE: Chronic | ICD-10-CM

## 2018-01-11 DIAGNOSIS — Z01.818 ENCOUNTER FOR OTHER PREPROCEDURAL EXAMINATION: ICD-10-CM

## 2018-01-11 DIAGNOSIS — Z98.890 OTHER SPECIFIED POSTPROCEDURAL STATES: Chronic | ICD-10-CM

## 2018-01-11 DIAGNOSIS — E11.9 TYPE 2 DIABETES MELLITUS WITHOUT COMPLICATIONS: ICD-10-CM

## 2018-01-11 PROCEDURE — 71046 X-RAY EXAM CHEST 2 VIEWS: CPT | Mod: 26

## 2018-01-11 NOTE — ASU PATIENT PROFILE, ADULT - TOBACCO USE
EXAM note      Assessment & Plan      1. Annual physical exam    2. VINCENZO on CPAP    3. Lipid screening    4. Lipoma of back      Reassured back mass was consistent with lipoma. He prefers not to have any intervention done as it is not bothersome. Referral placed to sleep medicine given his sleep apnea. Fasting lab orders placed. He will obtain these in the next several weeks. Will notify him of those results when available and any further recommendations.      Orders Placed This Encounter   • Lipid Panel with Reflex   • Comprehensive Metabolic Panel   • SERVICE TO SLEEP MEDICINE       Return in about 1 year (around 4/11/2018) for Annual exam in one year.          History  Nasim Cueva Jr. is a 39 year old male who presents to have his care and for annual exam. States he has obstructive sleep apnea and was diagnosed approximately 10 years ago. Comments since his divorce 2 years ago and weight loss of approximately 50-60 pounds he's noticed that his C Pap tends to pump air into his stomach causing distention and discomfort for several hours. Following belching symptoms improve. As a result he has not used it for the past year. Comments his stepdad recently had heart issues and was told it was likely related to ill fitting C Pap. This caused some concern for the patient and he would like this evaluated. He also has a mass on his back that is not bothersome. He has a history of lipoma excision on his back. Feels this is similar and would like it looked at. He serves in the [employer]. States he has labs approximately every 2 years. He does not have access to those records.    History reviewed. No pertinent past medical history.  ALLERGIES:  No Known Allergies  No current outpatient prescriptions on file.     No current facility-administered medications for this visit.      Past Surgical History:   Procedure Laterality Date   • CHOLECYSTECTOMY       Social History     Social History   • Marital status: Single     Spouse  name: N/A   • Number of children: N/A   • Years of education: N/A     Social History Main Topics   • Smoking status: Former Smoker     Types: Cigarettes     Start date: 12/1/2006   • Smokeless tobacco: Never Used   • Alcohol use Yes      Comment: occasionally   • Drug use: No   • Sexual activity: Not Asked     Other Topics Concern   • None     Social History Narrative     Family History   Problem Relation Age of Onset   • Diabetes Maternal Grandfather            Review of systems  Constitutional:  Patient denies fever, chills, or fatigue.  Eyes:  Denies change in visual acuity.  HENT:  Denies sinonasal congestion, otalgia, rhinorrhea or sore throat.  Respiratory:  Denies cough or shortness of breath.  Cardiovascular:  Denies chest pain, palpitations or peripheral edema.  Gastrointestinal:  Denies abdominal pain, nausea, vomiting, hematochezia or diarrhea.  Genitourinary:  Denies dysuria, urinary frequency, hematuria or nocturia.  Musculoskeletal:  Denies back pain, neck pain, or joint pain.  Integument:  Denies rash. Mass on his back.  Neurologic:  Denies sensory or visual changes.  Lymphatic:  Denies swollen glands or weight loss.    Health Maintenance   Topic Date Due   • DTaP/Tdap/Td Vaccine (1 - Tdap) 06/01/1998   • Influenza Vaccine (Season Ended) 09/01/2017       Physical Exam  Vital Signs:    Vitals:    04/11/17 1610   BP: 118/72   Pulse: 94   Resp: 12   Weight: 113.4 kg   Height: 6' 2\" (1.88 m)     Constitutional:  Well groomed. In no acute distress. Eye contact and conversation appropriate for age.  Integument:  Warm and dry. No erythema or edema noted.  HENT:  Normocephalic. Atraumatic. Bilateral external ears normal. Tympanic membranes pearly gray with good light reflex bilaterally. Buccal mucosa pink and moist. Posterior oropharynx without erythema, edema or tonsillar exudates. Nasal mucosa normal. Patency maintained.  Neck:  Supple without lymphadenopathy or thyromegaly. Trachea midline.  Eyes: PERRL  (pupils equal, round, and reactive to light), EOMI (extraocular movements are intact). Conjunctivae normal. No discharge. No nystagmus.  Cardiovascular:  Heart rate and rhythm regular without murmur.  Respiratory:  Clear to auscultation bilaterally.  Abdomen:  Soft, nondistended, nontender. No masses or HSM (hepatosplenomegaly) noted. Bowel sounds normoactive. Over his lower thoracic spine is an approximate 2 cm rounded mobile nontender mass consistent with lipoma.  Extremities:  Without edema.  Neurological:  Cranial nerves II through XII grossly intact. Gait normal.     Never smoker

## 2018-01-11 NOTE — H&P PST ADULT - HISTORY OF PRESENT ILLNESS
Pt is a 62 y.o male with pancreatic cancer in November 2017 s/p whipple procedure now scheduled for a mediport insertion with fluoro.

## 2018-01-11 NOTE — H&P PST ADULT - ASSESSMENT
Pt is a 62 y.o male with PMH of HTN, DM, Hyperlipidemia, pancreatic cancer undergoing mediport insertion with fluoro. Instructed to hold any medications containing ibuprofen and hold aspirin starting today. Will consult with PMD about insulin use day of surgery.

## 2018-01-11 NOTE — H&P PST ADULT - FAMILY HISTORY
Mother  Still living? No  Family history of lymphoma, Age at diagnosis: 61-70     Father  Still living? No  Family history of heart disease, Age at diagnosis: 51-60  Family history of CABG, Age at diagnosis: 51-60

## 2018-01-11 NOTE — ASU PATIENT PROFILE, ADULT - PMH
Adenocarcinoma of pancreas    Anxiety    Asthma    BPH (benign prostatic hyperplasia)    Bronchitis  1 month ago  Diabetes   @ 10 :40 this am  Factor V Leiden    Fever  104  11/11/17 ; pt admitted to [facility]  GERD (gastroesophageal reflux disease)    Heart murmur    Herpes  lips  High cholesterol    HTN (hypertension)

## 2018-01-11 NOTE — H&P PST ADULT - PMH
Adenocarcinoma of pancreas    Anxiety    Asthma    BPH (benign prostatic hyperplasia)    Bronchitis  1 month ago  Diabetes   @ 10 :40 this am  Factor V Leiden    Fever  104  11/11/17 ; pt admitted to [facility]  GERD (gastroesophageal reflux disease)    Heart murmur    Herpes  lips  High cholesterol    HTN (hypertension)

## 2018-01-16 ENCOUNTER — APPOINTMENT (OUTPATIENT)
Dept: HEMATOLOGY ONCOLOGY | Facility: CLINIC | Age: 63
End: 2018-01-16

## 2018-01-16 ENCOUNTER — FORM ENCOUNTER (OUTPATIENT)
Age: 63
End: 2018-01-16

## 2018-01-17 ENCOUNTER — OUTPATIENT (OUTPATIENT)
Dept: OUTPATIENT SERVICES | Facility: HOSPITAL | Age: 63
LOS: 1 days | End: 2018-01-17
Payer: COMMERCIAL

## 2018-01-17 ENCOUNTER — APPOINTMENT (OUTPATIENT)
Dept: SURGICAL ONCOLOGY | Facility: HOSPITAL | Age: 63
End: 2018-01-17

## 2018-01-17 VITALS
OXYGEN SATURATION: 99 % | HEART RATE: 92 BPM | RESPIRATION RATE: 15 BRPM | SYSTOLIC BLOOD PRESSURE: 111 MMHG | TEMPERATURE: 98 F | DIASTOLIC BLOOD PRESSURE: 86 MMHG

## 2018-01-17 VITALS
SYSTOLIC BLOOD PRESSURE: 136 MMHG | TEMPERATURE: 97 F | RESPIRATION RATE: 16 BRPM | HEART RATE: 90 BPM | DIASTOLIC BLOOD PRESSURE: 100 MMHG | HEIGHT: 65 IN | OXYGEN SATURATION: 100 % | WEIGHT: 182.1 LBS

## 2018-01-17 DIAGNOSIS — Z98.890 OTHER SPECIFIED POSTPROCEDURAL STATES: Chronic | ICD-10-CM

## 2018-01-17 DIAGNOSIS — C25.9 MALIGNANT NEOPLASM OF PANCREAS, UNSPECIFIED: ICD-10-CM

## 2018-01-17 DIAGNOSIS — K90.81 WHIPPLE'S DISEASE: Chronic | ICD-10-CM

## 2018-01-17 LAB
APTT BLD: 39.1 SEC — HIGH (ref 27.5–37.4)
GLUCOSE BLDC GLUCOMTR-MCNC: 115 MG/DL — HIGH (ref 70–99)
GLUCOSE BLDC GLUCOMTR-MCNC: 139 MG/DL — HIGH (ref 70–99)
INR BLD: 1.19 RATIO — HIGH (ref 0.88–1.16)
PROTHROM AB SERPL-ACNC: 13.1 SEC — HIGH (ref 9.8–12.7)

## 2018-01-17 PROCEDURE — 71045 X-RAY EXAM CHEST 1 VIEW: CPT

## 2018-01-17 PROCEDURE — G0463: CPT

## 2018-01-17 PROCEDURE — 36561 INSERT TUNNELED CV CATH: CPT

## 2018-01-17 PROCEDURE — 85730 THROMBOPLASTIN TIME PARTIAL: CPT

## 2018-01-17 PROCEDURE — 82962 GLUCOSE BLOOD TEST: CPT

## 2018-01-17 PROCEDURE — 71046 X-RAY EXAM CHEST 2 VIEWS: CPT

## 2018-01-17 PROCEDURE — 36415 COLL VENOUS BLD VENIPUNCTURE: CPT

## 2018-01-17 PROCEDURE — 71045 X-RAY EXAM CHEST 1 VIEW: CPT | Mod: 26

## 2018-01-17 PROCEDURE — 85610 PROTHROMBIN TIME: CPT

## 2018-01-17 PROCEDURE — C1788: CPT

## 2018-01-17 PROCEDURE — 76000 FLUOROSCOPY <1 HR PHYS/QHP: CPT

## 2018-01-17 PROCEDURE — 36561 INSERT TUNNELED CV CATH: CPT | Mod: 58,RT

## 2018-01-17 RX ORDER — ONDANSETRON 8 MG/1
4 TABLET, FILM COATED ORAL ONCE
Qty: 0 | Refills: 0 | Status: DISCONTINUED | OUTPATIENT
Start: 2018-01-17 | End: 2018-01-17

## 2018-01-17 RX ORDER — FENTANYL CITRATE 50 UG/ML
50 INJECTION INTRAVENOUS
Qty: 0 | Refills: 0 | Status: DISCONTINUED | OUTPATIENT
Start: 2018-01-17 | End: 2018-01-17

## 2018-01-17 RX ORDER — SODIUM CHLORIDE 9 MG/ML
1000 INJECTION, SOLUTION INTRAVENOUS
Qty: 0 | Refills: 0 | Status: DISCONTINUED | OUTPATIENT
Start: 2018-01-17 | End: 2018-01-17

## 2018-01-17 RX ORDER — SODIUM CHLORIDE 9 MG/ML
3 INJECTION INTRAMUSCULAR; INTRAVENOUS; SUBCUTANEOUS ONCE
Qty: 0 | Refills: 0 | Status: DISCONTINUED | OUTPATIENT
Start: 2018-01-17 | End: 2018-01-17

## 2018-01-17 NOTE — ASU DISCHARGE PLAN (ADULT/PEDIATRIC). - NOTIFY
Bleeding that does not stop/Persistent Nausea and Vomiting/Fever greater than 101/Swelling that continues

## 2018-01-17 NOTE — ASU DISCHARGE PLAN (ADULT/PEDIATRIC). - MEDICATION SUMMARY - MEDICATIONS TO TAKE
I will START or STAY ON the medications listed below when I get home from the hospital:    aspirin 81 mg oral tablet  -- 1 tab(s) by mouth once a day    -- Indication: For johana emed    ibuprofen  -- 400mg po q6h prn  last dose 11/23  -- Indication: For home med    oxyCODONE 5 mg oral tablet  -- 1 tab(s) by mouth every 4 hours, As Needed  -- Indication: For home med    Flomax 0.4 mg oral capsule  -- 1 cap(s) by mouth once a day (at bedtime)  -- Indication: For home med    HumaLOG Cartridge 100 units/mL subcutaneous solution  -- subcutaneous 3 times a day using sliding scale   -- Indication: For home med    Lantus 100 units/mL subcutaneous solution  -- 10 unit(s) subcutaneous once a day (at bedtime)  -- Indication: For home med    Metoprolol Succinate ER 25 mg oral tablet, extended release  -- 0.5 tab(s) by mouth once a day  -- Indication: For home med    Dulera 100 mcg-5 mcg/inh inhalation aerosol  -- 2 puff(s) inhaled 2 times a day, As Needed  -- Indication: For home med    Senna 8.6 mg oral tablet  -- 2 tab(s) by mouth once a day, As Needed  -- Indication: For home med    docusate sodium 100 mg oral capsule  -- 1 cap(s) by mouth 3 times a day, As Needed for constipation  -- Indication: For home med    polyethylene glycol 3350 oral powder for reconstitution  -- 17 gram(s) by mouth 2 times a day, As Needed for constipation.  -- Indication: For home med    sucralfate 1 g oral tablet  -- 1 tab(s) by mouth every 12 hours  -- Indication: For home med    pantoprazole 40 mg oral delayed release tablet  -- 1 tab(s) by mouth once a day in am  -- Indication: For home med

## 2018-01-18 ENCOUNTER — TRANSCRIPTION ENCOUNTER (OUTPATIENT)
Age: 63
End: 2018-01-18

## 2018-01-22 ENCOUNTER — APPOINTMENT (OUTPATIENT)
Dept: SURGICAL ONCOLOGY | Facility: CLINIC | Age: 63
End: 2018-01-22
Payer: COMMERCIAL

## 2018-01-22 ENCOUNTER — FORM ENCOUNTER (OUTPATIENT)
Age: 63
End: 2018-01-22

## 2018-01-22 VITALS
WEIGHT: 181 LBS | DIASTOLIC BLOOD PRESSURE: 70 MMHG | HEIGHT: 65 IN | SYSTOLIC BLOOD PRESSURE: 110 MMHG | BODY MASS INDEX: 30.16 KG/M2 | RESPIRATION RATE: 16 BRPM

## 2018-01-22 PROCEDURE — 99213 OFFICE O/P EST LOW 20 MIN: CPT | Mod: 24

## 2018-01-23 ENCOUNTER — APPOINTMENT (OUTPATIENT)
Dept: CT IMAGING | Facility: CLINIC | Age: 63
End: 2018-01-23
Payer: COMMERCIAL

## 2018-01-23 ENCOUNTER — APPOINTMENT (OUTPATIENT)
Dept: HEMATOLOGY ONCOLOGY | Facility: CLINIC | Age: 63
End: 2018-01-23
Payer: COMMERCIAL

## 2018-01-23 ENCOUNTER — OUTPATIENT (OUTPATIENT)
Dept: OUTPATIENT SERVICES | Facility: HOSPITAL | Age: 63
LOS: 1 days | End: 2018-01-23

## 2018-01-23 ENCOUNTER — RESULT REVIEW (OUTPATIENT)
Age: 63
End: 2018-01-23

## 2018-01-23 ENCOUNTER — OUTPATIENT (OUTPATIENT)
Dept: OUTPATIENT SERVICES | Facility: HOSPITAL | Age: 63
LOS: 1 days | Discharge: HOME | End: 2018-01-23

## 2018-01-23 VITALS
OXYGEN SATURATION: 97 % | SYSTOLIC BLOOD PRESSURE: 117 MMHG | DIASTOLIC BLOOD PRESSURE: 78 MMHG | TEMPERATURE: 98.6 F | HEART RATE: 104 BPM | WEIGHT: 181.55 LBS | HEIGHT: 64.37 IN | BODY MASS INDEX: 30.99 KG/M2

## 2018-01-23 DIAGNOSIS — K90.81 WHIPPLE'S DISEASE: Chronic | ICD-10-CM

## 2018-01-23 DIAGNOSIS — D68.51 ACTIVATED PROTEIN C RESISTANCE: ICD-10-CM

## 2018-01-23 DIAGNOSIS — Z98.890 OTHER SPECIFIED POSTPROCEDURAL STATES: Chronic | ICD-10-CM

## 2018-01-23 DIAGNOSIS — C25.9 MALIGNANT NEOPLASM OF PANCREAS, UNSPECIFIED: ICD-10-CM

## 2018-01-23 DIAGNOSIS — Z95.828 PRESENCE OF OTHER VASCULAR IMPLANTS AND GRAFTS: ICD-10-CM

## 2018-01-23 LAB
BASOPHILS # BLD AUTO: 0.1 K/UL — SIGNIFICANT CHANGE UP (ref 0–0.2)
BASOPHILS NFR BLD AUTO: 0.8 % — SIGNIFICANT CHANGE UP (ref 0–2)
EOSINOPHIL # BLD AUTO: 0.1 K/UL — SIGNIFICANT CHANGE UP (ref 0–0.5)
EOSINOPHIL NFR BLD AUTO: 0.9 % — SIGNIFICANT CHANGE UP (ref 0–6)
HCT VFR BLD CALC: 45 % — SIGNIFICANT CHANGE UP (ref 39–50)
HGB BLD-MCNC: 15 G/DL — SIGNIFICANT CHANGE UP (ref 13–17)
LYMPHOCYTES # BLD AUTO: 1.4 K/UL — SIGNIFICANT CHANGE UP (ref 1–3.3)
LYMPHOCYTES # BLD AUTO: 17.1 % — SIGNIFICANT CHANGE UP (ref 13–44)
MCHC RBC-ENTMCNC: 30.2 PG — SIGNIFICANT CHANGE UP (ref 27–34)
MCHC RBC-ENTMCNC: 33.4 GM/DL — SIGNIFICANT CHANGE UP (ref 32–36)
MCV RBC AUTO: 90.2 FL — SIGNIFICANT CHANGE UP (ref 80–100)
MONOCYTES # BLD AUTO: 0.9 K/UL — SIGNIFICANT CHANGE UP (ref 0–0.9)
MONOCYTES NFR BLD AUTO: 10.4 % — SIGNIFICANT CHANGE UP (ref 2–14)
NEUTROPHILS # BLD AUTO: 5.9 K/UL — SIGNIFICANT CHANGE UP (ref 1.8–7.4)
NEUTROPHILS NFR BLD AUTO: 70.8 % — SIGNIFICANT CHANGE UP (ref 43–77)
PLATELET # BLD AUTO: 279 K/UL — SIGNIFICANT CHANGE UP (ref 150–400)
RBC # BLD: 4.98 M/UL — SIGNIFICANT CHANGE UP (ref 4.2–5.8)
RBC # FLD: 13 % — SIGNIFICANT CHANGE UP (ref 10.3–14.5)
WBC # BLD: 8.4 K/UL — SIGNIFICANT CHANGE UP (ref 3.8–10.5)
WBC # FLD AUTO: 8.4 K/UL — SIGNIFICANT CHANGE UP (ref 3.8–10.5)

## 2018-01-23 PROCEDURE — 74177 CT ABD & PELVIS W/CONTRAST: CPT | Mod: 26

## 2018-01-23 PROCEDURE — 71260 CT THORAX DX C+: CPT | Mod: 26

## 2018-01-23 PROCEDURE — 99214 OFFICE O/P EST MOD 30 MIN: CPT

## 2018-01-26 ENCOUNTER — OTHER (OUTPATIENT)
Age: 63
End: 2018-01-26

## 2018-01-29 ENCOUNTER — OUTPATIENT (OUTPATIENT)
Dept: OUTPATIENT SERVICES | Facility: HOSPITAL | Age: 63
LOS: 1 days | End: 2018-01-29
Payer: COMMERCIAL

## 2018-01-29 DIAGNOSIS — Z98.890 OTHER SPECIFIED POSTPROCEDURAL STATES: Chronic | ICD-10-CM

## 2018-01-29 DIAGNOSIS — Z95.0 PRESENCE OF CARDIAC PACEMAKER: ICD-10-CM

## 2018-01-29 DIAGNOSIS — K90.81 WHIPPLE'S DISEASE: Chronic | ICD-10-CM

## 2018-01-29 DIAGNOSIS — C25.9 MALIGNANT NEOPLASM OF PANCREAS, UNSPECIFIED: ICD-10-CM

## 2018-01-29 PROCEDURE — 76000 FLUOROSCOPY <1 HR PHYS/QHP: CPT

## 2018-01-29 PROCEDURE — 36598 INJ W/FLUOR EVAL CV DEVICE: CPT

## 2018-01-30 ENCOUNTER — APPOINTMENT (OUTPATIENT)
Dept: INTERVENTIONAL RADIOLOGY/VASCULAR | Facility: CLINIC | Age: 63
End: 2018-01-30

## 2018-01-30 ENCOUNTER — APPOINTMENT (OUTPATIENT)
Dept: HEMATOLOGY ONCOLOGY | Facility: CLINIC | Age: 63
End: 2018-01-30

## 2018-01-30 ENCOUNTER — APPOINTMENT (OUTPATIENT)
Dept: INFUSION THERAPY | Facility: CLINIC | Age: 63
End: 2018-01-30

## 2018-02-06 ENCOUNTER — RESULT REVIEW (OUTPATIENT)
Age: 63
End: 2018-02-06

## 2018-02-06 ENCOUNTER — APPOINTMENT (OUTPATIENT)
Dept: INFUSION THERAPY | Facility: CLINIC | Age: 63
End: 2018-02-06

## 2018-02-06 ENCOUNTER — APPOINTMENT (OUTPATIENT)
Dept: HEMATOLOGY ONCOLOGY | Facility: CLINIC | Age: 63
End: 2018-02-06
Payer: COMMERCIAL

## 2018-02-06 VITALS
WEIGHT: 183.87 LBS | OXYGEN SATURATION: 98 % | DIASTOLIC BLOOD PRESSURE: 81 MMHG | TEMPERATURE: 98.4 F | SYSTOLIC BLOOD PRESSURE: 126 MMHG | BODY MASS INDEX: 31.2 KG/M2 | HEART RATE: 84 BPM

## 2018-02-06 LAB
BASOPHILS # BLD AUTO: 0.1 K/UL — SIGNIFICANT CHANGE UP (ref 0–0.2)
BASOPHILS NFR BLD AUTO: 0.6 % — SIGNIFICANT CHANGE UP (ref 0–2)
EOSINOPHIL # BLD AUTO: 0 K/UL — SIGNIFICANT CHANGE UP (ref 0–0.5)
EOSINOPHIL NFR BLD AUTO: 0.5 % — SIGNIFICANT CHANGE UP (ref 0–6)
HCT VFR BLD CALC: 48 % — SIGNIFICANT CHANGE UP (ref 39–50)
HGB BLD-MCNC: 16 G/DL — SIGNIFICANT CHANGE UP (ref 13–17)
LYMPHOCYTES # BLD AUTO: 1.4 K/UL — SIGNIFICANT CHANGE UP (ref 1–3.3)
LYMPHOCYTES # BLD AUTO: 15.4 % — SIGNIFICANT CHANGE UP (ref 13–44)
MCHC RBC-ENTMCNC: 30 PG — SIGNIFICANT CHANGE UP (ref 27–34)
MCHC RBC-ENTMCNC: 33.4 GM/DL — SIGNIFICANT CHANGE UP (ref 32–36)
MCV RBC AUTO: 89.8 FL — SIGNIFICANT CHANGE UP (ref 80–100)
MONOCYTES # BLD AUTO: 0.7 K/UL — SIGNIFICANT CHANGE UP (ref 0–0.9)
MONOCYTES NFR BLD AUTO: 8.2 % — SIGNIFICANT CHANGE UP (ref 2–14)
NEUTROPHILS # BLD AUTO: 6.6 K/UL — SIGNIFICANT CHANGE UP (ref 1.8–7.4)
NEUTROPHILS NFR BLD AUTO: 75.2 % — SIGNIFICANT CHANGE UP (ref 43–77)
PLATELET # BLD AUTO: 374 K/UL — SIGNIFICANT CHANGE UP (ref 150–400)
RBC # BLD: 5.34 M/UL — SIGNIFICANT CHANGE UP (ref 4.2–5.8)
RBC # FLD: 13.1 % — SIGNIFICANT CHANGE UP (ref 10.3–14.5)
WBC # BLD: 8.8 K/UL — SIGNIFICANT CHANGE UP (ref 3.8–10.5)
WBC # FLD AUTO: 8.8 K/UL — SIGNIFICANT CHANGE UP (ref 3.8–10.5)

## 2018-02-06 PROCEDURE — 99214 OFFICE O/P EST MOD 30 MIN: CPT

## 2018-02-06 RX ORDER — TAMSULOSIN HYDROCHLORIDE 0.4 MG/1
CAPSULE ORAL
Refills: 0 | Status: ACTIVE | COMMUNITY

## 2018-02-06 RX ORDER — PANTOPRAZOLE 40 MG/1
40 TABLET, DELAYED RELEASE ORAL DAILY
Qty: 90 | Refills: 1 | Status: ACTIVE | COMMUNITY

## 2018-02-06 RX ORDER — SIMETHICONE 125 MG/1
125 TABLET, CHEWABLE ORAL
Refills: 0 | Status: ACTIVE | COMMUNITY

## 2018-02-06 RX ORDER — METOPROLOL TARTRATE 25 MG/1
25 TABLET, FILM COATED ORAL DAILY
Refills: 0 | Status: ACTIVE | COMMUNITY

## 2018-02-06 RX ORDER — PROCHLORPERAZINE MALEATE 10 MG/1
10 TABLET ORAL
Qty: 60 | Refills: 1 | Status: ACTIVE | COMMUNITY
Start: 2018-02-06 | End: 1900-01-01

## 2018-02-06 RX ORDER — ENOXAPARIN SODIUM 100 MG/ML
40 INJECTION SUBCUTANEOUS
Qty: 12 | Refills: 0 | Status: DISCONTINUED | COMMUNITY
Start: 2017-12-13 | End: 2018-02-06

## 2018-02-07 ENCOUNTER — CLINICAL ADVICE (OUTPATIENT)
Age: 63
End: 2018-02-07

## 2018-02-07 ENCOUNTER — LABORATORY RESULT (OUTPATIENT)
Age: 63
End: 2018-02-07

## 2018-02-07 ENCOUNTER — APPOINTMENT (OUTPATIENT)
Dept: INFUSION THERAPY | Facility: CLINIC | Age: 63
End: 2018-02-07

## 2018-02-07 LAB
ALBUMIN SERPL ELPH-MCNC: 4.2 G/DL
ALP BLD-CCNC: 107 U/L
ALT SERPL-CCNC: 15 U/L
ANION GAP SERPL CALC-SCNC: 14 MMOL/L
AST SERPL-CCNC: 14 U/L
BILIRUB SERPL-MCNC: 0.6 MG/DL
BUN SERPL-MCNC: 14 MG/DL
CALCIUM SERPL-MCNC: 10.1 MG/DL
CHLORIDE SERPL-SCNC: 100 MMOL/L
CO2 SERPL-SCNC: 28 MMOL/L
CREAT SERPL-MCNC: 1.02 MG/DL
GLUCOSE SERPL-MCNC: 175 MG/DL
POTASSIUM SERPL-SCNC: 4.6 MMOL/L
PROT SERPL-MCNC: 7.3 G/DL
SODIUM SERPL-SCNC: 142 MMOL/L

## 2018-02-08 DIAGNOSIS — C25.9 MALIGNANT NEOPLASM OF PANCREAS, UNSPECIFIED: ICD-10-CM

## 2018-02-13 ENCOUNTER — RESULT REVIEW (OUTPATIENT)
Age: 63
End: 2018-02-13

## 2018-02-13 ENCOUNTER — APPOINTMENT (OUTPATIENT)
Dept: HEMATOLOGY ONCOLOGY | Facility: CLINIC | Age: 63
End: 2018-02-13
Payer: COMMERCIAL

## 2018-02-13 VITALS
WEIGHT: 181.44 LBS | OXYGEN SATURATION: 96 % | SYSTOLIC BLOOD PRESSURE: 124 MMHG | TEMPERATURE: 98.3 F | HEART RATE: 87 BPM | DIASTOLIC BLOOD PRESSURE: 85 MMHG | BODY MASS INDEX: 30.79 KG/M2

## 2018-02-13 LAB
BASOPHILS # BLD AUTO: 0.1 K/UL — SIGNIFICANT CHANGE UP (ref 0–0.2)
BASOPHILS NFR BLD AUTO: 1.2 % — SIGNIFICANT CHANGE UP (ref 0–2)
EOSINOPHIL # BLD AUTO: 0.1 K/UL — SIGNIFICANT CHANGE UP (ref 0–0.5)
EOSINOPHIL NFR BLD AUTO: 1.7 % — SIGNIFICANT CHANGE UP (ref 0–6)
HCT VFR BLD CALC: 46.9 % — SIGNIFICANT CHANGE UP (ref 39–50)
HGB BLD-MCNC: 16.3 G/DL — SIGNIFICANT CHANGE UP (ref 13–17)
LYMPHOCYTES # BLD AUTO: 1.3 K/UL — SIGNIFICANT CHANGE UP (ref 1–3.3)
LYMPHOCYTES # BLD AUTO: 20.9 % — SIGNIFICANT CHANGE UP (ref 13–44)
MCHC RBC-ENTMCNC: 30.6 PG — SIGNIFICANT CHANGE UP (ref 27–34)
MCHC RBC-ENTMCNC: 34.7 GM/DL — SIGNIFICANT CHANGE UP (ref 32–36)
MCV RBC AUTO: 88.2 FL — SIGNIFICANT CHANGE UP (ref 80–100)
MONOCYTES # BLD AUTO: 0.3 K/UL — SIGNIFICANT CHANGE UP (ref 0–0.9)
MONOCYTES NFR BLD AUTO: 4.8 % — SIGNIFICANT CHANGE UP (ref 2–14)
NEUTROPHILS # BLD AUTO: 4.3 K/UL — SIGNIFICANT CHANGE UP (ref 1.8–7.4)
NEUTROPHILS NFR BLD AUTO: 71.4 % — SIGNIFICANT CHANGE UP (ref 43–77)
PLAT MORPH BLD: NORMAL — SIGNIFICANT CHANGE UP
PLATELET # BLD AUTO: 247 K/UL — SIGNIFICANT CHANGE UP (ref 150–400)
RBC # BLD: 5.32 M/UL — SIGNIFICANT CHANGE UP (ref 4.2–5.8)
RBC # FLD: 12.7 % — SIGNIFICANT CHANGE UP (ref 10.3–14.5)
RBC BLD AUTO: NORMAL — SIGNIFICANT CHANGE UP
WBC # BLD: 6.1 K/UL — SIGNIFICANT CHANGE UP (ref 3.8–10.5)
WBC # FLD AUTO: 6.1 K/UL — SIGNIFICANT CHANGE UP (ref 3.8–10.5)

## 2018-02-13 PROCEDURE — 99214 OFFICE O/P EST MOD 30 MIN: CPT

## 2018-02-13 RX ORDER — IBUPROFEN 400 MG/1
400 TABLET, FILM COATED ORAL
Refills: 0 | Status: DISCONTINUED | COMMUNITY
End: 2018-02-13

## 2018-02-14 ENCOUNTER — APPOINTMENT (OUTPATIENT)
Dept: INFUSION THERAPY | Facility: CLINIC | Age: 63
End: 2018-02-14

## 2018-02-15 ENCOUNTER — RX RENEWAL (OUTPATIENT)
Age: 63
End: 2018-02-15

## 2018-02-15 DIAGNOSIS — R11.2 NAUSEA WITH VOMITING, UNSPECIFIED: ICD-10-CM

## 2018-02-15 DIAGNOSIS — Z51.11 ENCOUNTER FOR ANTINEOPLASTIC CHEMOTHERAPY: ICD-10-CM

## 2018-02-20 ENCOUNTER — FORM ENCOUNTER (OUTPATIENT)
Age: 63
End: 2018-02-20

## 2018-02-20 ENCOUNTER — RESULT REVIEW (OUTPATIENT)
Age: 63
End: 2018-02-20

## 2018-02-20 ENCOUNTER — APPOINTMENT (OUTPATIENT)
Dept: HEMATOLOGY ONCOLOGY | Facility: CLINIC | Age: 63
End: 2018-02-20

## 2018-02-20 ENCOUNTER — OTHER (OUTPATIENT)
Age: 63
End: 2018-02-20

## 2018-02-20 LAB
BASOPHILS # BLD AUTO: 0 K/UL — SIGNIFICANT CHANGE UP (ref 0–0.2)
BASOPHILS NFR BLD AUTO: 1.1 % — SIGNIFICANT CHANGE UP (ref 0–2)
EOSINOPHIL # BLD AUTO: 0.1 K/UL — SIGNIFICANT CHANGE UP (ref 0–0.5)
EOSINOPHIL NFR BLD AUTO: 3.5 % — SIGNIFICANT CHANGE UP (ref 0–6)
HCT VFR BLD CALC: 42 % — SIGNIFICANT CHANGE UP (ref 39–50)
HGB BLD-MCNC: 14.7 G/DL — SIGNIFICANT CHANGE UP (ref 13–17)
LYMPHOCYTES # BLD AUTO: 0.7 K/UL — LOW (ref 1–3.3)
LYMPHOCYTES # BLD AUTO: 20.1 % — SIGNIFICANT CHANGE UP (ref 13–44)
MCHC RBC-ENTMCNC: 30.4 PG — SIGNIFICANT CHANGE UP (ref 27–34)
MCHC RBC-ENTMCNC: 35.1 GM/DL — SIGNIFICANT CHANGE UP (ref 32–36)
MCV RBC AUTO: 86.6 FL — SIGNIFICANT CHANGE UP (ref 80–100)
MONOCYTES # BLD AUTO: 0.1 K/UL — SIGNIFICANT CHANGE UP (ref 0–0.9)
MONOCYTES NFR BLD AUTO: 3.9 % — SIGNIFICANT CHANGE UP (ref 2–14)
NEUTROPHILS # BLD AUTO: 2.6 K/UL — SIGNIFICANT CHANGE UP (ref 1.8–7.4)
NEUTROPHILS NFR BLD AUTO: 71.5 % — SIGNIFICANT CHANGE UP (ref 43–77)
PLATELET # BLD AUTO: 144 K/UL — LOW (ref 150–400)
RBC # BLD: 4.84 M/UL — SIGNIFICANT CHANGE UP (ref 4.2–5.8)
RBC # FLD: 12.5 % — SIGNIFICANT CHANGE UP (ref 10.3–14.5)
WBC # BLD: 3.7 K/UL — LOW (ref 3.8–10.5)
WBC # FLD AUTO: 3.7 K/UL — LOW (ref 3.8–10.5)

## 2018-02-20 NOTE — ASU PATIENT PROFILE, ADULT - PMH
Adenocarcinoma of pancreas    Anxiety    Asthma    BPH (benign prostatic hyperplasia)    Bronchitis  1 month ago  Diabetes   @ 10 :40 this am  Factor V Leiden    Fever  104  11/11/17 ; pt admitted to [facility]  GERD (gastroesophageal reflux disease)    Heart murmur    Herpes  lips  High cholesterol    HTN (hypertension)

## 2018-02-20 NOTE — ASU PATIENT PROFILE, ADULT - LEARNING ASSESSMENT (PATIENT) ADDITIONAL COMMENTS
telephone update. Pt was not home does not recall all medication will bring list in AM. Has appointment with Dr Vela for updated medical clearance at 3pm. Pre-op instructions reviewed. Pt verbalized understanding

## 2018-02-21 ENCOUNTER — OUTPATIENT (OUTPATIENT)
Dept: OUTPATIENT SERVICES | Facility: HOSPITAL | Age: 63
LOS: 1 days | End: 2018-02-21
Payer: COMMERCIAL

## 2018-02-21 ENCOUNTER — APPOINTMENT (OUTPATIENT)
Dept: SURGICAL ONCOLOGY | Facility: HOSPITAL | Age: 63
End: 2018-02-21

## 2018-02-21 ENCOUNTER — APPOINTMENT (OUTPATIENT)
Dept: INFUSION THERAPY | Facility: CLINIC | Age: 63
End: 2018-02-21

## 2018-02-21 VITALS
HEART RATE: 75 BPM | RESPIRATION RATE: 18 BRPM | OXYGEN SATURATION: 100 % | DIASTOLIC BLOOD PRESSURE: 81 MMHG | SYSTOLIC BLOOD PRESSURE: 110 MMHG

## 2018-02-21 VITALS
HEIGHT: 65 IN | DIASTOLIC BLOOD PRESSURE: 70 MMHG | HEART RATE: 88 BPM | RESPIRATION RATE: 16 BRPM | WEIGHT: 176.37 LBS | TEMPERATURE: 98 F | SYSTOLIC BLOOD PRESSURE: 110 MMHG

## 2018-02-21 VITALS
WEIGHT: 176.37 LBS | OXYGEN SATURATION: 100 % | SYSTOLIC BLOOD PRESSURE: 112 MMHG | HEIGHT: 65 IN | HEART RATE: 84 BPM | TEMPERATURE: 97 F | RESPIRATION RATE: 18 BRPM | DIASTOLIC BLOOD PRESSURE: 69 MMHG

## 2018-02-21 DIAGNOSIS — K90.81 WHIPPLE'S DISEASE: Chronic | ICD-10-CM

## 2018-02-21 DIAGNOSIS — I10 ESSENTIAL (PRIMARY) HYPERTENSION: ICD-10-CM

## 2018-02-21 DIAGNOSIS — D68.51 ACTIVATED PROTEIN C RESISTANCE: ICD-10-CM

## 2018-02-21 DIAGNOSIS — Z98.890 OTHER SPECIFIED POSTPROCEDURAL STATES: Chronic | ICD-10-CM

## 2018-02-21 DIAGNOSIS — Z95.828 PRESENCE OF OTHER VASCULAR IMPLANTS AND GRAFTS: ICD-10-CM

## 2018-02-21 DIAGNOSIS — Z95.828 PRESENCE OF OTHER VASCULAR IMPLANTS AND GRAFTS: Chronic | ICD-10-CM

## 2018-02-21 DIAGNOSIS — J45.909 UNSPECIFIED ASTHMA, UNCOMPLICATED: ICD-10-CM

## 2018-02-21 DIAGNOSIS — E11.9 TYPE 2 DIABETES MELLITUS WITHOUT COMPLICATIONS: ICD-10-CM

## 2018-02-21 DIAGNOSIS — Z01.818 ENCOUNTER FOR OTHER PREPROCEDURAL EXAMINATION: ICD-10-CM

## 2018-02-21 DIAGNOSIS — C25.9 MALIGNANT NEOPLASM OF PANCREAS, UNSPECIFIED: ICD-10-CM

## 2018-02-21 LAB
APTT BLD: 37.3 SEC — SIGNIFICANT CHANGE UP (ref 27.5–37.4)
GLUCOSE BLDC GLUCOMTR-MCNC: 141 MG/DL — HIGH (ref 70–99)
INR BLD: 1.2 RATIO — HIGH (ref 0.88–1.16)
PROTHROM AB SERPL-ACNC: 13.3 SEC — HIGH (ref 9.8–12.7)

## 2018-02-21 PROCEDURE — 82962 GLUCOSE BLOOD TEST: CPT

## 2018-02-21 PROCEDURE — 93005 ELECTROCARDIOGRAM TRACING: CPT

## 2018-02-21 PROCEDURE — 93010 ELECTROCARDIOGRAM REPORT: CPT

## 2018-02-21 PROCEDURE — 85610 PROTHROMBIN TIME: CPT

## 2018-02-21 PROCEDURE — G0463: CPT

## 2018-02-21 PROCEDURE — 36415 COLL VENOUS BLD VENIPUNCTURE: CPT

## 2018-02-21 PROCEDURE — 76000 FLUOROSCOPY <1 HR PHYS/QHP: CPT

## 2018-02-21 PROCEDURE — 85730 THROMBOPLASTIN TIME PARTIAL: CPT

## 2018-02-21 PROCEDURE — 36561 INSERT TUNNELED CV CATH: CPT | Mod: 78

## 2018-02-21 PROCEDURE — 36590 REMOVAL TUNNELED CV CATH: CPT | Mod: 74

## 2018-02-21 RX ORDER — ONDANSETRON 8 MG/1
4 TABLET, FILM COATED ORAL ONCE
Qty: 0 | Refills: 0 | Status: DISCONTINUED | OUTPATIENT
Start: 2018-02-21 | End: 2018-02-21

## 2018-02-21 RX ORDER — SODIUM CHLORIDE 9 MG/ML
3 INJECTION INTRAMUSCULAR; INTRAVENOUS; SUBCUTANEOUS EVERY 8 HOURS
Qty: 0 | Refills: 0 | Status: DISCONTINUED | OUTPATIENT
Start: 2018-02-21 | End: 2018-02-21

## 2018-02-21 RX ORDER — SODIUM CHLORIDE 9 MG/ML
1000 INJECTION, SOLUTION INTRAVENOUS
Qty: 0 | Refills: 0 | Status: DISCONTINUED | OUTPATIENT
Start: 2018-02-21 | End: 2018-02-21

## 2018-02-21 RX ORDER — MOMETASONE FUROATE AND FORMOTEROL FUMARATE DIHYDRATE 200; 5 UG/1; UG/1
2 AEROSOL RESPIRATORY (INHALATION)
Qty: 0 | Refills: 0 | COMMUNITY

## 2018-02-21 RX ORDER — ASPIRIN/CALCIUM CARB/MAGNESIUM 324 MG
1 TABLET ORAL
Qty: 0 | Refills: 0 | COMMUNITY

## 2018-02-21 RX ORDER — FENTANYL CITRATE 50 UG/ML
25 INJECTION INTRAVENOUS
Qty: 0 | Refills: 0 | Status: DISCONTINUED | OUTPATIENT
Start: 2018-02-21 | End: 2018-02-21

## 2018-02-21 NOTE — ASU DISCHARGE PLAN (ADULT/PEDIATRIC). - NOTIFY
Pain not relieved by Medications/Fever greater than 101/Persistent Nausea and Vomiting/Increased Irritability or Sluggishness/Inability to Tolerate Liquids or Foods/Numbness, color, or temperature change to extremity/Bleeding that does not stop/Swelling that continues

## 2018-02-21 NOTE — H&P PST ADULT - PMH
Adenocarcinoma of pancreas    Anxiety    Asthma    BPH (benign prostatic hyperplasia)    Bronchitis  1 month ago  Diabetes   @ 10 :40 this am  Factor V Leiden    Fever  104  11/11/17 ; pt admitted to [facility]  GERD (gastroesophageal reflux disease)    Heart murmur    Herpes  lips  High cholesterol    HTN (hypertension) Adenocarcinoma of pancreas    Anxiety    Asthma    BPH (benign prostatic hyperplasia)    Bronchitis  1 month ago  Diabetes    Factor V Leiden    GERD (gastroesophageal reflux disease)    Heart murmur    Herpes  lips  High cholesterol    HTN (hypertension)    Port catheter in place  not working since insertion

## 2018-02-21 NOTE — ASU DISCHARGE PLAN (ADULT/PEDIATRIC). - ITEMS TO FOLLOWUP WITH YOUR PHYSICIAN'S
Follow up: Please call and make an appointment with Oncology 1 week after discharge. Also, please call and make an appointment with your primary care physician as per your usual schedule.   Activity: May return to normal activities as tolerated, Please, limit activity and rest until follow up appointment.   Diet: May continue Regular diet as tolerated.  Medications: Please take all home medications as prescribed by your primary care doctor. Pain medication has been prescribed for you. Please, take it as it has been prescribed, do not drive or operate heavy machinery while taking narcotics.   If confusion, altered mental status, fever, chest pain, shortness of breath, new or worsening pain, vomiting, change or worsening of medical status, please come back to the emergency room, and in case of emergency call 911.

## 2018-02-21 NOTE — H&P PST ADULT - ASSESSMENT
Pleasant 62 year old male with history of pancreatic cancer presents for removal of portacath and insertion of new one. pt had chemo via peripheral line on 2/14/18.

## 2018-02-21 NOTE — BRIEF OPERATIVE NOTE - PROCEDURE
<<-----Click on this checkbox to enter Procedure Fluoroscopy  02/21/2018  [facility] [employer]  Active  TABBY

## 2018-02-21 NOTE — ASU DISCHARGE PLAN (ADULT/PEDIATRIC). - MEDICATION SUMMARY - MEDICATIONS TO TAKE
I will START or STAY ON the medications listed below when I get home from the hospital:    ibuprofen  -- 400mg po q6h prn  last dose 11/23  -- Indication: For home med    oxyCODONE 5 mg oral tablet  -- 1 tab(s) by mouth every 4 hours, As Needed  -- Indication: For home med    Flomax 0.4 mg oral capsule  -- 1 cap(s) by mouth once a day (at bedtime)  -- Indication: For home med    HumaLOG Cartridge 100 units/mL subcutaneous solution  -- subcutaneous 3 times a day using sliding scale   -- Indication: For home med    Lantus 100 units/mL subcutaneous solution  -- 10 unit(s) subcutaneous once a day (at bedtime)  -- Indication: For home med    prochlorperazine 10 mg oral tablet  -- 1 tab(s) by mouth 3 times a day, As Needed  -- Indication: For home med    Xeloda 500 mg oral tablet  -- Indication: For home med    Metoprolol Succinate ER 25 mg oral tablet, extended release  -- 0.5 tab(s) by mouth once a day  -- Indication: For home med    Creon 36,000 units oral delayed release capsule  -- 1 cap(s) by mouth 3 times a day  -- Indication: For home med    docusate sodium 100 mg oral capsule  -- 1 cap(s) by mouth 3 times a day, As Needed for constipation  -- Indication: For home med    polyethylene glycol 3350 oral powder for reconstitution  -- 17 gram(s) by mouth 2 times a day, As Needed for constipation.  -- Indication: For home med    Senna 8.6 mg oral tablet  -- 2 tab(s) by mouth once a day, As Needed  -- Indication: For home med    pantoprazole 40 mg oral delayed release tablet  -- 1 tab(s) by mouth once a day in am  -- Indication: For home med

## 2018-02-21 NOTE — H&P PST ADULT - HISTORY OF PRESENT ILLNESS
Pleasant 62 year old male with history of pancreatic  cancer NOV 2017  s/p whipple  presents with c/o of portacath  not working , unable to access for chemo, had chemo via peripheral veins X 2 treatments.  Under care of Dr. Vela for treatments, scheduled today for removal of port and insertion of new portacath.

## 2018-02-22 ENCOUNTER — OUTPATIENT (OUTPATIENT)
Dept: OUTPATIENT SERVICES | Facility: HOSPITAL | Age: 63
LOS: 1 days | Discharge: HOME | End: 2018-02-22

## 2018-02-22 DIAGNOSIS — D68.51 ACTIVATED PROTEIN C RESISTANCE: ICD-10-CM

## 2018-02-22 DIAGNOSIS — Z98.890 OTHER SPECIFIED POSTPROCEDURAL STATES: Chronic | ICD-10-CM

## 2018-02-22 DIAGNOSIS — C25.9 MALIGNANT NEOPLASM OF PANCREAS, UNSPECIFIED: ICD-10-CM

## 2018-02-22 DIAGNOSIS — K90.81 WHIPPLE'S DISEASE: Chronic | ICD-10-CM

## 2018-02-22 DIAGNOSIS — Z95.828 PRESENCE OF OTHER VASCULAR IMPLANTS AND GRAFTS: Chronic | ICD-10-CM

## 2018-02-27 ENCOUNTER — FORM ENCOUNTER (OUTPATIENT)
Age: 63
End: 2018-02-27

## 2018-02-27 ENCOUNTER — APPOINTMENT (OUTPATIENT)
Dept: INFUSION THERAPY | Facility: CLINIC | Age: 63
End: 2018-02-27

## 2018-02-27 ENCOUNTER — RX RENEWAL (OUTPATIENT)
Age: 63
End: 2018-02-27

## 2018-02-28 ENCOUNTER — LABORATORY RESULT (OUTPATIENT)
Age: 63
End: 2018-02-28

## 2018-02-28 ENCOUNTER — APPOINTMENT (OUTPATIENT)
Dept: INFUSION THERAPY | Facility: CLINIC | Age: 63
End: 2018-02-28

## 2018-02-28 ENCOUNTER — APPOINTMENT (OUTPATIENT)
Dept: HEMATOLOGY ONCOLOGY | Facility: CLINIC | Age: 63
End: 2018-02-28
Payer: COMMERCIAL

## 2018-02-28 ENCOUNTER — RESULT REVIEW (OUTPATIENT)
Age: 63
End: 2018-02-28

## 2018-02-28 ENCOUNTER — OUTPATIENT (OUTPATIENT)
Dept: OUTPATIENT SERVICES | Facility: HOSPITAL | Age: 63
LOS: 1 days | End: 2018-02-28
Payer: COMMERCIAL

## 2018-02-28 DIAGNOSIS — Z98.890 OTHER SPECIFIED POSTPROCEDURAL STATES: Chronic | ICD-10-CM

## 2018-02-28 DIAGNOSIS — C25.9 MALIGNANT NEOPLASM OF PANCREAS, UNSPECIFIED: ICD-10-CM

## 2018-02-28 DIAGNOSIS — Z95.828 PRESENCE OF OTHER VASCULAR IMPLANTS AND GRAFTS: Chronic | ICD-10-CM

## 2018-02-28 DIAGNOSIS — K90.81 WHIPPLE'S DISEASE: Chronic | ICD-10-CM

## 2018-02-28 LAB
BASOPHILS # BLD AUTO: 0 K/UL — SIGNIFICANT CHANGE UP (ref 0–0.2)
BASOPHILS NFR BLD AUTO: 0.7 % — SIGNIFICANT CHANGE UP (ref 0–2)
EOSINOPHIL # BLD AUTO: 0.1 K/UL — SIGNIFICANT CHANGE UP (ref 0–0.5)
EOSINOPHIL NFR BLD AUTO: 1.5 % — SIGNIFICANT CHANGE UP (ref 0–6)
HCT VFR BLD CALC: 44.3 % — SIGNIFICANT CHANGE UP (ref 39–50)
HGB BLD-MCNC: 15.5 G/DL — SIGNIFICANT CHANGE UP (ref 13–17)
LYMPHOCYTES # BLD AUTO: 1.1 K/UL — SIGNIFICANT CHANGE UP (ref 1–3.3)
LYMPHOCYTES # BLD AUTO: 19.4 % — SIGNIFICANT CHANGE UP (ref 13–44)
MCHC RBC-ENTMCNC: 31.2 PG — SIGNIFICANT CHANGE UP (ref 27–34)
MCHC RBC-ENTMCNC: 34.9 GM/DL — SIGNIFICANT CHANGE UP (ref 32–36)
MCV RBC AUTO: 89.3 FL — SIGNIFICANT CHANGE UP (ref 80–100)
MONOCYTES # BLD AUTO: 0.7 K/UL — SIGNIFICANT CHANGE UP (ref 0–0.9)
MONOCYTES NFR BLD AUTO: 13.2 % — SIGNIFICANT CHANGE UP (ref 2–14)
NEUTROPHILS # BLD AUTO: 3.5 K/UL — SIGNIFICANT CHANGE UP (ref 1.8–7.4)
NEUTROPHILS NFR BLD AUTO: 65.2 % — SIGNIFICANT CHANGE UP (ref 43–77)
PLATELET # BLD AUTO: 428 K/UL — HIGH (ref 150–400)
RBC # BLD: 4.96 M/UL — SIGNIFICANT CHANGE UP (ref 4.2–5.8)
RBC # FLD: 15.2 % — HIGH (ref 10.3–14.5)
WBC # BLD: 5.4 K/UL — SIGNIFICANT CHANGE UP (ref 3.8–10.5)
WBC # FLD AUTO: 5.4 K/UL — SIGNIFICANT CHANGE UP (ref 3.8–10.5)

## 2018-02-28 PROCEDURE — 99214 OFFICE O/P EST MOD 30 MIN: CPT

## 2018-02-28 PROCEDURE — 71046 X-RAY EXAM CHEST 2 VIEWS: CPT | Mod: 26

## 2018-03-01 DIAGNOSIS — R11.2 NAUSEA WITH VOMITING, UNSPECIFIED: ICD-10-CM

## 2018-03-01 DIAGNOSIS — Z51.11 ENCOUNTER FOR ANTINEOPLASTIC CHEMOTHERAPY: ICD-10-CM

## 2018-03-04 ENCOUNTER — TRANSCRIPTION ENCOUNTER (OUTPATIENT)
Age: 63
End: 2018-03-04

## 2018-03-06 ENCOUNTER — APPOINTMENT (OUTPATIENT)
Dept: INFUSION THERAPY | Facility: CLINIC | Age: 63
End: 2018-03-06

## 2018-03-07 ENCOUNTER — RESULT REVIEW (OUTPATIENT)
Age: 63
End: 2018-03-07

## 2018-03-07 ENCOUNTER — APPOINTMENT (OUTPATIENT)
Dept: INFUSION THERAPY | Facility: CLINIC | Age: 63
End: 2018-03-07

## 2018-03-07 ENCOUNTER — LABORATORY RESULT (OUTPATIENT)
Age: 63
End: 2018-03-07

## 2018-03-07 ENCOUNTER — APPOINTMENT (OUTPATIENT)
Dept: HEMATOLOGY ONCOLOGY | Facility: CLINIC | Age: 63
End: 2018-03-07
Payer: COMMERCIAL

## 2018-03-07 VITALS
TEMPERATURE: 98 F | HEART RATE: 73 BPM | SYSTOLIC BLOOD PRESSURE: 115 MMHG | HEIGHT: 64.37 IN | WEIGHT: 181.44 LBS | DIASTOLIC BLOOD PRESSURE: 86 MMHG | OXYGEN SATURATION: 99 % | BODY MASS INDEX: 30.98 KG/M2

## 2018-03-07 LAB
BASOPHILS # BLD AUTO: 0.1 K/UL — SIGNIFICANT CHANGE UP (ref 0–0.2)
BASOPHILS NFR BLD AUTO: 1.9 % — SIGNIFICANT CHANGE UP (ref 0–2)
EOSINOPHIL # BLD AUTO: 0 K/UL — SIGNIFICANT CHANGE UP (ref 0–0.5)
EOSINOPHIL NFR BLD AUTO: 1.1 % — SIGNIFICANT CHANGE UP (ref 0–6)
HCT VFR BLD CALC: 42.9 % — SIGNIFICANT CHANGE UP (ref 39–50)
HGB BLD-MCNC: 15 G/DL — SIGNIFICANT CHANGE UP (ref 13–17)
LYMPHOCYTES # BLD AUTO: 1.5 K/UL — SIGNIFICANT CHANGE UP (ref 1–3.3)
LYMPHOCYTES # BLD AUTO: 37.9 % — SIGNIFICANT CHANGE UP (ref 13–44)
MCHC RBC-ENTMCNC: 30.8 PG — SIGNIFICANT CHANGE UP (ref 27–34)
MCHC RBC-ENTMCNC: 34.9 GM/DL — SIGNIFICANT CHANGE UP (ref 32–36)
MCV RBC AUTO: 88.4 FL — SIGNIFICANT CHANGE UP (ref 80–100)
MONOCYTES # BLD AUTO: 0.5 K/UL — SIGNIFICANT CHANGE UP (ref 0–0.9)
MONOCYTES NFR BLD AUTO: 13 % — SIGNIFICANT CHANGE UP (ref 2–14)
NEUTROPHILS # BLD AUTO: 1.8 K/UL — SIGNIFICANT CHANGE UP (ref 1.8–7.4)
NEUTROPHILS NFR BLD AUTO: 46.2 % — SIGNIFICANT CHANGE UP (ref 43–77)
PLATELET # BLD AUTO: 356 K/UL — SIGNIFICANT CHANGE UP (ref 150–400)
RBC # BLD: 4.85 M/UL — SIGNIFICANT CHANGE UP (ref 4.2–5.8)
RBC # FLD: 14.6 % — HIGH (ref 10.3–14.5)
WBC # BLD: 4 K/UL — SIGNIFICANT CHANGE UP (ref 3.8–10.5)
WBC # FLD AUTO: 4 K/UL — SIGNIFICANT CHANGE UP (ref 3.8–10.5)

## 2018-03-07 PROCEDURE — 99214 OFFICE O/P EST MOD 30 MIN: CPT

## 2018-03-07 RX ORDER — SUCRALFATE 1 G/1
1 TABLET ORAL
Refills: 0 | Status: DISCONTINUED | COMMUNITY
Start: 2017-12-14 | End: 2018-03-07

## 2018-03-11 RX ORDER — OXYCODONE 5 MG/1
5 TABLET ORAL
Qty: 12 | Refills: 0 | Status: DISCONTINUED | COMMUNITY
Start: 2017-12-13 | End: 2018-03-11

## 2018-03-13 ENCOUNTER — APPOINTMENT (OUTPATIENT)
Dept: INFUSION THERAPY | Facility: CLINIC | Age: 63
End: 2018-03-13

## 2018-03-14 ENCOUNTER — APPOINTMENT (OUTPATIENT)
Dept: INFUSION THERAPY | Facility: CLINIC | Age: 63
End: 2018-03-14

## 2018-03-14 ENCOUNTER — RX RENEWAL (OUTPATIENT)
Age: 63
End: 2018-03-14

## 2018-03-20 ENCOUNTER — APPOINTMENT (OUTPATIENT)
Dept: INFUSION THERAPY | Facility: CLINIC | Age: 63
End: 2018-03-20

## 2018-03-20 ENCOUNTER — RESULT REVIEW (OUTPATIENT)
Age: 63
End: 2018-03-20

## 2018-03-20 ENCOUNTER — APPOINTMENT (OUTPATIENT)
Dept: HEMATOLOGY ONCOLOGY | Facility: CLINIC | Age: 63
End: 2018-03-20
Payer: COMMERCIAL

## 2018-03-20 VITALS
HEART RATE: 75 BPM | WEIGHT: 180.34 LBS | TEMPERATURE: 98.2 F | BODY MASS INDEX: 30.79 KG/M2 | DIASTOLIC BLOOD PRESSURE: 74 MMHG | OXYGEN SATURATION: 100 % | SYSTOLIC BLOOD PRESSURE: 122 MMHG | HEIGHT: 64.37 IN

## 2018-03-20 LAB
BASOPHILS # BLD AUTO: 0 K/UL — SIGNIFICANT CHANGE UP (ref 0–0.2)
BASOPHILS NFR BLD AUTO: 0.7 % — SIGNIFICANT CHANGE UP (ref 0–2)
EOSINOPHIL # BLD AUTO: 0.1 K/UL — SIGNIFICANT CHANGE UP (ref 0–0.5)
EOSINOPHIL NFR BLD AUTO: 1.1 % — SIGNIFICANT CHANGE UP (ref 0–6)
HCT VFR BLD CALC: 43.3 % — SIGNIFICANT CHANGE UP (ref 39–50)
HGB BLD-MCNC: 15.2 G/DL — SIGNIFICANT CHANGE UP (ref 13–17)
LYMPHOCYTES # BLD AUTO: 1.6 K/UL — SIGNIFICANT CHANGE UP (ref 1–3.3)
LYMPHOCYTES # BLD AUTO: 25 % — SIGNIFICANT CHANGE UP (ref 13–44)
MCHC RBC-ENTMCNC: 32 PG — SIGNIFICANT CHANGE UP (ref 27–34)
MCHC RBC-ENTMCNC: 35.1 GM/DL — SIGNIFICANT CHANGE UP (ref 32–36)
MCV RBC AUTO: 91 FL — SIGNIFICANT CHANGE UP (ref 80–100)
MONOCYTES # BLD AUTO: 0.9 K/UL — SIGNIFICANT CHANGE UP (ref 0–0.9)
MONOCYTES NFR BLD AUTO: 14.1 % — HIGH (ref 2–14)
NEUTROPHILS # BLD AUTO: 3.8 K/UL — SIGNIFICANT CHANGE UP (ref 1.8–7.4)
NEUTROPHILS NFR BLD AUTO: 59.2 % — SIGNIFICANT CHANGE UP (ref 43–77)
PLATELET # BLD AUTO: 256 K/UL — SIGNIFICANT CHANGE UP (ref 150–400)
RBC # BLD: 4.76 M/UL — SIGNIFICANT CHANGE UP (ref 4.2–5.8)
RBC # FLD: 18.3 % — HIGH (ref 10.3–14.5)
WBC # BLD: 6.4 K/UL — SIGNIFICANT CHANGE UP (ref 3.8–10.5)
WBC # FLD AUTO: 6.4 K/UL — SIGNIFICANT CHANGE UP (ref 3.8–10.5)

## 2018-03-20 PROCEDURE — 99214 OFFICE O/P EST MOD 30 MIN: CPT

## 2018-03-21 ENCOUNTER — LABORATORY RESULT (OUTPATIENT)
Age: 63
End: 2018-03-21

## 2018-03-21 ENCOUNTER — APPOINTMENT (OUTPATIENT)
Dept: INFUSION THERAPY | Facility: CLINIC | Age: 63
End: 2018-03-21

## 2018-03-26 ENCOUNTER — OUTPATIENT (OUTPATIENT)
Dept: OUTPATIENT SERVICES | Facility: HOSPITAL | Age: 63
LOS: 1 days | Discharge: HOME | End: 2018-03-26

## 2018-03-26 DIAGNOSIS — Z95.828 PRESENCE OF OTHER VASCULAR IMPLANTS AND GRAFTS: Chronic | ICD-10-CM

## 2018-03-26 DIAGNOSIS — Z98.890 OTHER SPECIFIED POSTPROCEDURAL STATES: Chronic | ICD-10-CM

## 2018-03-26 DIAGNOSIS — K90.81 WHIPPLE'S DISEASE: Chronic | ICD-10-CM

## 2018-03-26 DIAGNOSIS — C25.9 MALIGNANT NEOPLASM OF PANCREAS, UNSPECIFIED: ICD-10-CM

## 2018-03-26 DIAGNOSIS — D68.51 ACTIVATED PROTEIN C RESISTANCE: ICD-10-CM

## 2018-03-28 ENCOUNTER — LABORATORY RESULT (OUTPATIENT)
Age: 63
End: 2018-03-28

## 2018-03-28 ENCOUNTER — APPOINTMENT (OUTPATIENT)
Dept: HEMATOLOGY ONCOLOGY | Facility: CLINIC | Age: 63
End: 2018-03-28
Payer: COMMERCIAL

## 2018-03-28 ENCOUNTER — RESULT REVIEW (OUTPATIENT)
Age: 63
End: 2018-03-28

## 2018-03-28 ENCOUNTER — APPOINTMENT (OUTPATIENT)
Dept: INFUSION THERAPY | Facility: CLINIC | Age: 63
End: 2018-03-28

## 2018-03-28 LAB
BASOPHILS # BLD AUTO: 0.1 K/UL — SIGNIFICANT CHANGE UP (ref 0–0.2)
BASOPHILS NFR BLD AUTO: 1.5 % — SIGNIFICANT CHANGE UP (ref 0–2)
EOSINOPHIL # BLD AUTO: 0 K/UL — SIGNIFICANT CHANGE UP (ref 0–0.5)
EOSINOPHIL NFR BLD AUTO: 0.9 % — SIGNIFICANT CHANGE UP (ref 0–6)
HCT VFR BLD CALC: 41.1 % — SIGNIFICANT CHANGE UP (ref 39–50)
HGB BLD-MCNC: 14.2 G/DL — SIGNIFICANT CHANGE UP (ref 13–17)
LYMPHOCYTES # BLD AUTO: 1.1 K/UL — SIGNIFICANT CHANGE UP (ref 1–3.3)
LYMPHOCYTES # BLD AUTO: 30.6 % — SIGNIFICANT CHANGE UP (ref 13–44)
MCHC RBC-ENTMCNC: 31.6 PG — SIGNIFICANT CHANGE UP (ref 27–34)
MCHC RBC-ENTMCNC: 34.6 GM/DL — SIGNIFICANT CHANGE UP (ref 32–36)
MCV RBC AUTO: 91.4 FL — SIGNIFICANT CHANGE UP (ref 80–100)
MONOCYTES # BLD AUTO: 0.5 K/UL — SIGNIFICANT CHANGE UP (ref 0–0.9)
MONOCYTES NFR BLD AUTO: 14.2 % — HIGH (ref 2–14)
NEUTROPHILS # BLD AUTO: 1.8 K/UL — SIGNIFICANT CHANGE UP (ref 1.8–7.4)
NEUTROPHILS NFR BLD AUTO: 52.8 % — SIGNIFICANT CHANGE UP (ref 43–77)
PLATELET # BLD AUTO: 293 K/UL — SIGNIFICANT CHANGE UP (ref 150–400)
RBC # BLD: 4.49 M/UL — SIGNIFICANT CHANGE UP (ref 4.2–5.8)
RBC # FLD: 17.9 % — HIGH (ref 10.3–14.5)
WBC # BLD: 3.5 K/UL — LOW (ref 3.8–10.5)
WBC # FLD AUTO: 3.5 K/UL — LOW (ref 3.8–10.5)

## 2018-03-28 PROCEDURE — 99214 OFFICE O/P EST MOD 30 MIN: CPT

## 2018-03-29 DIAGNOSIS — R11.2 NAUSEA WITH VOMITING, UNSPECIFIED: ICD-10-CM

## 2018-03-29 DIAGNOSIS — Z51.11 ENCOUNTER FOR ANTINEOPLASTIC CHEMOTHERAPY: ICD-10-CM

## 2018-04-10 ENCOUNTER — APPOINTMENT (OUTPATIENT)
Dept: HEMATOLOGY ONCOLOGY | Facility: CLINIC | Age: 63
End: 2018-04-10
Payer: COMMERCIAL

## 2018-04-10 ENCOUNTER — RESULT REVIEW (OUTPATIENT)
Age: 63
End: 2018-04-10

## 2018-04-10 VITALS
TEMPERATURE: 98.1 F | DIASTOLIC BLOOD PRESSURE: 71 MMHG | OXYGEN SATURATION: 98 % | SYSTOLIC BLOOD PRESSURE: 110 MMHG | BODY MASS INDEX: 30.41 KG/M2 | WEIGHT: 179.21 LBS | HEART RATE: 82 BPM

## 2018-04-10 LAB
BASOPHILS # BLD AUTO: 0 K/UL — SIGNIFICANT CHANGE UP (ref 0–0.2)
BASOPHILS NFR BLD AUTO: 0.6 % — SIGNIFICANT CHANGE UP (ref 0–2)
EOSINOPHIL # BLD AUTO: 0.1 K/UL — SIGNIFICANT CHANGE UP (ref 0–0.5)
EOSINOPHIL NFR BLD AUTO: 0.7 % — SIGNIFICANT CHANGE UP (ref 0–6)
HCT VFR BLD CALC: 39.8 % — SIGNIFICANT CHANGE UP (ref 39–50)
HGB BLD-MCNC: 14.3 G/DL — SIGNIFICANT CHANGE UP (ref 13–17)
LYMPHOCYTES # BLD AUTO: 1.4 K/UL — SIGNIFICANT CHANGE UP (ref 1–3.3)
LYMPHOCYTES # BLD AUTO: 19.3 % — SIGNIFICANT CHANGE UP (ref 13–44)
MCHC RBC-ENTMCNC: 33.6 PG — SIGNIFICANT CHANGE UP (ref 27–34)
MCHC RBC-ENTMCNC: 35.8 GM/DL — SIGNIFICANT CHANGE UP (ref 32–36)
MCV RBC AUTO: 94 FL — SIGNIFICANT CHANGE UP (ref 80–100)
MONOCYTES # BLD AUTO: 0.7 K/UL — SIGNIFICANT CHANGE UP (ref 0–0.9)
MONOCYTES NFR BLD AUTO: 9.9 % — SIGNIFICANT CHANGE UP (ref 2–14)
NEUTROPHILS # BLD AUTO: 5.2 K/UL — SIGNIFICANT CHANGE UP (ref 1.8–7.4)
NEUTROPHILS NFR BLD AUTO: 69.5 % — SIGNIFICANT CHANGE UP (ref 43–77)
PLATELET # BLD AUTO: 243 K/UL — SIGNIFICANT CHANGE UP (ref 150–400)
RBC # BLD: 4.24 M/UL — SIGNIFICANT CHANGE UP (ref 4.2–5.8)
RBC # FLD: 20.4 % — HIGH (ref 10.3–14.5)
WBC # BLD: 7.5 K/UL — SIGNIFICANT CHANGE UP (ref 3.8–10.5)
WBC # FLD AUTO: 7.5 K/UL — SIGNIFICANT CHANGE UP (ref 3.8–10.5)

## 2018-04-10 PROCEDURE — 99214 OFFICE O/P EST MOD 30 MIN: CPT

## 2018-04-11 ENCOUNTER — FORM ENCOUNTER (OUTPATIENT)
Age: 63
End: 2018-04-11

## 2018-04-11 ENCOUNTER — APPOINTMENT (OUTPATIENT)
Dept: INFUSION THERAPY | Facility: CLINIC | Age: 63
End: 2018-04-11

## 2018-04-11 LAB
ALBUMIN SERPL ELPH-MCNC: 4.3 G/DL
ALP BLD-CCNC: 96 U/L
ALT SERPL-CCNC: 11 U/L
ANION GAP SERPL CALC-SCNC: 11 MMOL/L
AST SERPL-CCNC: 18 U/L
BILIRUB SERPL-MCNC: 1.5 MG/DL
BUN SERPL-MCNC: 11 MG/DL
CALCIUM SERPL-MCNC: 9.6 MG/DL
CANCER AG19-9 SERPL-ACNC: 104.3 U/ML
CHLORIDE SERPL-SCNC: 104 MMOL/L
CO2 SERPL-SCNC: 27 MMOL/L
CREAT SERPL-MCNC: 0.95 MG/DL
POTASSIUM SERPL-SCNC: 4.6 MMOL/L
PROT SERPL-MCNC: 6.7 G/DL
SODIUM SERPL-SCNC: 142 MMOL/L

## 2018-04-12 ENCOUNTER — APPOINTMENT (OUTPATIENT)
Dept: CT IMAGING | Facility: CLINIC | Age: 63
End: 2018-04-12
Payer: COMMERCIAL

## 2018-04-12 ENCOUNTER — OUTPATIENT (OUTPATIENT)
Dept: OUTPATIENT SERVICES | Facility: HOSPITAL | Age: 63
LOS: 1 days | End: 2018-04-12

## 2018-04-12 DIAGNOSIS — Z98.890 OTHER SPECIFIED POSTPROCEDURAL STATES: Chronic | ICD-10-CM

## 2018-04-12 DIAGNOSIS — K90.81 WHIPPLE'S DISEASE: Chronic | ICD-10-CM

## 2018-04-12 DIAGNOSIS — C25.9 MALIGNANT NEOPLASM OF PANCREAS, UNSPECIFIED: ICD-10-CM

## 2018-04-12 DIAGNOSIS — Z95.828 PRESENCE OF OTHER VASCULAR IMPLANTS AND GRAFTS: Chronic | ICD-10-CM

## 2018-04-12 PROCEDURE — 71260 CT THORAX DX C+: CPT | Mod: 26

## 2018-04-12 PROCEDURE — 74177 CT ABD & PELVIS W/CONTRAST: CPT | Mod: 26

## 2018-04-16 ENCOUNTER — APPOINTMENT (OUTPATIENT)
Dept: HEMATOLOGY ONCOLOGY | Facility: CLINIC | Age: 63
End: 2018-04-16
Payer: COMMERCIAL

## 2018-04-16 VITALS
HEART RATE: 100 BPM | HEIGHT: 64.37 IN | TEMPERATURE: 98.3 F | OXYGEN SATURATION: 99 % | WEIGHT: 180.56 LBS | DIASTOLIC BLOOD PRESSURE: 73 MMHG | SYSTOLIC BLOOD PRESSURE: 130 MMHG | BODY MASS INDEX: 30.83 KG/M2

## 2018-04-16 PROCEDURE — 99214 OFFICE O/P EST MOD 30 MIN: CPT

## 2018-04-18 ENCOUNTER — APPOINTMENT (OUTPATIENT)
Dept: INFUSION THERAPY | Facility: CLINIC | Age: 63
End: 2018-04-18

## 2018-04-18 ENCOUNTER — RESULT REVIEW (OUTPATIENT)
Age: 63
End: 2018-04-18

## 2018-04-18 ENCOUNTER — LABORATORY RESULT (OUTPATIENT)
Age: 63
End: 2018-04-18

## 2018-04-18 LAB
BASOPHILS # BLD AUTO: 0.1 K/UL — SIGNIFICANT CHANGE UP (ref 0–0.2)
BASOPHILS NFR BLD AUTO: 2 % — SIGNIFICANT CHANGE UP (ref 0–2)
EOSINOPHIL # BLD AUTO: 0 K/UL — SIGNIFICANT CHANGE UP (ref 0–0.5)
EOSINOPHIL NFR BLD AUTO: 0.5 % — SIGNIFICANT CHANGE UP (ref 0–6)
HCT VFR BLD CALC: 39.3 % — SIGNIFICANT CHANGE UP (ref 39–50)
HGB BLD-MCNC: 14.1 G/DL — SIGNIFICANT CHANGE UP (ref 13–17)
LYMPHOCYTES # BLD AUTO: 1.2 K/UL — SIGNIFICANT CHANGE UP (ref 1–3.3)
LYMPHOCYTES # BLD AUTO: 35.4 % — SIGNIFICANT CHANGE UP (ref 13–44)
MCHC RBC-ENTMCNC: 33.8 PG — SIGNIFICANT CHANGE UP (ref 27–34)
MCHC RBC-ENTMCNC: 35.8 GM/DL — SIGNIFICANT CHANGE UP (ref 32–36)
MCV RBC AUTO: 94.4 FL — SIGNIFICANT CHANGE UP (ref 80–100)
MONOCYTES # BLD AUTO: 0.6 K/UL — SIGNIFICANT CHANGE UP (ref 0–0.9)
MONOCYTES NFR BLD AUTO: 15.9 % — HIGH (ref 2–14)
NEUTROPHILS # BLD AUTO: 1.6 K/UL — LOW (ref 1.8–7.4)
NEUTROPHILS NFR BLD AUTO: 46.2 % — SIGNIFICANT CHANGE UP (ref 43–77)
PLATELET # BLD AUTO: 220 K/UL — SIGNIFICANT CHANGE UP (ref 150–400)
RBC # BLD: 4.16 M/UL — LOW (ref 4.2–5.8)
RBC # FLD: 19.1 % — HIGH (ref 10.3–14.5)
WBC # BLD: 3.5 K/UL — LOW (ref 3.8–10.5)
WBC # FLD AUTO: 3.5 K/UL — LOW (ref 3.8–10.5)

## 2018-04-26 ENCOUNTER — OUTPATIENT (OUTPATIENT)
Dept: OUTPATIENT SERVICES | Facility: HOSPITAL | Age: 63
LOS: 1 days | Discharge: HOME | End: 2018-04-26

## 2018-04-26 DIAGNOSIS — Z98.890 OTHER SPECIFIED POSTPROCEDURAL STATES: Chronic | ICD-10-CM

## 2018-04-26 DIAGNOSIS — C25.9 MALIGNANT NEOPLASM OF PANCREAS, UNSPECIFIED: ICD-10-CM

## 2018-04-26 DIAGNOSIS — K90.81 WHIPPLE'S DISEASE: Chronic | ICD-10-CM

## 2018-04-26 DIAGNOSIS — Z95.828 PRESENCE OF OTHER VASCULAR IMPLANTS AND GRAFTS: Chronic | ICD-10-CM

## 2018-04-27 ENCOUNTER — RX RENEWAL (OUTPATIENT)
Age: 63
End: 2018-04-27

## 2018-04-30 ENCOUNTER — RESULT REVIEW (OUTPATIENT)
Age: 63
End: 2018-04-30

## 2018-04-30 ENCOUNTER — APPOINTMENT (OUTPATIENT)
Dept: HEMATOLOGY ONCOLOGY | Facility: CLINIC | Age: 63
End: 2018-04-30
Payer: COMMERCIAL

## 2018-04-30 VITALS
HEART RATE: 56 BPM | DIASTOLIC BLOOD PRESSURE: 78 MMHG | WEIGHT: 178 LBS | SYSTOLIC BLOOD PRESSURE: 127 MMHG | TEMPERATURE: 97.8 F | BODY MASS INDEX: 29.66 KG/M2 | HEIGHT: 65 IN | OXYGEN SATURATION: 98 %

## 2018-04-30 LAB
BASOPHILS # BLD AUTO: 0 K/UL — SIGNIFICANT CHANGE UP (ref 0–0.2)
BASOPHILS NFR BLD AUTO: 0.5 % — SIGNIFICANT CHANGE UP (ref 0–2)
EOSINOPHIL # BLD AUTO: 0 K/UL — SIGNIFICANT CHANGE UP (ref 0–0.5)
EOSINOPHIL NFR BLD AUTO: 0.7 % — SIGNIFICANT CHANGE UP (ref 0–6)
HCT VFR BLD CALC: 40.2 % — SIGNIFICANT CHANGE UP (ref 39–50)
HGB BLD-MCNC: 14.5 G/DL — SIGNIFICANT CHANGE UP (ref 13–17)
LYMPHOCYTES # BLD AUTO: 1.4 K/UL — SIGNIFICANT CHANGE UP (ref 1–3.3)
LYMPHOCYTES # BLD AUTO: 24 % — SIGNIFICANT CHANGE UP (ref 13–44)
MCHC RBC-ENTMCNC: 34.9 PG — HIGH (ref 27–34)
MCHC RBC-ENTMCNC: 36.1 GM/DL — HIGH (ref 32–36)
MCV RBC AUTO: 96.7 FL — SIGNIFICANT CHANGE UP (ref 80–100)
MONOCYTES # BLD AUTO: 0.9 K/UL — SIGNIFICANT CHANGE UP (ref 0–0.9)
MONOCYTES NFR BLD AUTO: 15.3 % — HIGH (ref 2–14)
NEUTROPHILS # BLD AUTO: 3.4 K/UL — SIGNIFICANT CHANGE UP (ref 1.8–7.4)
NEUTROPHILS NFR BLD AUTO: 59.5 % — SIGNIFICANT CHANGE UP (ref 43–77)
PLATELET # BLD AUTO: 192 K/UL — SIGNIFICANT CHANGE UP (ref 150–400)
RBC # BLD: 4.16 M/UL — LOW (ref 4.2–5.8)
RBC # FLD: 20.4 % — HIGH (ref 10.3–14.5)
WBC # BLD: 5.7 K/UL — SIGNIFICANT CHANGE UP (ref 3.8–10.5)
WBC # FLD AUTO: 5.7 K/UL — SIGNIFICANT CHANGE UP (ref 3.8–10.5)

## 2018-04-30 PROCEDURE — 99214 OFFICE O/P EST MOD 30 MIN: CPT

## 2018-05-02 ENCOUNTER — LABORATORY RESULT (OUTPATIENT)
Age: 63
End: 2018-05-02

## 2018-05-02 ENCOUNTER — APPOINTMENT (OUTPATIENT)
Dept: INFUSION THERAPY | Facility: CLINIC | Age: 63
End: 2018-05-02

## 2018-05-03 DIAGNOSIS — Z51.11 ENCOUNTER FOR ANTINEOPLASTIC CHEMOTHERAPY: ICD-10-CM

## 2018-05-03 DIAGNOSIS — R11.2 NAUSEA WITH VOMITING, UNSPECIFIED: ICD-10-CM

## 2018-05-09 ENCOUNTER — APPOINTMENT (OUTPATIENT)
Dept: INFUSION THERAPY | Facility: CLINIC | Age: 63
End: 2018-05-09

## 2018-05-09 ENCOUNTER — LABORATORY RESULT (OUTPATIENT)
Age: 63
End: 2018-05-09

## 2018-05-09 ENCOUNTER — RESULT REVIEW (OUTPATIENT)
Age: 63
End: 2018-05-09

## 2018-05-09 LAB
BASOPHILS # BLD AUTO: 0.1 K/UL — SIGNIFICANT CHANGE UP (ref 0–0.2)
BASOPHILS NFR BLD AUTO: 1.6 % — SIGNIFICANT CHANGE UP (ref 0–2)
EOSINOPHIL # BLD AUTO: 0 K/UL — SIGNIFICANT CHANGE UP (ref 0–0.5)
EOSINOPHIL NFR BLD AUTO: 0.7 % — SIGNIFICANT CHANGE UP (ref 0–6)
HCT VFR BLD CALC: 38.3 % — LOW (ref 39–50)
HGB BLD-MCNC: 13.8 G/DL — SIGNIFICANT CHANGE UP (ref 13–17)
LYMPHOCYTES # BLD AUTO: 1.3 K/UL — SIGNIFICANT CHANGE UP (ref 1–3.3)
LYMPHOCYTES # BLD AUTO: 37.3 % — SIGNIFICANT CHANGE UP (ref 13–44)
MCHC RBC-ENTMCNC: 35 PG — HIGH (ref 27–34)
MCHC RBC-ENTMCNC: 36.2 GM/DL — HIGH (ref 32–36)
MCV RBC AUTO: 96.7 FL — SIGNIFICANT CHANGE UP (ref 80–100)
MONOCYTES # BLD AUTO: 0.6 K/UL — SIGNIFICANT CHANGE UP (ref 0–0.9)
MONOCYTES NFR BLD AUTO: 17.7 % — HIGH (ref 2–14)
NEUTROPHILS # BLD AUTO: 1.5 K/UL — LOW (ref 1.8–7.4)
NEUTROPHILS NFR BLD AUTO: 42.7 % — LOW (ref 43–77)
PLAT MORPH BLD: NORMAL — SIGNIFICANT CHANGE UP
PLATELET # BLD AUTO: 239 K/UL — SIGNIFICANT CHANGE UP (ref 150–400)
RBC # BLD: 3.96 M/UL — LOW (ref 4.2–5.8)
RBC # FLD: 17.5 % — HIGH (ref 10.3–14.5)
RBC BLD AUTO: NORMAL — SIGNIFICANT CHANGE UP
WBC # BLD: 3.4 K/UL — LOW (ref 3.8–10.5)
WBC # FLD AUTO: 3.4 K/UL — LOW (ref 3.8–10.5)

## 2018-05-15 ENCOUNTER — RX RENEWAL (OUTPATIENT)
Age: 63
End: 2018-05-15

## 2018-05-15 RX ORDER — LIDOCAINE AND PRILOCAINE 25; 25 MG/G; MG/G
2.5-2.5 CREAM TOPICAL
Qty: 2 | Refills: 4 | Status: ACTIVE | COMMUNITY
Start: 2018-01-24 | End: 1900-01-01

## 2018-05-23 ENCOUNTER — APPOINTMENT (OUTPATIENT)
Dept: INFUSION THERAPY | Facility: CLINIC | Age: 63
End: 2018-05-23

## 2018-05-23 ENCOUNTER — RESULT REVIEW (OUTPATIENT)
Age: 63
End: 2018-05-23

## 2018-05-23 ENCOUNTER — LABORATORY RESULT (OUTPATIENT)
Age: 63
End: 2018-05-23

## 2018-05-23 LAB
BASOPHILS # BLD AUTO: 0.1 K/UL — SIGNIFICANT CHANGE UP (ref 0–0.2)
BASOPHILS NFR BLD AUTO: 1 % — SIGNIFICANT CHANGE UP (ref 0–2)
EOSINOPHIL # BLD AUTO: 0 K/UL — SIGNIFICANT CHANGE UP (ref 0–0.5)
EOSINOPHIL NFR BLD AUTO: 0.8 % — SIGNIFICANT CHANGE UP (ref 0–6)
HCT VFR BLD CALC: 36.8 % — LOW (ref 39–50)
HGB BLD-MCNC: 12.8 G/DL — LOW (ref 13–17)
LYMPHOCYTES # BLD AUTO: 1.1 K/UL — SIGNIFICANT CHANGE UP (ref 1–3.3)
LYMPHOCYTES # BLD AUTO: 19.5 % — SIGNIFICANT CHANGE UP (ref 13–44)
MCHC RBC-ENTMCNC: 34.9 GM/DL — SIGNIFICANT CHANGE UP (ref 32–36)
MCHC RBC-ENTMCNC: 35.4 PG — HIGH (ref 27–34)
MCV RBC AUTO: 101.6 FL — HIGH (ref 80–100)
MONOCYTES # BLD AUTO: 0.6 K/UL — SIGNIFICANT CHANGE UP (ref 0–0.9)
MONOCYTES NFR BLD AUTO: 11.3 % — SIGNIFICANT CHANGE UP (ref 2–14)
NEUTROPHILS # BLD AUTO: 3.8 K/UL — SIGNIFICANT CHANGE UP (ref 1.8–7.4)
NEUTROPHILS NFR BLD AUTO: 67.4 % — SIGNIFICANT CHANGE UP (ref 43–77)
PLATELET # BLD AUTO: 269 K/UL — SIGNIFICANT CHANGE UP (ref 150–400)
RBC # BLD: 3.63 M/UL — LOW (ref 4.2–5.8)
RBC # FLD: 17.6 % — HIGH (ref 10.3–14.5)
WBC # BLD: 5.7 K/UL — SIGNIFICANT CHANGE UP (ref 3.8–10.5)
WBC # FLD AUTO: 5.7 K/UL — SIGNIFICANT CHANGE UP (ref 3.8–10.5)

## 2018-05-24 ENCOUNTER — OUTPATIENT (OUTPATIENT)
Dept: OUTPATIENT SERVICES | Facility: HOSPITAL | Age: 63
LOS: 1 days | Discharge: HOME | End: 2018-05-24

## 2018-05-24 DIAGNOSIS — Z98.890 OTHER SPECIFIED POSTPROCEDURAL STATES: Chronic | ICD-10-CM

## 2018-05-24 DIAGNOSIS — C25.9 MALIGNANT NEOPLASM OF PANCREAS, UNSPECIFIED: ICD-10-CM

## 2018-05-24 DIAGNOSIS — Z95.828 PRESENCE OF OTHER VASCULAR IMPLANTS AND GRAFTS: Chronic | ICD-10-CM

## 2018-05-24 DIAGNOSIS — K90.81 WHIPPLE'S DISEASE: Chronic | ICD-10-CM

## 2018-05-29 ENCOUNTER — RESULT REVIEW (OUTPATIENT)
Age: 63
End: 2018-05-29

## 2018-05-29 ENCOUNTER — RX RENEWAL (OUTPATIENT)
Age: 63
End: 2018-05-29

## 2018-05-29 ENCOUNTER — APPOINTMENT (OUTPATIENT)
Dept: HEMATOLOGY ONCOLOGY | Facility: CLINIC | Age: 63
End: 2018-05-29
Payer: COMMERCIAL

## 2018-05-29 VITALS
TEMPERATURE: 98.2 F | HEART RATE: 80 BPM | BODY MASS INDEX: 28.76 KG/M2 | WEIGHT: 172.62 LBS | SYSTOLIC BLOOD PRESSURE: 107 MMHG | HEIGHT: 65 IN | DIASTOLIC BLOOD PRESSURE: 68 MMHG | OXYGEN SATURATION: 100 %

## 2018-05-29 LAB
BASOPHILS # BLD AUTO: 0.1 K/UL — SIGNIFICANT CHANGE UP (ref 0–0.2)
BASOPHILS NFR BLD AUTO: 1.1 % — SIGNIFICANT CHANGE UP (ref 0–2)
EOSINOPHIL # BLD AUTO: 0 K/UL — SIGNIFICANT CHANGE UP (ref 0–0.5)
EOSINOPHIL NFR BLD AUTO: 0.8 % — SIGNIFICANT CHANGE UP (ref 0–6)
HCT VFR BLD CALC: 35.6 % — LOW (ref 39–50)
HGB BLD-MCNC: 13.2 G/DL — SIGNIFICANT CHANGE UP (ref 13–17)
LYMPHOCYTES # BLD AUTO: 1.4 K/UL — SIGNIFICANT CHANGE UP (ref 1–3.3)
LYMPHOCYTES # BLD AUTO: 27.4 % — SIGNIFICANT CHANGE UP (ref 13–44)
MCHC RBC-ENTMCNC: 36.8 PG — HIGH (ref 27–34)
MCHC RBC-ENTMCNC: 37.1 GM/DL — HIGH (ref 32–36)
MCV RBC AUTO: 99.2 FL — SIGNIFICANT CHANGE UP (ref 80–100)
MONOCYTES # BLD AUTO: 0.3 K/UL — SIGNIFICANT CHANGE UP (ref 0–0.9)
MONOCYTES NFR BLD AUTO: 5.8 % — SIGNIFICANT CHANGE UP (ref 2–14)
NEUTROPHILS # BLD AUTO: 3.4 K/UL — SIGNIFICANT CHANGE UP (ref 1.8–7.4)
NEUTROPHILS NFR BLD AUTO: 64.8 % — SIGNIFICANT CHANGE UP (ref 43–77)
PLATELET # BLD AUTO: 298 K/UL — SIGNIFICANT CHANGE UP (ref 150–400)
RBC # BLD: 3.59 M/UL — LOW (ref 4.2–5.8)
RBC # FLD: 15.5 % — HIGH (ref 10.3–14.5)
WBC # BLD: 5.3 K/UL — SIGNIFICANT CHANGE UP (ref 3.8–10.5)
WBC # FLD AUTO: 5.3 K/UL — SIGNIFICANT CHANGE UP (ref 3.8–10.5)

## 2018-05-29 PROCEDURE — 99214 OFFICE O/P EST MOD 30 MIN: CPT

## 2018-05-30 ENCOUNTER — APPOINTMENT (OUTPATIENT)
Dept: INFUSION THERAPY | Facility: CLINIC | Age: 63
End: 2018-05-30

## 2018-05-31 DIAGNOSIS — R11.2 NAUSEA WITH VOMITING, UNSPECIFIED: ICD-10-CM

## 2018-05-31 DIAGNOSIS — Z51.11 ENCOUNTER FOR ANTINEOPLASTIC CHEMOTHERAPY: ICD-10-CM

## 2018-06-05 ENCOUNTER — RX RENEWAL (OUTPATIENT)
Age: 63
End: 2018-06-05

## 2018-06-07 ENCOUNTER — APPOINTMENT (OUTPATIENT)
Dept: GASTROENTEROLOGY | Facility: CLINIC | Age: 63
End: 2018-06-07
Payer: COMMERCIAL

## 2018-06-07 VITALS
SYSTOLIC BLOOD PRESSURE: 118 MMHG | BODY MASS INDEX: 27.82 KG/M2 | DIASTOLIC BLOOD PRESSURE: 74 MMHG | RESPIRATION RATE: 16 BRPM | WEIGHT: 167 LBS | OXYGEN SATURATION: 99 % | HEART RATE: 76 BPM | HEIGHT: 65 IN

## 2018-06-07 DIAGNOSIS — K21.9 GASTRO-ESOPHAGEAL REFLUX DISEASE W/OUT ESOPHAGITIS: ICD-10-CM

## 2018-06-07 PROCEDURE — 99204 OFFICE O/P NEW MOD 45 MIN: CPT

## 2018-06-10 PROBLEM — K21.9 GERD (GASTROESOPHAGEAL REFLUX DISEASE): Status: ACTIVE | Noted: 2018-06-07

## 2018-06-10 RX ORDER — MAG HYDROX/ALUMINUM HYD/SIMETH 200-200-20
200-200-20 SUSPENSION, ORAL (FINAL DOSE FORM) ORAL
Refills: 0 | Status: ACTIVE | COMMUNITY

## 2018-06-13 ENCOUNTER — LABORATORY RESULT (OUTPATIENT)
Age: 63
End: 2018-06-13

## 2018-06-13 ENCOUNTER — APPOINTMENT (OUTPATIENT)
Dept: INFUSION THERAPY | Facility: CLINIC | Age: 63
End: 2018-06-13

## 2018-06-13 ENCOUNTER — APPOINTMENT (OUTPATIENT)
Dept: RADIATION ONCOLOGY | Facility: CLINIC | Age: 63
End: 2018-06-13
Payer: COMMERCIAL

## 2018-06-13 ENCOUNTER — RESULT REVIEW (OUTPATIENT)
Age: 63
End: 2018-06-13

## 2018-06-13 VITALS
RESPIRATION RATE: 16 BRPM | WEIGHT: 171 LBS | DIASTOLIC BLOOD PRESSURE: 70 MMHG | HEIGHT: 65 IN | SYSTOLIC BLOOD PRESSURE: 109 MMHG | OXYGEN SATURATION: 99 % | HEART RATE: 78 BPM | BODY MASS INDEX: 28.49 KG/M2

## 2018-06-13 LAB
BASOPHILS # BLD AUTO: 0 K/UL — SIGNIFICANT CHANGE UP (ref 0–0.2)
BASOPHILS NFR BLD AUTO: 0.8 % — SIGNIFICANT CHANGE UP (ref 0–2)
EOSINOPHIL # BLD AUTO: 0.1 K/UL — SIGNIFICANT CHANGE UP (ref 0–0.5)
EOSINOPHIL NFR BLD AUTO: 2 % — SIGNIFICANT CHANGE UP (ref 0–6)
HCT VFR BLD CALC: 36.4 % — LOW (ref 39–50)
HGB BLD-MCNC: 13.4 G/DL — SIGNIFICANT CHANGE UP (ref 13–17)
LYMPHOCYTES # BLD AUTO: 1 K/UL — SIGNIFICANT CHANGE UP (ref 1–3.3)
LYMPHOCYTES # BLD AUTO: 16.9 % — SIGNIFICANT CHANGE UP (ref 13–44)
MCHC RBC-ENTMCNC: 36.8 GM/DL — HIGH (ref 32–36)
MCHC RBC-ENTMCNC: 38 PG — HIGH (ref 27–34)
MCV RBC AUTO: 103.1 FL — HIGH (ref 80–100)
MONOCYTES # BLD AUTO: 0.7 K/UL — SIGNIFICANT CHANGE UP (ref 0–0.9)
MONOCYTES NFR BLD AUTO: 12.4 % — SIGNIFICANT CHANGE UP (ref 2–14)
NEUTROPHILS # BLD AUTO: 3.9 K/UL — SIGNIFICANT CHANGE UP (ref 1.8–7.4)
NEUTROPHILS NFR BLD AUTO: 67.8 % — SIGNIFICANT CHANGE UP (ref 43–77)
PLATELET # BLD AUTO: 294 K/UL — SIGNIFICANT CHANGE UP (ref 150–400)
RBC # BLD: 3.53 M/UL — LOW (ref 4.2–5.8)
RBC # FLD: 15.5 % — HIGH (ref 10.3–14.5)
WBC # BLD: 5.7 K/UL — SIGNIFICANT CHANGE UP (ref 3.8–10.5)
WBC # FLD AUTO: 5.7 K/UL — SIGNIFICANT CHANGE UP (ref 3.8–10.5)

## 2018-06-13 PROCEDURE — 99213 OFFICE O/P EST LOW 20 MIN: CPT

## 2018-06-14 ENCOUNTER — APPOINTMENT (OUTPATIENT)
Dept: GASTROENTEROLOGY | Facility: CLINIC | Age: 63
End: 2018-06-14

## 2018-06-20 ENCOUNTER — APPOINTMENT (OUTPATIENT)
Dept: INFUSION THERAPY | Facility: CLINIC | Age: 63
End: 2018-06-20

## 2018-06-20 ENCOUNTER — RESULT REVIEW (OUTPATIENT)
Age: 63
End: 2018-06-20

## 2018-06-20 ENCOUNTER — LABORATORY RESULT (OUTPATIENT)
Age: 63
End: 2018-06-20

## 2018-06-20 LAB
BASOPHILS # BLD AUTO: 0.1 K/UL — SIGNIFICANT CHANGE UP (ref 0–0.2)
BASOPHILS NFR BLD AUTO: 2 % — SIGNIFICANT CHANGE UP (ref 0–2)
EOSINOPHIL # BLD AUTO: 0 K/UL — SIGNIFICANT CHANGE UP (ref 0–0.5)
EOSINOPHIL NFR BLD AUTO: 0.5 % — SIGNIFICANT CHANGE UP (ref 0–6)
HCT VFR BLD CALC: 38.2 % — LOW (ref 39–50)
HGB BLD-MCNC: 14.1 G/DL — SIGNIFICANT CHANGE UP (ref 13–17)
LYMPHOCYTES # BLD AUTO: 1.4 K/UL — SIGNIFICANT CHANGE UP (ref 1–3.3)
LYMPHOCYTES # BLD AUTO: 41.2 % — SIGNIFICANT CHANGE UP (ref 13–44)
MCHC RBC-ENTMCNC: 36.8 GM/DL — HIGH (ref 32–36)
MCHC RBC-ENTMCNC: 37.9 PG — HIGH (ref 27–34)
MCV RBC AUTO: 103.2 FL — HIGH (ref 80–100)
MONOCYTES # BLD AUTO: 0.6 K/UL — SIGNIFICANT CHANGE UP (ref 0–0.9)
MONOCYTES NFR BLD AUTO: 15.8 % — HIGH (ref 2–14)
NEUTROPHILS # BLD AUTO: 1.4 K/UL — LOW (ref 1.8–7.4)
NEUTROPHILS NFR BLD AUTO: 40.5 % — LOW (ref 43–77)
PLATELET # BLD AUTO: 308 K/UL — SIGNIFICANT CHANGE UP (ref 150–400)
RBC # BLD: 3.7 M/UL — LOW (ref 4.2–5.8)
RBC # FLD: 14.9 % — HIGH (ref 10.3–14.5)
WBC # BLD: 3.5 K/UL — LOW (ref 3.8–10.5)
WBC # FLD AUTO: 3.5 K/UL — LOW (ref 3.8–10.5)

## 2018-06-21 ENCOUNTER — APPOINTMENT (OUTPATIENT)
Dept: GASTROENTEROLOGY | Facility: CLINIC | Age: 63
End: 2018-06-21
Payer: COMMERCIAL

## 2018-06-21 VITALS
HEIGHT: 65 IN | WEIGHT: 171 LBS | BODY MASS INDEX: 28.49 KG/M2 | HEART RATE: 70 BPM | OXYGEN SATURATION: 98 % | SYSTOLIC BLOOD PRESSURE: 108 MMHG | DIASTOLIC BLOOD PRESSURE: 72 MMHG

## 2018-06-21 PROCEDURE — 99214 OFFICE O/P EST MOD 30 MIN: CPT

## 2018-06-22 ENCOUNTER — OUTPATIENT (OUTPATIENT)
Dept: OUTPATIENT SERVICES | Facility: HOSPITAL | Age: 63
LOS: 1 days | Discharge: HOME | End: 2018-06-22

## 2018-06-22 DIAGNOSIS — Z98.890 OTHER SPECIFIED POSTPROCEDURAL STATES: Chronic | ICD-10-CM

## 2018-06-22 DIAGNOSIS — K90.81 WHIPPLE'S DISEASE: Chronic | ICD-10-CM

## 2018-06-22 DIAGNOSIS — D68.51 ACTIVATED PROTEIN C RESISTANCE: ICD-10-CM

## 2018-06-22 DIAGNOSIS — Z95.828 PRESENCE OF OTHER VASCULAR IMPLANTS AND GRAFTS: Chronic | ICD-10-CM

## 2018-06-22 DIAGNOSIS — C25.9 MALIGNANT NEOPLASM OF PANCREAS, UNSPECIFIED: ICD-10-CM

## 2018-07-01 ENCOUNTER — FORM ENCOUNTER (OUTPATIENT)
Age: 63
End: 2018-07-01

## 2018-07-02 ENCOUNTER — APPOINTMENT (OUTPATIENT)
Dept: CT IMAGING | Facility: CLINIC | Age: 63
End: 2018-07-02
Payer: COMMERCIAL

## 2018-07-02 ENCOUNTER — OUTPATIENT (OUTPATIENT)
Dept: OUTPATIENT SERVICES | Facility: HOSPITAL | Age: 63
LOS: 1 days | End: 2018-07-02

## 2018-07-02 DIAGNOSIS — Z98.890 OTHER SPECIFIED POSTPROCEDURAL STATES: Chronic | ICD-10-CM

## 2018-07-02 DIAGNOSIS — C25.9 MALIGNANT NEOPLASM OF PANCREAS, UNSPECIFIED: ICD-10-CM

## 2018-07-02 DIAGNOSIS — K90.81 WHIPPLE'S DISEASE: Chronic | ICD-10-CM

## 2018-07-02 DIAGNOSIS — Z95.828 PRESENCE OF OTHER VASCULAR IMPLANTS AND GRAFTS: Chronic | ICD-10-CM

## 2018-07-02 PROCEDURE — 74177 CT ABD & PELVIS W/CONTRAST: CPT | Mod: 26

## 2018-07-02 PROCEDURE — 71260 CT THORAX DX C+: CPT | Mod: 26

## 2018-07-03 ENCOUNTER — APPOINTMENT (OUTPATIENT)
Dept: INFUSION THERAPY | Facility: CLINIC | Age: 63
End: 2018-07-03

## 2018-07-09 ENCOUNTER — APPOINTMENT (OUTPATIENT)
Dept: RADIATION ONCOLOGY | Facility: CLINIC | Age: 63
End: 2018-07-09
Payer: COMMERCIAL

## 2018-07-09 VITALS
HEIGHT: 65 IN | HEART RATE: 74 BPM | RESPIRATION RATE: 16 BRPM | SYSTOLIC BLOOD PRESSURE: 121 MMHG | OXYGEN SATURATION: 97 % | DIASTOLIC BLOOD PRESSURE: 75 MMHG | TEMPERATURE: 98 F | BODY MASS INDEX: 27.82 KG/M2 | WEIGHT: 167 LBS

## 2018-07-09 PROCEDURE — 99213 OFFICE O/P EST LOW 20 MIN: CPT

## 2018-07-10 ENCOUNTER — RESULT REVIEW (OUTPATIENT)
Age: 63
End: 2018-07-10

## 2018-07-10 ENCOUNTER — APPOINTMENT (OUTPATIENT)
Dept: GASTROENTEROLOGY | Facility: GI CENTER | Age: 63
End: 2018-07-10
Payer: COMMERCIAL

## 2018-07-10 ENCOUNTER — OUTPATIENT (OUTPATIENT)
Dept: OUTPATIENT SERVICES | Facility: HOSPITAL | Age: 63
LOS: 1 days | End: 2018-07-10
Payer: COMMERCIAL

## 2018-07-10 VITALS
SYSTOLIC BLOOD PRESSURE: 111 MMHG | WEIGHT: 168 LBS | RESPIRATION RATE: 14 BRPM | HEART RATE: 84 BPM | OXYGEN SATURATION: 100 % | TEMPERATURE: 98 F | HEIGHT: 65 IN | BODY MASS INDEX: 27.99 KG/M2 | DIASTOLIC BLOOD PRESSURE: 79 MMHG

## 2018-07-10 DIAGNOSIS — Z98.890 OTHER SPECIFIED POSTPROCEDURAL STATES: Chronic | ICD-10-CM

## 2018-07-10 DIAGNOSIS — Z95.828 PRESENCE OF OTHER VASCULAR IMPLANTS AND GRAFTS: Chronic | ICD-10-CM

## 2018-07-10 DIAGNOSIS — R10.9 UNSPECIFIED ABDOMINAL PAIN: ICD-10-CM

## 2018-07-10 DIAGNOSIS — K21.9 GASTRO-ESOPHAGEAL REFLUX DISEASE WITHOUT ESOPHAGITIS: ICD-10-CM

## 2018-07-10 DIAGNOSIS — K90.81 WHIPPLE'S DISEASE: Chronic | ICD-10-CM

## 2018-07-10 LAB — GLUCOSE BLDC GLUCOMTR-MCNC: 124 MG/DL — HIGH (ref 70–99)

## 2018-07-10 PROCEDURE — 88305 TISSUE EXAM BY PATHOLOGIST: CPT

## 2018-07-10 PROCEDURE — 88342 IMHCHEM/IMCYTCHM 1ST ANTB: CPT | Mod: 26

## 2018-07-10 PROCEDURE — 88305 TISSUE EXAM BY PATHOLOGIST: CPT | Mod: 26

## 2018-07-10 PROCEDURE — 43239 EGD BIOPSY SINGLE/MULTIPLE: CPT

## 2018-07-10 PROCEDURE — 88342 IMHCHEM/IMCYTCHM 1ST ANTB: CPT

## 2018-07-10 PROCEDURE — 82962 GLUCOSE BLOOD TEST: CPT

## 2018-07-11 ENCOUNTER — APPOINTMENT (OUTPATIENT)
Dept: INFUSION THERAPY | Facility: CLINIC | Age: 63
End: 2018-07-11

## 2018-07-11 ENCOUNTER — RX RENEWAL (OUTPATIENT)
Age: 63
End: 2018-07-11

## 2018-07-16 PROBLEM — E11.9 TYPE 2 DIABETES MELLITUS WITHOUT COMPLICATIONS: Chronic | Status: INACTIVE | Noted: 2017-10-23 | Resolved: 2018-02-21

## 2018-07-16 PROBLEM — R50.9 FEVER, UNSPECIFIED: Chronic | Status: INACTIVE | Noted: 2017-11-15 | Resolved: 2018-02-21

## 2018-07-17 ENCOUNTER — OUTPATIENT (OUTPATIENT)
Dept: OUTPATIENT SERVICES | Facility: HOSPITAL | Age: 63
LOS: 1 days | Discharge: HOME | End: 2018-07-17

## 2018-07-17 DIAGNOSIS — K90.81 WHIPPLE'S DISEASE: Chronic | ICD-10-CM

## 2018-07-17 DIAGNOSIS — Z98.890 OTHER SPECIFIED POSTPROCEDURAL STATES: Chronic | ICD-10-CM

## 2018-07-17 DIAGNOSIS — Z95.828 PRESENCE OF OTHER VASCULAR IMPLANTS AND GRAFTS: Chronic | ICD-10-CM

## 2018-07-17 DIAGNOSIS — D68.51 ACTIVATED PROTEIN C RESISTANCE: ICD-10-CM

## 2018-07-17 DIAGNOSIS — C25.9 MALIGNANT NEOPLASM OF PANCREAS, UNSPECIFIED: ICD-10-CM

## 2018-07-18 PROBLEM — E11.9 TYPE 2 DIABETES MELLITUS WITHOUT COMPLICATIONS: Chronic | Status: ACTIVE | Noted: 2018-02-21

## 2018-07-18 PROBLEM — J45.909 UNSPECIFIED ASTHMA, UNCOMPLICATED: Chronic | Status: ACTIVE | Noted: 2017-11-15

## 2018-07-18 PROBLEM — J40 BRONCHITIS, NOT SPECIFIED AS ACUTE OR CHRONIC: Chronic | Status: ACTIVE | Noted: 2017-11-15

## 2018-07-18 PROBLEM — F41.9 ANXIETY DISORDER, UNSPECIFIED: Chronic | Status: ACTIVE | Noted: 2018-01-11

## 2018-07-18 PROBLEM — C25.9 MALIGNANT NEOPLASM OF PANCREAS, UNSPECIFIED: Chronic | Status: ACTIVE | Noted: 2017-11-12

## 2018-07-18 PROBLEM — K21.9 GASTRO-ESOPHAGEAL REFLUX DISEASE WITHOUT ESOPHAGITIS: Chronic | Status: ACTIVE | Noted: 2017-11-15

## 2018-07-18 PROBLEM — B00.9 HERPESVIRAL INFECTION, UNSPECIFIED: Chronic | Status: ACTIVE | Noted: 2017-11-15

## 2018-07-18 PROBLEM — Z95.828 PRESENCE OF OTHER VASCULAR IMPLANTS AND GRAFTS: Chronic | Status: ACTIVE | Noted: 2018-02-21

## 2018-07-18 PROBLEM — I10 ESSENTIAL (PRIMARY) HYPERTENSION: Chronic | Status: ACTIVE | Noted: 2017-10-23

## 2018-07-18 PROBLEM — R01.1 CARDIAC MURMUR, UNSPECIFIED: Chronic | Status: ACTIVE | Noted: 2018-01-11

## 2018-07-18 PROBLEM — N40.0 BENIGN PROSTATIC HYPERPLASIA WITHOUT LOWER URINARY TRACT SYMPTOMS: Chronic | Status: ACTIVE | Noted: 2017-11-12

## 2018-07-18 PROBLEM — D68.51 ACTIVATED PROTEIN C RESISTANCE: Chronic | Status: ACTIVE | Noted: 2018-01-11

## 2018-07-19 ENCOUNTER — RX RENEWAL (OUTPATIENT)
Age: 63
End: 2018-07-19

## 2018-07-19 DIAGNOSIS — K59.00 CONSTIPATION, UNSPECIFIED: ICD-10-CM

## 2018-07-19 PROCEDURE — 77301 RADIOTHERAPY DOSE PLAN IMRT: CPT | Mod: 26

## 2018-07-19 PROCEDURE — 77263 THER RADIOLOGY TX PLNG CPLX: CPT | Mod: 59

## 2018-07-19 PROCEDURE — 77300 RADIATION THERAPY DOSE PLAN: CPT | Mod: 26

## 2018-07-19 PROCEDURE — 77338 DESIGN MLC DEVICE FOR IMRT: CPT | Mod: 26

## 2018-07-22 PROBLEM — Z95.828 PORT-A-CATH IN PLACE: Status: ACTIVE | Noted: 2018-01-23

## 2018-07-23 ENCOUNTER — RX RENEWAL (OUTPATIENT)
Age: 63
End: 2018-07-23

## 2018-07-23 ENCOUNTER — APPOINTMENT (OUTPATIENT)
Dept: HEMATOLOGY ONCOLOGY | Facility: CLINIC | Age: 63
End: 2018-07-23

## 2018-07-23 VITALS
HEART RATE: 88 BPM | SYSTOLIC BLOOD PRESSURE: 125 MMHG | RESPIRATION RATE: 16 BRPM | TEMPERATURE: 98.2 F | DIASTOLIC BLOOD PRESSURE: 77 MMHG | OXYGEN SATURATION: 99 %

## 2018-07-24 ENCOUNTER — RX RENEWAL (OUTPATIENT)
Age: 63
End: 2018-07-24

## 2018-07-24 ENCOUNTER — MEDICATION RENEWAL (OUTPATIENT)
Age: 63
End: 2018-07-24

## 2018-07-24 PROCEDURE — 77387B: CUSTOM | Mod: 26

## 2018-07-25 ENCOUNTER — APPOINTMENT (OUTPATIENT)
Dept: INFUSION THERAPY | Facility: CLINIC | Age: 63
End: 2018-07-25

## 2018-07-25 PROCEDURE — 77387B: CUSTOM | Mod: 26

## 2018-07-26 ENCOUNTER — CLINICAL ADVICE (OUTPATIENT)
Age: 63
End: 2018-07-26

## 2018-07-26 PROCEDURE — 77387B: CUSTOM | Mod: 26

## 2018-07-27 PROCEDURE — 77387B: CUSTOM | Mod: 26

## 2018-07-30 ENCOUNTER — RESULT REVIEW (OUTPATIENT)
Age: 63
End: 2018-07-30

## 2018-07-30 ENCOUNTER — APPOINTMENT (OUTPATIENT)
Dept: INFUSION THERAPY | Facility: CLINIC | Age: 63
End: 2018-07-30

## 2018-07-30 ENCOUNTER — APPOINTMENT (OUTPATIENT)
Dept: HEMATOLOGY ONCOLOGY | Facility: CLINIC | Age: 63
End: 2018-07-30
Payer: COMMERCIAL

## 2018-07-30 ENCOUNTER — APPOINTMENT (OUTPATIENT)
Dept: PHYSICAL MEDICINE AND REHAB | Facility: CLINIC | Age: 63
End: 2018-07-30
Payer: COMMERCIAL

## 2018-07-30 VITALS
DIASTOLIC BLOOD PRESSURE: 81 MMHG | SYSTOLIC BLOOD PRESSURE: 131 MMHG | HEIGHT: 65 IN | OXYGEN SATURATION: 98 % | RESPIRATION RATE: 16 BRPM | TEMPERATURE: 98 F | WEIGHT: 156 LBS | BODY MASS INDEX: 25.99 KG/M2 | HEART RATE: 76 BPM

## 2018-07-30 LAB
BASOPHILS # BLD AUTO: 0.1 K/UL — SIGNIFICANT CHANGE UP (ref 0–0.2)
BASOPHILS NFR BLD AUTO: 1 % — SIGNIFICANT CHANGE UP (ref 0–2)
EOSINOPHIL # BLD AUTO: 0.2 K/UL — SIGNIFICANT CHANGE UP (ref 0–0.5)
EOSINOPHIL NFR BLD AUTO: 3.5 % — SIGNIFICANT CHANGE UP (ref 0–6)
HCT VFR BLD CALC: 41.7 % — SIGNIFICANT CHANGE UP (ref 39–50)
HGB BLD-MCNC: 14.4 G/DL — SIGNIFICANT CHANGE UP (ref 13–17)
LYMPHOCYTES # BLD AUTO: 0.6 K/UL — LOW (ref 1–3.3)
LYMPHOCYTES # BLD AUTO: 9.4 % — LOW (ref 13–44)
MCHC RBC-ENTMCNC: 34.4 PG — HIGH (ref 27–34)
MCHC RBC-ENTMCNC: 34.6 GM/DL — SIGNIFICANT CHANGE UP (ref 32–36)
MCV RBC AUTO: 99.5 FL — SIGNIFICANT CHANGE UP (ref 80–100)
MONOCYTES # BLD AUTO: 0.5 K/UL — SIGNIFICANT CHANGE UP (ref 0–0.9)
MONOCYTES NFR BLD AUTO: 7.8 % — SIGNIFICANT CHANGE UP (ref 2–14)
NEUTROPHILS # BLD AUTO: 4.8 K/UL — SIGNIFICANT CHANGE UP (ref 1.8–7.4)
NEUTROPHILS NFR BLD AUTO: 78.3 % — HIGH (ref 43–77)
PLATELET # BLD AUTO: 260 K/UL — SIGNIFICANT CHANGE UP (ref 150–400)
RBC # BLD: 4.19 M/UL — LOW (ref 4.2–5.8)
RBC # FLD: 13 % — SIGNIFICANT CHANGE UP (ref 10.3–14.5)
WBC # BLD: 6.2 K/UL — SIGNIFICANT CHANGE UP (ref 3.8–10.5)
WBC # FLD AUTO: 6.2 K/UL — SIGNIFICANT CHANGE UP (ref 3.8–10.5)

## 2018-07-30 PROCEDURE — 77427 RADIATION TX MANAGEMENT X5: CPT

## 2018-07-30 PROCEDURE — 99214 OFFICE O/P EST MOD 30 MIN: CPT

## 2018-07-30 PROCEDURE — 77014: CPT | Mod: 26

## 2018-07-30 PROCEDURE — 99204 OFFICE O/P NEW MOD 45 MIN: CPT

## 2018-07-30 RX ORDER — LORATADINE 5 MG
17 TABLET,CHEWABLE ORAL
Refills: 0 | Status: DISCONTINUED | COMMUNITY
End: 2018-07-30

## 2018-07-30 RX ORDER — URSODIOL 250 MG/1
250 TABLET ORAL TWICE DAILY
Qty: 360 | Refills: 1 | Status: DISCONTINUED | COMMUNITY
Start: 2018-07-11 | End: 2018-07-30

## 2018-07-30 RX ORDER — METOCLOPRAMIDE 10 MG/1
10 TABLET ORAL
Qty: 90 | Refills: 1 | Status: DISCONTINUED | COMMUNITY
Start: 2018-02-06 | End: 2018-07-30

## 2018-07-31 VITALS
RESPIRATION RATE: 14 BRPM | BODY MASS INDEX: 25.99 KG/M2 | SYSTOLIC BLOOD PRESSURE: 131 MMHG | HEIGHT: 65 IN | HEART RATE: 70 BPM | WEIGHT: 156 LBS | DIASTOLIC BLOOD PRESSURE: 81 MMHG

## 2018-07-31 LAB
ALBUMIN SERPL ELPH-MCNC: 4.2 G/DL
ALP BLD-CCNC: 96 U/L
ALT SERPL-CCNC: 9 U/L
ANION GAP SERPL CALC-SCNC: 15 MMOL/L
AST SERPL-CCNC: 18 U/L
BILIRUB SERPL-MCNC: 1.3 MG/DL
BUN SERPL-MCNC: 7 MG/DL
CALCIUM SERPL-MCNC: 9.1 MG/DL
CANCER AG19-9 SERPL-ACNC: 291.3 U/ML
CHLORIDE SERPL-SCNC: 100 MMOL/L
CO2 SERPL-SCNC: 25 MMOL/L
CREAT SERPL-MCNC: 0.72 MG/DL
POTASSIUM SERPL-SCNC: 3.8 MMOL/L
PROT SERPL-MCNC: 6.7 G/DL
SODIUM SERPL-SCNC: 140 MMOL/L

## 2018-07-31 PROCEDURE — 77387B: CUSTOM | Mod: 26

## 2018-08-01 ENCOUNTER — APPOINTMENT (OUTPATIENT)
Dept: INFUSION THERAPY | Facility: CLINIC | Age: 63
End: 2018-08-01

## 2018-08-01 PROCEDURE — 77387B: CUSTOM | Mod: 26

## 2018-08-02 ENCOUNTER — CLINICAL ADVICE (OUTPATIENT)
Age: 63
End: 2018-08-02

## 2018-08-02 PROCEDURE — 77387B: CUSTOM | Mod: 26

## 2018-08-03 ENCOUNTER — RESULT REVIEW (OUTPATIENT)
Age: 63
End: 2018-08-03

## 2018-08-03 PROCEDURE — 77387B: CUSTOM | Mod: 26

## 2018-08-06 ENCOUNTER — RESULT REVIEW (OUTPATIENT)
Age: 63
End: 2018-08-06

## 2018-08-06 ENCOUNTER — APPOINTMENT (OUTPATIENT)
Dept: HEMATOLOGY ONCOLOGY | Facility: CLINIC | Age: 63
End: 2018-08-06
Payer: COMMERCIAL

## 2018-08-06 VITALS
RESPIRATION RATE: 16 BRPM | DIASTOLIC BLOOD PRESSURE: 73 MMHG | TEMPERATURE: 97.8 F | BODY MASS INDEX: 25.99 KG/M2 | SYSTOLIC BLOOD PRESSURE: 118 MMHG | WEIGHT: 156.2 LBS | HEART RATE: 72 BPM

## 2018-08-06 LAB
BASOPHILS # BLD AUTO: 0 K/UL — SIGNIFICANT CHANGE UP (ref 0–0.2)
BASOPHILS NFR BLD AUTO: 0.8 % — SIGNIFICANT CHANGE UP (ref 0–2)
EOSINOPHIL # BLD AUTO: 0.2 K/UL — SIGNIFICANT CHANGE UP (ref 0–0.5)
EOSINOPHIL NFR BLD AUTO: 3.3 % — SIGNIFICANT CHANGE UP (ref 0–6)
HCT VFR BLD CALC: 40 % — SIGNIFICANT CHANGE UP (ref 39–50)
HGB BLD-MCNC: 14.2 G/DL — SIGNIFICANT CHANGE UP (ref 13–17)
LYMPHOCYTES # BLD AUTO: 0.3 K/UL — LOW (ref 1–3.3)
LYMPHOCYTES # BLD AUTO: 4.6 % — LOW (ref 13–44)
MCHC RBC-ENTMCNC: 35.2 PG — HIGH (ref 27–34)
MCHC RBC-ENTMCNC: 35.5 GM/DL — SIGNIFICANT CHANGE UP (ref 32–36)
MCV RBC AUTO: 99 FL — SIGNIFICANT CHANGE UP (ref 80–100)
MONOCYTES # BLD AUTO: 0.6 K/UL — SIGNIFICANT CHANGE UP (ref 0–0.9)
MONOCYTES NFR BLD AUTO: 9.3 % — SIGNIFICANT CHANGE UP (ref 2–14)
NEUTROPHILS # BLD AUTO: 4.9 K/UL — SIGNIFICANT CHANGE UP (ref 1.8–7.4)
NEUTROPHILS NFR BLD AUTO: 82 % — HIGH (ref 43–77)
PLATELET # BLD AUTO: 173 K/UL — SIGNIFICANT CHANGE UP (ref 150–400)
RBC # BLD: 4.04 M/UL — LOW (ref 4.2–5.8)
RBC # FLD: 13.4 % — SIGNIFICANT CHANGE UP (ref 10.3–14.5)
WBC # BLD: 6 K/UL — SIGNIFICANT CHANGE UP (ref 3.8–10.5)
WBC # FLD AUTO: 6 K/UL — SIGNIFICANT CHANGE UP (ref 3.8–10.5)

## 2018-08-06 PROCEDURE — 77014: CPT | Mod: 26

## 2018-08-06 PROCEDURE — 99214 OFFICE O/P EST MOD 30 MIN: CPT

## 2018-08-06 PROCEDURE — 77427 RADIATION TX MANAGEMENT X5: CPT

## 2018-08-06 RX ORDER — FENTANYL 12 UG/H
12 PATCH, EXTENDED RELEASE TRANSDERMAL
Qty: 10 | Refills: 0 | Status: DISCONTINUED | COMMUNITY
Start: 2018-07-24

## 2018-08-06 RX ORDER — ACETAMINOPHEN AND CODEINE 300; 30 MG/1; MG/1
300-30 TABLET ORAL
Qty: 90 | Refills: 0 | Status: DISCONTINUED | COMMUNITY
Start: 2018-02-15 | End: 2018-08-06

## 2018-08-07 PROCEDURE — 77387B: CUSTOM | Mod: 26

## 2018-08-08 PROCEDURE — 77387B: CUSTOM | Mod: 26

## 2018-08-09 ENCOUNTER — CLINICAL ADVICE (OUTPATIENT)
Age: 63
End: 2018-08-09

## 2018-08-09 PROCEDURE — 77387B: CUSTOM | Mod: 26

## 2018-08-10 ENCOUNTER — RX RENEWAL (OUTPATIENT)
Age: 63
End: 2018-08-10

## 2018-08-10 PROCEDURE — 77387B: CUSTOM | Mod: 26

## 2018-08-13 ENCOUNTER — APPOINTMENT (OUTPATIENT)
Dept: HEMATOLOGY ONCOLOGY | Facility: CLINIC | Age: 63
End: 2018-08-13

## 2018-08-13 ENCOUNTER — RESULT REVIEW (OUTPATIENT)
Age: 63
End: 2018-08-13

## 2018-08-13 ENCOUNTER — APPOINTMENT (OUTPATIENT)
Dept: PHYSICAL MEDICINE AND REHAB | Facility: CLINIC | Age: 63
End: 2018-08-13
Payer: COMMERCIAL

## 2018-08-13 VITALS
DIASTOLIC BLOOD PRESSURE: 70 MMHG | OXYGEN SATURATION: 99 % | BODY MASS INDEX: 25.97 KG/M2 | WEIGHT: 155.86 LBS | HEIGHT: 65 IN | HEART RATE: 60 BPM | SYSTOLIC BLOOD PRESSURE: 114 MMHG

## 2018-08-13 VITALS
WEIGHT: 156 LBS | DIASTOLIC BLOOD PRESSURE: 68 MMHG | OXYGEN SATURATION: 98 % | TEMPERATURE: 98 F | SYSTOLIC BLOOD PRESSURE: 106 MMHG | RESPIRATION RATE: 16 BRPM | HEIGHT: 65 IN | HEART RATE: 66 BPM | BODY MASS INDEX: 25.99 KG/M2

## 2018-08-13 LAB
BASOPHILS # BLD AUTO: 0 K/UL — SIGNIFICANT CHANGE UP (ref 0–0.2)
BASOPHILS NFR BLD AUTO: 0.9 % — SIGNIFICANT CHANGE UP (ref 0–2)
EOSINOPHIL # BLD AUTO: 0.2 K/UL — SIGNIFICANT CHANGE UP (ref 0–0.5)
EOSINOPHIL NFR BLD AUTO: 4.9 % — SIGNIFICANT CHANGE UP (ref 0–6)
HCT VFR BLD CALC: 41.1 % — SIGNIFICANT CHANGE UP (ref 39–50)
HGB BLD-MCNC: 14.4 G/DL — SIGNIFICANT CHANGE UP (ref 13–17)
LYMPHOCYTES # BLD AUTO: 0.2 K/UL — LOW (ref 1–3.3)
LYMPHOCYTES # BLD AUTO: 4.3 % — LOW (ref 13–44)
MCHC RBC-ENTMCNC: 34.4 PG — HIGH (ref 27–34)
MCHC RBC-ENTMCNC: 35.2 GM/DL — SIGNIFICANT CHANGE UP (ref 32–36)
MCV RBC AUTO: 97.9 FL — SIGNIFICANT CHANGE UP (ref 80–100)
MONOCYTES # BLD AUTO: 0.6 K/UL — SIGNIFICANT CHANGE UP (ref 0–0.9)
MONOCYTES NFR BLD AUTO: 12 % — SIGNIFICANT CHANGE UP (ref 2–14)
NEUTROPHILS # BLD AUTO: 3.9 K/UL — SIGNIFICANT CHANGE UP (ref 1.8–7.4)
NEUTROPHILS NFR BLD AUTO: 77.9 % — HIGH (ref 43–77)
PLATELET # BLD AUTO: 193 K/UL — SIGNIFICANT CHANGE UP (ref 150–400)
RBC # BLD: 4.2 M/UL — SIGNIFICANT CHANGE UP (ref 4.2–5.8)
RBC # FLD: 14.4 % — SIGNIFICANT CHANGE UP (ref 10.3–14.5)
WBC # BLD: 5 K/UL — SIGNIFICANT CHANGE UP (ref 3.8–10.5)
WBC # FLD AUTO: 5 K/UL — SIGNIFICANT CHANGE UP (ref 3.8–10.5)

## 2018-08-13 PROCEDURE — 77427 RADIATION TX MANAGEMENT X5: CPT

## 2018-08-13 PROCEDURE — 77014: CPT | Mod: 26

## 2018-08-13 PROCEDURE — 99214 OFFICE O/P EST MOD 30 MIN: CPT

## 2018-08-14 ENCOUNTER — RESULT REVIEW (OUTPATIENT)
Age: 63
End: 2018-08-14

## 2018-08-14 ENCOUNTER — LABORATORY RESULT (OUTPATIENT)
Age: 63
End: 2018-08-14

## 2018-08-14 ENCOUNTER — APPOINTMENT (OUTPATIENT)
Dept: INFUSION THERAPY | Facility: CLINIC | Age: 63
End: 2018-08-14

## 2018-08-14 ENCOUNTER — RX RENEWAL (OUTPATIENT)
Age: 63
End: 2018-08-14

## 2018-08-14 ENCOUNTER — APPOINTMENT (OUTPATIENT)
Dept: HEMATOLOGY ONCOLOGY | Facility: CLINIC | Age: 63
End: 2018-08-14
Payer: COMMERCIAL

## 2018-08-14 VITALS
HEIGHT: 65 IN | DIASTOLIC BLOOD PRESSURE: 71 MMHG | HEART RATE: 78 BPM | TEMPERATURE: 97.6 F | BODY MASS INDEX: 25.75 KG/M2 | SYSTOLIC BLOOD PRESSURE: 109 MMHG | WEIGHT: 154.54 LBS | OXYGEN SATURATION: 100 %

## 2018-08-14 PROCEDURE — 77387B: CUSTOM | Mod: 26

## 2018-08-14 PROCEDURE — 99214 OFFICE O/P EST MOD 30 MIN: CPT

## 2018-08-14 RX ORDER — LACTULOSE 10 G/15ML
20 SOLUTION ORAL
Qty: 900 | Refills: 3 | Status: DISCONTINUED | COMMUNITY
Start: 2018-06-21 | End: 2018-08-14

## 2018-08-14 RX ORDER — DOCUSATE SODIUM 100 MG
100 TABLET ORAL
Refills: 0 | Status: DISCONTINUED | COMMUNITY
End: 2018-08-14

## 2018-08-15 ENCOUNTER — OUTPATIENT (OUTPATIENT)
Dept: OUTPATIENT SERVICES | Facility: HOSPITAL | Age: 63
LOS: 1 days | Discharge: HOME | End: 2018-08-15

## 2018-08-15 DIAGNOSIS — Z98.890 OTHER SPECIFIED POSTPROCEDURAL STATES: Chronic | ICD-10-CM

## 2018-08-15 DIAGNOSIS — C25.9 MALIGNANT NEOPLASM OF PANCREAS, UNSPECIFIED: ICD-10-CM

## 2018-08-15 DIAGNOSIS — K90.81 WHIPPLE'S DISEASE: Chronic | ICD-10-CM

## 2018-08-15 DIAGNOSIS — Z95.828 PRESENCE OF OTHER VASCULAR IMPLANTS AND GRAFTS: Chronic | ICD-10-CM

## 2018-08-15 PROCEDURE — 77387B: CUSTOM | Mod: 26

## 2018-08-16 ENCOUNTER — CLINICAL ADVICE (OUTPATIENT)
Age: 63
End: 2018-08-16

## 2018-08-16 PROCEDURE — 77387B: CUSTOM | Mod: 26

## 2018-08-17 PROCEDURE — 77387B: CUSTOM | Mod: 26

## 2018-08-20 ENCOUNTER — RX RENEWAL (OUTPATIENT)
Age: 63
End: 2018-08-20

## 2018-08-20 VITALS
RESPIRATION RATE: 16 BRPM | DIASTOLIC BLOOD PRESSURE: 72 MMHG | HEART RATE: 70 BPM | BODY MASS INDEX: 25.33 KG/M2 | TEMPERATURE: 97.8 F | SYSTOLIC BLOOD PRESSURE: 113 MMHG | WEIGHT: 152 LBS | OXYGEN SATURATION: 95 % | HEIGHT: 65 IN

## 2018-08-20 PROCEDURE — 77427 RADIATION TX MANAGEMENT X5: CPT

## 2018-08-20 PROCEDURE — 77014: CPT | Mod: 26

## 2018-08-21 PROCEDURE — 77387B: CUSTOM | Mod: 26

## 2018-08-22 ENCOUNTER — APPOINTMENT (OUTPATIENT)
Dept: SURGICAL ONCOLOGY | Facility: CLINIC | Age: 63
End: 2018-08-22
Payer: COMMERCIAL

## 2018-08-22 VITALS
SYSTOLIC BLOOD PRESSURE: 111 MMHG | OXYGEN SATURATION: 97 % | HEIGHT: 65 IN | WEIGHT: 150 LBS | HEART RATE: 70 BPM | TEMPERATURE: 98 F | BODY MASS INDEX: 24.99 KG/M2 | DIASTOLIC BLOOD PRESSURE: 74 MMHG

## 2018-08-22 PROCEDURE — 99215 OFFICE O/P EST HI 40 MIN: CPT

## 2018-08-22 PROCEDURE — 77387B: CUSTOM | Mod: 26

## 2018-08-23 ENCOUNTER — RESULT REVIEW (OUTPATIENT)
Age: 63
End: 2018-08-23

## 2018-08-23 ENCOUNTER — APPOINTMENT (OUTPATIENT)
Dept: INFUSION THERAPY | Facility: CLINIC | Age: 63
End: 2018-08-23

## 2018-08-23 ENCOUNTER — CLINICAL ADVICE (OUTPATIENT)
Age: 63
End: 2018-08-23

## 2018-08-23 ENCOUNTER — LABORATORY RESULT (OUTPATIENT)
Age: 63
End: 2018-08-23

## 2018-08-23 ENCOUNTER — APPOINTMENT (OUTPATIENT)
Dept: HEMATOLOGY ONCOLOGY | Facility: CLINIC | Age: 63
End: 2018-08-23
Payer: COMMERCIAL

## 2018-08-23 VITALS
DIASTOLIC BLOOD PRESSURE: 70 MMHG | WEIGHT: 150.79 LBS | TEMPERATURE: 97.7 F | HEIGHT: 65 IN | OXYGEN SATURATION: 100 % | SYSTOLIC BLOOD PRESSURE: 107 MMHG | HEART RATE: 74 BPM | BODY MASS INDEX: 25.12 KG/M2

## 2018-08-23 LAB
BASOPHILS # BLD AUTO: 0 K/UL — SIGNIFICANT CHANGE UP (ref 0–0.2)
BASOPHILS NFR BLD AUTO: 0.6 % — SIGNIFICANT CHANGE UP (ref 0–2)
EOSINOPHIL # BLD AUTO: 0 K/UL — SIGNIFICANT CHANGE UP (ref 0–0.5)
EOSINOPHIL NFR BLD AUTO: 0.6 % — SIGNIFICANT CHANGE UP (ref 0–6)
HCT VFR BLD CALC: 41.4 % — SIGNIFICANT CHANGE UP (ref 39–50)
HGB BLD-MCNC: 14.2 G/DL — SIGNIFICANT CHANGE UP (ref 13–17)
LYMPHOCYTES # BLD AUTO: 0.2 K/UL — LOW (ref 1–3.3)
LYMPHOCYTES # BLD AUTO: 3.1 % — LOW (ref 13–44)
MCHC RBC-ENTMCNC: 33.4 PG — SIGNIFICANT CHANGE UP (ref 27–34)
MCHC RBC-ENTMCNC: 34.4 GM/DL — SIGNIFICANT CHANGE UP (ref 32–36)
MCV RBC AUTO: 97.2 FL — SIGNIFICANT CHANGE UP (ref 80–100)
MONOCYTES # BLD AUTO: 0.6 K/UL — SIGNIFICANT CHANGE UP (ref 0–0.9)
MONOCYTES NFR BLD AUTO: 13 % — SIGNIFICANT CHANGE UP (ref 2–14)
NEUTROPHILS # BLD AUTO: 4.1 K/UL — SIGNIFICANT CHANGE UP (ref 1.8–7.4)
NEUTROPHILS NFR BLD AUTO: 82.7 % — HIGH (ref 43–77)
PLATELET # BLD AUTO: 164 K/UL — SIGNIFICANT CHANGE UP (ref 150–400)
RBC # BLD: 4.26 M/UL — SIGNIFICANT CHANGE UP (ref 4.2–5.8)
RBC # FLD: 13.5 % — SIGNIFICANT CHANGE UP (ref 10.3–14.5)
WBC # BLD: 4.9 K/UL — SIGNIFICANT CHANGE UP (ref 3.8–10.5)
WBC # FLD AUTO: 4.9 K/UL — SIGNIFICANT CHANGE UP (ref 3.8–10.5)

## 2018-08-23 PROCEDURE — 99214 OFFICE O/P EST MOD 30 MIN: CPT | Mod: 25

## 2018-08-23 PROCEDURE — 77387B: CUSTOM | Mod: 26

## 2018-08-24 PROCEDURE — 77387B: CUSTOM | Mod: 26

## 2018-08-27 VITALS
RESPIRATION RATE: 16 BRPM | DIASTOLIC BLOOD PRESSURE: 69 MMHG | OXYGEN SATURATION: 100 % | HEIGHT: 65 IN | SYSTOLIC BLOOD PRESSURE: 105 MMHG | WEIGHT: 146 LBS | HEART RATE: 68 BPM | BODY MASS INDEX: 24.32 KG/M2 | TEMPERATURE: 98 F

## 2018-08-27 PROCEDURE — 77427 RADIATION TX MANAGEMENT X5: CPT

## 2018-08-27 PROCEDURE — 77014: CPT | Mod: 26

## 2018-08-27 RX ORDER — LUBIPROSTONE 24 UG/1
24 CAPSULE, GELATIN COATED ORAL TWICE DAILY
Qty: 60 | Refills: 3 | Status: DISCONTINUED | COMMUNITY
Start: 2018-07-19 | End: 2018-08-27

## 2018-08-28 PROCEDURE — 77387B: CUSTOM | Mod: 26

## 2018-08-29 ENCOUNTER — RESULT REVIEW (OUTPATIENT)
Age: 63
End: 2018-08-29

## 2018-08-29 ENCOUNTER — APPOINTMENT (OUTPATIENT)
Dept: HEMATOLOGY ONCOLOGY | Facility: CLINIC | Age: 63
End: 2018-08-29

## 2018-08-29 LAB
BASOPHILS # BLD AUTO: 0 K/UL — SIGNIFICANT CHANGE UP (ref 0–0.2)
BASOPHILS NFR BLD AUTO: 0.4 % — SIGNIFICANT CHANGE UP (ref 0–2)
EOSINOPHIL # BLD AUTO: 0.1 K/UL — SIGNIFICANT CHANGE UP (ref 0–0.5)
EOSINOPHIL NFR BLD AUTO: 1.4 % — SIGNIFICANT CHANGE UP (ref 0–6)
HCT VFR BLD CALC: 40.7 % — SIGNIFICANT CHANGE UP (ref 39–50)
HGB BLD-MCNC: 14.4 G/DL — SIGNIFICANT CHANGE UP (ref 13–17)
LYMPHOCYTES # BLD AUTO: 0.1 K/UL — LOW (ref 1–3.3)
LYMPHOCYTES # BLD AUTO: 2.1 % — LOW (ref 13–44)
MCHC RBC-ENTMCNC: 33.6 PG — SIGNIFICANT CHANGE UP (ref 27–34)
MCHC RBC-ENTMCNC: 35.4 GM/DL — SIGNIFICANT CHANGE UP (ref 32–36)
MCV RBC AUTO: 95.2 FL — SIGNIFICANT CHANGE UP (ref 80–100)
MONOCYTES # BLD AUTO: 0.6 K/UL — SIGNIFICANT CHANGE UP (ref 0–0.9)
MONOCYTES NFR BLD AUTO: 11.8 % — SIGNIFICANT CHANGE UP (ref 2–14)
NEUTROPHILS # BLD AUTO: 4.1 K/UL — SIGNIFICANT CHANGE UP (ref 1.8–7.4)
NEUTROPHILS NFR BLD AUTO: 84.2 % — HIGH (ref 43–77)
PLATELET # BLD AUTO: 201 K/UL — SIGNIFICANT CHANGE UP (ref 150–400)
RBC # BLD: 4.28 M/UL — SIGNIFICANT CHANGE UP (ref 4.2–5.8)
RBC # FLD: 13.7 % — SIGNIFICANT CHANGE UP (ref 10.3–14.5)
WBC # BLD: 4.9 K/UL — SIGNIFICANT CHANGE UP (ref 3.8–10.5)
WBC # FLD AUTO: 4.9 K/UL — SIGNIFICANT CHANGE UP (ref 3.8–10.5)

## 2018-08-29 PROCEDURE — 77387B: CUSTOM | Mod: 26

## 2018-08-30 PROCEDURE — 77387B: CUSTOM | Mod: 26

## 2018-09-04 ENCOUNTER — RX RENEWAL (OUTPATIENT)
Age: 63
End: 2018-09-04

## 2018-09-19 ENCOUNTER — FORM ENCOUNTER (OUTPATIENT)
Age: 63
End: 2018-09-19

## 2018-09-20 ENCOUNTER — OUTPATIENT (OUTPATIENT)
Dept: OUTPATIENT SERVICES | Facility: HOSPITAL | Age: 63
LOS: 1 days | Discharge: HOME | End: 2018-09-20

## 2018-09-20 ENCOUNTER — APPOINTMENT (OUTPATIENT)
Dept: CT IMAGING | Facility: CLINIC | Age: 63
End: 2018-09-20
Payer: COMMERCIAL

## 2018-09-20 ENCOUNTER — OUTPATIENT (OUTPATIENT)
Dept: OUTPATIENT SERVICES | Facility: HOSPITAL | Age: 63
LOS: 1 days | End: 2018-09-20
Payer: COMMERCIAL

## 2018-09-20 DIAGNOSIS — K90.81 WHIPPLE'S DISEASE: Chronic | ICD-10-CM

## 2018-09-20 DIAGNOSIS — C25.9 MALIGNANT NEOPLASM OF PANCREAS, UNSPECIFIED: ICD-10-CM

## 2018-09-20 DIAGNOSIS — Z98.890 OTHER SPECIFIED POSTPROCEDURAL STATES: Chronic | ICD-10-CM

## 2018-09-20 DIAGNOSIS — Z95.828 PRESENCE OF OTHER VASCULAR IMPLANTS AND GRAFTS: Chronic | ICD-10-CM

## 2018-09-20 PROCEDURE — 71260 CT THORAX DX C+: CPT | Mod: 26

## 2018-09-20 PROCEDURE — 74177 CT ABD & PELVIS W/CONTRAST: CPT | Mod: 26

## 2018-09-20 PROCEDURE — 82565 ASSAY OF CREATININE: CPT

## 2018-09-20 PROCEDURE — 71260 CT THORAX DX C+: CPT

## 2018-09-20 PROCEDURE — 74177 CT ABD & PELVIS W/CONTRAST: CPT

## 2018-09-27 ENCOUNTER — LABORATORY RESULT (OUTPATIENT)
Age: 63
End: 2018-09-27

## 2018-09-27 ENCOUNTER — APPOINTMENT (OUTPATIENT)
Dept: HEMATOLOGY ONCOLOGY | Facility: CLINIC | Age: 63
End: 2018-09-27
Payer: COMMERCIAL

## 2018-09-27 ENCOUNTER — RESULT REVIEW (OUTPATIENT)
Age: 63
End: 2018-09-27

## 2018-09-27 ENCOUNTER — APPOINTMENT (OUTPATIENT)
Age: 63
End: 2018-09-27

## 2018-09-27 ENCOUNTER — APPOINTMENT (OUTPATIENT)
Dept: RADIATION ONCOLOGY | Facility: CLINIC | Age: 63
End: 2018-09-27
Payer: COMMERCIAL

## 2018-09-27 VITALS
OXYGEN SATURATION: 99 % | TEMPERATURE: 98.1 F | DIASTOLIC BLOOD PRESSURE: 70 MMHG | BODY MASS INDEX: 23.93 KG/M2 | SYSTOLIC BLOOD PRESSURE: 105 MMHG | HEIGHT: 65 IN | HEART RATE: 74 BPM | WEIGHT: 143.63 LBS

## 2018-09-27 VITALS
TEMPERATURE: 98 F | SYSTOLIC BLOOD PRESSURE: 114 MMHG | WEIGHT: 144 LBS | BODY MASS INDEX: 23.99 KG/M2 | HEIGHT: 65 IN | OXYGEN SATURATION: 95 % | RESPIRATION RATE: 16 BRPM | HEART RATE: 76 BPM | DIASTOLIC BLOOD PRESSURE: 74 MMHG

## 2018-09-27 LAB
BASOPHILS # BLD AUTO: 0.1 K/UL — SIGNIFICANT CHANGE UP (ref 0–0.2)
BASOPHILS NFR BLD AUTO: 0.9 % — SIGNIFICANT CHANGE UP (ref 0–2)
EOSINOPHIL # BLD AUTO: 0 K/UL — SIGNIFICANT CHANGE UP (ref 0–0.5)
EOSINOPHIL NFR BLD AUTO: 0.1 % — SIGNIFICANT CHANGE UP (ref 0–6)
HCT VFR BLD CALC: 38.7 % — LOW (ref 39–50)
HGB BLD-MCNC: 13.1 G/DL — SIGNIFICANT CHANGE UP (ref 13–17)
LYMPHOCYTES # BLD AUTO: 0.3 K/UL — LOW (ref 1–3.3)
LYMPHOCYTES # BLD AUTO: 5.1 % — LOW (ref 13–44)
MCHC RBC-ENTMCNC: 32.2 PG — SIGNIFICANT CHANGE UP (ref 27–34)
MCHC RBC-ENTMCNC: 33.8 GM/DL — SIGNIFICANT CHANGE UP (ref 32–36)
MCV RBC AUTO: 95.5 FL — SIGNIFICANT CHANGE UP (ref 80–100)
MONOCYTES # BLD AUTO: 0.7 K/UL — SIGNIFICANT CHANGE UP (ref 0–0.9)
MONOCYTES NFR BLD AUTO: 10.2 % — SIGNIFICANT CHANGE UP (ref 2–14)
NEUTROPHILS # BLD AUTO: 5.7 K/UL — SIGNIFICANT CHANGE UP (ref 1.8–7.4)
NEUTROPHILS NFR BLD AUTO: 83.7 % — HIGH (ref 43–77)
PLATELET # BLD AUTO: 204 K/UL — SIGNIFICANT CHANGE UP (ref 150–400)
RBC # BLD: 4.05 M/UL — LOW (ref 4.2–5.8)
RBC # FLD: 13.4 % — SIGNIFICANT CHANGE UP (ref 10.3–14.5)
WBC # BLD: 6.8 K/UL — SIGNIFICANT CHANGE UP (ref 3.8–10.5)
WBC # FLD AUTO: 6.8 K/UL — SIGNIFICANT CHANGE UP (ref 3.8–10.5)

## 2018-09-27 PROCEDURE — 99024 POSTOP FOLLOW-UP VISIT: CPT

## 2018-09-27 PROCEDURE — 99214 OFFICE O/P EST MOD 30 MIN: CPT

## 2018-09-27 NOTE — REVIEW OF SYSTEMS
[Diarrhea] : diarrhea [Fatigue] : fatigue [Negative] : Allergic/Immunologic [Vomiting] : no vomiting [Constipation] : no constipation [FreeTextEntry7] : intermittent diarrhea

## 2018-09-27 NOTE — VITALS
[Maximal Pain Intensity: 2/10] : 2/10 [Least Pain Intensity: 0/10] : 0/10 [Pain Description/Quality: ___] : Pain description/quality: [unfilled] [Pain Duration: ___] : Pain duration: [unfilled] [Pain Location: ___] : Pain Location: [unfilled] [Pain Interferes with ADLs] : Pain interferes with activities of daily living. [Opioid] : opioid [70: Cares for self; unalbe to carry on normal activity or do active work.] : 70: Cares for self; unable to carry on normal activity or do active work. [ECOG Performance Status: 2 - Ambulatory and capable of all self care but unable to carry out any work activities] : Performance Status: 2 - Ambulatory and capable of all self care but unable to carry out any work activities. Up and about more than 50% of waking hours

## 2018-09-27 NOTE — HISTORY OF PRESENT ILLNESS
[FreeTextEntry1] : This 62 year-old presents for post-treatment evaluation.  He completed radiation therapy to the pancreatic bed and lymph nodes for adenocarcinoma T3N1M0, stage IIIB.  He is s/p pancreaticoduodenectomy, as well as chemotherapy.\par Reports continued abdominal bloating and cramping, which has diminished since treatment completion.  \par Reports diarrhea is now intermittent, alternating with formed stools.   Follows with Dr. Vela and Dr. Kelley.  CT scan of abdomen and pelvis 9/20/2018 reviewed.

## 2018-09-27 NOTE — ASSESSMENT
[Metastatic disease without local control] : Metastatic disease without local control [FreeTextEntry1] : modest radiation related treatment  sequelae.

## 2018-09-27 NOTE — REASON FOR VISIT
[Post-Treatment Evaluation] : post-treatment evaluation for [Other: ___] : [unfilled] [Spouse] : spouse [Family Member] : family member

## 2018-09-27 NOTE — PHYSICAL EXAM
[General Appearance - Well Developed] : well developed [General Appearance - In No Acute Distress] : in no acute distress [Abdomen Soft] : soft [Normal] : oriented to person, place and time, the affect was normal, the mood was normal and not anxious [FreeTextEntry1] : deferred [de-identified] : deferred

## 2018-09-27 NOTE — DISEASE MANAGEMENT
[Pathological] : TNM Stage: p [IIB] : IIB [TTNM] : 3 [NTNM] : 1 [MTNM] : 0 [de-identified] : 5040cGy [de-identified] : abdomen

## 2018-09-27 NOTE — LETTER CLOSING
[Sincerely yours,] : Sincerely yours, [FreeTextEntry3] : Jag Bhardwaj MD\par Physician in Chief\par Department of Radiation Medicine\par [facility] [place]\par [facility] [place]\par \par  of Radiation Medicine\par [facility]\par at  [facility]\par \par Radiation \par [facility]/\par [facility] [place]\par [address]\par [place], [place] [address]\par \par Tel: [phone]\par Fax: [phone]\par

## 2018-09-27 NOTE — LETTER GREETING
[Dear Doctor] : Dear Doctor, [Follow-Up] : Your patient, [unfilled] was seen in my office today for follow-up [Please see my note below.] : Please see my note below. [FreeTextEntry2] : Mervat Vela MD

## 2018-10-01 PROCEDURE — 88341 IMHCHEM/IMCYTCHM EA ADD ANTB: CPT | Mod: 26

## 2018-10-01 PROCEDURE — 88342 IMHCHEM/IMCYTCHM 1ST ANTB: CPT | Mod: 26

## 2018-10-04 ENCOUNTER — APPOINTMENT (OUTPATIENT)
Dept: HEMATOLOGY ONCOLOGY | Facility: CLINIC | Age: 63
End: 2018-10-04
Payer: COMMERCIAL

## 2018-10-04 VITALS
DIASTOLIC BLOOD PRESSURE: 73 MMHG | OXYGEN SATURATION: 100 % | HEIGHT: 65 IN | WEIGHT: 144.18 LBS | SYSTOLIC BLOOD PRESSURE: 111 MMHG | HEART RATE: 78 BPM | TEMPERATURE: 97.4 F | BODY MASS INDEX: 24.02 KG/M2

## 2018-10-04 PROCEDURE — 99214 OFFICE O/P EST MOD 30 MIN: CPT

## 2018-10-04 RX ORDER — CIPROFLOXACIN HYDROCHLORIDE 500 MG/1
500 TABLET, FILM COATED ORAL
Qty: 20 | Refills: 0 | Status: DISCONTINUED | COMMUNITY
Start: 2018-09-27 | End: 2018-10-04

## 2018-10-04 RX ORDER — FENTANYL 25 UG/H
25 PATCH, EXTENDED RELEASE TRANSDERMAL
Qty: 10 | Refills: 0 | Status: DISCONTINUED | COMMUNITY
Start: 2018-07-24 | End: 2018-10-04

## 2018-10-04 RX ORDER — CAPECITABINE 500 MG/1
500 TABLET ORAL
Qty: 84 | Refills: 0 | Status: DISCONTINUED | COMMUNITY
Start: 2018-01-25 | End: 2018-10-04

## 2018-10-16 ENCOUNTER — RESULT REVIEW (OUTPATIENT)
Age: 63
End: 2018-10-16

## 2018-10-16 ENCOUNTER — APPOINTMENT (OUTPATIENT)
Age: 63
End: 2018-10-16

## 2018-10-16 LAB
BASOPHILS # BLD AUTO: 0 K/UL — SIGNIFICANT CHANGE UP (ref 0–0.2)
BASOPHILS NFR BLD AUTO: 1.2 % — SIGNIFICANT CHANGE UP (ref 0–2)
EOSINOPHIL # BLD AUTO: 0.1 K/UL — SIGNIFICANT CHANGE UP (ref 0–0.5)
EOSINOPHIL NFR BLD AUTO: 2.9 % — SIGNIFICANT CHANGE UP (ref 0–6)
HCT VFR BLD CALC: 38.5 % — LOW (ref 39–50)
HGB BLD-MCNC: 12.9 G/DL — LOW (ref 13–17)
LYMPHOCYTES # BLD AUTO: 0.4 K/UL — LOW (ref 1–3.3)
LYMPHOCYTES # BLD AUTO: 10.8 % — LOW (ref 13–44)
MCHC RBC-ENTMCNC: 32.4 PG — SIGNIFICANT CHANGE UP (ref 27–34)
MCHC RBC-ENTMCNC: 33.6 GM/DL — SIGNIFICANT CHANGE UP (ref 32–36)
MCV RBC AUTO: 96.4 FL — SIGNIFICANT CHANGE UP (ref 80–100)
MONOCYTES # BLD AUTO: 0.5 K/UL — SIGNIFICANT CHANGE UP (ref 0–0.9)
MONOCYTES NFR BLD AUTO: 14.9 % — HIGH (ref 2–14)
NEUTROPHILS # BLD AUTO: 2.5 K/UL — SIGNIFICANT CHANGE UP (ref 1.8–7.4)
NEUTROPHILS NFR BLD AUTO: 70.1 % — SIGNIFICANT CHANGE UP (ref 43–77)
PLATELET # BLD AUTO: 226 K/UL — SIGNIFICANT CHANGE UP (ref 150–400)
RBC # BLD: 3.99 M/UL — LOW (ref 4.2–5.8)
RBC # FLD: 12.9 % — SIGNIFICANT CHANGE UP (ref 10.3–14.5)
WBC # BLD: 3.5 K/UL — LOW (ref 3.8–10.5)
WBC # FLD AUTO: 3.5 K/UL — LOW (ref 3.8–10.5)

## 2018-10-17 DIAGNOSIS — Z51.11 ENCOUNTER FOR ANTINEOPLASTIC CHEMOTHERAPY: ICD-10-CM

## 2018-10-17 DIAGNOSIS — R11.2 NAUSEA WITH VOMITING, UNSPECIFIED: ICD-10-CM

## 2018-10-18 ENCOUNTER — APPOINTMENT (OUTPATIENT)
Age: 63
End: 2018-10-18

## 2018-10-24 ENCOUNTER — OUTPATIENT (OUTPATIENT)
Dept: OUTPATIENT SERVICES | Facility: HOSPITAL | Age: 63
LOS: 1 days | Discharge: HOME | End: 2018-10-24

## 2018-10-24 DIAGNOSIS — Z95.828 PRESENCE OF OTHER VASCULAR IMPLANTS AND GRAFTS: Chronic | ICD-10-CM

## 2018-10-24 DIAGNOSIS — C25.9 MALIGNANT NEOPLASM OF PANCREAS, UNSPECIFIED: ICD-10-CM

## 2018-10-24 DIAGNOSIS — K90.81 WHIPPLE'S DISEASE: Chronic | ICD-10-CM

## 2018-10-24 DIAGNOSIS — Z98.890 OTHER SPECIFIED POSTPROCEDURAL STATES: Chronic | ICD-10-CM

## 2018-10-30 ENCOUNTER — RESULT REVIEW (OUTPATIENT)
Age: 63
End: 2018-10-30

## 2018-10-30 ENCOUNTER — LABORATORY RESULT (OUTPATIENT)
Age: 63
End: 2018-10-30

## 2018-10-30 ENCOUNTER — RX RENEWAL (OUTPATIENT)
Age: 63
End: 2018-10-30

## 2018-10-30 ENCOUNTER — APPOINTMENT (OUTPATIENT)
Age: 63
End: 2018-10-30

## 2018-10-30 LAB
BASOPHILS # BLD AUTO: 0.1 K/UL — SIGNIFICANT CHANGE UP (ref 0–0.2)
BASOPHILS NFR BLD AUTO: 2 % — SIGNIFICANT CHANGE UP (ref 0–2)
EOSINOPHIL # BLD AUTO: 0.2 K/UL — SIGNIFICANT CHANGE UP (ref 0–0.5)
EOSINOPHIL NFR BLD AUTO: 4.9 % — SIGNIFICANT CHANGE UP (ref 0–6)
HCT VFR BLD CALC: 35.4 % — LOW (ref 39–50)
HGB BLD-MCNC: 12.6 G/DL — LOW (ref 13–17)
LYMPHOCYTES # BLD AUTO: 0.4 K/UL — LOW (ref 1–3.3)
LYMPHOCYTES # BLD AUTO: 12.3 % — LOW (ref 13–44)
MCHC RBC-ENTMCNC: 32.8 PG — SIGNIFICANT CHANGE UP (ref 27–34)
MCHC RBC-ENTMCNC: 35.4 GM/DL — SIGNIFICANT CHANGE UP (ref 32–36)
MCV RBC AUTO: 92.5 FL — SIGNIFICANT CHANGE UP (ref 80–100)
MONOCYTES # BLD AUTO: 0.5 K/UL — SIGNIFICANT CHANGE UP (ref 0–0.9)
MONOCYTES NFR BLD AUTO: 16 % — HIGH (ref 2–14)
NEUTROPHILS # BLD AUTO: 2.2 K/UL — SIGNIFICANT CHANGE UP (ref 1.8–7.4)
NEUTROPHILS NFR BLD AUTO: 64.7 % — SIGNIFICANT CHANGE UP (ref 43–77)
PLATELET # BLD AUTO: 327 K/UL — SIGNIFICANT CHANGE UP (ref 150–400)
RBC # BLD: 3.83 M/UL — LOW (ref 4.2–5.8)
RBC # FLD: 12.8 % — SIGNIFICANT CHANGE UP (ref 10.3–14.5)
WBC # BLD: 3.4 K/UL — LOW (ref 3.8–10.5)
WBC # FLD AUTO: 3.4 K/UL — LOW (ref 3.8–10.5)

## 2018-10-31 DIAGNOSIS — R11.2 NAUSEA WITH VOMITING, UNSPECIFIED: ICD-10-CM

## 2018-10-31 DIAGNOSIS — Z51.11 ENCOUNTER FOR ANTINEOPLASTIC CHEMOTHERAPY: ICD-10-CM

## 2018-11-01 ENCOUNTER — APPOINTMENT (OUTPATIENT)
Dept: HEMATOLOGY ONCOLOGY | Facility: CLINIC | Age: 63
End: 2018-11-01

## 2018-11-01 ENCOUNTER — APPOINTMENT (OUTPATIENT)
Dept: HEMATOLOGY ONCOLOGY | Facility: CLINIC | Age: 63
End: 2018-11-01
Payer: COMMERCIAL

## 2018-11-01 ENCOUNTER — APPOINTMENT (OUTPATIENT)
Age: 63
End: 2018-11-01

## 2018-11-01 VITALS
BODY MASS INDEX: 23.54 KG/M2 | SYSTOLIC BLOOD PRESSURE: 103 MMHG | HEIGHT: 65 IN | DIASTOLIC BLOOD PRESSURE: 67 MMHG | WEIGHT: 141.31 LBS | HEART RATE: 56 BPM | OXYGEN SATURATION: 100 % | TEMPERATURE: 98 F

## 2018-11-01 DIAGNOSIS — R19.7 DIARRHEA, UNSPECIFIED: ICD-10-CM

## 2018-11-01 PROCEDURE — 99214 OFFICE O/P EST MOD 30 MIN: CPT

## 2018-11-01 RX ORDER — PEN NEEDLE, DIABETIC 29 G X1/2"
31G X 8 MM NEEDLE, DISPOSABLE MISCELLANEOUS
Qty: 100 | Refills: 0 | Status: DISCONTINUED | COMMUNITY
Start: 2018-01-17 | End: 2018-11-01

## 2018-11-01 RX ORDER — SYRING-NEEDL,DISP,INSUL,0.3 ML 31GX15/64"
31G X 15/64" SYRINGE, EMPTY DISPOSABLE MISCELLANEOUS
Qty: 100 | Refills: 0 | Status: DISCONTINUED | COMMUNITY
Start: 2017-11-27 | End: 2018-11-01

## 2018-11-01 RX ORDER — INSULIN LISPRO 100 [IU]/ML
100 INJECTION, SOLUTION INTRAVENOUS; SUBCUTANEOUS
Qty: 10 | Refills: 0 | Status: DISCONTINUED | COMMUNITY
Start: 2017-11-02 | End: 2018-11-01

## 2018-11-01 RX ORDER — INSULIN GLARGINE 100 [IU]/ML
100 INJECTION, SOLUTION SUBCUTANEOUS
Refills: 0 | Status: DISCONTINUED | COMMUNITY
Start: 2017-11-02 | End: 2018-11-01

## 2018-11-01 RX ORDER — PEN NEEDLE, DIABETIC 29 G X1/2"
31G X 5 MM NEEDLE, DISPOSABLE MISCELLANEOUS
Qty: 180 | Refills: 0 | Status: DISCONTINUED | COMMUNITY
Start: 2018-05-14 | End: 2018-11-01

## 2018-11-01 RX ORDER — ATORVASTATIN CALCIUM 10 MG/1
10 TABLET, FILM COATED ORAL
Refills: 0 | Status: ACTIVE | COMMUNITY

## 2018-11-01 RX ORDER — SYRING-NEEDL,DISP,INSUL,0.3 ML 31 GX5/16"
31G X 5/16" SYRINGE, EMPTY DISPOSABLE MISCELLANEOUS
Qty: 100 | Refills: 0 | Status: DISCONTINUED | COMMUNITY
Start: 2017-11-02 | End: 2018-11-01

## 2018-11-01 RX ORDER — METRONIDAZOLE 500 MG/1
500 TABLET ORAL 3 TIMES DAILY
Qty: 30 | Refills: 0 | Status: DISCONTINUED | COMMUNITY
Start: 2018-09-27 | End: 2018-11-01

## 2018-11-01 RX ORDER — INSULIN GLARGINE 100 [IU]/ML
100 INJECTION, SOLUTION SUBCUTANEOUS
Qty: 15 | Refills: 0 | Status: DISCONTINUED | COMMUNITY
Start: 2018-01-17 | End: 2018-11-01

## 2018-11-01 RX ORDER — ISOPROPYL ALCOHOL 0.75 G/1
SWAB TOPICAL
Qty: 100 | Refills: 0 | Status: DISCONTINUED | COMMUNITY
Start: 2017-11-27 | End: 2018-11-01

## 2018-11-01 RX ORDER — INSULIN LISPRO 100 [IU]/ML
100 INJECTION, SOLUTION INTRAVENOUS; SUBCUTANEOUS
Qty: 15 | Refills: 0 | Status: DISCONTINUED | COMMUNITY
Start: 2018-01-17 | End: 2018-11-01

## 2018-11-05 NOTE — HISTORY OF PRESENT ILLNESS
[Disease: _____________________] : Disease: [unfilled] [T: ___] : T[unfilled] [N: ___] : N[unfilled] [AJCC Stage: ____] : AJCC Stage: [unfilled] [de-identified] : Mr. Galaviz is a 62 year old gentleman diagnosed with pancreatic cancer in October 2017 after initially presenting with painless jaundice. EUS and ERCP on 10/31/17 revealed a 2.2 cm pancreatic head mass, with no apparent vessel involvement or lymphadenopathy. The CBD was stented. Final pathology was consistent with adenocarcinoma. EUS and ERCP on 10/31/17, which revealed a 2.2 cm pancreatic head mass, with no apparent vessel involvement or lymphadenopathy. The CBD was stented. Final pathology was consistent with adenocarcinoma. S/p Whipple, cholecystectomy, enterectomy x 2 and removal of CBD stent on 11/30/17. Final pathology was 3.1 cm pancreatic adenocarcinoma invading the nadira pancreatic tissue and CBD, and involving 2/26 lymph nodes (T3N1). The CBD resection margin was positive for adenocarcinoma with extensive nadira neural invasion. Mediport was placed on 1/17/18. \par - CT C/A/P 1/23/18: IMPRESSION: \par   No evidence of recurrent or metastatic disease in the chest, abdomen, or pelvis. \par   Stable postsurgical anatomy status post Whipple procedure. No evidence of bowel or gastric outlet obstruction. Pancreatic duct stent in place without \par   pancreatic duct or biliary dilatation. \par   2 tiny foci of extraluminal air in the right upper quadrant along a prior MUNA drain tract. This is likely secondary to recent drain removal. No drainable fluid collections. \par   Small amount of nonocclusive thrombus at the tip of the port catheter within the right brachiocephalic vein. \par \par Treatment:\par 2/7/18 - 6/13/18 adjuvant chemotherapy with gemcitabine + capecitabine (7 cycles)\par 4/12/18 CT C/A/P: no evidence of recurrent or metastatic disease.\par 7/2/18 CT a/p  : new soft tissue inferior to proximal superior mesenteric artery and to right of celiac axis in patient s/p whipple, suspicious for recurrent disease. \par 7/24/18-8/30/18  chemo-radiation with xeloda [de-identified] : Final Diagnosis\par 1. Lymph node at hepatic artery, excision:- One lymph node, negative for carcinoma, (0/1)\par 2. Periportal lymph node, excision:- One lymph node, negative for carcinoma, (0/1)\par 3. Gallbladder, cholecystectomy:- Chronic cholecystitis, negative for carcinoma\par - One lymph node, negative for carcinoma, (0/1)\par 4. Periportal lymph node, #2, excision:- One lymph node, negative for carcinoma, (0/1)\par 5. Pancreas, stomach and duodenum, pancreaticoduodenectomy(Whipple procedure):\par - Invasive adenocarcinoma of pancreas, moderately to poorly\par differentiated,pancreatobiliary type\par - Tumor invading into peripancreatic soft tissue and common bile duct\par - Comment bile duct resection margin, positive for adenocarcinoma, with\par extensive perineural invasion only, see comment- Pancreatic neck resection margin, negative for carcinoma\par - 18 lymph nodes, negative for carcinoma, (0/18)\par - AJCC (7th Ed) staging: pT3N1(2/26), see synoptic report,\par see Part 7 and Part 9\par 6. Portion of pancreas at portal vein #1, excision:- Atrophic pancreatitis, negative for carcinoma\par 7. Portion of pancreas at portal vein #2, excision:- Positive for adenocarcinoma, see comment\par 8. Portion of stent, removal:- Stent, gross examination only\par 9. Uncinate at superior mesenteric artery #1, excision:- Metastatic pancreatic adenocarcinoma present in 2 of 2 lymph nodes, (2/2)\par 10. Uncinate at superior mesenteric artery #2, excision:- Two lymph nodes, negative for carcinoma, (0/2)\par 11. Stomach and bowel at gastrojejunostomy site, excision:- Gastric and small bowel tissue, negative for carcinoma\par  [de-identified] : Disconnected from [employer] today post C2. Denies fevers, rates fatigue as moderate, has no SOB, CP, appetite is fair, +constipation varied w/ diarrhea. He has spasms w/ diarrhea up to 5 x's  a day. He is now taking the creon tabs but does not feel it is helping the bloating. He is taking the dicyclomine 3 times a day for the spasms.. He also has reflux, has no pain/burning w/ urination or LE edema the remainder of his ROS is negative.

## 2018-11-05 NOTE — PHYSICAL EXAM
[Ambulatory and capable of all self care but unable to carry out any work activities] : Status 2- Ambulatory and capable of all self care but unable to carry out any work activities. Up and about more than 50% of waking hours [Normal] : normal appearance, no rash, nodules, vesicles, ulcers, erythema [de-identified] : Interactive, well groomed, in NAD. [de-identified] : negative cervical supra/infraclavicular adenopathy [de-identified] : clear b/l [de-identified] : S1/S2 [de-identified] : negative pedal edema or calve tenderness  [de-identified] : without spinal or cva tenderness. [de-identified] : BS+, soft, nontender on palpation

## 2018-11-05 NOTE — ASSESSMENT
[FreeTextEntry1] : 61 y/o gentleman with Factor V Leiden mutation and stage IIB pancreatic cancer (T3N1) with positive CBD margin with peripancreatic soft tissue invasion, s/p Whipple on 11/30/17.  S/p 7 cycles of gemcitabine + Xeloda.  Post chemotherapy scans  7/2/18 CT scan with local recurrence. Completed chemoradiation with Xeloda from 7/24/18 - 8/30/18.\par \par 9/20/18 restaging CT scans with POD in upper abdomen and possible colitis.   Foundation One testing with no actionable mutations, MSI was requested and is stable.  Started liposomal irinotecan 70 mg/m2 w/ Leucovorin 400 mg/m2 and continuous infusion 5 FU 2400 mg/m2 over 46 hrs on 10/16/18.\par \par Plan:\par 1. Diarrhea: likely r/t chemo, increase oral hydration, add sodium to diet as well as K+. Send stool for C.Diff. Call if diarrhea persists. Completed Flagyl. Abdominal exam is benign, WBC 3.4, afebrile.\par 2. Hypokalemia: KCL Rx sent.\par 2. Foundation One testing --> no actionable mutations.  MSI -stable.\par 3. Abdominal pain/bloating: increase fentanyl 37.5 mcg/hr, and oxycodone scheduled 3-4 times daily. Continue f/u with Dr. Kelley. He is now taking Creon..\par 4.RTO 2 weeks with C3, or sooner if diarrhea persists.\par

## 2018-11-05 NOTE — REASON FOR VISIT
[Follow-Up Visit] : a follow-up [Family Member] : family member [Other: _____] : [unfilled] [FreeTextEntry2] : pancreatic cancer

## 2018-11-05 NOTE — PHYSICAL EXAM
[Ambulatory and capable of all self care but unable to carry out any work activities] : Status 2- Ambulatory and capable of all self care but unable to carry out any work activities. Up and about more than 50% of waking hours [Normal] : normal appearance, no rash, nodules, vesicles, ulcers, erythema [de-identified] : Interactive, well groomed, in NAD. [de-identified] : negative cervical supra/infraclavicular adenopathy [de-identified] : clear b/l [de-identified] : S1/S2 [de-identified] : negative pedal edema or calve tenderness  [de-identified] : BS+, soft, nontender on palpation [de-identified] : without spinal or cva tenderness.

## 2018-11-05 NOTE — HISTORY OF PRESENT ILLNESS
[Disease: _____________________] : Disease: [unfilled] [T: ___] : T[unfilled] [N: ___] : N[unfilled] [AJCC Stage: ____] : AJCC Stage: [unfilled] [de-identified] : Mr. Galaviz is a 62 year old gentleman diagnosed with pancreatic cancer in October 2017 after initially presenting with painless jaundice. EUS and ERCP on 10/31/17 revealed a 2.2 cm pancreatic head mass, with no apparent vessel involvement or lymphadenopathy. The CBD was stented. Final pathology was consistent with adenocarcinoma. EUS and ERCP on 10/31/17, which revealed a 2.2 cm pancreatic head mass, with no apparent vessel involvement or lymphadenopathy. The CBD was stented. Final pathology was consistent with adenocarcinoma. S/p Whipple, cholecystectomy, enterectomy x 2 and removal of CBD stent on 11/30/17. Final pathology was 3.1 cm pancreatic adenocarcinoma invading the nadira pancreatic tissue and CBD, and involving 2/26 lymph nodes (T3N1). The CBD resection margin was positive for adenocarcinoma with extensive nadira neural invasion. Mediport was placed on 1/17/18. \par - CT C/A/P 1/23/18: IMPRESSION: \par   No evidence of recurrent or metastatic disease in the chest, abdomen, or pelvis. \par   Stable postsurgical anatomy status post Whipple procedure. No evidence of bowel or gastric outlet obstruction. Pancreatic duct stent in place without \par   pancreatic duct or biliary dilatation. \par   2 tiny foci of extraluminal air in the right upper quadrant along a prior MUNA drain tract. This is likely secondary to recent drain removal. No drainable fluid collections. \par   Small amount of nonocclusive thrombus at the tip of the port catheter within the right brachiocephalic vein. \par \par Treatment:\par 2/7/18 - 6/13/18 adjuvant chemotherapy with gemcitabine + capecitabine (7 cycles)\par 4/12/18 CT C/A/P: no evidence of recurrent or metastatic disease.\par 7/2/18 CT a/p  : new soft tissue inferior to proximal superior mesenteric artery and to right of celiac axis in patient s/p whipple, suspicious for recurrent disease. \par 7/24/18-8/30/18  chemo-radiation with xeloda [de-identified] : Final Diagnosis\par 1. Lymph node at hepatic artery, excision:- One lymph node, negative for carcinoma, (0/1)\par 2. Periportal lymph node, excision:- One lymph node, negative for carcinoma, (0/1)\par 3. Gallbladder, cholecystectomy:- Chronic cholecystitis, negative for carcinoma\par - One lymph node, negative for carcinoma, (0/1)\par 4. Periportal lymph node, #2, excision:- One lymph node, negative for carcinoma, (0/1)\par 5. Pancreas, stomach and duodenum, pancreaticoduodenectomy(Whipple procedure):\par - Invasive adenocarcinoma of pancreas, moderately to poorly\par differentiated,pancreatobiliary type\par - Tumor invading into peripancreatic soft tissue and common bile duct\par - Comment bile duct resection margin, positive for adenocarcinoma, with\par extensive perineural invasion only, see comment- Pancreatic neck resection margin, negative for carcinoma\par - 18 lymph nodes, negative for carcinoma, (0/18)\par - AJCC (7th Ed) staging: pT3N1(2/26), see synoptic report,\par see Part 7 and Part 9\par 6. Portion of pancreas at portal vein #1, excision:- Atrophic pancreatitis, negative for carcinoma\par 7. Portion of pancreas at portal vein #2, excision:- Positive for adenocarcinoma, see comment\par 8. Portion of stent, removal:- Stent, gross examination only\par 9. Uncinate at superior mesenteric artery #1, excision:- Metastatic pancreatic adenocarcinoma present in 2 of 2 lymph nodes, (2/2)\par 10. Uncinate at superior mesenteric artery #2, excision:- Two lymph nodes, negative for carcinoma, (0/2)\par 11. Stomach and bowel at gastrojejunostomy site, excision:- Gastric and small bowel tissue, negative for carcinoma\par  [de-identified] : Disconnected from [employer] today post C2. Denies fevers, rates fatigue as moderate, has no SOB, CP, appetite is fair, +constipation varied w/ diarrhea. He has spasms w/ diarrhea up to 5 x's  a day. He is now taking the creon tabs but does not feel it is helping the bloating. He is taking the dicyclomine 3 times a day for the spasms.. He also has reflux, has no pain/burning w/ urination or LE edema the remainder of his ROS is negative.

## 2018-11-05 NOTE — ASSESSMENT
[FreeTextEntry1] : 63 y/o gentleman with Factor V Leiden mutation and stage IIB pancreatic cancer (T3N1) with positive CBD margin with peripancreatic soft tissue invasion, s/p Whipple on 11/30/17.  S/p 7 cycles of gemcitabine + Xeloda.  Post chemotherapy scans  7/2/18 CT scan with local recurrence. Completed chemoradiation with Xeloda from 7/24/18 - 8/30/18.\par \par 9/20/18 restaging CT scans with POD in upper abdomen and possible colitis.   Foundation One testing with no actionable mutations, MSI was requested and is stable.  Started liposomal irinotecan 70 mg/m2 w/ Leucovorin 400 mg/m2 and continuous infusion 5 FU 2400 mg/m2 over 46 hrs on 10/16/18.\par \par Plan:\par 1. Diarrhea: likely r/t chemo, increase oral hydration, add sodium to diet as well as K+. Send stool for C.Diff. Call if diarrhea persists. Completed Flagyl. Abdominal exam is benign, WBC 3.4, afebrile.\par 2. Hypokalemia: KCL Rx sent.\par 2. Foundation One testing --> no actionable mutations.  MSI -stable.\par 3. Abdominal pain/bloating: increase fentanyl 37.5 mcg/hr, and oxycodone scheduled 3-4 times daily. Continue f/u with Dr. Kellye. He is now taking Creon..\par 4.RTO 2 weeks with C3, or sooner if diarrhea persists.\par

## 2018-11-12 ENCOUNTER — RESULT REVIEW (OUTPATIENT)
Age: 63
End: 2018-11-12

## 2018-11-12 LAB
C DIFF TOX GENS STL QL NAA+PROBE: NORMAL
CDIFF BY PCR: NOT DETECTED

## 2018-11-13 ENCOUNTER — APPOINTMENT (OUTPATIENT)
Dept: HEMATOLOGY ONCOLOGY | Facility: CLINIC | Age: 63
End: 2018-11-13
Payer: COMMERCIAL

## 2018-11-13 ENCOUNTER — LABORATORY RESULT (OUTPATIENT)
Age: 63
End: 2018-11-13

## 2018-11-13 ENCOUNTER — RESULT REVIEW (OUTPATIENT)
Age: 63
End: 2018-11-13

## 2018-11-13 ENCOUNTER — APPOINTMENT (OUTPATIENT)
Age: 63
End: 2018-11-13

## 2018-11-13 LAB
BASOPHILS # BLD AUTO: 0 K/UL — SIGNIFICANT CHANGE UP (ref 0–0.2)
BASOPHILS NFR BLD AUTO: 0.9 % — SIGNIFICANT CHANGE UP (ref 0–2)
EOSINOPHIL # BLD AUTO: 0.1 K/UL — SIGNIFICANT CHANGE UP (ref 0–0.5)
EOSINOPHIL NFR BLD AUTO: 2.6 % — SIGNIFICANT CHANGE UP (ref 0–6)
HCT VFR BLD CALC: 35.9 % — LOW (ref 39–50)
HGB BLD-MCNC: 12 G/DL — LOW (ref 13–17)
LYMPHOCYTES # BLD AUTO: 0.4 K/UL — LOW (ref 1–3.3)
LYMPHOCYTES # BLD AUTO: 8.7 % — LOW (ref 13–44)
MCHC RBC-ENTMCNC: 32.2 PG — SIGNIFICANT CHANGE UP (ref 27–34)
MCHC RBC-ENTMCNC: 33.4 GM/DL — SIGNIFICANT CHANGE UP (ref 32–36)
MCV RBC AUTO: 96.4 FL — SIGNIFICANT CHANGE UP (ref 80–100)
MONOCYTES # BLD AUTO: 0.6 K/UL — SIGNIFICANT CHANGE UP (ref 0–0.9)
MONOCYTES NFR BLD AUTO: 15.2 % — HIGH (ref 2–14)
NEUTROPHILS # BLD AUTO: 2.9 K/UL — SIGNIFICANT CHANGE UP (ref 1.8–7.4)
NEUTROPHILS NFR BLD AUTO: 72.5 % — SIGNIFICANT CHANGE UP (ref 43–77)
PLATELET # BLD AUTO: 210 K/UL — SIGNIFICANT CHANGE UP (ref 150–400)
RBC # BLD: 3.72 M/UL — LOW (ref 4.2–5.8)
RBC # FLD: 13.7 % — SIGNIFICANT CHANGE UP (ref 10.3–14.5)
WBC # BLD: 4 K/UL — SIGNIFICANT CHANGE UP (ref 3.8–10.5)
WBC # FLD AUTO: 4 K/UL — SIGNIFICANT CHANGE UP (ref 3.8–10.5)

## 2018-11-13 PROCEDURE — 99214 OFFICE O/P EST MOD 30 MIN: CPT

## 2018-11-13 NOTE — PHYSICAL EXAM
[Ambulatory and capable of all self care but unable to carry out any work activities] : Status 2- Ambulatory and capable of all self care but unable to carry out any work activities. Up and about more than 50% of waking hours [Normal] : affect appropriate [de-identified] : Interactive, well groomed, in NAD. [de-identified] : negative cervical supra/infraclavicular adenopathy [de-identified] : clear b/l [de-identified] : S1/S2 [de-identified] : negative pedal edema or calve tenderness  [de-identified] : soft, non tender, epigastric nodule is much softer [de-identified] : without spinal or cva tenderness.

## 2018-11-13 NOTE — ASSESSMENT
[FreeTextEntry1] : 63 y/o gentleman with Factor V Leiden mutation and stage IIB pancreatic cancer (T3N1) with positive CBD margin with peripancreatic soft tissue invasion, s/p Whipple on 11/30/17.  S/p 7 cycles of gemcitabine + Xeloda.  Post chemotherapy scans  7/2/18 CT scan with local recurrence. Completed chemoradiation with Xeloda from 7/24/18 - 8/30/18.  Foundation One testing with no actionable mutations, MSI was requested and is stable.\par \par 9/20/18 restaging CT scans with POD in upper abdomen and possible colitis.   Started liposomal irinotecan 70 mg/m2 w/ Leucovorin 400 mg/m2 and continuous infusion 5 FU 2400 mg/m2 over 46 hrs on 10/16/18.\par \par Doing reasonably well on chemotherapy with improved energy and appetite.  Continues to have abdominal cramping which has been present since his surgery.  Only having 2 diarrhea episodes per day. \par \par Plan:\par 1. Continue liposomal irinotecan/Leukovorin/5FU - C3 today\par 2. Diarrhea chronic - 2 episodes per day, can take Imodium if increase in frequency of diarrhea\par 3. Abdominal cramping: On fentanyl, and is NOT taking perocet.  Continue dicyclomine.  Can consider trial of hycosamine on next visit.\par 4.RTO 4 weeks\par

## 2018-11-13 NOTE — HISTORY OF PRESENT ILLNESS
[Disease: _____________________] : Disease: [unfilled] [T: ___] : T[unfilled] [N: ___] : N[unfilled] [AJCC Stage: ____] : AJCC Stage: [unfilled] [de-identified] : Mr. Galaviz is a 62 year old gentleman diagnosed with pancreatic cancer in October 2017 after initially presenting with painless jaundice. EUS and ERCP on 10/31/17 revealed a 2.2 cm pancreatic head mass, with no apparent vessel involvement or lymphadenopathy. The CBD was stented. Final pathology was consistent with adenocarcinoma. EUS and ERCP on 10/31/17, which revealed a 2.2 cm pancreatic head mass, with no apparent vessel involvement or lymphadenopathy. The CBD was stented. Final pathology was consistent with adenocarcinoma. S/p Whipple, cholecystectomy, enterectomy x 2 and removal of CBD stent on 11/30/17. Final pathology was 3.1 cm pancreatic adenocarcinoma invading the nadira pancreatic tissue and CBD, and involving 2/26 lymph nodes (T3N1). The CBD resection margin was positive for adenocarcinoma with extensive nadira neural invasion. Mediport was placed on 1/17/18. \par - CT C/A/P 1/23/18: IMPRESSION: \par   No evidence of recurrent or metastatic disease in the chest, abdomen, or pelvis. \par   Stable postsurgical anatomy status post Whipple procedure. No evidence of bowel or gastric outlet obstruction. Pancreatic duct stent in place without \par   pancreatic duct or biliary dilatation. \par   2 tiny foci of extraluminal air in the right upper quadrant along a prior MUNA drain tract. This is likely secondary to recent drain removal. No drainable fluid collections. \par   Small amount of nonocclusive thrombus at the tip of the port catheter within the right brachiocephalic vein. \par \par Treatment:\par 2/7/18 - 6/13/18 adjuvant chemotherapy with gemcitabine + capecitabine (7 cycles)\par 4/12/18 CT C/A/P: no evidence of recurrent or metastatic disease.\par 7/2/18 CT a/p  : new soft tissue inferior to proximal superior mesenteric artery and to right of celiac axis in patient s/p whipple, suspicious for recurrent disease. \par 7/24/18-8/30/18  chemo-radiation with xeloda [de-identified] : Final Diagnosis\par 1. Lymph node at hepatic artery, excision:- One lymph node, negative for carcinoma, (0/1)\par 2. Periportal lymph node, excision:- One lymph node, negative for carcinoma, (0/1)\par 3. Gallbladder, cholecystectomy:- Chronic cholecystitis, negative for carcinoma\par - One lymph node, negative for carcinoma, (0/1)\par 4. Periportal lymph node, #2, excision:- One lymph node, negative for carcinoma, (0/1)\par 5. Pancreas, stomach and duodenum, pancreaticoduodenectomy(Whipple procedure):\par - Invasive adenocarcinoma of pancreas, moderately to poorly\par differentiated,pancreatobiliary type\par - Tumor invading into peripancreatic soft tissue and common bile duct\par - Comment bile duct resection margin, positive for adenocarcinoma, with\par extensive perineural invasion only, see comment- Pancreatic neck resection margin, negative for carcinoma\par - 18 lymph nodes, negative for carcinoma, (0/18)\par - AJCC (7th Ed) staging: pT3N1(2/26), see synoptic report,\par see Part 7 and Part 9\par 6. Portion of pancreas at portal vein #1, excision:- Atrophic pancreatitis, negative for carcinoma\par 7. Portion of pancreas at portal vein #2, excision:- Positive for adenocarcinoma, see comment\par 8. Portion of stent, removal:- Stent, gross examination only\par 9. Uncinate at superior mesenteric artery #1, excision:- Metastatic pancreatic adenocarcinoma present in 2 of 2 lymph nodes, (2/2)\par 10. Uncinate at superior mesenteric artery #2, excision:- Two lymph nodes, negative for carcinoma, (0/2)\par 11. Stomach and bowel at gastrojejunostomy site, excision:- Gastric and small bowel tissue, negative for carcinoma\par  [de-identified] : Returns for follow up. Today is C3 liposomal irinotecan / leukovorin / 5FU.\par He has abdominal cramping and diarrhea daily, and has up to 2 episodes per day. C.difficile was negative. He notes that abdominal cramping is  only relieved after a bowel movement, so he sometimes gives himself an enema just to relieve cramping. Does not have hard stools or constipation. He is taking dicyclomine.  Reports compliance with Creon.\par His appetite is improved, he is eating more and enjoying it. His energy is better. \par He is not taking Percocet because he's afraid of constipation.\par No fevers.

## 2018-11-15 ENCOUNTER — APPOINTMENT (OUTPATIENT)
Age: 63
End: 2018-11-15

## 2018-11-19 ENCOUNTER — OUTPATIENT (OUTPATIENT)
Dept: OUTPATIENT SERVICES | Facility: HOSPITAL | Age: 63
LOS: 1 days | Discharge: HOME | End: 2018-11-19

## 2018-11-19 DIAGNOSIS — D68.51 ACTIVATED PROTEIN C RESISTANCE: ICD-10-CM

## 2018-11-19 DIAGNOSIS — Z98.890 OTHER SPECIFIED POSTPROCEDURAL STATES: Chronic | ICD-10-CM

## 2018-11-19 DIAGNOSIS — K90.81 WHIPPLE'S DISEASE: Chronic | ICD-10-CM

## 2018-11-19 DIAGNOSIS — C25.9 MALIGNANT NEOPLASM OF PANCREAS, UNSPECIFIED: ICD-10-CM

## 2018-11-19 DIAGNOSIS — Z95.828 PRESENCE OF OTHER VASCULAR IMPLANTS AND GRAFTS: Chronic | ICD-10-CM

## 2018-11-27 ENCOUNTER — LABORATORY RESULT (OUTPATIENT)
Age: 63
End: 2018-11-27

## 2018-11-27 ENCOUNTER — APPOINTMENT (OUTPATIENT)
Age: 63
End: 2018-11-27

## 2018-11-27 ENCOUNTER — RESULT REVIEW (OUTPATIENT)
Age: 63
End: 2018-11-27

## 2018-11-27 LAB
BASOPHILS # BLD AUTO: 0.1 K/UL — SIGNIFICANT CHANGE UP (ref 0–0.2)
BASOPHILS NFR BLD AUTO: 1.4 % — SIGNIFICANT CHANGE UP (ref 0–2)
EOSINOPHIL # BLD AUTO: 0.1 K/UL — SIGNIFICANT CHANGE UP (ref 0–0.5)
EOSINOPHIL NFR BLD AUTO: 2.3 % — SIGNIFICANT CHANGE UP (ref 0–6)
HCT VFR BLD CALC: 33.9 % — LOW (ref 39–50)
HGB BLD-MCNC: 11.8 G/DL — LOW (ref 13–17)
LYMPHOCYTES # BLD AUTO: 0.3 K/UL — LOW (ref 1–3.3)
LYMPHOCYTES # BLD AUTO: 6.5 % — LOW (ref 13–44)
MCHC RBC-ENTMCNC: 33.3 PG — SIGNIFICANT CHANGE UP (ref 27–34)
MCHC RBC-ENTMCNC: 34.9 GM/DL — SIGNIFICANT CHANGE UP (ref 32–36)
MCV RBC AUTO: 95.4 FL — SIGNIFICANT CHANGE UP (ref 80–100)
MONOCYTES # BLD AUTO: 0.6 K/UL — SIGNIFICANT CHANGE UP (ref 0–0.9)
MONOCYTES NFR BLD AUTO: 12.4 % — SIGNIFICANT CHANGE UP (ref 2–14)
NEUTROPHILS # BLD AUTO: 3.7 K/UL — SIGNIFICANT CHANGE UP (ref 1.8–7.4)
NEUTROPHILS NFR BLD AUTO: 77.4 % — HIGH (ref 43–77)
PLATELET # BLD AUTO: 228 K/UL — SIGNIFICANT CHANGE UP (ref 150–400)
RBC # BLD: 3.56 M/UL — LOW (ref 4.2–5.8)
RBC # FLD: 13.4 % — SIGNIFICANT CHANGE UP (ref 10.3–14.5)
WBC # BLD: 4.8 K/UL — SIGNIFICANT CHANGE UP (ref 3.8–10.5)
WBC # FLD AUTO: 4.8 K/UL — SIGNIFICANT CHANGE UP (ref 3.8–10.5)

## 2018-11-28 DIAGNOSIS — Z51.11 ENCOUNTER FOR ANTINEOPLASTIC CHEMOTHERAPY: ICD-10-CM

## 2018-11-28 DIAGNOSIS — R11.2 NAUSEA WITH VOMITING, UNSPECIFIED: ICD-10-CM

## 2018-11-29 ENCOUNTER — APPOINTMENT (OUTPATIENT)
Age: 63
End: 2018-11-29

## 2018-12-11 ENCOUNTER — RESULT REVIEW (OUTPATIENT)
Age: 63
End: 2018-12-11

## 2018-12-11 ENCOUNTER — LABORATORY RESULT (OUTPATIENT)
Age: 63
End: 2018-12-11

## 2018-12-11 ENCOUNTER — APPOINTMENT (OUTPATIENT)
Age: 63
End: 2018-12-11

## 2018-12-11 LAB
BASOPHILS # BLD AUTO: 0 K/UL — SIGNIFICANT CHANGE UP (ref 0–0.2)
BASOPHILS NFR BLD AUTO: 1.1 % — SIGNIFICANT CHANGE UP (ref 0–2)
EOSINOPHIL # BLD AUTO: 0.1 K/UL — SIGNIFICANT CHANGE UP (ref 0–0.5)
EOSINOPHIL NFR BLD AUTO: 2.2 % — SIGNIFICANT CHANGE UP (ref 0–6)
HCT VFR BLD CALC: 35.8 % — LOW (ref 39–50)
HGB BLD-MCNC: 11.8 G/DL — LOW (ref 13–17)
LYMPHOCYTES # BLD AUTO: 0.3 K/UL — LOW (ref 1–3.3)
LYMPHOCYTES # BLD AUTO: 7.5 % — LOW (ref 13–44)
MCHC RBC-ENTMCNC: 31.9 PG — SIGNIFICANT CHANGE UP (ref 27–34)
MCHC RBC-ENTMCNC: 33 GM/DL — SIGNIFICANT CHANGE UP (ref 32–36)
MCV RBC AUTO: 96.5 FL — SIGNIFICANT CHANGE UP (ref 80–100)
MONOCYTES # BLD AUTO: 0.5 K/UL — SIGNIFICANT CHANGE UP (ref 0–0.9)
MONOCYTES NFR BLD AUTO: 12 % — SIGNIFICANT CHANGE UP (ref 2–14)
NEUTROPHILS # BLD AUTO: 2.9 K/UL — SIGNIFICANT CHANGE UP (ref 1.8–7.4)
NEUTROPHILS NFR BLD AUTO: 77.2 % — HIGH (ref 43–77)
PLATELET # BLD AUTO: 213 K/UL — SIGNIFICANT CHANGE UP (ref 150–400)
RBC # BLD: 3.71 M/UL — LOW (ref 4.2–5.8)
RBC # FLD: 13.9 % — SIGNIFICANT CHANGE UP (ref 10.3–14.5)
WBC # BLD: 3.8 K/UL — SIGNIFICANT CHANGE UP (ref 3.8–10.5)
WBC # FLD AUTO: 3.8 K/UL — SIGNIFICANT CHANGE UP (ref 3.8–10.5)

## 2018-12-13 ENCOUNTER — APPOINTMENT (OUTPATIENT)
Age: 63
End: 2018-12-13

## 2018-12-13 ENCOUNTER — APPOINTMENT (OUTPATIENT)
Dept: HEMATOLOGY ONCOLOGY | Facility: CLINIC | Age: 63
End: 2018-12-13
Payer: COMMERCIAL

## 2018-12-13 VITALS
BODY MASS INDEX: 23.25 KG/M2 | WEIGHT: 139.55 LBS | DIASTOLIC BLOOD PRESSURE: 71 MMHG | HEIGHT: 65 IN | TEMPERATURE: 98.1 F | SYSTOLIC BLOOD PRESSURE: 118 MMHG | HEART RATE: 72 BPM

## 2018-12-13 DIAGNOSIS — R10.9 UNSPECIFIED ABDOMINAL PAIN: ICD-10-CM

## 2018-12-13 PROCEDURE — 99214 OFFICE O/P EST MOD 30 MIN: CPT

## 2018-12-13 NOTE — PHYSICAL EXAM
[Ambulatory and capable of all self care but unable to carry out any work activities] : Status 2- Ambulatory and capable of all self care but unable to carry out any work activities. Up and about more than 50% of waking hours [Normal] : affect appropriate [de-identified] : Interactive, tired appearing [de-identified] : negative cervical supra/infraclavicular adenopathy [de-identified] : clear b/l [de-identified] : S1/S2 [de-identified] : bilateral pedal edema [de-identified] : without spinal or cva tenderness.

## 2018-12-13 NOTE — ASSESSMENT
[FreeTextEntry1] : 61 y/o gentleman with Factor V Leiden mutation and stage IIB pancreatic cancer (T3N1) with positive CBD margin with peripancreatic soft tissue invasion, s/p Whipple on 11/30/17.  S/p 7 cycles of gemcitabine + Xeloda.  Post chemotherapy scans  7/2/18 CT scan with local recurrence. Completed chemoradiation with Xeloda from 7/24/18 - 8/30/18.  Foundation One testing with no actionable mutations, MSI-stable. \par \par 9/20/18 restaging CT scans with POD in upper abdomen and possible colitis.   Started liposomal irinotecan 70 mg/m2 w/ Leucovorin 400 mg/m2 and continuous infusion 5 FU 2400 mg/m2 over 46 hrs on 10/16/18.\par \par Doing reasonably well on chemotherapy with improved energy and appetite.  Continues to have abdominal cramping which has been present since his surgery.  Only having 2 diarrhea episodes per day. \par \par Plan:\par 1. Continue liposomal irinotecan/Leukovorin/5FU - C5 today\par 2. Diarrhea chronic - 2 episodes per day, can take Imodium if increase in frequency of diarrhea\par 3. Abdominal cramping: On fentanyl, and is NOT taking perocet.  Continue dicyclomine.  Can consider trial of hycosamine, \par 4. B/l leg swelling / non tender- Rest and elevate feet\par 5. Repeat CT scans after C6 , beginning of January\par 6. Hypokalemia - start KCL 40 mEq daily\par 7. RTO 4 weeks\par

## 2018-12-13 NOTE — ADDENDUM
[FreeTextEntry1] : I, Rita Pederson, acted solely as a scribe for Dr. Mervat Vela on this date 12/13/18.

## 2018-12-13 NOTE — HISTORY OF PRESENT ILLNESS
[Disease: _____________________] : Disease: [unfilled] [T: ___] : T[unfilled] [N: ___] : N[unfilled] [AJCC Stage: ____] : AJCC Stage: [unfilled] [de-identified] : Mr. Galaviz is a 62 year old gentleman diagnosed with pancreatic cancer in October 2017 after initially presenting with painless jaundice. EUS and ERCP on 10/31/17 revealed a 2.2 cm pancreatic head mass, with no apparent vessel involvement or lymphadenopathy. The CBD was stented. Final pathology was consistent with adenocarcinoma. EUS and ERCP on 10/31/17, which revealed a 2.2 cm pancreatic head mass, with no apparent vessel involvement or lymphadenopathy. The CBD was stented. Final pathology was consistent with adenocarcinoma. S/p Whipple, cholecystectomy, enterectomy x 2 and removal of CBD stent on 11/30/17. Final pathology was 3.1 cm pancreatic adenocarcinoma invading the nadira pancreatic tissue and CBD, and involving 2/26 lymph nodes (T3N1). The CBD resection margin was positive for adenocarcinoma with extensive nadira neural invasion. Mediport was placed on 1/17/18. \par - CT C/A/P 1/23/18: IMPRESSION: \par   No evidence of recurrent or metastatic disease in the chest, abdomen, or pelvis. \par   Stable postsurgical anatomy status post Whipple procedure. No evidence of bowel or gastric outlet obstruction. Pancreatic duct stent in place without \par   pancreatic duct or biliary dilatation. \par   2 tiny foci of extraluminal air in the right upper quadrant along a prior MUNA drain tract. This is likely secondary to recent drain removal. No drainable fluid collections. \par   Small amount of nonocclusive thrombus at the tip of the port catheter within the right brachiocephalic vein. \par \par Treatment:\par 2/7/18 - 6/13/18 adjuvant chemotherapy with gemcitabine + capecitabine (7 cycles)\par 4/12/18 CT C/A/P: no evidence of recurrent or metastatic disease.\par 7/2/18 CT a/p  : new soft tissue inferior to proximal superior mesenteric artery and to right of celiac axis in patient s/p whipple, suspicious for recurrent disease. \par 7/24/18-8/30/18  chemo-radiation with xeloda [de-identified] : Final Diagnosis\par 1. Lymph node at hepatic artery, excision:- One lymph node, negative for carcinoma, (0/1)\par 2. Periportal lymph node, excision:- One lymph node, negative for carcinoma, (0/1)\par 3. Gallbladder, cholecystectomy:- Chronic cholecystitis, negative for carcinoma\par - One lymph node, negative for carcinoma, (0/1)\par 4. Periportal lymph node, #2, excision:- One lymph node, negative for carcinoma, (0/1)\par 5. Pancreas, stomach and duodenum, pancreaticoduodenectomy(Whipple procedure):\par - Invasive adenocarcinoma of pancreas, moderately to poorly\par differentiated,pancreatobiliary type\par - Tumor invading into peripancreatic soft tissue and common bile duct\par - Comment bile duct resection margin, positive for adenocarcinoma, with\par extensive perineural invasion only, see comment- Pancreatic neck resection margin, negative for carcinoma\par - 18 lymph nodes, negative for carcinoma, (0/18)\par - AJCC (7th Ed) staging: pT3N1(2/26), see synoptic report,\par see Part 7 and Part 9\par 6. Portion of pancreas at portal vein #1, excision:- Atrophic pancreatitis, negative for carcinoma\par 7. Portion of pancreas at portal vein #2, excision:- Positive for adenocarcinoma, see comment\par 8. Portion of stent, removal:- Stent, gross examination only\par 9. Uncinate at superior mesenteric artery #1, excision:- Metastatic pancreatic adenocarcinoma present in 2 of 2 lymph nodes, (2/2)\par 10. Uncinate at superior mesenteric artery #2, excision:- Two lymph nodes, negative for carcinoma, (0/2)\par 11. Stomach and bowel at gastrojejunostomy site, excision:- Gastric and small bowel tissue, negative for carcinoma\par  [de-identified] : Returns for follow up. Today is C5 liposomal irinotecan / leukovorin / 5FU.\par He has general body aches, chills and rhinorrhea after treatment. He continues to have abdominal cramping and mild diarrhea. \par His appetite continues to improve, he is eating more and enjoying it. His energy is better. No SOB.\par Waxing and waning bilateral pedal edema x1 week. He denies any pain. He has recently moved and was walking around a lot.

## 2018-12-16 ENCOUNTER — OUTPATIENT (OUTPATIENT)
Dept: OUTPATIENT SERVICES | Facility: HOSPITAL | Age: 63
LOS: 1 days | Discharge: HOME | End: 2018-12-16

## 2018-12-16 DIAGNOSIS — Z95.828 PRESENCE OF OTHER VASCULAR IMPLANTS AND GRAFTS: Chronic | ICD-10-CM

## 2018-12-16 DIAGNOSIS — Z98.890 OTHER SPECIFIED POSTPROCEDURAL STATES: Chronic | ICD-10-CM

## 2018-12-16 DIAGNOSIS — C25.9 MALIGNANT NEOPLASM OF PANCREAS, UNSPECIFIED: ICD-10-CM

## 2018-12-16 DIAGNOSIS — D68.51 ACTIVATED PROTEIN C RESISTANCE: ICD-10-CM

## 2018-12-16 DIAGNOSIS — K90.81 WHIPPLE'S DISEASE: Chronic | ICD-10-CM

## 2018-12-26 ENCOUNTER — APPOINTMENT (OUTPATIENT)
Age: 63
End: 2018-12-26

## 2018-12-26 ENCOUNTER — RESULT REVIEW (OUTPATIENT)
Age: 63
End: 2018-12-26

## 2018-12-26 ENCOUNTER — APPOINTMENT (OUTPATIENT)
Dept: HEMATOLOGY ONCOLOGY | Facility: CLINIC | Age: 63
End: 2018-12-26
Payer: COMMERCIAL

## 2018-12-26 ENCOUNTER — LABORATORY RESULT (OUTPATIENT)
Age: 63
End: 2018-12-26

## 2018-12-26 LAB
BASOPHILS # BLD AUTO: 0.1 K/UL — SIGNIFICANT CHANGE UP (ref 0–0.2)
EOSINOPHIL # BLD AUTO: 0.2 K/UL — SIGNIFICANT CHANGE UP (ref 0–0.5)
EOSINOPHIL NFR BLD AUTO: 2 % — SIGNIFICANT CHANGE UP (ref 0–6)
HCT VFR BLD CALC: 34.6 % — LOW (ref 39–50)
HGB BLD-MCNC: 11.5 G/DL — LOW (ref 13–17)
LYMPHOCYTES # BLD AUTO: 0.3 K/UL — LOW (ref 1–3.3)
LYMPHOCYTES # BLD AUTO: 13 % — SIGNIFICANT CHANGE UP (ref 13–44)
MANUAL DIF COMMENT BLD-IMP: SIGNIFICANT CHANGE UP
MCHC RBC-ENTMCNC: 32.2 PG — SIGNIFICANT CHANGE UP (ref 27–34)
MCHC RBC-ENTMCNC: 33.1 GM/DL — SIGNIFICANT CHANGE UP (ref 32–36)
MCV RBC AUTO: 97.4 FL — SIGNIFICANT CHANGE UP (ref 80–100)
MONOCYTES # BLD AUTO: 0.6 K/UL — SIGNIFICANT CHANGE UP (ref 0–0.9)
MONOCYTES NFR BLD AUTO: 15 % — HIGH (ref 2–14)
NEUTROPHILS # BLD AUTO: 2.3 K/UL — SIGNIFICANT CHANGE UP (ref 1.8–7.4)
NEUTROPHILS NFR BLD AUTO: 69 % — SIGNIFICANT CHANGE UP (ref 43–77)
PLAT MORPH BLD: ABNORMAL
PLATELET # BLD AUTO: 370 K/UL — SIGNIFICANT CHANGE UP (ref 150–400)
RBC # BLD: 3.55 M/UL — LOW (ref 4.2–5.8)
RBC # FLD: 14.1 % — SIGNIFICANT CHANGE UP (ref 10.3–14.5)
RBC BLD AUTO: SIGNIFICANT CHANGE UP
VARIANT LYMPHS # BLD: 1 % — SIGNIFICANT CHANGE UP (ref 0–6)
WBC # BLD: 3.4 K/UL — LOW (ref 3.8–10.5)
WBC # FLD AUTO: 3.4 K/UL — LOW (ref 3.8–10.5)

## 2018-12-26 PROCEDURE — 99214 OFFICE O/P EST MOD 30 MIN: CPT

## 2018-12-26 RX ORDER — DRONABINOL 2.5 MG/1
2.5 CAPSULE ORAL TWICE DAILY
Qty: 30 | Refills: 0 | Status: DISCONTINUED | COMMUNITY
Start: 2018-07-26 | End: 2018-12-26

## 2018-12-27 DIAGNOSIS — Z51.11 ENCOUNTER FOR ANTINEOPLASTIC CHEMOTHERAPY: ICD-10-CM

## 2018-12-27 DIAGNOSIS — R11.2 NAUSEA WITH VOMITING, UNSPECIFIED: ICD-10-CM

## 2018-12-27 NOTE — ASSESSMENT
[FreeTextEntry1] : 62 y/o gentleman with Factor V Leiden mutation and stage IIB pancreatic cancer (T3N1) with positive CBD margin with peripancreatic soft tissue invasion, s/p Whipple on 11/30/17.  S/p 7 cycles of gemcitabine + Xeloda.  Post chemotherapy scans  7/2/18 CT scan with local recurrence. Completed chemoradiation with Xeloda from 7/24/18 - 8/30/18.  Foundation One testing with no actionable mutations, MSI-stable. \par \par 9/20/18 restaging CT scans with POD in upper abdomen and possible colitis. Started liposomal irinotecan 70 mg/m2 w/ Leucovorin 400 mg/m2 and continuous infusion 5 FU 2400 mg/m2 over 46 hrs every 2 weeks on 10/16/18.\par \par Improved appetite, + fatigue. Only having 2 diarrhea episodes per day. \par \par Plan:\par 1. Continue liposomal irinotecan/Leukovorin/5FU - C6 today\par 2. Diarrhea chronic - 2 episodes per day, can take Imodium if increase in frequency of diarrhea\par 3. Abdominal cramping: on fentanyl.  Continue dicyclomine.  Can consider trial of hycosamine, \par 4. B/l leg swelling / non tender- Rest and elevate feet\par 5. Murmur is slightly louder, no thrill -> grade III. Advised to contact his cardiologist to manage his BP meds (BP has been lower, since losing wgt), bilateral LE edema, and to evaluate murmur. \par 6. Potassium: pending CMP results.\par 7. CT scans after C6 , beginning of January\par 8. Mucositis: grade 1, no white patches, no ulceration.\par 9. RTO post CT scans

## 2018-12-27 NOTE — HISTORY OF PRESENT ILLNESS
[Disease: _____________________] : Disease: [unfilled] [T: ___] : T[unfilled] [N: ___] : N[unfilled] [AJCC Stage: ____] : AJCC Stage: [unfilled] [de-identified] : Mr. Galaviz was diagnosed with pancreatic cancer in October 2017, at age 62 year old, after initially presenting with painless jaundice. EUS and ERCP on 10/31/17 revealed a 2.2 cm pancreatic head mass, with no apparent vessel involvement or lymphadenopathy. The CBD was stented. Final pathology was consistent with adenocarcinoma. EUS and ERCP on 10/31/17, which revealed a 2.2 cm pancreatic head mass, with no apparent vessel involvement or lymphadenopathy. The CBD was stented. Final pathology was consistent with adenocarcinoma. S/p Whipple, cholecystectomy, enterectomy x 2 and removal of CBD stent on 11/30/17. Final pathology was 3.1 cm pancreatic adenocarcinoma invading the nadira pancreatic tissue and CBD, and involving 2/26 lymph nodes (T3N1). The CBD resection margin was positive for adenocarcinoma with extensive nadira neural invasion. Mediport was placed on 1/17/18. \par - CT C/A/P 1/23/18: IMPRESSION: \par   No evidence of recurrent or metastatic disease in the chest, abdomen, or pelvis. \par   Stable postsurgical anatomy status post Whipple procedure. No evidence of bowel or gastric outlet obstruction. Pancreatic duct stent in place without \par   pancreatic duct or biliary dilatation. \par   2 tiny foci of extraluminal air in the right upper quadrant along a prior MUNA drain tract. This is likely secondary to recent drain removal. No drainable fluid collections. \par   Small amount of nonocclusive thrombus at the tip of the port catheter within the right brachiocephalic vein. \par \par Treatment:\par 2/7/18 - 6/13/18 adjuvant chemotherapy with gemcitabine + capecitabine (7 cycles)\par 4/12/18 CT C/A/P: no evidence of recurrent or metastatic disease.\par 7/2/18 CT a/p  : new soft tissue inferior to proximal superior mesenteric artery and to right of celiac axis in patient s/p whipple, suspicious for recurrent disease. \par 7/24/18-8/30/18  chemo-radiation with xeloda\par 9/20/18 CT A/P progression of disease in upper abdomen.\par 10/16/18 tx changed to liposomal irinotecan (70 mg/m2) w/ leucovorin 400 mg/m2 and 5FU 2400 mg/m2 over 46 hrs) every 2 weeks.\par \par  [de-identified] : Final Diagnosis\par 1. Lymph node at hepatic artery, excision:- One lymph node, negative for carcinoma, (0/1)\par 2. Periportal lymph node, excision:- One lymph node, negative for carcinoma, (0/1)\par 3. Gallbladder, cholecystectomy:- Chronic cholecystitis, negative for carcinoma\par - One lymph node, negative for carcinoma, (0/1)\par 4. Periportal lymph node, #2, excision:- One lymph node, negative for carcinoma, (0/1)\par 5. Pancreas, stomach and duodenum, pancreaticoduodenectomy(Whipple procedure):\par - Invasive adenocarcinoma of pancreas, moderately to poorly\par differentiated,pancreatobiliary type\par - Tumor invading into peripancreatic soft tissue and common bile duct\par - Comment bile duct resection margin, positive for adenocarcinoma, with\par extensive perineural invasion only, see comment- Pancreatic neck resection margin, negative for carcinoma\par - 18 lymph nodes, negative for carcinoma, (0/18)\par - AJCC (7th Ed) staging: pT3N1(2/26), see synoptic report,\par see Part 7 and Part 9\par 6. Portion of pancreas at portal vein #1, excision:- Atrophic pancreatitis, negative for carcinoma\par 7. Portion of pancreas at portal vein #2, excision:- Positive for adenocarcinoma, see comment\par 8. Portion of stent, removal:- Stent, gross examination only\par 9. Uncinate at superior mesenteric artery #1, excision:- Metastatic pancreatic adenocarcinoma present in 2 of 2 lymph nodes, (2/2)\par 10. Uncinate at superior mesenteric artery #2, excision:- Two lymph nodes, negative for carcinoma, (0/2)\par 11. Stomach and bowel at gastrojejunostomy site, excision:- Gastric and small bowel tissue, negative for carcinoma\par  [de-identified] : Here for C 6 chemotherapy treatment. Had a great Nadeem w/ family, feels appetite picked up. Denies fevers, though notes, chills the week after chemo. Has no SOB, CP, had c/o mouth soreness last week. Thought he had 2 white "spots", inner lower lip and gums sore. treated w/ salt rinses and Sensodyne toothpaste, and improved. Appetite is improved, no change in bowel status since last cycle. Post chemo has maximum diarrhea of 2 per day. LBM today and was formed. No c/o pain/burning w/ urination. Fatigued much of the time. Just moved into a new house. LE edema improves w/ elevation, no calve tenderness. Completed KCL rx. He held his BP meds for a week as he felt woozy upon rising to a standing position at times. Remainder of ROS is negative.

## 2018-12-27 NOTE — PHYSICAL EXAM
[Ambulatory and capable of all self care but unable to carry out any work activities] : Status 2- Ambulatory and capable of all self care but unable to carry out any work activities. Up and about more than 50% of waking hours [Normal] : affect appropriate [de-identified] : Interactive, tired appearing [de-identified] : negative cervical supra/infraclavicular adenopathy [de-identified] : clear b/l [de-identified] : S1/S2 grade 3 murmor heard best over L sternal border. [de-identified] : bilateral pedal edema [de-identified] : without spinal or cva tenderness.

## 2018-12-28 ENCOUNTER — APPOINTMENT (OUTPATIENT)
Age: 63
End: 2018-12-28

## 2019-01-01 ENCOUNTER — FORM ENCOUNTER (OUTPATIENT)
Age: 64
End: 2019-01-01

## 2019-01-02 ENCOUNTER — APPOINTMENT (OUTPATIENT)
Dept: CT IMAGING | Facility: CLINIC | Age: 64
End: 2019-01-02
Payer: COMMERCIAL

## 2019-01-02 ENCOUNTER — OUTPATIENT (OUTPATIENT)
Dept: OUTPATIENT SERVICES | Facility: HOSPITAL | Age: 64
LOS: 1 days | End: 2019-01-02
Payer: COMMERCIAL

## 2019-01-02 DIAGNOSIS — Z98.890 OTHER SPECIFIED POSTPROCEDURAL STATES: Chronic | ICD-10-CM

## 2019-01-02 DIAGNOSIS — C25.9 MALIGNANT NEOPLASM OF PANCREAS, UNSPECIFIED: ICD-10-CM

## 2019-01-02 DIAGNOSIS — Z95.828 PRESENCE OF OTHER VASCULAR IMPLANTS AND GRAFTS: Chronic | ICD-10-CM

## 2019-01-02 DIAGNOSIS — K90.81 WHIPPLE'S DISEASE: Chronic | ICD-10-CM

## 2019-01-02 PROCEDURE — 71260 CT THORAX DX C+: CPT | Mod: 26

## 2019-01-02 PROCEDURE — 74177 CT ABD & PELVIS W/CONTRAST: CPT | Mod: 26

## 2019-01-02 PROCEDURE — 74177 CT ABD & PELVIS W/CONTRAST: CPT

## 2019-01-02 PROCEDURE — 71260 CT THORAX DX C+: CPT

## 2019-01-08 ENCOUNTER — RESULT REVIEW (OUTPATIENT)
Age: 64
End: 2019-01-08

## 2019-01-08 ENCOUNTER — APPOINTMENT (OUTPATIENT)
Dept: HEMATOLOGY ONCOLOGY | Facility: CLINIC | Age: 64
End: 2019-01-08
Payer: COMMERCIAL

## 2019-01-08 ENCOUNTER — APPOINTMENT (OUTPATIENT)
Age: 64
End: 2019-01-08

## 2019-01-08 ENCOUNTER — LABORATORY RESULT (OUTPATIENT)
Age: 64
End: 2019-01-08

## 2019-01-08 VITALS
WEIGHT: 137 LBS | SYSTOLIC BLOOD PRESSURE: 104 MMHG | TEMPERATURE: 98.1 F | OXYGEN SATURATION: 100 % | HEIGHT: 65 IN | DIASTOLIC BLOOD PRESSURE: 70 MMHG | BODY MASS INDEX: 22.82 KG/M2 | HEART RATE: 83 BPM

## 2019-01-08 LAB
ANISOCYTOSIS BLD QL: SLIGHT — SIGNIFICANT CHANGE UP
BASOPHILS # BLD AUTO: 0 K/UL — SIGNIFICANT CHANGE UP (ref 0–0.2)
BASOPHILS NFR BLD AUTO: 2 % — SIGNIFICANT CHANGE UP (ref 0–2)
EOSINOPHIL # BLD AUTO: 0.1 K/UL — SIGNIFICANT CHANGE UP (ref 0–0.5)
EOSINOPHIL NFR BLD AUTO: 3 % — SIGNIFICANT CHANGE UP (ref 0–6)
GIANT PLATELETS BLD QL SMEAR: PRESENT — SIGNIFICANT CHANGE UP
HCT VFR BLD CALC: 34.5 % — LOW (ref 39–50)
HGB BLD-MCNC: 11.3 G/DL — LOW (ref 13–17)
LG PLATELETS BLD QL AUTO: SLIGHT — SIGNIFICANT CHANGE UP
LYMPHOCYTES # BLD AUTO: 0.3 K/UL — LOW (ref 1–3.3)
LYMPHOCYTES # BLD AUTO: 10 % — LOW (ref 13–44)
MACROCYTES BLD QL: SLIGHT — SIGNIFICANT CHANGE UP
MCHC RBC-ENTMCNC: 31.7 PG — SIGNIFICANT CHANGE UP (ref 27–34)
MCHC RBC-ENTMCNC: 32.7 GM/DL — SIGNIFICANT CHANGE UP (ref 32–36)
MCV RBC AUTO: 97 FL — SIGNIFICANT CHANGE UP (ref 80–100)
MICROCYTES BLD QL: SLIGHT — SIGNIFICANT CHANGE UP
MONOCYTES # BLD AUTO: 0.6 K/UL — SIGNIFICANT CHANGE UP (ref 0–0.9)
MONOCYTES NFR BLD AUTO: 8 % — SIGNIFICANT CHANGE UP (ref 2–14)
MYELOCYTES NFR BLD: 1 % — HIGH (ref 0–0)
NEUTROPHILS # BLD AUTO: 3 K/UL — SIGNIFICANT CHANGE UP (ref 1.8–7.4)
NEUTROPHILS NFR BLD AUTO: 76 % — SIGNIFICANT CHANGE UP (ref 43–77)
PLAT MORPH BLD: NORMAL — SIGNIFICANT CHANGE UP
PLATELET # BLD AUTO: 235 K/UL — SIGNIFICANT CHANGE UP (ref 150–400)
POIKILOCYTOSIS BLD QL AUTO: SLIGHT — SIGNIFICANT CHANGE UP
RBC # BLD: 3.56 M/UL — LOW (ref 4.2–5.8)
RBC # FLD: 14.4 % — SIGNIFICANT CHANGE UP (ref 10.3–14.5)
RBC BLD AUTO: SIGNIFICANT CHANGE UP
WBC # BLD: 4.1 K/UL — SIGNIFICANT CHANGE UP (ref 3.8–10.5)
WBC # FLD AUTO: 4.1 K/UL — SIGNIFICANT CHANGE UP (ref 3.8–10.5)

## 2019-01-08 PROCEDURE — 99214 OFFICE O/P EST MOD 30 MIN: CPT

## 2019-01-08 NOTE — ASSESSMENT
[FreeTextEntry1] : 62 y/o gentleman with Factor V Leiden mutation and stage IIB pancreatic cancer (T3N1) with positive CBD margin with peripancreatic soft tissue invasion, s/p Whipple on 11/30/17.  S/p 7 cycles of gemcitabine + Xeloda.  Post chemotherapy scans  7/2/18 CT scan with local recurrence. Completed chemoradiation with Xeloda from 7/24/18 - 8/30/18.  Foundation One testing with no actionable mutations, MSI-stable. \par \par 9/20/18 restaging CT scans with POD in upper abdomen and possible colitis. Started liposomal irinotecan 70 mg/m2 w/ Leucovorin 400 mg/m2 and continuous infusion 5 FU 2400 mg/m2 over 46 hrs every 2 weeks on 10/16/18.\par \par S/p 6 cycles of chemotherapy. 1/2/19 scans with partial response, no evidence of metastatic disease, + colitis. \par \par \par Plan:\par 1. Continue liposomal irinotecan/Leukovorin/5FU - C7 today\par 2. Diarrhea chronic - 2 episodes per day, taking immodium PRN.  Follow up with Dr. Evans re: colitis, and role for colonoscopy. He was previously treated with abx in 9/2018 prior to restarting chemotherapy. \par 4. B/l leg swelling / non tender- due to hypoalbuminemia, s/p dopplers with PCP on 1/7/9 which were negative\par 5. RTO 1 month

## 2019-01-08 NOTE — ADDENDUM
[FreeTextEntry1] : I, Rita Pederson, acted solely as a scribe for Dr. Mervat Vela on this date 1/8/18.

## 2019-01-08 NOTE — HISTORY OF PRESENT ILLNESS
[Disease: _____________________] : Disease: [unfilled] [T: ___] : T[unfilled] [N: ___] : N[unfilled] [AJCC Stage: ____] : AJCC Stage: [unfilled] [de-identified] : Mr. Galaviz was diagnosed with pancreatic cancer in October 2017, at age 62 year old, after initially presenting with painless jaundice. EUS and ERCP on 10/31/17 revealed a 2.2 cm pancreatic head mass, with no apparent vessel involvement or lymphadenopathy. The CBD was stented. Final pathology was consistent with adenocarcinoma. EUS and ERCP on 10/31/17, which revealed a 2.2 cm pancreatic head mass, with no apparent vessel involvement or lymphadenopathy. The CBD was stented. Final pathology was consistent with adenocarcinoma. S/p Whipple, cholecystectomy, enterectomy x 2 and removal of CBD stent on 11/30/17. Final pathology was 3.1 cm pancreatic adenocarcinoma invading the nadira pancreatic tissue and CBD, and involving 2/26 lymph nodes (T3N1). The CBD resection margin was positive for adenocarcinoma with extensive nadiar neural invasion. Mediport was placed on 1/17/18. \par - CT C/A/P 1/23/18: IMPRESSION: \par   No evidence of recurrent or metastatic disease in the chest, abdomen, or pelvis. \par   Stable postsurgical anatomy status post Whipple procedure. No evidence of bowel or gastric outlet obstruction. Pancreatic duct stent in place without \par   pancreatic duct or biliary dilatation. \par   2 tiny foci of extraluminal air in the right upper quadrant along a prior MUNA drain tract. This is likely secondary to recent drain removal. No drainable fluid collections. \par   Small amount of nonocclusive thrombus at the tip of the port catheter within the right brachiocephalic vein. \par \par Treatment:\par 2/7/18 - 6/13/18 adjuvant chemotherapy with gemcitabine + capecitabine (7 cycles)\par 4/12/18 CT C/A/P: no evidence of recurrent or metastatic disease.\par 7/2/18 CT a/p  : new soft tissue inferior to proximal superior mesenteric artery and to right of celiac axis in patient s/p whipple, suspicious for recurrent disease. \par 7/24/18-8/30/18  chemo-radiation with xeloda\par 9/20/18 CT A/P progression of disease in upper abdomen.\par 10/16/18 tx changed to liposomal irinotecan (70 mg/m2) w/ leucovorin 400 mg/m2 and 5FU 2400 mg/m2 over 46 hrs) every 2 weeks.\par \par  [de-identified] : Final Diagnosis\par 1. Lymph node at hepatic artery, excision:- One lymph node, negative for carcinoma, (0/1)\par 2. Periportal lymph node, excision:- One lymph node, negative for carcinoma, (0/1)\par 3. Gallbladder, cholecystectomy:- Chronic cholecystitis, negative for carcinoma\par - One lymph node, negative for carcinoma, (0/1)\par 4. Periportal lymph node, #2, excision:- One lymph node, negative for carcinoma, (0/1)\par 5. Pancreas, stomach and duodenum, pancreaticoduodenectomy(Whipple procedure):\par - Invasive adenocarcinoma of pancreas, moderately to poorly\par differentiated,pancreatobiliary type\par - Tumor invading into peripancreatic soft tissue and common bile duct\par - Comment bile duct resection margin, positive for adenocarcinoma, with\par extensive perineural invasion only, see comment- Pancreatic neck resection margin, negative for carcinoma\par - 18 lymph nodes, negative for carcinoma, (0/18)\par - AJCC (7th Ed) staging: pT3N1(2/26), see synoptic report,\par see Part 7 and Part 9\par 6. Portion of pancreas at portal vein #1, excision:- Atrophic pancreatitis, negative for carcinoma\par 7. Portion of pancreas at portal vein #2, excision:- Positive for adenocarcinoma, see comment\par 8. Portion of stent, removal:- Stent, gross examination only\par 9. Uncinate at superior mesenteric artery #1, excision:- Metastatic pancreatic adenocarcinoma present in 2 of 2 lymph nodes, (2/2)\par 10. Uncinate at superior mesenteric artery #2, excision:- Two lymph nodes, negative for carcinoma, (0/2)\par 11. Stomach and bowel at gastrojejunostomy site, excision:- Gastric and small bowel tissue, negative for carcinoma\par  [de-identified] : Returns for follow up.\par Today is C7 chemotherapy treatment. \par Denies fevers.\par Appetite continues to improve. He has about 1-2 episodes of diarrhea a day. He has urges but is not always able to have a bowel movement. The diarrhea increases 3-4 days after chemotherapy treatment. No c/o pain/burning w/ urination. Denies any abdominal pain. He feels fatigued 3-4 days after treatment, but then energy levels improve. \par Patient had Doppler of LE for persistent pedal edema and PCP told him the results came back normal.

## 2019-01-08 NOTE — PHYSICAL EXAM
[Ambulatory and capable of all self care but unable to carry out any work activities] : Status 2- Ambulatory and capable of all self care but unable to carry out any work activities. Up and about more than 50% of waking hours [Normal] : affect appropriate [de-identified] : Interactive, tired appearing [de-identified] : negative cervical supra/infraclavicular adenopathy [de-identified] : clear b/l [de-identified] : S1/S2 grade 3 murmur heard best over L sternal border. [de-identified] : bilateral pedal edema [de-identified] : without spinal or cva tenderness.

## 2019-01-08 NOTE — RESULTS/DATA
[FreeTextEntry1] : 1/2/19 CT CAP: \par 1. No evidence of metastatic disease in the thorax\par 2. Decrease in size of the soft tissue mass surrounding the proximal superior mesenteric artery (now 1.5 x 0.6 cm, previously 1.8 x 1.4 cm) and interposed between the celiac access and portal vein. In the region of the santos splenic confluence soft tissue measures 1.8 x 1.1 cm, previously 2.0 x 2.0 cm.\par 3. Colitis involving the sigmoid colon again noted with interval development of additional areas of colitis extending from ascending colon through the transverse colon. \par 4. Increase in pelvic  ascites

## 2019-01-09 ENCOUNTER — APPOINTMENT (OUTPATIENT)
Dept: RADIATION ONCOLOGY | Facility: CLINIC | Age: 64
End: 2019-01-09
Payer: COMMERCIAL

## 2019-01-09 VITALS
BODY MASS INDEX: 23.82 KG/M2 | RESPIRATION RATE: 16 BRPM | HEART RATE: 63 BPM | DIASTOLIC BLOOD PRESSURE: 61 MMHG | HEIGHT: 65 IN | SYSTOLIC BLOOD PRESSURE: 91 MMHG | OXYGEN SATURATION: 98 % | WEIGHT: 143 LBS

## 2019-01-09 PROCEDURE — 99213 OFFICE O/P EST LOW 20 MIN: CPT

## 2019-01-09 NOTE — LETTER CLOSING
[Sincerely yours,] : Sincerely yours, [FreeTextEntry3] : Jag Bhardwaj MD\par Physician in Chief\par Department of Radiation Medicine\par [facility] [place]\par [facility] [place]\par \par  of Radiation Medicine\par [facility]\par at  [facility]\par \par Radiation \par [facility]/\par [facility] [place]\par [address]\par [place], [place] [address]\par \par Tel: [phone]\par Fax: [phone]\par

## 2019-01-09 NOTE — REASON FOR VISIT
[Routine Follow-Up] : routine follow-up visit for [Other: ___] : [unfilled] [Spouse] : spouse [Family Member] : family member

## 2019-01-09 NOTE — HISTORY OF PRESENT ILLNESS
[FreeTextEntry1] : This 63 year-old presents for routine follow-up.  He completed radiation therapy to the pancreatic bed and lymph nodes for adenocarcinoma T3N1M0, stage IIIB.  He is s/p pancreaticoduodenectomy, as well as chemotherapy which continues.  Presents today with chemotherapy infusion pump.  Reports he is eating a variety of foods without bloating or cramping.  \par Reports diarrhea is now intermittent, alternating with formed stools.   Follows with Dr. Vela and Dr. Kelley.  CT scan of abdomen and pelvis 1/2/2019 reviewed.

## 2019-01-09 NOTE — VITALS
[Least Pain Intensity: 0/10] : 0/10 [Pain Description/Quality: ___] : Pain description/quality: [unfilled] [Pain Duration: ___] : Pain duration: [unfilled] [70: Cares for self; unalbe to carry on normal activity or do active work.] : 70: Cares for self; unable to carry on normal activity or do active work. [ECOG Performance Status: 2 - Ambulatory and capable of all self care but unable to carry out any work activities] : Performance Status: 2 - Ambulatory and capable of all self care but unable to carry out any work activities. Up and about more than 50% of waking hours [Maximal Pain Intensity: 1/10] : 1/10

## 2019-01-09 NOTE — DISEASE MANAGEMENT
[Pathological] : TNM Stage: p [IIB] : IIB [TTNM] : 3 [NTNM] : 1 [MTNM] : 0 [de-identified] : 5040cGy [de-identified] : abdomen

## 2019-01-09 NOTE — PHYSICAL EXAM
[General Appearance - In No Acute Distress] : in no acute distress [Normal] : oriented to person, place and time, the affect was normal, the mood was normal and not anxious [Thin] : thin [de-identified] : c [FreeTextEntry1] : deferred [de-identified] : deferred [de-identified] : minimal alopecia

## 2019-01-09 NOTE — REVIEW OF SYSTEMS
[Fatigue] : fatigue [Diarrhea] : diarrhea [Negative] : Allergic/Immunologic [Vomiting] : no vomiting [Constipation] : no constipation [FreeTextEntry7] : intermittent diarrhea, constipation  [FreeTextEntry8] : renal function diminished, BLE edema varies in degree

## 2019-01-10 ENCOUNTER — APPOINTMENT (OUTPATIENT)
Age: 64
End: 2019-01-10

## 2019-01-15 ENCOUNTER — OUTPATIENT (OUTPATIENT)
Dept: OUTPATIENT SERVICES | Facility: HOSPITAL | Age: 64
LOS: 1 days | Discharge: HOME | End: 2019-01-15

## 2019-01-15 ENCOUNTER — APPOINTMENT (OUTPATIENT)
Dept: GASTROENTEROLOGY | Facility: CLINIC | Age: 64
End: 2019-01-15
Payer: COMMERCIAL

## 2019-01-15 VITALS
HEIGHT: 65 IN | SYSTOLIC BLOOD PRESSURE: 107 MMHG | DIASTOLIC BLOOD PRESSURE: 71 MMHG | BODY MASS INDEX: 23.66 KG/M2 | WEIGHT: 142 LBS | HEART RATE: 70 BPM

## 2019-01-15 DIAGNOSIS — D68.51 ACTIVATED PROTEIN C RESISTANCE: ICD-10-CM

## 2019-01-15 DIAGNOSIS — Z98.890 OTHER SPECIFIED POSTPROCEDURAL STATES: Chronic | ICD-10-CM

## 2019-01-15 DIAGNOSIS — C25.9 MALIGNANT NEOPLASM OF PANCREAS, UNSPECIFIED: ICD-10-CM

## 2019-01-15 DIAGNOSIS — Z95.828 PRESENCE OF OTHER VASCULAR IMPLANTS AND GRAFTS: Chronic | ICD-10-CM

## 2019-01-15 DIAGNOSIS — K90.81 WHIPPLE'S DISEASE: Chronic | ICD-10-CM

## 2019-01-15 PROCEDURE — 99214 OFFICE O/P EST MOD 30 MIN: CPT

## 2019-01-15 RX ORDER — POLYETHYLENE GLYCOL 3350, SODIUM SULFATE, SODIUM CHLORIDE, POTASSIUM CHLORIDE, ASCORBIC ACID, SODIUM ASCORBATE 7.5-2.691G
100 KIT ORAL
Qty: 1 | Refills: 0 | Status: ACTIVE | COMMUNITY
Start: 2019-01-15 | End: 1900-01-01

## 2019-01-15 RX ORDER — DICYCLOMINE HYDROCHLORIDE 10 MG/1
10 CAPSULE ORAL 3 TIMES DAILY
Qty: 90 | Refills: 2 | Status: ACTIVE | COMMUNITY
Start: 2018-06-10 | End: 1900-01-01

## 2019-01-15 NOTE — PHYSICAL EXAM
[General Appearance - Alert] : alert [General Appearance - In No Acute Distress] : in no acute distress [Sclera] : the sclera and conjunctiva were normal [PERRL With Normal Accommodation] : pupils were equal in size, round, and reactive to light [Extraocular Movements] : extraocular movements were intact [Outer Ear] : the ears and nose were normal in appearance [Oropharynx] : the oropharynx was normal [Neck Appearance] : the appearance of the neck was normal [Neck Cervical Mass (___cm)] : no neck mass was observed [Jugular Venous Distention Increased] : there was no jugular-venous distention [Thyroid Diffuse Enlargement] : the thyroid was not enlarged [Thyroid Nodule] : there were no palpable thyroid nodules [Auscultation Breath Sounds / Voice Sounds] : lungs were clear to auscultation bilaterally [Heart Rate And Rhythm] : heart rate was normal and rhythm regular [Heart Sounds] : normal S1 and S2 [Heart Sounds Gallop] : no gallops [Murmurs] : no murmurs [Heart Sounds Pericardial Friction Rub] : no pericardial rub [Full Pulse] : the pedal pulses are present [Edema] : there was no peripheral edema [Bowel Sounds] : normal bowel sounds [Abdomen Soft] : soft [Abdomen Tenderness] : non-tender [Abdomen Mass (___ Cm)] : no abdominal mass palpated [FreeTextEntry1] : Laparotomy scar-well healed [Cervical Lymph Nodes Enlarged Posterior Bilaterally] : posterior cervical [Cervical Lymph Nodes Enlarged Anterior Bilaterally] : anterior cervical [Supraclavicular Lymph Nodes Enlarged Bilaterally] : supraclavicular [Axillary Lymph Nodes Enlarged Bilaterally] : axillary [Femoral Lymph Nodes Enlarged Bilaterally] : femoral [Inguinal Lymph Nodes Enlarged Bilaterally] : inguinal [No CVA Tenderness] : no ~M costovertebral angle tenderness [No Spinal Tenderness] : no spinal tenderness [Abnormal Walk] : normal gait [Nail Clubbing] : no clubbing  or cyanosis of the fingernails [Musculoskeletal - Swelling] : no joint swelling seen [Motor Tone] : muscle strength and tone were normal [Skin Color & Pigmentation] : normal skin color and pigmentation [Skin Turgor] : normal skin turgor [] : no rash [No Focal Deficits] : no focal deficits [Oriented To Time, Place, And Person] : oriented to person, place, and time [Impaired Insight] : insight and judgment were intact [Affect] : the affect was normal

## 2019-01-15 NOTE — HISTORY OF PRESENT ILLNESS
[de-identified] : The patient arrived for an office visit. He is accompanied by his wife and sister-in-law. He has known history of pancreatic head cancer status post Whipple surgery and now had been on chemotherapy for metastatic disease. He was initially evaluated for diarrhea and abdominal cramping and had undergone EGD which revealed bile reflux gastritis. He has been on dicyclomine and Creon. Lately have been diarrhea. He underwent a CT abdomen which revealed some evidence of colitis. His oncologist is concerned with the CT abdomen finding. The patient never had a colonoscopy done before. He is taking Imodium on an as-needed basis. He is also taking dicyclomine and Creon.His recent blood test have been fairly unremarkable except for mildly low potassium.

## 2019-01-15 NOTE — ASSESSMENT
[FreeTextEntry1] : We discussed the concern for colitis and abnormal thickening of the colon wall. I discussed the need of continuing dicyclomine and Creon. We discussed a colonoscopy procedure at length. The bowel preparation was discussed at length. Risks (including bleeding, pain, perforation, incomplete examination, splenic laceration, adverse reactions to medications, aspiration and death), benefits and alternatives were discussed. Patient is agreeable for the colonoscopy. The patient is medically optimized for the procedure. We will schedule the patient for the procedure. Bowel preparation was sent to the pharmacy.\par

## 2019-01-22 ENCOUNTER — APPOINTMENT (OUTPATIENT)
Age: 64
End: 2019-01-22

## 2019-01-23 ENCOUNTER — APPOINTMENT (OUTPATIENT)
Dept: GASTROENTEROLOGY | Facility: GI CENTER | Age: 64
End: 2019-01-23
Payer: COMMERCIAL

## 2019-01-23 ENCOUNTER — RESULT REVIEW (OUTPATIENT)
Age: 64
End: 2019-01-23

## 2019-01-23 ENCOUNTER — OUTPATIENT (OUTPATIENT)
Dept: OUTPATIENT SERVICES | Facility: HOSPITAL | Age: 64
LOS: 1 days | End: 2019-01-23
Payer: COMMERCIAL

## 2019-01-23 DIAGNOSIS — Z98.890 OTHER SPECIFIED POSTPROCEDURAL STATES: Chronic | ICD-10-CM

## 2019-01-23 DIAGNOSIS — K90.81 WHIPPLE'S DISEASE: Chronic | ICD-10-CM

## 2019-01-23 DIAGNOSIS — K52.9 NONINFECTIVE GASTROENTERITIS AND COLITIS, UNSPECIFIED: ICD-10-CM

## 2019-01-23 DIAGNOSIS — Z95.828 PRESENCE OF OTHER VASCULAR IMPLANTS AND GRAFTS: Chronic | ICD-10-CM

## 2019-01-23 PROCEDURE — 45380 COLONOSCOPY AND BIOPSY: CPT

## 2019-01-23 PROCEDURE — 88305 TISSUE EXAM BY PATHOLOGIST: CPT | Mod: 26

## 2019-01-23 PROCEDURE — 88305 TISSUE EXAM BY PATHOLOGIST: CPT

## 2019-01-23 NOTE — ASSESSMENT
[FreeTextEntry1] : IMPRESSION:\par Diverticulosis of colon\par Internal hemorrhoids\par \par RECOMMENDATIONS:\par Await pathology results\par Follow up in 1-2 months\par

## 2019-01-23 NOTE — PROCEDURE
[Colitis] : colitis [Procedure Explained] : The procedure was explained [Allergies Reviewed] : allergies reviewed. [Risks] : Risks [Benefits] : benefits [Alternatives] : alternatives [Consent Obtained] : written consent was obtained prior to the procedure and is detailed in the patient's record [Patient] : the patient [Bowel Prep Kit] : the patient took the appropriate bowel preparation kit as directed [Approved Diet Followed] : the patient avoided solid foods and adhered to the approved diet list for 24 hours prior to the procedure [Automated Blood Pressure Cuff] : automated blood pressure cuff [Cardiac Monitor] : cardiac monitor [Pulse Oximeter] : pulse oximeter [Left Lateral Decubitus] : The patient was positioned in the left lateral decubitus position [No Difficulty] : without difficulty [Insufflated] : insufflated [Retroflex View] : a retroflex view of the rectum was performed [Sent to Pathology] : was sent to pathology for analysis [Tolerated Well] : the patient tolerated the procedure well [Vital Signs Stable] : the vital signs were stable [No Complications] : There were no complications [With Biopsy] : with biopsy [2] : 2 [Sedation Clearance] : the patient was cleared for moderate sedation [Time started: ___] : Start Time:  [unfilled] [Prep Qualtiy: ___] : Prep Quality:  [unfilled] [Withdrawal Time: ___] : Withdrawal Time:  [unfilled] [Time Completed: ___] : Completion Time:  [unfilled] [Performed By: ___] : Performed by:  ELMER [Abnormal Rectum] : a normal rectum [External Hemorrhoids] : no external hemorrhoids [Slightly Enlarged Prostate] : a slightly enlarged prostate [Terminal Ileum via Ileocecal Valve] : and the terminal ileum was examined by entering the ileocecal valve [Single Pass Needed] : after a single pass [Patient Rotated Into Alternating Positions] : the patient was not rotated [Normal] : Normal [Diverticulosis] : diverticulosis [Hemorrhoids] : hemorrhoids [de-identified] : History of pancreatic cancer [de-identified] : Normal terminal ileum [de-identified] : s/p cold biopsy [de-identified] : s/p cold biopsy [de-identified] : s/p cold biopsy [de-identified] : Random colon

## 2019-01-23 NOTE — PROCEDURE
[Colitis] : colitis [Procedure Explained] : The procedure was explained [Allergies Reviewed] : allergies reviewed. [Risks] : Risks [Benefits] : benefits [Alternatives] : alternatives [Consent Obtained] : written consent was obtained prior to the procedure and is detailed in the patient's record [Patient] : the patient [Bowel Prep Kit] : the patient took the appropriate bowel preparation kit as directed [Approved Diet Followed] : the patient avoided solid foods and adhered to the approved diet list for 24 hours prior to the procedure [Automated Blood Pressure Cuff] : automated blood pressure cuff [Cardiac Monitor] : cardiac monitor [Pulse Oximeter] : pulse oximeter [Left Lateral Decubitus] : The patient was positioned in the left lateral decubitus position [No Difficulty] : without difficulty [Insufflated] : insufflated [Retroflex View] : a retroflex view of the rectum was performed [Sent to Pathology] : was sent to pathology for analysis [Tolerated Well] : the patient tolerated the procedure well [Vital Signs Stable] : the vital signs were stable [No Complications] : There were no complications [With Biopsy] : with biopsy [2] : 2 [Sedation Clearance] : the patient was cleared for moderate sedation [Time started: ___] : Start Time:  [unfilled] [Prep Qualtiy: ___] : Prep Quality:  [unfilled] [Withdrawal Time: ___] : Withdrawal Time:  [unfilled] [Time Completed: ___] : Completion Time:  [unfilled] [Performed By: ___] : Performed by:  ELMER [Abnormal Rectum] : a normal rectum [External Hemorrhoids] : no external hemorrhoids [Slightly Enlarged Prostate] : a slightly enlarged prostate [Terminal Ileum via Ileocecal Valve] : and the terminal ileum was examined by entering the ileocecal valve [Single Pass Needed] : after a single pass [Patient Rotated Into Alternating Positions] : the patient was not rotated [Normal] : Normal [Diverticulosis] : diverticulosis [Hemorrhoids] : hemorrhoids [de-identified] : History of pancreatic cancer [de-identified] : Normal terminal ileum [de-identified] : s/p cold biopsy [de-identified] : s/p cold biopsy [de-identified] : s/p cold biopsy [de-identified] : Random colon

## 2019-01-23 NOTE — REASON FOR VISIT
[Colonoscopy] : a colonoscopy [Procedure: _________] : a [unfilled] procedure visit [Family Member] : family member

## 2019-01-24 ENCOUNTER — APPOINTMENT (OUTPATIENT)
Age: 64
End: 2019-01-24

## 2019-01-26 ENCOUNTER — INPATIENT (INPATIENT)
Facility: HOSPITAL | Age: 64
LOS: 1 days | Discharge: HOME | DRG: 291 | End: 2019-01-28
Attending: HOSPITALIST | Admitting: INTERNAL MEDICINE
Payer: COMMERCIAL

## 2019-01-26 VITALS
SYSTOLIC BLOOD PRESSURE: 103 MMHG | TEMPERATURE: 98 F | RESPIRATION RATE: 18 BRPM | DIASTOLIC BLOOD PRESSURE: 65 MMHG | WEIGHT: 141.98 LBS | HEART RATE: 77 BPM | HEIGHT: 65 IN | OXYGEN SATURATION: 99 %

## 2019-01-26 DIAGNOSIS — Z98.890 OTHER SPECIFIED POSTPROCEDURAL STATES: Chronic | ICD-10-CM

## 2019-01-26 DIAGNOSIS — C25.9 MALIGNANT NEOPLASM OF PANCREAS, UNSPECIFIED: ICD-10-CM

## 2019-01-26 DIAGNOSIS — I10 ESSENTIAL (PRIMARY) HYPERTENSION: ICD-10-CM

## 2019-01-26 DIAGNOSIS — R60.0 LOCALIZED EDEMA: ICD-10-CM

## 2019-01-26 DIAGNOSIS — E11.9 TYPE 2 DIABETES MELLITUS WITHOUT COMPLICATIONS: ICD-10-CM

## 2019-01-26 DIAGNOSIS — K90.81 WHIPPLE'S DISEASE: Chronic | ICD-10-CM

## 2019-01-26 DIAGNOSIS — Z95.828 PRESENCE OF OTHER VASCULAR IMPLANTS AND GRAFTS: Chronic | ICD-10-CM

## 2019-01-26 DIAGNOSIS — E78.00 PURE HYPERCHOLESTEROLEMIA, UNSPECIFIED: ICD-10-CM

## 2019-01-26 DIAGNOSIS — R60.1 GENERALIZED EDEMA: ICD-10-CM

## 2019-01-26 LAB
ALBUMIN SERPL ELPH-MCNC: 3 G/DL — LOW (ref 3.3–5.2)
ALP SERPL-CCNC: 262 U/L — HIGH (ref 40–120)
ALT FLD-CCNC: 18 U/L — SIGNIFICANT CHANGE UP
ANION GAP SERPL CALC-SCNC: 7 MMOL/L — SIGNIFICANT CHANGE UP (ref 5–17)
ANISOCYTOSIS BLD QL: SLIGHT — SIGNIFICANT CHANGE UP
APPEARANCE UR: CLEAR — SIGNIFICANT CHANGE UP
AST SERPL-CCNC: 23 U/L — SIGNIFICANT CHANGE UP
BASOPHILS NFR BLD AUTO: 1 % — SIGNIFICANT CHANGE UP (ref 0–2)
BILIRUB SERPL-MCNC: 0.5 MG/DL — SIGNIFICANT CHANGE UP (ref 0.4–2)
BILIRUB UR-MCNC: NEGATIVE — SIGNIFICANT CHANGE UP
BUN SERPL-MCNC: 9 MG/DL — SIGNIFICANT CHANGE UP (ref 8–20)
CALCIUM SERPL-MCNC: 8.4 MG/DL — LOW (ref 8.6–10.2)
CHLORIDE SERPL-SCNC: 102 MMOL/L — SIGNIFICANT CHANGE UP (ref 98–107)
CK SERPL-CCNC: 45 U/L — SIGNIFICANT CHANGE UP (ref 30–200)
CO2 SERPL-SCNC: 30 MMOL/L — HIGH (ref 22–29)
COLOR SPEC: YELLOW — SIGNIFICANT CHANGE UP
CREAT SERPL-MCNC: 0.53 MG/DL — SIGNIFICANT CHANGE UP (ref 0.5–1.3)
DIFF PNL FLD: NEGATIVE — SIGNIFICANT CHANGE UP
EOSINOPHIL NFR BLD AUTO: 1 % — SIGNIFICANT CHANGE UP (ref 0–6)
GLUCOSE SERPL-MCNC: 123 MG/DL — HIGH (ref 70–115)
GLUCOSE UR QL: NEGATIVE MG/DL — SIGNIFICANT CHANGE UP
HCT VFR BLD CALC: 33.8 % — LOW (ref 42–52)
HGB BLD-MCNC: 10.8 G/DL — LOW (ref 14–18)
KETONES UR-MCNC: NEGATIVE — SIGNIFICANT CHANGE UP
LEUKOCYTE ESTERASE UR-ACNC: NEGATIVE — SIGNIFICANT CHANGE UP
LYMPHOCYTES # BLD AUTO: 7 % — LOW (ref 20–55)
MACROCYTES BLD QL: SLIGHT — SIGNIFICANT CHANGE UP
MCHC RBC-ENTMCNC: 32 G/DL — SIGNIFICANT CHANGE UP (ref 32–36)
MCHC RBC-ENTMCNC: 32 PG — HIGH (ref 27–31)
MCV RBC AUTO: 100 FL — HIGH (ref 80–94)
MONOCYTES NFR BLD AUTO: 16 % — HIGH (ref 3–10)
NEUTROPHILS NFR BLD AUTO: 67 % — SIGNIFICANT CHANGE UP (ref 37–73)
NEUTS BAND # BLD: 7 % — SIGNIFICANT CHANGE UP (ref 0–8)
NITRITE UR-MCNC: NEGATIVE — SIGNIFICANT CHANGE UP
NT-PROBNP SERPL-SCNC: 359 PG/ML — HIGH (ref 0–300)
OVALOCYTES BLD QL SMEAR: SLIGHT — SIGNIFICANT CHANGE UP
PH UR: 6 — SIGNIFICANT CHANGE UP (ref 5–8)
PLAT MORPH BLD: NORMAL — SIGNIFICANT CHANGE UP
PLATELET # BLD AUTO: 336 K/UL — SIGNIFICANT CHANGE UP (ref 150–400)
POTASSIUM SERPL-MCNC: 4.4 MMOL/L — SIGNIFICANT CHANGE UP (ref 3.5–5.3)
POTASSIUM SERPL-SCNC: 4.4 MMOL/L — SIGNIFICANT CHANGE UP (ref 3.5–5.3)
PROT SERPL-MCNC: 5.2 G/DL — LOW (ref 6.6–8.7)
PROT UR-MCNC: NEGATIVE MG/DL — SIGNIFICANT CHANGE UP
RBC # BLD: 3.38 M/UL — LOW (ref 4.6–6.2)
RBC # FLD: 14.4 % — SIGNIFICANT CHANGE UP (ref 11–15.6)
RBC BLD AUTO: PRESENT — SIGNIFICANT CHANGE UP
SODIUM SERPL-SCNC: 139 MMOL/L — SIGNIFICANT CHANGE UP (ref 135–145)
SP GR SPEC: 1.01 — SIGNIFICANT CHANGE UP (ref 1.01–1.02)
SURGICAL PATHOLOGY STUDY: SIGNIFICANT CHANGE UP
TROPONIN T SERPL-MCNC: <0.01 NG/ML — SIGNIFICANT CHANGE UP (ref 0–0.06)
UROBILINOGEN FLD QL: NEGATIVE MG/DL — SIGNIFICANT CHANGE UP
VARIANT LYMPHS # BLD: 1 % — SIGNIFICANT CHANGE UP (ref 0–6)
WBC # BLD: 3.2 K/UL — LOW (ref 4.8–10.8)
WBC # FLD AUTO: 3.2 K/UL — LOW (ref 4.8–10.8)

## 2019-01-26 PROCEDURE — 93970 EXTREMITY STUDY: CPT | Mod: 26

## 2019-01-26 PROCEDURE — 99222 1ST HOSP IP/OBS MODERATE 55: CPT

## 2019-01-26 PROCEDURE — 99285 EMERGENCY DEPT VISIT HI MDM: CPT

## 2019-01-26 PROCEDURE — 93010 ELECTROCARDIOGRAM REPORT: CPT

## 2019-01-26 PROCEDURE — 74177 CT ABD & PELVIS W/CONTRAST: CPT | Mod: 26

## 2019-01-26 PROCEDURE — 71046 X-RAY EXAM CHEST 2 VIEWS: CPT | Mod: 26

## 2019-01-26 RX ORDER — ONDANSETRON 8 MG/1
4 TABLET, FILM COATED ORAL EVERY 6 HOURS
Qty: 0 | Refills: 0 | Status: DISCONTINUED | OUTPATIENT
Start: 2019-01-26 | End: 2019-01-28

## 2019-01-26 RX ORDER — PROCHLORPERAZINE MALEATE 5 MG
1 TABLET ORAL
Qty: 0 | Refills: 0 | COMMUNITY

## 2019-01-26 RX ORDER — DEXTROSE 50 % IN WATER 50 %
15 SYRINGE (ML) INTRAVENOUS ONCE
Qty: 0 | Refills: 0 | Status: DISCONTINUED | OUTPATIENT
Start: 2019-01-26 | End: 2019-01-28

## 2019-01-26 RX ORDER — OXYCODONE HYDROCHLORIDE 5 MG/1
1 TABLET ORAL
Qty: 0 | Refills: 0 | COMMUNITY

## 2019-01-26 RX ORDER — ALBUMIN HUMAN 25 %
50 VIAL (ML) INTRAVENOUS ONCE
Qty: 0 | Refills: 0 | Status: COMPLETED | OUTPATIENT
Start: 2019-01-26 | End: 2019-01-26

## 2019-01-26 RX ORDER — ENOXAPARIN SODIUM 100 MG/ML
40 INJECTION SUBCUTANEOUS EVERY 24 HOURS
Qty: 0 | Refills: 0 | Status: DISCONTINUED | OUTPATIENT
Start: 2019-01-26 | End: 2019-01-28

## 2019-01-26 RX ORDER — CAPECITABINE 500 MG/1
0 TABLET ORAL
Qty: 0 | Refills: 0 | COMMUNITY

## 2019-01-26 RX ORDER — SODIUM CHLORIDE 9 MG/ML
1000 INJECTION, SOLUTION INTRAVENOUS
Qty: 0 | Refills: 0 | Status: DISCONTINUED | OUTPATIENT
Start: 2019-01-26 | End: 2019-01-28

## 2019-01-26 RX ORDER — ASPIRIN/CALCIUM CARB/MAGNESIUM 324 MG
81 TABLET ORAL DAILY
Qty: 0 | Refills: 0 | Status: DISCONTINUED | OUTPATIENT
Start: 2019-01-26 | End: 2019-01-28

## 2019-01-26 RX ORDER — TAMSULOSIN HYDROCHLORIDE 0.4 MG/1
0.4 CAPSULE ORAL AT BEDTIME
Qty: 0 | Refills: 0 | Status: DISCONTINUED | OUTPATIENT
Start: 2019-01-26 | End: 2019-01-28

## 2019-01-26 RX ORDER — INSULIN LISPRO 100/ML
VIAL (ML) SUBCUTANEOUS
Qty: 0 | Refills: 0 | Status: DISCONTINUED | OUTPATIENT
Start: 2019-01-26 | End: 2019-01-28

## 2019-01-26 RX ORDER — METOPROLOL TARTRATE 50 MG
12.5 TABLET ORAL DAILY
Qty: 0 | Refills: 0 | Status: DISCONTINUED | OUTPATIENT
Start: 2019-01-26 | End: 2019-01-28

## 2019-01-26 RX ORDER — ATORVASTATIN CALCIUM 80 MG/1
10 TABLET, FILM COATED ORAL AT BEDTIME
Qty: 0 | Refills: 0 | Status: DISCONTINUED | OUTPATIENT
Start: 2019-01-26 | End: 2019-01-28

## 2019-01-26 RX ORDER — LIPASE/PROTEASE/AMYLASE 16-48-48K
2 CAPSULE,DELAYED RELEASE (ENTERIC COATED) ORAL
Qty: 0 | Refills: 0 | Status: DISCONTINUED | OUTPATIENT
Start: 2019-01-26 | End: 2019-01-27

## 2019-01-26 RX ORDER — INSULIN GLARGINE 100 [IU]/ML
10 INJECTION, SOLUTION SUBCUTANEOUS
Qty: 0 | Refills: 0 | COMMUNITY

## 2019-01-26 RX ORDER — SODIUM CHLORIDE 9 MG/ML
3 INJECTION INTRAMUSCULAR; INTRAVENOUS; SUBCUTANEOUS EVERY 8 HOURS
Qty: 0 | Refills: 0 | Status: DISCONTINUED | OUTPATIENT
Start: 2019-01-26 | End: 2019-01-28

## 2019-01-26 RX ORDER — FUROSEMIDE 40 MG
20 TABLET ORAL
Qty: 0 | Refills: 0 | Status: DISCONTINUED | OUTPATIENT
Start: 2019-01-26 | End: 2019-01-28

## 2019-01-26 RX ORDER — FUROSEMIDE 40 MG
40 TABLET ORAL ONCE
Qty: 0 | Refills: 0 | Status: COMPLETED | OUTPATIENT
Start: 2019-01-26 | End: 2019-01-26

## 2019-01-26 RX ORDER — GLUCAGON INJECTION, SOLUTION 0.5 MG/.1ML
1 INJECTION, SOLUTION SUBCUTANEOUS ONCE
Qty: 0 | Refills: 0 | Status: DISCONTINUED | OUTPATIENT
Start: 2019-01-26 | End: 2019-01-28

## 2019-01-26 RX ORDER — DEXTROSE 50 % IN WATER 50 %
12.5 SYRINGE (ML) INTRAVENOUS ONCE
Qty: 0 | Refills: 0 | Status: DISCONTINUED | OUTPATIENT
Start: 2019-01-26 | End: 2019-01-28

## 2019-01-26 RX ADMIN — Medication 40 MILLIGRAM(S): at 17:32

## 2019-01-26 RX ADMIN — Medication 50 MILLILITER(S): at 19:30

## 2019-01-26 NOTE — ED STATDOCS - PMH
Adenocarcinoma of pancreas    Anxiety    Asthma    BPH (benign prostatic hyperplasia)    Bronchitis  1 month ago  Diabetes    Factor V Leiden    GERD (gastroesophageal reflux disease)    Heart murmur    Herpes  lips  High cholesterol    HTN (hypertension)    Port catheter in place  not working since insertion

## 2019-01-26 NOTE — ED PROVIDER NOTE - GASTROINTESTINAL, MLM
Abdomen soft, non-tender, no guarding. Abdomen soft, non-tender, no guarding. Mild fluid wave appreciated. Diastasis recti present in periumbilical region. Abdomen soft, non-tender, no guarding. Mild fluid wave appreciated. Diastasis recti present in periumbilical region., Scrotal swelling

## 2019-01-26 NOTE — ED STATDOCS - PROGRESS NOTE DETAILS
62 y/o M pt with PMHx of DM, HTN, HLD, GERD, and Pancreatic CA presents to the ED c/o swelling of lower extremities. He is a chemotherapy patient for pancreatic cancer. The chemo medication is a trial medication and he is unaware of the exact name. Reports difficulty getting up, difficulty carrying things, lethargy and weight loss. He had a colonoscopy this Wednesday which showed no abnormalities. His last round of radiation was 4 months ago. No further complaints at this time. Patient will be moved to the main ED for further evaluation by another provider.   PE: MARLYS LE edema extending up to groin. appears to be cardio toxicity from chemotherapy it is a trial medication.

## 2019-01-26 NOTE — ED PROVIDER NOTE - OBJECTIVE STATEMENT
Pt with PMHx of HLD, DM2, GERD, pancreatic ca S/P Whipple's (11/2017) presents with progressive leg swelling b/l. Pt states that he has had swelling for the past month or so that is usually around the feet and ankles. Pt states that in the last few days the swelling has ascended up his leg and also into his scrotum. As per pt's sister-in-law, his last blood work showed a low albumin. Pt states that he also had chinese food yesterday. Pt is currently on ?Arititucan?/5-FU (which he receives every other week). Pt denies any fever, n/v/d/c, dysuria, pruritus, rash, SOB, CP, palpitations. Pt with PMHx of HLD, DM2, GERD, pancreatic ca S/P Whipple's (11/2017) presents with progressive leg swelling b/l. Pt states that he has had swelling for the past month or so that is usually around the feet and ankles. The patient is having time ambulating secondary to swelling. Pt states that in the last few days the swelling has ascended up his leg and also into his scrotum. As per pt's sister-in-law, his last blood work showed a low albumin. Pt states that he also had chinese food yesterday. Pt is currently on Arititucan/5-FU (which he receives every other week). Pt denies any fever, n/v/d/c, dysuria, pruritus, rash, SOB, CP, palpitations.

## 2019-01-26 NOTE — ED ADULT NURSE NOTE - OBJECTIVE STATEMENT
Patient is alert and verbal, report bilateral lower extremity swelling, + scrotal swelling. onset 6 weeks ago, swelling have gotten worse from the last 3 days.. Hx: pancreatic CA on Chemo. have

## 2019-01-26 NOTE — ED PROVIDER NOTE - CHPI ED SYMPTOMS NEG
no blood in stool/no diarrhea/no hematuria/no fever/no nausea/no chills/no burning urination/no dysuria

## 2019-01-26 NOTE — H&P ADULT - ASSESSMENT
64 y/o male with increasing B/L LE edema, Murmur, Hx of Pancreatic cancer s/p whipple on active chemo, BPH, HLD,

## 2019-01-26 NOTE — H&P ADULT - FAMILY HISTORY
Father  Still living? Unknown  Family history of lymphoma, Age at diagnosis: Age Unknown  Family history of heart disease, Age at diagnosis: Age Unknown  Family history of CABG, Age at diagnosis: Age Unknown

## 2019-01-26 NOTE — ED ADULT TRIAGE NOTE - CHIEF COMPLAINT QUOTE
patient c/o swelling of feet traveling to the groin. on chemotherapy for Pancreatic cancer, had a colonoscopy on wednesday.

## 2019-01-26 NOTE — ED PROVIDER NOTE - SKIN, MLM
Skin normal color for race, warm, dry and intact. No evidence of rash. Skin normal color for race, warm, dry and intact. No evidence of rash. +4 Pitting edema of LE to knee b/l. Scrotum is enlarged

## 2019-01-26 NOTE — H&P ADULT - HISTORY OF PRESENT ILLNESS
62 y/o male with history of Pancreatic cancer s/p whipple in 11/17 with positive margins currently on active chemo with 5 FU and Irinotecan/Leucovirin last dose this week, BPH, DM-2, HLD presents to ED with family c/o increase in B/L LE edema that is now up to his scrotum. He has no hx of DVT, CHF, Pulm, or renal disease. He has baseline pedal edema but over the past 2 weeks it has gotten much worse. He did recently go for a colonoscopy with negative biopsies, but otherwise no changes to medications. He and his family state he recently got his appetite back and has been eating large amounts of seafood and drinking a lot of water. He denies any SOB, orthopnea, PND, or CP. He is very active but now having trouble getting around due to leg edema. He has gained about 5lbs in last week. He had a cardiac w/u before his whipple in 2017 but nothing since. In ED vitals stable, lungs clear, cxr neg. has 4+ pitting edema up to scrotum, LE doppler neg for dvt.

## 2019-01-26 NOTE — ED PROVIDER NOTE - CARE PLAN
Principal Discharge DX:	Anasarca  Secondary Diagnosis:	Adenocarcinoma of pancreas  Secondary Diagnosis:	HTN (hypertension)  Secondary Diagnosis:	High cholesterol  Secondary Diagnosis:	Diabetes

## 2019-01-26 NOTE — ED ADULT NURSE NOTE - NSIMPLEMENTINTERV_GEN_ALL_ED
Implemented All Universal Safety Interventions:  [place] to call system. Call bell, personal items and telephone within reach. Instruct patient to call for assistance. Room bathroom lighting operational. Non-slip footwear when patient is off stretcher. Physically safe environment: no spills, clutter or unnecessary equipment. Stretcher in lowest position, wheels locked, appropriate side rails in place.

## 2019-01-26 NOTE — H&P ADULT - PROBLEM SELECTOR PLAN 1
Patient with normal renal function, no proteinuria, mild hypoalbuminemia, no evidence of lefts sided CHF on exam/xray. No hx of pulm htn. Possible chemo induced NICM? Has been eating a large amount of seafood and water which could be causing fluid retention. F/U echo, IV lasix for symptomatic relief, no DVT on doppler. CT abd/pelvis with IV contrast without obstruction of IVC. OOB, ambulate, DVT-P, high blood pressure

## 2019-01-27 DIAGNOSIS — R60.1 GENERALIZED EDEMA: ICD-10-CM

## 2019-01-27 LAB
GLUCOSE BLDC GLUCOMTR-MCNC: 111 MG/DL — HIGH (ref 70–99)
GLUCOSE BLDC GLUCOMTR-MCNC: 119 MG/DL — HIGH (ref 70–99)
GLUCOSE BLDC GLUCOMTR-MCNC: 131 MG/DL — HIGH (ref 70–99)
GLUCOSE BLDC GLUCOMTR-MCNC: 141 MG/DL — HIGH (ref 70–99)
GLUCOSE BLDC GLUCOMTR-MCNC: 177 MG/DL — HIGH (ref 70–99)

## 2019-01-27 PROCEDURE — 99232 SBSQ HOSP IP/OBS MODERATE 35: CPT

## 2019-01-27 PROCEDURE — 93306 TTE W/DOPPLER COMPLETE: CPT | Mod: 26

## 2019-01-27 RX ORDER — LIPASE/PROTEASE/AMYLASE 16-48-48K
6 CAPSULE,DELAYED RELEASE (ENTERIC COATED) ORAL
Qty: 0 | Refills: 0 | Status: DISCONTINUED | OUTPATIENT
Start: 2019-01-27 | End: 2019-01-28

## 2019-01-27 RX ORDER — FENTANYL CITRATE 50 UG/ML
1 INJECTION INTRAVENOUS
Qty: 0 | Refills: 0 | Status: DISCONTINUED | OUTPATIENT
Start: 2019-01-27 | End: 2019-01-28

## 2019-01-27 RX ADMIN — SODIUM CHLORIDE 3 MILLILITER(S): 9 INJECTION INTRAMUSCULAR; INTRAVENOUS; SUBCUTANEOUS at 21:09

## 2019-01-27 RX ADMIN — ATORVASTATIN CALCIUM 10 MILLIGRAM(S): 80 TABLET, FILM COATED ORAL at 21:06

## 2019-01-27 RX ADMIN — Medication 6 CAPSULE(S): at 08:31

## 2019-01-27 RX ADMIN — Medication 81 MILLIGRAM(S): at 12:19

## 2019-01-27 RX ADMIN — SODIUM CHLORIDE 3 MILLILITER(S): 9 INJECTION INTRAMUSCULAR; INTRAVENOUS; SUBCUTANEOUS at 14:20

## 2019-01-27 RX ADMIN — FENTANYL CITRATE 1 PATCH: 50 INJECTION INTRAVENOUS at 15:50

## 2019-01-27 RX ADMIN — SODIUM CHLORIDE 3 MILLILITER(S): 9 INJECTION INTRAMUSCULAR; INTRAVENOUS; SUBCUTANEOUS at 06:10

## 2019-01-27 RX ADMIN — Medication 10 MILLIGRAM(S): at 14:30

## 2019-01-27 RX ADMIN — TAMSULOSIN HYDROCHLORIDE 0.4 MILLIGRAM(S): 0.4 CAPSULE ORAL at 21:06

## 2019-01-27 RX ADMIN — Medication 10 MILLIGRAM(S): at 19:58

## 2019-01-27 RX ADMIN — Medication 20 MILLIGRAM(S): at 21:07

## 2019-01-27 RX ADMIN — ENOXAPARIN SODIUM 40 MILLIGRAM(S): 100 INJECTION SUBCUTANEOUS at 08:32

## 2019-01-27 RX ADMIN — Medication 20 MILLIGRAM(S): at 10:23

## 2019-01-27 RX ADMIN — Medication 2: at 12:14

## 2019-01-27 RX ADMIN — Medication 6 CAPSULE(S): at 18:30

## 2019-01-27 NOTE — PROGRESS NOTE ADULT - SUBJECTIVE AND OBJECTIVE BOX
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 1000 milliLiter(s) (50 mL/Hr) IV Continuous <Continuous>  dextrose 50% Injectable 12.5 Gram(s) IV Push once  enoxaparin Injectable 40 milliGRAM(s) SubCutaneous every 24 hours  fentaNYL   Patch  12 MICROgram(s)/Hr. 1 Patch Transdermal every 48 hours  fentaNYL   Patch  25 MICROgram(s)/Hr. 1 Patch Transdermal every 48 hours  furosemide   Injectable 20 milliGRAM(s) IV Push two times a day  insulin lispro (HumaLOG) corrective regimen sliding scale   SubCutaneous Before meals and at bedtime  metoprolol succinate ER 12.5 milliGRAM(s) Oral daily  sodium chloride 0.9% lock flush 3 milliLiter(s) IV Push every 8 hours  tamsulosin 0.4 milliGRAM(s) Oral at bedtime      Vital Signs Last 24 Hrs  T(C): 36.9 (2019 17:20), Max: 37.2 (2019 08:05)  T(F): 98.5 (2019 17:20), Max: 98.9 (2019 08:05)  HR: 78 (2019 17:20) (56 - 84)  BP: 87/57 (2019 17:20) (87/57 - 109/71)  BP(mean): 83 (2019 21:42) (83 - 83)  RR: 18 (2019 17:20) (10 - 18)  SpO2: 98% (2019 10:29) (96% - 100%)    PHYSICAL EXAM:  HEENT: PERRLA, EOMI, Normal Hearing  Neck: No LAD, No JVD  Back: No CVA tenderness  Respiratory: CTAB Cardiovascular: S1 and S2, RRR, no M/G/R  Gastrointestinal: BS+, soft, NT/ND  Extremities: two plus edema  Vascular: 2+ peripheral pulses  Neurological: A/O x 3, no focal deficits        CAPILLARY BLOOD GLUCOSE    LABS:                        10.8   3.2   )-----------( 336      ( 2019 15:27 )             33.8         139  |  102  |  9.0  ----------------------------<  123<H>  4.4   |  30.0<H>  |  0.53    Ca    8.4<L>      2019 15:27    TPro  5.2<L>  /  Alb  3.0<L>  /  TBili  0.5  /  DBili  x   /  AST  23  /  ALT  18  /  AlkPhos  262<H>        Urinalysis Basic - ( 2019 17:08 )    Color: Yellow / Appearance: Clear / S.010 / pH: x  Gluc: x / Ketone: Negative  / Bili: Negative / Urobili: Negative mg/dL   Blood: x / Protein: Negative mg/dL / Nitrite: Negative   Leuk Esterase: Negative / RBC: x / WBC x   Sq Epi: x / Non Sq Epi: x / Bacteria: x        RADIOLOGY & ADDITIONAL TESTS:

## 2019-01-28 ENCOUNTER — TRANSCRIPTION ENCOUNTER (OUTPATIENT)
Age: 64
End: 2019-01-28

## 2019-01-28 VITALS — TEMPERATURE: 98 F | DIASTOLIC BLOOD PRESSURE: 63 MMHG | HEART RATE: 92 BPM | SYSTOLIC BLOOD PRESSURE: 91 MMHG

## 2019-01-28 LAB
ANION GAP SERPL CALC-SCNC: 6 MMOL/L — SIGNIFICANT CHANGE UP (ref 5–17)
BUN SERPL-MCNC: 7 MG/DL — LOW (ref 8–20)
CALCIUM SERPL-MCNC: 8.4 MG/DL — LOW (ref 8.6–10.2)
CHLORIDE SERPL-SCNC: 97 MMOL/L — LOW (ref 98–107)
CO2 SERPL-SCNC: 36 MMOL/L — HIGH (ref 22–29)
CREAT SERPL-MCNC: 0.61 MG/DL — SIGNIFICANT CHANGE UP (ref 0.5–1.3)
GLUCOSE BLDC GLUCOMTR-MCNC: 152 MG/DL — HIGH (ref 70–99)
GLUCOSE BLDC GLUCOMTR-MCNC: 94 MG/DL — SIGNIFICANT CHANGE UP (ref 70–99)
GLUCOSE SERPL-MCNC: 87 MG/DL — SIGNIFICANT CHANGE UP (ref 70–115)
HCT VFR BLD CALC: 31 % — LOW (ref 42–52)
HGB BLD-MCNC: 9.9 G/DL — LOW (ref 14–18)
MAGNESIUM SERPL-MCNC: 2.1 MG/DL — SIGNIFICANT CHANGE UP (ref 1.6–2.6)
MCHC RBC-ENTMCNC: 31.9 G/DL — LOW (ref 32–36)
MCHC RBC-ENTMCNC: 31.9 PG — HIGH (ref 27–31)
MCV RBC AUTO: 100 FL — HIGH (ref 80–94)
PHOSPHATE SERPL-MCNC: 3.2 MG/DL — SIGNIFICANT CHANGE UP (ref 2.4–4.7)
PLATELET # BLD AUTO: 288 K/UL — SIGNIFICANT CHANGE UP (ref 150–400)
POTASSIUM SERPL-MCNC: 3.9 MMOL/L — SIGNIFICANT CHANGE UP (ref 3.5–5.3)
POTASSIUM SERPL-SCNC: 3.9 MMOL/L — SIGNIFICANT CHANGE UP (ref 3.5–5.3)
RBC # BLD: 3.1 M/UL — LOW (ref 4.6–6.2)
RBC # FLD: 14.2 % — SIGNIFICANT CHANGE UP (ref 11–15.6)
SODIUM SERPL-SCNC: 139 MMOL/L — SIGNIFICANT CHANGE UP (ref 135–145)
WBC # BLD: 2.7 K/UL — LOW (ref 4.8–10.8)
WBC # FLD AUTO: 2.7 K/UL — LOW (ref 4.8–10.8)

## 2019-01-28 PROCEDURE — 82962 GLUCOSE BLOOD TEST: CPT

## 2019-01-28 PROCEDURE — 82550 ASSAY OF CK (CPK): CPT

## 2019-01-28 PROCEDURE — 93005 ELECTROCARDIOGRAM TRACING: CPT

## 2019-01-28 PROCEDURE — 80048 BASIC METABOLIC PNL TOTAL CA: CPT

## 2019-01-28 PROCEDURE — 71046 X-RAY EXAM CHEST 2 VIEWS: CPT

## 2019-01-28 PROCEDURE — 96374 THER/PROPH/DIAG INJ IV PUSH: CPT | Mod: XU

## 2019-01-28 PROCEDURE — 99238 HOSP IP/OBS DSCHRG MGMT 30/<: CPT

## 2019-01-28 PROCEDURE — 99285 EMERGENCY DEPT VISIT HI MDM: CPT | Mod: 25

## 2019-01-28 PROCEDURE — 84100 ASSAY OF PHOSPHORUS: CPT

## 2019-01-28 PROCEDURE — 93306 TTE W/DOPPLER COMPLETE: CPT

## 2019-01-28 PROCEDURE — 85027 COMPLETE CBC AUTOMATED: CPT

## 2019-01-28 PROCEDURE — 99232 SBSQ HOSP IP/OBS MODERATE 35: CPT

## 2019-01-28 PROCEDURE — 96375 TX/PRO/DX INJ NEW DRUG ADDON: CPT

## 2019-01-28 PROCEDURE — 83880 ASSAY OF NATRIURETIC PEPTIDE: CPT

## 2019-01-28 PROCEDURE — 80053 COMPREHEN METABOLIC PANEL: CPT

## 2019-01-28 PROCEDURE — 83735 ASSAY OF MAGNESIUM: CPT

## 2019-01-28 PROCEDURE — 74177 CT ABD & PELVIS W/CONTRAST: CPT

## 2019-01-28 PROCEDURE — P9047: CPT

## 2019-01-28 PROCEDURE — 36415 COLL VENOUS BLD VENIPUNCTURE: CPT

## 2019-01-28 PROCEDURE — 93970 EXTREMITY STUDY: CPT

## 2019-01-28 PROCEDURE — 84484 ASSAY OF TROPONIN QUANT: CPT

## 2019-01-28 PROCEDURE — 81003 URINALYSIS AUTO W/O SCOPE: CPT

## 2019-01-28 RX ORDER — ATORVASTATIN CALCIUM 80 MG/1
1 TABLET, FILM COATED ORAL
Qty: 0 | Refills: 0 | DISCHARGE
Start: 2019-01-28

## 2019-01-28 RX ORDER — FUROSEMIDE 40 MG
1 TABLET ORAL
Qty: 15 | Refills: 0 | OUTPATIENT
Start: 2019-01-28 | End: 2019-02-26

## 2019-01-28 RX ORDER — FUROSEMIDE 40 MG
1 TABLET ORAL
Qty: 15 | Refills: 0
Start: 2019-01-28 | End: 2019-02-26

## 2019-01-28 RX ORDER — ASPIRIN/CALCIUM CARB/MAGNESIUM 324 MG
1 TABLET ORAL
Qty: 0 | Refills: 0 | DISCHARGE
Start: 2019-01-28

## 2019-01-28 RX ADMIN — FENTANYL CITRATE 1 PATCH: 50 INJECTION INTRAVENOUS at 08:15

## 2019-01-28 RX ADMIN — ENOXAPARIN SODIUM 40 MILLIGRAM(S): 100 INJECTION SUBCUTANEOUS at 08:15

## 2019-01-28 RX ADMIN — Medication 81 MILLIGRAM(S): at 08:15

## 2019-01-28 RX ADMIN — Medication 20 MILLIGRAM(S): at 05:21

## 2019-01-28 RX ADMIN — FENTANYL CITRATE 1 PATCH: 50 INJECTION INTRAVENOUS at 08:16

## 2019-01-28 RX ADMIN — Medication 6 CAPSULE(S): at 08:15

## 2019-01-28 RX ADMIN — SODIUM CHLORIDE 3 MILLILITER(S): 9 INJECTION INTRAMUSCULAR; INTRAVENOUS; SUBCUTANEOUS at 05:24

## 2019-01-28 RX ADMIN — SODIUM CHLORIDE 3 MILLILITER(S): 9 INJECTION INTRAMUSCULAR; INTRAVENOUS; SUBCUTANEOUS at 11:14

## 2019-01-28 RX ADMIN — Medication 2: at 11:14

## 2019-01-28 NOTE — CONSULT NOTE ADULT - ASSESSMENT
Patient with pancreatic cancer, s/p Whipple, on liposomal irinotecan and fluorouracil  -ambulating with improvement in LE edema.  -further oncologic management  as outpatient

## 2019-01-28 NOTE — DISCHARGE NOTE ADULT - PATIENT PORTAL LINK FT
You can access the [facility] Portal, offered by [facility], by registering with the following website: [email or web address]

## 2019-01-28 NOTE — DISCHARGE NOTE ADULT - MEDICATION SUMMARY - MEDICATIONS TO TAKE
I will START or STAY ON the medications listed below when I get home from the hospital:    aspirin 81 mg oral delayed release tablet  -- 1 tab(s) by mouth once a day  -- Indication: For prophylaxis    fentanyl topical 25 mcg/hr transdermal film, extended release (obsolete)  -- 1 patch by transdermal patch every 72  hours  -- Indication: For chronic pain     Flomax 0.4 mg oral capsule  -- 1 cap(s) by mouth once a day (at bedtime)  -- Indication: For BPH    HumaLOG Cartridge 100 units/mL subcutaneous solution  -- subcutaneous 3 times a day using sliding scale   -- Indication: For DM type 2     atorvastatin 10 mg oral tablet  -- 1 tab(s) by mouth once a day (at bedtime)  -- Indication: For HLD    Creon 36,000 units oral delayed release capsule  -- 1 cap(s) by mouth 3 times a day  -- Indication: For As per GI     Lasix 20 mg oral tablet  -- 1 tab(s) by mouth every other day  -- Indication: For PEDAL EDEMA     dicyclomine 10 mg oral capsule  -- 2 cap(s) by mouth 4 times a day  -- Indication: For As per GI     docusate sodium 100 mg oral capsule  -- 1 cap(s) by mouth 3 times a day, As Needed for constipation  -- Indication: For constipation prophylaxis    polyethylene glycol 3350 oral powder for reconstitution  -- 17 gram(s) by mouth 2 times a day, As Needed for constipation.  -- Indication: For constipation prophylaxis     Senna 8.6 mg oral tablet  -- 2 tab(s) by mouth once a day, As Needed  -- Indication: For constipation prophylaxis     potassium chloride 20 mEq oral tablet, extended release  -- 1 tab(s) by mouth once a day  -- Indication: For Hypokalemia    pantoprazole 40 mg oral delayed release tablet  -- 1 tab(s) by mouth once a day in am  -- Indication: For GI prophylaxis I will START or STAY ON the medications listed below when I get home from the hospital:    aspirin 81 mg oral delayed release tablet  -- 1 tab(s) by mouth once a day  -- Indication: For prophylaxis    fentanyl topical 25 mcg/hr transdermal film, extended release (obsolete)  -- 1 patch by transdermal patch every 72  hours  -- Indication: For pain    Flomax 0.4 mg oral capsule  -- 1 cap(s) by mouth once a day (at bedtime)  -- Indication: For BPH    HumaLOG Cartridge 100 units/mL subcutaneous solution  -- subcutaneous 3 times a day using sliding scale   -- Indication: For DM    atorvastatin 10 mg oral tablet  -- 1 tab(s) by mouth once a day (at bedtime)  -- Indication: For HLD    Creon 36,000 units oral delayed release capsule  -- 1 cap(s) by mouth 3 times a day  -- Indication: For As per GI    Lasix 20 mg oral tablet  -- 1 tab(s) by mouth every other day  -- Indication: For pedal edema/ diastolic HF    dicyclomine 10 mg oral capsule  -- 2 cap(s) by mouth 4 times a day  -- Indication: For As per GI     docusate sodium 100 mg oral capsule  -- 1 cap(s) by mouth 3 times a day, As Needed for constipation  -- Indication: For CONSTIPATION PROPHYLAXIS     polyethylene glycol 3350 oral powder for reconstitution  -- 17 gram(s) by mouth 2 times a day, As Needed for constipation.  -- Indication: For CONSTIPATION PROPHYLAXIS     Senna 8.6 mg oral tablet  -- 2 tab(s) by mouth once a day, As Needed  -- Indication: For CONSTIPATION PROPHYLAXIS     potassium chloride 20 mEq oral tablet, extended release  -- 1 tab(s) by mouth once a day  -- Indication: For Hypokalemia     pantoprazole 40 mg oral delayed release tablet  -- 1 tab(s) by mouth once a day in am  -- Indication: For GeRD

## 2019-01-28 NOTE — CONSULT NOTE ADULT - SUBJECTIVE AND OBJECTIVE BOX
JENIFER NORTH  MRN-[id number]    HPI:  62 y/o male with history of Pancreatic cancer s/p whipple in 11/17 with positive margins currently on active chemo with 5 FU and Irinotecan/Leucovirin last dose this week, BPH, DM-2, HLD presents to ED with family c/o increase in B/L LE edema that is now up to his scrotum. He has no hx of DVT, CHF, Pulm, or renal disease. He has baseline pedal edema but over the past 2 weeks it has gotten much worse. He did recently go for a colonoscopy with negative biopsies, but otherwise no changes to medications. He and his family state he recently got his appetite back and has been eating large amounts of seafood and drinking a lot of water. He denies any SOB, orthopnea, PND, or CP. He is very active but now having trouble getting around due to leg edema. He has gained about 5lbs in last week. He had a cardiac w/u before his whipple in 2017 but nothing since. In ED vitals stable, lungs clear, cxr neg. has 4+ pitting edema up to scrotum, LE doppler neg for dvt. 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 1000 milliLiter(s) (50 mL/Hr) IV Continuous <Continuous>  dextrose 50% Injectable 12.5 Gram(s) IV Push once  enoxaparin Injectable 40 milliGRAM(s) SubCutaneous every 24 hours  fentaNYL   Patch  12 MICROgram(s)/Hr. 1 Patch Transdermal every 48 hours  fentaNYL   Patch  25 MICROgram(s)/Hr. 1 Patch Transdermal every 48 hours  insulin lispro (HumaLOG) corrective regimen sliding scale   SubCutaneous Before meals and at bedtime  sodium chloride 0.9% lock flush 3 milliLiter(s) IV Push every 8 hours  tamsulosin 0.4 milliGRAM(s) Oral at bedtime    MEDICATIONS  (PRN):  dextrose 40% Gel 15 Gram(s) Oral once PRN Blood Glucose LESS THAN 70 milliGRAM(s)/deciliter  dicyclomine 10 milliGRAM(s) Oral three times a day before meals PRN nausea/bloating  glucagon  Injectable 1 milliGRAM(s) IntraMuscular once PRN Glucose LESS THAN 70 milligrams/deciliter  ondansetron Injectable 4 milliGRAM(s) IV Push every 6 hours PRN Nausea      Allergies    No Known Allergies    Intolerances        FAMILY HISTORY:  Family history of CABG (Father)  Family history of heart disease (Father)  Family history of lymphoma (Father)      Review of Systems    Constitutional, Eyes, ENT, Cardiovascular, Respiratory, Gastrointestinal, Genitourinary, Musculoskeletal, Integumentary, Neurological, Psychiatric, Endocrine, Heme/Lymph and Allergic/Immunologic review of systems are otherwise negative except as noted in HPI.     Vital Signs Last 24 Hrs  T(C): 36.8 (28 Jan 2019 10:33), Max: 36.8 (28 Jan 2019 10:33)  T(F): 98.2 (28 Jan 2019 10:33), Max: 98.2 (28 Jan 2019 10:33)  HR: 92 (28 Jan 2019 10:33) (77 - 92)  BP: 91/63 (28 Jan 2019 10:33) (90/57 - 103/62)  BP(mean): --  RR: 18 (28 Jan 2019 08:57) (18 - 18)  SpO2: 97% (28 Jan 2019 08:57) (97% - 97%)        General:	Denies fatigue, fevers, chills, sweats, decreased appetite.    Skin/Breast: denies pruritis, rash  	  Ophthalmologic: no change in vision or blurring  	  HEENT	Denies dry mouth, oral sores, dysphagia,  change in hearing. NGT in place    Respiratory and Thorax:  no cough, sob, wheeze, hemoptysis  	  Cardiovascular:	no cp , palp, orthopnea    Gastrointestinal:	post-op abdominal discomfort    Genitourinary:	no dysuria of frequency, no hematuria, no flank pain    Musculoskeletal:	no bone or joint pain. no muscle aches.     Neurological:	no change in sensory or motor function. no headache. no weakness.     Psychiatric:	no depression, no anxiety, insomnia.     Hematology/Lymphatics: 2+ edema b/l      LABS:  CBC Full  -  ( 28 Jan 2019 07:53 )  WBC Count : 2.7 K/uL  Hemoglobin : 9.9 g/dL  Hematocrit : 31.0 %  Platelet Count - Automated : 288 K/uL  Mean Cell Volume : 100.0 fl  Mean Cell Hemoglobin : 31.9 pg  Mean Cell Hemoglobin Concentration : 31.9 g/dL  Auto Neutrophil # : x  Auto Lymphocyte # : x  Auto Monocyte # : x  Auto Eosinophil # : x  Auto Basophil # : x  Auto Neutrophil % : x  Auto Lymphocyte % : x  Auto Monocyte % : x  Auto Eosinophil % : x  Auto Basophil % : x    01-28    139  |  97<L>  |  7.0<L>  ----------------------------<  87  3.9   |  36.0<H>  |  0.61    Ca    8.4<L>      28 Jan 2019 07:53  Phos  3.2     01-28  Mg     2.1     01-28            RADIOLOGY & ADDITIONAL STUDIES:

## 2019-01-28 NOTE — DISCHARGE NOTE ADULT - SECONDARY DIAGNOSIS.
Diastolic heart failure, unspecified HF chronicity Type 2 diabetes mellitus without complication, with long-term current use of insulin Adenocarcinoma of pancreas Benign prostatic hyperplasia without lower urinary tract symptoms

## 2019-01-28 NOTE — DISCHARGE NOTE ADULT - CARE PLAN
Principal Discharge DX:	Anasarca  Goal:	get better , check weight daily , if increase by 5 lbs - call MD  Assessment and plan of treatment:	iv diuretics given , improved , continue lasix as recommended , decrease fluid intake to 1500 ml  Secondary Diagnosis:	Diastolic heart failure, unspecified HF chronicity  Goal:	patient advised to see own cardiologist in 1 wk  Secondary Diagnosis:	Type 2 diabetes mellitus without complication, with long-term current use of insulin  Goal:	now diet controlled , not on Insulin or oral hypoglycemics ,  Secondary Diagnosis:	Adenocarcinoma of pancreas  Goal:	on chemotherapy as per Dr Vela - advised to follow up  Secondary Diagnosis:	Benign prostatic hyperplasia without lower urinary tract symptoms

## 2019-01-28 NOTE — DISCHARGE NOTE ADULT - PLAN OF CARE
get better , check weight daily , if increase by 5 lbs - call MD iv diuretics given , improved , continue lasix as recommended , decrease fluid intake to 1500 ml patient advised to see own cardiologist in 1 wk now diet controlled , not on Insulin or oral hypoglycemics , on chemotherapy as per Dr Vela - advised to follow up

## 2019-01-28 NOTE — DISCHARGE NOTE ADULT - HOSPITAL COURSE
· Assessment		  64 y/o male with increasing B/L LE edema, Murmur, Hx of Pancreatic cancer s/p whipple on active chemo, BPH, HLD, diet controlled DM - 2 .   S/P iv diuresis x 2 days , responded weoo , no sob , no cp , no dizziness , edema almost gone , ambulating well , appetite is good , wants to go home .   CC : B/L LE edema - resolved     REVIEW OF SYSTEMS:  B/L LE edema - resolving , all other systems are reviewed and are negative     Vital Signs Last 24 Hrs  T(C): 36.8 (28 Jan 2019 10:33), Max: 36.9 (27 Jan 2019 17:20)  T(F): 98.2 (28 Jan 2019 10:33), Max: 98.5 (27 Jan 2019 17:20)  HR: 92 (28 Jan 2019 10:33) (77 - 92)  BP: 91/63 (28 Jan 2019 10:33) (87/57 - 103/62)  BP(mean): --  RR: 18 (28 Jan 2019 08:57) (18 - 18)  SpO2: 97% (28 Jan 2019 08:57) (97% - 97%)  PHYSICAL EXAM:    GENERAL: NAD, well-groomed, well-developed  HEAD:  Atraumatic, Normocephalic  EYES: EOMI, PERRLA, conjunctiva and sclera clear  NECK: Supple, No JVD, Normal thyroid  NERVOUS SYSTEM:  Alert & Oriented X3, no focal deficit  CHEST/LUNG: CTA b/l ,  no  rales, rhonchi, wheezing, or rubs  HEART: Regular rate and rhythm; No murmurs, rubs, or gallops  ABDOMEN: Soft, Nontender, Nondistended; Bowel sounds present , ols mid abdominal scar +   EXTREMITIES:  2+ Peripheral Pulses, No clubbing, cyanosis, R LE no edema , L LE trace pedal edema+   LYMPH: No lymphadenopathy noted  SKIN: No rashes or lesions           Problem/Plan - 1:  ·  Problem: Anasarca / LE edema  Plan: Patient with normal renal function, no proteinuria, mild hypoalbuminemia, no evidence of lefts sided CHF on exam/xray. No hx of pulm htn. ECHO with nl EF , grade II diastolic disfunction , responded to diuretics well , low sodium diet , will continue lasix 20 mg every other day , advised to follow up with PMD / Cardiologist / Onc in 1 wk. Likely secondary to diastolic CHF - unknown chronicity.       Problem/Plan - 2:  ·  Problem: Hx of  Adenocarcinoma of pancreas.  Plan: F/U with Oncology as scheduled after DC.      Problem/Plan - 3:  ·  Problem: Essential hypertension.  Plan: BP on lower side , will d/c metoprolol , follow up with PMD / Cardio    Problem/Plan - 4:  ·  Problem: High cholesterol.  Plan: Statin.      Problem/Plan - 5:  ·  Problem: Hx of  Type 2 diabetes mellitus without complication, with long-term current use of insulin.  Plan: now off Insulin , oral hypoglycemics as patient lost a lot of weight after surgery     Problem / Plan -6: Hx of BPH - continue Flomax     Problem / Plan - 7: Anemia - likely of chronic dz - asymptomatic     More than 40 min spent with patient / nurse / discharge note .

## 2019-01-31 NOTE — CDI QUERY NOTE - NSCDIOTHERTXTBX_GEN_ALL_CORE_HH
Clinical documentation indicates that this patient has CHF.  Please include more specific documentation of the acuity of CHF in an addendum to the d/c note.    -Acute diastolic CHF   -Acute on chronic diastolic CHF  -Chronic diastolic CHF  -Other (please specify)  -Not clinically significant      SUPPORTING DOCUMENTATION AND/OR CLINICAL EVIDENCE:     D/C note-·  Problem: Anasarca / LE edema  Plan: Patient with normal renal function, no proteinuria, mild hypoalbuminemia, no evidence of lefts sided CHF on exam/xray. No hx of pulm htn. ECHO with nl EF , grade II diastolic disfunction , responded to diuretics well , low sodium diet , will continue lasix 20 mg every other day , advised to follow up with PMD / Cardiologist / Onc in 1 wk. Likely secondary to diastolic CHF - unknown chronicity.      Treatment with Lasix IV      CHEST XRAY: Impression: No active infiltrates. Possible trace right pleural effusion      BNP:  Serum Pro-Brain Natriuretic Peptide: 359 pg/mL <H> [0 - 300] (01-26-19)

## 2019-02-05 ENCOUNTER — RESULT REVIEW (OUTPATIENT)
Age: 64
End: 2019-02-05

## 2019-02-05 ENCOUNTER — LABORATORY RESULT (OUTPATIENT)
Age: 64
End: 2019-02-05

## 2019-02-05 ENCOUNTER — APPOINTMENT (OUTPATIENT)
Age: 64
End: 2019-02-05

## 2019-02-05 ENCOUNTER — APPOINTMENT (OUTPATIENT)
Dept: HEMATOLOGY ONCOLOGY | Facility: CLINIC | Age: 64
End: 2019-02-05
Payer: COMMERCIAL

## 2019-02-05 VITALS
HEIGHT: 65 IN | TEMPERATURE: 97.7 F | DIASTOLIC BLOOD PRESSURE: 68 MMHG | OXYGEN SATURATION: 99 % | SYSTOLIC BLOOD PRESSURE: 104 MMHG | BODY MASS INDEX: 21.99 KG/M2 | HEART RATE: 76 BPM | WEIGHT: 132 LBS

## 2019-02-05 LAB
BASOPHILS # BLD AUTO: 0.1 K/UL — SIGNIFICANT CHANGE UP (ref 0–0.2)
BASOPHILS NFR BLD AUTO: 1.1 % — SIGNIFICANT CHANGE UP (ref 0–2)
EOSINOPHIL # BLD AUTO: 0.1 K/UL — SIGNIFICANT CHANGE UP (ref 0–0.5)
EOSINOPHIL NFR BLD AUTO: 0.9 % — SIGNIFICANT CHANGE UP (ref 0–6)
HCT VFR BLD CALC: 36.6 % — LOW (ref 39–50)
HGB BLD-MCNC: 11.7 G/DL — LOW (ref 13–17)
LYMPHOCYTES # BLD AUTO: 0.5 K/UL — LOW (ref 1–3.3)
LYMPHOCYTES # BLD AUTO: 5.9 % — LOW (ref 13–44)
MCHC RBC-ENTMCNC: 32.1 G/DL — SIGNIFICANT CHANGE UP (ref 32–36)
MCHC RBC-ENTMCNC: 32.2 PG — SIGNIFICANT CHANGE UP (ref 27–34)
MCV RBC AUTO: 100.3 FL — HIGH (ref 80–100)
MONOCYTES # BLD AUTO: 1.2 K/UL — HIGH (ref 0–0.9)
MONOCYTES NFR BLD AUTO: 12.7 % — SIGNIFICANT CHANGE UP (ref 2–14)
NEUTROPHILS # BLD AUTO: 7.2 K/UL — SIGNIFICANT CHANGE UP (ref 1.8–7.4)
NEUTROPHILS NFR BLD AUTO: 79.4 % — HIGH (ref 43–77)
PLATELET # BLD AUTO: 243 K/UL — SIGNIFICANT CHANGE UP (ref 150–400)
RBC # BLD: 3.65 M/UL — LOW (ref 4.2–5.8)
RBC # FLD: 14 % — SIGNIFICANT CHANGE UP (ref 10.3–14.5)
WBC # BLD: 9.1 K/UL — SIGNIFICANT CHANGE UP (ref 3.8–10.5)
WBC # FLD AUTO: 9.1 K/UL — SIGNIFICANT CHANGE UP (ref 3.8–10.5)

## 2019-02-05 PROCEDURE — 99214 OFFICE O/P EST MOD 30 MIN: CPT

## 2019-02-07 ENCOUNTER — APPOINTMENT (OUTPATIENT)
Age: 64
End: 2019-02-07

## 2019-02-13 ENCOUNTER — APPOINTMENT (OUTPATIENT)
Age: 64
End: 2019-02-13

## 2019-02-13 ENCOUNTER — LABORATORY RESULT (OUTPATIENT)
Age: 64
End: 2019-02-13

## 2019-02-13 ENCOUNTER — RESULT REVIEW (OUTPATIENT)
Age: 64
End: 2019-02-13

## 2019-02-13 LAB
BASOPHILS # BLD AUTO: 0.1 K/UL — SIGNIFICANT CHANGE UP (ref 0–0.2)
BASOPHILS NFR BLD AUTO: 0.7 % — SIGNIFICANT CHANGE UP (ref 0–2)
EOSINOPHIL # BLD AUTO: 0.2 K/UL — SIGNIFICANT CHANGE UP (ref 0–0.5)
EOSINOPHIL NFR BLD AUTO: 1.7 % — SIGNIFICANT CHANGE UP (ref 0–6)
HCT VFR BLD CALC: 37 % — LOW (ref 39–50)
HGB BLD-MCNC: 12.1 G/DL — LOW (ref 13–17)
LYMPHOCYTES # BLD AUTO: 0.4 K/UL — LOW (ref 1–3.3)
LYMPHOCYTES # BLD AUTO: 4.4 % — LOW (ref 13–44)
MCHC RBC-ENTMCNC: 32.8 G/DL — SIGNIFICANT CHANGE UP (ref 32–36)
MCHC RBC-ENTMCNC: 33 PG — SIGNIFICANT CHANGE UP (ref 27–34)
MCV RBC AUTO: 100.6 FL — HIGH (ref 80–100)
MONOCYTES # BLD AUTO: 1.1 K/UL — HIGH (ref 0–0.9)
MONOCYTES NFR BLD AUTO: 11.5 % — SIGNIFICANT CHANGE UP (ref 2–14)
NEUTROPHILS # BLD AUTO: 7.9 K/UL — HIGH (ref 1.8–7.4)
NEUTROPHILS NFR BLD AUTO: 81.7 % — HIGH (ref 43–77)
PLATELET # BLD AUTO: 290 K/UL — SIGNIFICANT CHANGE UP (ref 150–400)
RBC # BLD: 3.67 M/UL — LOW (ref 4.2–5.8)
RBC # FLD: 13.8 % — SIGNIFICANT CHANGE UP (ref 10.3–14.5)
WBC # BLD: 9.7 K/UL — SIGNIFICANT CHANGE UP (ref 3.8–10.5)
WBC # FLD AUTO: 9.7 K/UL — SIGNIFICANT CHANGE UP (ref 3.8–10.5)

## 2019-02-14 ENCOUNTER — OUTPATIENT (OUTPATIENT)
Dept: OUTPATIENT SERVICES | Facility: HOSPITAL | Age: 64
LOS: 1 days | Discharge: HOME | End: 2019-02-14

## 2019-02-14 DIAGNOSIS — Z51.11 ENCOUNTER FOR ANTINEOPLASTIC CHEMOTHERAPY: ICD-10-CM

## 2019-02-14 DIAGNOSIS — R11.2 NAUSEA WITH VOMITING, UNSPECIFIED: ICD-10-CM

## 2019-02-14 DIAGNOSIS — Z95.828 PRESENCE OF OTHER VASCULAR IMPLANTS AND GRAFTS: Chronic | ICD-10-CM

## 2019-02-14 DIAGNOSIS — Z98.890 OTHER SPECIFIED POSTPROCEDURAL STATES: Chronic | ICD-10-CM

## 2019-02-14 DIAGNOSIS — K90.81 WHIPPLE'S DISEASE: Chronic | ICD-10-CM

## 2019-02-14 DIAGNOSIS — C25.9 MALIGNANT NEOPLASM OF PANCREAS, UNSPECIFIED: ICD-10-CM

## 2019-02-15 ENCOUNTER — RX RENEWAL (OUTPATIENT)
Age: 64
End: 2019-02-15

## 2019-02-15 ENCOUNTER — APPOINTMENT (OUTPATIENT)
Age: 64
End: 2019-02-15

## 2019-02-20 NOTE — RESULTS/DATA
[FreeTextEntry1] : Conoscopy Bxiopsies 1/23/19\par Final Diagnosis\par \par Colon, random biopsies\par - Mild hyperplastic change and scattered lymphoid aggregates\par - Negative for colitis, granuloma, or dysplasia\par

## 2019-02-20 NOTE — ASSESSMENT
[FreeTextEntry1] : 64 y/o gentleman with Factor V Leiden mutation and stage IIB pancreatic cancer (T3N1) with positive CBD margin with peripancreatic soft tissue invasion, s/p Whipple on 11/30/17.  S/p 7 cycles of gemcitabine + Xeloda.  Post chemotherapy scans  7/2/18 CT scan with local recurrence. Completed chemoradiation with Xeloda from 7/24/18 - 8/30/18.  Foundation One testing with no actionable mutations, MSI-stable. \par \par 9/20/18 restaging CT scans with POD in upper abdomen and possible colitis. Started liposomal irinotecan 70 mg/m2 w/ Leucovorin 400 mg/m2 and continuous infusion 5 FU 2400 mg/m2 over 46 hrs every 2 weeks on 10/16/18.\par \par S/p 6 cycles of chemotherapy. 1/2/19 scans with partial response, no evidence of metastatic disease, + colitis. Colonoscopy w/ Dr. Evans on 1/23/19 shows mild hyperplastic change and scattered lymphoid aggregates and is negative for colitis, granuloma, or dysplasia. Admitted to [facility] 1/26 - 1/28/19 for anasarca, resolved post diuresis. C/o fatigue. Dr. Vela aware of admit and discharge.\par \par Plan:\par 1. Resume liposomal irinotecan/Leukovorin/5FU -  in 1 week.\par 2. Diarrhea chronic - 2 episodes per day, taking immodium PRN. . \par 3. B/l leg swelling / non tender- due to hypoalbuminemia, s/p dopplers with PCP on 1/7/9 which were negative. Instructed to weigh daily an use furosemide PRN 3 times per week.\par 5. Reschedule chemo on 2/13/19.

## 2019-02-20 NOTE — PHYSICAL EXAM
[Ambulatory and capable of all self care but unable to carry out any work activities] : Status 2- Ambulatory and capable of all self care but unable to carry out any work activities. Up and about more than 50% of waking hours [Normal] : affect appropriate [de-identified] : Interactive, tired appearing [de-identified] : negative cervical supra/infraclavicular adenopathy [de-identified] : clear b/l [de-identified] : S1/S2 grade 3 murmur heard best over L sternal border. [de-identified] : bilateral pedal edema [de-identified] : without spinal or cva tenderness.

## 2019-02-20 NOTE — REASON FOR VISIT
[Follow-Up Visit] : a follow-up [Other: _____] : [unfilled] [Family Member] : family member [FreeTextEntry2] : pancreatic cancer

## 2019-02-20 NOTE — HISTORY OF PRESENT ILLNESS
[Disease: _____________________] : Disease: [unfilled] [T: ___] : T[unfilled] [N: ___] : N[unfilled] [AJCC Stage: ____] : AJCC Stage: [unfilled] [de-identified] : Mr. Galaviz was diagnosed with pancreatic cancer in October 2017, at age 62 year old, after initially presenting with painless jaundice. EUS and ERCP on 10/31/17 revealed a 2.2 cm pancreatic head mass, with no apparent vessel involvement or lymphadenopathy. The CBD was stented. Final pathology was consistent with adenocarcinoma. EUS and ERCP on 10/31/17, which revealed a 2.2 cm pancreatic head mass, with no apparent vessel involvement or lymphadenopathy. The CBD was stented. Final pathology was consistent with adenocarcinoma. S/p Whipple, cholecystectomy, enterectomy x 2 and removal of CBD stent on 11/30/17. Final pathology was 3.1 cm pancreatic adenocarcinoma invading the nadira pancreatic tissue and CBD, and involving 2/26 lymph nodes (T3N1). The CBD resection margin was positive for adenocarcinoma with extensive nadira neural invasion. Mediport was placed on 1/17/18. \par - CT C/A/P 1/23/18: IMPRESSION: \par   No evidence of recurrent or metastatic disease in the chest, abdomen, or pelvis. \par   Stable postsurgical anatomy status post Whipple procedure. No evidence of bowel or gastric outlet obstruction. Pancreatic duct stent in place without \par   pancreatic duct or biliary dilatation. \par   2 tiny foci of extraluminal air in the right upper quadrant along a prior MUNA drain tract. This is likely secondary to recent drain removal. No drainable fluid collections. \par   Small amount of nonocclusive thrombus at the tip of the port catheter within the right brachiocephalic vein. \par \par Treatment:\par 2/7/18 - 6/13/18 adjuvant chemotherapy with gemcitabine + capecitabine (7 cycles)\par 4/12/18 CT C/A/P: no evidence of recurrent or metastatic disease.\par 7/2/18 CT a/p  : new soft tissue inferior to proximal superior mesenteric artery and to right of celiac axis in patient s/p whipple, suspicious for recurrent disease. \par 7/24/18-8/30/18  chemo-radiation with xeloda\par 9/20/18 CT A/P progression of disease in upper abdomen.\par 10/16/18 tx changed to liposomal irinotecan (70 mg/m2) w/ leucovorin 400 mg/m2 and 5FU 2400 mg/m2 over 46 hrs) every 2 weeks.\par \par  [de-identified] : Final Diagnosis\par 1. Lymph node at hepatic artery, excision:- One lymph node, negative for carcinoma, (0/1)\par 2. Periportal lymph node, excision:- One lymph node, negative for carcinoma, (0/1)\par 3. Gallbladder, cholecystectomy:- Chronic cholecystitis, negative for carcinoma\par - One lymph node, negative for carcinoma, (0/1)\par 4. Periportal lymph node, #2, excision:- One lymph node, negative for carcinoma, (0/1)\par 5. Pancreas, stomach and duodenum, pancreaticoduodenectomy(Whipple procedure):\par - Invasive adenocarcinoma of pancreas, moderately to poorly\par differentiated,pancreatobiliary type\par - Tumor invading into peripancreatic soft tissue and common bile duct\par - Comment bile duct resection margin, positive for adenocarcinoma, with\par extensive perineural invasion only, see comment- Pancreatic neck resection margin, negative for carcinoma\par - 18 lymph nodes, negative for carcinoma, (0/18)\par - AJCC (7th Ed) staging: pT3N1(2/26), see synoptic report,\par see Part 7 and Part 9\par 6. Portion of pancreas at portal vein #1, excision:- Atrophic pancreatitis, negative for carcinoma\par 7. Portion of pancreas at portal vein #2, excision:- Positive for adenocarcinoma, see comment\par 8. Portion of stent, removal:- Stent, gross examination only\par 9. Uncinate at superior mesenteric artery #1, excision:- Metastatic pancreatic adenocarcinoma present in 2 of 2 lymph nodes, (2/2)\par 10. Uncinate at superior mesenteric artery #2, excision:- Two lymph nodes, negative for carcinoma, (0/2)\par 11. Stomach and bowel at gastrojejunostomy site, excision:- Gastric and small bowel tissue, negative for carcinoma\par  [de-identified] : Admitted to [facility] 1/26 - 1/28/19 for anasarca. See hospital course below.\par - Hospital Course \par - Assessment \par 62 y/o male with increasing B/L LE edema, Murmur, Hx of Pancreatic cancer s/p\par whipple on active chemo, BPH, HLD, diet controlled DM - 2 .\par S/P iv diuresis x 2 days , responded weoo , no sob , no cp , no dizziness ,\par edema almost gone , ambulating well , appetite is good , wants to go home .\par CC : B/L LE edema - resolved\par ----------------------------------------------------------------------------\par Since d/c from hospital is on Furosemide 20 mg and potassium. He feels the swelling is improved.

## 2019-02-26 ENCOUNTER — RESULT REVIEW (OUTPATIENT)
Age: 64
End: 2019-02-26

## 2019-02-26 ENCOUNTER — LABORATORY RESULT (OUTPATIENT)
Age: 64
End: 2019-02-26

## 2019-02-26 ENCOUNTER — RX RENEWAL (OUTPATIENT)
Age: 64
End: 2019-02-26

## 2019-02-26 ENCOUNTER — APPOINTMENT (OUTPATIENT)
Age: 64
End: 2019-02-26

## 2019-02-26 LAB
BASOPHILS # BLD AUTO: 0 K/UL — SIGNIFICANT CHANGE UP (ref 0–0.2)
BASOPHILS NFR BLD AUTO: 0.8 % — SIGNIFICANT CHANGE UP (ref 0–2)
EOSINOPHIL # BLD AUTO: 0.1 K/UL — SIGNIFICANT CHANGE UP (ref 0–0.5)
EOSINOPHIL NFR BLD AUTO: 2.8 % — SIGNIFICANT CHANGE UP (ref 0–6)
HCT VFR BLD CALC: 33.8 % — LOW (ref 39–50)
HGB BLD-MCNC: 11 G/DL — LOW (ref 13–17)
LYMPHOCYTES # BLD AUTO: 0.2 K/UL — LOW (ref 1–3.3)
LYMPHOCYTES # BLD AUTO: 4.9 % — LOW (ref 13–44)
MCHC RBC-ENTMCNC: 32.3 PG — SIGNIFICANT CHANGE UP (ref 27–34)
MCHC RBC-ENTMCNC: 32.5 G/DL — SIGNIFICANT CHANGE UP (ref 32–36)
MCV RBC AUTO: 99.2 FL — SIGNIFICANT CHANGE UP (ref 80–100)
MONOCYTES # BLD AUTO: 0.4 K/UL — SIGNIFICANT CHANGE UP (ref 0–0.9)
MONOCYTES NFR BLD AUTO: 8.5 % — SIGNIFICANT CHANGE UP (ref 2–14)
NEUTROPHILS # BLD AUTO: 4.2 K/UL — SIGNIFICANT CHANGE UP (ref 1.8–7.4)
NEUTROPHILS NFR BLD AUTO: 83 % — HIGH (ref 43–77)
PLATELET # BLD AUTO: 251 K/UL — SIGNIFICANT CHANGE UP (ref 150–400)
RBC # BLD: 3.41 M/UL — LOW (ref 4.2–5.8)
RBC # FLD: 13.6 % — SIGNIFICANT CHANGE UP (ref 10.3–14.5)
WBC # BLD: 5.1 K/UL — SIGNIFICANT CHANGE UP (ref 3.8–10.5)
WBC # FLD AUTO: 5.1 K/UL — SIGNIFICANT CHANGE UP (ref 3.8–10.5)

## 2019-02-27 DIAGNOSIS — Z51.11 ENCOUNTER FOR ANTINEOPLASTIC CHEMOTHERAPY: ICD-10-CM

## 2019-02-27 DIAGNOSIS — R11.2 NAUSEA WITH VOMITING, UNSPECIFIED: ICD-10-CM

## 2019-02-28 ENCOUNTER — APPOINTMENT (OUTPATIENT)
Age: 64
End: 2019-02-28

## 2019-02-28 ENCOUNTER — APPOINTMENT (OUTPATIENT)
Dept: HEMATOLOGY ONCOLOGY | Facility: CLINIC | Age: 64
End: 2019-02-28
Payer: COMMERCIAL

## 2019-02-28 VITALS
HEIGHT: 65 IN | DIASTOLIC BLOOD PRESSURE: 68 MMHG | TEMPERATURE: 97.8 F | SYSTOLIC BLOOD PRESSURE: 109 MMHG | HEART RATE: 67 BPM | BODY MASS INDEX: 23.66 KG/M2 | WEIGHT: 142 LBS

## 2019-02-28 PROCEDURE — 99214 OFFICE O/P EST MOD 30 MIN: CPT

## 2019-03-05 ENCOUNTER — APPOINTMENT (OUTPATIENT)
Dept: HEMATOLOGY ONCOLOGY | Facility: CLINIC | Age: 64
End: 2019-03-05

## 2019-03-05 ENCOUNTER — APPOINTMENT (OUTPATIENT)
Age: 64
End: 2019-03-05

## 2019-03-07 ENCOUNTER — APPOINTMENT (OUTPATIENT)
Age: 64
End: 2019-03-07

## 2019-03-07 NOTE — ASSESSMENT
[FreeTextEntry1] : 62 y/o gentleman with Factor V Leiden mutation and stage IIB pancreatic cancer (T3N1) with positive CBD margin with peripancreatic soft tissue invasion, s/p Whipple on 11/30/17.  S/p 7 cycles of gemcitabine + Xeloda.  Post chemotherapy scans  7/2/18 CT scan with local recurrence. Completed chemoradiation with Xeloda from 7/24/18 - 8/30/18.  Foundation One testing with no actionable mutations, MSI-stable. \par \par 9/20/18 restaging CT scans with POD in upper abdomen and possible colitis. Started liposomal irinotecan 70 mg/m2 w/ Leucovorin 400 mg/m2 and continuous infusion 5 FU 2400 mg/m2 over 46 hrs every 2 weeks on 10/16/18.\par \par S/p 6 cycles of chemotherapy. 1/2/19 scans with partial response, no evidence of metastatic disease, + colitis. Colonoscopy w/ Dr. Evans on 1/23/19 shows mild hyperplastic change and scattered lymphoid aggregates and is negative for colitis, granuloma, or dysplasia. Admitted to [facility] 1/26 - 1/28/19 for anasarca, resolved post diuresis. C/o fatigue. Dr. Vela aware of admit and discharge. Liposomal Irinotecan dose reduced to 60 mg/m2 on 2/26/19. \par \par S/p C9 w/ dose reduced Liposomal Irinotecan to 60 mg/m2 on 2/26/19. Recent hospitalization for anasarca, improved w/ diuresis. Low albumin is likely a significant contributer to LE edema. Feels oral protein intake is good. Ca 19-9 is 221 today down from 299 on 2/13/19. Subjectively feels better. Noes new "outpouching" surrounding umbilicus when standing. Also noted when pushes abdominal muscles to sit up on exam today. No nodularity palpated.\par \par Plan:\par 1.Cont liposomal irinotecan/Leukovorin/5FU at reduced dose. \par 2. F/u w/ Dr. Curry re: ?ventral hernia \par 3. B/l leg swelling not increased, but still present. Non tender-  hypoalbuminemia is likely a contributer, - subjectively feels he is consuming ++ protein. S/p dopplers with PCP on 1/7/9 which were negative. - Re-enforced to weigh daily and use furosemide PRN 3 times per week.\par 4. C10 chemo o scheduled on 3/12/19.

## 2019-03-07 NOTE — HISTORY OF PRESENT ILLNESS
[Disease: _____________________] : Disease: [unfilled] [T: ___] : T[unfilled] [N: ___] : N[unfilled] [AJCC Stage: ____] : AJCC Stage: [unfilled] [de-identified] : Mr. Galaviz was diagnosed with pancreatic cancer in October 2017, at age 62 year old, after initially presenting with painless jaundice. EUS and ERCP on 10/31/17 revealed a 2.2 cm pancreatic head mass, with no apparent vessel involvement or lymphadenopathy. The CBD was stented. Final pathology was consistent with adenocarcinoma. EUS and ERCP on 10/31/17, which revealed a 2.2 cm pancreatic head mass, with no apparent vessel involvement or lymphadenopathy. The CBD was stented. Final pathology was consistent with adenocarcinoma. S/p Whipple, cholecystectomy, enterectomy x 2 and removal of CBD stent on 11/30/17. Final pathology was 3.1 cm pancreatic adenocarcinoma invading the nadira pancreatic tissue and CBD, and involving 2/26 lymph nodes (T3N1). The CBD resection margin was positive for adenocarcinoma with extensive nadira neural invasion. Mediport was placed on 1/17/18. \par - CT C/A/P 1/23/18: IMPRESSION: \par   No evidence of recurrent or metastatic disease in the chest, abdomen, or pelvis. \par   Stable postsurgical anatomy status post Whipple procedure. No evidence of bowel or gastric outlet obstruction. Pancreatic duct stent in place without \par   pancreatic duct or biliary dilatation. \par   2 tiny foci of extraluminal air in the right upper quadrant along a prior MUNA drain tract. This is likely secondary to recent drain removal. No drainable fluid collections. \par   Small amount of nonocclusive thrombus at the tip of the port catheter within the right brachiocephalic vein. \par \par Treatment:\par 2/7/18 - 6/13/18 adjuvant chemotherapy with gemcitabine + capecitabine (7 cycles)\par 4/12/18 CT C/A/P: no evidence of recurrent or metastatic disease.\par 7/2/18 CT a/p  : new soft tissue inferior to proximal superior mesenteric artery and to right of celiac axis in patient s/p whipple, suspicious for recurrent disease. \par 7/24/18-8/30/18  chemo-radiation with xeloda\par 9/20/18 CT A/P progression of disease in upper abdomen.\par 10/16/18 tx changed to liposomal irinotecan (70 mg/m2) w/ leucovorin 400 mg/m2 and 5FU 2400 mg/m2 over 46 hrs) every 2 weeks.\par \par  [de-identified] : Final Diagnosis\par 1. Lymph node at hepatic artery, excision:- One lymph node, negative for carcinoma, (0/1)\par 2. Periportal lymph node, excision:- One lymph node, negative for carcinoma, (0/1)\par 3. Gallbladder, cholecystectomy:- Chronic cholecystitis, negative for carcinoma\par - One lymph node, negative for carcinoma, (0/1)\par 4. Periportal lymph node, #2, excision:- One lymph node, negative for carcinoma, (0/1)\par 5. Pancreas, stomach and duodenum, pancreaticoduodenectomy(Whipple procedure):\par - Invasive adenocarcinoma of pancreas, moderately to poorly\par differentiated,pancreatobiliary type\par - Tumor invading into peripancreatic soft tissue and common bile duct\par - Comment bile duct resection margin, positive for adenocarcinoma, with\par extensive perineural invasion only, see comment- Pancreatic neck resection margin, negative for carcinoma\par - 18 lymph nodes, negative for carcinoma, (0/18)\par - AJCC (7th Ed) staging: pT3N1(2/26), see synoptic report,\par see Part 7 and Part 9\par 6. Portion of pancreas at portal vein #1, excision:- Atrophic pancreatitis, negative for carcinoma\par 7. Portion of pancreas at portal vein #2, excision:- Positive for adenocarcinoma, see comment\par 8. Portion of stent, removal:- Stent, gross examination only\par 9. Uncinate at superior mesenteric artery #1, excision:- Metastatic pancreatic adenocarcinoma present in 2 of 2 lymph nodes, (2/2)\par 10. Uncinate at superior mesenteric artery #2, excision:- Two lymph nodes, negative for carcinoma, (0/2)\par 11. Stomach and bowel at gastrojejunostomy site, excision:- Gastric and small bowel tissue, negative for carcinoma\par  [de-identified] : Denies fevers, SOB, CP, appetite is "very good", no constipation or diarrhea--> its more normal, mentions that when standing he notes a "an outpouching" surrounding his belly button. There is no pain there. Has no pain/burning w/ urination. Feels LE edema is improved. Energy level is is fair. Skin is very dry and scaling, despite moisturizing. His mouth was sore- but has improved "a little bit" The remainder of ROS is negative.

## 2019-03-07 NOTE — PHYSICAL EXAM
[Ambulatory and capable of all self care but unable to carry out any work activities] : Status 2- Ambulatory and capable of all self care but unable to carry out any work activities. Up and about more than 50% of waking hours [Normal] : affect appropriate [de-identified] : Interactive, well groomed, in NAD [de-identified] : + erytema, no white patches or ulceration. [de-identified] : negative cervical supra/infraclavicular adenopathy [de-identified] : clear b/l [de-identified] : S1/S2 grade 3 murmur heard best over L sternal border. [de-identified] : Thighs edematous- mild distal LE edema. [de-identified] : BS+, soft, nontender on palpation. On exam w/ muscle tightening ventral hernia? noted. No palable nodules. [de-identified] : without spinal or cva tenderness.

## 2019-03-07 NOTE — RESULTS/DATA
[FreeTextEntry1] : Random colon bx 1/23/19\par Surgical Final Report\par \par Final Diagnosis\par \par Colon, random biopsies\par - Mild hyperplastic change and scattered lymphoid aggregates\par - Negative for colitis, granuloma, or dysplasia\par

## 2019-03-12 ENCOUNTER — LABORATORY RESULT (OUTPATIENT)
Age: 64
End: 2019-03-12

## 2019-03-12 ENCOUNTER — RESULT REVIEW (OUTPATIENT)
Age: 64
End: 2019-03-12

## 2019-03-12 ENCOUNTER — APPOINTMENT (OUTPATIENT)
Age: 64
End: 2019-03-12

## 2019-03-12 ENCOUNTER — RX RENEWAL (OUTPATIENT)
Age: 64
End: 2019-03-12

## 2019-03-12 LAB
BASOPHILS # BLD AUTO: 0 K/UL — SIGNIFICANT CHANGE UP (ref 0–0.2)
BASOPHILS NFR BLD AUTO: 0.8 % — SIGNIFICANT CHANGE UP (ref 0–2)
EOSINOPHIL # BLD AUTO: 0.2 K/UL — SIGNIFICANT CHANGE UP (ref 0–0.5)
EOSINOPHIL NFR BLD AUTO: 3.5 % — SIGNIFICANT CHANGE UP (ref 0–6)
HCT VFR BLD CALC: 35.4 % — LOW (ref 39–50)
HGB BLD-MCNC: 11.4 G/DL — LOW (ref 13–17)
LYMPHOCYTES # BLD AUTO: 0.3 K/UL — LOW (ref 1–3.3)
LYMPHOCYTES # BLD AUTO: 6.9 % — LOW (ref 13–44)
MCHC RBC-ENTMCNC: 32.3 G/DL — SIGNIFICANT CHANGE UP (ref 32–36)
MCHC RBC-ENTMCNC: 32.4 PG — SIGNIFICANT CHANGE UP (ref 27–34)
MCV RBC AUTO: 100.1 FL — HIGH (ref 80–100)
MONOCYTES # BLD AUTO: 0.5 K/UL — SIGNIFICANT CHANGE UP (ref 0–0.9)
MONOCYTES NFR BLD AUTO: 11.9 % — SIGNIFICANT CHANGE UP (ref 2–14)
NEUTROPHILS # BLD AUTO: 3.4 K/UL — SIGNIFICANT CHANGE UP (ref 1.8–7.4)
NEUTROPHILS NFR BLD AUTO: 77 % — SIGNIFICANT CHANGE UP (ref 43–77)
PLATELET # BLD AUTO: 212 K/UL — SIGNIFICANT CHANGE UP (ref 150–400)
RBC # BLD: 3.53 M/UL — LOW (ref 4.2–5.8)
RBC # FLD: 13.9 % — SIGNIFICANT CHANGE UP (ref 10.3–14.5)
WBC # BLD: 4.4 K/UL — SIGNIFICANT CHANGE UP (ref 3.8–10.5)
WBC # FLD AUTO: 4.4 K/UL — SIGNIFICANT CHANGE UP (ref 3.8–10.5)

## 2019-03-13 ENCOUNTER — APPOINTMENT (OUTPATIENT)
Dept: SURGICAL ONCOLOGY | Facility: CLINIC | Age: 64
End: 2019-03-13
Payer: COMMERCIAL

## 2019-03-13 VITALS
SYSTOLIC BLOOD PRESSURE: 101 MMHG | BODY MASS INDEX: 23.66 KG/M2 | DIASTOLIC BLOOD PRESSURE: 67 MMHG | WEIGHT: 142 LBS | HEART RATE: 58 BPM | HEIGHT: 65 IN

## 2019-03-13 PROCEDURE — 99215 OFFICE O/P EST HI 40 MIN: CPT

## 2019-03-13 NOTE — HISTORY OF PRESENT ILLNESS
[de-identified] : 63 year-old male returns for follow up, s/p Whipple, cholecystectomy, enterectomy x 2 and removal of CBD stent on 11/30/17.  Final pathology was 3.1 cm pancreatic adenocarcinoma invading the nadira pancreatic tissue and CBD, and involving 2/26 lymph nodes (T3N1).  The CBD resection margin was positive for adenocarcinoma with extensive nadira neural invasion.  Pathology of the gallbladder was benign.\par \par He consulted with Dr. Vela and Dr. Bhardwaj in the postop setting. In light of the positive margin and extensive peripancreatic soft tissue invasion, decision was made to proceed with adjuvant chemotherapy (completed 2/7/18- 6/13/18) followed by concurrent chemoradiation (started 7/24/18).  \par \par Restaging post chemo and radiation therapy CT A/P/C was completed on 9/20/18.  There is confluent soft tissue surrounding the proximal SMA is more conspicuous.  There is new soft tissue surrounding the proximal portion of the main portal vein which results in narrowing of the main portal vein.  Soft tissue is also noted to the right of the celiac axis and extending into the periportal region consistent with progression of disease.  There is interval development of a small amount of ascites.  \par  \par He received additional chemotherapy per Dr. Vela.\par \par Ca 19-9:  221 (2/26/19)\par \par He underwent a repeat CT A/P/C in January 2019 which revealed no evidence of metastatic disease in the thorax, decrease in size of the soft tissue  mass surrounding the proximal superior mesenteric artery and interposed between the celiac access and portal vein, increased pelvic ascites and colitis. \par \par He continues chemotherapy and Dr. Vela will arrange for repeat scans in the near future.\par \par Today he notes interval development of an incisional hernia but there are no associated obstructive symptoms. \par \par Previous pertinent history is as follows:\par \par  He was admitted to [facility] on 10/23/17 with painless obstructive jaundice.  Imaging included CTs of the chest, abdomen and pelvis as well as an MRI which demonstrated a pancreatic head mass causing CBD and PD dilatation, but no evidence of any distant lesions.\par \par He had an initial EUS on 10/25/17 which showed a pancreatic head mass and FNA was atypical and essentially non diagnostic.  He had a second EUS and ERCP on 10/31/17, which revealed a 2.2 cm pancreatic head mass, with no apparent vessel involvement or lymphadenopathy.  The CBD was stented.  Final pathology was consistent with adenocarcinoma.\par \par His PMH is otherwise notable for HTN, asthma, BPH and DM II.  He has had no prior surgeries.  He endorses recent weight loss but is otherwise asymptomatic.  He is pending cardiac and medical clearance.\par \par PCP: Dr. Emily Moran\par Referring MD: in house\par GI: Dr. Scott Moura\par Cardiologist: Dr. Jose Mae\par Rad Onc: Dr. Jag Bhardwaj\par Med Onc: Dr. Mervat Vela

## 2019-03-13 NOTE — PHYSICAL EXAM
[Normal] : supple, no neck mass and thyroid not enlarged [Normal Neck Lymph Nodes] : normal neck lymph nodes  [Normal Supraclavicular Lymph Nodes] : normal supraclavicular lymph nodes [Normal] : oriented to person, place and time, with appropriate affect [de-identified] : Well-healed abdominal incisions with reducible ventral hernia.

## 2019-03-13 NOTE — CONSULT LETTER
[FreeTextEntry2] : Emily Moran MD [FreeTextEntry1] : 63 year-old male returns for follow up, s/p Whipple, cholecystectomy, enterectomy x 2 and removal of CBD stent on 11/30/17.  Final pathology was 3.1 cm pancreatic adenocarcinoma invading the nadira pancreatic tissue and CBD, and involving 2/26 lymph nodes (T3N1).  The CBD resection margin was positive for adenocarcinoma with extensive nadira neural invasion.  Pathology of the gallbladder was benign.\par \par He consulted with Dr. Vela and Dr. Bhardwaj in the postop setting. In light of the positive margin and extensive peripancreatic soft tissue invasion, decision was made to proceed with adjuvant chemotherapy (completed 2/7/18- 6/13/18) followed by concurrent chemoradiation (started 7/24/18).  \par \par Restaging post chemo and radiation therapy CT A/P/C was completed on 9/20/18.  There is confluent soft tissue surrounding the proximal SMA is more conspicuous.  There is new soft tissue surrounding the proximal portion of the main portal vein which results in narrowing of the main portal vein.  Soft tissue is also noted to the right of the celiac axis and extending into the periportal region consistent with progression of disease.  There is interval development of a small amount of ascites.  \par  \par He received additional chemotherapy per Dr. Vela.\par \par Ca 19-9:  221 (2/26/19)\par \par He underwent a repeat CT A/P/C in January 2019 which revealed no evidence of metastatic disease in the thorax, decrease in size of the soft tissue  mass surrounding the proximal superior mesenteric artery and interposed between the celiac access and portal vein, increased pelvic ascites and colitis. \par \par He continues chemotherapy and Dr. Vela will arrange for repeat scans in the near future.\par \par Today he notes interval development of an incisional hernia but there are no associated obstructive symptoms. \par \par Previous pertinent history is as follows:\par \par  He was admitted to [facility] on 10/23/17 with painless obstructive jaundice.  Imaging included CTs of the chest, abdomen and pelvis as well as an MRI which demonstrated a pancreatic head mass causing CBD and PD dilatation, but no evidence of any distant lesions.\par \par He had an initial EUS on 10/25/17 which showed a pancreatic head mass and FNA was atypical and essentially non diagnostic.  He had a second EUS and ERCP on 10/31/17, which revealed a 2.2 cm pancreatic head mass, with no apparent vessel involvement or lymphadenopathy.  The CBD was stented.  Final pathology was consistent with adenocarcinoma.\par \par His PMH is otherwise notable for HTN, asthma, BPH and DM II.  He has had no prior surgeries.  He endorses recent weight loss but is otherwise asymptomatic.  He is pending cardiac and medical clearance.\par \par PCP: Dr. Emily Moran\par Referring MD: in house\par GI: Dr. Scott Moura\par Cardiologist: Dr. Jose Mae\par Rad Onc: Dr. Jag Bhardwaj\par Med Onc: Dr. Mervat Vela [FreeTextEntry3] : Rene Curry MD, FACS, FASCRS\par , Department of Surgery\par Director of the [facility] [place]\par , Minimally Invasive/Robotic Cancer Surgery, Central & [place] Divisions\par Division of Surgical Oncology

## 2019-03-14 ENCOUNTER — APPOINTMENT (OUTPATIENT)
Age: 64
End: 2019-03-14

## 2019-03-18 ENCOUNTER — OUTPATIENT (OUTPATIENT)
Dept: OUTPATIENT SERVICES | Facility: HOSPITAL | Age: 64
LOS: 1 days | Discharge: HOME | End: 2019-03-18

## 2019-03-18 DIAGNOSIS — C25.9 MALIGNANT NEOPLASM OF PANCREAS, UNSPECIFIED: ICD-10-CM

## 2019-03-18 DIAGNOSIS — D68.51 ACTIVATED PROTEIN C RESISTANCE: ICD-10-CM

## 2019-03-18 DIAGNOSIS — K90.81 WHIPPLE'S DISEASE: Chronic | ICD-10-CM

## 2019-03-18 DIAGNOSIS — Z98.890 OTHER SPECIFIED POSTPROCEDURAL STATES: Chronic | ICD-10-CM

## 2019-03-18 DIAGNOSIS — Z95.828 PRESENCE OF OTHER VASCULAR IMPLANTS AND GRAFTS: Chronic | ICD-10-CM

## 2019-03-19 ENCOUNTER — APPOINTMENT (OUTPATIENT)
Age: 64
End: 2019-03-19

## 2019-03-19 ENCOUNTER — APPOINTMENT (OUTPATIENT)
Dept: HEMATOLOGY ONCOLOGY | Facility: CLINIC | Age: 64
End: 2019-03-19

## 2019-03-21 ENCOUNTER — APPOINTMENT (OUTPATIENT)
Age: 64
End: 2019-03-21

## 2019-03-26 ENCOUNTER — LABORATORY RESULT (OUTPATIENT)
Age: 64
End: 2019-03-26

## 2019-03-26 ENCOUNTER — APPOINTMENT (OUTPATIENT)
Age: 64
End: 2019-03-26

## 2019-03-27 DIAGNOSIS — R11.2 NAUSEA WITH VOMITING, UNSPECIFIED: ICD-10-CM

## 2019-03-27 DIAGNOSIS — Z51.11 ENCOUNTER FOR ANTINEOPLASTIC CHEMOTHERAPY: ICD-10-CM

## 2019-03-28 ENCOUNTER — APPOINTMENT (OUTPATIENT)
Age: 64
End: 2019-03-28

## 2019-04-09 ENCOUNTER — RESULT REVIEW (OUTPATIENT)
Age: 64
End: 2019-04-09

## 2019-04-09 ENCOUNTER — APPOINTMENT (OUTPATIENT)
Age: 64
End: 2019-04-09

## 2019-04-09 ENCOUNTER — LABORATORY RESULT (OUTPATIENT)
Age: 64
End: 2019-04-09

## 2019-04-09 LAB
BASOPHILS # BLD AUTO: 0.1 K/UL — SIGNIFICANT CHANGE UP (ref 0–0.2)
BASOPHILS NFR BLD AUTO: 1.6 % — SIGNIFICANT CHANGE UP (ref 0–2)
EOSINOPHIL # BLD AUTO: 0.3 K/UL — SIGNIFICANT CHANGE UP (ref 0–0.5)
EOSINOPHIL NFR BLD AUTO: 6.7 % — HIGH (ref 0–6)
HCT VFR BLD CALC: 37.6 % — LOW (ref 39–50)
HGB BLD-MCNC: 12.5 G/DL — LOW (ref 13–17)
LYMPHOCYTES # BLD AUTO: 0.3 K/UL — LOW (ref 1–3.3)
LYMPHOCYTES # BLD AUTO: 7.9 % — LOW (ref 13–44)
MCHC RBC-ENTMCNC: 32.9 PG — SIGNIFICANT CHANGE UP (ref 27–34)
MCHC RBC-ENTMCNC: 33.2 G/DL — SIGNIFICANT CHANGE UP (ref 32–36)
MCV RBC AUTO: 99 FL — SIGNIFICANT CHANGE UP (ref 80–100)
MONOCYTES # BLD AUTO: 0.6 K/UL — SIGNIFICANT CHANGE UP (ref 0–0.9)
MONOCYTES NFR BLD AUTO: 14 % — SIGNIFICANT CHANGE UP (ref 2–14)
NEUTROPHILS # BLD AUTO: 3.1 K/UL — SIGNIFICANT CHANGE UP (ref 1.8–7.4)
NEUTROPHILS NFR BLD AUTO: 69.9 % — SIGNIFICANT CHANGE UP (ref 43–77)
PLATELET # BLD AUTO: 270 K/UL — SIGNIFICANT CHANGE UP (ref 150–400)
RBC # BLD: 3.79 M/UL — LOW (ref 4.2–5.8)
RBC # FLD: 14 % — SIGNIFICANT CHANGE UP (ref 10.3–14.5)
WBC # BLD: 4.4 K/UL — SIGNIFICANT CHANGE UP (ref 3.8–10.5)
WBC # FLD AUTO: 4.4 K/UL — SIGNIFICANT CHANGE UP (ref 3.8–10.5)

## 2019-04-10 ENCOUNTER — APPOINTMENT (OUTPATIENT)
Dept: HEMATOLOGY ONCOLOGY | Facility: CLINIC | Age: 64
End: 2019-04-10
Payer: COMMERCIAL

## 2019-04-10 VITALS
BODY MASS INDEX: 23.07 KG/M2 | OXYGEN SATURATION: 100 % | WEIGHT: 138.44 LBS | TEMPERATURE: 97.1 F | HEART RATE: 62 BPM | HEIGHT: 65 IN | DIASTOLIC BLOOD PRESSURE: 66 MMHG | SYSTOLIC BLOOD PRESSURE: 107 MMHG

## 2019-04-10 DIAGNOSIS — R63.4 ABNORMAL WEIGHT LOSS: ICD-10-CM

## 2019-04-10 DIAGNOSIS — Z51.5 ENCOUNTER FOR PALLIATIVE CARE: ICD-10-CM

## 2019-04-10 PROCEDURE — 99204 OFFICE O/P NEW MOD 45 MIN: CPT

## 2019-04-11 ENCOUNTER — APPOINTMENT (OUTPATIENT)
Age: 64
End: 2019-04-11

## 2019-04-11 ENCOUNTER — APPOINTMENT (OUTPATIENT)
Dept: HEMATOLOGY ONCOLOGY | Facility: CLINIC | Age: 64
End: 2019-04-11
Payer: COMMERCIAL

## 2019-04-11 VITALS
SYSTOLIC BLOOD PRESSURE: 91 MMHG | TEMPERATURE: 98.5 F | OXYGEN SATURATION: 98 % | BODY MASS INDEX: 22.99 KG/M2 | HEART RATE: 80 BPM | HEIGHT: 65 IN | WEIGHT: 138 LBS | DIASTOLIC BLOOD PRESSURE: 48 MMHG

## 2019-04-11 PROCEDURE — 99214 OFFICE O/P EST MOD 30 MIN: CPT

## 2019-04-11 NOTE — ASSESSMENT
[FreeTextEntry1] : 64 y/o gentleman with Factor V Leiden mutation and stage IIB pancreatic cancer (T3N1) with positive CBD margin with peripancreatic soft tissue invasion, s/p Whipple on 11/30/17.  S/p 7 cycles of gemcitabine + Xeloda.  Post chemotherapy scans  7/2/18 CT scan with local recurrence. Completed chemoradiation with Xeloda from 7/24/18 - 8/30/18.  Foundation One testing with no actionable mutations, MSI-stable. \par \par 9/20/18 restaging CT scans with POD in upper abdomen and possible colitis. Started liposomal irinotecan 70 mg/m2 w/ Leucovorin 400 mg/m2 and continuous infusion 5 FU 2400 mg/m2 over 46 hrs every 2 weeks on 10/16/18.\par \par Continues to have intermittent constipation alternating with loose BMs throughout the cycle. Weight is stable.\par Edema improved on lasix.  is trending up\par \par \par \par Plan:\par 1.Cont liposomal irinotecan/Leukovorin/5FU at reduced dose. \par 2. restaging scans now\par 3. edema - continue lasix\par 4. follow up in 2 weeks

## 2019-04-11 NOTE — HISTORY OF PRESENT ILLNESS
[Disease: _____________________] : Disease: [unfilled] [T: ___] : T[unfilled] [N: ___] : N[unfilled] [AJCC Stage: ____] : AJCC Stage: [unfilled] [de-identified] : Mr. Galaviz was diagnosed with pancreatic cancer in October 2017, at age 62 year old, after initially presenting with painless jaundice. EUS and ERCP on 10/31/17 revealed a 2.2 cm pancreatic head mass, with no apparent vessel involvement or lymphadenopathy. The CBD was stented. Final pathology was consistent with adenocarcinoma. EUS and ERCP on 10/31/17, which revealed a 2.2 cm pancreatic head mass, with no apparent vessel involvement or lymphadenopathy. The CBD was stented. Final pathology was consistent with adenocarcinoma. S/p Whipple, cholecystectomy, enterectomy x 2 and removal of CBD stent on 11/30/17. Final pathology was 3.1 cm pancreatic adenocarcinoma invading the nadira pancreatic tissue and CBD, and involving 2/26 lymph nodes (T3N1). The CBD resection margin was positive for adenocarcinoma with extensive nadira neural invasion. Mediport was placed on 1/17/18. \par - CT C/A/P 1/23/18: IMPRESSION: \par   No evidence of recurrent or metastatic disease in the chest, abdomen, or pelvis. \par   Stable postsurgical anatomy status post Whipple procedure. No evidence of bowel or gastric outlet obstruction. Pancreatic duct stent in place without \par   pancreatic duct or biliary dilatation. \par   2 tiny foci of extraluminal air in the right upper quadrant along a prior MUNA drain tract. This is likely secondary to recent drain removal. No drainable fluid collections. \par   Small amount of nonocclusive thrombus at the tip of the port catheter within the right brachiocephalic vein. \par \par Treatment:\par 2/7/18 - 6/13/18 adjuvant chemotherapy with gemcitabine + capecitabine (7 cycles)\par 4/12/18 CT C/A/P: no evidence of recurrent or metastatic disease.\par 7/2/18 CT a/p  : new soft tissue inferior to proximal superior mesenteric artery and to right of celiac axis in patient s/p whipple, suspicious for recurrent disease. \par 7/24/18-8/30/18  chemo-radiation with xeloda\par 9/20/18 CT A/P progression of disease in upper abdomen.\par 10/16/18 tx changed to liposomal irinotecan (70 mg/m2) w/ leucovorin 400 mg/m2 and 5FU 2400 mg/m2 over 46 hrs) every 2 weeks.\par \par  [de-identified] : Final Diagnosis\par 1. Lymph node at hepatic artery, excision:- One lymph node, negative for carcinoma, (0/1)\par 2. Periportal lymph node, excision:- One lymph node, negative for carcinoma, (0/1)\par 3. Gallbladder, cholecystectomy:- Chronic cholecystitis, negative for carcinoma\par - One lymph node, negative for carcinoma, (0/1)\par 4. Periportal lymph node, #2, excision:- One lymph node, negative for carcinoma, (0/1)\par 5. Pancreas, stomach and duodenum, pancreaticoduodenectomy(Whipple procedure):\par - Invasive adenocarcinoma of pancreas, moderately to poorly\par differentiated,pancreatobiliary type\par - Tumor invading into peripancreatic soft tissue and common bile duct\par - Comment bile duct resection margin, positive for adenocarcinoma, with\par extensive perineural invasion only, see comment- Pancreatic neck resection margin, negative for carcinoma\par - 18 lymph nodes, negative for carcinoma, (0/18)\par - AJCC (7th Ed) staging: pT3N1(2/26), see synoptic report,\par see Part 7 and Part 9\par 6. Portion of pancreas at portal vein #1, excision:- Atrophic pancreatitis, negative for carcinoma\par 7. Portion of pancreas at portal vein #2, excision:- Positive for adenocarcinoma, see comment\par 8. Portion of stent, removal:- Stent, gross examination only\par 9. Uncinate at superior mesenteric artery #1, excision:- Metastatic pancreatic adenocarcinoma present in 2 of 2 lymph nodes, (2/2)\par 10. Uncinate at superior mesenteric artery #2, excision:- Two lymph nodes, negative for carcinoma, (0/2)\par 11. Stomach and bowel at gastrojejunostomy site, excision:- Gastric and small bowel tissue, negative for carcinoma\par  [de-identified] : Returns for follow up.\kayce Continues on chemotherapy. FEels tired today as he was disconnected from chemo. \par Reports he tires easily and feels anxious about not being able to do things. \par He continues on creon before meals and snacks.\par He alternates with constipation and diarrhea (watery and sometimes soft/loose).\par He reports a good appetite.

## 2019-04-11 NOTE — PHYSICAL EXAM
[Ambulatory and capable of all self care but unable to carry out any work activities] : Status 2- Ambulatory and capable of all self care but unable to carry out any work activities. Up and about more than 50% of waking hours [Normal] : affect appropriate [de-identified] : Interactive, well groomed, in NAD [de-identified] : negative cervical supra/infraclavicular adenopathy [de-identified] : clear b/l [de-identified] : S1/S2 grade 3 murmur heard best over L sternal border. [de-identified] : trace edema [de-identified] : BS+, soft, nontender on palpation, upper abd nodule ~2-3 cm [de-identified] : without spinal or cva tenderness.

## 2019-04-15 ENCOUNTER — FORM ENCOUNTER (OUTPATIENT)
Age: 64
End: 2019-04-15

## 2019-04-16 ENCOUNTER — RX RENEWAL (OUTPATIENT)
Age: 64
End: 2019-04-16

## 2019-04-16 ENCOUNTER — OUTPATIENT (OUTPATIENT)
Dept: OUTPATIENT SERVICES | Facility: HOSPITAL | Age: 64
LOS: 1 days | End: 2019-04-16
Payer: COMMERCIAL

## 2019-04-16 ENCOUNTER — APPOINTMENT (OUTPATIENT)
Dept: CT IMAGING | Facility: CLINIC | Age: 64
End: 2019-04-16
Payer: COMMERCIAL

## 2019-04-16 DIAGNOSIS — C25.9 MALIGNANT NEOPLASM OF PANCREAS, UNSPECIFIED: ICD-10-CM

## 2019-04-16 DIAGNOSIS — Z98.890 OTHER SPECIFIED POSTPROCEDURAL STATES: Chronic | ICD-10-CM

## 2019-04-16 DIAGNOSIS — Z95.828 PRESENCE OF OTHER VASCULAR IMPLANTS AND GRAFTS: Chronic | ICD-10-CM

## 2019-04-16 DIAGNOSIS — K90.81 WHIPPLE'S DISEASE: Chronic | ICD-10-CM

## 2019-04-16 DIAGNOSIS — Z00.00 ENCOUNTER FOR GENERAL ADULT MEDICAL EXAMINATION WITHOUT ABNORMAL FINDINGS: ICD-10-CM

## 2019-04-16 PROCEDURE — 74177 CT ABD & PELVIS W/CONTRAST: CPT | Mod: 26

## 2019-04-16 PROCEDURE — 71260 CT THORAX DX C+: CPT | Mod: 26

## 2019-04-16 PROCEDURE — 71260 CT THORAX DX C+: CPT

## 2019-04-16 PROCEDURE — 74177 CT ABD & PELVIS W/CONTRAST: CPT

## 2019-04-16 RX ORDER — LIDOCAINE HYDROCHLORIDE 20 MG/ML
2 SOLUTION OROPHARYNGEAL
Qty: 100 | Refills: 2 | Status: ACTIVE | COMMUNITY
Start: 2018-12-26 | End: 1900-01-01

## 2019-04-17 PROBLEM — Z51.5 PALLIATIVE CARE BY SPECIALIST: Status: ACTIVE | Noted: 2019-04-17

## 2019-04-17 PROBLEM — R63.4 WEIGHT LOSS: Status: ACTIVE | Noted: 2018-06-07

## 2019-04-17 NOTE — REASON FOR VISIT
[Initial Consultation] : an initial consultation [FreeTextEntry2] : medical marijuana certification/symptom management

## 2019-04-17 NOTE — REVIEW OF SYSTEMS
[Fatigue] : fatigue [Recent Change In Weight] : ~T recent weight change [Abdominal Pain] : abdominal pain [Insomnia] : insomnia [Anxiety] : anxiety [Negative] : Allergic/Immunologic

## 2019-04-17 NOTE — ASSESSMENT
[Palliative] : Goals of care discussed with patient: Palliative [FreeTextEntry1] : Polysymptomatology in setting of recurrent PDCA.Pt is eligible for Mm.R/B d/w patient, main goal id improvement in anxiety/insomnia, wt gain.Pt to start and re-assess appx 1 mo. [Palliative Care Plan] : not applicable at this time

## 2019-04-17 NOTE — HISTORY OF PRESENT ILLNESS
[de-identified] : 63 year old man who has been treated for recurrent pancreatic cancer after intially presenting for adjuvant treatment.Has rec'd [place]/Abraxane and is currently being treated with Ondivyde/w 5FU as second line therapy.Reports initial wt loss, s/s poor taste and appetite, poor sleep and anxiety, but not consistently.Requesting cert of Mm to help w symptom burden.Would like to feel stronger and be able to do what he used to do.Like to work with his hands especially.

## 2019-04-18 ENCOUNTER — OUTPATIENT (OUTPATIENT)
Dept: OUTPATIENT SERVICES | Facility: HOSPITAL | Age: 64
LOS: 1 days | Discharge: HOME | End: 2019-04-18

## 2019-04-18 DIAGNOSIS — Z95.828 PRESENCE OF OTHER VASCULAR IMPLANTS AND GRAFTS: Chronic | ICD-10-CM

## 2019-04-18 DIAGNOSIS — D68.51 ACTIVATED PROTEIN C RESISTANCE: ICD-10-CM

## 2019-04-18 DIAGNOSIS — C25.9 MALIGNANT NEOPLASM OF PANCREAS, UNSPECIFIED: ICD-10-CM

## 2019-04-18 DIAGNOSIS — Z98.890 OTHER SPECIFIED POSTPROCEDURAL STATES: Chronic | ICD-10-CM

## 2019-04-18 DIAGNOSIS — K90.81 WHIPPLE'S DISEASE: Chronic | ICD-10-CM

## 2019-04-23 ENCOUNTER — LABORATORY RESULT (OUTPATIENT)
Age: 64
End: 2019-04-23

## 2019-04-23 ENCOUNTER — RESULT REVIEW (OUTPATIENT)
Age: 64
End: 2019-04-23

## 2019-04-23 ENCOUNTER — APPOINTMENT (OUTPATIENT)
Age: 64
End: 2019-04-23

## 2019-04-23 ENCOUNTER — APPOINTMENT (OUTPATIENT)
Dept: HEMATOLOGY ONCOLOGY | Facility: CLINIC | Age: 64
End: 2019-04-23
Payer: COMMERCIAL

## 2019-04-23 LAB
BASOPHILS # BLD AUTO: 0.1 K/UL — SIGNIFICANT CHANGE UP (ref 0–0.2)
BASOPHILS NFR BLD AUTO: 1.1 % — SIGNIFICANT CHANGE UP (ref 0–2)
EOSINOPHIL # BLD AUTO: 0.2 K/UL — SIGNIFICANT CHANGE UP (ref 0–0.5)
EOSINOPHIL NFR BLD AUTO: 4.4 % — SIGNIFICANT CHANGE UP (ref 0–6)
HCT VFR BLD CALC: 37.8 % — LOW (ref 39–50)
HGB BLD-MCNC: 12.5 G/DL — LOW (ref 13–17)
LYMPHOCYTES # BLD AUTO: 0.4 K/UL — LOW (ref 1–3.3)
LYMPHOCYTES # BLD AUTO: 9.7 % — LOW (ref 13–44)
MCHC RBC-ENTMCNC: 32.8 PG — SIGNIFICANT CHANGE UP (ref 27–34)
MCHC RBC-ENTMCNC: 33 G/DL — SIGNIFICANT CHANGE UP (ref 32–36)
MCV RBC AUTO: 99.3 FL — SIGNIFICANT CHANGE UP (ref 80–100)
MONOCYTES # BLD AUTO: 0.7 K/UL — SIGNIFICANT CHANGE UP (ref 0–0.9)
MONOCYTES NFR BLD AUTO: 14.8 % — HIGH (ref 2–14)
NEUTROPHILS # BLD AUTO: 3.2 K/UL — SIGNIFICANT CHANGE UP (ref 1.8–7.4)
NEUTROPHILS NFR BLD AUTO: 70 % — SIGNIFICANT CHANGE UP (ref 43–77)
PLATELET # BLD AUTO: 240 K/UL — SIGNIFICANT CHANGE UP (ref 150–400)
RBC # BLD: 3.81 M/UL — LOW (ref 4.2–5.8)
RBC # FLD: 13.6 % — SIGNIFICANT CHANGE UP (ref 10.3–14.5)
WBC # BLD: 4.6 K/UL — SIGNIFICANT CHANGE UP (ref 3.8–10.5)
WBC # FLD AUTO: 4.6 K/UL — SIGNIFICANT CHANGE UP (ref 3.8–10.5)

## 2019-04-23 PROCEDURE — 99214 OFFICE O/P EST MOD 30 MIN: CPT

## 2019-04-23 NOTE — ADDENDUM
[FreeTextEntry1] : I, Rita Pederson, acted solely as a scribe for Dr. Mervat Vela on this date 4/23/19.

## 2019-04-23 NOTE — ASSESSMENT
[FreeTextEntry1] : 64 y/o gentleman with Factor V Leiden mutation and stage IIB pancreatic cancer (T3N1) with positive CBD margin with peripancreatic soft tissue invasion, s/p Whipple on 11/30/17.  S/p 7 cycles of gemcitabine + Xeloda.  Post chemotherapy scans  7/2/18 CT scan with local recurrence. Completed chemoradiation with Xeloda from 7/24/18 - 8/30/18.  Foundation One testing with no actionable mutations, MSI-stable. \par 9/20/18 restaging CT scans with POD in upper abdomen and possible colitis. Started liposomal irinotecan 70 mg/m2 w/ Leucovorin 400 mg/m2 and continuous infusion 5 FU 2400 mg/m2 over 46 hrs every 2 weeks on 10/16/18.\par \par Continues to have intermittent constipation alternating with loose BMs throughout the cycle. Weight is stable.\par 4/16/19 CT scans showed stable disease. \par \par Plan:\par 1. Cont liposomal irinotecan/Leukovorin/5FU at reduced dose. \par 2. edema - continue lasix\par 3. follow up in 1 month

## 2019-04-23 NOTE — HISTORY OF PRESENT ILLNESS
[Disease: _____________________] : Disease: [unfilled] [T: ___] : T[unfilled] [N: ___] : N[unfilled] [AJCC Stage: ____] : AJCC Stage: [unfilled] [de-identified] : Mr. Galaviz was diagnosed with pancreatic cancer in October 2017, at age 62 year old, after initially presenting with painless jaundice. EUS and ERCP on 10/31/17 revealed a 2.2 cm pancreatic head mass, with no apparent vessel involvement or lymphadenopathy. The CBD was stented. Final pathology was consistent with adenocarcinoma. EUS and ERCP on 10/31/17, which revealed a 2.2 cm pancreatic head mass, with no apparent vessel involvement or lymphadenopathy. The CBD was stented. Final pathology was consistent with adenocarcinoma. S/p Whipple, cholecystectomy, enterectomy x 2 and removal of CBD stent on 11/30/17. Final pathology was 3.1 cm pancreatic adenocarcinoma invading the nadira pancreatic tissue and CBD, and involving 2/26 lymph nodes (T3N1). The CBD resection margin was positive for adenocarcinoma with extensive nadira neural invasion. Mediport was placed on 1/17/18. \par - CT C/A/P 1/23/18: IMPRESSION: \par   No evidence of recurrent or metastatic disease in the chest, abdomen, or pelvis. \par   Stable postsurgical anatomy status post Whipple procedure. No evidence of bowel or gastric outlet obstruction. Pancreatic duct stent in place without \par   pancreatic duct or biliary dilatation. \par   2 tiny foci of extraluminal air in the right upper quadrant along a prior MUNA drain tract. This is likely secondary to recent drain removal. No drainable fluid collections. \par   Small amount of nonocclusive thrombus at the tip of the port catheter within the right brachiocephalic vein. \par \par Treatment:\par 2/7/18 - 6/13/18 adjuvant chemotherapy with gemcitabine + capecitabine (7 cycles)\par 4/12/18 CT C/A/P: no evidence of recurrent or metastatic disease.\par 7/2/18 CT a/p  : new soft tissue inferior to proximal superior mesenteric artery and to right of celiac axis in patient s/p whipple, suspicious for recurrent disease. \par 7/24/18-8/30/18  chemo-radiation with xeloda\par 9/20/18 CT A/P progression of disease in upper abdomen.\par 10/16/18 tx changed to liposomal irinotecan (70 mg/m2) w/ leucovorin 400 mg/m2 and 5FU 2400 mg/m2 over 46 hrs) every 2 weeks.\par \par  [de-identified] : Final Diagnosis\par 1. Lymph node at hepatic artery, excision:- One lymph node, negative for carcinoma, (0/1)\par 2. Periportal lymph node, excision:- One lymph node, negative for carcinoma, (0/1)\par 3. Gallbladder, cholecystectomy:- Chronic cholecystitis, negative for carcinoma\par - One lymph node, negative for carcinoma, (0/1)\par 4. Periportal lymph node, #2, excision:- One lymph node, negative for carcinoma, (0/1)\par 5. Pancreas, stomach and duodenum, pancreaticoduodenectomy(Whipple procedure):\par - Invasive adenocarcinoma of pancreas, moderately to poorly\par differentiated,pancreatobiliary type\par - Tumor invading into peripancreatic soft tissue and common bile duct\par - Comment bile duct resection margin, positive for adenocarcinoma, with\par extensive perineural invasion only, see comment- Pancreatic neck resection margin, negative for carcinoma\par - 18 lymph nodes, negative for carcinoma, (0/18)\par - AJCC (7th Ed) staging: pT3N1(2/26), see synoptic report,\par see Part 7 and Part 9\par 6. Portion of pancreas at portal vein #1, excision:- Atrophic pancreatitis, negative for carcinoma\par 7. Portion of pancreas at portal vein #2, excision:- Positive for adenocarcinoma, see comment\par 8. Portion of stent, removal:- Stent, gross examination only\par 9. Uncinate at superior mesenteric artery #1, excision:- Metastatic pancreatic adenocarcinoma present in 2 of 2 lymph nodes, (2/2)\par 10. Uncinate at superior mesenteric artery #2, excision:- Two lymph nodes, negative for carcinoma, (0/2)\par 11. Stomach and bowel at gastrojejunostomy site, excision:- Gastric and small bowel tissue, negative for carcinoma\par  [de-identified] : Returns for follow up.\par He reports that he is eating well. \par Intermittent diarrhea.\par Reports memory loss. \par Planning to go on vacation at the end of July or beginning of August for 9 days.\par Reports insomnia.\par Reports he cannot afford CBD oil.\par \par 4/16/19 CT C/A/P: \par -No evidence of metastatic disease in the thorax\par -Stable disease with soft tissue surrounding the proximal mesenteric artery and interposed between the celiac axis and portal vein\par -Previously identified colitis has resolved.

## 2019-04-23 NOTE — PHYSICAL EXAM
[Ambulatory and capable of all self care but unable to carry out any work activities] : Status 2- Ambulatory and capable of all self care but unable to carry out any work activities. Up and about more than 50% of waking hours [Normal] : affect appropriate [de-identified] : Interactive, well groomed, in NAD [de-identified] : negative cervical supra/infraclavicular adenopathy [de-identified] : clear b/l [de-identified] : trace edema [de-identified] : S1/S2 grade 3 murmur heard best over L sternal border. [de-identified] : BS+, soft, nontender on palpation, upper abd nodule ~2-3 cm [de-identified] : without spinal or cva tenderness.

## 2019-04-24 DIAGNOSIS — R11.2 NAUSEA WITH VOMITING, UNSPECIFIED: ICD-10-CM

## 2019-04-24 DIAGNOSIS — Z51.11 ENCOUNTER FOR ANTINEOPLASTIC CHEMOTHERAPY: ICD-10-CM

## 2019-04-25 ENCOUNTER — APPOINTMENT (OUTPATIENT)
Age: 64
End: 2019-04-25

## 2019-05-07 ENCOUNTER — RESULT REVIEW (OUTPATIENT)
Age: 64
End: 2019-05-07

## 2019-05-07 ENCOUNTER — APPOINTMENT (OUTPATIENT)
Age: 64
End: 2019-05-07

## 2019-05-07 ENCOUNTER — LABORATORY RESULT (OUTPATIENT)
Age: 64
End: 2019-05-07

## 2019-05-07 LAB
BASOPHILS # BLD AUTO: 0.1 K/UL — SIGNIFICANT CHANGE UP (ref 0–0.2)
BASOPHILS NFR BLD AUTO: 1.4 % — SIGNIFICANT CHANGE UP (ref 0–2)
EOSINOPHIL # BLD AUTO: 0.1 K/UL — SIGNIFICANT CHANGE UP (ref 0–0.5)
EOSINOPHIL NFR BLD AUTO: 2 % — SIGNIFICANT CHANGE UP (ref 0–6)
HCT VFR BLD CALC: 35.4 % — LOW (ref 39–50)
HGB BLD-MCNC: 11.6 G/DL — LOW (ref 13–17)
LYMPHOCYTES # BLD AUTO: 0.3 K/UL — LOW (ref 1–3.3)
LYMPHOCYTES # BLD AUTO: 5.7 % — LOW (ref 13–44)
MCHC RBC-ENTMCNC: 31.8 PG — SIGNIFICANT CHANGE UP (ref 27–34)
MCHC RBC-ENTMCNC: 32.8 G/DL — SIGNIFICANT CHANGE UP (ref 32–36)
MCV RBC AUTO: 97 FL — SIGNIFICANT CHANGE UP (ref 80–100)
MONOCYTES # BLD AUTO: 0.4 K/UL — SIGNIFICANT CHANGE UP (ref 0–0.9)
MONOCYTES NFR BLD AUTO: 9.1 % — SIGNIFICANT CHANGE UP (ref 2–14)
NEUTROPHILS # BLD AUTO: 3.9 K/UL — SIGNIFICANT CHANGE UP (ref 1.8–7.4)
NEUTROPHILS NFR BLD AUTO: 81.8 % — HIGH (ref 43–77)
PLATELET # BLD AUTO: 257 K/UL — SIGNIFICANT CHANGE UP (ref 150–400)
RBC # BLD: 3.65 M/UL — LOW (ref 4.2–5.8)
RBC # FLD: 13.1 % — SIGNIFICANT CHANGE UP (ref 10.3–14.5)
WBC # BLD: 4.8 K/UL — SIGNIFICANT CHANGE UP (ref 3.8–10.5)
WBC # FLD AUTO: 4.8 K/UL — SIGNIFICANT CHANGE UP (ref 3.8–10.5)

## 2019-05-08 ENCOUNTER — APPOINTMENT (OUTPATIENT)
Dept: RADIATION ONCOLOGY | Facility: CLINIC | Age: 64
End: 2019-05-08
Payer: COMMERCIAL

## 2019-05-08 VITALS
BODY MASS INDEX: 21.99 KG/M2 | DIASTOLIC BLOOD PRESSURE: 67 MMHG | HEART RATE: 64 BPM | RESPIRATION RATE: 16 BRPM | SYSTOLIC BLOOD PRESSURE: 95 MMHG | OXYGEN SATURATION: 99 % | HEIGHT: 65 IN | TEMPERATURE: 98 F | WEIGHT: 132 LBS

## 2019-05-08 DIAGNOSIS — Z92.3 PERSONAL HISTORY OF IRRADIATION: ICD-10-CM

## 2019-05-08 PROCEDURE — 99213 OFFICE O/P EST LOW 20 MIN: CPT

## 2019-05-08 NOTE — VITALS
[Maximal Pain Intensity: 4/10] : 4/10 [Least Pain Intensity: 0/10] : 0/10 [Pain Duration: ___] : Pain duration: [unfilled] [Pain Description/Quality: ___] : Pain description/quality: [unfilled] [Pain Interferes with ADLs] : Pain interferes with activities of daily living. [Pain Location: ___] : Pain Location: [unfilled] [70: Cares for self; unalbe to carry on normal activity or do active work.] : 70: Cares for self; unable to carry on normal activity or do active work. [Opioid] : opioid [ECOG Performance Status: 2 - Ambulatory and capable of all self care but unable to carry out any work activities] : Performance Status: 2 - Ambulatory and capable of all self care but unable to carry out any work activities. Up and about more than 50% of waking hours

## 2019-05-09 ENCOUNTER — APPOINTMENT (OUTPATIENT)
Age: 64
End: 2019-05-09

## 2019-05-09 PROBLEM — Z92.3 PERSONAL HISTORY OF RADIATION THERAPY: Status: ACTIVE | Noted: 2018-09-27

## 2019-05-09 NOTE — DISEASE MANAGEMENT
[Pathological] : TNM Stage: p [IIB] : IIB [TTNM] : 3 [NTNM] : 1 [MTNM] : 0 [de-identified] : 5040cGy [de-identified] : abdomen

## 2019-05-09 NOTE — REVIEW OF SYSTEMS
[Fatigue] : fatigue [Diarrhea] : diarrhea [Negative] : Endocrine [Vomiting] : no vomiting [Constipation] : no constipation [FreeTextEntry7] : intermittent diarrhea, constipation  [FreeTextEntry8] : renal function diminished, BLE edema varies in degree

## 2019-05-09 NOTE — ASSESSMENT
[FreeTextEntry1] : modest radiation related treatment  sequelae. apparent persistence of gross malignant disease in abdomen

## 2019-05-09 NOTE — HISTORY OF PRESENT ILLNESS
[FreeTextEntry1] : This 63 year-old presents for routine follow-up.  He completed radiation therapy to the pancreatic bed and lymph nodes for adenocarcinoma T3N1M0, stage IIIB, completed 8/30/2018.  He is s/p pancreaticoduodenectomy, as well as chemotherapy which continues.  Presents today with chemotherapy infusion pump.  Reports he is eating a variety of foods without bloating or cramping.  \par Reports diarrhea is now intermittent, alternating with formed stools.   Follows with Dr. Vela and Dr. Kelley.

## 2019-05-09 NOTE — PHYSICAL EXAM
[General Appearance - In No Acute Distress] : in no acute distress [Normal] : normal skin color and pigmentation and no rash [Abdomen Soft] : soft [Thin] : thin [de-identified] : well healed surgical scars [FreeTextEntry1] : deferred [de-identified] : deferred

## 2019-05-09 NOTE — LETTER CLOSING
[Sincerely yours,] : Sincerely yours, [FreeTextEntry3] : Jag Bhardwaj MD\par Physician in Chief\par Department of Radiation Medicine\par [facility]\par [facility]\par \par  of Radiation Medicine\[facility]\par at  [facility]\par \par Radiation \[facility] [place]\par [address]\par [place], [place] [address]\par \par Tel: [phone]\par Fax: [phone]\par

## 2019-05-17 ENCOUNTER — APPOINTMENT (OUTPATIENT)
Dept: HEMATOLOGY ONCOLOGY | Facility: CLINIC | Age: 64
End: 2019-05-17

## 2019-05-17 ENCOUNTER — OUTPATIENT (OUTPATIENT)
Dept: OUTPATIENT SERVICES | Facility: HOSPITAL | Age: 64
LOS: 1 days | Discharge: HOME | End: 2019-05-17

## 2019-05-17 DIAGNOSIS — Z98.890 OTHER SPECIFIED POSTPROCEDURAL STATES: Chronic | ICD-10-CM

## 2019-05-17 DIAGNOSIS — K90.81 WHIPPLE'S DISEASE: Chronic | ICD-10-CM

## 2019-05-17 DIAGNOSIS — Z95.828 PRESENCE OF OTHER VASCULAR IMPLANTS AND GRAFTS: Chronic | ICD-10-CM

## 2019-05-17 DIAGNOSIS — D68.51 ACTIVATED PROTEIN C RESISTANCE: ICD-10-CM

## 2019-05-17 DIAGNOSIS — C25.9 MALIGNANT NEOPLASM OF PANCREAS, UNSPECIFIED: ICD-10-CM

## 2019-05-21 ENCOUNTER — LABORATORY RESULT (OUTPATIENT)
Age: 64
End: 2019-05-21

## 2019-05-21 ENCOUNTER — RESULT REVIEW (OUTPATIENT)
Age: 64
End: 2019-05-21

## 2019-05-21 ENCOUNTER — APPOINTMENT (OUTPATIENT)
Age: 64
End: 2019-05-21

## 2019-05-21 LAB
BASOPHILS # BLD AUTO: 0.1 K/UL — SIGNIFICANT CHANGE UP (ref 0–0.2)
BUN SERPL-MCNC: 5 MG/DL — LOW (ref 7–23)
CA-I BLDA-SCNC: 1.14 MMOL/L — SIGNIFICANT CHANGE UP (ref 1.12–1.3)
CHLORIDE SERPL-SCNC: 95 MMOL/L — LOW (ref 96–108)
CO2 SERPL-SCNC: 27 MMOL/L — SIGNIFICANT CHANGE UP (ref 22–31)
CREAT SERPL-MCNC: 0.4 MG/DL — LOW (ref 0.5–1.3)
EOSINOPHIL # BLD AUTO: 0.1 K/UL — SIGNIFICANT CHANGE UP (ref 0–0.5)
EOSINOPHIL NFR BLD AUTO: 1 % — SIGNIFICANT CHANGE UP (ref 0–6)
GIANT PLATELETS BLD QL SMEAR: PRESENT — SIGNIFICANT CHANGE UP
GLUCOSE SERPL-MCNC: 235 MG/DL — HIGH (ref 70–99)
HCT VFR BLD CALC: 32.4 % — LOW (ref 39–50)
HGB BLD-MCNC: 10.4 G/DL — LOW (ref 13–17)
HYPOCHROMIA BLD QL: SLIGHT — SIGNIFICANT CHANGE UP
LG PLATELETS BLD QL AUTO: SLIGHT — SIGNIFICANT CHANGE UP
LYMPHOCYTES # BLD AUTO: 0.2 K/UL — LOW (ref 1–3.3)
LYMPHOCYTES # BLD AUTO: 20 % — SIGNIFICANT CHANGE UP (ref 13–44)
MCHC RBC-ENTMCNC: 32.2 G/DL — SIGNIFICANT CHANGE UP (ref 32–36)
MCHC RBC-ENTMCNC: 32.4 PG — SIGNIFICANT CHANGE UP (ref 27–34)
MCV RBC AUTO: 100.6 FL — HIGH (ref 80–100)
MONOCYTES # BLD AUTO: 0.7 K/UL — SIGNIFICANT CHANGE UP (ref 0–0.9)
MONOCYTES NFR BLD AUTO: 17 % — HIGH (ref 2–14)
NEUTROPHILS # BLD AUTO: 3.1 K/UL — SIGNIFICANT CHANGE UP (ref 1.8–7.4)
NEUTROPHILS NFR BLD AUTO: 45 % — SIGNIFICANT CHANGE UP (ref 43–77)
NEUTS BAND # BLD: 15 % — HIGH (ref 0–8)
PLAT MORPH BLD: NORMAL — SIGNIFICANT CHANGE UP
PLATELET # BLD AUTO: 216 K/UL — SIGNIFICANT CHANGE UP (ref 150–400)
POIKILOCYTOSIS BLD QL AUTO: SLIGHT — SIGNIFICANT CHANGE UP
POTASSIUM SERPL-MCNC: 3 MMOL/L — LOW (ref 3.5–5.3)
POTASSIUM SERPL-SCNC: 3 MMOL/L — LOW (ref 3.5–5.3)
RBC # BLD: 3.22 M/UL — LOW (ref 4.2–5.8)
RBC # FLD: 14 % — SIGNIFICANT CHANGE UP (ref 10.3–14.5)
RBC BLD AUTO: ABNORMAL
SODIUM SERPL-SCNC: 138 MMOL/L — SIGNIFICANT CHANGE UP (ref 135–145)
VARIANT LYMPHS # BLD: 2 % — SIGNIFICANT CHANGE UP (ref 0–6)
WBC # BLD: 4.1 K/UL — SIGNIFICANT CHANGE UP (ref 3.8–10.5)
WBC # FLD AUTO: 4.1 K/UL — SIGNIFICANT CHANGE UP (ref 3.8–10.5)

## 2019-05-22 ENCOUNTER — RX RENEWAL (OUTPATIENT)
Age: 64
End: 2019-05-22

## 2019-05-22 DIAGNOSIS — Z87.440 PERSONAL HISTORY OF URINARY (TRACT) INFECTIONS: ICD-10-CM

## 2019-05-22 DIAGNOSIS — K12.31 ORAL MUCOSITIS (ULCERATIVE) DUE TO ANTINEOPLASTIC THERAPY: ICD-10-CM

## 2019-05-22 DIAGNOSIS — E86.0 DEHYDRATION: ICD-10-CM

## 2019-05-23 ENCOUNTER — APPOINTMENT (OUTPATIENT)
Dept: HEMATOLOGY ONCOLOGY | Facility: CLINIC | Age: 64
End: 2019-05-23

## 2019-05-23 ENCOUNTER — APPOINTMENT (OUTPATIENT)
Age: 64
End: 2019-05-23

## 2019-05-31 NOTE — H&P PST ADULT - NS SC CAGE ALCOHOL ANNOYED YOU
Take amoxicillin twice a day for 10 days  Ibuprofen up to 3 over the counter tablets four times a day as needed for pain  Return urgently if any change in symptoms like increasing pain, fever, cough or other change in symptoms.    no

## 2019-06-04 ENCOUNTER — RESULT REVIEW (OUTPATIENT)
Age: 64
End: 2019-06-04

## 2019-06-04 ENCOUNTER — LABORATORY RESULT (OUTPATIENT)
Age: 64
End: 2019-06-04

## 2019-06-04 ENCOUNTER — APPOINTMENT (OUTPATIENT)
Age: 64
End: 2019-06-04

## 2019-06-04 LAB
BASOPHILS # BLD AUTO: 0.1 K/UL — SIGNIFICANT CHANGE UP (ref 0–0.2)
BASOPHILS NFR BLD AUTO: 1.6 % — SIGNIFICANT CHANGE UP (ref 0–2)
BUN SERPL-MCNC: 9 MG/DL — SIGNIFICANT CHANGE UP (ref 7–23)
CA-I BLDA-SCNC: 1.19 MMOL/L — SIGNIFICANT CHANGE UP (ref 1.12–1.3)
CHLORIDE SERPL-SCNC: 96 MMOL/L — SIGNIFICANT CHANGE UP (ref 96–108)
CO2 SERPL-SCNC: 27 MMOL/L — SIGNIFICANT CHANGE UP (ref 22–31)
CREAT SERPL-MCNC: 0.4 MG/DL — LOW (ref 0.5–1.3)
EOSINOPHIL # BLD AUTO: 0.3 K/UL — SIGNIFICANT CHANGE UP (ref 0–0.5)
EOSINOPHIL NFR BLD AUTO: 6 % — SIGNIFICANT CHANGE UP (ref 0–6)
GLUCOSE SERPL-MCNC: 152 MG/DL — HIGH (ref 70–99)
HCT VFR BLD CALC: 34.4 % — LOW (ref 39–50)
HGB BLD-MCNC: 11.6 G/DL — LOW (ref 13–17)
LYMPHOCYTES # BLD AUTO: 0.4 K/UL — LOW (ref 1–3.3)
LYMPHOCYTES # BLD AUTO: 7.7 % — LOW (ref 13–44)
MCHC RBC-ENTMCNC: 33.3 PG — SIGNIFICANT CHANGE UP (ref 27–34)
MCHC RBC-ENTMCNC: 33.8 G/DL — SIGNIFICANT CHANGE UP (ref 32–36)
MCV RBC AUTO: 98.6 FL — SIGNIFICANT CHANGE UP (ref 80–100)
MONOCYTES # BLD AUTO: 0.6 K/UL — SIGNIFICANT CHANGE UP (ref 0–0.9)
MONOCYTES NFR BLD AUTO: 12.9 % — SIGNIFICANT CHANGE UP (ref 2–14)
NEUTROPHILS # BLD AUTO: 3.5 K/UL — SIGNIFICANT CHANGE UP (ref 1.8–7.4)
NEUTROPHILS NFR BLD AUTO: 71.9 % — SIGNIFICANT CHANGE UP (ref 43–77)
PLATELET # BLD AUTO: 321 K/UL — SIGNIFICANT CHANGE UP (ref 150–400)
POTASSIUM SERPL-MCNC: 4 MMOL/L — SIGNIFICANT CHANGE UP (ref 3.5–5.3)
POTASSIUM SERPL-SCNC: 4 MMOL/L — SIGNIFICANT CHANGE UP (ref 3.5–5.3)
RBC # BLD: 3.49 M/UL — LOW (ref 4.2–5.8)
RBC # FLD: 13.2 % — SIGNIFICANT CHANGE UP (ref 10.3–14.5)
SODIUM SERPL-SCNC: 137 MMOL/L — SIGNIFICANT CHANGE UP (ref 135–145)
WBC # BLD: 4.8 K/UL — SIGNIFICANT CHANGE UP (ref 3.8–10.5)
WBC # FLD AUTO: 4.8 K/UL — SIGNIFICANT CHANGE UP (ref 3.8–10.5)

## 2019-06-05 DIAGNOSIS — R11.2 NAUSEA WITH VOMITING, UNSPECIFIED: ICD-10-CM

## 2019-06-05 DIAGNOSIS — Z51.11 ENCOUNTER FOR ANTINEOPLASTIC CHEMOTHERAPY: ICD-10-CM

## 2019-06-06 ENCOUNTER — RX RENEWAL (OUTPATIENT)
Age: 64
End: 2019-06-06

## 2019-06-06 ENCOUNTER — APPOINTMENT (OUTPATIENT)
Age: 64
End: 2019-06-06

## 2019-06-06 DIAGNOSIS — Z79.899 OTHER LONG TERM (CURRENT) DRUG THERAPY: ICD-10-CM

## 2019-06-09 ENCOUNTER — FORM ENCOUNTER (OUTPATIENT)
Age: 64
End: 2019-06-09

## 2019-06-10 ENCOUNTER — OUTPATIENT (OUTPATIENT)
Dept: OUTPATIENT SERVICES | Facility: HOSPITAL | Age: 64
LOS: 1 days | End: 2019-06-10
Payer: COMMERCIAL

## 2019-06-10 ENCOUNTER — APPOINTMENT (OUTPATIENT)
Dept: CT IMAGING | Facility: CLINIC | Age: 64
End: 2019-06-10
Payer: COMMERCIAL

## 2019-06-10 DIAGNOSIS — Z98.890 OTHER SPECIFIED POSTPROCEDURAL STATES: Chronic | ICD-10-CM

## 2019-06-10 DIAGNOSIS — Z95.828 PRESENCE OF OTHER VASCULAR IMPLANTS AND GRAFTS: Chronic | ICD-10-CM

## 2019-06-10 DIAGNOSIS — R10.9 UNSPECIFIED ABDOMINAL PAIN: ICD-10-CM

## 2019-06-10 DIAGNOSIS — K90.81 WHIPPLE'S DISEASE: Chronic | ICD-10-CM

## 2019-06-10 DIAGNOSIS — C25.9 MALIGNANT NEOPLASM OF PANCREAS, UNSPECIFIED: ICD-10-CM

## 2019-06-10 PROCEDURE — 71260 CT THORAX DX C+: CPT | Mod: 26

## 2019-06-10 PROCEDURE — 74177 CT ABD & PELVIS W/CONTRAST: CPT | Mod: 26

## 2019-06-10 PROCEDURE — 71260 CT THORAX DX C+: CPT

## 2019-06-10 PROCEDURE — 74177 CT ABD & PELVIS W/CONTRAST: CPT

## 2019-06-12 ENCOUNTER — APPOINTMENT (OUTPATIENT)
Dept: HEMATOLOGY ONCOLOGY | Facility: CLINIC | Age: 64
End: 2019-06-12

## 2019-06-13 ENCOUNTER — OUTPATIENT (OUTPATIENT)
Dept: OUTPATIENT SERVICES | Facility: HOSPITAL | Age: 64
LOS: 1 days | Discharge: HOME | End: 2019-06-13

## 2019-06-13 DIAGNOSIS — C25.9 MALIGNANT NEOPLASM OF PANCREAS, UNSPECIFIED: ICD-10-CM

## 2019-06-13 DIAGNOSIS — Z98.890 OTHER SPECIFIED POSTPROCEDURAL STATES: Chronic | ICD-10-CM

## 2019-06-13 DIAGNOSIS — K90.81 WHIPPLE'S DISEASE: Chronic | ICD-10-CM

## 2019-06-13 DIAGNOSIS — D68.51 ACTIVATED PROTEIN C RESISTANCE: ICD-10-CM

## 2019-06-13 DIAGNOSIS — Z95.828 PRESENCE OF OTHER VASCULAR IMPLANTS AND GRAFTS: Chronic | ICD-10-CM

## 2019-06-18 ENCOUNTER — RESULT REVIEW (OUTPATIENT)
Age: 64
End: 2019-06-18

## 2019-06-18 ENCOUNTER — APPOINTMENT (OUTPATIENT)
Age: 64
End: 2019-06-18

## 2019-06-18 ENCOUNTER — RX RENEWAL (OUTPATIENT)
Age: 64
End: 2019-06-18

## 2019-06-18 ENCOUNTER — LABORATORY RESULT (OUTPATIENT)
Age: 64
End: 2019-06-18

## 2019-06-18 LAB
BASOPHILS # BLD AUTO: 0 K/UL — SIGNIFICANT CHANGE UP (ref 0–0.2)
BASOPHILS NFR BLD AUTO: 0.8 % — SIGNIFICANT CHANGE UP (ref 0–2)
EOSINOPHIL # BLD AUTO: 0.1 K/UL — SIGNIFICANT CHANGE UP (ref 0–0.5)
EOSINOPHIL NFR BLD AUTO: 3.1 % — SIGNIFICANT CHANGE UP (ref 0–6)
HCT VFR BLD CALC: 33.2 % — LOW (ref 39–50)
HGB BLD-MCNC: 11.1 G/DL — LOW (ref 13–17)
LYMPHOCYTES # BLD AUTO: 0.5 K/UL — LOW (ref 1–3.3)
LYMPHOCYTES # BLD AUTO: 10.8 % — LOW (ref 13–44)
MCHC RBC-ENTMCNC: 32.7 PG — SIGNIFICANT CHANGE UP (ref 27–34)
MCHC RBC-ENTMCNC: 33.3 G/DL — SIGNIFICANT CHANGE UP (ref 32–36)
MCV RBC AUTO: 98.1 FL — SIGNIFICANT CHANGE UP (ref 80–100)
MONOCYTES # BLD AUTO: 0.5 K/UL — SIGNIFICANT CHANGE UP (ref 0–0.9)
MONOCYTES NFR BLD AUTO: 12.4 % — SIGNIFICANT CHANGE UP (ref 2–14)
NEUTROPHILS # BLD AUTO: 3.1 K/UL — SIGNIFICANT CHANGE UP (ref 1.8–7.4)
NEUTROPHILS NFR BLD AUTO: 72.9 % — SIGNIFICANT CHANGE UP (ref 43–77)
PLATELET # BLD AUTO: 253 K/UL — SIGNIFICANT CHANGE UP (ref 150–400)
RBC # BLD: 3.39 M/UL — LOW (ref 4.2–5.8)
RBC # FLD: 13.7 % — SIGNIFICANT CHANGE UP (ref 10.3–14.5)
WBC # BLD: 4.3 K/UL — SIGNIFICANT CHANGE UP (ref 3.8–10.5)
WBC # FLD AUTO: 4.3 K/UL — SIGNIFICANT CHANGE UP (ref 3.8–10.5)

## 2019-06-19 DIAGNOSIS — Z51.11 ENCOUNTER FOR ANTINEOPLASTIC CHEMOTHERAPY: ICD-10-CM

## 2019-06-19 DIAGNOSIS — R11.2 NAUSEA WITH VOMITING, UNSPECIFIED: ICD-10-CM

## 2019-06-20 ENCOUNTER — APPOINTMENT (OUTPATIENT)
Age: 64
End: 2019-06-20

## 2019-06-20 ENCOUNTER — APPOINTMENT (OUTPATIENT)
Dept: HEMATOLOGY ONCOLOGY | Facility: CLINIC | Age: 64
End: 2019-06-20
Payer: COMMERCIAL

## 2019-06-20 VITALS
SYSTOLIC BLOOD PRESSURE: 105 MMHG | HEIGHT: 65 IN | OXYGEN SATURATION: 98 % | HEART RATE: 68 BPM | TEMPERATURE: 98.3 F | BODY MASS INDEX: 22.56 KG/M2 | WEIGHT: 135.38 LBS | DIASTOLIC BLOOD PRESSURE: 70 MMHG

## 2019-06-20 PROCEDURE — 99214 OFFICE O/P EST MOD 30 MIN: CPT

## 2019-06-20 RX ORDER — SUNITINIB MALATE 37.5 MG/1
37.5 CAPSULE ORAL
Qty: 30 | Refills: 5 | Status: ACTIVE | COMMUNITY
Start: 2019-06-20 | End: 1900-01-01

## 2019-06-20 RX ORDER — LEVOFLOXACIN 750 MG/1
750 TABLET, FILM COATED ORAL DAILY
Qty: 7 | Refills: 0 | Status: DISCONTINUED | COMMUNITY
Start: 2019-05-22 | End: 2019-06-20

## 2019-06-20 NOTE — PHYSICAL EXAM
[Ambulatory and capable of all self care but unable to carry out any work activities] : Status 2- Ambulatory and capable of all self care but unable to carry out any work activities. Up and about more than 50% of waking hours [Normal] : affect appropriate [de-identified] : Interactive, well groomed, in NAD [de-identified] : negative cervical supra/infraclavicular adenopathy [de-identified] : clear b/l [de-identified] : S1/S2 grade 3 murmur heard best over L sternal border. [de-identified] : trace edema [de-identified] : soft, nontender, not distended

## 2019-06-20 NOTE — ADDENDUM
[FreeTextEntry1] : I, Denia Lechuga, acted solely as a scribe for Dr. Mervat Vela on this date 6/20/19.

## 2019-06-20 NOTE — HISTORY OF PRESENT ILLNESS
[Disease: _____________________] : Disease: [unfilled] [T: ___] : T[unfilled] [N: ___] : N[unfilled] [AJCC Stage: ____] : AJCC Stage: [unfilled] [de-identified] : Mr. Galaviz was diagnosed with pancreatic cancer in October 2017, at age 62 year old, after initially presenting with painless jaundice. EUS and ERCP on 10/31/17 revealed a 2.2 cm pancreatic head mass, with no apparent vessel involvement or lymphadenopathy. The CBD was stented. Final pathology was consistent with adenocarcinoma. EUS and ERCP on 10/31/17, which revealed a 2.2 cm pancreatic head mass, with no apparent vessel involvement or lymphadenopathy. The CBD was stented. Final pathology was consistent with adenocarcinoma. S/p Whipple, cholecystectomy, enterectomy x 2 and removal of CBD stent on 11/30/17. Final pathology was 3.1 cm pancreatic adenocarcinoma invading the nadira pancreatic tissue and CBD, and involving 2/26 lymph nodes (T3N1). The CBD resection margin was positive for adenocarcinoma with extensive nadira neural invasion. Mediport was placed on 1/17/18. \par - CT C/A/P 1/23/18: IMPRESSION: \par   No evidence of recurrent or metastatic disease in the chest, abdomen, or pelvis. \par   Stable postsurgical anatomy status post Whipple procedure. No evidence of bowel or gastric outlet obstruction. Pancreatic duct stent in place without \par   pancreatic duct or biliary dilatation. \par   2 tiny foci of extraluminal air in the right upper quadrant along a prior MUNA drain tract. This is likely secondary to recent drain removal. No drainable fluid collections. \par   Small amount of nonocclusive thrombus at the tip of the port catheter within the right brachiocephalic vein. \par \par 4/16/19 CT C/A/P: \par -No evidence of metastatic disease in the thorax\par -Stable disease with soft tissue surrounding the proximal mesenteric artery and interposed between the celiac axis and portal vein\par -Previously identified colitis has resolved. \par \par Treatment:\par 2/7/18 - 6/13/18 adjuvant chemotherapy with gemcitabine + capecitabine (7 cycles)\par 4/12/18 CT C/A/P: no evidence of recurrent or metastatic disease.\par 7/2/18 CT a/p  : new soft tissue inferior to proximal superior mesenteric artery and to right of celiac axis in patient s/p whipple, suspicious for recurrent disease. \par 7/24/18-8/30/18  chemo-radiation with xeloda\par 9/20/18 CT A/P progression of disease in upper abdomen.\par 10/16/18 tx changed to liposomal irinotecan (70 mg/m2) w/ leucovorin 400 mg/m2 and 5FU 2400 mg/m2 over 46 hrs) every 2 weeks.\par \par  [de-identified] : Final Diagnosis\par 1. Lymph node at hepatic artery, excision:- One lymph node, negative for carcinoma, (0/1)\par 2. Periportal lymph node, excision:- One lymph node, negative for carcinoma, (0/1)\par 3. Gallbladder, cholecystectomy:- Chronic cholecystitis, negative for carcinoma\par - One lymph node, negative for carcinoma, (0/1)\par 4. Periportal lymph node, #2, excision:- One lymph node, negative for carcinoma, (0/1)\par 5. Pancreas, stomach and duodenum, pancreaticoduodenectomy(Whipple procedure):\par - Invasive adenocarcinoma of pancreas, moderately to poorly\par differentiated,pancreatobiliary type\par - Tumor invading into peripancreatic soft tissue and common bile duct\par - Comment bile duct resection margin, positive for adenocarcinoma, with\par extensive perineural invasion only, see comment- Pancreatic neck resection margin, negative for carcinoma\par - 18 lymph nodes, negative for carcinoma, (0/18)\par - AJCC (7th Ed) staging: pT3N1(2/26), see synoptic report,\par see Part 7 and Part 9\par 6. Portion of pancreas at portal vein #1, excision:- Atrophic pancreatitis, negative for carcinoma\par 7. Portion of pancreas at portal vein #2, excision:- Positive for adenocarcinoma, see comment\par 8. Portion of stent, removal:- Stent, gross examination only\par 9. Uncinate at superior mesenteric artery #1, excision:- Metastatic pancreatic adenocarcinoma present in 2 of 2 lymph nodes, (2/2)\par 10. Uncinate at superior mesenteric artery #2, excision:- Two lymph nodes, negative for carcinoma, (0/2)\par 11. Stomach and bowel at gastrojejunostomy site, excision:- Gastric and small bowel tissue, negative for carcinoma\par  [de-identified] : Returns for follow up.\par He reports that he is eating well. \par Intermittent diarrhea and constipation. First bowel movement in three days today. Initially formed in the morning followed by diarrhea throughout the day. \par No abdominal pain or discomfort. \par Intermittent reflux\par Not taking oxycodone, but continues on Fentanyl patch \par \par Planning to go on vacation at the end of July or beginning of August for 9 days.\par \par 6/10/19 CT C/A/P:\par No evidence of metastatic disease in the thorax. \par Interval development of trace bilateral pleural effusion.\par Stable soft tissue surrounding the proximal superior mesenteric artery and interposed between the celiac axis and portal vein which us narrowed\par Increase in the small amount of perisplenic ascites. Small amount of perihepatic ascites and moderate amount of pelvic ascites is stable. \par Interval development of thickening of areas of the colon as described above suggesting colitis. \par

## 2019-06-20 NOTE — ASSESSMENT
[FreeTextEntry1] : 62 y/o gentleman with Factor V Leiden mutation and stage IIB pancreatic cancer (T3N1) with positive CBD margin with peripancreatic soft tissue invasion, s/p Whipple on 11/30/17.  S/p 7 cycles of gemcitabine + Xeloda.  Post chemotherapy scans.  7/2/18 CT scan with local recurrence. Completed chemoradiation with Xeloda from 7/24/18 - 8/30/18.  Foundation One testing with no actionable mutations, MSI-stable. \par 9/20/18 restaging CT scans with POD in upper abdomen and possible colitis. Started liposomal irinotecan 70 mg/m2 w/ Leucovorin 400 mg/m2 and continuous infusion 5 FU 2400 mg/m2 over 46 hrs every 2 weeks on 10/16/18.\par \par 6/10/19 CT scan with no overt progression of disease, but new mild perisplenic ascites, however his  continues to trend up.  Findings are worrisome for POD but not yet radiographically evident, possibly peritoneal disease.  I discussed with patient and his girlfriend about switching therapy at this time.  We discussed Sutent as per Rowena et.al. trial.  Other options include [place]/Abraxane,  Xeloda+oxaliplatin, or FOLFOX.  He's agreeble to sutent, he wants a break from infusional treatment.  We reviewed side effects of sutent in detail. \par \par Plan:\par 1. Discontinue liposomal irinotecan/Leukovorin/5FU.  Begin Sutent 37.5 mg daily on 7/2/19. \par 2. Follow up with Dr. Evans re:?colitis\par 3. follow up in 1 month + port flush

## 2019-06-26 ENCOUNTER — RX RENEWAL (OUTPATIENT)
Age: 64
End: 2019-06-26

## 2019-06-26 RX ORDER — PANCRELIPASE 36000; 180000; 114000 [USP'U]/1; [USP'U]/1; [USP'U]/1
36000-114000 CAPSULE, DELAYED RELEASE PELLETS ORAL
Qty: 720 | Refills: 2 | Status: ACTIVE | COMMUNITY
Start: 2018-02-06 | End: 1900-01-01

## 2019-06-26 RX ORDER — SUCRALFATE 1 G/10ML
1 SUSPENSION ORAL
Qty: 420 | Refills: 1 | Status: ACTIVE | COMMUNITY
Start: 2019-05-22 | End: 1900-01-01

## 2019-07-01 ENCOUNTER — APPOINTMENT (OUTPATIENT)
Age: 64
End: 2019-07-01

## 2019-07-02 RX ORDER — SUNITINIB MALATE 12.5 MG/1
1 CAPSULE ORAL
Qty: 0 | Refills: 0 | DISCHARGE
Start: 2019-07-02

## 2019-07-03 ENCOUNTER — APPOINTMENT (OUTPATIENT)
Age: 64
End: 2019-07-03

## 2019-07-05 ENCOUNTER — INPATIENT (INPATIENT)
Facility: HOSPITAL | Age: 64
LOS: 4 days | Discharge: HOME | DRG: 871 | End: 2019-07-10
Attending: HOSPITALIST | Admitting: HOSPITALIST
Payer: COMMERCIAL

## 2019-07-05 ENCOUNTER — APPOINTMENT (OUTPATIENT)
Dept: GASTROENTEROLOGY | Facility: CLINIC | Age: 64
End: 2019-07-05

## 2019-07-05 VITALS
DIASTOLIC BLOOD PRESSURE: 69 MMHG | SYSTOLIC BLOOD PRESSURE: 113 MMHG | HEART RATE: 130 BPM | OXYGEN SATURATION: 100 % | HEIGHT: 68 IN | RESPIRATION RATE: 20 BRPM | WEIGHT: 119.93 LBS | TEMPERATURE: 102 F

## 2019-07-05 DIAGNOSIS — J18.1 LOBAR PNEUMONIA, UNSPECIFIED ORGANISM: ICD-10-CM

## 2019-07-05 DIAGNOSIS — K90.81 WHIPPLE'S DISEASE: Chronic | ICD-10-CM

## 2019-07-05 DIAGNOSIS — R09.89 OTHER SPECIFIED SYMPTOMS AND SIGNS INVOLVING THE CIRCULATORY AND RESPIRATORY SYSTEMS: ICD-10-CM

## 2019-07-05 DIAGNOSIS — C25.9 MALIGNANT NEOPLASM OF PANCREAS, UNSPECIFIED: ICD-10-CM

## 2019-07-05 DIAGNOSIS — Z98.890 OTHER SPECIFIED POSTPROCEDURAL STATES: Chronic | ICD-10-CM

## 2019-07-05 DIAGNOSIS — Z95.828 PRESENCE OF OTHER VASCULAR IMPLANTS AND GRAFTS: Chronic | ICD-10-CM

## 2019-07-05 DIAGNOSIS — A41.9 SEPSIS, UNSPECIFIED ORGANISM: ICD-10-CM

## 2019-07-05 DIAGNOSIS — R60.9 EDEMA, UNSPECIFIED: ICD-10-CM

## 2019-07-05 LAB
ALBUMIN SERPL ELPH-MCNC: 3 G/DL — LOW (ref 3.3–5.2)
ALP SERPL-CCNC: 652 U/L — HIGH (ref 40–120)
ALT FLD-CCNC: 57 U/L — HIGH
ANION GAP SERPL CALC-SCNC: 10 MMOL/L — SIGNIFICANT CHANGE UP (ref 5–17)
ANISOCYTOSIS BLD QL: SLIGHT — SIGNIFICANT CHANGE UP
APPEARANCE UR: CLEAR — SIGNIFICANT CHANGE UP
APTT BLD: 32.5 SEC — SIGNIFICANT CHANGE UP (ref 27.5–36.3)
AST SERPL-CCNC: 106 U/L — HIGH
BASOPHILS # BLD AUTO: 0 K/UL — SIGNIFICANT CHANGE UP (ref 0–0.2)
BASOPHILS NFR BLD AUTO: 0 % — SIGNIFICANT CHANGE UP (ref 0–2)
BILIRUB SERPL-MCNC: 1 MG/DL — SIGNIFICANT CHANGE UP (ref 0.4–2)
BILIRUB UR-MCNC: NEGATIVE — SIGNIFICANT CHANGE UP
BUN SERPL-MCNC: 12 MG/DL — SIGNIFICANT CHANGE UP (ref 8–20)
CALCIUM SERPL-MCNC: 8.3 MG/DL — LOW (ref 8.6–10.2)
CHLORIDE SERPL-SCNC: 101 MMOL/L — SIGNIFICANT CHANGE UP (ref 98–107)
CO2 SERPL-SCNC: 28 MMOL/L — SIGNIFICANT CHANGE UP (ref 22–29)
COLOR SPEC: YELLOW — SIGNIFICANT CHANGE UP
CREAT SERPL-MCNC: 0.62 MG/DL — SIGNIFICANT CHANGE UP (ref 0.5–1.3)
DIFF PNL FLD: NEGATIVE — SIGNIFICANT CHANGE UP
EOSINOPHIL # BLD AUTO: 0 K/UL — SIGNIFICANT CHANGE UP (ref 0–0.5)
EOSINOPHIL NFR BLD AUTO: 0 % — SIGNIFICANT CHANGE UP (ref 0–6)
GAS PNL BLDV: SIGNIFICANT CHANGE UP
GIANT PLATELETS BLD QL SMEAR: PRESENT — SIGNIFICANT CHANGE UP
GLUCOSE SERPL-MCNC: 111 MG/DL — SIGNIFICANT CHANGE UP (ref 70–115)
GLUCOSE UR QL: NEGATIVE MG/DL — SIGNIFICANT CHANGE UP
HCO3 BLDV-SCNC: 30 MMOL/L — HIGH (ref 21–29)
HCT VFR BLD CALC: 37.7 % — LOW (ref 39–50)
HGB BLD-MCNC: 12.3 G/DL — LOW (ref 13–17)
INR BLD: 1.22 RATIO — HIGH (ref 0.88–1.16)
KETONES UR-MCNC: NEGATIVE — SIGNIFICANT CHANGE UP
LACTATE BLDV-MCNC: 2.6 MMOL/L — HIGH (ref 0.5–2)
LACTATE BLDV-MCNC: 4.2 MMOL/L — CRITICAL HIGH (ref 0.5–2)
LEUKOCYTE ESTERASE UR-ACNC: NEGATIVE — SIGNIFICANT CHANGE UP
LYMPHOCYTES # BLD AUTO: 0.02 K/UL — LOW (ref 1–3.3)
LYMPHOCYTES # BLD AUTO: 0.9 % — LOW (ref 13–44)
MACROCYTES BLD QL: SLIGHT — SIGNIFICANT CHANGE UP
MANUAL SMEAR VERIFICATION: SIGNIFICANT CHANGE UP
MCHC RBC-ENTMCNC: 32.6 GM/DL — SIGNIFICANT CHANGE UP (ref 32–36)
MCHC RBC-ENTMCNC: 33 PG — SIGNIFICANT CHANGE UP (ref 27–34)
MCV RBC AUTO: 101.1 FL — HIGH (ref 80–100)
MONOCYTES # BLD AUTO: 0.02 K/UL — SIGNIFICANT CHANGE UP (ref 0–0.9)
MONOCYTES NFR BLD AUTO: 0.9 % — LOW (ref 2–14)
NEUTROPHILS # BLD AUTO: 1.79 K/UL — LOW (ref 1.8–7.4)
NEUTROPHILS NFR BLD AUTO: 93 % — HIGH (ref 43–77)
NEUTS BAND # BLD: 5.2 % — SIGNIFICANT CHANGE UP (ref 0–8)
NITRITE UR-MCNC: NEGATIVE — SIGNIFICANT CHANGE UP
PCO2 BLDV: 46 MMHG — SIGNIFICANT CHANGE UP (ref 35–50)
PH BLDV: 7.45 — HIGH (ref 7.32–7.43)
PH UR: 8 — SIGNIFICANT CHANGE UP (ref 5–8)
PLAT MORPH BLD: NORMAL — SIGNIFICANT CHANGE UP
PLATELET # BLD AUTO: 225 K/UL — SIGNIFICANT CHANGE UP (ref 150–400)
PO2 BLDV: 41 MMHG — SIGNIFICANT CHANGE UP (ref 25–45)
POLYCHROMASIA BLD QL SMEAR: SLIGHT — SIGNIFICANT CHANGE UP
POTASSIUM SERPL-MCNC: 3.9 MMOL/L — SIGNIFICANT CHANGE UP (ref 3.5–5.3)
POTASSIUM SERPL-SCNC: 3.9 MMOL/L — SIGNIFICANT CHANGE UP (ref 3.5–5.3)
PROT SERPL-MCNC: 5.4 G/DL — LOW (ref 6.6–8.7)
PROT UR-MCNC: NEGATIVE MG/DL — SIGNIFICANT CHANGE UP
PROTHROM AB SERPL-ACNC: 14.1 SEC — HIGH (ref 10–12.9)
RBC # BLD: 3.73 M/UL — LOW (ref 4.2–5.8)
RBC # FLD: 14.7 % — HIGH (ref 10.3–14.5)
RBC BLD AUTO: ABNORMAL
SAO2 % BLDV: 75 % — SIGNIFICANT CHANGE UP
SODIUM SERPL-SCNC: 139 MMOL/L — SIGNIFICANT CHANGE UP (ref 135–145)
SP GR SPEC: 1.01 — SIGNIFICANT CHANGE UP (ref 1.01–1.02)
UROBILINOGEN FLD QL: NEGATIVE MG/DL — SIGNIFICANT CHANGE UP
WBC # BLD: 1.82 K/UL — LOW (ref 3.8–10.5)
WBC # FLD AUTO: 1.82 K/UL — LOW (ref 3.8–10.5)

## 2019-07-05 PROCEDURE — 93010 ELECTROCARDIOGRAM REPORT: CPT

## 2019-07-05 PROCEDURE — 99291 CRITICAL CARE FIRST HOUR: CPT

## 2019-07-05 PROCEDURE — 71045 X-RAY EXAM CHEST 1 VIEW: CPT | Mod: 26

## 2019-07-05 RX ORDER — CHLORHEXIDINE GLUCONATE 213 G/1000ML
1 SOLUTION TOPICAL
Refills: 0 | Status: DISCONTINUED | OUTPATIENT
Start: 2019-07-05 | End: 2019-07-07

## 2019-07-05 RX ORDER — LIPASE/PROTEASE/AMYLASE 16-48-48K
1 CAPSULE,DELAYED RELEASE (ENTERIC COATED) ORAL DAILY
Refills: 0 | Status: DISCONTINUED | OUTPATIENT
Start: 2019-07-05 | End: 2019-07-10

## 2019-07-05 RX ORDER — VANCOMYCIN HCL 1 G
1000 VIAL (EA) INTRAVENOUS ONCE
Refills: 0 | Status: COMPLETED | OUTPATIENT
Start: 2019-07-05 | End: 2019-07-05

## 2019-07-05 RX ORDER — IBUPROFEN 200 MG
600 TABLET ORAL ONCE
Refills: 0 | Status: COMPLETED | OUTPATIENT
Start: 2019-07-05 | End: 2019-07-05

## 2019-07-05 RX ORDER — LIPASE/PROTEASE/AMYLASE 16-48-48K
2 CAPSULE,DELAYED RELEASE (ENTERIC COATED) ORAL
Refills: 0 | Status: DISCONTINUED | OUTPATIENT
Start: 2019-07-06 | End: 2019-07-06

## 2019-07-05 RX ORDER — SODIUM CHLORIDE 9 MG/ML
1000 INJECTION INTRAMUSCULAR; INTRAVENOUS; SUBCUTANEOUS ONCE
Refills: 0 | Status: COMPLETED | OUTPATIENT
Start: 2019-07-05 | End: 2019-07-05

## 2019-07-05 RX ORDER — VANCOMYCIN HCL 1 G
VIAL (EA) INTRAVENOUS
Refills: 0 | Status: DISCONTINUED | OUTPATIENT
Start: 2019-07-05 | End: 2019-07-06

## 2019-07-05 RX ORDER — SODIUM CHLORIDE 9 MG/ML
1000 INJECTION, SOLUTION INTRAVENOUS
Refills: 0 | Status: DISCONTINUED | OUTPATIENT
Start: 2019-07-05 | End: 2019-07-06

## 2019-07-05 RX ORDER — HYDROCORTISONE 20 MG
50 TABLET ORAL EVERY 6 HOURS
Refills: 0 | Status: DISCONTINUED | OUTPATIENT
Start: 2019-07-05 | End: 2019-07-07

## 2019-07-05 RX ORDER — ATORVASTATIN CALCIUM 80 MG/1
10 TABLET, FILM COATED ORAL AT BEDTIME
Refills: 0 | Status: DISCONTINUED | OUTPATIENT
Start: 2019-07-05 | End: 2019-07-10

## 2019-07-05 RX ORDER — THIAMINE MONONITRATE (VIT B1) 100 MG
200 TABLET ORAL EVERY 8 HOURS
Refills: 0 | Status: DISCONTINUED | OUTPATIENT
Start: 2019-07-05 | End: 2019-07-05

## 2019-07-05 RX ORDER — VANCOMYCIN HCL 1 G
VIAL (EA) INTRAVENOUS
Refills: 0 | Status: DISCONTINUED | OUTPATIENT
Start: 2019-07-05 | End: 2019-07-05

## 2019-07-05 RX ORDER — CEFEPIME 1 G/1
2000 INJECTION, POWDER, FOR SOLUTION INTRAMUSCULAR; INTRAVENOUS ONCE
Refills: 0 | Status: COMPLETED | OUTPATIENT
Start: 2019-07-05 | End: 2019-07-05

## 2019-07-05 RX ORDER — NOREPINEPHRINE BITARTRATE/D5W 8 MG/250ML
0.05 PLASTIC BAG, INJECTION (ML) INTRAVENOUS
Qty: 8 | Refills: 0 | Status: DISCONTINUED | OUTPATIENT
Start: 2019-07-05 | End: 2019-07-06

## 2019-07-05 RX ORDER — SODIUM CHLORIDE 9 MG/ML
1700 INJECTION INTRAMUSCULAR; INTRAVENOUS; SUBCUTANEOUS ONCE
Refills: 0 | Status: COMPLETED | OUTPATIENT
Start: 2019-07-05 | End: 2019-07-05

## 2019-07-05 RX ORDER — INSULIN LISPRO 100/ML
0 VIAL (ML) SUBCUTANEOUS
Qty: 0 | Refills: 0 | DISCHARGE

## 2019-07-05 RX ORDER — ASCORBIC ACID 60 MG
1500 TABLET,CHEWABLE ORAL EVERY 6 HOURS
Refills: 0 | Status: DISCONTINUED | OUTPATIENT
Start: 2019-07-05 | End: 2019-07-07

## 2019-07-05 RX ORDER — ASPIRIN/CALCIUM CARB/MAGNESIUM 324 MG
81 TABLET ORAL DAILY
Refills: 0 | Status: DISCONTINUED | OUTPATIENT
Start: 2019-07-05 | End: 2019-07-10

## 2019-07-05 RX ORDER — CEFEPIME 1 G/1
INJECTION, POWDER, FOR SOLUTION INTRAMUSCULAR; INTRAVENOUS
Refills: 0 | Status: DISCONTINUED | OUTPATIENT
Start: 2019-07-05 | End: 2019-07-06

## 2019-07-05 RX ORDER — PIPERACILLIN AND TAZOBACTAM 4; .5 G/20ML; G/20ML
3.38 INJECTION, POWDER, LYOPHILIZED, FOR SOLUTION INTRAVENOUS ONCE
Refills: 0 | Status: COMPLETED | OUTPATIENT
Start: 2019-07-05 | End: 2019-07-05

## 2019-07-05 RX ORDER — ENOXAPARIN SODIUM 100 MG/ML
40 INJECTION SUBCUTANEOUS DAILY
Refills: 0 | Status: DISCONTINUED | OUTPATIENT
Start: 2019-07-05 | End: 2019-07-10

## 2019-07-05 RX ORDER — THIAMINE MONONITRATE (VIT B1) 100 MG
200 TABLET ORAL
Refills: 0 | Status: DISCONTINUED | OUTPATIENT
Start: 2019-07-05 | End: 2019-07-07

## 2019-07-05 RX ORDER — CEFEPIME 1 G/1
2000 INJECTION, POWDER, FOR SOLUTION INTRAMUSCULAR; INTRAVENOUS EVERY 8 HOURS
Refills: 0 | Status: DISCONTINUED | OUTPATIENT
Start: 2019-07-06 | End: 2019-07-06

## 2019-07-05 RX ORDER — AZITHROMYCIN 500 MG/1
1000 TABLET, FILM COATED ORAL ONCE
Refills: 0 | Status: DISCONTINUED | OUTPATIENT
Start: 2019-07-05 | End: 2019-07-05

## 2019-07-05 RX ORDER — AZITHROMYCIN 500 MG/1
500 TABLET, FILM COATED ORAL ONCE
Refills: 0 | Status: COMPLETED | OUTPATIENT
Start: 2019-07-05 | End: 2019-07-05

## 2019-07-05 RX ORDER — VANCOMYCIN HCL 1 G
1000 VIAL (EA) INTRAVENOUS EVERY 8 HOURS
Refills: 0 | Status: DISCONTINUED | OUTPATIENT
Start: 2019-07-06 | End: 2019-07-06

## 2019-07-05 RX ORDER — TAMSULOSIN HYDROCHLORIDE 0.4 MG/1
0.4 CAPSULE ORAL AT BEDTIME
Refills: 0 | Status: DISCONTINUED | OUTPATIENT
Start: 2019-07-05 | End: 2019-07-10

## 2019-07-05 RX ORDER — ASCORBIC ACID 60 MG
1500 TABLET,CHEWABLE ORAL
Refills: 0 | Status: DISCONTINUED | OUTPATIENT
Start: 2019-07-05 | End: 2019-07-05

## 2019-07-05 RX ADMIN — Medication 600 MILLIGRAM(S): at 16:09

## 2019-07-05 RX ADMIN — SODIUM CHLORIDE 1700 MILLILITER(S): 9 INJECTION INTRAMUSCULAR; INTRAVENOUS; SUBCUTANEOUS at 15:59

## 2019-07-05 RX ADMIN — PIPERACILLIN AND TAZOBACTAM 3.38 GRAM(S): 4; .5 INJECTION, POWDER, LYOPHILIZED, FOR SOLUTION INTRAVENOUS at 15:59

## 2019-07-05 RX ADMIN — Medication 50 MILLIGRAM(S): at 23:13

## 2019-07-05 RX ADMIN — Medication 250 MILLIGRAM(S): at 16:21

## 2019-07-05 RX ADMIN — SODIUM CHLORIDE 1000 MILLILITER(S): 9 INJECTION INTRAMUSCULAR; INTRAVENOUS; SUBCUTANEOUS at 18:45

## 2019-07-05 RX ADMIN — Medication 1000 MILLIGRAM(S): at 16:59

## 2019-07-05 RX ADMIN — Medication 600 MILLIGRAM(S): at 15:36

## 2019-07-05 RX ADMIN — SODIUM CHLORIDE 1000 MILLILITER(S): 9 INJECTION INTRAMUSCULAR; INTRAVENOUS; SUBCUTANEOUS at 17:58

## 2019-07-05 RX ADMIN — Medication 153 MILLIGRAM(S): at 23:38

## 2019-07-05 RX ADMIN — PIPERACILLIN AND TAZOBACTAM 200 GRAM(S): 4; .5 INJECTION, POWDER, LYOPHILIZED, FOR SOLUTION INTRAVENOUS at 15:36

## 2019-07-05 RX ADMIN — Medication 102 MILLIGRAM(S): at 23:38

## 2019-07-05 RX ADMIN — SODIUM CHLORIDE 1700 MILLILITER(S): 9 INJECTION INTRAMUSCULAR; INTRAVENOUS; SUBCUTANEOUS at 15:34

## 2019-07-05 RX ADMIN — Medication 5.1 MICROGRAM(S)/KG/MIN: at 20:10

## 2019-07-05 RX ADMIN — SODIUM CHLORIDE 120 MILLILITER(S): 9 INJECTION, SOLUTION INTRAVENOUS at 23:11

## 2019-07-05 RX ADMIN — SODIUM CHLORIDE 120 MILLILITER(S): 9 INJECTION, SOLUTION INTRAVENOUS at 20:00

## 2019-07-05 RX ADMIN — Medication 250 MILLIGRAM(S): at 23:58

## 2019-07-05 RX ADMIN — ENOXAPARIN SODIUM 40 MILLIGRAM(S): 100 INJECTION SUBCUTANEOUS at 23:13

## 2019-07-05 RX ADMIN — AZITHROMYCIN 255 MILLIGRAM(S): 500 TABLET, FILM COATED ORAL at 19:10

## 2019-07-05 RX ADMIN — CEFEPIME 100 MILLIGRAM(S): 1 INJECTION, POWDER, FOR SOLUTION INTRAMUSCULAR; INTRAVENOUS at 23:38

## 2019-07-05 RX ADMIN — ATORVASTATIN CALCIUM 10 MILLIGRAM(S): 80 TABLET, FILM COATED ORAL at 23:12

## 2019-07-05 RX ADMIN — TAMSULOSIN HYDROCHLORIDE 0.4 MILLIGRAM(S): 0.4 CAPSULE ORAL at 23:13

## 2019-07-05 NOTE — PHARMACOTHERAPY INTERVENTION NOTE - COMMENTS
Spoke to patient at bedside. Started Sunitinib 37.5 mG once daily, in the afternoon 3 days ago. Finished IV chemotherapy (included 5-FU) about 2 weeks ago, switched to PO chemotherapy.
Sepsis

## 2019-07-05 NOTE — ED PROVIDER NOTE - OBJECTIVE STATEMENT
63yoM with h/o HTN, HLD, DM asthma, FVL,  pancreatic CA, presenting with fever, AMS since yesterday; per wife, pt yesterday seemed lower in energy since yesterday but was able to attend a barbecue;  today she noticed that he seemed lethargic and took Tylenol at 1230 when his temp was 99 so he took a nap; when she  tried to wake him up she had difficulty ; when EMS arrived they found him tachycardic to 140s with SBP 85.  BP improved with IVF on route. Pt of note, was recently changed to a different chemotherapeutic agent, Sunitinib 3 days ago but did not take any today. He denies any pain in the ears/ throat/ chest/ abdomen. Denies any cough. Does feel like he has to urinate. NO nausea/ vomiting. Pt has chronic loose stools   No neck pain/ stiffness. No foreign travel. No known sick contacts.  PT treated for UTI two months agol.    Oncologist: Dr. Grider    PMD: Dr. Moran

## 2019-07-05 NOTE — ED PROVIDER NOTE - PROGRESS NOTE DETAILS
Case d/w Dr. Villarreal from oncology; he does not feel new chemo agent should cause picture today but rather feels it is from the sepsis. WIll follow. Family updated as to findings. Pt awake, alert, well appearing;

## 2019-07-05 NOTE — H&P ADULT - ATTENDING COMMENTS
updated large family at bedside updated large family at bedside.  Now on 0.18 mcg/kg/min of norepineprhine infusion (at 2300)  start MARIK protocol; can consider adding midodrine in AM if pressor requirement remains high.  Need to contact Dr Villarreal's service ([facility]) and Dr Evans's service () in AM.

## 2019-07-05 NOTE — ED ADULT TRIAGE NOTE - CHIEF COMPLAINT QUOTE
patient c/o fever at home with altered mental status, hx of pancreatic cancer with whipple. just changed chemotherapy drugs. patient hypotensive prior to arrival and EMS gave fluids prior to arriving.

## 2019-07-05 NOTE — H&P ADULT - HISTORY OF PRESENT ILLNESS
62 y/o male with history of Pancreatic cancer s/p whipple in 11/17 with positive margins on 3rd line chemotherapy per family (oral sunitinib, started 7/2); has a 3 day history of feeling unwell, mild cough, +chills, high fever.  He feels fatigued; has had diarrhea for the past few months, for which he has been taking loperamide and has had stool studies (negative) -- CT scan last month indicated colitis, and he has had a recent colonoscopy.    In the ED, he has had 4 liters of crystalloid, antibiotics, and uptrending lactates; our service was called and we will be admitted to the MICU and advised that vasoactive agents shouldn't be held until he is transferred.   Of note, he has been taking daily lasix as well for lower extremity edema.

## 2019-07-05 NOTE — H&P ADULT - NSICDXFAMILYHX_GEN_ALL_CORE_FT
FAMILY HISTORY:  Family history of CABG  Family history of heart disease  Family history of lymphoma

## 2019-07-05 NOTE — ED PROVIDER NOTE - ENMT, MLM
Airway patent, Nasal mucosa clear. Mouth with DRY mucosa. Throat has no vesicles, no oropharyngeal exudates and uvula is midline. Normal TM bilatearlly.

## 2019-07-05 NOTE — ED ADULT NURSE REASSESSMENT NOTE - NS ED NURSE REASSESS COMMENT FT1
Pt A&OX3, states he feels much better than when he came into the ED.  Lactate is 4.2, Dr. Pham aware.

## 2019-07-05 NOTE — ED PROVIDER NOTE - CARE PLAN
Principal Discharge DX:	Pneumonia of right middle lobe due to infectious organism  Secondary Diagnosis:	Septic shock Principal Discharge DX:	Pneumonia of right middle lobe due to infectious organism  Secondary Diagnosis:	Septic shock  Secondary Diagnosis:	Metabolic encephalopathy

## 2019-07-05 NOTE — ED ADULT NURSE NOTE - OBJECTIVE STATEMENT
Code sepsis called and MD Leon called to bedside, pt A&O2- Oriented to self and place, pt has Hx of pancreatic CA started on new chemo med Sutent 3 days ago, yesterday pt was c/o "low energy" and today as per wife pt was lethargic, temp of 99 was given Tylenol @1230 and went to take a nap as per wife pt was hard wake up from nap and became AMS, resp even and unlabored no distress noted, abd soft NTND, pt denies any pain ,

## 2019-07-05 NOTE — H&P ADULT - PROBLEM SELECTOR PLAN 1
Appears well perfused after volume resuscitation despite low mean arterial pressure.    Likely source RLL pneumonia  Broad spectrum antibiotics  norepinephrine infusion to support perfusion  will add hydrocortisone/vitamin c/thiamine    patient and family prefer use of port rather than cvc.    check echocardiogram

## 2019-07-05 NOTE — H&P ADULT - NSICDXPASTSURGICALHX_GEN_ALL_CORE_FT
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

## 2019-07-05 NOTE — H&P ADULT - NSICDXPASTMEDICALHX_GEN_ALL_CORE_FT
PAST MEDICAL HISTORY:  Adenocarcinoma of pancreas     Anxiety     Asthma     BPH (benign prostatic hyperplasia)     Bronchitis 1 month ago    Diabetes     Factor V Leiden     GERD (gastroesophageal reflux disease)     Heart murmur     Herpes lips    High cholesterol     HTN (hypertension)     Port catheter in place not working since insertion

## 2019-07-05 NOTE — ED PROVIDER NOTE - CRITICAL CARE PROVIDED
documentation/direct patient care (not related to procedure)/consult w/ pt's family directly relating to pts condition/additional history taking/interpretation of diagnostic studies/consultation with other physicians/conducted a detailed discussion of DNR status

## 2019-07-06 LAB
ANION GAP SERPL CALC-SCNC: 10 MMOL/L — SIGNIFICANT CHANGE UP (ref 5–17)
BUN SERPL-MCNC: 11 MG/DL — SIGNIFICANT CHANGE UP (ref 8–20)
CALCIUM SERPL-MCNC: 8 MG/DL — LOW (ref 8.6–10.2)
CHLORIDE SERPL-SCNC: 107 MMOL/L — SIGNIFICANT CHANGE UP (ref 98–107)
CO2 SERPL-SCNC: 24 MMOL/L — SIGNIFICANT CHANGE UP (ref 22–29)
CREAT SERPL-MCNC: 0.69 MG/DL — SIGNIFICANT CHANGE UP (ref 0.5–1.3)
CULTURE RESULTS: NO GROWTH — SIGNIFICANT CHANGE UP
E COLI DNA BLD POS QL NAA+NON-PROBE: SIGNIFICANT CHANGE UP
GLUCOSE BLDC GLUCOMTR-MCNC: 173 MG/DL — HIGH (ref 70–99)
GLUCOSE BLDC GLUCOMTR-MCNC: 174 MG/DL — HIGH (ref 70–99)
GLUCOSE BLDC GLUCOMTR-MCNC: 184 MG/DL — HIGH (ref 70–99)
GLUCOSE BLDC GLUCOMTR-MCNC: 214 MG/DL — HIGH (ref 70–99)
GLUCOSE SERPL-MCNC: 147 MG/DL — HIGH (ref 70–115)
HCT VFR BLD CALC: 37.2 % — LOW (ref 39–50)
HGB BLD-MCNC: 12.3 G/DL — LOW (ref 13–17)
LACTATE BLDV-MCNC: 3.1 MMOL/L — HIGH (ref 0.5–2)
MAGNESIUM SERPL-MCNC: 1.8 MG/DL — SIGNIFICANT CHANGE UP (ref 1.6–2.6)
MCHC RBC-ENTMCNC: 33.1 GM/DL — SIGNIFICANT CHANGE UP (ref 32–36)
MCHC RBC-ENTMCNC: 33.7 PG — SIGNIFICANT CHANGE UP (ref 27–34)
MCV RBC AUTO: 101.9 FL — HIGH (ref 80–100)
METHOD TYPE: SIGNIFICANT CHANGE UP
PHOSPHATE SERPL-MCNC: 3.3 MG/DL — SIGNIFICANT CHANGE UP (ref 2.4–4.7)
PLATELET # BLD AUTO: 259 K/UL — SIGNIFICANT CHANGE UP (ref 150–400)
POTASSIUM SERPL-MCNC: 4.2 MMOL/L — SIGNIFICANT CHANGE UP (ref 3.5–5.3)
POTASSIUM SERPL-SCNC: 4.2 MMOL/L — SIGNIFICANT CHANGE UP (ref 3.5–5.3)
PROCALCITONIN SERPL-MCNC: 20.16 NG/ML — HIGH (ref 0.02–0.1)
RBC # BLD: 3.65 M/UL — LOW (ref 4.2–5.8)
RBC # FLD: 15.4 % — HIGH (ref 10.3–14.5)
SODIUM SERPL-SCNC: 141 MMOL/L — SIGNIFICANT CHANGE UP (ref 135–145)
SPECIMEN SOURCE: SIGNIFICANT CHANGE UP
WBC # BLD: 10.17 K/UL — SIGNIFICANT CHANGE UP (ref 3.8–10.5)
WBC # FLD AUTO: 10.17 K/UL — SIGNIFICANT CHANGE UP (ref 3.8–10.5)

## 2019-07-06 PROCEDURE — 99233 SBSQ HOSP IP/OBS HIGH 50: CPT

## 2019-07-06 PROCEDURE — 99223 1ST HOSP IP/OBS HIGH 75: CPT

## 2019-07-06 PROCEDURE — 93971 EXTREMITY STUDY: CPT | Mod: 26,LT

## 2019-07-06 RX ORDER — DEXTROSE 50 % IN WATER 50 %
25 SYRINGE (ML) INTRAVENOUS ONCE
Refills: 0 | Status: DISCONTINUED | OUTPATIENT
Start: 2019-07-06 | End: 2019-07-10

## 2019-07-06 RX ORDER — LIPASE/PROTEASE/AMYLASE 16-48-48K
6 CAPSULE,DELAYED RELEASE (ENTERIC COATED) ORAL
Refills: 0 | Status: DISCONTINUED | OUTPATIENT
Start: 2019-07-06 | End: 2019-07-06

## 2019-07-06 RX ORDER — GLUCAGON INJECTION, SOLUTION 0.5 MG/.1ML
1 INJECTION, SOLUTION SUBCUTANEOUS ONCE
Refills: 0 | Status: DISCONTINUED | OUTPATIENT
Start: 2019-07-06 | End: 2019-07-10

## 2019-07-06 RX ORDER — FENTANYL CITRATE 50 UG/ML
1 INJECTION INTRAVENOUS
Refills: 0 | Status: DISCONTINUED | OUTPATIENT
Start: 2019-07-06 | End: 2019-07-09

## 2019-07-06 RX ORDER — CEFTRIAXONE 500 MG/1
2000 INJECTION, POWDER, FOR SOLUTION INTRAMUSCULAR; INTRAVENOUS EVERY 24 HOURS
Refills: 0 | Status: DISCONTINUED | OUTPATIENT
Start: 2019-07-06 | End: 2019-07-10

## 2019-07-06 RX ORDER — INSULIN LISPRO 100/ML
VIAL (ML) SUBCUTANEOUS
Refills: 0 | Status: DISCONTINUED | OUTPATIENT
Start: 2019-07-06 | End: 2019-07-10

## 2019-07-06 RX ORDER — METRONIDAZOLE 500 MG
500 TABLET ORAL EVERY 8 HOURS
Refills: 0 | Status: DISCONTINUED | OUTPATIENT
Start: 2019-07-06 | End: 2019-07-10

## 2019-07-06 RX ORDER — DEXTROSE 50 % IN WATER 50 %
15 SYRINGE (ML) INTRAVENOUS ONCE
Refills: 0 | Status: DISCONTINUED | OUTPATIENT
Start: 2019-07-06 | End: 2019-07-10

## 2019-07-06 RX ORDER — MIDODRINE HYDROCHLORIDE 2.5 MG/1
10 TABLET ORAL ONCE
Refills: 0 | Status: COMPLETED | OUTPATIENT
Start: 2019-07-06 | End: 2019-07-06

## 2019-07-06 RX ORDER — MIDODRINE HYDROCHLORIDE 2.5 MG/1
10 TABLET ORAL EVERY 8 HOURS
Refills: 0 | Status: DISCONTINUED | OUTPATIENT
Start: 2019-07-06 | End: 2019-07-10

## 2019-07-06 RX ORDER — DEXTROSE 50 % IN WATER 50 %
12.5 SYRINGE (ML) INTRAVENOUS ONCE
Refills: 0 | Status: DISCONTINUED | OUTPATIENT
Start: 2019-07-06 | End: 2019-07-10

## 2019-07-06 RX ORDER — SODIUM CHLORIDE 9 MG/ML
1000 INJECTION, SOLUTION INTRAVENOUS
Refills: 0 | Status: DISCONTINUED | OUTPATIENT
Start: 2019-07-06 | End: 2019-07-10

## 2019-07-06 RX ORDER — MAGNESIUM SULFATE 500 MG/ML
2 VIAL (ML) INJECTION ONCE
Refills: 0 | Status: COMPLETED | OUTPATIENT
Start: 2019-07-06 | End: 2019-07-06

## 2019-07-06 RX ORDER — LIPASE/PROTEASE/AMYLASE 16-48-48K
2 CAPSULE,DELAYED RELEASE (ENTERIC COATED) ORAL
Refills: 0 | Status: DISCONTINUED | OUTPATIENT
Start: 2019-07-06 | End: 2019-07-10

## 2019-07-06 RX ADMIN — FENTANYL CITRATE 1 PATCH: 50 INJECTION INTRAVENOUS at 18:49

## 2019-07-06 RX ADMIN — Medication 50 MILLIGRAM(S): at 06:39

## 2019-07-06 RX ADMIN — Medication 6 CAPSULE(S): at 13:16

## 2019-07-06 RX ADMIN — Medication 1 CAPSULE(S): at 15:34

## 2019-07-06 RX ADMIN — CHLORHEXIDINE GLUCONATE 1 APPLICATION(S): 213 SOLUTION TOPICAL at 09:02

## 2019-07-06 RX ADMIN — Medication 30 MILLILITER(S): at 21:20

## 2019-07-06 RX ADMIN — Medication 5.1 MICROGRAM(S)/KG/MIN: at 05:00

## 2019-07-06 RX ADMIN — CEFEPIME 100 MILLIGRAM(S): 1 INJECTION, POWDER, FOR SOLUTION INTRAMUSCULAR; INTRAVENOUS at 06:38

## 2019-07-06 RX ADMIN — MIDODRINE HYDROCHLORIDE 10 MILLIGRAM(S): 2.5 TABLET ORAL at 09:00

## 2019-07-06 RX ADMIN — FENTANYL CITRATE 1 PATCH: 50 INJECTION INTRAVENOUS at 15:45

## 2019-07-06 RX ADMIN — ATORVASTATIN CALCIUM 10 MILLIGRAM(S): 80 TABLET, FILM COATED ORAL at 21:20

## 2019-07-06 RX ADMIN — Medication 2 CAPSULE(S): at 18:27

## 2019-07-06 RX ADMIN — Medication 50 MILLIGRAM(S): at 17:16

## 2019-07-06 RX ADMIN — Medication 1: at 18:25

## 2019-07-06 RX ADMIN — Medication 50 GRAM(S): at 06:40

## 2019-07-06 RX ADMIN — Medication 153 MILLIGRAM(S): at 06:38

## 2019-07-06 RX ADMIN — Medication 250 MILLIGRAM(S): at 06:39

## 2019-07-06 RX ADMIN — SODIUM CHLORIDE 120 MILLILITER(S): 9 INJECTION, SOLUTION INTRAVENOUS at 06:38

## 2019-07-06 RX ADMIN — ENOXAPARIN SODIUM 40 MILLIGRAM(S): 100 INJECTION SUBCUTANEOUS at 12:23

## 2019-07-06 RX ADMIN — Medication 6 CAPSULE(S): at 09:27

## 2019-07-06 RX ADMIN — Medication 1: at 12:22

## 2019-07-06 RX ADMIN — Medication 153 MILLIGRAM(S): at 12:24

## 2019-07-06 RX ADMIN — TAMSULOSIN HYDROCHLORIDE 0.4 MILLIGRAM(S): 0.4 CAPSULE ORAL at 21:20

## 2019-07-06 RX ADMIN — Medication 102 MILLIGRAM(S): at 17:16

## 2019-07-06 RX ADMIN — Medication 153 MILLIGRAM(S): at 17:17

## 2019-07-06 RX ADMIN — MIDODRINE HYDROCHLORIDE 10 MILLIGRAM(S): 2.5 TABLET ORAL at 21:20

## 2019-07-06 RX ADMIN — Medication 50 MILLIGRAM(S): at 12:28

## 2019-07-06 RX ADMIN — FENTANYL CITRATE 1 PATCH: 50 INJECTION INTRAVENOUS at 15:46

## 2019-07-06 RX ADMIN — MIDODRINE HYDROCHLORIDE 10 MILLIGRAM(S): 2.5 TABLET ORAL at 14:48

## 2019-07-06 RX ADMIN — CEFEPIME 100 MILLIGRAM(S): 1 INJECTION, POWDER, FOR SOLUTION INTRAMUSCULAR; INTRAVENOUS at 14:44

## 2019-07-06 RX ADMIN — Medication 2: at 21:20

## 2019-07-06 RX ADMIN — Medication 81 MILLIGRAM(S): at 12:21

## 2019-07-06 RX ADMIN — Medication 500 MILLIGRAM(S): at 21:20

## 2019-07-06 RX ADMIN — CEFTRIAXONE 100 MILLIGRAM(S): 500 INJECTION, POWDER, FOR SOLUTION INTRAMUSCULAR; INTRAVENOUS at 17:18

## 2019-07-06 RX ADMIN — Medication 102 MILLIGRAM(S): at 06:38

## 2019-07-06 NOTE — PROVIDER CONTACT NOTE (EICU) - SITUATION
Per report, pt with indigestion.
Chart review revealed hypomagnesemia as such replaced in accordance w/ hospital electrolyte repletion standard.

## 2019-07-06 NOTE — PROVIDER CONTACT NOTE (EICU) - ASSESSMENT
Pt assessed on video, pt denies chest pain/sob and endorses indigestion similar to previous episodes which is relieved with maalox.

## 2019-07-06 NOTE — PROGRESS NOTE ADULT - SUBJECTIVE AND OBJECTIVE BOX
INTERVAL HPI/OVERNIGHT EVENTS: No major events, feels better, no cough or sputum     On Admission  19 (1d)  HPI:  62 y/o male with history of Pancreatic cancer s/p whipple in  with positive margins on 3rd line chemotherapy per family (oral sunitinib, started ); has a 3 day history of feeling unwell, mild cough, +chills, high fever.  He feels fatigued; has had diarrhea for the past few months, for which he has been taking loperamide and has had stool studies (negative) -- CT scan last month indicated colitis, and he has had a recent colonoscopy.    In the ED, he has had 4 liters of crystalloid, antibiotics, and uptrending lactates; our service was called and we will be admitted to the MICU and advised that vasoactive agents shouldn't be held until he is transferred.   Of note, he has been taking daily lasix as well for lower extremity edema. (2019 21:03)    PAST MEDICAL & SURGICAL HISTORY:  Port catheter in place: not working since insertion  Diabetes  Heart murmur  Anxiety  Factor V Leiden  Bronchitis: 1 month ago  Herpes: lips  Asthma  GERD (gastroesophageal reflux disease)  BPH (benign prostatic hyperplasia)  Adenocarcinoma of pancreas  HTN (hypertension)  High cholesterol  Port catheter in place  Whipple disease: surgery 17  H/O circumcision  S/P ERCP:       Antimicrobial:  cefepime   IVPB      cefepime   IVPB 2000 milliGRAM(s) IV Intermittent every 8 hours    Cardiovascular:  midodrine 10 milliGRAM(s) Oral every 8 hours  norepinephrine Infusion 0.05 MICROgram(s)/kG/Min IV Continuous <Continuous>  tamsulosin 0.4 milliGRAM(s) Oral at bedtime    Pulmonary:    Hematalogic:  aspirin  chewable 81 milliGRAM(s) Oral daily  enoxaparin Injectable 40 milliGRAM(s) SubCutaneous daily    Other:  ascorbic acid IVPB 1500 milliGRAM(s) IV Intermittent every 6 hours  atorvastatin 10 milliGRAM(s) Oral at bedtime  chlorhexidine 2% Cloths 1 Application(s) Topical <User Schedule>  dextrose 40% Gel 15 Gram(s) Oral once PRN  dextrose 5%. 1000 milliLiter(s) IV Continuous <Continuous>  dextrose 50% Injectable 12.5 Gram(s) IV Push once  dextrose 50% Injectable 25 Gram(s) IV Push once  dextrose 50% Injectable 25 Gram(s) IV Push once  glucagon  Injectable 1 milliGRAM(s) IntraMuscular once PRN  hydrocortisone sodium succinate Injectable 50 milliGRAM(s) IV Push every 6 hours  insulin lispro (HumaLOG) corrective regimen sliding scale   SubCutaneous Before meals and at bedtime  pancrelipase  (CREON 12,000 Lipase Units) 6 Capsule(s) Oral <User Schedule>  pancrelipase  (CREON 36,000 Lipase Units) 1 Capsule(s) Oral daily  thiamine IVPB 200 milliGRAM(s) IV Intermittent <User Schedule>      Drug Dosing Weight  Height (cm): 172.72 (2019 15:21)  Weight (kg): 65.4 (2019 21:40)  BMI (kg/m2): 21.9 (2019 21:40)  BSA (m2): 1.78 (2019 21:40)    T(C): 36.3 (19 @ 12:07), Max: 39 (19 @ 15:21)  HR: 76 (19 @ 14:00)  BP: 90/61 (19 @ 13:00)  BP(mean): 72 (19 @ 13:00)  ABP: --  ABP(mean): --  RR: 18 (19 @ 15:00)  SpO2: 98% (19 @ 15:00)          0705 @ 07:01  -   @ 07:00  --------------------------------------------------------  IN: 3034.4 mL / OUT: 2500 mL / NET: 534.4 mL            PHYSICAL EXAM:    GENERAL: NAD, chronically ill appearing   HEAD:  Atraumatic, normocephalic  EYES: EOMI,   ENMT:  MMM, No oropharyngeal erythema or exudates  NECK:  Supple, normal appearance, trachea midline  NERVOUS SYSTEM:  Alert & Oriented X3, Symmetric strength in all extremities    CHEST/LUNG: Bilateral air entry, no chest deformity, no crackles or wheeze  HEART: Regular rate, regular rhythm; systolic murmur   ABDOMEN: Soft, Nontender, Nondistended; No rebound or guarding, bowel sounds present  EXTREMITIES:  mild bilat edema, no clubbing, no cyanosis.  SKIN:  No visible rashes or skin lesions, good capillary refill  PSYCH: appropriate affect and behavior    LABS:  CBC Full  -  ( 2019 05:00 )  WBC Count : 10.17 K/uL  RBC Count : 3.65 M/uL  Hemoglobin : 12.3 g/dL  Hematocrit : 37.2 %  Platelet Count - Automated : 259 K/uL  Mean Cell Volume : 101.9 fl  Mean Cell Hemoglobin : 33.7 pg  Mean Cell Hemoglobin Concentration : 33.1 gm/dL  Auto Neutrophil # : x  Auto Lymphocyte # : x  Auto Monocyte # : x  Auto Eosinophil # : x  Auto Basophil # : x  Auto Neutrophil % : x  Auto Lymphocyte % : x  Auto Monocyte % : x  Auto Eosinophil % : x  Auto Basophil % : x        141  |  107  |  11.0  ----------------------------<  147<H>  4.2   |  24.0  |  0.69    Ca    8.0<L>      2019 05:00  Phos  3.3     -  Mg     1.8     -    TPro  5.4<L>  /  Alb  3.0<L>  /  TBili  1.0  /  DBili  x   /  AST  106<H>  /  ALT  57<H>  /  AlkPhos  652<H>      PT/INR - ( 2019 15:38 )   PT: 14.1 sec;   INR: 1.22 ratio         PTT - ( 2019 15:38 )  PTT:32.5 sec  Urinalysis Basic - ( 2019 15:34 )    Color: Yellow / Appearance: Clear / S.010 / pH: x  Gluc: x / Ketone: Negative  / Bili: Negative / Urobili: Negative mg/dL   Blood: x / Protein: Negative mg/dL / Nitrite: Negative   Leuk Esterase: Negative / RBC: x / WBC x   Sq Epi: x / Non Sq Epi: x / Bacteria: x      Culture Results:   Growth in aerobic and anaerobic bottles: Gram Negative Rods  Aerobic Bottle: 7.37 Hours to positivity  Anaerobic Bottle: 8.18 Hours to positivity  .  ***Blood Panel PCR results on this specimen are available  approximately 3 hours after the Gram stain result.***  Gram stain, PCR, and/or culture results may not always  correspond due to difference in methodologies.  ************************************************************  This PCR assay was performed using [employer].  The following targets are tested for: Enterococcus,  vancomycin resistant enterococci, Listeria monocytogenes,  coagulase negative staphylococci, S. aureus,  methicillin resistant S. aureus, Streptococcus agalactiae  (Group B), S. pneumoniae, S. pyogenes (Group A),  Acinetobacter baumannii, Enterobacter cloacae, E. coli,  Klebsiella oxytoca, K. pneumoniae, Proteus sp.,  Serratia marcescens, Haemophilus influenzae,  Neisseria meningitidis, Pseudomonas aeruginosa, Candida  albicans, C. glabrata, C krusei, C parapsilosis,  C. tropicalis and the KPC resistance gene.  "Due to technical problems, Proteus sp. will Not be reported as part of  the BCID panel until further notice"  .  TYPE: (C=Critical, N=Notification, A=Abnormal) C  TESTS:  _ GS  DATE/TIME CALLED: _2019 09:48:00  CALLED TO: Arthur Barlow RN  READ BACK (2 Patient Identifiers)(Y/N): _ Y  READ BACK VALUES (Y/N): _ Y  CALLED BY: Arthur Su ( @ 17:22)  Culture Results:   Growth in aerobic and anaerobic bottles: Gram Negative Rods  Aerobic Bottle: 8.07 Hours to positivity  Anaerobic Bottle: 8.18 Hours to positivity  .  TYPE: (C=Critical, N=Notification, A=Abnormal) C  TESTS:  _ GS  DATE/TIME CALLED: _ 2019 09:53:11  CALLED TO: Arthur Barlow RN  READ BACK (2 Patient Identifiers)(Y/N): _ Y  READ BACK VALUES (Y/N): _ Y  CALLED BY: Arthur Su ( @ 17:22)      RADIOLOGY personally reviewed  CXR- right base airspace opacity

## 2019-07-06 NOTE — PROGRESS NOTE ADULT - ASSESSMENT
64 yo male with pancreatic cancer s/p whipple procedure in 2017, now on chemotherapy, presented with fever, chills, general malaise, found to have E coli bacteremia and septic shock requiring vasopressors.  Source of bacteremia likely intraabdominal (colitis on CT abdomen).      Plan:  - change abx to ceftriaxone and flagyl  - gentle IV hydration   - titrate down pressors as tolerated  - oncology consultation called

## 2019-07-06 NOTE — CONSULT NOTE ADULT - ASSESSMENT
Again this is a 63 year old man with pancreas cancer status post Whipple currently on chemotherapy who presented in septic shock secondary to gram negative rods bacteremia.  The source of his bacteremia is possibly secondary to his underlying colitis.  He is currently on ceftriaxone and metronidazole and his vasopressors are being weaned as tolerated.  Supportive care per MICU team.  Will continue to follow.    Thank you for the consult.  Please do not hesitate to call with any questions or concerns.    GOVIND Crespo MD  [facility] Physician [facility]  Division of Gastroenterology  Tel [phone]  Fax [phone]  07-07-19 @ 01:06

## 2019-07-07 DIAGNOSIS — E11.9 TYPE 2 DIABETES MELLITUS WITHOUT COMPLICATIONS: ICD-10-CM

## 2019-07-07 DIAGNOSIS — R01.1 CARDIAC MURMUR, UNSPECIFIED: ICD-10-CM

## 2019-07-07 DIAGNOSIS — A41.51 SEPSIS DUE TO ESCHERICHIA COLI [E. COLI]: ICD-10-CM

## 2019-07-07 LAB
ALBUMIN SERPL ELPH-MCNC: 2.2 G/DL — LOW (ref 3.3–5.2)
ALP SERPL-CCNC: 407 U/L — HIGH (ref 40–120)
ALT FLD-CCNC: 30 U/L — SIGNIFICANT CHANGE UP
ANION GAP SERPL CALC-SCNC: 6 MMOL/L — SIGNIFICANT CHANGE UP (ref 5–17)
AST SERPL-CCNC: 31 U/L — SIGNIFICANT CHANGE UP
BASOPHILS # BLD AUTO: 0 K/UL — SIGNIFICANT CHANGE UP (ref 0–0.2)
BASOPHILS NFR BLD AUTO: 0 % — SIGNIFICANT CHANGE UP (ref 0–2)
BILIRUB DIRECT SERPL-MCNC: 0.2 MG/DL — SIGNIFICANT CHANGE UP (ref 0–0.3)
BILIRUB INDIRECT FLD-MCNC: 0.2 MG/DL — SIGNIFICANT CHANGE UP (ref 0.2–1)
BILIRUB SERPL-MCNC: 0.4 MG/DL — SIGNIFICANT CHANGE UP (ref 0.4–2)
BUN SERPL-MCNC: 14 MG/DL — SIGNIFICANT CHANGE UP (ref 8–20)
CALCIUM SERPL-MCNC: 8.2 MG/DL — LOW (ref 8.6–10.2)
CHLORIDE SERPL-SCNC: 101 MMOL/L — SIGNIFICANT CHANGE UP (ref 98–107)
CO2 SERPL-SCNC: 25 MMOL/L — SIGNIFICANT CHANGE UP (ref 22–29)
CREAT SERPL-MCNC: 0.48 MG/DL — LOW (ref 0.5–1.3)
EOSINOPHIL # BLD AUTO: 0 K/UL — SIGNIFICANT CHANGE UP (ref 0–0.5)
EOSINOPHIL NFR BLD AUTO: 0 % — SIGNIFICANT CHANGE UP (ref 0–6)
GLUCOSE BLDC GLUCOMTR-MCNC: 148 MG/DL — HIGH (ref 70–99)
GLUCOSE BLDC GLUCOMTR-MCNC: 159 MG/DL — HIGH (ref 70–99)
GLUCOSE BLDC GLUCOMTR-MCNC: 191 MG/DL — HIGH (ref 70–99)
GLUCOSE SERPL-MCNC: 154 MG/DL — HIGH (ref 70–115)
HBA1C BLD-MCNC: 6.2 % — HIGH (ref 4–5.6)
HCT VFR BLD CALC: 33.6 % — LOW (ref 39–50)
HGB BLD-MCNC: 10.8 G/DL — LOW (ref 13–17)
LYMPHOCYTES # BLD AUTO: 0.47 K/UL — LOW (ref 1–3.3)
LYMPHOCYTES # BLD AUTO: 4.4 % — LOW (ref 13–44)
MAGNESIUM SERPL-MCNC: 2.2 MG/DL — SIGNIFICANT CHANGE UP (ref 1.6–2.6)
MCHC RBC-ENTMCNC: 32.1 GM/DL — SIGNIFICANT CHANGE UP (ref 32–36)
MCHC RBC-ENTMCNC: 33 PG — SIGNIFICANT CHANGE UP (ref 27–34)
MCV RBC AUTO: 102.8 FL — HIGH (ref 80–100)
MONOCYTES # BLD AUTO: 0.28 K/UL — SIGNIFICANT CHANGE UP (ref 0–0.9)
MONOCYTES NFR BLD AUTO: 2.6 % — SIGNIFICANT CHANGE UP (ref 2–14)
NEUTROPHILS # BLD AUTO: 9.96 K/UL — HIGH (ref 1.8–7.4)
NEUTROPHILS NFR BLD AUTO: 83.3 % — HIGH (ref 43–77)
PHOSPHATE SERPL-MCNC: 2.5 MG/DL — SIGNIFICANT CHANGE UP (ref 2.4–4.7)
PLATELET # BLD AUTO: 217 K/UL — SIGNIFICANT CHANGE UP (ref 150–400)
POTASSIUM SERPL-MCNC: 4 MMOL/L — SIGNIFICANT CHANGE UP (ref 3.5–5.3)
POTASSIUM SERPL-SCNC: 4 MMOL/L — SIGNIFICANT CHANGE UP (ref 3.5–5.3)
PROCALCITONIN SERPL-MCNC: 12.89 NG/ML — HIGH (ref 0.02–0.1)
PROT SERPL-MCNC: 4.7 G/DL — LOW (ref 6.6–8.7)
RBC # BLD: 3.27 M/UL — LOW (ref 4.2–5.8)
RBC # FLD: 15.3 % — HIGH (ref 10.3–14.5)
SODIUM SERPL-SCNC: 132 MMOL/L — LOW (ref 135–145)
WBC # BLD: 10.71 K/UL — HIGH (ref 3.8–10.5)
WBC # FLD AUTO: 10.71 K/UL — HIGH (ref 3.8–10.5)

## 2019-07-07 PROCEDURE — 99233 SBSQ HOSP IP/OBS HIGH 50: CPT

## 2019-07-07 PROCEDURE — 99223 1ST HOSP IP/OBS HIGH 75: CPT

## 2019-07-07 RX ORDER — FAMOTIDINE 10 MG/ML
40 INJECTION INTRAVENOUS
Refills: 0 | Status: DISCONTINUED | OUTPATIENT
Start: 2019-07-07 | End: 2019-07-10

## 2019-07-07 RX ORDER — SIMETHICONE 80 MG/1
80 TABLET, CHEWABLE ORAL EVERY 6 HOURS
Refills: 0 | Status: DISCONTINUED | OUTPATIENT
Start: 2019-07-07 | End: 2019-07-10

## 2019-07-07 RX ADMIN — ENOXAPARIN SODIUM 40 MILLIGRAM(S): 100 INJECTION SUBCUTANEOUS at 11:31

## 2019-07-07 RX ADMIN — MIDODRINE HYDROCHLORIDE 10 MILLIGRAM(S): 2.5 TABLET ORAL at 13:19

## 2019-07-07 RX ADMIN — Medication 500 MILLIGRAM(S): at 06:08

## 2019-07-07 RX ADMIN — MIDODRINE HYDROCHLORIDE 10 MILLIGRAM(S): 2.5 TABLET ORAL at 21:10

## 2019-07-07 RX ADMIN — ATORVASTATIN CALCIUM 10 MILLIGRAM(S): 80 TABLET, FILM COATED ORAL at 21:10

## 2019-07-07 RX ADMIN — Medication 500 MILLIGRAM(S): at 13:19

## 2019-07-07 RX ADMIN — CHLORHEXIDINE GLUCONATE 1 APPLICATION(S): 213 SOLUTION TOPICAL at 07:47

## 2019-07-07 RX ADMIN — Medication 1: at 07:49

## 2019-07-07 RX ADMIN — Medication 1: at 16:40

## 2019-07-07 RX ADMIN — Medication 2 CAPSULE(S): at 07:47

## 2019-07-07 RX ADMIN — FENTANYL CITRATE 1 PATCH: 50 INJECTION INTRAVENOUS at 07:38

## 2019-07-07 RX ADMIN — Medication 1: at 11:32

## 2019-07-07 RX ADMIN — Medication 102 MILLIGRAM(S): at 06:07

## 2019-07-07 RX ADMIN — Medication 153 MILLIGRAM(S): at 00:22

## 2019-07-07 RX ADMIN — FENTANYL CITRATE 1 PATCH: 50 INJECTION INTRAVENOUS at 20:20

## 2019-07-07 RX ADMIN — Medication 2 CAPSULE(S): at 12:05

## 2019-07-07 RX ADMIN — TAMSULOSIN HYDROCHLORIDE 0.4 MILLIGRAM(S): 0.4 CAPSULE ORAL at 21:10

## 2019-07-07 RX ADMIN — MIDODRINE HYDROCHLORIDE 10 MILLIGRAM(S): 2.5 TABLET ORAL at 06:08

## 2019-07-07 RX ADMIN — Medication 153 MILLIGRAM(S): at 06:07

## 2019-07-07 RX ADMIN — Medication 50 MILLIGRAM(S): at 06:08

## 2019-07-07 RX ADMIN — CEFTRIAXONE 100 MILLIGRAM(S): 500 INJECTION, POWDER, FOR SOLUTION INTRAMUSCULAR; INTRAVENOUS at 16:35

## 2019-07-07 RX ADMIN — FENTANYL CITRATE 1 PATCH: 50 INJECTION INTRAVENOUS at 07:39

## 2019-07-07 RX ADMIN — SIMETHICONE 80 MILLIGRAM(S): 80 TABLET, CHEWABLE ORAL at 13:20

## 2019-07-07 RX ADMIN — Medication 500 MILLIGRAM(S): at 21:10

## 2019-07-07 RX ADMIN — Medication 2 CAPSULE(S): at 16:35

## 2019-07-07 RX ADMIN — Medication 50 MILLIGRAM(S): at 00:22

## 2019-07-07 RX ADMIN — Medication 81 MILLIGRAM(S): at 11:30

## 2019-07-07 NOTE — DIETITIAN INITIAL EVALUATION ADULT. - PERTINENT LABORATORY DATA
07-07 Na132 mmol/L<L> Glu 154 mg/dL<H> K+ 4.0 mmol/L Cr  0.48 mg/dL<L> BUN 14.0 mg/dL Phos 2.5 mg/dL Alb 2.2 g/dL<L> PAB n/a

## 2019-07-07 NOTE — PROGRESS NOTE ADULT - ASSESSMENT
63yr old male with PMH Asthma, Factor V Leiden deficiency,  Pancreatic Cancer s/p Whipple surgery in Nov 2017, s/p radiation, on 3rd line chemotherapy started on 7/2 after failed prior 2 chemo regimen was admitted to MICU on 7/5 with septic shock. He required vasopressor therapy after failed fluid resuscitation . Leucopenia, uptrending lactate and was persistently hypotensive. He was started on empiric antibiotics for possible pneumonia with CXR s/o Right medial basal consolidation. Was started on po midodrine on 7/6 and weaned off vasopressors since 7/6.  Blood cultures showed e. Coli bacteremia, and CT abd in Jun 2019 showed diffuse colitis with nadira-splenic and hepatic, moderate pelvic ascites. GI and Oncology was consulted by MICU. HE is now stable to be downgraded for floor.      #septic shock - resolved s/p vasopressors, iv stress steroid therapy  - now on midodrine - downtitrate as possible   f/u repeat Cultures   possible source colitis vs pneumonia  - most likely Abdominal source of E Coli bacteremia.  CT abd in 6/2019 - colitis - and ascites - will repeat imaging (ordered)  ID consult placed (discussed with Dr Rock) for duration of antibiotics given immunocompromised state  GI following , Oncology recs awaited  Chemotherapy on hold , pt has mediport in place.     # elevated LFTs - possibly 2/2 shock liver - resolved  # pancreatic cancer s/p whipple - ct pancreatic enzymes with meals  # heart murmur - Mild AI, AS on ECHO - f/u cardio outpt  # DM - A1C - 6.2 - SSI for now  # asthma- stable   # Peripheral edema - possibly 2/2 severe protein calorie malnutrition - add glucerna. 63yr old male with PMH Asthma, Factor V Leiden deficiency,  Pancreatic Cancer s/p Whipple surgery in Nov 2017, s/p radiation, on 3rd line chemotherapy started on 7/2 after failed prior 2 chemo regimen was admitted to MICU on 7/5 with septic shock. He required vasopressor therapy after failed fluid resuscitation . Leucopenia, uptrending lactate and was persistently hypotensive. He was started on empiric antibiotics for possible pneumonia with CXR s/o Right medial basal consolidation. Was started on po midodrine on 7/6 and weaned off vasopressors since 7/6.  Blood cultures showed e. Coli bacteremia, and CT abd in Jun 2019 showed diffuse colitis with nadira-splenic and hepatic, moderate pelvic ascites. GI and Oncology was consulted by MICU. HE is now stable to be downgraded for floor.      #septic shock - resolved s/p vasopressors, iv stress steroid therapy  - now on midodrine - downtitrate as possible   f/u repeat Cultures   possible source colitis vs pneumonia  - most likely Abdominal source of E Coli bacteremia.  CT abd in 6/2019 - colitis - and ascites - will repeat imaging (ordered)  ID consult placed (discussed with Dr Rock) for duration of antibiotics given immunocompromised state  GI following , Oncology recs awaited  Chemotherapy on hold , pt has mediport in place.     # elevated LFTs - possibly 2/2 shock liver - resolved  # pancreatic cancer s/p whipple - ct pancreatic enzymes with meals  # heart murmur - Mild AI, AS on ECHO - f/u cardio outpt  # DM - A1C - 6.2 - SSI for now  # asthma- stable   # Peripheral edema - possibly 2/2 severe protein calorie malnutrition - add glucerna. check BNP

## 2019-07-07 NOTE — CONSULT NOTE ADULT - ASSESSMENT
63 M with history of factor V Leiden mutation and stage II B pancreatic cancer in November 2017, s/p Whipple procedure, presently on Sunitinib ( 3rd line of therapy, after gemcitabine/ abraxane and  capecitabine/oxaliplatin), admitted with fever, chills, diarrhea, malaise.Patient admitted at [facility] for dehydration and possible sepsis ( uptrending lactates). In ED patient had venous Doppler, due to persistent lower extremity edema; no evidence of DVT. CT C/AP from 6/10/19 showed no evidence of metastatic disease in the thorax, but interval development of pelvic ascites.In the context of upward trending CA 19-9, patient was started on Sunitinib. Last seen in Dr. Vela's office in June 2019.Medical oncology consult per above. 63 M with history of factor V Leiden mutation and stage II B pancreatic cancer in November 2017, s/p Whipple procedure, presently on Sunitinib ( 3rd line of therapy, after gemcitabine/ abraxane and  capecitabine/oxaliplatin), admitted with fever, chills, diarrhea, malaise.Patient admitted at [facility] for dehydration and possible sepsis ( uptrending lactates).Treated with antibiotics for PNA. In ED patient had venous Doppler, due to persistent lower extremity edema; no evidence of DVT. CT C/AP from 6/10/19 showed no evidence of metastatic disease in the thorax, but interval development of pelvic ascites.In the context of upward trending CA 19-9, patient was started on Sunitinib. Last seen in Dr. Vela's office in June 2019.Medical oncology consult per above.

## 2019-07-07 NOTE — PROGRESS NOTE ADULT - SUBJECTIVE AND OBJECTIVE BOX
TRANSFER NOTE    CC: Back pain, fever, chills, cough , intermittent diarrhea for last few months    HPI in brief:  63yr old male with PMH Asthma, Factor V Leiden deficiency,  Pancreatic Cancer s/p Whipple surgery in Nov 2017, s/p radiation, on 3rd line chemotherapy started on 7/2 after failed prior 2 chemo regimen was admitted to MICU on 7/5 with septic shock. He required vasopressor therapy after failed fluid resuscitation uptrending lactate and was persistently hypotensive. He was started on empiric antibiotics for possible pneumonia with CXR s/o Right medial basal consolidation. Was started on po midodrine on 7/6 and weaned off vasopressors since 7/6.  Blood cultures showed e. Coli bacteremia, and CT abd in Jun 2019 showed diffuse colitis with nadira-splenic and hepatic, moderate pelvic ascites. GI and Oncology was consulted by MICU. HE is now stable to be downgraded for floor.    Patient voices that his diarrhea has been better since the break from chemotherapy in end of June( before  3rd line chemo was started on 7/2) But he has heart burn and feels bloated with abdominal cramps when he eats. feels better after being off of chemo.       INTERVAL HPI/OVERNIGHT EVENTS: as above in HPI     REVIEW OF SYSTEMS:    CONSTITUTIONAL: + fever, 100lbs weight loss over last 2mths, + fatigue  RESPIRATORY: No cough, wheezing, hemoptysis; No shortness of breath  CARDIOVASCULAR: No chest pain, palpitations  GASTROINTESTINAL:  No nausea, vomiting  NEUROLOGICAL: No headaches, memory loss, loss of strength.  MISCELLANEOUS: right sided back pain       Vital Signs Last 24 Hrs  T(C): 37.1 (07 Jul 2019 12:02), Max: 37.3 (06 Jul 2019 19:33)  T(F): 98.8 (07 Jul 2019 12:02), Max: 99.2 (06 Jul 2019 19:33)  HR: 93 (07 Jul 2019 14:00) (53 - 96)  BP: 117/70 (07 Jul 2019 14:00) (78/59 - 117/70)  BP(mean): 88 (07 Jul 2019 14:00) (62 - 88)  RR: 30 (07 Jul 2019 14:00) (10 - 38)  SpO2: 97% (07 Jul 2019 14:00) (90% - 98%)    PHYSICAL EXAM:    GENERAL: NAD, well-groomed  HEENT: PERRL, +EOMI  NECK: soft, Supple  CHEST/LUNG: Clear to percussion bilaterally; No wheezing  HEART: S1S2+, Regular rate and rhythm; systolic murmur+  ABDOMEN: Soft, Nontender, Nondistended; Bowel sounds present  EXTREMITIES:   No clubbing, cyanosis, pitting pedal  edema 1+  SKIN: No rashes or lesions  NEURO: AAOX3  PSYCH: normal mood      07-06 @ 07:01  -  07-07 @ 07:00  --------------------------------------------------------  IN: 3364 mL / OUT: 250 mL / NET: 3114 mL    07-07 @ 07:01  -  07-07 @ 15:44  --------------------------------------------------------  IN: 250 mL / OUT: 0 mL / NET: 250 mL        LABS:                        10.8   10.71 )-----------( 217      ( 07 Jul 2019 06:28 )             33.6     07-07    132<L>  |  101  |  14.0  ----------------------------<  154<H>  4.0   |  25.0  |  0.48<L>    Ca    8.2<L>      07 Jul 2019 06:28  Phos  2.5     07-07  Mg     2.2     07-07    TPro  4.7<L>  /  Alb  2.2<L>  /  TBili  0.4  /  DBili  0.2  /  AST  31  /  ALT  30  /  AlkPhos  407<H>  07-07            MEDICATIONS  (STANDING):  aspirin  chewable 81 milliGRAM(s) Oral daily  atorvastatin 10 milliGRAM(s) Oral at bedtime  cefTRIAXone   IVPB 2000 milliGRAM(s) IV Intermittent every 24 hours  dextrose 5%. 1000 milliLiter(s) (50 mL/Hr) IV Continuous <Continuous>  dextrose 50% Injectable 12.5 Gram(s) IV Push once  dextrose 50% Injectable 25 Gram(s) IV Push once  dextrose 50% Injectable 25 Gram(s) IV Push once  enoxaparin Injectable 40 milliGRAM(s) SubCutaneous daily  famotidine    Tablet 40 milliGRAM(s) Oral two times a day  fentaNYL   Patch  12 MICROgram(s)/Hr 1 Patch Transdermal every 72 hours  fentaNYL   Patch  25 MICROgram(s)/Hr 1 Patch Transdermal every 72 hours  insulin lispro (HumaLOG) corrective regimen sliding scale   SubCutaneous Before meals and at bedtime  metroNIDAZOLE    Tablet 500 milliGRAM(s) Oral every 8 hours  midodrine 10 milliGRAM(s) Oral every 8 hours  pancrelipase  (CREON 36,000 Lipase Units) 1 Capsule(s) Oral daily  pancrelipase  (CREON 36,000 Lipase Units) 2 Capsule(s) Oral <User Schedule>  tamsulosin 0.4 milliGRAM(s) Oral at bedtime    MEDICATIONS  (PRN):  aluminum hydroxide/magnesium hydroxide/simethicone Suspension 30 milliLiter(s) Oral every 6 hours PRN Dyspepsia  dextrose 40% Gel 15 Gram(s) Oral once PRN Blood Glucose LESS THAN 70 milliGRAM(s)/deciliter  glucagon  Injectable 1 milliGRAM(s) IntraMuscular once PRN Glucose LESS THAN 70 milligrams/deciliter  simethicone 80 milliGRAM(s) Chew every 6 hours PRN Gas      RADIOLOGY & ADDITIONAL TESTS:    CT chest / abd 6/2019 -    IMPRESSION:     No evidence of metastatic disease in the thorax.    Interval development of trace bilateral pleural effusions.    Stable soft tissue surrounding the proximal superior mesenteric artery   and interposed between the celiac axis and portal vein which is narrowed.    Increase in the small amount of perisplenic ascites. Small amount of   perihepatic ascites and moderate amount of pelvic ascites is stable.    Interval development of thickening of areas of the colon as described   above suggesting colitis.        ECHO - Summary:   1. Left ventricular ejection fraction, by visual estimation, is 55 to   60%.   2. Normal global left ventricular systolic function.   3. Spectral Doppler shows impaired relaxation pattern of left   ventricular myocardial filling (Grade I diastolic dysfunction).   4. Mild mitral annular calcification.   5. Thickening of the anterior and posterior mitral valve leaflets.   6. Mild to moderate aortic regurgitation.   7. Dilatation of the ascending aorta.   8. The aortic valve mean gradient is 12.9 mmHg consistent with mild   aortic stenosis.        Lt duplex - NEg for DVT       CXR - portable - image reviewed    IMPRESSION: RIGHT medial basilar airspace consolidation.   Has mediport in place +

## 2019-07-07 NOTE — PROGRESS NOTE ADULT - ASSESSMENT
62 yo male with pancreatic cancer s/p whipple procedure in 2017, now on chemotherapy, presented with fever, chills, general malaise, found to have E coli bacteremia and septic shock requiring vasopressors.  Source of bacteremia likely intraabdominal (colitis on CT abdomen).

## 2019-07-07 NOTE — CONSULT NOTE ADULT - SUBJECTIVE AND OBJECTIVE BOX
JENIFER NORTH,  63y Male  MRN: [id number]  ATTENDING: Dr. Allison Vicente      HPI:  63 M with history of factor V Leiden mutation and stage II B pancreatic cancer in November 2017, s/p Whipple procedure, presently on Sunitinib ( 3rd line of therapy, after gemcitabine/ abraxane and  capecitabine/oxaliplatin), admitted with fever, chills, diarrhea, malaise.Patient admitted at [facility] for dehydration and possible sepsis ( uptrending lactates). In ED patient had venous Doppler, due to persistent lower extremity edema; no evidence of DVT. CT C/AP from 6/10/19 showed no evidence of metastatic disease in the thorax, but interval development of pelvic ascites.In the context of upward trending CA 19-9, patient was started on Sunitinib. Last seen in Dr. Vela's office in June 2019.Medical oncology consult per above.    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 1000 milliLiter(s) IV Continuous <Continuous>  dextrose 50% Injectable 12.5 Gram(s) IV Push once  dextrose 50% Injectable 25 Gram(s) IV Push once  dextrose 50% Injectable 25 Gram(s) IV Push once  enoxaparin Injectable 40 milliGRAM(s) SubCutaneous daily  famotidine    Tablet 40 milliGRAM(s) Oral two times a day  fentaNYL   Patch  12 MICROgram(s)/Hr 1 Patch Transdermal every 72 hours  fentaNYL   Patch  25 MICROgram(s)/Hr 1 Patch Transdermal every 72 hours  insulin lispro (HumaLOG) corrective regimen sliding scale   SubCutaneous Before meals and at bedtime  metroNIDAZOLE    Tablet 500 milliGRAM(s) Oral every 8 hours  midodrine 10 milliGRAM(s) Oral every 8 hours  pancrelipase  (CREON 36,000 Lipase Units) 1 Capsule(s) Oral daily  pancrelipase  (CREON 36,000 Lipase Units) 2 Capsule(s) Oral <User Schedule>  tamsulosin 0.4 milliGRAM(s) Oral at bedtime      ALLERGIES:  No Known Allergies      FAMILY HISTORY:  Reviewed, non-contributory: [ ]   Maternal-  Paternal-    SOCIAL HISTORY:  Tobacco: YES [ ]  ; NO [ ]; Former smoker [ ]  Alcohol:   YES [ ]  ; NO [ ]; Social alcohol user [ ]  Occupation/ marital status/ children:    REVIEW SYSTEMS:  Constitutional: fever, weight  HEENT: oral thrush / dysphagia  Respiratory: no dyspnea , wheezing, cough  Cardiovascular: denies chest pain, palpitations  GI: no abdominal tenderness / pain; no change in bowel habits  Musculoskeletal: joint pain / swelling  Integumentary: denies pruritus; no skin lesions  Neurologic:    VITALS:  T(C): 36.6, Max: 37.3 (07-06-19 @ 19:33)  T(F): 97.8, Max: 99.2 (07-06-19 @ 19:33)  HR: 93 (53 - 96)  BP: 117/70 (78/59 - 117/70)  SpO2: 97% (90% - 98%)    PHYSICAL EXAM:  Constitutional: Alert and oriented x 3  Eyes/ ENMT: PERRLA,   Respiratory: clear to auscultation bilaterally  Cardiovascular: S1,S2 rhythmic  Gastrointestinal: no guarding/ rebound  Extremities: no calf tenderness;  peripheral edema  Skin: dry, rash  Musculoskeletal:     LABS:  (07-07) WBC: 10.71 K/uL,Hemoglobin: 10.8 g/dL, Hematocrit: 33.6 %,  Platelet: 217 K/uL  (07-07) Na: 132 mmol/L ; K: 4.0 mmol/L ; BUN: 14.0 mg/dL ; Cr: 0.48 mg/dL.    Blood Gas Venous - Lactate (07.06.19 @ 04:58)    Blood Gas Venous - Lactate: 3.1 mmoL/L    Blood Gas Venous - Lactate (07.05.19 @ 19:09)    Blood Gas Venous - Lactate: 4.2: TYPE:(C=Critical, N=Notification, A=Abnormal) C  TESTS: LACTATEWBV  DATE/TIME CALLED: 07/05/19 18:45  CALLED TO: RN: TANIYA  READ BACK (2 Patient Identifiers)(Y/N): Y  READ BACK VALUES (Y/N): Y  CALLED BY: AB mmoL/L        RADIOLOGY:  CT Chest w/ IV Cont (06.10.19 @ 15:39)   IMPRESSION:     No evidence of metastatic disease in the thorax.    Interval development of trace bilateral pleural effusions.    Stable soft tissue surrounding the proximal superior mesenteric artery   and interposed between the celiac axis and portal vein which is narrowed.    Increase in the small amount of perisplenic ascites. Small amount of   perihepatic ascites and moderate amount of pelvic ascites is stable.    Interval development of thickening of areas of the colon as described   above suggesting colitis. JENIFER NORTH,  63y Male  MRN: [id number]  ATTENDING: Dr. Allison Vicente      HPI:  63 M with history of factor V Leiden mutation and stage II B pancreatic cancer in November 2017, s/p Whipple procedure, presently on Sunitinib ( 3rd line of therapy, after gemcitabine/ abraxane and  capecitabine/oxaliplatin), admitted with fever, chills, diarrhea, malaise.Patient admitted at [facility] for dehydration and possible sepsis ( uptrending lactates).Treated with antibiotics for PNA. In ED patient had venous Doppler, due to persistent lower extremity edema; no evidence of DVT. CT C/AP from 6/10/19 showed no evidence of metastatic disease in the thorax, but interval development of pelvic ascites.In the context of upward trending CA 19-9, patient was started on Sunitinib. Last seen in Dr. Vela's office in June 2019.Medical oncology consult per above.    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 1000 milliLiter(s) IV Continuous <Continuous>  dextrose 50% Injectable 12.5 Gram(s) IV Push once  dextrose 50% Injectable 25 Gram(s) IV Push once  dextrose 50% Injectable 25 Gram(s) IV Push once  enoxaparin Injectable 40 milliGRAM(s) SubCutaneous daily  famotidine    Tablet 40 milliGRAM(s) Oral two times a day  fentaNYL   Patch  12 MICROgram(s)/Hr 1 Patch Transdermal every 72 hours  fentaNYL   Patch  25 MICROgram(s)/Hr 1 Patch Transdermal every 72 hours  insulin lispro (HumaLOG) corrective regimen sliding scale   SubCutaneous Before meals and at bedtime  metroNIDAZOLE    Tablet 500 milliGRAM(s) Oral every 8 hours  midodrine 10 milliGRAM(s) Oral every 8 hours  pancrelipase  (CREON 36,000 Lipase Units) 1 Capsule(s) Oral daily  pancrelipase  (CREON 36,000 Lipase Units) 2 Capsule(s) Oral <User Schedule>  tamsulosin 0.4 milliGRAM(s) Oral at bedtime      ALLERGIES:  No Known Allergies      FAMILY HISTORY:  Reviewed, non-contributory: [ ]   Maternal-  Paternal-    SOCIAL HISTORY:  Tobacco: YES [ ]  ; NO [ ]; Former smoker [ ]  Alcohol:   YES [ ]  ; NO [ ]; Social alcohol user [ ]  Occupation/ marital status/ children:    REVIEW SYSTEMS:  Constitutional: fever, weight  HEENT: oral thrush / dysphagia  Respiratory: no dyspnea , wheezing, cough  Cardiovascular: denies chest pain, palpitations  GI: no abdominal tenderness / pain; no change in bowel habits  Musculoskeletal: joint pain / swelling  Integumentary: denies pruritus; no skin lesions  Neurologic:    VITALS:  T(C): 36.6, Max: 37.3 (07-06-19 @ 19:33)  T(F): 97.8, Max: 99.2 (07-06-19 @ 19:33)  HR: 93 (53 - 96)  BP: 117/70 (78/59 - 117/70)  SpO2: 97% (90% - 98%)    PHYSICAL EXAM:  Constitutional: Alert and oriented x 3  Eyes/ ENMT: PERRLA,   Respiratory: clear to auscultation bilaterally  Cardiovascular: S1,S2 rhythmic  Gastrointestinal: no guarding/ rebound  Extremities: no calf tenderness;  peripheral edema  Skin: dry, rash  Musculoskeletal:     LABS:  (07-07) WBC: 10.71 K/uL,Hemoglobin: 10.8 g/dL, Hematocrit: 33.6 %,  Platelet: 217 K/uL  (07-07) Na: 132 mmol/L ; K: 4.0 mmol/L ; BUN: 14.0 mg/dL ; Cr: 0.48 mg/dL.    Blood Gas Venous - Lactate (07.06.19 @ 04:58)    Blood Gas Venous - Lactate: 3.1 mmoL/L    Blood Gas Venous - Lactate (07.05.19 @ 19:09)    Blood Gas Venous - Lactate: 4.2: TYPE:(C=Critical, N=Notification, A=Abnormal) C  TESTS: LACTATEWBV  DATE/TIME CALLED: 07/05/19 18:45  CALLED TO: RN: TANIYA  READ BACK (2 Patient Identifiers)(Y/N): Y  READ BACK VALUES (Y/N): Y  CALLED BY: AB mmoL/L        RADIOLOGY:  CT Chest w/ IV Cont (06.10.19 @ 15:39)   IMPRESSION:     No evidence of metastatic disease in the thorax.    Interval development of trace bilateral pleural effusions.    Stable soft tissue surrounding the proximal superior mesenteric artery   and interposed between the celiac axis and portal vein which is narrowed.    Increase in the small amount of perisplenic ascites. Small amount of   perihepatic ascites and moderate amount of pelvic ascites is stable.    Interval development of thickening of areas of the colon as described   above suggesting colitis.

## 2019-07-07 NOTE — PROGRESS NOTE ADULT - ASSESSMENT
Again this is a 63 year old man with pancreas cancer status post Whipple currently on chemotherapy who presented in septic shock secondary to E. coli.  The source of his bacteremia is possibly secondary to his underlying colitis.  He is currently on ceftriaxone and metronidazole and his vasopressors have been weaned.  Supportive care per primary team.  Will continue to follow.    Thank you for the consult.  Please do not hesitate to call with any questions or concerns.    GOVIND Crespo MD  [facility] Physician [facility]  Division of Gastroenterology  Tel [phone]  Fax [phone]

## 2019-07-07 NOTE — CONSULT NOTE ADULT - PROBLEM SELECTOR RECOMMENDATION 9
Patient with stage IIB pancreatic adenocarcinoma, resected in November 2017, with positive margins, presently under systemic treatment with Sunitinib. No evidence of metastatic disease, but upward trending CA 19-9. Will follow up with Dr. Vela in ambulatory. OK to hold Sunitinib until infectious process treatment completed.

## 2019-07-07 NOTE — PROGRESS NOTE ADULT - SUBJECTIVE AND OBJECTIVE BOX
INTERVAL HPI/OVERNIGHT EVENTS: Off pressors since yesterday, feels better.     On Admission  19 (2d)  HPI:  62 y/o male with history of Pancreatic cancer s/p whipple in  with positive margins on 3rd line chemotherapy per family (oral sunitinib, started ); has a 3 day history of feeling unwell, mild cough, +chills, high fever.  He feels fatigued; has had diarrhea for the past few months, for which he has been taking loperamide and has had stool studies (negative) -- CT scan last month indicated colitis, and he has had a recent colonoscopy.    In the ED, he has had 4 liters of crystalloid, antibiotics, and uptrending lactates; our service was called and we will be admitted to the MICU and advised that vasoactive agents shouldn't be held until he is transferred.   Of note, he has been taking daily lasix as well for lower extremity edema. (2019 21:03)    PAST MEDICAL & SURGICAL HISTORY:  Port catheter in place: not working since insertion  Diabetes  Heart murmur  Anxiety  Factor V Leiden  Bronchitis: 1 month ago  Herpes: lips  Asthma  GERD (gastroesophageal reflux disease)  BPH (benign prostatic hyperplasia)  Adenocarcinoma of pancreas  HTN (hypertension)  High cholesterol  Port catheter in place  Whipple disease: surgery 17  H/O circumcision  S/P ERCP:       Antimicrobial:  cefTRIAXone   IVPB 2000 milliGRAM(s) IV Intermittent every 24 hours  metroNIDAZOLE    Tablet 500 milliGRAM(s) Oral every 8 hours    Cardiovascular:  midodrine 10 milliGRAM(s) Oral every 8 hours  tamsulosin 0.4 milliGRAM(s) Oral at bedtime    Pulmonary:    Hematalogic:  aspirin  chewable 81 milliGRAM(s) Oral daily  enoxaparin Injectable 40 milliGRAM(s) SubCutaneous daily    Other:  atorvastatin 10 milliGRAM(s) Oral at bedtime  chlorhexidine 2% Cloths 1 Application(s) Topical <User Schedule>  dextrose 40% Gel 15 Gram(s) Oral once PRN  dextrose 5%. 1000 milliLiter(s) IV Continuous <Continuous>  dextrose 50% Injectable 12.5 Gram(s) IV Push once  dextrose 50% Injectable 25 Gram(s) IV Push once  dextrose 50% Injectable 25 Gram(s) IV Push once  fentaNYL   Patch  12 MICROgram(s)/Hr 1 Patch Transdermal every 72 hours  fentaNYL   Patch  25 MICROgram(s)/Hr 1 Patch Transdermal every 72 hours  glucagon  Injectable 1 milliGRAM(s) IntraMuscular once PRN  insulin lispro (HumaLOG) corrective regimen sliding scale   SubCutaneous Before meals and at bedtime  pancrelipase  (CREON 36,000 Lipase Units) 1 Capsule(s) Oral daily  pancrelipase  (CREON 36,000 Lipase Units) 2 Capsule(s) Oral <User Schedule>  simethicone 80 milliGRAM(s) Chew every 6 hours PRN      Drug Dosing Weight  Height (cm): 172.72 (2019 15:21)  Weight (kg): 65.4 (2019 21:40)  BMI (kg/m2): 21.9 (2019 21:40)  BSA (m2): 1.78 (2019 21:40)    T(C): 36.5 (19 @ 07:42), Max: 37.3 (19 @ 19:33)  HR: 83 (19 @ 10:00)  BP: 88/54 (19 @ 09:00)  BP(mean): 66 (19 @ 09:00)  ABP: --  ABP(mean): --  RR: 15 (19 @ 09:00)  SpO2: 95% (19 @ 09:00)          0706 @ 07:01  -   @ 07:00  --------------------------------------------------------  IN: 3364 mL / OUT: 250 mL / NET: 3114 mL            PHYSICAL EXAM:    GENERAL: NAD, well groomed,   HEAD:  Atraumatic, normocephalic  EYES: EOMI, PERRL,   ENMT:  MMM, No oropharyngeal erythema or exudates  NECK:  Supple, normal appearance, trachea midline,  NERVOUS SYSTEM:  Alert & Oriented X3, Symmetric strength in all extremities    CHEST/LUNG: Bilateral air entry, no chest deformity, right port accessed   HEART: Regular rate, regular rhythm; systolic and diastolic murmur   ABDOMEN: Soft, Nontender, Nondistended;   EXTREMITIES:  Bilat pitting edema, no clubbing, no cyanosis.  LYMPH:  No lymphadenopathy noted  SKIN:  good capillary refill  PSYCH: appropriate affect and behavior    LABS:  CBC Full  -  ( 2019 06:28 )  WBC Count : 10.71 K/uL  RBC Count : 3.27 M/uL  Hemoglobin : 10.8 g/dL  Hematocrit : 33.6 %  Platelet Count - Automated : 217 K/uL  Mean Cell Volume : 102.8 fl  Mean Cell Hemoglobin : 33.0 pg  Mean Cell Hemoglobin Concentration : 32.1 gm/dL  Auto Neutrophil # : 9.96 K/uL  Auto Lymphocyte # : 0.47 K/uL  Auto Monocyte # : 0.28 K/uL  Auto Eosinophil # : 0.00 K/uL  Auto Basophil # : 0.00 K/uL  Auto Neutrophil % : 83.3 %  Auto Lymphocyte % : 4.4 %  Auto Monocyte % : 2.6 %  Auto Eosinophil % : 0.0 %  Auto Basophil % : 0.0 %        132<L>  |  101  |  14.0  ----------------------------<  154<H>  4.0   |  25.0  |  0.48<L>    Ca    8.2<L>      2019 06:28  Phos  2.5       Mg     2.2         TPro  4.7<L>  /  Alb  2.2<L>  /  TBili  0.4  /  DBili  0.2  /  AST  31  /  ALT  30  /  AlkPhos  407<H>      PT/INR - ( 2019 15:38 )   PT: 14.1 sec;   INR: 1.22 ratio         PTT - ( 2019 15:38 )  PTT:32.5 sec  Urinalysis Basic - ( 2019 15:34 )    Color: Yellow / Appearance: Clear / S.010 / pH: x  Gluc: x / Ketone: Negative  / Bili: Negative / Urobili: Negative mg/dL   Blood: x / Protein: Negative mg/dL / Nitrite: Negative   Leuk Esterase: Negative / RBC: x / WBC x   Sq Epi: x / Non Sq Epi: x / Bacteria: x      Culture Results:   Growth in aerobic and anaerobic bottles: Gram Negative Rods  Aerobic Bottle: 7.37 Hours to positivity  Anaerobic Bottle: 8.18 Hours to positivity  .  ***Blood Panel PCR results on this specimen are available  approximately 3 hours after the Gram stain result.***  Gram stain, PCR, and/or culture results may not always  correspond due to difference in methodologies.  ************************************************************  This PCR assay was performed using [employer].  The following targets are tested for: Enterococcus,  vancomycin resistant enterococci, Listeria monocytogenes,  coagulase negative staphylococci, S. aureus,  methicillin resistant S. aureus, Streptococcus agalactiae  (Group B), S. pneumoniae, S. pyogenes (Group A),  Acinetobacter baumannii, Enterobacter cloacae, E. coli,  Klebsiella oxytoca, K. pneumoniae, Proteus sp.,  Serratia marcescens, Haemophilus influenzae,  Neisseria meningitidis, Pseudomonas aeruginosa, Candida  albicans, C. glabrata, C krusei, C parapsilosis,  C. tropicalis and the KPC resistance gene.  "Due to technical problems, Proteus sp. will Not be reported as part of  the BCID panel until further notice"  .  TYPE: (C=Critical, N=Notification, A=Abnormal) C  TESTS:  _ GS  DATE/TIME CALLED: _2019 09:48:00  CALLED TO: Arthur Barlow RN  READ BACK (2 Patient Identifiers)(Y/N): _ Y  READ BACK VALUES (Y/N): _ Y  CALLED BY: Arthur Su ( @ 17:22)  Culture Results:   Growth in aerobic and anaerobic bottles: Gram Negative Rods  Aerobic Bottle: 8.07 Hours to positivity  Anaerobic Bottle: 8.18 Hours to positivity  .  TYPE: (C=Critical, N=Notification, A=Abnormal) C  TESTS:  _ GS  DATE/TIME CALLED: _ 2019 09:53:11  CALLED TO: Arthur Barlow RN  READ BACK (2 Patient Identifiers)(Y/N): _ Y  READ BACK VALUES (Y/N): _ Y  CALLED BY: Arthur Su ( @ 17:22)  Culture Results:   No growth ( @ 15:35)

## 2019-07-07 NOTE — DIETITIAN INITIAL EVALUATION ADULT. - ETIOLOGY
related to inadequate energy intake, altered GI function in setting of pancreatic CA on chemo, colitis, bacteremia

## 2019-07-07 NOTE — DIETITIAN INITIAL EVALUATION ADULT. - OTHER INFO
62 y/o male with history of Pancreatic cancer s/p whipple in 11/17 with positive margins on 3rd line chemotherapy per family (oral sunitinib, started 7/2); has a 3 day history of feeling unwell leading upt o admission, mild cough, +chills, high fever.  He feels fatigued; has had diarrhea for the past few months, for which he has been taking loperamide and has had stool studies (negative) -- CT scan last month indicated colitis.   Of note, he has been taking daily lasix as well for lower extremity edema now with 3 + pedel edema. Pt reports 31# weight loss in 6 mo and c/o diarrhea for a few months. Pt taking small amounts of food at this time. Encouraged healthy diet and is willing to try diabetishield TID. Pt reports lactose intolerance. Family and pt reporting his weight is ~122#. Admission weight is inaccurate at 153# or 144#. unable to perform full NFPE at this time.

## 2019-07-07 NOTE — PROGRESS NOTE ADULT - SUBJECTIVE AND OBJECTIVE BOX
Chief Complaint: This is a 63y old Male patient being seen for follow-up consultation for pancreas cancer.    HPI / 24H events:  Patient successfully weaned off vasopressors.  Doing well without complaints.  Feeling much better.  Downgraded from MICU to medicine al.    ROS: A 14-point review of systems was reviewed and was otherwise negative save what was reported in the HPI.    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 1000 milliLiter(s) (50 mL/Hr) IV Continuous <Continuous>  dextrose 50% Injectable 12.5 Gram(s) IV Push once  dextrose 50% Injectable 25 Gram(s) IV Push once  dextrose 50% Injectable 25 Gram(s) IV Push once  enoxaparin Injectable 40 milliGRAM(s) SubCutaneous daily  famotidine    Tablet 40 milliGRAM(s) Oral two times a day  fentaNYL   Patch  12 MICROgram(s)/Hr 1 Patch Transdermal every 72 hours  fentaNYL   Patch  25 MICROgram(s)/Hr 1 Patch Transdermal every 72 hours  insulin lispro (HumaLOG) corrective regimen sliding scale   SubCutaneous Before meals and at bedtime  metroNIDAZOLE    Tablet 500 milliGRAM(s) Oral every 8 hours  midodrine 10 milliGRAM(s) Oral every 8 hours  pancrelipase  (CREON 36,000 Lipase Units) 1 Capsule(s) Oral daily  pancrelipase  (CREON 36,000 Lipase Units) 2 Capsule(s) Oral <User Schedule>  tamsulosin 0.4 milliGRAM(s) Oral at bedtime    MEDICATIONS  (PRN):  aluminum hydroxide/magnesium hydroxide/simethicone Suspension 30 milliLiter(s) Oral every 6 hours PRN Dyspepsia  dextrose 40% Gel 15 Gram(s) Oral once PRN Blood Glucose LESS THAN 70 milliGRAM(s)/deciliter  glucagon  Injectable 1 milliGRAM(s) IntraMuscular once PRN Glucose LESS THAN 70 milligrams/deciliter  simethicone 80 milliGRAM(s) Chew every 6 hours PRN Gas    VITAL SIGNS LAST 24 HOURS:  T(C): 36.4 (07 Jul 2019 18:24), Max: 37.3 (06 Jul 2019 19:33)  T(F): 97.6 (07 Jul 2019 18:24), Max: 99.2 (06 Jul 2019 19:33)  HR: 60 (07 Jul 2019 18:24) (53 - 96)  BP: 102/67 (07 Jul 2019 18:24) (78/59 - 117/70)  BP(mean): 88 (07 Jul 2019 17:00) (62 - 88)  RR: 17 (07 Jul 2019 18:24) (10 - 38)  SpO2: 96% (07 Jul 2019 18:24) (90% - 98%)      07-06-19 @ 07:01  -  07-07-19 @ 07:00  --------------------------------------------------------  IN: 3364 mL / OUT: 250 mL / NET: 3114 mL    07-07-19 @ 07:01  -  07-07-19 @ 18:28  --------------------------------------------------------  IN: 400 mL / OUT: 0 mL / NET: 400 mL      PHYSICAL EXAM:  Constitutional: Well-developed, well-nourished, in no apparent distress  Eyes: Sclerae anicteric, conjunctivae normal  ENMT: Mucus membranes moist, no oropharyngeal thrush noted  Neck: No thyroid nodules appreciated, no significant cervical or supraclavicular lymphadenopathy  Respiratory: Breathing nonlabored; clear to auscultation  Cardiovascular: Regular rate and rhythm  Gastrointestinal: Soft, nontender, nondistended, normoactive bowel sounds; no hepatosplenomegaly appreciated; no rebound tenderness or involuntary guarding  Extremities: No clubbing, cyanosis or edema  Neurological: Alert and oriented to person, place and time; no asterixis  Skin: No jaundice  Lymph Nodes: No significant lymphadenopathy  Musculoskeletal: No significant peripheral atrophy  Psychiatric: Affect and mood appropriate                            10.8   10.71 )-----------( 217      ( 07 Jul 2019 06:28 )             33.6     07-07    132<L>  |  101  |  14.0  ----------------------------<  154<H>  4.0   |  25.0  |  0.48<L>    Ca    8.2<L>      07 Jul 2019 06:28  Phos  2.5     07-07  Mg     2.2     07-07    TPro  4.7<L>  /  Alb  2.2<L>  /  TBili  0.4  /  DBili  0.2  /  AST  31  /  ALT  30  /  AlkPhos  407<H>  07-07    LIVER FUNCTIONS - ( 07 Jul 2019 06:28 )  Alb: 2.2 g/dL / Pro: 4.7 g/dL / ALK PHOS: 407 U/L / ALT: 30 U/L / AST: 31 U/L / GGT: x               Culture - Blood (collected 05 Jul 2019 17:22)  Source: .Blood  Preliminary Report (07 Jul 2019 10:50):    Growth in aerobic and anaerobic bottles: Escherichia coli Susceptibility    to follow.    Aerobic Bottle: 8.07 Hours to positivity    Anaerobic Bottle: 8.18 Hours to positivity    .    TYPE: (C=Critical, N=Notification, A=Abnormal) C    TESTS:  _ OLEGARIO    DATE/TIMECALLED: _ 07/06/2019 09:53:11    CALLED TO: Arthur Barlow RN    READ BACK (2 Patient Identifiers)(Y/N): _ Y    READ BACK VALUES (Y/N): _ Y    CALLED BY: Arthur Su    Culture - Blood (collected 05 Jul 2019 17:22)  Source: .Blood  Preliminary Report (07 Jul 2019 10:48):    Growth in aerobic and anaerobic bottles: Escherichia coli Susceptibility    to follow.    Aerobic Bottle: 7.37 Hours to positivity    Anaerobic Bottle: 8.18 Hours to positivity    .    ***Blood Panel PCR results on this specimen are available    approximately 3 hours after the Gram stain result.***    Gram stain, PCR, and/or culture results may not always    correspond due to difference in methodologies.    ************************************************************    This PCR assay was performed using T-Quad 22.    The following targets are tested for: Enterococcus,    vancomycin resistant enterococci, Listeria monocytogenes,    coagulase negative staphylococci, S. aureus,    methicillin resistant S. aureus, Streptococcus agalactiae    (Group B), S. pneumoniae, S. pyogenes (Group A),    Acinetobacter baumannii, Enterobacter cloacae, E. coli,    Klebsiella oxytoca, K. pneumoniae, Proteus sp.,    Serratia marcescens, Haemophilus influenzae,    Neisseria meningitidis, Pseudomonas aeruginosa, Candida    albicans, C. glabrata, C krusei, C parapsilosis,    C. tropicalis and the KPC resistance gene.    "Due to technical problems, Proteus sp. will Not be reported as part of    the BCID panel until further notice"    .    TYPE: (C=Critical, N=Notification, A=Abnormal) C    TESTS:  _     DATE/TIME CALLED: _ 07/06/2019 09:48:00    CALLED TO: Arthur Barlow RN    READ BACK (2 Patient Identifiers)(Y/N): _ Y    READ BACK VALUES (Y/N): _ Y    CALLED BY: Arthur Su  Organism: Blood Culture PCR (06 Jul 2019 09:50)  Organism: Blood Culture PCR (06 Jul 2019 09:50)    Culture - Urine (collected 05 Jul 2019 15:35)  Source: .Urine  Final Report (06 Jul 2019 15:39):    No growth      IMAGING: I personally reviewed the [XXXXXX], and I agree with the radiologist's interpretation.

## 2019-07-08 DIAGNOSIS — K52.9 NONINFECTIVE GASTROENTERITIS AND COLITIS, UNSPECIFIED: ICD-10-CM

## 2019-07-08 LAB
-  AMIKACIN: SIGNIFICANT CHANGE UP
-  AMIKACIN: SIGNIFICANT CHANGE UP
-  AMPICILLIN/SULBACTAM: SIGNIFICANT CHANGE UP
-  AMPICILLIN/SULBACTAM: SIGNIFICANT CHANGE UP
-  AMPICILLIN: SIGNIFICANT CHANGE UP
-  AMPICILLIN: SIGNIFICANT CHANGE UP
-  AZTREONAM: SIGNIFICANT CHANGE UP
-  AZTREONAM: SIGNIFICANT CHANGE UP
-  CEFAZOLIN: SIGNIFICANT CHANGE UP
-  CEFAZOLIN: SIGNIFICANT CHANGE UP
-  CEFEPIME: SIGNIFICANT CHANGE UP
-  CEFEPIME: SIGNIFICANT CHANGE UP
-  CEFOXITIN: SIGNIFICANT CHANGE UP
-  CEFOXITIN: SIGNIFICANT CHANGE UP
-  CEFTRIAXONE: SIGNIFICANT CHANGE UP
-  CEFTRIAXONE: SIGNIFICANT CHANGE UP
-  CIPROFLOXACIN: SIGNIFICANT CHANGE UP
-  CIPROFLOXACIN: SIGNIFICANT CHANGE UP
-  ERTAPENEM: SIGNIFICANT CHANGE UP
-  ERTAPENEM: SIGNIFICANT CHANGE UP
-  GENTAMICIN: SIGNIFICANT CHANGE UP
-  GENTAMICIN: SIGNIFICANT CHANGE UP
-  IMIPENEM: SIGNIFICANT CHANGE UP
-  IMIPENEM: SIGNIFICANT CHANGE UP
-  LEVOFLOXACIN: SIGNIFICANT CHANGE UP
-  LEVOFLOXACIN: SIGNIFICANT CHANGE UP
-  MEROPENEM: SIGNIFICANT CHANGE UP
-  MEROPENEM: SIGNIFICANT CHANGE UP
-  PIPERACILLIN/TAZOBACTAM: SIGNIFICANT CHANGE UP
-  PIPERACILLIN/TAZOBACTAM: SIGNIFICANT CHANGE UP
-  TOBRAMYCIN: SIGNIFICANT CHANGE UP
-  TOBRAMYCIN: SIGNIFICANT CHANGE UP
-  TRIMETHOPRIM/SULFAMETHOXAZOLE: SIGNIFICANT CHANGE UP
-  TRIMETHOPRIM/SULFAMETHOXAZOLE: SIGNIFICANT CHANGE UP
ANION GAP SERPL CALC-SCNC: 6 MMOL/L — SIGNIFICANT CHANGE UP (ref 5–17)
BASOPHILS # BLD AUTO: 0 K/UL — SIGNIFICANT CHANGE UP (ref 0–0.2)
BASOPHILS NFR BLD AUTO: 0 % — SIGNIFICANT CHANGE UP (ref 0–2)
BUN SERPL-MCNC: 18 MG/DL — SIGNIFICANT CHANGE UP (ref 8–20)
CALCIUM SERPL-MCNC: 8.4 MG/DL — LOW (ref 8.6–10.2)
CHLORIDE SERPL-SCNC: 102 MMOL/L — SIGNIFICANT CHANGE UP (ref 98–107)
CO2 SERPL-SCNC: 27 MMOL/L — SIGNIFICANT CHANGE UP (ref 22–29)
CREAT SERPL-MCNC: 0.52 MG/DL — SIGNIFICANT CHANGE UP (ref 0.5–1.3)
EOSINOPHIL # BLD AUTO: 0 K/UL — SIGNIFICANT CHANGE UP (ref 0–0.5)
EOSINOPHIL NFR BLD AUTO: 0 % — SIGNIFICANT CHANGE UP (ref 0–6)
GLUCOSE BLDC GLUCOMTR-MCNC: 107 MG/DL — HIGH (ref 70–99)
GLUCOSE BLDC GLUCOMTR-MCNC: 116 MG/DL — HIGH (ref 70–99)
GLUCOSE BLDC GLUCOMTR-MCNC: 116 MG/DL — HIGH (ref 70–99)
GLUCOSE BLDC GLUCOMTR-MCNC: 135 MG/DL — HIGH (ref 70–99)
GLUCOSE BLDC GLUCOMTR-MCNC: 173 MG/DL — HIGH (ref 70–99)
GLUCOSE SERPL-MCNC: 119 MG/DL — HIGH (ref 70–115)
HCT VFR BLD CALC: 36.8 % — LOW (ref 39–50)
HGB BLD-MCNC: 12 G/DL — LOW (ref 13–17)
LYMPHOCYTES # BLD AUTO: 0.18 K/UL — LOW (ref 1–3.3)
LYMPHOCYTES # BLD AUTO: 1.8 % — LOW (ref 13–44)
MAGNESIUM SERPL-MCNC: 2.1 MG/DL — SIGNIFICANT CHANGE UP (ref 1.8–2.6)
MCHC RBC-ENTMCNC: 32.6 GM/DL — SIGNIFICANT CHANGE UP (ref 32–36)
MCHC RBC-ENTMCNC: 32.9 PG — SIGNIFICANT CHANGE UP (ref 27–34)
MCV RBC AUTO: 100.8 FL — HIGH (ref 80–100)
METHOD TYPE: SIGNIFICANT CHANGE UP
METHOD TYPE: SIGNIFICANT CHANGE UP
MONOCYTES # BLD AUTO: 0.17 K/UL — SIGNIFICANT CHANGE UP (ref 0–0.9)
MONOCYTES NFR BLD AUTO: 1.7 % — LOW (ref 2–14)
NEUTROPHILS # BLD AUTO: 9.32 K/UL — HIGH (ref 1.8–7.4)
NEUTROPHILS NFR BLD AUTO: 93.9 % — HIGH (ref 43–77)
NT-PROBNP SERPL-SCNC: 865 PG/ML — HIGH (ref 0–300)
PHOSPHATE SERPL-MCNC: 2 MG/DL — LOW (ref 2.4–4.7)
PLATELET # BLD AUTO: 251 K/UL — SIGNIFICANT CHANGE UP (ref 150–400)
POTASSIUM SERPL-MCNC: 3.4 MMOL/L — LOW (ref 3.5–5.3)
POTASSIUM SERPL-SCNC: 3.4 MMOL/L — LOW (ref 3.5–5.3)
PROCALCITONIN SERPL-MCNC: 7.92 NG/ML — HIGH (ref 0.02–0.1)
RBC # BLD: 3.65 M/UL — LOW (ref 4.2–5.8)
RBC # FLD: 15.1 % — HIGH (ref 10.3–14.5)
SODIUM SERPL-SCNC: 135 MMOL/L — SIGNIFICANT CHANGE UP (ref 135–145)
WBC # BLD: 9.75 K/UL — SIGNIFICANT CHANGE UP (ref 3.8–10.5)
WBC # FLD AUTO: 9.75 K/UL — SIGNIFICANT CHANGE UP (ref 3.8–10.5)

## 2019-07-08 PROCEDURE — 99222 1ST HOSP IP/OBS MODERATE 55: CPT

## 2019-07-08 PROCEDURE — 99233 SBSQ HOSP IP/OBS HIGH 50: CPT

## 2019-07-08 PROCEDURE — 74177 CT ABD & PELVIS W/CONTRAST: CPT | Mod: 26

## 2019-07-08 RX ORDER — PETROLATUM,WHITE
1 JELLY (GRAM) TOPICAL
Refills: 0 | Status: DISCONTINUED | OUTPATIENT
Start: 2019-07-08 | End: 2019-07-09

## 2019-07-08 RX ORDER — ACETAMINOPHEN 500 MG
650 TABLET ORAL EVERY 6 HOURS
Refills: 0 | Status: DISCONTINUED | OUTPATIENT
Start: 2019-07-08 | End: 2019-07-10

## 2019-07-08 RX ORDER — DIPHENHYDRAMINE HYDROCHLORIDE AND LIDOCAINE HYDROCHLORIDE AND ALUMINUM HYDROXIDE AND MAGNESIUM HYDRO
5 KIT
Refills: 0 | Status: DISCONTINUED | OUTPATIENT
Start: 2019-07-08 | End: 2019-07-10

## 2019-07-08 RX ADMIN — FAMOTIDINE 40 MILLIGRAM(S): 10 INJECTION INTRAVENOUS at 18:45

## 2019-07-08 RX ADMIN — MIDODRINE HYDROCHLORIDE 10 MILLIGRAM(S): 2.5 TABLET ORAL at 05:47

## 2019-07-08 RX ADMIN — Medication 2 CAPSULE(S): at 12:40

## 2019-07-08 RX ADMIN — DIPHENHYDRAMINE HYDROCHLORIDE AND LIDOCAINE HYDROCHLORIDE AND ALUMINUM HYDROXIDE AND MAGNESIUM HYDRO 5 MILLILITER(S): KIT at 14:20

## 2019-07-08 RX ADMIN — FENTANYL CITRATE 1 PATCH: 50 INJECTION INTRAVENOUS at 07:30

## 2019-07-08 RX ADMIN — ENOXAPARIN SODIUM 40 MILLIGRAM(S): 100 INJECTION SUBCUTANEOUS at 12:40

## 2019-07-08 RX ADMIN — Medication 650 MILLIGRAM(S): at 22:49

## 2019-07-08 RX ADMIN — Medication 650 MILLIGRAM(S): at 10:12

## 2019-07-08 RX ADMIN — Medication 500 MILLIGRAM(S): at 22:49

## 2019-07-08 RX ADMIN — SIMETHICONE 80 MILLIGRAM(S): 80 TABLET, CHEWABLE ORAL at 22:57

## 2019-07-08 RX ADMIN — MIDODRINE HYDROCHLORIDE 10 MILLIGRAM(S): 2.5 TABLET ORAL at 14:21

## 2019-07-08 RX ADMIN — FAMOTIDINE 40 MILLIGRAM(S): 10 INJECTION INTRAVENOUS at 05:48

## 2019-07-08 RX ADMIN — Medication 650 MILLIGRAM(S): at 23:16

## 2019-07-08 RX ADMIN — DIPHENHYDRAMINE HYDROCHLORIDE AND LIDOCAINE HYDROCHLORIDE AND ALUMINUM HYDROXIDE AND MAGNESIUM HYDRO 5 MILLILITER(S): KIT at 18:45

## 2019-07-08 RX ADMIN — Medication 500 MILLIGRAM(S): at 14:22

## 2019-07-08 RX ADMIN — Medication 650 MILLIGRAM(S): at 09:33

## 2019-07-08 RX ADMIN — FENTANYL CITRATE 1 PATCH: 50 INJECTION INTRAVENOUS at 19:44

## 2019-07-08 RX ADMIN — CEFTRIAXONE 100 MILLIGRAM(S): 500 INJECTION, POWDER, FOR SOLUTION INTRAMUSCULAR; INTRAVENOUS at 18:44

## 2019-07-08 RX ADMIN — MIDODRINE HYDROCHLORIDE 10 MILLIGRAM(S): 2.5 TABLET ORAL at 22:49

## 2019-07-08 RX ADMIN — DIPHENHYDRAMINE HYDROCHLORIDE AND LIDOCAINE HYDROCHLORIDE AND ALUMINUM HYDROXIDE AND MAGNESIUM HYDRO 5 MILLILITER(S): KIT at 22:50

## 2019-07-08 RX ADMIN — TAMSULOSIN HYDROCHLORIDE 0.4 MILLIGRAM(S): 0.4 CAPSULE ORAL at 22:49

## 2019-07-08 RX ADMIN — Medication 500 MILLIGRAM(S): at 05:48

## 2019-07-08 RX ADMIN — Medication 81 MILLIGRAM(S): at 12:40

## 2019-07-08 RX ADMIN — ATORVASTATIN CALCIUM 10 MILLIGRAM(S): 80 TABLET, FILM COATED ORAL at 22:49

## 2019-07-08 RX ADMIN — Medication 2 CAPSULE(S): at 18:45

## 2019-07-08 RX ADMIN — Medication 1 CAPSULE(S): at 14:21

## 2019-07-08 RX ADMIN — Medication 300 UNIT(S): at 16:29

## 2019-07-08 RX ADMIN — Medication 2 CAPSULE(S): at 09:33

## 2019-07-08 RX ADMIN — FENTANYL CITRATE 1 PATCH: 50 INJECTION INTRAVENOUS at 19:45

## 2019-07-08 NOTE — PROGRESS NOTE ADULT - SUBJECTIVE AND OBJECTIVE BOX
JENIFER NORTH    [id number]    63y      Male    INTERVAL HPI/OVERNIGHT EVENTS:    patient being seen for e coli bacteremia. Patient seen at bedside and denies any complaints    REVIEW OF SYSTEMS:    CONSTITUTIONAL: No fever, weight loss, or fatigue  RESPIRATORY: No cough, wheezing, hemoptysis; No shortness of breath  CARDIOVASCULAR: No chest pain, palpitations  GASTROINTESTINAL: No abdominal or epigastric pain. No nausea, vomiting  NEUROLOGICAL: No headaches, memory loss, loss of strength.  MISCELLANEOUS:      Vital Signs Last 24 Hrs  T(C): 36.4 (08 Jul 2019 07:00), Max: 36.6 (07 Jul 2019 15:45)  T(F): 97.6 (08 Jul 2019 07:00), Max: 97.9 (08 Jul 2019 00:42)  HR: 58 (08 Jul 2019 08:05) (56 - 93)  BP: 91/62 (08 Jul 2019 07:00) (91/62 - 119/70)  BP(mean): 88 (07 Jul 2019 17:00) (81 - 88)  RR: 18 (08 Jul 2019 07:00) (13 - 30)  SpO2: 96% (08 Jul 2019 08:05) (92% - 98%)    PHYSICAL EXAM:    GENERAL: NAD, well-groomed  HEENT: mouth sores  NECK: soft, Supple, No JVD,   CHEST/LUNG: Clear to auscultation bilaterally; No wheezing  HEART: S1S2+, Regular rate and rhythm; No murmurs, rubs, or gallops  ABDOMEN: Soft, Nontender, Nondistended; Bowel sounds present  EXTREMITIES:  2+ Peripheral Pulses, edema  SKIN: No rashes or lesions  NEURO: AAOX3, no focal deficits, no motor r sensory loss  PSYCH: normal mood      LABS:                        12.0   9.75  )-----------( 251      ( 08 Jul 2019 08:24 )             36.8     07-08    135  |  102  |  18.0  ----------------------------<  119<H>  3.4<L>   |  27.0  |  0.52    Ca    8.4<L>      08 Jul 2019 08:24  Phos  2.0     07-08  Mg     2.1     07-08    TPro  4.7<L>  /  Alb  2.2<L>  /  TBili  0.4  /  DBili  0.2  /  AST  31  /  ALT  30  /  AlkPhos  407<H>  07-07            MEDICATIONS  (STANDING):  aspirin  chewable 81 milliGRAM(s) Oral daily  atorvastatin 10 milliGRAM(s) Oral at bedtime  cefTRIAXone   IVPB 2000 milliGRAM(s) IV Intermittent every 24 hours  dextrose 5%. 1000 milliLiter(s) (50 mL/Hr) IV Continuous <Continuous>  dextrose 50% Injectable 12.5 Gram(s) IV Push once  dextrose 50% Injectable 25 Gram(s) IV Push once  dextrose 50% Injectable 25 Gram(s) IV Push once  enoxaparin Injectable 40 milliGRAM(s) SubCutaneous daily  famotidine    Tablet 40 milliGRAM(s) Oral two times a day  fentaNYL   Patch  12 MICROgram(s)/Hr 1 Patch Transdermal every 72 hours  fentaNYL   Patch  25 MICROgram(s)/Hr 1 Patch Transdermal every 72 hours  FIRST- Mouthwash  BLM 5 milliLiter(s) Swish and Spit four times a day  insulin lispro (HumaLOG) corrective regimen sliding scale   SubCutaneous Before meals and at bedtime  metroNIDAZOLE    Tablet 500 milliGRAM(s) Oral every 8 hours  midodrine 10 milliGRAM(s) Oral every 8 hours  pancrelipase  (CREON 36,000 Lipase Units) 1 Capsule(s) Oral daily  pancrelipase  (CREON 36,000 Lipase Units) 2 Capsule(s) Oral <User Schedule>  tamsulosin 0.4 milliGRAM(s) Oral at bedtime    MEDICATIONS  (PRN):  acetaminophen   Tablet .. 650 milliGRAM(s) Oral every 6 hours PRN Temp greater or equal to 38C (100.4F), Mild Pain (1 - 3)  aluminum hydroxide/magnesium hydroxide/simethicone Suspension 30 milliLiter(s) Oral every 6 hours PRN Dyspepsia  dextrose 40% Gel 15 Gram(s) Oral once PRN Blood Glucose LESS THAN 70 milliGRAM(s)/deciliter  glucagon  Injectable 1 milliGRAM(s) IntraMuscular once PRN Glucose LESS THAN 70 milligrams/deciliter  simethicone 80 milliGRAM(s) Chew every 6 hours PRN Gas      RADIOLOGY & ADDITIONAL TESTS:

## 2019-07-08 NOTE — PROGRESS NOTE ADULT - PROBLEM SELECTOR PLAN 1
Likely secondary to bacterial translocation from Keytruda induced colitis. Diet changed to low residue, low fat, lactose restricted. Continue current medical management for now.
E coli bacteremia.  Likely due to colitis.  Now on ceftriaxone and flagyl.  Repeat blood cultures in AM.    Off pressors since 7/6

## 2019-07-08 NOTE — CONSULT NOTE ADULT - SUBJECTIVE AND OBJECTIVE BOX
[facility]  INFECTIOUS DISEASES AND INTERNAL MEDICINE at [place]  =======================================================  Malcolm Marquez MD  Diplomates American Board of Internal Medicine and Infectious Diseases  =======================================================    [id number]  JENIFER NORTH     CC: Admitted on 7/5 with septic shock to MICU    HPI:  62 y/o man with PMH of Pancreatic cancer s/p whipple in 11/17 with positive margins on 3rd line chemotherapy was admitted with fever, chills and weakness on 7/5, he was in septic shock so went to MICU and after stabilization, transferred to medical floor on 7/7. He has diarrhea for few months for which had CT showing colitis and then colonoscopy.   His blood cultures showed Ecoli. Today his main complaint is pain in mouth and throat due to mucositis from chemo.     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  pain in mouth and throat   CARDIOVASCULAR:  No chest pain or SOB.  RESPIRATORY:  No cough, shortness of breath, PND or orthopnea.  GASTROINTESTINAL:  No nausea, vomiting or diarrhea.  GENITOURINARY:  No dysuria, frequency or urgency. No Blood in urine  MUSCULOSKELETAL:  no joint aches, no muscle pain  SKIN:  No change in skin, hair or nails.  NEUROLOGIC:  No paresthesias, fasciculations, seizures or weakness.  PSYCHIATRIC:  No disorder of thought or mood.  ENDOCRINE:  No heat or cold intolerance, polyuria or polydipsia.  HEMATOLOGICAL:  No easy bruising or bleeding.     Physical Exam:  Vital Signs Last 24 Hrs  T(C): 36.4 (08 Jul 2019 07:00), Max: 37.1 (07 Jul 2019 12:02)  T(F): 97.6 (08 Jul 2019 07:00), Max: 98.8 (07 Jul 2019 12:02)  HR: 58 (08 Jul 2019 08:05) (53 - 93)  BP: 91/62 (08 Jul 2019 07:00) (90/60 - 119/70)  BP(mean): 88 (07 Jul 2019 17:00) (67 - 88)  RR: 18 (08 Jul 2019 07:00) (11 - 30)  SpO2: 96% (08 Jul 2019 08:05) (92% - 98%)  GEN: NAD  HEENT: normocephalic and atraumatic. EOMI. PERRL.    NECK: Supple.  No lymphadenopathy   LUNGS: Clear to auscultation.  HEART: Regular rate and rhythm without murmur.  ABDOMEN: Soft, nontender, and nondistended.  Positive bowel sounds. Scar+ clean   : No CVA tenderness  EXTREMITIES: Without any cyanosis, clubbing, rash, lesions or edema.  NEUROLOGIC: grossly intact.  PSYCHIATRIC: Appropriate affect .  SKIN: No ulceration or induration present.    Labs:  07-08    135  |  102  |  18.0  ----------------------------<  119<H>  3.4<L>   |  27.0  |  0.52    Ca    8.4<L>      08 Jul 2019 08:24  Phos  2.0     07-08  Mg     2.1     07-08    TPro  4.7<L>  /  Alb  2.2<L>  /  TBili  0.4  /  DBili  0.2  /  AST  31  /  ALT  30  /  AlkPhos  407<H>  07-07                        12.0   9.75  )-----------( 251      ( 08 Jul 2019 08:24 )             36.8     LIVER FUNCTIONS - ( 07 Jul 2019 06:28 )  Alb: 2.2 g/dL / Pro: 4.7 g/dL / ALK PHOS: 407 U/L / ALT: 30 U/L / AST: 31 U/L / GGT: x           RECENT CULTURES:  07-05 @ 17:22 .Blood Blood Culture PCR  Escherichia coli    Growth in aerobic and anaerobic bottles: Escherichia coli  Growth in aerobic and anaerobic bottles: Gram Negative Rods  Identification and susceptibility to follow.  Aerobic Bottle: 7.37 Hours to positivity  Anaerobic Bottle: 8.18 Hours to positivity    07-05 @ 15:35 .Urine     No growth    All imaging and other data have been reviewed.

## 2019-07-08 NOTE — PROGRESS NOTE ADULT - PROBLEM SELECTOR PLAN 2
See above management plan. Repeat labs ordered for the AM.
s/p whipple, now on 3rd line chemotherapy.  Oncology called.

## 2019-07-08 NOTE — CONSULT NOTE ADULT - ASSESSMENT
64 y/o man with PMH of Pancreatic cancer s/p whipple in 11/17 with positive margins on 3rd line chemotherapy was admitted with fever, chills and weakness on 7/5, he was in septic shock so went to MICU and after stabilization, transferred to medical floor on 7/7. He has diarrhea for few months for which had CT showing colitis and then colonoscopy.   Ecoli bacteremia is 2' to mucositis/colitis.     Bacteremia   Pancreatic ca on chemo     - blood culture 7/5 with a sensitive ecoli   - Repeat blood cultures pending.   - Agree with ceftriaxone 2gm daily   - Metronidazole po 500mg q8h can be continued due to mucositis to prevent bacteremia with anaerobes and also possible c diff.   - Will need 2 weeks of antibiotics from the first negative blood culture.    Will follow.

## 2019-07-08 NOTE — PROGRESS NOTE ADULT - ASSESSMENT
63yr old male with PMH Asthma, Factor V Leiden deficiency,  Pancreatic Cancer s/p Whipple surgery in Nov 2017, s/p radiation, on 3rd line chemotherapy started on 7/2 after failed prior 2 chemo regimen was admitted to MICU on 7/5 with septic shock. He required vasopressor therapy after failed fluid resuscitation . Leucopenia, uptrending lactate and was persistently hypotensive. He was started on empiric antibiotics for possible pneumonia with CXR s/o Right medial basal consolidation. Was started on po midodrine on 7/6 and weaned off vasopressors since 7/6.  Blood cultures showed e. Coli bacteremia, and CT abd in Jun 2019 showed diffuse colitis with nadira-splenic and hepatic, moderate pelvic ascites. GI and ID following     #septic shock - resolved s/p vasopressors, iv stress steroid therapy  - now on midodrine -   f/u repeat Cultures   possible source colitis vs pneumonia  - most likely Abdominal source of E Coli bacteremia.  CT abd in 6/2019 - colitis - and ascites -  ID and GI consults appreciated  -->  Oncology recs awaited  Chemotherapy on hold , pt has mediport in place.     #mucositis -> start BLM    # elevated LFTs - possibly 2/2 shock liver - resolved  # pancreatic cancer s/p whipple - ct pancreatic enzymes with meals  # heart murmur - Mild AI, AS on ECHO - f/u cardio outpt  # DM - A1C - 6.2 - SSI for now  # asthma- stable   # Peripheral edema - possibly 2/2 severe protein calorie malnutrition -glucerna

## 2019-07-08 NOTE — PROGRESS NOTE ADULT - SUBJECTIVE AND OBJECTIVE BOX
Pt seen and examined. Still c/o diarrhea. No c/o abdominal pain.     REVIEW OF SYSTEMS:  Constitutional: Pt. feels weak and tired.  Cardiovascular: No chest pain, palpitations, dizziness or leg swelling  Gastrointestinal: As noted above.  Skin: No itching, burning, rashes or lesions       MEDICATIONS:  MEDICATIONS  (STANDING):  aspirin  chewable 81 milliGRAM(s) Oral daily  atorvastatin 10 milliGRAM(s) Oral at bedtime  cefTRIAXone   IVPB 2000 milliGRAM(s) IV Intermittent every 24 hours  dextrose 5%. 1000 milliLiter(s) (50 mL/Hr) IV Continuous <Continuous>  dextrose 50% Injectable 12.5 Gram(s) IV Push once  dextrose 50% Injectable 25 Gram(s) IV Push once  dextrose 50% Injectable 25 Gram(s) IV Push once  enoxaparin Injectable 40 milliGRAM(s) SubCutaneous daily  famotidine    Tablet 40 milliGRAM(s) Oral two times a day  fentaNYL   Patch  12 MICROgram(s)/Hr 1 Patch Transdermal every 72 hours  fentaNYL   Patch  25 MICROgram(s)/Hr 1 Patch Transdermal every 72 hours  FIRST- Mouthwash  BLM 5 milliLiter(s) Swish and Spit four times a day  insulin lispro (HumaLOG) corrective regimen sliding scale   SubCutaneous Before meals and at bedtime  metroNIDAZOLE    Tablet 500 milliGRAM(s) Oral every 8 hours  midodrine 10 milliGRAM(s) Oral every 8 hours  pancrelipase  (CREON 36,000 Lipase Units) 1 Capsule(s) Oral daily  pancrelipase  (CREON 36,000 Lipase Units) 2 Capsule(s) Oral <User Schedule>  tamsulosin 0.4 milliGRAM(s) Oral at bedtime    MEDICATIONS  (PRN):  acetaminophen   Tablet .. 650 milliGRAM(s) Oral every 6 hours PRN Temp greater or equal to 38C (100.4F), Mild Pain (1 - 3)  aluminum hydroxide/magnesium hydroxide/simethicone Suspension 30 milliLiter(s) Oral every 6 hours PRN Dyspepsia  dextrose 40% Gel 15 Gram(s) Oral once PRN Blood Glucose LESS THAN 70 milliGRAM(s)/deciliter  glucagon  Injectable 1 milliGRAM(s) IntraMuscular once PRN Glucose LESS THAN 70 milligrams/deciliter  simethicone 80 milliGRAM(s) Chew every 6 hours PRN Gas      Allergies    No Known Allergies    Intolerances        Vital Signs Last 24 Hrs  T(C): 36.4 (08 Jul 2019 07:00), Max: 37.1 (07 Jul 2019 12:02)  T(F): 97.6 (08 Jul 2019 07:00), Max: 98.8 (07 Jul 2019 12:02)  HR: 58 (08 Jul 2019 08:05) (53 - 93)  BP: 91/62 (08 Jul 2019 07:00) (90/60 - 119/70)  BP(mean): 88 (07 Jul 2019 17:00) (67 - 88)  RR: 18 (08 Jul 2019 07:00) (11 - 30)  SpO2: 96% (08 Jul 2019 08:05) (92% - 98%)    07-07 @ 07:01  -  07-08 @ 07:00  --------------------------------------------------------  IN: 400 mL / OUT: 300 mL / NET: 100 mL        PHYSICAL EXAM:    General: Well developed; well nourished; in no acute distress  HEENT: MMM, conjunctiva pink and sclera anicteric.  Lungs: clear to auscultation bilaterally.  Cor: RRR S1, S2 only.  Gastrointestinal: Abdomen: Soft non-tender non-distended; Normal bowel sounds; No hepatosplenomegaly  Extremities: no cyanosis, clubbing or edema.  Skin: Warm and dry. No obvious rash  Neuro: Pt. a + o x 3    LABS:  CBC Full  -  ( 08 Jul 2019 08:24 )  WBC Count : 9.75 K/uL  RBC Count : 3.65 M/uL  Hemoglobin : 12.0 g/dL  Hematocrit : 36.8 %  Platelet Count - Automated : 251 K/uL  Mean Cell Volume : 100.8 fl  Mean Cell Hemoglobin : 32.9 pg  Mean Cell Hemoglobin Concentration : 32.6 gm/dL  Auto Neutrophil # : 9.32 K/uL  Auto Lymphocyte # : 0.18 K/uL  Auto Monocyte # : 0.17 K/uL  Auto Eosinophil # : 0.00 K/uL  Auto Basophil # : 0.00 K/uL  Auto Neutrophil % : 93.9 %  Auto Lymphocyte % : 1.8 %  Auto Monocyte % : 1.7 %  Auto Eosinophil % : 0.0 %  Auto Basophil % : 0.0 %    07-08    135  |  102  |  18.0  ----------------------------<  119<H>  3.4<L>   |  27.0  |  0.52    Ca    8.4<L>      08 Jul 2019 08:24  Phos  2.0     07-08  Mg     2.1     07-08    TPro  4.7<L>  /  Alb  2.2<L>  /  TBili  0.4  /  DBili  0.2  /  AST  31  /  ALT  30  /  AlkPhos  407<H>  07-07                      RADIOLOGY & ADDITIONAL STUDIES (The following images were personally reviewed):

## 2019-07-09 DIAGNOSIS — R78.81 BACTEREMIA: ICD-10-CM

## 2019-07-09 LAB
-  AMIKACIN: SIGNIFICANT CHANGE UP
-  AMIKACIN: SIGNIFICANT CHANGE UP
-  AMPICILLIN/SULBACTAM: SIGNIFICANT CHANGE UP
-  AMPICILLIN/SULBACTAM: SIGNIFICANT CHANGE UP
-  AMPICILLIN: SIGNIFICANT CHANGE UP
-  AMPICILLIN: SIGNIFICANT CHANGE UP
-  AZTREONAM: SIGNIFICANT CHANGE UP
-  AZTREONAM: SIGNIFICANT CHANGE UP
-  CEFAZOLIN: SIGNIFICANT CHANGE UP
-  CEFAZOLIN: SIGNIFICANT CHANGE UP
-  CEFEPIME: SIGNIFICANT CHANGE UP
-  CEFEPIME: SIGNIFICANT CHANGE UP
-  CEFOXITIN: SIGNIFICANT CHANGE UP
-  CEFOXITIN: SIGNIFICANT CHANGE UP
-  CEFTRIAXONE: SIGNIFICANT CHANGE UP
-  CEFTRIAXONE: SIGNIFICANT CHANGE UP
-  CIPROFLOXACIN: SIGNIFICANT CHANGE UP
-  CIPROFLOXACIN: SIGNIFICANT CHANGE UP
-  ERTAPENEM: SIGNIFICANT CHANGE UP
-  ERTAPENEM: SIGNIFICANT CHANGE UP
-  GENTAMICIN: SIGNIFICANT CHANGE UP
-  GENTAMICIN: SIGNIFICANT CHANGE UP
-  IMIPENEM: SIGNIFICANT CHANGE UP
-  IMIPENEM: SIGNIFICANT CHANGE UP
-  LEVOFLOXACIN: SIGNIFICANT CHANGE UP
-  LEVOFLOXACIN: SIGNIFICANT CHANGE UP
-  MEROPENEM: SIGNIFICANT CHANGE UP
-  MEROPENEM: SIGNIFICANT CHANGE UP
-  PIPERACILLIN/TAZOBACTAM: SIGNIFICANT CHANGE UP
-  PIPERACILLIN/TAZOBACTAM: SIGNIFICANT CHANGE UP
-  TOBRAMYCIN: SIGNIFICANT CHANGE UP
-  TOBRAMYCIN: SIGNIFICANT CHANGE UP
-  TRIMETHOPRIM/SULFAMETHOXAZOLE: SIGNIFICANT CHANGE UP
-  TRIMETHOPRIM/SULFAMETHOXAZOLE: SIGNIFICANT CHANGE UP
ALBUMIN SERPL ELPH-MCNC: 2.2 G/DL — LOW (ref 3.3–5.2)
ALP SERPL-CCNC: 461 U/L — HIGH (ref 40–120)
ALT FLD-CCNC: 21 U/L — SIGNIFICANT CHANGE UP
ANION GAP SERPL CALC-SCNC: 7 MMOL/L — SIGNIFICANT CHANGE UP (ref 5–17)
AST SERPL-CCNC: 27 U/L — SIGNIFICANT CHANGE UP
BILIRUB SERPL-MCNC: 0.6 MG/DL — SIGNIFICANT CHANGE UP (ref 0.4–2)
BUN SERPL-MCNC: 11 MG/DL — SIGNIFICANT CHANGE UP (ref 8–20)
CALCIUM SERPL-MCNC: 8.1 MG/DL — LOW (ref 8.6–10.2)
CHLORIDE SERPL-SCNC: 102 MMOL/L — SIGNIFICANT CHANGE UP (ref 98–107)
CO2 SERPL-SCNC: 29 MMOL/L — SIGNIFICANT CHANGE UP (ref 22–29)
CREAT SERPL-MCNC: 0.59 MG/DL — SIGNIFICANT CHANGE UP (ref 0.5–1.3)
CULTURE RESULTS: SIGNIFICANT CHANGE UP
CULTURE RESULTS: SIGNIFICANT CHANGE UP
GLUCOSE BLDC GLUCOMTR-MCNC: 107 MG/DL — HIGH (ref 70–99)
GLUCOSE BLDC GLUCOMTR-MCNC: 109 MG/DL — HIGH (ref 70–99)
GLUCOSE BLDC GLUCOMTR-MCNC: 135 MG/DL — HIGH (ref 70–99)
GLUCOSE BLDC GLUCOMTR-MCNC: 142 MG/DL — HIGH (ref 70–99)
GLUCOSE SERPL-MCNC: 107 MG/DL — SIGNIFICANT CHANGE UP (ref 70–115)
HCT VFR BLD CALC: 37.6 % — LOW (ref 39–50)
HGB BLD-MCNC: 12.3 G/DL — LOW (ref 13–17)
MAGNESIUM SERPL-MCNC: 1.9 MG/DL — SIGNIFICANT CHANGE UP (ref 1.6–2.6)
MCHC RBC-ENTMCNC: 32.5 PG — SIGNIFICANT CHANGE UP (ref 27–34)
MCHC RBC-ENTMCNC: 32.7 GM/DL — SIGNIFICANT CHANGE UP (ref 32–36)
MCV RBC AUTO: 99.2 FL — SIGNIFICANT CHANGE UP (ref 80–100)
METHOD TYPE: SIGNIFICANT CHANGE UP
METHOD TYPE: SIGNIFICANT CHANGE UP
ORGANISM # SPEC MICROSCOPIC CNT: SIGNIFICANT CHANGE UP
PLATELET # BLD AUTO: 235 K/UL — SIGNIFICANT CHANGE UP (ref 150–400)
POTASSIUM SERPL-MCNC: 3.9 MMOL/L — SIGNIFICANT CHANGE UP (ref 3.5–5.3)
POTASSIUM SERPL-SCNC: 3.9 MMOL/L — SIGNIFICANT CHANGE UP (ref 3.5–5.3)
PROT SERPL-MCNC: 4.6 G/DL — LOW (ref 6.6–8.7)
RBC # BLD: 3.79 M/UL — LOW (ref 4.2–5.8)
RBC # FLD: 14.9 % — HIGH (ref 10.3–14.5)
SODIUM SERPL-SCNC: 138 MMOL/L — SIGNIFICANT CHANGE UP (ref 135–145)
SPECIMEN SOURCE: SIGNIFICANT CHANGE UP
SPECIMEN SOURCE: SIGNIFICANT CHANGE UP
WBC # BLD: 6.6 K/UL — SIGNIFICANT CHANGE UP (ref 3.8–10.5)
WBC # FLD AUTO: 6.6 K/UL — SIGNIFICANT CHANGE UP (ref 3.8–10.5)

## 2019-07-09 PROCEDURE — 99233 SBSQ HOSP IP/OBS HIGH 50: CPT

## 2019-07-09 PROCEDURE — 99232 SBSQ HOSP IP/OBS MODERATE 35: CPT

## 2019-07-09 RX ORDER — FUROSEMIDE 40 MG
40 TABLET ORAL ONCE
Refills: 0 | Status: COMPLETED | OUTPATIENT
Start: 2019-07-09 | End: 2019-07-09

## 2019-07-09 RX ORDER — OXYCODONE AND ACETAMINOPHEN 5; 325 MG/1; MG/1
2 TABLET ORAL EVERY 6 HOURS
Refills: 0 | Status: DISCONTINUED | OUTPATIENT
Start: 2019-07-09 | End: 2019-07-10

## 2019-07-09 RX ORDER — FENTANYL CITRATE 50 UG/ML
1 INJECTION INTRAVENOUS
Refills: 0 | Status: DISCONTINUED | OUTPATIENT
Start: 2019-07-09 | End: 2019-07-10

## 2019-07-09 RX ORDER — ACYCLOVIR SODIUM 500 MG
400 VIAL (EA) INTRAVENOUS THREE TIMES A DAY
Refills: 0 | Status: DISCONTINUED | OUTPATIENT
Start: 2019-07-09 | End: 2019-07-10

## 2019-07-09 RX ADMIN — FAMOTIDINE 40 MILLIGRAM(S): 10 INJECTION INTRAVENOUS at 17:22

## 2019-07-09 RX ADMIN — Medication 400 MILLIGRAM(S): at 12:16

## 2019-07-09 RX ADMIN — Medication 1 APPLICATION(S): at 12:11

## 2019-07-09 RX ADMIN — Medication 40 MILLIGRAM(S): at 11:18

## 2019-07-09 RX ADMIN — Medication 2 CAPSULE(S): at 17:22

## 2019-07-09 RX ADMIN — ATORVASTATIN CALCIUM 10 MILLIGRAM(S): 80 TABLET, FILM COATED ORAL at 22:17

## 2019-07-09 RX ADMIN — Medication 500 MILLIGRAM(S): at 22:17

## 2019-07-09 RX ADMIN — Medication 2 CAPSULE(S): at 08:43

## 2019-07-09 RX ADMIN — Medication 500 MILLIGRAM(S): at 05:51

## 2019-07-09 RX ADMIN — FENTANYL CITRATE 1 PATCH: 50 INJECTION INTRAVENOUS at 11:30

## 2019-07-09 RX ADMIN — FENTANYL CITRATE 1 PATCH: 50 INJECTION INTRAVENOUS at 07:30

## 2019-07-09 RX ADMIN — Medication 1 APPLICATION(S): at 06:04

## 2019-07-09 RX ADMIN — FENTANYL CITRATE 1 PATCH: 50 INJECTION INTRAVENOUS at 11:18

## 2019-07-09 RX ADMIN — FENTANYL CITRATE 1 PATCH: 50 INJECTION INTRAVENOUS at 07:31

## 2019-07-09 RX ADMIN — Medication 81 MILLIGRAM(S): at 11:25

## 2019-07-09 RX ADMIN — Medication 400 MILLIGRAM(S): at 16:17

## 2019-07-09 RX ADMIN — SIMETHICONE 80 MILLIGRAM(S): 80 TABLET, CHEWABLE ORAL at 17:23

## 2019-07-09 RX ADMIN — FAMOTIDINE 40 MILLIGRAM(S): 10 INJECTION INTRAVENOUS at 05:52

## 2019-07-09 RX ADMIN — Medication 500 MILLIGRAM(S): at 14:45

## 2019-07-09 RX ADMIN — ENOXAPARIN SODIUM 40 MILLIGRAM(S): 100 INJECTION SUBCUTANEOUS at 11:25

## 2019-07-09 RX ADMIN — MIDODRINE HYDROCHLORIDE 10 MILLIGRAM(S): 2.5 TABLET ORAL at 22:17

## 2019-07-09 RX ADMIN — CEFTRIAXONE 100 MILLIGRAM(S): 500 INJECTION, POWDER, FOR SOLUTION INTRAMUSCULAR; INTRAVENOUS at 17:23

## 2019-07-09 RX ADMIN — Medication 400 MILLIGRAM(S): at 22:17

## 2019-07-09 RX ADMIN — DIPHENHYDRAMINE HYDROCHLORIDE AND LIDOCAINE HYDROCHLORIDE AND ALUMINUM HYDROXIDE AND MAGNESIUM HYDRO 5 MILLILITER(S): KIT at 14:45

## 2019-07-09 RX ADMIN — DIPHENHYDRAMINE HYDROCHLORIDE AND LIDOCAINE HYDROCHLORIDE AND ALUMINUM HYDROXIDE AND MAGNESIUM HYDRO 5 MILLILITER(S): KIT at 22:20

## 2019-07-09 RX ADMIN — MIDODRINE HYDROCHLORIDE 10 MILLIGRAM(S): 2.5 TABLET ORAL at 14:45

## 2019-07-09 RX ADMIN — FENTANYL CITRATE 1 PATCH: 50 INJECTION INTRAVENOUS at 19:15

## 2019-07-09 RX ADMIN — TAMSULOSIN HYDROCHLORIDE 0.4 MILLIGRAM(S): 0.4 CAPSULE ORAL at 22:17

## 2019-07-09 RX ADMIN — MIDODRINE HYDROCHLORIDE 10 MILLIGRAM(S): 2.5 TABLET ORAL at 05:51

## 2019-07-09 RX ADMIN — Medication 1 APPLICATION(S): at 06:05

## 2019-07-09 RX ADMIN — DIPHENHYDRAMINE HYDROCHLORIDE AND LIDOCAINE HYDROCHLORIDE AND ALUMINUM HYDROXIDE AND MAGNESIUM HYDRO 5 MILLILITER(S): KIT at 17:24

## 2019-07-09 RX ADMIN — DIPHENHYDRAMINE HYDROCHLORIDE AND LIDOCAINE HYDROCHLORIDE AND ALUMINUM HYDROXIDE AND MAGNESIUM HYDRO 5 MILLILITER(S): KIT at 05:52

## 2019-07-09 RX ADMIN — Medication 2 CAPSULE(S): at 11:25

## 2019-07-09 NOTE — PROGRESS NOTE ADULT - ASSESSMENT
62 y/o man with PMH of Pancreatic cancer s/p whipple in 11/17 with positive margins on 3rd line chemotherapy was admitted with fever, chills and weakness on 7/5, he was in septic shock so went to MICU and after stabilization, transferred to medical floor on 7/7. He has diarrhea for few months for which had CT showing colitis and then colonoscopy.   Ecoli bacteremia is 2' to mucositis/colitis.     Bacteremia with Ecoli  Colitis   Pancreatic ca on chemo     - blood culture 7/5 with a sensitive ecoli   - Repeat blood cultures pending.   - Agree with ceftriaxone 2gm daily   - Metronidazole po 500mg q8h can be continued due to mucositis to prevent bacteremia with anaerobes and also possible c diff.   - Will need 2 weeks of antibiotics from the first negative blood culture.    Will follow.

## 2019-07-09 NOTE — CHART NOTE - NSCHARTNOTEFT_GEN_A_CORE
Source: Patient [x ]  Family [x ]   other [ ]    Current Diet:  consistent CHO, low fiber, low fat, lactose free with Glucerna shake TID. Pt refusing Glucerna shake  63yr old male with PMH Asthma, Factor V Leiden deficiency,  Pancreatic Cancer s/p Whipple surgery in Nov 2017, s/p radiation, on 3rd line chemotherapy started on 7/2 after failed prior 2 chemo regimen was admitted to MICU on 7/5 with septic shock. He required vasopressor therapy after failed fluid resuscitation . Leucopenia, uptrending lactate and was persistently hypotensive. He was started on empiric antibiotics for possible pneumonia with CXR s/o Right medial basal consolidation. Was started on po midodrine on 7/6 and weaned off vasopressors since 7/6.  Blood cultures showed e. Coli bacteremia, and CT abd in Jun 2019 showed diffuse colitis with nadira-splenic and hepatic, moderate pelvic ascites. GI and ID following.      Patient reports [ ] nausea  [ ] vomiting [x ] diarrhea [ ] constipation  [ ]chewing problems [ ] swallowing issues  [ ] other:     PO intake:  < 50% [ ]   50-75%  [ x]   %  [x ]  other :    Source for PO intake [x ] Patient [ ] family [ ] chart [ ] staff [ ] other    Enteral /Parenteral Nutrition:     Current Weight: 7/7 153.8#, 7/6 141.3#    % Weight Change     Pertinent Medications: MEDICATIONS  (STANDING):  acyclovir   Oral Tab/Cap 400 milliGRAM(s) Oral three times a day  aspirin  chewable 81 milliGRAM(s) Oral daily  atorvastatin 10 milliGRAM(s) Oral at bedtime  cefTRIAXone   IVPB 2000 milliGRAM(s) IV Intermittent every 24 hours  dextrose 5%. 1000 milliLiter(s) (50 mL/Hr) IV Continuous <Continuous>  dextrose 50% Injectable 12.5 Gram(s) IV Push once  dextrose 50% Injectable 25 Gram(s) IV Push once  dextrose 50% Injectable 25 Gram(s) IV Push once  enoxaparin Injectable 40 milliGRAM(s) SubCutaneous daily  famotidine    Tablet 40 milliGRAM(s) Oral two times a day  fentaNYL   Patch  50 MICROgram(s)/Hr 1 Patch Transdermal every 72 hours  FIRST- Mouthwash  BLM 5 milliLiter(s) Swish and Spit four times a day  insulin lispro (HumaLOG) corrective regimen sliding scale   SubCutaneous Before meals and at bedtime  metroNIDAZOLE    Tablet 500 milliGRAM(s) Oral every 8 hours  midodrine 10 milliGRAM(s) Oral every 8 hours  pancrelipase  (CREON 36,000 Lipase Units) 1 Capsule(s) Oral daily  pancrelipase  (CREON 36,000 Lipase Units) 2 Capsule(s) Oral <User Schedule>  tamsulosin 0.4 milliGRAM(s) Oral at bedtime    MEDICATIONS  (PRN):  acetaminophen   Tablet .. 650 milliGRAM(s) Oral every 6 hours PRN Temp greater or equal to 38C (100.4F), Mild Pain (1 - 3)  aluminum hydroxide/magnesium hydroxide/simethicone Suspension 30 milliLiter(s) Oral every 6 hours PRN Dyspepsia  dextrose 40% Gel 15 Gram(s) Oral once PRN Blood Glucose LESS THAN 70 milliGRAM(s)/deciliter  glucagon  Injectable 1 milliGRAM(s) IntraMuscular once PRN Glucose LESS THAN 70 milligrams/deciliter  oxyCODONE    5 mG/acetaminophen 325 mG 2 Tablet(s) Oral every 6 hours PRN breakthrough  simethicone 80 milliGRAM(s) Chew every 6 hours PRN Gas    Pertinent Labs: CBC Full  -  ( 09 Jul 2019 08:14 )  WBC Count : 6.60 K/uL  RBC Count : 3.79 M/uL  Hemoglobin : 12.3 g/dL  Hematocrit : 37.6 %  Platelet Count - Automated : 235 K/uL  Mean Cell Volume : 99.2 fl  Mean Cell Hemoglobin : 32.5 pg  Mean Cell Hemoglobin Concentration : 32.7 gm/dL  Auto Neutrophil # : x  Auto Lymphocyte # : x  Auto Monocyte # : x  Auto Eosinophil # : x  Auto Basophil # : x  Auto Neutrophil % : x  Auto Lymphocyte % : x  Auto Monocyte % : x  Auto Eosinophil % : x  Auto Basophil % : x      07-09 Na138 mmol/L Glu 107 mg/dL K+ 3.9 mmol/L Cr  0.59 mg/dL BUN 11.0 mg/dL Phos n/a   Alb 2.2 g/dL<L> PAB n/a           Skin:     Nutrition focused physical exam previously conducted - found signs of malnutrition [ ]absent [x ]present    Subcutaneous fat loss: [ ] Orbital fat pads region, [ ]Buccal fat region, [ ]Triceps region,  [ ]Ribs region    Muscle wasting: [x ]Temples region, [ ]Clavicle region, [ ]Shoulder region, [ ]Scapula region, [ ]Interosseous region,  [ ]thigh region, [ ]Calf region    Estimated Needs:   [x ] no change since previous assessment  [ ] recalculated:     Current Nutrition Diagnosis: Malnutrition severe chronic related to inadequate energy intake, altered GI function in setting of pancreatic CA on chemo, colitis, bacteremia as evidenced by chronic diarrhea, <75% intake > 1 mo, 31# (20%) weight loss in 6 mo, muscle wasting. Pt with poor to fair po intake. Pt c/o sores in mouth that is limiting him from eating. Pt wants clear liquid supplement. Family brings in food from home.       Recommendations: Continue diet as ordered. Rec change Glucerna to Diabetishield TID    Monitoring and Evaluation:   [x ] PO intake [ x] Tolerance to diet prescription [X] Weights  [X] Follow up per protocol [X] Labs:

## 2019-07-09 NOTE — PROGRESS NOTE ADULT - SUBJECTIVE AND OBJECTIVE BOX
JENIFER NORTH    [id number]    63y      Male    INTERVAL HPI/OVERNIGHT EVENTS:    patient being seen for e coli bacteremia. Patient complaining of herpetic lesions on upper lip that started late last night.     REVIEW OF SYSTEMS:    CONSTITUTIONAL: No fever, weight loss, or fatigue  RESPIRATORY: No cough, wheezing, hemoptysis; No shortness of breath  CARDIOVASCULAR: No chest pain, palpitations  GASTROINTESTINAL: No abdominal or epigastric pain. No nausea, vomiting  NEUROLOGICAL: No headaches, memory loss, loss of strength.  MISCELLANEOUS:      Vital Signs Last 24 Hrs  T(C): 37 (09 Jul 2019 11:00), Max: 37 (09 Jul 2019 11:00)  T(F): 98.6 (09 Jul 2019 11:00), Max: 98.6 (09 Jul 2019 11:00)  HR: 62 (09 Jul 2019 11:00) (62 - 70)  BP: 134/80 (09 Jul 2019 11:00) (114/75 - 135/85)  BP(mean): --  RR: 18 (09 Jul 2019 11:00) (18 - 18)  SpO2: 95% (09 Jul 2019 11:00) (93% - 97%)    PHYSICAL EXAM:    GENERAL: NAD, well-groomed  HEENT: lesions on upper lip  NECK: soft, Supple, No JVD,   CHEST/LUNG: Clear to auscultation bilaterally; No wheezing  HEART: S1S2+, Regular rate and rhythm; No murmurs, rubs, or gallops  ABDOMEN: Soft, Nontender, Nondistended; Bowel sounds present  EXTREMITIES:  2+ Peripheral Pulses, No clubbing, cyanosis, or edema  SKIN: No rashes or lesions  NEURO: AAOX3, no focal deficits, no motor r sensory loss  PSYCH: normal mood      LABS:                        12.3   6.60  )-----------( 235      ( 09 Jul 2019 08:14 )             37.6     07-09    138  |  102  |  11.0  ----------------------------<  107  3.9   |  29.0  |  0.59    Ca    8.1<L>      09 Jul 2019 08:14  Phos  2.0     07-08  Mg     1.9     07-09    TPro  4.6<L>  /  Alb  2.2<L>  /  TBili  0.6  /  DBili  x   /  AST  27  /  ALT  21  /  AlkPhos  461<H>  07-09            MEDICATIONS  (STANDING):  acyclovir   Oral Tab/Cap 400 milliGRAM(s) Oral three times a day  aspirin  chewable 81 milliGRAM(s) Oral daily  atorvastatin 10 milliGRAM(s) Oral at bedtime  cefTRIAXone   IVPB 2000 milliGRAM(s) IV Intermittent every 24 hours  dextrose 5%. 1000 milliLiter(s) (50 mL/Hr) IV Continuous <Continuous>  dextrose 50% Injectable 12.5 Gram(s) IV Push once  dextrose 50% Injectable 25 Gram(s) IV Push once  dextrose 50% Injectable 25 Gram(s) IV Push once  enoxaparin Injectable 40 milliGRAM(s) SubCutaneous daily  famotidine    Tablet 40 milliGRAM(s) Oral two times a day  fentaNYL   Patch  50 MICROgram(s)/Hr 1 Patch Transdermal every 72 hours  FIRST- Mouthwash  BLM 5 milliLiter(s) Swish and Spit four times a day  insulin lispro (HumaLOG) corrective regimen sliding scale   SubCutaneous Before meals and at bedtime  metroNIDAZOLE    Tablet 500 milliGRAM(s) Oral every 8 hours  midodrine 10 milliGRAM(s) Oral every 8 hours  pancrelipase  (CREON 36,000 Lipase Units) 1 Capsule(s) Oral daily  pancrelipase  (CREON 36,000 Lipase Units) 2 Capsule(s) Oral <User Schedule>  petrolatum white Ointment 1 Application(s) Topical four times a day  tamsulosin 0.4 milliGRAM(s) Oral at bedtime    MEDICATIONS  (PRN):  acetaminophen   Tablet .. 650 milliGRAM(s) Oral every 6 hours PRN Temp greater or equal to 38C (100.4F), Mild Pain (1 - 3)  aluminum hydroxide/magnesium hydroxide/simethicone Suspension 30 milliLiter(s) Oral every 6 hours PRN Dyspepsia  dextrose 40% Gel 15 Gram(s) Oral once PRN Blood Glucose LESS THAN 70 milliGRAM(s)/deciliter  glucagon  Injectable 1 milliGRAM(s) IntraMuscular once PRN Glucose LESS THAN 70 milligrams/deciliter  oxyCODONE    5 mG/acetaminophen 325 mG 2 Tablet(s) Oral every 6 hours PRN breakthrough  simethicone 80 milliGRAM(s) Chew every 6 hours PRN Gas      RADIOLOGY & ADDITIONAL TESTS:

## 2019-07-09 NOTE — PROGRESS NOTE ADULT - SUBJECTIVE AND OBJECTIVE BOX
INTERVAL HISTORY:  Afebrile      MEDICATIONS  (STANDING):  aspirin  chewable 81 milliGRAM(s) Oral daily  atorvastatin 10 milliGRAM(s) Oral at bedtime  cefTRIAXone   IVPB 2000 milliGRAM(s) IV Intermittent every 24 hours  dextrose 5%. 1000 milliLiter(s) (50 mL/Hr) IV Continuous <Continuous>  dextrose 50% Injectable 12.5 Gram(s) IV Push once  dextrose 50% Injectable 25 Gram(s) IV Push once  dextrose 50% Injectable 25 Gram(s) IV Push once  enoxaparin Injectable 40 milliGRAM(s) SubCutaneous daily  famotidine    Tablet 40 milliGRAM(s) Oral two times a day  fentaNYL   Patch  50 MICROgram(s)/Hr 1 Patch Transdermal every 72 hours  FIRST- Mouthwash  BLM 5 milliLiter(s) Swish and Spit four times a day  furosemide   Injectable 40 milliGRAM(s) IV Push once  insulin lispro (HumaLOG) corrective regimen sliding scale   SubCutaneous Before meals and at bedtime  metroNIDAZOLE    Tablet 500 milliGRAM(s) Oral every 8 hours  midodrine 10 milliGRAM(s) Oral every 8 hours  pancrelipase  (CREON 36,000 Lipase Units) 1 Capsule(s) Oral daily  pancrelipase  (CREON 36,000 Lipase Units) 2 Capsule(s) Oral <User Schedule>  petrolatum white Ointment 1 Application(s) Topical four times a day  tamsulosin 0.4 milliGRAM(s) Oral at bedtime    MEDICATIONS  (PRN):  acetaminophen   Tablet .. 650 milliGRAM(s) Oral every 6 hours PRN Temp greater or equal to 38C (100.4F), Mild Pain (1 - 3)  aluminum hydroxide/magnesium hydroxide/simethicone Suspension 30 milliLiter(s) Oral every 6 hours PRN Dyspepsia  dextrose 40% Gel 15 Gram(s) Oral once PRN Blood Glucose LESS THAN 70 milliGRAM(s)/deciliter  glucagon  Injectable 1 milliGRAM(s) IntraMuscular once PRN Glucose LESS THAN 70 milligrams/deciliter  simethicone 80 milliGRAM(s) Chew every 6 hours PRN Gas        ALLERGIES:  No Known Allergies      REVIEW SYSTEMS:  Constitutional: fever, weight  HEENT: oral thrush / dysphagia  Respiratory: no dyspnea , wheezing, cough  Cardiovascular: denies chest pain, palpitations  GI: no abdominal tenderness / pain; no change in bowel habits  Musculoskeletal: joint pain / swelling  Integumentary: denies pruritus; no skin lesions  Neurologic:    ICU Vital Signs Last 24 Hrs  T(C): 36.8 (08 Jul 2019 22:47), Max: 36.8 (08 Jul 2019 22:47)  T(F): 98.2 (08 Jul 2019 22:47), Max: 98.2 (08 Jul 2019 22:47)  HR: 62 (09 Jul 2019 05:48) (62 - 70)  BP: 121/76 (09 Jul 2019 05:48) (114/75 - 135/85)  BP(mean): --  ABP: --  ABP(mean): --  RR: 18 (08 Jul 2019 16:00) (18 - 18)  SpO2: 95% (09 Jul 2019 05:48) (93% - 97%)    PHYSICAL EXAM:  Constitutional: Alert and oriented x 3  Eyes/ ENMT: PERRLA,   Respiratory: clear to auscultation bilaterally  Cardiovascular: S1,S2 rhythmic  Gastrointestinal: no guarding/ rebound  Extremities: no calf tenderness;  peripheral edema  Skin: dry, rash  Musculoskeletal:     LABS:                        12.3   6.60  )-----------( 235      ( 09 Jul 2019 08:14 )             37.6     07-09    138  |  102  |  11.0  ----------------------------<  107  3.9   |  29.0  |  0.59    Ca    8.1<L>      09 Jul 2019 08:14  Phos  2.0     07-08  Mg     1.9     07-09    TPro  4.6<L>  /  Alb  2.2<L>  /  TBili  0.6  /  DBili  x   /  AST  27  /  ALT  21  /  AlkPhos  461<H>  07-09    < from: CT Abdomen and Pelvis w/ Oral Cont and w/ IV Cont (07.08.19 @ 17:43) >  IMPRESSION:    Moderate bilateral pleural effusions with associated passive atelectasis  Stable soft tissue at the level of the celiac axis may represent   lymphadenopathy.  Stable narrowing of the main portal vein  Pancreatic stent again seen  Increase in the amount of ascites  Bladder wall thickening may be due to under distention versus cystitis  Horseshoe kidney    < end of copied text > INTERVAL HISTORY:  Afebrile  No diarrhea  Reports mouth sores.  Broke out in oral herpes x 1 day    MEDICATIONS  (STANDING):  aspirin  chewable 81 milliGRAM(s) Oral daily  atorvastatin 10 milliGRAM(s) Oral at bedtime  cefTRIAXone   IVPB 2000 milliGRAM(s) IV Intermittent every 24 hours  dextrose 5%. 1000 milliLiter(s) (50 mL/Hr) IV Continuous <Continuous>  dextrose 50% Injectable 12.5 Gram(s) IV Push once  dextrose 50% Injectable 25 Gram(s) IV Push once  dextrose 50% Injectable 25 Gram(s) IV Push once  enoxaparin Injectable 40 milliGRAM(s) SubCutaneous daily  famotidine    Tablet 40 milliGRAM(s) Oral two times a day  fentaNYL   Patch  50 MICROgram(s)/Hr 1 Patch Transdermal every 72 hours  FIRST- Mouthwash  BLM 5 milliLiter(s) Swish and Spit four times a day  furosemide   Injectable 40 milliGRAM(s) IV Push once  insulin lispro (HumaLOG) corrective regimen sliding scale   SubCutaneous Before meals and at bedtime  metroNIDAZOLE    Tablet 500 milliGRAM(s) Oral every 8 hours  midodrine 10 milliGRAM(s) Oral every 8 hours  pancrelipase  (CREON 36,000 Lipase Units) 1 Capsule(s) Oral daily  pancrelipase  (CREON 36,000 Lipase Units) 2 Capsule(s) Oral <User Schedule>  petrolatum white Ointment 1 Application(s) Topical four times a day  tamsulosin 0.4 milliGRAM(s) Oral at bedtime    MEDICATIONS  (PRN):  acetaminophen   Tablet .. 650 milliGRAM(s) Oral every 6 hours PRN Temp greater or equal to 38C (100.4F), Mild Pain (1 - 3)  aluminum hydroxide/magnesium hydroxide/simethicone Suspension 30 milliLiter(s) Oral every 6 hours PRN Dyspepsia  dextrose 40% Gel 15 Gram(s) Oral once PRN Blood Glucose LESS THAN 70 milliGRAM(s)/deciliter  glucagon  Injectable 1 milliGRAM(s) IntraMuscular once PRN Glucose LESS THAN 70 milligrams/deciliter  simethicone 80 milliGRAM(s) Chew every 6 hours PRN Gas        ALLERGIES:  No Known Allergies      REVIEW SYSTEMS:  Constitutional: fever, weight  HEENT: oral thrush / dysphagia  Respiratory: no dyspnea , wheezing, cough  Cardiovascular: denies chest pain, palpitations  GI: no abdominal tenderness / pain; no change in bowel habits  Musculoskeletal: joint pain / swelling  Integumentary: denies pruritus; no skin lesions  Neurologic:    ICU Vital Signs Last 24 Hrs  T(C): 36.8 (08 Jul 2019 22:47), Max: 36.8 (08 Jul 2019 22:47)  T(F): 98.2 (08 Jul 2019 22:47), Max: 98.2 (08 Jul 2019 22:47)  HR: 62 (09 Jul 2019 05:48) (62 - 70)  BP: 121/76 (09 Jul 2019 05:48) (114/75 - 135/85)  BP(mean): --  ABP: --  ABP(mean): --  RR: 18 (08 Jul 2019 16:00) (18 - 18)  SpO2: 95% (09 Jul 2019 05:48) (93% - 97%)    PHYSICAL EXAM:  Constitutional: Alert and oriented x 3  Eyes/ ENMT: PERRLA, +oral herpes rash  Respiratory: clear to auscultation bilaterally  Cardiovascular: S1,S2 rhythmic  Gastrointestinal: no guarding/ rebound  Extremities: no calf tenderness;  peripheral edema  Skin: dry, rash  Musculoskeletal:     LABS:                        12.3   6.60  )-----------( 235      ( 09 Jul 2019 08:14 )             37.6     07-09    138  |  102  |  11.0  ----------------------------<  107  3.9   |  29.0  |  0.59    Ca    8.1<L>      09 Jul 2019 08:14  Phos  2.0     07-08  Mg     1.9     07-09    TPro  4.6<L>  /  Alb  2.2<L>  /  TBili  0.6  /  DBili  x   /  AST  27  /  ALT  21  /  AlkPhos  461<H>  07-09    < from: CT Abdomen and Pelvis w/ Oral Cont and w/ IV Cont (07.08.19 @ 17:43) >  IMPRESSION:    Moderate bilateral pleural effusions with associated passive atelectasis  Stable soft tissue at the level of the celiac axis may represent   lymphadenopathy.  Stable narrowing of the main portal vein  Pancreatic stent again seen  Increase in the amount of ascites  Bladder wall thickening may be due to under distention versus cystitis  Horseshoe kidney    < end of copied text >

## 2019-07-09 NOTE — PROGRESS NOTE ADULT - ASSESSMENT
Again this is a 63 year old man with pancreas cancer status post Whipple currently on Keytruda who presented in septic shock secondary to E. coli.  The source of his bacteremia is likely bacterial translocation secondary to his underlying colitis.  He is currently on ceftriaxone and metronidazole and his vasopressors have been weaned.  Supportive care per primary team.  Tolerating diet.  Continue current treatment plan.    Thank you for the consult.  Please do not hesitate to call with any questions or concerns.    GOVIND Crespo MD  [facility]  Division of Gastroenterology  Tel [phone]  Fax [phone]

## 2019-07-09 NOTE — PROGRESS NOTE ADULT - SUBJECTIVE AND OBJECTIVE BOX
[facility]  INFECTIOUS DISEASES AND INTERNAL MEDICINE at [place]  =======================================================  Malcolm Marquez MD  Diplomates American Board of Internal Medicine and Infectious Diseases  =======================================================    [id number]  JENIFER NORTH     Follow up; Ecoli bacteremia     No new complaint, still has pain in mouth and throat, unable to eat food properly.   No fever.     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  pain in mouth and throat   CARDIOVASCULAR:  No chest pain or SOB.  RESPIRATORY:  No cough, shortness of breath, PND or orthopnea.  GASTROINTESTINAL:  No nausea, vomiting or diarrhea.  GENITOURINARY:  No dysuria, frequency or urgency. No Blood in urine  MUSCULOSKELETAL:  no joint aches, no muscle pain  SKIN:  No change in skin, hair or nails.  NEUROLOGIC:  No paresthesias, fasciculations, seizures or weakness.  PSYCHIATRIC:  No disorder of thought or mood.  ENDOCRINE:  No heat or cold intolerance, polyuria or polydipsia.  HEMATOLOGICAL:  No easy bruising or bleeding.     Physical Exam:  Vital Signs Last 24 Hrs  T(C): 37 (09 Jul 2019 11:00), Max: 37 (09 Jul 2019 11:00)  T(F): 98.6 (09 Jul 2019 11:00), Max: 98.6 (09 Jul 2019 11:00)  HR: 62 (09 Jul 2019 11:00) (62 - 70)  BP: 134/80 (09 Jul 2019 11:00) (114/75 - 135/85)  RR: 18 (09 Jul 2019 11:00) (18 - 18)  SpO2: 95% (09 Jul 2019 11:00) (93% - 97%)  GEN: NAD  HEENT: normocephalic and atraumatic. EOMI. PERRL.    NECK: Supple.  No lymphadenopathy   LUNGS: Clear to auscultation.  HEART: Regular rate and rhythm without murmur.  ABDOMEN: Soft, nontender, and nondistended.  Positive bowel sounds. Scar+ clean   : No CVA tenderness  EXTREMITIES: Without any cyanosis, clubbing, rash, lesions or edema.  NEUROLOGIC: grossly intact.  PSYCHIATRIC: Appropriate affect .  SKIN: No ulceration or induration present.    Labs:  07-09    138  |  102  |  11.0  ----------------------------<  107  3.9   |  29.0  |  0.59    Ca    8.1<L>      09 Jul 2019 08:14  Phos  2.0     07-08  Mg     1.9     07-09    TPro  4.6<L>  /  Alb  2.2<L>  /  TBili  0.6  /  DBili  x   /  AST  27  /  ALT  21  /  AlkPhos  461<H>  07-09                        12.3   6.60  )-----------( 235      ( 09 Jul 2019 08:14 )             37.6     LIVER FUNCTIONS - ( 09 Jul 2019 08:14 )  Alb: 2.2 g/dL / Pro: 4.6 g/dL / ALK PHOS: 461 U/L / ALT: 21 U/L / AST: 27 U/L / GGT: x           RECENT CULTURES:  07-05 @ 17:22 .Blood Blood Culture PCR  Escherichia coli  Escherichia coli ESBL    Growth in aerobic and anaerobic bottles: Escherichia coli  Growth in aerobic and anaerobic bottles: Escherichia coli ESBL  Aerobic Bottle: 7.37 Hours to positivity  Anaerobic Bottle: 8.18 Hours to positivity    07-05 @ 15:35 .Urine     No growth    All imaging and other data have been reviewed.

## 2019-07-09 NOTE — PROGRESS NOTE ADULT - ASSESSMENT
63 M with history of factor V Leiden mutation and stage II B pancreatic cancer in November 2017, s/p Whipple procedure, presently on Sunitinib ( 3rd line of therapy, after gemcitabine/ abraxane and  capecitabine/oxaliplatin), admitted with septic shock, found to have e.coli bacteremia likely from colitis. Sunitinib on hold while on abx. 63 M with history of factor V Leiden mutation and stage II B pancreatic cancer in November 2017, s/p Whipple procedure, presently on Sunitinib ( 3rd line of therapy, after gemcitabine/ abraxane and  capecitabine/oxaliplatin), admitted with septic shock, found to have e.coli bacteremia likely from colitis. Sunitinib on hold while on abx.  Also being treated for oral herpes.  Repeat blood cultures pending.

## 2019-07-09 NOTE — PROGRESS NOTE ADULT - ASSESSMENT
63yr old male with PMH Asthma, Factor V Leiden deficiency,  Pancreatic Cancer s/p Whipple surgery in Nov 2017, s/p radiation, on 3rd line chemotherapy started on 7/2 after failed prior 2 chemo regimen was admitted to MICU on 7/5 with septic shock. He required vasopressor therapy after failed fluid resuscitation . Leucopenia, uptrending lactate and was persistently hypotensive. He was started on empiric antibiotics for possible pneumonia with CXR s/o Right medial basal consolidation. Was started on po midodrine on 7/6 and weaned off vasopressors since 7/6.  Blood cultures showed e. Coli bacteremia, and CT abd in Jun 2019 showed diffuse colitis with nadira-splenic and hepatic, moderate pelvic ascites. GI and ID following. Patient developed herpetic lesions overnight     #septic shock - resolved s/p vasopressors, iv stress steroid therapy  - now on midodrine -   f/u repeat Cultures   possible source colitis vs pneumonia  - most likely Abdominal source of E Coli bacteremia.  ID and GI consults appreciated  -->  Oncology outpatient follow up   Chemotherapy on hold , pt has mediport in place.     #herpetic lesions - started po acyclovir    #mucositis -> magic mouthwash    # elevated LFTs - possibly 2/2 shock liver - resolved  # pancreatic cancer s/p whipple - ct pancreatic enzymes with meals  # heart murmur - Mild AI, AS on ECHO - f/u cardio outpt  # DM - A1C - 6.2 - SSI for now  # asthma- stable   # Peripheral edema - possibly 2/2 severe protein calorie malnutrition -glucerna     dispo --> dc once repeat blood cultures are negative  --> likely will need midline with iv abx 63yr old male with PMH Asthma, Factor V Leiden deficiency,  Pancreatic Cancer s/p Whipple surgery in Nov 2017, s/p radiation, on 3rd line chemotherapy started on 7/2 after failed prior 2 chemo regimen was admitted to MICU on 7/5 with septic shock. He required vasopressor therapy after failed fluid resuscitation . Leucopenia, uptrending lactate and was persistently hypotensive. He was started on empiric antibiotics for possible pneumonia with CXR s/o Right medial basal consolidation. Was started on po midodrine on 7/6 and weaned off vasopressors since 7/6.  Blood cultures showed e. Coli bacteremia, and CT abd in Jun 2019 showed diffuse colitis with nadira-splenic and hepatic, moderate pelvic ascites. GI and ID following. Patient developed herpetic lesions overnight     #septic shock - resolved s/p vasopressors, iv stress steroid therapy  - now on midodrine -   f/u repeat Cultures   possible source colitis vs pneumonia  - most likely Abdominal source of E Coli bacteremia.  ID and GI consults appreciated  -->  Oncology outpatient follow up   Chemotherapy on hold , pt has mediport in place.     #herpetic lesions - started po acyclovir    #mucositis -> magic mouthwash    #back pain --> xray spine ordered    # elevated LFTs - possibly 2/2 shock liver - resolved  # pancreatic cancer s/p whipple - ct pancreatic enzymes with meals  # heart murmur - Mild AI, AS on ECHO - f/u cardio outpt  # DM - A1C - 6.2 - SSI for now  # asthma- stable   # Peripheral edema - possibly 2/2 severe protein calorie malnutrition -glucerna     dispo --> dc once repeat blood cultures are negative  --> likely will need midline with iv abx

## 2019-07-09 NOTE — PROGRESS NOTE ADULT - SUBJECTIVE AND OBJECTIVE BOX
Chief Complaint: This is a 63y old Male patient being seen for follow-up consultation for pancreas cancer.    HPI / 24H events:  Patient complains of sores in mouth and on lips.  Feeling constipated.    ROS: A 14-point review of systems was reviewed and was otherwise negative save what was reported in the HPI.    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 1000 milliLiter(s) (50 mL/Hr) IV Continuous <Continuous>  dextrose 50% Injectable 12.5 Gram(s) IV Push once  dextrose 50% Injectable 25 Gram(s) IV Push once  dextrose 50% Injectable 25 Gram(s) IV Push once  enoxaparin Injectable 40 milliGRAM(s) SubCutaneous daily  famotidine    Tablet 40 milliGRAM(s) Oral two times a day  fentaNYL   Patch  50 MICROgram(s)/Hr 1 Patch Transdermal every 72 hours  FIRST- Mouthwash  BLM 5 milliLiter(s) Swish and Spit four times a day  furosemide   Injectable 40 milliGRAM(s) IV Push once  insulin lispro (HumaLOG) corrective regimen sliding scale   SubCutaneous Before meals and at bedtime  metroNIDAZOLE    Tablet 500 milliGRAM(s) Oral every 8 hours  midodrine 10 milliGRAM(s) Oral every 8 hours  pancrelipase  (CREON 36,000 Lipase Units) 1 Capsule(s) Oral daily  pancrelipase  (CREON 36,000 Lipase Units) 2 Capsule(s) Oral <User Schedule>  petrolatum white Ointment 1 Application(s) Topical four times a day  tamsulosin 0.4 milliGRAM(s) Oral at bedtime    MEDICATIONS  (PRN):  acetaminophen   Tablet .. 650 milliGRAM(s) Oral every 6 hours PRN Temp greater or equal to 38C (100.4F), Mild Pain (1 - 3)  aluminum hydroxide/magnesium hydroxide/simethicone Suspension 30 milliLiter(s) Oral every 6 hours PRN Dyspepsia  dextrose 40% Gel 15 Gram(s) Oral once PRN Blood Glucose LESS THAN 70 milliGRAM(s)/deciliter  glucagon  Injectable 1 milliGRAM(s) IntraMuscular once PRN Glucose LESS THAN 70 milligrams/deciliter  oxyCODONE    5 mG/acetaminophen 325 mG 2 Tablet(s) Oral every 6 hours PRN breakthrough  simethicone 80 milliGRAM(s) Chew every 6 hours PRN Gas    Vital Signs Last 24 Hrs  T(C): 36.8 (08 Jul 2019 22:47), Max: 36.8 (08 Jul 2019 22:47)  T(F): 98.2 (08 Jul 2019 22:47), Max: 98.2 (08 Jul 2019 22:47)  HR: 62 (09 Jul 2019 05:48) (62 - 70)  BP: 121/76 (09 Jul 2019 05:48) (114/75 - 135/85)  BP(mean): --  RR: 18 (08 Jul 2019 16:00) (18 - 18)  SpO2: 95% (09 Jul 2019 05:48) (93% - 97%)    PHYSICAL EXAM:  Constitutional: Well-developed, well-nourished, in no apparent distress  Eyes: Sclerae anicteric, conjunctivae normal  ENMT: Mucus membranes moist, no oropharyngeal thrush noted  Neck: No thyroid nodules appreciated, no significant cervical or supraclavicular lymphadenopathy  Respiratory: Breathing nonlabored; clear to auscultation  Cardiovascular: Regular rate and rhythm  Gastrointestinal: Soft, nontender, nondistended, normoactive bowel sounds; no hepatosplenomegaly appreciated; no rebound tenderness or involuntary guarding  Extremities: No clubbing, cyanosis or edema  Neurological: Alert and oriented to person, place and time; no asterixis  Skin: No jaundice  Lymph Nodes: No significant lymphadenopathy  Musculoskeletal: No significant peripheral atrophy  Psychiatric: Affect and mood appropriate    LABS:                        12.3   6.60  )-----------( 235      ( 09 Jul 2019 08:14 )             37.6   07-09    138  |  102  |  11.0  ----------------------------<  107  3.9   |  29.0  |  0.59    Ca    8.1<L>      09 Jul 2019 08:14  Phos  2.0     07-08  Mg     1.9     07-09    TPro  4.6<L>  /  Alb  2.2<L>  /  TBili  0.6  /  DBili  x   /  AST  27  /  ALT  21  /  AlkPhos  461<H>  07-09

## 2019-07-10 ENCOUNTER — TRANSCRIPTION ENCOUNTER (OUTPATIENT)
Age: 64
End: 2019-07-10

## 2019-07-10 VITALS — DIASTOLIC BLOOD PRESSURE: 62 MMHG | HEART RATE: 64 BPM | SYSTOLIC BLOOD PRESSURE: 110 MMHG | TEMPERATURE: 98 F

## 2019-07-10 LAB
ALBUMIN SERPL ELPH-MCNC: 2.4 G/DL — LOW (ref 3.3–5.2)
ALP SERPL-CCNC: 427 U/L — HIGH (ref 40–120)
ALT FLD-CCNC: 23 U/L — SIGNIFICANT CHANGE UP
ANION GAP SERPL CALC-SCNC: 8 MMOL/L — SIGNIFICANT CHANGE UP (ref 5–17)
AST SERPL-CCNC: 37 U/L — SIGNIFICANT CHANGE UP
BILIRUB SERPL-MCNC: 0.7 MG/DL — SIGNIFICANT CHANGE UP (ref 0.4–2)
BUN SERPL-MCNC: 11 MG/DL — SIGNIFICANT CHANGE UP (ref 8–20)
CALCIUM SERPL-MCNC: 8.5 MG/DL — LOW (ref 8.6–10.2)
CHLORIDE SERPL-SCNC: 99 MMOL/L — SIGNIFICANT CHANGE UP (ref 98–107)
CO2 SERPL-SCNC: 31 MMOL/L — HIGH (ref 22–29)
CREAT SERPL-MCNC: 0.59 MG/DL — SIGNIFICANT CHANGE UP (ref 0.5–1.3)
GLUCOSE BLDC GLUCOMTR-MCNC: 111 MG/DL — HIGH (ref 70–99)
GLUCOSE BLDC GLUCOMTR-MCNC: 97 MG/DL — SIGNIFICANT CHANGE UP (ref 70–99)
GLUCOSE SERPL-MCNC: 109 MG/DL — SIGNIFICANT CHANGE UP (ref 70–115)
HCT VFR BLD CALC: 35.2 % — LOW (ref 39–50)
HGB BLD-MCNC: 11.5 G/DL — LOW (ref 13–17)
MAGNESIUM SERPL-MCNC: 1.9 MG/DL — SIGNIFICANT CHANGE UP (ref 1.6–2.6)
MCHC RBC-ENTMCNC: 32.4 PG — SIGNIFICANT CHANGE UP (ref 27–34)
MCHC RBC-ENTMCNC: 32.7 GM/DL — SIGNIFICANT CHANGE UP (ref 32–36)
MCV RBC AUTO: 99.2 FL — SIGNIFICANT CHANGE UP (ref 80–100)
PLATELET # BLD AUTO: 242 K/UL — SIGNIFICANT CHANGE UP (ref 150–400)
POTASSIUM SERPL-MCNC: 3.5 MMOL/L — SIGNIFICANT CHANGE UP (ref 3.5–5.3)
POTASSIUM SERPL-SCNC: 3.5 MMOL/L — SIGNIFICANT CHANGE UP (ref 3.5–5.3)
PROT SERPL-MCNC: 4.5 G/DL — LOW (ref 6.6–8.7)
RBC # BLD: 3.55 M/UL — LOW (ref 4.2–5.8)
RBC # FLD: 14.8 % — HIGH (ref 10.3–14.5)
SODIUM SERPL-SCNC: 138 MMOL/L — SIGNIFICANT CHANGE UP (ref 135–145)
WBC # BLD: 5.55 K/UL — SIGNIFICANT CHANGE UP (ref 3.8–10.5)
WBC # FLD AUTO: 5.55 K/UL — SIGNIFICANT CHANGE UP (ref 3.8–10.5)

## 2019-07-10 PROCEDURE — 83036 HEMOGLOBIN GLYCOSYLATED A1C: CPT

## 2019-07-10 PROCEDURE — 96375 TX/PRO/DX INJ NEW DRUG ADDON: CPT

## 2019-07-10 PROCEDURE — 72110 X-RAY EXAM L-2 SPINE 4/>VWS: CPT

## 2019-07-10 PROCEDURE — 84100 ASSAY OF PHOSPHORUS: CPT

## 2019-07-10 PROCEDURE — 36415 COLL VENOUS BLD VENIPUNCTURE: CPT

## 2019-07-10 PROCEDURE — 82803 BLOOD GASES ANY COMBINATION: CPT

## 2019-07-10 PROCEDURE — 80048 BASIC METABOLIC PNL TOTAL CA: CPT

## 2019-07-10 PROCEDURE — 80053 COMPREHEN METABOLIC PANEL: CPT

## 2019-07-10 PROCEDURE — 96365 THER/PROPH/DIAG IV INF INIT: CPT

## 2019-07-10 PROCEDURE — 71045 X-RAY EXAM CHEST 1 VIEW: CPT

## 2019-07-10 PROCEDURE — 84145 PROCALCITONIN (PCT): CPT

## 2019-07-10 PROCEDURE — 80076 HEPATIC FUNCTION PANEL: CPT

## 2019-07-10 PROCEDURE — 85730 THROMBOPLASTIN TIME PARTIAL: CPT

## 2019-07-10 PROCEDURE — 85610 PROTHROMBIN TIME: CPT

## 2019-07-10 PROCEDURE — 81003 URINALYSIS AUTO W/O SCOPE: CPT

## 2019-07-10 PROCEDURE — 36569 INSJ PICC 5 YR+ W/O IMAGING: CPT

## 2019-07-10 PROCEDURE — 87040 BLOOD CULTURE FOR BACTERIA: CPT

## 2019-07-10 PROCEDURE — 74177 CT ABD & PELVIS W/CONTRAST: CPT

## 2019-07-10 PROCEDURE — 72110 X-RAY EXAM L-2 SPINE 4/>VWS: CPT | Mod: 26

## 2019-07-10 PROCEDURE — 76937 US GUIDE VASCULAR ACCESS: CPT | Mod: 26,59

## 2019-07-10 PROCEDURE — 87086 URINE CULTURE/COLONY COUNT: CPT

## 2019-07-10 PROCEDURE — 82009 KETONE BODYS QUAL: CPT

## 2019-07-10 PROCEDURE — 99239 HOSP IP/OBS DSCHRG MGMT >30: CPT

## 2019-07-10 PROCEDURE — 99232 SBSQ HOSP IP/OBS MODERATE 35: CPT

## 2019-07-10 PROCEDURE — 93005 ELECTROCARDIOGRAM TRACING: CPT

## 2019-07-10 PROCEDURE — 82962 GLUCOSE BLOOD TEST: CPT

## 2019-07-10 PROCEDURE — 93971 EXTREMITY STUDY: CPT

## 2019-07-10 PROCEDURE — 87150 DNA/RNA AMPLIFIED PROBE: CPT

## 2019-07-10 PROCEDURE — 87186 SC STD MICRODIL/AGAR DIL: CPT

## 2019-07-10 PROCEDURE — 96367 TX/PROPH/DG ADDL SEQ IV INF: CPT

## 2019-07-10 PROCEDURE — 85027 COMPLETE CBC AUTOMATED: CPT

## 2019-07-10 PROCEDURE — 83735 ASSAY OF MAGNESIUM: CPT

## 2019-07-10 PROCEDURE — 99285 EMERGENCY DEPT VISIT HI MDM: CPT | Mod: 25

## 2019-07-10 PROCEDURE — 83880 ASSAY OF NATRIURETIC PEPTIDE: CPT

## 2019-07-10 PROCEDURE — 83605 ASSAY OF LACTIC ACID: CPT

## 2019-07-10 PROCEDURE — 93306 TTE W/DOPPLER COMPLETE: CPT

## 2019-07-10 RX ORDER — ERTAPENEM SODIUM 1 G/1
1000 INJECTION, POWDER, LYOPHILIZED, FOR SOLUTION INTRAMUSCULAR; INTRAVENOUS EVERY 24 HOURS
Refills: 0 | Status: DISCONTINUED | OUTPATIENT
Start: 2019-07-10 | End: 2019-07-10

## 2019-07-10 RX ORDER — ERTAPENEM SODIUM 1 G/1
1 INJECTION, POWDER, LYOPHILIZED, FOR SOLUTION INTRAMUSCULAR; INTRAVENOUS
Qty: 1 | Refills: 0
Start: 2019-07-10 | End: 2019-07-22

## 2019-07-10 RX ORDER — SIMETHICONE 80 MG/1
1 TABLET, CHEWABLE ORAL
Qty: 0 | Refills: 0 | DISCHARGE

## 2019-07-10 RX ORDER — MIDODRINE HYDROCHLORIDE 2.5 MG/1
0.5 TABLET ORAL
Qty: 45 | Refills: 0
Start: 2019-07-10 | End: 2019-08-08

## 2019-07-10 RX ORDER — OXYCODONE HYDROCHLORIDE 5 MG/1
1 TABLET ORAL
Qty: 20 | Refills: 0
Start: 2019-07-10 | End: 2019-07-14

## 2019-07-10 RX ORDER — ACYCLOVIR SODIUM 500 MG
1 VIAL (EA) INTRAVENOUS
Qty: 18 | Refills: 0
Start: 2019-07-10 | End: 2019-07-15

## 2019-07-10 RX ORDER — FENTANYL CITRATE 50 UG/ML
1 INJECTION INTRAVENOUS
Qty: 0 | Refills: 0 | DISCHARGE

## 2019-07-10 RX ORDER — FENTANYL CITRATE 50 UG/ML
1 INJECTION INTRAVENOUS
Qty: 4 | Refills: 0
Start: 2019-07-10 | End: 2019-07-19

## 2019-07-10 RX ORDER — ERTAPENEM SODIUM 1 G/1
1 INJECTION, POWDER, LYOPHILIZED, FOR SOLUTION INTRAMUSCULAR; INTRAVENOUS
Qty: 0 | Refills: 0 | DISCHARGE
Start: 2019-07-10

## 2019-07-10 RX ADMIN — FAMOTIDINE 40 MILLIGRAM(S): 10 INJECTION INTRAVENOUS at 06:11

## 2019-07-10 RX ADMIN — FENTANYL CITRATE 1 PATCH: 50 INJECTION INTRAVENOUS at 08:18

## 2019-07-10 RX ADMIN — MIDODRINE HYDROCHLORIDE 10 MILLIGRAM(S): 2.5 TABLET ORAL at 06:11

## 2019-07-10 RX ADMIN — ENOXAPARIN SODIUM 40 MILLIGRAM(S): 100 INJECTION SUBCUTANEOUS at 12:30

## 2019-07-10 RX ADMIN — Medication 81 MILLIGRAM(S): at 12:30

## 2019-07-10 RX ADMIN — Medication 2 CAPSULE(S): at 12:31

## 2019-07-10 RX ADMIN — DIPHENHYDRAMINE HYDROCHLORIDE AND LIDOCAINE HYDROCHLORIDE AND ALUMINUM HYDROXIDE AND MAGNESIUM HYDRO 5 MILLILITER(S): KIT at 06:12

## 2019-07-10 RX ADMIN — DIPHENHYDRAMINE HYDROCHLORIDE AND LIDOCAINE HYDROCHLORIDE AND ALUMINUM HYDROXIDE AND MAGNESIUM HYDRO 5 MILLILITER(S): KIT at 12:31

## 2019-07-10 RX ADMIN — Medication 400 MILLIGRAM(S): at 06:11

## 2019-07-10 RX ADMIN — Medication 400 MILLIGRAM(S): at 14:18

## 2019-07-10 RX ADMIN — ERTAPENEM SODIUM 120 MILLIGRAM(S): 1 INJECTION, POWDER, LYOPHILIZED, FOR SOLUTION INTRAMUSCULAR; INTRAVENOUS at 12:30

## 2019-07-10 RX ADMIN — Medication 2 CAPSULE(S): at 08:36

## 2019-07-10 RX ADMIN — Medication 500 MILLIGRAM(S): at 06:11

## 2019-07-10 RX ADMIN — MIDODRINE HYDROCHLORIDE 10 MILLIGRAM(S): 2.5 TABLET ORAL at 14:18

## 2019-07-10 NOTE — PROGRESS NOTE ADULT - PROVIDER SPECIALTY LIST ADULT
Critical Care
Gastroenterology
Gastroenterology
Hospitalist
Infectious Disease
Infectious Disease
Internal Medicine
Internal Medicine
Critical Care
Heme/Onc
Gastroenterology

## 2019-07-10 NOTE — PROCEDURE NOTE - NSPROCDETAILS_GEN_ALL_CORE
supine position/ultrasound assessment/sterile dressing applied/sterile technique, catheter placed/ultrasound guidance

## 2019-07-10 NOTE — DISCHARGE NOTE PROVIDER - CARE PROVIDER_API CALL
Ruiz Ramos)  Infectious Disease; Internal Medicine  [address] [place] [address], [address]  [place] [address]  Phone: [phone]  Fax: [phone]  Follow Up Time: 1 week    primary care,   pcp  Phone: (   )    -  Fax: (   )    -  Follow Up Time:     oncology,   Phone: (   )    -  Fax: (   )    -  Follow Up Time:

## 2019-07-10 NOTE — DISCHARGE NOTE NURSING/CASE MANAGEMENT/SOCIAL WORK - NSDCDPATPORTLINK_GEN_ALL_CORE
You can access the [employer] system Patient Portal, offered by [facility], by registering with the following website: [email or web address]

## 2019-07-10 NOTE — PROGRESS NOTE ADULT - ASSESSMENT
62 y/o man with PMH of Pancreatic cancer s/p whipple in 11/17 with positive margins on 3rd line chemotherapy was admitted with fever, chills and weakness on 7/5, he was in septic shock so went to MICU and after stabilization, transferred to medical floor on 7/7. He has diarrhea for few months for which had CT showing colitis and then colonoscopy.   Ecoli bacteremia is 2' to mucositis/colitis.     Bacteremia with Ecoli  Colitis   Pancreatic ca on chemo     - blood culture 7/5 with 2 different type of Ecoli, one is ESBL  - Repeat blood cultures negative from 7/8  - Will start Ertapenem 1gm daily  - Can stop metronidazole   - Last day would be 7/13  - Medication has been sent to [facility] electronically.  - Will need CBC and CMP weekly.   - Midline has been ordered.   - Follow up with me in ID office in 7-10 days.     Will sign off please call with any question. 62 y/o man with PMH of Pancreatic cancer s/p whipple in 11/17 with positive margins on 3rd line chemotherapy was admitted with fever, chills and weakness on 7/5, he was in septic shock so went to MICU and after stabilization, transferred to medical floor on 7/7. He has diarrhea for few months for which had CT showing colitis and then colonoscopy.   Ecoli bacteremia is 2' to mucositis/colitis.     Bacteremia with Ecoli  Colitis   Pancreatic ca on chemo     - blood culture 7/5 with 2 different type of Ecoli, one is ESBL  - Repeat blood cultures negative from 7/8  - Will start Ertapenem 1gm daily  - Can stop metronidazole   - Last day would be 7/23  - Medication has been sent to [facility] electronically.  - Will need CBC and CMP weekly.   - Midline has been ordered.   - Follow up with me in ID office in 7-10 days.     Will sign off please call with any question.

## 2019-07-10 NOTE — DISCHARGE NOTE PROVIDER - PROVIDER TOKENS
PROVIDER:[TOKEN:[[id number]],FOLLOWUP:[1 week]],FREE:[LAST:[primary care],PHONE:[(   )    -],FAX:[(   )    -],ADDRESS:[pcp]],FREE:[LAST:[oncology],PHONE:[(   )    -],FAX:[(   )    -]]

## 2019-07-10 NOTE — PROCEDURE NOTE - NSICDXPROCEDURE_GEN_ALL_CORE_FT
PROCEDURES:  US guided needle placement 10-Jul-2019 12:38:49 Rene Nobles  US guided vascular access 10-Jul-2019 12:38:34 Patent left basilic vein Rene Keane  Midline catheter insertion 10-Jul-2019 12:37:36 4FR 14CM length 28CIRC BARD POWER MIDLINE ns flush good heme back left basilic vein Rene Keane

## 2019-07-10 NOTE — PROGRESS NOTE ADULT - SUBJECTIVE AND OBJECTIVE BOX
[facility]  INFECTIOUS DISEASES AND INTERNAL MEDICINE at [facility]  =======================================================  Malcolm Marquez MD  Diplomates American Board of Internal Medicine and Infectious Diseases  =======================================================    [facility]-[id number]  [address] [address]     Follow up; Ecoli bacteremia     No new complaint, No fever.     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  pain in mouth and throat   CARDIOVASCULAR:  No chest pain or SOB.  RESPIRATORY:  No cough, shortness of breath, PND or orthopnea.  GASTROINTESTINAL:  No nausea, vomiting or diarrhea.  GENITOURINARY:  No dysuria, frequency or urgency. No Blood in urine  MUSCULOSKELETAL:  no joint aches, no muscle pain  SKIN:  No change in skin, hair or nails.  NEUROLOGIC:  No paresthesias, fasciculations, seizures or weakness.  PSYCHIATRIC:  No disorder of thought or mood.  ENDOCRINE:  No heat or cold intolerance, polyuria or polydipsia.  HEMATOLOGICAL:  No easy bruising or bleeding.     Physical Exam:  Vital Signs Last 24 Hrs  T(C): 36.9 (10 Jul 2019 07:00), Max: 37 (09 Jul 2019 11:00)  T(F): 98.4 (10 Jul 2019 07:00), Max: 98.6 (09 Jul 2019 11:00)  HR: 63 (10 Jul 2019 07:00) (62 - 112)  BP: 113/65 (10 Jul 2019 07:00) (100/71 - 134/80)  RR: 18 (10 Jul 2019 07:00) (18 - 19)  SpO2: 93% (10 Jul 2019 07:00) (93% - 98%)  GEN: NAD  HEENT: normocephalic and atraumatic. EOMI. PERRL.    NECK: Supple.  No lymphadenopathy   LUNGS: Clear to auscultation.  HEART: Regular rate and rhythm without murmur.  ABDOMEN: Soft, nontender, and nondistended.  Positive bowel sounds. Scar+ clean   : No CVA tenderness  EXTREMITIES: Without any cyanosis, clubbing, rash, lesions or edema.  NEUROLOGIC: grossly intact.  PSYCHIATRIC: Appropriate affect .  SKIN: No ulceration or induration present.    Labs:  07-10    138  |  99  |  11.0  ----------------------------<  109  3.5   |  31.0<H>  |  0.59    Ca    8.5<L>      10 Jul 2019 06:27  Mg     1.9     07-10    TPro  4.5<L>  /  Alb  2.4<L>  /  TBili  0.7  /  DBili  x   /  AST  37  /  ALT  23  /  AlkPhos  427<H>  07-10                        11.5   5.55  )-----------( 242      ( 10 Jul 2019 06:27 )             35.2     LIVER FUNCTIONS - ( 10 Jul 2019 06:27 )  Alb: 2.4 g/dL / Pro: 4.5 g/dL / ALK PHOS: 427 U/L / ALT: 23 U/L / AST: 37 U/L / GGT: x           RECENT CULTURES:  07-08 @ 08:24 .Blood     No growth at 48 hours    07-05 @ 17:22 .Blood Blood Culture PCR  Escherichia coli  Escherichia coli ESBL    Growth in aerobic and anaerobic bottles: Escherichia coli  Growth in aerobic and anaerobic bottles: Escherichia coli ESBL  Aerobic Bottle: 7.37 Hours to positivity  Anaerobic Bottle: 8.18 Hours to positivity    07-05 @ 15:35 .Urine     No growth    All imaging and other data have been reviewed.

## 2019-07-10 NOTE — DISCHARGE NOTE PROVIDER - NSDCCPCAREPLAN_GEN_ALL_CORE_FT
PRINCIPAL DISCHARGE DIAGNOSIS  Diagnosis: Bacteremia  Assessment and Plan of Treatment:       SECONDARY DISCHARGE DIAGNOSES  Diagnosis: Metabolic encephalopathy  Assessment and Plan of Treatment:     Diagnosis: Septic shock  Assessment and Plan of Treatment:

## 2019-07-10 NOTE — PROGRESS NOTE ADULT - REASON FOR ADMISSION
septic shock

## 2019-07-13 LAB
CULTURE RESULTS: SIGNIFICANT CHANGE UP
CULTURE RESULTS: SIGNIFICANT CHANGE UP
SPECIMEN SOURCE: SIGNIFICANT CHANGE UP
SPECIMEN SOURCE: SIGNIFICANT CHANGE UP

## 2019-07-17 ENCOUNTER — APPOINTMENT (OUTPATIENT)
Dept: HEMATOLOGY ONCOLOGY | Facility: CLINIC | Age: 64
End: 2019-07-17
Payer: COMMERCIAL

## 2019-07-18 ENCOUNTER — RESULT REVIEW (OUTPATIENT)
Age: 64
End: 2019-07-18

## 2019-07-18 ENCOUNTER — APPOINTMENT (OUTPATIENT)
Dept: HEMATOLOGY ONCOLOGY | Facility: CLINIC | Age: 64
End: 2019-07-18
Payer: COMMERCIAL

## 2019-07-18 ENCOUNTER — OUTPATIENT (OUTPATIENT)
Dept: OUTPATIENT SERVICES | Facility: HOSPITAL | Age: 64
LOS: 1 days | Discharge: HOME | End: 2019-07-18

## 2019-07-18 VITALS
OXYGEN SATURATION: 93 % | SYSTOLIC BLOOD PRESSURE: 109 MMHG | HEIGHT: 65 IN | WEIGHT: 134.03 LBS | TEMPERATURE: 98.2 F | HEART RATE: 94 BPM | BODY MASS INDEX: 22.33 KG/M2 | DIASTOLIC BLOOD PRESSURE: 75 MMHG

## 2019-07-18 DIAGNOSIS — K90.81 WHIPPLE'S DISEASE: Chronic | ICD-10-CM

## 2019-07-18 DIAGNOSIS — E87.6 HYPOKALEMIA: ICD-10-CM

## 2019-07-18 DIAGNOSIS — Z98.890 OTHER SPECIFIED POSTPROCEDURAL STATES: Chronic | ICD-10-CM

## 2019-07-18 DIAGNOSIS — C25.9 MALIGNANT NEOPLASM OF PANCREAS, UNSPECIFIED: ICD-10-CM

## 2019-07-18 DIAGNOSIS — D68.51 ACTIVATED PROTEIN C RESISTANCE: ICD-10-CM

## 2019-07-18 DIAGNOSIS — R60.0 LOCALIZED EDEMA: ICD-10-CM

## 2019-07-18 DIAGNOSIS — Z95.828 PRESENCE OF OTHER VASCULAR IMPLANTS AND GRAFTS: Chronic | ICD-10-CM

## 2019-07-18 LAB
BASOPHILS # BLD AUTO: 0.1 K/UL — SIGNIFICANT CHANGE UP (ref 0–0.2)
BASOPHILS NFR BLD AUTO: 0.7 % — SIGNIFICANT CHANGE UP (ref 0–2)
EOSINOPHIL # BLD AUTO: 0 K/UL — SIGNIFICANT CHANGE UP (ref 0–0.5)
EOSINOPHIL NFR BLD AUTO: 0.4 % — SIGNIFICANT CHANGE UP (ref 0–6)
HCT VFR BLD CALC: 35.2 % — LOW (ref 39–50)
HGB BLD-MCNC: 11.4 G/DL — LOW (ref 13–17)
LYMPHOCYTES # BLD AUTO: 0.3 K/UL — LOW (ref 1–3.3)
LYMPHOCYTES # BLD AUTO: 3.7 % — LOW (ref 13–44)
MCHC RBC-ENTMCNC: 32.3 G/DL — SIGNIFICANT CHANGE UP (ref 32–36)
MCHC RBC-ENTMCNC: 32.8 PG — SIGNIFICANT CHANGE UP (ref 27–34)
MCV RBC AUTO: 101.4 FL — HIGH (ref 80–100)
MONOCYTES # BLD AUTO: 0.7 K/UL — SIGNIFICANT CHANGE UP (ref 0–0.9)
MONOCYTES NFR BLD AUTO: 9.7 % — SIGNIFICANT CHANGE UP (ref 2–14)
NEUTROPHILS # BLD AUTO: 6 K/UL — SIGNIFICANT CHANGE UP (ref 1.8–7.4)
NEUTROPHILS NFR BLD AUTO: 85.5 % — HIGH (ref 43–77)
PLATELET # BLD AUTO: 292 K/UL — SIGNIFICANT CHANGE UP (ref 150–400)
RBC # BLD: 3.47 M/UL — LOW (ref 4.2–5.8)
RBC # FLD: 15 % — HIGH (ref 10.3–14.5)
WBC # BLD: 7 K/UL — SIGNIFICANT CHANGE UP (ref 3.8–10.5)
WBC # FLD AUTO: 7 K/UL — SIGNIFICANT CHANGE UP (ref 3.8–10.5)

## 2019-07-18 PROCEDURE — 99214 OFFICE O/P EST MOD 30 MIN: CPT

## 2019-07-18 RX ORDER — OXYCODONE 5 MG/1
5 TABLET ORAL
Qty: 90 | Refills: 0 | Status: ACTIVE | COMMUNITY
Start: 2018-07-24 | End: 1900-01-01

## 2019-07-19 ENCOUNTER — EMERGENCY (EMERGENCY)
Facility: HOSPITAL | Age: 64
LOS: 1 days | Discharge: HOME | End: 2019-07-19
Attending: EMERGENCY MEDICINE
Payer: COMMERCIAL

## 2019-07-19 VITALS
HEIGHT: 65 IN | RESPIRATION RATE: 18 BRPM | TEMPERATURE: 98 F | WEIGHT: 134.04 LBS | DIASTOLIC BLOOD PRESSURE: 80 MMHG | SYSTOLIC BLOOD PRESSURE: 113 MMHG | OXYGEN SATURATION: 99 % | HEART RATE: 73 BPM

## 2019-07-19 DIAGNOSIS — K90.81 WHIPPLE'S DISEASE: Chronic | ICD-10-CM

## 2019-07-19 DIAGNOSIS — Z98.890 OTHER SPECIFIED POSTPROCEDURAL STATES: Chronic | ICD-10-CM

## 2019-07-19 DIAGNOSIS — Z95.828 PRESENCE OF OTHER VASCULAR IMPLANTS AND GRAFTS: Chronic | ICD-10-CM

## 2019-07-19 PROCEDURE — 99282 EMERGENCY DEPT VISIT SF MDM: CPT

## 2019-07-19 NOTE — ED ADULT NURSE NOTE - OBJECTIVE STATEMENT
pt reports picc line to left upper extremity is leaking. even and unlabored resps present, AOX3, no other complaints. skin warm dry and intact.

## 2019-07-19 NOTE — ED STATDOCS - OBJECTIVE STATEMENT
62 y/o M pt with significant PMHx of GERD and Pancreatic cancer, presents to the ED with wife c/o complications with the access device on L arm, onset today. He reports that he was in septic shock last week and was BIBA and stayed for 5-6 days. Pt had an E. coli infection and was in ICU for several days. He is currently on chemo for his Pancreatic cancer, but has since stopped it because of the infection. Denies fever. No further acute complaints at this time.

## 2019-07-19 NOTE — ED ADULT NURSE NOTE - NSIMPLEMENTINTERV_GEN_ALL_ED
Rx for novolog sent to his local pharmacy   Implemented All Universal Safety Interventions:  [place] to call system. Call bell, personal items and telephone within reach. Instruct patient to call for assistance. Room bathroom lighting operational. Non-slip footwear when patient is off stretcher. Physically safe environment: no spills, clutter or unnecessary equipment. Stretcher in lowest position, wheels locked, appropriate side rails in place.

## 2019-07-19 NOTE — ED STATDOCS - CLINICAL SUMMARY MEDICAL DECISION MAKING FREE TEXT BOX
Patient at bedside, bandage taken down. line was flushed. Repositioned, cleaned, and bandaged again. Line flushing well with no problems. pt with home abx infusing. Will infuse here to ensure no further complications. If all is well, can be discharged home.

## 2019-07-19 NOTE — ED ADULT NURSE REASSESSMENT NOTE - NS ED NURSE REASSESS COMMENT FT1
pt midline catheter flushed by ER MD, dressing changed and personal ABX infusing at this time. no noted discomfort no swelling and no leaking around site.

## 2019-07-22 ENCOUNTER — LABORATORY RESULT (OUTPATIENT)
Age: 64
End: 2019-07-22

## 2019-07-22 ENCOUNTER — APPOINTMENT (OUTPATIENT)
Dept: HEMATOLOGY ONCOLOGY | Facility: CLINIC | Age: 64
End: 2019-07-22

## 2019-07-22 ENCOUNTER — APPOINTMENT (OUTPATIENT)
Age: 64
End: 2019-07-22

## 2019-07-22 PROBLEM — R60.0 FLUID RETENTION IN LEGS: Status: ACTIVE | Noted: 2019-02-15

## 2019-07-22 LAB
ALBUMIN SERPL ELPH-MCNC: 3 G/DL
ALP BLD-CCNC: 716 U/L
ALT SERPL-CCNC: 33 U/L
AST SERPL-CCNC: 57 U/L
BILIRUB DIRECT SERPL-MCNC: 0.2 MG/DL
BILIRUB INDIRECT SERPL-MCNC: 0.3 MG/DL
BILIRUB SERPL-MCNC: 0.5 MG/DL
PROT SERPL-MCNC: 5.8 G/DL

## 2019-07-24 ENCOUNTER — APPOINTMENT (OUTPATIENT)
Dept: INTERNAL MEDICINE | Facility: CLINIC | Age: 64
End: 2019-07-24
Payer: COMMERCIAL

## 2019-07-24 ENCOUNTER — RX RENEWAL (OUTPATIENT)
Age: 64
End: 2019-07-24

## 2019-07-24 VITALS
HEIGHT: 65 IN | WEIGHT: 131 LBS | DIASTOLIC BLOOD PRESSURE: 71 MMHG | BODY MASS INDEX: 21.83 KG/M2 | HEART RATE: 90 BPM | SYSTOLIC BLOOD PRESSURE: 103 MMHG | RESPIRATION RATE: 16 BRPM | TEMPERATURE: 97.8 F

## 2019-07-24 DIAGNOSIS — R78.81 BACTEREMIA: ICD-10-CM

## 2019-07-24 PROBLEM — E87.6 HYPOKALEMIA: Status: ACTIVE | Noted: 2018-12-13

## 2019-07-24 PROCEDURE — 99213 OFFICE O/P EST LOW 20 MIN: CPT

## 2019-07-24 NOTE — PHYSICAL EXAM
[General Appearance - In No Acute Distress] : in no acute distress [General Appearance - Alert] : alert [Sclera] : the sclera and conjunctiva were normal [Extraocular Movements] : extraocular movements were intact [PERRL With Normal Accommodation] : pupils were equal in size, round, reactive to light [Neck Appearance] : the appearance of the neck was normal [Outer Ear] : the ears and nose were normal in appearance [Oropharynx] : the oropharynx was normal with no thrush [Jugular Venous Distention Increased] : there was no jugular-venous distention [Neck Cervical Mass (___cm)] : no neck mass was observed [Thyroid Diffuse Enlargement] : the thyroid was not enlarged [Auscultation Breath Sounds / Voice Sounds] : lungs were clear to auscultation bilaterally [Heart Rate And Rhythm] : heart rate was normal and rhythm regular [Murmurs] : no murmurs [Heart Sounds Gallop] : no gallops [Heart Sounds] : normal S1 and S2 [Full Pulse] : the pedal pulses are present [Edema] : there was no peripheral edema [Heart Sounds Pericardial Friction Rub] : no pericardial rub [No Palpable Adenopathy] : no palpable adenopathy [FreeTextEntry1] : Bilateral 2+ leg edema  [Skin Color & Pigmentation] : normal skin color and pigmentation [Musculoskeletal - Swelling] : no joint swelling [No Focal Deficits] : no focal deficits [] : no rash [Oriented To Time, Place, And Person] : oriented to person, place, and time

## 2019-07-24 NOTE — HISTORY OF PRESENT ILLNESS
[FreeTextEntry1] : 62 y/o man with PMH of Pancreatic cancer s/p whipple in 11/17 with positive\par margins on 3rd line chemotherapy was admitted to [facility] earlier this [facility] with fever, chills and weakness\par on 7/5, he was in septic shock so went to MICU and after stabilization,\par transferred to medical floor on 7/7. He had diarrhea for few months for which\par had CT showing colitis, work up in [facility] showed Ecoli bacteremia 2' to mucositis/colitis.\par - blood culture 7/5 with 2 different type of Ecoli, one is ESBL\par - Repeat blood cultures negative from 7/8\par Was started on Ertapenem 1gm daily Last day would 7/23\par No new complaint. no fever. Still has abdominal discomfort, eating food but also has soft stool. No diarrhea. Has appt with GI.

## 2019-07-24 NOTE — ASSESSMENT
[FreeTextEntry1] : Has been completed 2 weeks ertapenem after his first negative blood culture. Midline from left arm was removed with no bleeding or complication. \par No more antibiotics. will follow up with GI and oncologist. \par Source of infection was colitis and mucositis, repeat culture neg so port can be used with no problem. \par If any fever or worsening of abdominal pain or diarrhea will go to ED.  [Anticipatory Guidance] : anticipatory guidance

## 2019-07-24 NOTE — REVIEW OF SYSTEMS
[Feeling Tired] : feeling tired [FreeTextEntry7] : abdominal discomfort and soft stool  [FreeTextEntry2] : feels weakness  [Negative] : Heme/Lymph

## 2019-07-26 ENCOUNTER — APPOINTMENT (OUTPATIENT)
Dept: GASTROENTEROLOGY | Facility: CLINIC | Age: 64
End: 2019-07-26
Payer: COMMERCIAL

## 2019-07-26 VITALS
BODY MASS INDEX: 21.66 KG/M2 | HEIGHT: 65 IN | DIASTOLIC BLOOD PRESSURE: 70 MMHG | HEART RATE: 85 BPM | SYSTOLIC BLOOD PRESSURE: 110 MMHG | WEIGHT: 130 LBS

## 2019-07-26 DIAGNOSIS — K52.9 NONINFECTIVE GASTROENTERITIS AND COLITIS, UNSPECIFIED: ICD-10-CM

## 2019-07-26 DIAGNOSIS — R10.9 UNSPECIFIED ABDOMINAL PAIN: ICD-10-CM

## 2019-07-26 PROCEDURE — 99214 OFFICE O/P EST MOD 30 MIN: CPT

## 2019-07-26 NOTE — ASSESSMENT
[FreeTextEntry1] : I am recommending to start probiotic therapy and continue the dicyclomine. I also recommended to continue the pancreatic enzyme supplementation. He will be following up with hematology to consider the anticancer medications.

## 2019-07-26 NOTE — HISTORY OF PRESENT ILLNESS
[de-identified] : The patient arrived for a followup. He has been evaluated in the past due to history of metastatic pancreatic adenocarcinoma. He was having upper GI symptoms requiring EGD and then subsequently at colonoscopy. There was no evidence of any colitis on the biopsies. He was on chemotherapy which was changed. He was admitted with Escherichia coli septicemia requiring antibiotics. He just finished the antibiotics. PICC line has been removed. He is having some loose stools lately. No jb bleeding.

## 2019-07-26 NOTE — PHYSICAL EXAM
[General Appearance - Alert] : alert [General Appearance - In No Acute Distress] : in no acute distress [PERRL With Normal Accommodation] : pupils were equal in size, round, and reactive to light [Sclera] : the sclera and conjunctiva were normal [Extraocular Movements] : extraocular movements were intact [Oropharynx] : the oropharynx was normal [Outer Ear] : the ears and nose were normal in appearance [Neck Appearance] : the appearance of the neck was normal [Neck Cervical Mass (___cm)] : no neck mass was observed [Jugular Venous Distention Increased] : there was no jugular-venous distention [Thyroid Diffuse Enlargement] : the thyroid was not enlarged [Thyroid Nodule] : there were no palpable thyroid nodules [Auscultation Breath Sounds / Voice Sounds] : lungs were clear to auscultation bilaterally [Heart Sounds Gallop] : no gallops [Heart Sounds] : normal S1 and S2 [Heart Rate And Rhythm] : heart rate was normal and rhythm regular [Murmurs] : no murmurs [Heart Sounds Pericardial Friction Rub] : no pericardial rub [Full Pulse] : the pedal pulses are present [Edema] : there was no peripheral edema [Bowel Sounds] : normal bowel sounds [Abdomen Tenderness] : non-tender [Abdomen Soft] : soft [Abdomen Mass (___ Cm)] : no abdominal mass palpated [Cervical Lymph Nodes Enlarged Posterior Bilaterally] : posterior cervical [Cervical Lymph Nodes Enlarged Anterior Bilaterally] : anterior cervical [Femoral Lymph Nodes Enlarged Bilaterally] : femoral [Inguinal Lymph Nodes Enlarged Bilaterally] : inguinal [Axillary Lymph Nodes Enlarged Bilaterally] : axillary [Supraclavicular Lymph Nodes Enlarged Bilaterally] : supraclavicular [No CVA Tenderness] : no ~M costovertebral angle tenderness [No Spinal Tenderness] : no spinal tenderness [Nail Clubbing] : no clubbing  or cyanosis of the fingernails [Abnormal Walk] : normal gait [Musculoskeletal - Swelling] : no joint swelling seen [Motor Tone] : muscle strength and tone were normal [Skin Color & Pigmentation] : normal skin color and pigmentation [Skin Turgor] : normal skin turgor [] : no rash [No Focal Deficits] : no focal deficits [Oriented To Time, Place, And Person] : oriented to person, place, and time [Impaired Insight] : insight and judgment were intact [Affect] : the affect was normal [FreeTextEntry1] : Laparotomy scar-well healed

## 2019-07-29 ENCOUNTER — LABORATORY RESULT (OUTPATIENT)
Age: 64
End: 2019-07-29

## 2019-07-29 ENCOUNTER — RESULT REVIEW (OUTPATIENT)
Age: 64
End: 2019-07-29

## 2019-07-29 ENCOUNTER — APPOINTMENT (OUTPATIENT)
Dept: HEMATOLOGY ONCOLOGY | Facility: CLINIC | Age: 64
End: 2019-07-29
Payer: COMMERCIAL

## 2019-07-29 ENCOUNTER — APPOINTMENT (OUTPATIENT)
Age: 64
End: 2019-07-29

## 2019-07-29 VITALS
BODY MASS INDEX: 21.83 KG/M2 | SYSTOLIC BLOOD PRESSURE: 118 MMHG | DIASTOLIC BLOOD PRESSURE: 79 MMHG | HEART RATE: 102 BPM | OXYGEN SATURATION: 96 % | HEIGHT: 65 IN | WEIGHT: 131.01 LBS | TEMPERATURE: 98.3 F

## 2019-07-29 LAB
BASOPHILS # BLD AUTO: 0 K/UL — SIGNIFICANT CHANGE UP (ref 0–0.2)
BASOPHILS NFR BLD AUTO: 0.6 % — SIGNIFICANT CHANGE UP (ref 0–2)
EOSINOPHIL # BLD AUTO: 0.1 K/UL — SIGNIFICANT CHANGE UP (ref 0–0.5)
EOSINOPHIL NFR BLD AUTO: 1.7 % — SIGNIFICANT CHANGE UP (ref 0–6)
HCT VFR BLD CALC: 34.1 % — LOW (ref 39–50)
HGB BLD-MCNC: 11.4 G/DL — LOW (ref 13–17)
LYMPHOCYTES # BLD AUTO: 0.4 K/UL — LOW (ref 1–3.3)
LYMPHOCYTES # BLD AUTO: 6.4 % — LOW (ref 13–44)
MCHC RBC-ENTMCNC: 33.1 PG — SIGNIFICANT CHANGE UP (ref 27–34)
MCHC RBC-ENTMCNC: 33.4 G/DL — SIGNIFICANT CHANGE UP (ref 32–36)
MCV RBC AUTO: 99.2 FL — SIGNIFICANT CHANGE UP (ref 80–100)
MONOCYTES # BLD AUTO: 0.7 K/UL — SIGNIFICANT CHANGE UP (ref 0–0.9)
MONOCYTES NFR BLD AUTO: 11.8 % — SIGNIFICANT CHANGE UP (ref 2–14)
NEUTROPHILS # BLD AUTO: 4.5 K/UL — SIGNIFICANT CHANGE UP (ref 1.8–7.4)
NEUTROPHILS NFR BLD AUTO: 79.5 % — HIGH (ref 43–77)
PLATELET # BLD AUTO: 387 K/UL — SIGNIFICANT CHANGE UP (ref 150–400)
RBC # BLD: 3.44 M/UL — LOW (ref 4.2–5.8)
RBC # FLD: 14.3 % — SIGNIFICANT CHANGE UP (ref 10.3–14.5)
WBC # BLD: 5.6 K/UL — SIGNIFICANT CHANGE UP (ref 3.8–10.5)
WBC # FLD AUTO: 5.6 K/UL — SIGNIFICANT CHANGE UP (ref 3.8–10.5)

## 2019-07-29 PROCEDURE — 99214 OFFICE O/P EST MOD 30 MIN: CPT

## 2019-07-29 RX ORDER — DICYCLOMINE HYDROCHLORIDE 10 MG/1
10 CAPSULE ORAL 3 TIMES DAILY
Qty: 90 | Refills: 3 | Status: DISCONTINUED | COMMUNITY
Start: 2018-08-20 | End: 2019-07-29

## 2019-07-31 NOTE — RESULTS/DATA
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 If concern for \par acute deep vein thrombosis persists, consider follow-up evaluation in 5-7 \par days.\par ------------------------\par XRAY Lumbar Spine 7/10/19\par IMPRESSION:   \par Unremarkable radiographs of the lumbar spine. \par

## 2019-07-31 NOTE — ASSESSMENT
[FreeTextEntry1] : 64 y/o gentleman with Factor V Leiden mutation and stage IIB pancreatic cancer (T3N1) with positive CBD margin with peripancreatic soft tissue invasion, s/p Whipple on 11/30/17.  S/p 7 cycles of gemcitabine + Xeloda.  Post chemotherapy scans, 7/2/18 CT scan with local recurrence. Completed chemoradiation with Xeloda from 7/24/18 - 8/30/18.  Foundation One testing with no actionable mutations, MSI-stable. \par Received 2nd line liposomal irinotecan/ Leucovorin/continuous infusion 5 FU from 10/16/18 - 5/2019.\par Switched to Sutent 7/2/19\par \par 6/10/19 CT scan with no overt progression of disease, but new mild perisplenic ascites, however his  continues to trend up. Findings are worrisome for POD but not yet radiographically evident, possibly peritoneal disease. Recommended Sutent as per Rowena et.al. trial. He began Sutent 37.5 mg daily on 6/25/19, but question actual start date 7/02/19.\par \par Admitted to [facility] 7/05 - 7/10/19 w/ bacteremia sent to MICU on 7/5 with septic shock. He required vasopressor therapy after failed fluid resuscitation. Blood cultures showed e. Coli bacteremia. Discharged on IV antibx via PICC line, to continue through 7/23/19. Has f/u w/ ID on 7/24/19. Discussed w/ Dr. Vela \par \par Plan:\par 1. Hold Sutent while on antibx. \par 2. F/u w/ ID\par 3. LE edema: increase furosemide by alternating 1 tab/2 tabs every other day. For two tab day, increase Klor-Con to 2 tabs. Advised increase in protein as low Albumin is a contributer to LE edema.\par 4. Request ID to weigh in if PORT can be used after antibx.\par 5. RTO w/ Dr. Vela on 7/29/19.

## 2019-07-31 NOTE — PHYSICAL EXAM
[Thin] : thin [Normal] : pharynx is unremarkable, moist mucus membrane, no oral lesions [de-identified] : Appears thinner, interactive, well groomed, in NAD [de-identified] : negative cervical supra/infraclavicular adenopathy [de-identified] : S1/S2  [de-identified] : slightly diminished in bases, not dull to percussion. [de-identified] : moderate bilateral LE edema [de-identified] : BS+, soft, nontender, not distended  [de-identified] : without spinal or CVA tenderness.

## 2019-07-31 NOTE — HISTORY OF PRESENT ILLNESS
[de-identified] : Mr. Galaviz was diagnosed with pancreatic cancer in October 2017, at age 62 year old, after initially presenting with painless jaundice. EUS and ERCP on 10/31/17 revealed a 2.2 cm pancreatic head mass, with no apparent vessel involvement or lymphadenopathy. The CBD was stented. Final pathology was consistent with adenocarcinoma. EUS and ERCP on 10/31/17, which revealed a 2.2 cm pancreatic head mass, with no apparent vessel involvement or lymphadenopathy. The CBD was stented. Final pathology was consistent with adenocarcinoma. S/p Whipple, cholecystectomy, enterectomy x 2 and removal of CBD stent on 11/30/17. Final pathology was 3.1 cm pancreatic adenocarcinoma invading the nadira pancreatic tissue and CBD, and involving 2/26 lymph nodes (T3N1). The CBD resection margin was positive for adenocarcinoma with extensive nadira neural invasion. Mediport was placed on 1/17/18. \par - CT C/A/P 1/23/18: IMPRESSION: \par   No evidence of recurrent or metastatic disease in the chest, abdomen, or pelvis. \par   Stable postsurgical anatomy status post Whipple procedure. No evidence of bowel or gastric outlet obstruction. Pancreatic duct stent in place without \par   pancreatic duct or biliary dilatation. \par   2 tiny foci of extraluminal air in the right upper quadrant along a prior MUNA drain tract. This is likely secondary to recent drain removal. No drainable fluid collections. \par   Small amount of nonocclusive thrombus at the tip of the port catheter within the right brachiocephalic vein. \par \par 4/16/19 CT C/A/P: \par -No evidence of metastatic disease in the thorax\par -Stable disease with soft tissue surrounding the proximal mesenteric artery and interposed between the celiac axis and portal vein\par -Previously identified colitis has resolved. \par \par Treatment:\par 2/7/18 - 6/13/18 adjuvant chemotherapy with gemcitabine + capecitabine (7 cycles)\par 4/12/18 CT C/A/P: no evidence of recurrent or metastatic disease.\par 7/2/18 CT a/p  : new soft tissue inferior to proximal superior mesenteric artery and to right of celiac axis in patient s/p whipple, suspicious for recurrent disease. \par 7/24/18-8/30/18  chemo-radiation with xeloda\par 9/20/18 CT A/P progression of disease in upper abdomen.\par 10/16/18 - 5/2019 tx changed to liposomal irinotecan (70 mg/m2) w/ leucovorin 400 mg/m2 and 5FU 2400 mg/m2 over 46 hrs) every 2 weeks.\par 6/20/19 tx changed to Sutent (started on ~ 7/02/19).\par \par  [de-identified] : Final Diagnosis\par 1. Lymph node at hepatic artery, excision:- One lymph node, negative for carcinoma, (0/1)\par 2. Periportal lymph node, excision:- One lymph node, negative for carcinoma, (0/1)\par 3. Gallbladder, cholecystectomy:- Chronic cholecystitis, negative for carcinoma\par - One lymph node, negative for carcinoma, (0/1)\par 4. Periportal lymph node, #2, excision:- One lymph node, negative for carcinoma, (0/1)\par 5. Pancreas, stomach and duodenum, pancreaticoduodenectomy(Whipple procedure):\par - Invasive adenocarcinoma of pancreas, moderately to poorly\par differentiated,pancreatobiliary type\par - Tumor invading into peripancreatic soft tissue and common bile duct\par - Comment bile duct resection margin, positive for adenocarcinoma, with\par extensive perineural invasion only, see comment- Pancreatic neck resection margin, negative for carcinoma\par - 18 lymph nodes, negative for carcinoma, (0/18)\par - AJCC (7th Ed) staging: pT3N1(2/26), see synoptic report,\par see Part 7 and Part 9\par 6. Portion of pancreas at portal vein #1, excision:- Atrophic pancreatitis, negative for carcinoma\par 7. Portion of pancreas at portal vein #2, excision:- Positive for adenocarcinoma, see comment\par 8. Portion of stent, removal:- Stent, gross examination only\par 9. Uncinate at superior mesenteric artery #1, excision:- Metastatic pancreatic adenocarcinoma present in 2 of 2 lymph nodes, (2/2)\par 10. Uncinate at superior mesenteric artery #2, excision:- Two lymph nodes, negative for carcinoma, (0/2)\par 11. Stomach and bowel at gastrojejunostomy site, excision:- Gastric and small bowel tissue, negative for carcinoma\par  [de-identified] : Since last seen was admitted to [facility] 7/05 - 7/10/19 w/ bacteremia (see hospital course below).  He has been off Sutent since hospital admission and is receiving IV home antibx. He was seen by his PCP today and has f/u in 3 months. Since home he continues to receive once daily antibx, no fevers,  no SOB, but feels w/ deep breath has pain in R mid back. no CP, appetite is good, his wgt is up ~ 10 lbs. no constipation, ++ yesterday, six water movements, only one today. only taking Imodium twice a day. No pain/burning w/ urination. No LE edema. Energy level is fair. The remainder of ROS is negative.\par \par Hospital Course: 63 yr old male with PMH Asthma, Factor V Leiden deficiency, Pancreatic Cancer s/p Whipple surgery in Nov 2017, s/p radiation, on 3rd line chemotherapy started on 7/2 after failed prior 2 chemo regimen was admitted to MICU on 7/5 with septic shock. He required vasopressor therapy after failed fluid resuscitation. Leucopenia, up-trending lactate and was persistently hypotensive. He was started on empiric antibiotics for possible pneumonia with CXR s/o Right medial basal consolidation. Was started on po midodrine on 7/6 and weaned off vasopressors since 7/6. Blood cultures showed e. Coli bacteremia, and CT abd in Jun 2019 showed diffuse colitis with nadira-splenic and hepatic, moderate pelvic ascites. GI and ID consulted. Blood cultures resulted as esbl e coli and continued on iv abx. Patient switched o iv invanz and will have midline placed for iv abx until 7.13. of note Patient also developed herpetic lesions and will be sent home on po acyclovir.  admitted to MICU on 7/5 with septic shock. He required vasopressor therapy after failed fluid resuscitation. Leucopenia, up-trending lactate and was persistently hypotensive. He was started on empiric antibiotics for possible pneumonia with CXR s/o Right medial basal consolidation. Was started on po midodrine on 7/6 and weaned off vasopressors since 7/6. Blood cultures showed e. Coli bacteremia, and CT abd in Jun 2019 showed diffuse colitis with nadira-splenic and hepatic, moderate pelvic ascites. GI and ID consulted. Blood cultures resulted as esbl e coli and continued on iv abx.\par patient is now stable for dc home with home care and advised to follow up with oncology and ID. high risk of readmission due to multiple comorbidtiies\par

## 2019-08-05 ENCOUNTER — RX RENEWAL (OUTPATIENT)
Age: 64
End: 2019-08-05

## 2019-08-05 RX ORDER — POTASSIUM CHLORIDE 1500 MG/1
20 TABLET, EXTENDED RELEASE ORAL
Qty: 45 | Refills: 1 | Status: ACTIVE | COMMUNITY
Start: 2018-11-01 | End: 1900-01-01

## 2019-08-05 RX ORDER — FUROSEMIDE 20 MG/1
20 TABLET ORAL
Qty: 45 | Refills: 1 | Status: ACTIVE | COMMUNITY
Start: 2019-02-15 | End: 1900-01-01

## 2019-08-15 ENCOUNTER — RX RENEWAL (OUTPATIENT)
Age: 64
End: 2019-08-15

## 2019-08-22 ENCOUNTER — OUTPATIENT (OUTPATIENT)
Dept: OUTPATIENT SERVICES | Facility: HOSPITAL | Age: 64
LOS: 1 days | Discharge: HOME | End: 2019-08-22

## 2019-08-22 DIAGNOSIS — Z95.828 PRESENCE OF OTHER VASCULAR IMPLANTS AND GRAFTS: Chronic | ICD-10-CM

## 2019-08-22 DIAGNOSIS — Z98.890 OTHER SPECIFIED POSTPROCEDURAL STATES: Chronic | ICD-10-CM

## 2019-08-22 DIAGNOSIS — C25.9 MALIGNANT NEOPLASM OF PANCREAS, UNSPECIFIED: ICD-10-CM

## 2019-08-22 DIAGNOSIS — D68.51 ACTIVATED PROTEIN C RESISTANCE: ICD-10-CM

## 2019-08-22 DIAGNOSIS — K90.81 WHIPPLE'S DISEASE: Chronic | ICD-10-CM

## 2019-08-23 ENCOUNTER — RESULT REVIEW (OUTPATIENT)
Age: 64
End: 2019-08-23

## 2019-08-23 ENCOUNTER — LABORATORY RESULT (OUTPATIENT)
Age: 64
End: 2019-08-23

## 2019-08-23 ENCOUNTER — APPOINTMENT (OUTPATIENT)
Dept: HEMATOLOGY ONCOLOGY | Facility: CLINIC | Age: 64
End: 2019-08-23
Payer: COMMERCIAL

## 2019-08-23 ENCOUNTER — APPOINTMENT (OUTPATIENT)
Age: 64
End: 2019-08-23

## 2019-08-23 DIAGNOSIS — L89.152 PRESSURE ULCER OF SACRAL REGION, STAGE 2: ICD-10-CM

## 2019-08-23 PROCEDURE — 99214 OFFICE O/P EST MOD 30 MIN: CPT

## 2019-08-26 ENCOUNTER — APPOINTMENT (OUTPATIENT)
Dept: HEMATOLOGY ONCOLOGY | Facility: CLINIC | Age: 64
End: 2019-08-26

## 2019-08-26 ENCOUNTER — APPOINTMENT (OUTPATIENT)
Age: 64
End: 2019-08-26

## 2019-08-26 DIAGNOSIS — E86.0 DEHYDRATION: ICD-10-CM

## 2019-08-27 ENCOUNTER — RESULT REVIEW (OUTPATIENT)
Age: 64
End: 2019-08-27

## 2019-08-27 ENCOUNTER — APPOINTMENT (OUTPATIENT)
Dept: HEMATOLOGY ONCOLOGY | Facility: CLINIC | Age: 64
End: 2019-08-27
Payer: COMMERCIAL

## 2019-08-27 VITALS
TEMPERATURE: 98.1 F | SYSTOLIC BLOOD PRESSURE: 127 MMHG | HEIGHT: 65 IN | HEART RATE: 76 BPM | OXYGEN SATURATION: 95 % | WEIGHT: 140 LBS | BODY MASS INDEX: 23.32 KG/M2 | DIASTOLIC BLOOD PRESSURE: 84 MMHG

## 2019-08-27 LAB
BASOPHILS # BLD AUTO: 0 K/UL — SIGNIFICANT CHANGE UP (ref 0–0.2)
BASOPHILS NFR BLD AUTO: 0.6 % — SIGNIFICANT CHANGE UP (ref 0–2)
EOSINOPHIL # BLD AUTO: 0.1 K/UL — SIGNIFICANT CHANGE UP (ref 0–0.5)
EOSINOPHIL NFR BLD AUTO: 2.5 % — SIGNIFICANT CHANGE UP (ref 0–6)
HCT VFR BLD CALC: 37.3 % — LOW (ref 39–50)
HGB BLD-MCNC: 13.3 G/DL — SIGNIFICANT CHANGE UP (ref 13–17)
LYMPHOCYTES # BLD AUTO: 0.3 K/UL — LOW (ref 1–3.3)
LYMPHOCYTES # BLD AUTO: 12.8 % — LOW (ref 13–44)
MCHC RBC-ENTMCNC: 34.6 PG — HIGH (ref 27–34)
MCHC RBC-ENTMCNC: 35.6 G/DL — SIGNIFICANT CHANGE UP (ref 32–36)
MCV RBC AUTO: 97.3 FL — SIGNIFICANT CHANGE UP (ref 80–100)
MONOCYTES # BLD AUTO: 0.3 K/UL — SIGNIFICANT CHANGE UP (ref 0–0.9)
MONOCYTES NFR BLD AUTO: 12.2 % — SIGNIFICANT CHANGE UP (ref 2–14)
NEUTROPHILS # BLD AUTO: 1.7 K/UL — LOW (ref 1.8–7.4)
NEUTROPHILS NFR BLD AUTO: 71.9 % — SIGNIFICANT CHANGE UP (ref 43–77)
PLATELET # BLD AUTO: 224 K/UL — SIGNIFICANT CHANGE UP (ref 150–400)
RBC # BLD: 3.84 M/UL — LOW (ref 4.2–5.8)
RBC # FLD: 15 % — HIGH (ref 10.3–14.5)
WBC # BLD: 2.4 K/UL — LOW (ref 3.8–10.5)
WBC # FLD AUTO: 2.4 K/UL — LOW (ref 3.8–10.5)

## 2019-08-27 PROCEDURE — 99213 OFFICE O/P EST LOW 20 MIN: CPT

## 2019-08-29 NOTE — PHYSICAL EXAM
[Thin] : thin [Normal] : affect appropriate [Capable of only limited self care, confined to bed or chair more than 50% of waking hours] : Status 3- Capable of only limited self care, confined to bed or chair more than 50% of waking hours [de-identified] : Sallow appearance to skin. ++ Cachexia, frail appearance, in NAD. [de-identified] : sclera non icteric [de-identified] : + xerostomia, + chelitis on L, no ulceration or white patches. [de-identified] : negative cervical, supra/infraclavicular adenopathy. [de-identified] : slightly diminished in bases, not dull to percussion. [de-identified] : S1/S2 sl tachy rate [de-identified] : moderate bilateral LE edema (not new) [de-identified] : BS+, soft, nontender, + ventral hernia- appears larger, soft- not nodular. [de-identified] : without spinal or CVA tenderness. [de-identified] : Coccyx- erythemic, w/ area of broken skin (shallow) and skin sloughing, no drainage noted.

## 2019-08-29 NOTE — REASON FOR VISIT
[Other: _____] : [unfilled] [Urgent Visit] : an urgent  [Family Member] : family member [FreeTextEntry2] : pancreatic cancer

## 2019-08-29 NOTE — ASSESSMENT
[FreeTextEntry1] : 64 y/o gentleman with Factor V Leiden mutation and stage IIB pancreatic cancer (T3N1) with positive CBD margin with peripancreatic soft tissue invasion, s/p Whipple on 11/30/17.  S/p 7 cycles of gemcitabine + Xeloda.  Post chemotherapy scans, 7/2/18 CT scan with local recurrence. Completed chemoradiation with Xeloda from 7/24/18 - 8/30/18.  Foundation One testing with no actionable mutations, MSI-stable. \par 9/20/18 restaging CT scans with POD in upper abdomen and possible colitis. Started liposomal irinotecan 70 mg/m2 w/ Leucovorin 400 mg/m2 and continuous infusion 5 FU 2400 mg/m2 over 46 hrs every 2 weeks on 10/16/18.\par \par 6/10/19 CT scan with no overt progression of disease, but new mild perisplenic ascites, however his  continues to trend up. Findings are worrisome for POD but not yet radiographically evident, possibly peritoneal disease. Recommended Sutent as per Rowena et.al. trial. Began Sutent 37.5 mg daily on 6/25/19, but question actual start date 7/02/19.  Admitted to [facility] 7/05 - 7/10/19 w/ bacteremia sent to MICU on 7/5 with septic shock. He required vasopressor therapy after failed fluid resuscitation. Blood cultures showed e. Coli bacteremia. Discharged on IV antibx via PICC line, to continue through 7/23/19. Has f/u w/ ID on 7/24/19. Per Dr. Ramos,  completed 2 weeks ertapenem after first negative blood culture. Midline from left arm was removed with no bleeding or complication. Source of infection was colitis and mucositis, repeat culture neg so port can be used with no problem. Placed on probiotic therapy and continue dicyclomine, per GI. Restarted Sutent on 7/29/19.\par  \par Self stopped Sutent 5 days ago (~ day 21) due to severe mouth pain, and painful hands. WBC today 2.7, ANC 2.0, Hgb 14.2, PLT 201K. V/S stable. Very frail, cachectic appearance. Grade 3 royal erythema, Plantar grade 1. Grade 2 Decubitus Ulcer to coccyx. His fiance reports he did not want to notify the team as he had f/u on 8/26.\par \par Plan:\par 1. Hold Sutent until LFT's known on 8/26/19. Question need for dose reduction based on PPE and mucositis.\par 2. Grade 2 DU on coccyx, foam cushion applied and discussed at length the need to turn and reposition in bed or chair. Apply bacitracin.\par 3. Call or come in sooner for any concerns.

## 2019-08-29 NOTE — HISTORY OF PRESENT ILLNESS
[T: ___] : T[unfilled] [Disease: _____________________] : Disease: [unfilled] [N: ___] : N[unfilled] [AJCC Stage: ____] : AJCC Stage: [unfilled] [de-identified] : Mr. Galaviz was diagnosed with pancreatic cancer in October 2017, at age 62 year old, after initially presenting with painless jaundice. EUS and ERCP on 10/31/17 revealed a 2.2 cm pancreatic head mass, with no apparent vessel involvement or lymphadenopathy. The CBD was stented. Final pathology was consistent with adenocarcinoma. EUS and ERCP on 10/31/17, which revealed a 2.2 cm pancreatic head mass, with no apparent vessel involvement or lymphadenopathy. The CBD was stented. Final pathology was consistent with adenocarcinoma. S/p Whipple, cholecystectomy, enterectomy x 2 and removal of CBD stent on 11/30/17. Final pathology was 3.1 cm pancreatic adenocarcinoma invading the nadira pancreatic tissue and CBD, and involving 2/26 lymph nodes (T3N1). The CBD resection margin was positive for adenocarcinoma with extensive nadira neural invasion. Mediport was placed on 1/17/18. \par - CT C/A/P 1/23/18: IMPRESSION: \par   No evidence of recurrent or metastatic disease in the chest, abdomen, or pelvis. \par   Stable postsurgical anatomy status post Whipple procedure. No evidence of bowel or gastric outlet obstruction. Pancreatic duct stent in place without \par   pancreatic duct or biliary dilatation. \par   2 tiny foci of extraluminal air in the right upper quadrant along a prior MUNA drain tract. This is likely secondary to recent drain removal. No drainable fluid collections. \par   Small amount of nonocclusive thrombus at the tip of the port catheter within the right brachiocephalic vein. \par \par 4/16/19 CT C/A/P: \par -No evidence of metastatic disease in the thorax\par -Stable disease with soft tissue surrounding the proximal mesenteric artery and interposed between the celiac axis and portal vein\par -Previously identified colitis has resolved. \par \par Treatment:\par 2/7/18 - 6/13/18 adjuvant chemotherapy with gemcitabine + capecitabine (7 cycles)\par 4/12/18 CT C/A/P: no evidence of recurrent or metastatic disease.\par 7/2/18 CT a/p  : new soft tissue inferior to proximal superior mesenteric artery and to right of celiac axis in patient s/p whipple, suspicious for recurrent disease. \par 7/24/18-8/30/18  chemo-radiation with xeloda\par 9/20/18 CT A/P progression of disease in upper abdomen.\par 10/16/18 tx changed to liposomal irinotecan (70 mg/m2) w/ leucovorin 400 mg/m2 and 5FU 2400 mg/m2 over 46 hrs) every 2 weeks.\par 6/20/19 tx changed to Sutent (started on ~ 7/02/19).\par \par  [de-identified] : Final Diagnosis\par 1. Lymph node at hepatic artery, excision:- One lymph node, negative for carcinoma, (0/1)\par 2. Periportal lymph node, excision:- One lymph node, negative for carcinoma, (0/1)\par 3. Gallbladder, cholecystectomy:- Chronic cholecystitis, negative for carcinoma\par - One lymph node, negative for carcinoma, (0/1)\par 4. Periportal lymph node, #2, excision:- One lymph node, negative for carcinoma, (0/1)\par 5. Pancreas, stomach and duodenum, pancreaticoduodenectomy(Whipple procedure):\par - Invasive adenocarcinoma of pancreas, moderately to poorly\par differentiated,pancreatobiliary type\par - Tumor invading into peripancreatic soft tissue and common bile duct\par - Comment bile duct resection margin, positive for adenocarcinoma, with\par extensive perineural invasion only, see comment- Pancreatic neck resection margin, negative for carcinoma\par - 18 lymph nodes, negative for carcinoma, (0/18)\par - AJCC (7th Ed) staging: pT3N1(2/26), see synoptic report,\par see Part 7 and Part 9\par 6. Portion of pancreas at portal vein #1, excision:- Atrophic pancreatitis, negative for carcinoma\par 7. Portion of pancreas at portal vein #2, excision:- Positive for adenocarcinoma, see comment\par 8. Portion of stent, removal:- Stent, gross examination only\par 9. Uncinate at superior mesenteric artery #1, excision:- Metastatic pancreatic adenocarcinoma present in 2 of 2 lymph nodes, (2/2)\par 10. Uncinate at superior mesenteric artery #2, excision:- Two lymph nodes, negative for carcinoma, (0/2)\par 11. Stomach and bowel at gastrojejunostomy site, excision:- Gastric and small bowel tissue, negative for carcinoma\par  [de-identified] : Self stopped Sutent 5 days ago due to mouth soreness (burning), "could not eat at all", no ulcerations noted- except cracks in L corner. 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 Denies fevers, no SOB, no CP, appetite is good (but "I can't eat"). No constipation or diarrhea (chronic gas pain), no pain/burning w/ urination. Persistent LE edema, not worse, continues to take furosemide alt 2 and 1 tab, w/ potassium. Energy level is poor. The remainder of ROS is negative.\par

## 2019-09-03 ENCOUNTER — INPATIENT (INPATIENT)
Facility: HOSPITAL | Age: 64
LOS: 1 days | Discharge: HOME | DRG: 947 | End: 2019-09-05
Attending: INTERNAL MEDICINE | Admitting: HOSPITALIST
Payer: COMMERCIAL

## 2019-09-03 VITALS
DIASTOLIC BLOOD PRESSURE: 84 MMHG | SYSTOLIC BLOOD PRESSURE: 116 MMHG | WEIGHT: 145.06 LBS | HEIGHT: 65 IN | RESPIRATION RATE: 20 BRPM | TEMPERATURE: 98 F | HEART RATE: 98 BPM | OXYGEN SATURATION: 95 %

## 2019-09-03 DIAGNOSIS — E88.09 OTHER DISORDERS OF PLASMA-PROTEIN METABOLISM, NOT ELSEWHERE CLASSIFIED: ICD-10-CM

## 2019-09-03 DIAGNOSIS — R18.8 OTHER ASCITES: ICD-10-CM

## 2019-09-03 DIAGNOSIS — Z98.890 OTHER SPECIFIED POSTPROCEDURAL STATES: Chronic | ICD-10-CM

## 2019-09-03 DIAGNOSIS — Z85.07 PERSONAL HISTORY OF MALIGNANT NEOPLASM OF PANCREAS: ICD-10-CM

## 2019-09-03 DIAGNOSIS — K90.81 WHIPPLE'S DISEASE: Chronic | ICD-10-CM

## 2019-09-03 DIAGNOSIS — Z95.828 PRESENCE OF OTHER VASCULAR IMPLANTS AND GRAFTS: Chronic | ICD-10-CM

## 2019-09-03 DIAGNOSIS — J90 PLEURAL EFFUSION, NOT ELSEWHERE CLASSIFIED: ICD-10-CM

## 2019-09-03 DIAGNOSIS — N40.0 BENIGN PROSTATIC HYPERPLASIA WITHOUT LOWER URINARY TRACT SYMPTOMS: ICD-10-CM

## 2019-09-03 DIAGNOSIS — R60.0 LOCALIZED EDEMA: ICD-10-CM

## 2019-09-03 LAB
ALBUMIN SERPL ELPH-MCNC: 2.8 G/DL — LOW (ref 3.3–5.2)
ALP SERPL-CCNC: 365 U/L — HIGH (ref 40–120)
ALT FLD-CCNC: 33 U/L — SIGNIFICANT CHANGE UP
ANION GAP SERPL CALC-SCNC: 7 MMOL/L — SIGNIFICANT CHANGE UP (ref 5–17)
ANISOCYTOSIS BLD QL: SLIGHT — SIGNIFICANT CHANGE UP
APTT BLD: 33.2 SEC — SIGNIFICANT CHANGE UP (ref 27.5–36.3)
AST SERPL-CCNC: 41 U/L — HIGH
BASOPHILS # BLD AUTO: 0 K/UL — SIGNIFICANT CHANGE UP (ref 0–0.2)
BASOPHILS NFR BLD AUTO: 0 % — SIGNIFICANT CHANGE UP (ref 0–2)
BILIRUB SERPL-MCNC: 0.6 MG/DL — SIGNIFICANT CHANGE UP (ref 0.4–2)
BUN SERPL-MCNC: 12 MG/DL — SIGNIFICANT CHANGE UP (ref 8–20)
CALCIUM SERPL-MCNC: 8.4 MG/DL — LOW (ref 8.6–10.2)
CHLORIDE SERPL-SCNC: 94 MMOL/L — LOW (ref 98–107)
CK SERPL-CCNC: 32 U/L — SIGNIFICANT CHANGE UP (ref 30–200)
CO2 SERPL-SCNC: 31 MMOL/L — HIGH (ref 22–29)
CREAT SERPL-MCNC: 0.54 MG/DL — SIGNIFICANT CHANGE UP (ref 0.5–1.3)
ELLIPTOCYTES BLD QL SMEAR: SLIGHT — SIGNIFICANT CHANGE UP
EOSINOPHIL # BLD AUTO: 0.05 K/UL — SIGNIFICANT CHANGE UP (ref 0–0.5)
EOSINOPHIL NFR BLD AUTO: 1.8 % — SIGNIFICANT CHANGE UP (ref 0–6)
GIANT PLATELETS BLD QL SMEAR: PRESENT — SIGNIFICANT CHANGE UP
GLUCOSE SERPL-MCNC: 158 MG/DL — HIGH (ref 70–115)
HCT VFR BLD CALC: 36.3 % — LOW (ref 39–50)
HGB BLD-MCNC: 12.2 G/DL — LOW (ref 13–17)
INR BLD: 1.29 RATIO — HIGH (ref 0.88–1.16)
LYMPHOCYTES # BLD AUTO: 0.29 K/UL — LOW (ref 1–3.3)
LYMPHOCYTES # BLD AUTO: 11.5 % — LOW (ref 13–44)
MACROCYTES BLD QL: SLIGHT — SIGNIFICANT CHANGE UP
MANUAL SMEAR VERIFICATION: SIGNIFICANT CHANGE UP
MCHC RBC-ENTMCNC: 33.6 GM/DL — SIGNIFICANT CHANGE UP (ref 32–36)
MCHC RBC-ENTMCNC: 34.3 PG — HIGH (ref 27–34)
MCV RBC AUTO: 102 FL — HIGH (ref 80–100)
MONOCYTES # BLD AUTO: 0.43 K/UL — SIGNIFICANT CHANGE UP (ref 0–0.9)
MONOCYTES NFR BLD AUTO: 16.8 % — HIGH (ref 2–14)
NEUTROPHILS # BLD AUTO: 1.75 K/UL — LOW (ref 1.8–7.4)
NEUTROPHILS NFR BLD AUTO: 65.5 % — SIGNIFICANT CHANGE UP (ref 43–77)
NEUTS BAND # BLD: 3.5 % — SIGNIFICANT CHANGE UP (ref 0–8)
NT-PROBNP SERPL-SCNC: 146 PG/ML — SIGNIFICANT CHANGE UP (ref 0–300)
OVALOCYTES BLD QL SMEAR: SLIGHT — SIGNIFICANT CHANGE UP
PLAT MORPH BLD: NORMAL — SIGNIFICANT CHANGE UP
PLATELET # BLD AUTO: 200 K/UL — SIGNIFICANT CHANGE UP (ref 150–400)
POIKILOCYTOSIS BLD QL AUTO: SLIGHT — SIGNIFICANT CHANGE UP
POLYCHROMASIA BLD QL SMEAR: SLIGHT — SIGNIFICANT CHANGE UP
POTASSIUM SERPL-MCNC: 3.9 MMOL/L — SIGNIFICANT CHANGE UP (ref 3.5–5.3)
POTASSIUM SERPL-SCNC: 3.9 MMOL/L — SIGNIFICANT CHANGE UP (ref 3.5–5.3)
PROT SERPL-MCNC: 5.8 G/DL — LOW (ref 6.6–8.7)
PROTHROM AB SERPL-ACNC: 15 SEC — HIGH (ref 10–12.9)
RBC # BLD: 3.56 M/UL — LOW (ref 4.2–5.8)
RBC # FLD: 16.4 % — HIGH (ref 10.3–14.5)
RBC BLD AUTO: ABNORMAL
SODIUM SERPL-SCNC: 132 MMOL/L — LOW (ref 135–145)
TROPONIN T SERPL-MCNC: <0.01 NG/ML — SIGNIFICANT CHANGE UP (ref 0–0.06)
VARIANT LYMPHS # BLD: 0.9 % — SIGNIFICANT CHANGE UP (ref 0–6)
WBC # BLD: 2.53 K/UL — LOW (ref 3.8–10.5)
WBC # FLD AUTO: 2.53 K/UL — LOW (ref 3.8–10.5)

## 2019-09-03 PROCEDURE — 74177 CT ABD & PELVIS W/CONTRAST: CPT | Mod: 26

## 2019-09-03 PROCEDURE — 71045 X-RAY EXAM CHEST 1 VIEW: CPT | Mod: 26

## 2019-09-03 PROCEDURE — 93970 EXTREMITY STUDY: CPT | Mod: 26

## 2019-09-03 PROCEDURE — 93010 ELECTROCARDIOGRAM REPORT: CPT

## 2019-09-03 PROCEDURE — 99285 EMERGENCY DEPT VISIT HI MDM: CPT

## 2019-09-03 RX ORDER — PANTOPRAZOLE SODIUM 20 MG/1
40 TABLET, DELAYED RELEASE ORAL
Refills: 0 | Status: DISCONTINUED | OUTPATIENT
Start: 2019-09-03 | End: 2019-09-05

## 2019-09-03 RX ORDER — TAMSULOSIN HYDROCHLORIDE 0.4 MG/1
0.4 CAPSULE ORAL AT BEDTIME
Refills: 0 | Status: DISCONTINUED | OUTPATIENT
Start: 2019-09-03 | End: 2019-09-05

## 2019-09-03 RX ORDER — HEPARIN SODIUM 5000 [USP'U]/ML
5000 INJECTION INTRAVENOUS; SUBCUTANEOUS EVERY 8 HOURS
Refills: 0 | Status: DISCONTINUED | OUTPATIENT
Start: 2019-09-03 | End: 2019-09-05

## 2019-09-03 RX ORDER — FUROSEMIDE 40 MG
40 TABLET ORAL ONCE
Refills: 0 | Status: COMPLETED | OUTPATIENT
Start: 2019-09-03 | End: 2019-09-03

## 2019-09-03 RX ORDER — ASPIRIN/CALCIUM CARB/MAGNESIUM 324 MG
81 TABLET ORAL DAILY
Refills: 0 | Status: DISCONTINUED | OUTPATIENT
Start: 2019-09-03 | End: 2019-09-05

## 2019-09-03 RX ORDER — LIPASE/PROTEASE/AMYLASE 16-48-48K
1 CAPSULE,DELAYED RELEASE (ENTERIC COATED) ORAL DAILY
Refills: 0 | Status: DISCONTINUED | OUTPATIENT
Start: 2019-09-03 | End: 2019-09-05

## 2019-09-03 RX ORDER — IPRATROPIUM/ALBUTEROL SULFATE 18-103MCG
3 AEROSOL WITH ADAPTER (GRAM) INHALATION EVERY 6 HOURS
Refills: 0 | Status: DISCONTINUED | OUTPATIENT
Start: 2019-09-03 | End: 2019-09-05

## 2019-09-03 RX ORDER — FUROSEMIDE 40 MG
40 TABLET ORAL EVERY 12 HOURS
Refills: 0 | Status: DISCONTINUED | OUTPATIENT
Start: 2019-09-03 | End: 2019-09-05

## 2019-09-03 RX ORDER — FUROSEMIDE 40 MG
40 TABLET ORAL ONCE
Refills: 0 | Status: DISCONTINUED | OUTPATIENT
Start: 2019-09-03 | End: 2019-09-03

## 2019-09-03 RX ORDER — LIPASE/PROTEASE/AMYLASE 16-48-48K
2 CAPSULE,DELAYED RELEASE (ENTERIC COATED) ORAL
Refills: 0 | Status: DISCONTINUED | OUTPATIENT
Start: 2019-09-03 | End: 2019-09-05

## 2019-09-03 RX ORDER — POTASSIUM CHLORIDE 20 MEQ
10 PACKET (EA) ORAL DAILY
Refills: 0 | Status: DISCONTINUED | OUTPATIENT
Start: 2019-09-03 | End: 2019-09-05

## 2019-09-03 RX ORDER — MIDODRINE HYDROCHLORIDE 2.5 MG/1
5 TABLET ORAL THREE TIMES A DAY
Refills: 0 | Status: DISCONTINUED | OUTPATIENT
Start: 2019-09-03 | End: 2019-09-05

## 2019-09-03 RX ADMIN — Medication 40 MILLIGRAM(S): at 15:40

## 2019-09-03 NOTE — H&P ADULT - NSICDXFAMILYHX_GEN_ALL_CORE_FT
FAMILY HISTORY:  Family history of CABG  Family history of heart disease  Family history of lymphoma  FH: lung cancer, mother had lung cancer.

## 2019-09-03 NOTE — ED PROVIDER NOTE - NS ED ROS FT
GENERAL:   Endorses subjective fevers  SKIN:   No Rashes  EYE:   Vision unchanged.  ENMT:   Denies ear pain.   PULM:   Denies SOB.  CVS:   Denies Chest Pain.  GI:   Endorses abdominal swelling, denies constipation.  : Denies dysuria, hematuria  MSK: Endorses LE swelling, denies pain in extremities  NEURO: Denies HA.

## 2019-09-03 NOTE — ED PROVIDER NOTE - ATTENDING CONTRIBUTION TO CARE
63 year old hx of pancreatic cancer last chemotherapy session 5 weeks ago hx of wipple in 2017 c/o worsening edema.  Patient reports that he has had swelling in his lower extremities in the past that responded to lasix.  Patient states that his swelling is worsening despite taking lasix and extending to his abdomen.  Patient well appearing noted to have 2+ edema to bilateral lower legs and swelling of genitals and abdomen consistent with anasarca.  Patient signed out to overnight physician pending CT and castro will need admission.

## 2019-09-03 NOTE — H&P ADULT - HISTORY OF PRESENT ILLNESS
62 y/o male with h/o pancreatic cancer and s/p whipple procedure came to the ER as for past 1 week he has been retaining more water in his legs and abdomen and getting difficult to walk around due to volume over load and getting tired easily. No new sob, cough. no cp.  no abd. pain. no n/v/d. no fever reported.

## 2019-09-03 NOTE — ED PROVIDER NOTE - PHYSICAL EXAMINATION
GENERAL:  Cachetic appearing Elderly M, uncomfortable appearing, NAD  EYE: EOM grossly intact. symmetrical lids, normal conjunctiva  ENMT: Atraumatic external nose and ears, moist mucous membranes  NECK: Symmetric, no tracheal deviation  CVS: Loud S1&S2, No murmurs appreciated.  RESP: Unlabored respiratory effort, no central cyanosis, no evidence for respiratory distress, lungs CTAB  GI: Abdomen appears distended. Soft and non-tender.  MSK: +2 pitting edema of B/L LE from foot to waistline. No deformities otherwise. LE pulses not palpable, DP & PT detected with doppler US B/L.  : Marked edema of scrotum, penile shaft and glans penis. No TTP elicited with examination of B/L testis. No penile discharge.  DERM: Skin is warm, dry.  NEURO: AAOx3. Moving all 4 extremities spontaneously without limitation. No facial droop. No dysarthria.  PSYCH: Appropriate mood and affect during encounter.

## 2019-09-03 NOTE — ED ADULT TRIAGE NOTE - CHIEF COMPLAINT QUOTE
Pancreatic cancer pt. Whipple done November 30 2017. Leg and groin swelling. No dyspnea, no chest pain

## 2019-09-03 NOTE — ED ADULT NURSE NOTE - OBJECTIVE STATEMENT
Pt with hx of pancreatic ca is here with c/o ble edema.  Pt is having difficulty ambulating.  Pt also noted to have ascites.

## 2019-09-03 NOTE — H&P ADULT - NSHPLABSRESULTS_GEN_ALL_CORE
ekg is nsr 90, low voltage qrs.  cxr reviewed by me, b/l pl. effusion noted rt. more than left. radiologist reading pending.

## 2019-09-03 NOTE — H&P ADULT - PROBLEM SELECTOR PLAN 1
pt; s abd. ascites has increased from before, will keep on iv lasix for now, monitor I/O, wt. daily. will request day team to contact IR to see if ascites can be drained.   GI consult.

## 2019-09-03 NOTE — ED ADULT NURSE NOTE - NSIMPLEMENTINTERV_GEN_ALL_ED
Implemented All Fall Risk Interventions:  [place] to call system. Call bell, personal items and telephone within reach. Instruct patient to call for assistance. Room bathroom lighting operational. Non-slip footwear when patient is off stretcher. Physically safe environment: no spills, clutter or unnecessary equipment. Stretcher in lowest position, wheels locked, appropriate side rails in place. Provide visual cue, wrist band, yellow gown, etc. Monitor gait and stability. Monitor for mental status changes and reorient to person, place, and time. Review medications for side effects contributing to fall risk. Reinforce activity limits and safety measures with patient and family.

## 2019-09-03 NOTE — ED STATDOCS - PROGRESS NOTE DETAILS
62 y/o M pt with history of pancreatic CA (off Sutent chemotherapy for the past 3 weeks, last radiation 9 months ago), rheumarthritis presents to the ED c/o bilateral leg swelling and groin swelling. Pt is on water pills, but has observed increasing fluid buildup in the past few days. Pt had a whipple procedure done in 2017. Pt has a port catheter in place. Pt still makes urine independently. Focused eval, protocol orders entered. Pt to be moved to main ED for complete evaluation by another provider. 64 y/o M pt with history of pancreatic CA (off Sutent chemotherapy for the past 3 weeks, last radiation 9 months ago), rheumarthritis presents to the ED c/o bilateral leg swelling and groin swelling. Pt is on water pills, but has observed increasing fluid buildup in the past few days. Pt had a whipple procedure done in 2017. Pt has a port catheter in place. Pt still makes urine independently. Focused eval, protocol orders entered. Pt to be moved to main ED for complete evaluation by another provider. On exam, 6+ pitting edema, will give IV Lasix and place on monitor.

## 2019-09-03 NOTE — ED PROVIDER NOTE - OBJECTIVE STATEMENT
63 y M Hx Pancreatica Ca s/p whipple in 2017, last cycle chemotx 5 wks ago p/w c/o B/L LE swelling up to hips and groin + abdominal distension. Patient denies abdominal pain, denies LE pain, endorses interference with ambulation due to swelling. Pt endorses prior episodes of BLLE swelling that respond to lasix, however for past week swelling has not improved with lasix. States that swelling involves genitals, denies difficulty urinating or defectating. Denies hematuria or blood in stool. Denies n/v. Denies CP, denies SOB.

## 2019-09-03 NOTE — H&P ADULT - NSHPPHYSICALEXAM_GEN_ALL_CORE
General: pt. lying in bed somewhat tired looking but NAD.   HEENT: AT, NC. PERRL. intact EOM. no throat erythema or exudate.   Neck: supple. no JVD.  Chest: some basal crackles bilaterally, air entry is fair and no inter costal retractions.   Heart: S1,S2. RRR. no heart murmur. 2 to 3 + edema of B/L LE.   Abdomen: soft. + BS, mid abdominal distention , moderate noted, small umbilical hernia noted, reducible, non tender.   rectal : deferred by pt.   Ext: no calf tenderness. moves all ext. without restriction.   Neuro: AAO x3. no focal weakness. no speech disorder. CNs intact.  Skin: no rash noted, no diaphoresis, no cyanosis.   Psych : normal affect, no si/hi. General: pt. lying in bed somewhat tired looking but NAD.   HEENT: AT, NC. PERRL. intact EOM. no throat erythema or exudate.   Neck: supple. no JVD.  Chest: some basal crackles bilaterally, air entry is fair and no inter costal retractions. rt. upper CW infusa port.   Heart: S1,S2. RRR. systolic heart murmur. 2 to 3 + edema of B/L LE.   Abdomen: soft. + BS, mid abdominal distention , moderate noted, small umbilical hernia noted, reducible, non tender.   rectal : deferred by pt.   Ext: no calf tenderness. moves all ext. without restriction.   Neuro: AAO x3. no focal weakness. no speech disorder. CNs intact.  Skin: no rash noted, no diaphoresis, no cyanosis.   Psych : normal affect, no si/hi.

## 2019-09-03 NOTE — H&P ADULT - PROBLEM SELECTOR PLAN 5
will continue follow up with his oncologist, dr. Vela. will continue follow up with his oncologist, dr. ramon. next appointment in 1 week, last chemo was about 3 weeks ago as per pt.

## 2019-09-04 ENCOUNTER — RESULT REVIEW (OUTPATIENT)
Age: 64
End: 2019-09-04

## 2019-09-04 LAB
ALBUMIN FLD-MCNC: 0.9 G/DL — SIGNIFICANT CHANGE UP
ALBUMIN SERPL ELPH-MCNC: 2.8 G/DL — LOW (ref 3.3–5.2)
ALP SERPL-CCNC: 424 U/L — HIGH (ref 40–120)
ALT FLD-CCNC: 38 U/L — SIGNIFICANT CHANGE UP
ANION GAP SERPL CALC-SCNC: 9 MMOL/L — SIGNIFICANT CHANGE UP (ref 5–17)
AST SERPL-CCNC: 70 U/L — HIGH
B PERT IGG+IGM PNL SER: ABNORMAL
BASOPHILS # BLD AUTO: 0 K/UL — SIGNIFICANT CHANGE UP (ref 0–0.2)
BASOPHILS # BLD AUTO: 0 K/UL — SIGNIFICANT CHANGE UP (ref 0–0.2)
BASOPHILS NFR BLD AUTO: 0 % — SIGNIFICANT CHANGE UP (ref 0–2)
BASOPHILS NFR BLD AUTO: 0.5 % — SIGNIFICANT CHANGE UP (ref 0–2)
BILIRUB SERPL-MCNC: 0.8 MG/DL — SIGNIFICANT CHANGE UP (ref 0.4–2)
BUN SERPL-MCNC: 13 MG/DL — SIGNIFICANT CHANGE UP (ref 7–23)
BUN SERPL-MCNC: 13 MG/DL — SIGNIFICANT CHANGE UP (ref 8–20)
CA-I BLDA-SCNC: 1.14 MMOL/L — SIGNIFICANT CHANGE UP (ref 1.12–1.3)
CALCIUM SERPL-MCNC: 8.8 MG/DL — SIGNIFICANT CHANGE UP (ref 8.6–10.2)
CHLORIDE SERPL-SCNC: 94 MMOL/L — LOW (ref 98–107)
CHLORIDE SERPL-SCNC: 97 MMOL/L — SIGNIFICANT CHANGE UP (ref 96–108)
CO2 SERPL-SCNC: 28 MMOL/L — SIGNIFICANT CHANGE UP (ref 22–31)
CO2 SERPL-SCNC: 31 MMOL/L — HIGH (ref 22–29)
COLOR FLD: YELLOW
CREAT SERPL-MCNC: 0.58 MG/DL — SIGNIFICANT CHANGE UP (ref 0.5–1.3)
CREAT SERPL-MCNC: 0.7 MG/DL — SIGNIFICANT CHANGE UP (ref 0.5–1.3)
EOSINOPHIL # BLD AUTO: 0 K/UL — SIGNIFICANT CHANGE UP (ref 0–0.5)
EOSINOPHIL # BLD AUTO: 0.03 K/UL — SIGNIFICANT CHANGE UP (ref 0–0.5)
EOSINOPHIL NFR BLD AUTO: 0.6 % — SIGNIFICANT CHANGE UP (ref 0–6)
EOSINOPHIL NFR BLD AUTO: 1.1 % — SIGNIFICANT CHANGE UP (ref 0–6)
FLUID INTAKE SUBSTANCE CLASS: SIGNIFICANT CHANGE UP
FLUID SEGMENTED GRANULOCYTES: 19 % — SIGNIFICANT CHANGE UP
GLUCOSE SERPL-MCNC: 157 MG/DL — HIGH (ref 70–115)
GLUCOSE SERPL-MCNC: 99 MG/DL — SIGNIFICANT CHANGE UP (ref 70–99)
GRAM STN FLD: SIGNIFICANT CHANGE UP
HCT VFR BLD CALC: 36.8 % — LOW (ref 39–50)
HCT VFR BLD CALC: 42.5 % — SIGNIFICANT CHANGE UP (ref 39–50)
HGB BLD-MCNC: 12 G/DL — LOW (ref 13–17)
HGB BLD-MCNC: 14.2 G/DL — SIGNIFICANT CHANGE UP (ref 13–17)
IMM GRANULOCYTES NFR BLD AUTO: 0.4 % — SIGNIFICANT CHANGE UP (ref 0–1.5)
LDH SERPL L TO P-CCNC: 53 U/L — SIGNIFICANT CHANGE UP
LYMPHOCYTES # BLD AUTO: 0.18 K/UL — LOW (ref 1–3.3)
LYMPHOCYTES # BLD AUTO: 0.4 K/UL — LOW (ref 1–3.3)
LYMPHOCYTES # BLD AUTO: 14 % — SIGNIFICANT CHANGE UP (ref 13–44)
LYMPHOCYTES # BLD AUTO: 6.6 % — LOW (ref 13–44)
LYMPHOCYTES # FLD: 14 % — SIGNIFICANT CHANGE UP
MCHC RBC-ENTMCNC: 32.6 GM/DL — SIGNIFICANT CHANGE UP (ref 32–36)
MCHC RBC-ENTMCNC: 32.6 PG — SIGNIFICANT CHANGE UP (ref 27–34)
MCHC RBC-ENTMCNC: 33.4 G/DL — SIGNIFICANT CHANGE UP (ref 32–36)
MCHC RBC-ENTMCNC: 33.5 PG — SIGNIFICANT CHANGE UP (ref 27–34)
MCV RBC AUTO: 102.8 FL — HIGH (ref 80–100)
MCV RBC AUTO: 97.8 FL — SIGNIFICANT CHANGE UP (ref 80–100)
MESOTHL CELL # FLD: 10 % — SIGNIFICANT CHANGE UP
MONOCYTES # BLD AUTO: 0.3 K/UL — SIGNIFICANT CHANGE UP (ref 0–0.9)
MONOCYTES # BLD AUTO: 0.42 K/UL — SIGNIFICANT CHANGE UP (ref 0–0.9)
MONOCYTES NFR BLD AUTO: 10 % — SIGNIFICANT CHANGE UP (ref 2–14)
MONOCYTES NFR BLD AUTO: 15.4 % — HIGH (ref 2–14)
MONOS+MACROS # FLD: 57 % — SIGNIFICANT CHANGE UP
NEUTROPHILS # BLD AUTO: 2 K/UL — SIGNIFICANT CHANGE UP (ref 1.8–7.4)
NEUTROPHILS # BLD AUTO: 2.08 K/UL — SIGNIFICANT CHANGE UP (ref 1.8–7.4)
NEUTROPHILS NFR BLD AUTO: 75 % — SIGNIFICANT CHANGE UP (ref 43–77)
NEUTROPHILS NFR BLD AUTO: 76.5 % — SIGNIFICANT CHANGE UP (ref 43–77)
NIGHT BLUE STAIN TISS: SIGNIFICANT CHANGE UP
PLATELET # BLD AUTO: 177 K/UL — SIGNIFICANT CHANGE UP (ref 150–400)
PLATELET # BLD AUTO: 201 K/UL — SIGNIFICANT CHANGE UP (ref 150–400)
POTASSIUM SERPL-MCNC: 4 MMOL/L — SIGNIFICANT CHANGE UP (ref 3.5–5.3)
POTASSIUM SERPL-MCNC: 4.1 MMOL/L — SIGNIFICANT CHANGE UP (ref 3.5–5.3)
POTASSIUM SERPL-SCNC: 4 MMOL/L — SIGNIFICANT CHANGE UP (ref 3.5–5.3)
POTASSIUM SERPL-SCNC: 4.1 MMOL/L — SIGNIFICANT CHANGE UP (ref 3.5–5.3)
PREALB SERPL-MCNC: 8 MG/DL — LOW (ref 18–38)
PROT FLD-MCNC: 1.4 G/DL — SIGNIFICANT CHANGE UP
PROT SERPL-MCNC: 5.8 G/DL — LOW (ref 6.6–8.7)
RBC # BLD: 3.58 M/UL — LOW (ref 4.2–5.8)
RBC # BLD: 4.35 M/UL — SIGNIFICANT CHANGE UP (ref 4.2–5.8)
RBC # FLD: 14.6 % — HIGH (ref 10.3–14.5)
RBC # FLD: 16.7 % — HIGH (ref 10.3–14.5)
RCV VOL RI: 780 /UL — HIGH (ref 0–5)
SODIUM SERPL-SCNC: 134 MMOL/L — LOW (ref 135–145)
SODIUM SERPL-SCNC: 134 MMOL/L — LOW (ref 135–145)
SPECIMEN SOURCE: SIGNIFICANT CHANGE UP
SPECIMEN SOURCE: SIGNIFICANT CHANGE UP
TOTAL NUCLEATED CELL COUNT, BODY FLUID: 228 /UL — HIGH (ref 0–5)
TUBE TYPE: SIGNIFICANT CHANGE UP
WBC # BLD: 2.7 K/UL — LOW (ref 3.8–10.5)
WBC # BLD: 2.72 K/UL — LOW (ref 3.8–10.5)
WBC # FLD AUTO: 2.7 K/UL — LOW (ref 3.8–10.5)
WBC # FLD AUTO: 2.72 K/UL — LOW (ref 3.8–10.5)

## 2019-09-04 PROCEDURE — 99223 1ST HOSP IP/OBS HIGH 75: CPT

## 2019-09-04 PROCEDURE — 88112 CYTOPATH CELL ENHANCE TECH: CPT | Mod: 26

## 2019-09-04 PROCEDURE — 99233 SBSQ HOSP IP/OBS HIGH 50: CPT | Mod: GC

## 2019-09-04 RX ORDER — ALBUMIN HUMAN 25 %
250 VIAL (ML) INTRAVENOUS ONCE
Refills: 0 | Status: COMPLETED | OUTPATIENT
Start: 2019-09-04 | End: 2019-09-04

## 2019-09-04 RX ORDER — LIPASE/PROTEASE/AMYLASE 16-48-48K
1 CAPSULE,DELAYED RELEASE (ENTERIC COATED) ORAL
Qty: 0 | Refills: 0 | DISCHARGE

## 2019-09-04 RX ADMIN — Medication 2 CAPSULE(S): at 08:37

## 2019-09-04 RX ADMIN — Medication 2 CAPSULE(S): at 17:22

## 2019-09-04 RX ADMIN — Medication 10 MILLIEQUIVALENT(S): at 11:06

## 2019-09-04 RX ADMIN — Medication 40 MILLIGRAM(S): at 05:05

## 2019-09-04 RX ADMIN — Medication 40 MILLIGRAM(S): at 17:22

## 2019-09-04 RX ADMIN — Medication 1 TABLET(S): at 11:05

## 2019-09-04 RX ADMIN — Medication 125 MILLILITER(S): at 10:52

## 2019-09-04 RX ADMIN — HEPARIN SODIUM 5000 UNIT(S): 5000 INJECTION INTRAVENOUS; SUBCUTANEOUS at 05:05

## 2019-09-04 RX ADMIN — Medication 2 CAPSULE(S): at 11:48

## 2019-09-04 RX ADMIN — MIDODRINE HYDROCHLORIDE 5 MILLIGRAM(S): 2.5 TABLET ORAL at 05:05

## 2019-09-04 RX ADMIN — HEPARIN SODIUM 5000 UNIT(S): 5000 INJECTION INTRAVENOUS; SUBCUTANEOUS at 13:34

## 2019-09-04 RX ADMIN — TAMSULOSIN HYDROCHLORIDE 0.4 MILLIGRAM(S): 0.4 CAPSULE ORAL at 21:37

## 2019-09-04 RX ADMIN — Medication 81 MILLIGRAM(S): at 11:06

## 2019-09-04 RX ADMIN — HEPARIN SODIUM 5000 UNIT(S): 5000 INJECTION INTRAVENOUS; SUBCUTANEOUS at 21:36

## 2019-09-04 RX ADMIN — PANTOPRAZOLE SODIUM 40 MILLIGRAM(S): 20 TABLET, DELAYED RELEASE ORAL at 05:05

## 2019-09-04 RX ADMIN — MIDODRINE HYDROCHLORIDE 5 MILLIGRAM(S): 2.5 TABLET ORAL at 11:06

## 2019-09-04 RX ADMIN — MIDODRINE HYDROCHLORIDE 5 MILLIGRAM(S): 2.5 TABLET ORAL at 17:22

## 2019-09-04 RX ADMIN — Medication 1 CAPSULE(S): at 20:12

## 2019-09-04 NOTE — CHART NOTE - NSCHARTNOTEFT_GEN_A_CORE
Interventional Radiology Brief- Operative Note    Operators: Aj Delgadillo MD    Procedure: US guided diagnostic and therapeutic paracentesis     Post-op Dx: ascites    EBL: 2 mL    Medications: 1% lidocaine    Contrast: none    Complications: no immediate complications    Findings/Plan: Successful US guided diagnostic and therapeutic paracentesis with 760 mL of straw colored ascites removed  A specimen was sent for analysis

## 2019-09-04 NOTE — PROGRESS NOTE ADULT - ASSESSMENT
A 63-year-old man with PMH of Pancreatic cancer s/p wipple procedure in 2017, BPH, presents to ED c/o of worsening lower extremity edema admitted for anasarca and worsening ascites.      Ascitics secondary to Hypoalbuminemia   -Likely 2/2 low albumin levels, might be causing low oncotic pressure and extravasation of fluids  -c/w Lasix  -Monitor I/O,   -Daily weights   -IR guided done this mornin cc were drained, sample sent to be analyzed     Bilateral Lower Extremity Edema  Likely multifactorial and liver congestion related.    TTE from  showed EF 55 to 60%.  will continue with lasix 40 mg iv q 12hrs.   Pending TTE      B/L pleural effusion   -Iv lasix for now    -Well repeat CXR as needed     BPH  -C/w Flomax    Pancreatic cancer s/p Wipple procedure   -Follow up with his oncologist, Dr. Vela. next appointment in 1 week   -Last chemo was about 3 weeks ago as per pt.     Hypoalbuminemia   -Will start Albumin infusion   -Nutritionist consult placed  -MVI daily     Prophylaxis   -Heparin 5000u SC q8h     Dispo  Likely to be d/c in the next 3-5 days, when medically stable A 63-year-old man with PMH of Pancreatic cancer s/p wipple procedure in 2017, BPH, presents to ED c/o of worsening lower extremity edema admitted for anasarca and worsening ascites.      anasarca, Ascitics secondary to Hypoalbuminemia /Bilateral Lower Extremity Edema/B/L pleural effusion   -Likely multifactorial and liver congestion related.    -Likely 2/2 low albumin levels, might be causing low oncotic pressure and extravasation of fluids  -TTE from  showed EF 55 to 60%. repeat tte pending   -will continue with lasix 40 mg iv q 12hrs.   -Monitor I/O,   -Daily weights   -IR guided done this mornin cc were drained, sample sent to be analyzed   -Well repeat CXR as needed     Pancreatic cancer s/p Wipple procedure   -Follow up with his oncologist, Dr. Vela. next appointment in 1 week   -Last chemo was about 3 weeks ago as per pt.     Hypoalbuminemia / severe protein andreina malnutrition   -Will start Albumin infusion   -Nutritionist consult placed  -MVI daily   BPH  -C/w Flomax    Prophylaxis   -Heparin 5000u SC q8h     Dispo  Likely to be d/c in the next 3-5 days, when medically stable

## 2019-09-04 NOTE — CONSULT NOTE ADULT - ASSESSMENT
In short, this is a 63-year-old man with pancreas cancer presenting with anasarca and worsening ascites.  His exam is notable for diffuse abdominal tenderness to light palpation.  Strongly recommend having patient undergo diagnostic paracentesis.    Recommendations:  - Diagnostic paracentesis; send fluid for culture, cell count, total protein, LDH and albumin   - Consider albumin infusion    Thank you for the consult.  Please do not hesitate to call with any questions or concerns.  Will follow with you.    GOVIND Crespo MD  [facility]  Division of Gastroenterology  Tel [phone]  Fax [phone]  09-04-19 @ 07:11 In short, this is a 63-year-old man with pancreas cancer presenting with anasarca and worsening ascites.  His exam is notable for diffuse abdominal tenderness to light palpation.  Strongly recommend having patient undergo diagnostic paracentesis.    Recommendations:  - Diagnostic paracentesis; send fluid for culture, cell count, total protein, LDH and albumin   - Consider albumin infusion  - Continue Creon    Thank you for the consult.  Please do not hesitate to call with any questions or concerns.  Will follow with you.    GOVIND Crespo MD  [facility]  Division of Gastroenterology  Tel [phone]  Fax [phone]  09-04-19 @ 07:11

## 2019-09-04 NOTE — PROGRESS NOTE ADULT - PROBLEM SELECTOR PLAN 2
very likely multifactorial and liver congestion related.    Last echo from july 6 , 2019 shoiwed :   1. Left ventricular ejection fraction, by visual estimation, is 55 to   60%.   2. Normal global left ventricular systolic function.   3. Spectral Doppler shows impaired relaxation pattern of left   ventricular myocardial filling (Grade I diastolic dysfunction).   4. Mild mitral annular calcification.   5. Thickening of the anterior and posterior mitral valve leaflets.   6. Mild to moderate aortic regurgitation.   7. Dilatation of the ascending aorta.   8. The aortic valve mean gradient is 12.9 mmHg consistent with mild   aortic stenosis.  will continue with lasix 40 mg iv q 12hrs.   will repeat echo as his pleural effusion have enlarged from before.

## 2019-09-04 NOTE — CHART NOTE - NSCHARTNOTEFT_GEN_A_CORE
Upon Nutritional Assessment by the Registered Dietitian your patient was determined to meet criteria / has evidence of the following diagnosis/diagnoses:          [ x ] Severe Protein Calorie Malnutrition        Findings as based on:  •  Comprehensive nutrition assessment and consultation  •  Calorie counts (nutrient intake analysis)  •  Food acceptance and intake status from observations by staff  •  Follow up  •  Patient education  •  Intervention secondary to interdisciplinary rounds  •   concerns      Treatment:    The following diet has been recommended:  PLEASE ORDER ENSURE PUDDING TID, --- DIET OFFICE WILL PROVIDE GELATEIN TID       PROVIDER Section:     By signing this assessment you are acknowledging and agree with the diagnosis/diagnoses assigned by the Registered Dietitian    Comments:

## 2019-09-04 NOTE — CONSULT NOTE ADULT - SUBJECTIVE AND OBJECTIVE BOX
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  He reports some mild associated dyspnea but denies any nausea, vomiting or fevers.  Reports significant scrotal swelling and mild abdominal pain.  Reports intermittent loose stool.  Underwent a CT A/P in the ED that showed bilateral pleural effusions and increased ascites.    ROS: A 14-point review of systems was completed and was otherwise negative save what was reported in the HPI.    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  Family history of CABG  Family history of heart disease  Family history of lymphoma    HOME MEDICATIONS:  aspirin 81 mg oral delayed release tablet: 1 tab(s) orally once a day (05 Jul 2019 16:27)  atorvastatin 10 mg oral tablet: 1 tab(s) orally once a day (at bedtime) (05 Jul 2019 16:27)  Creon 36,000 units oral delayed release capsule: 2 capsules with every meal, 1 capsule with every snack (05 Jul 2019 16:27)  dicyclomine 10 mg oral capsule: 2 cap(s) orally 4 times a day, As Needed (05 Jul 2019 16:27)  ertapenem 1 g injection: 1 gram(s) injectable once a day  until 7/13 (10 Jul 2019 11:16)  Flomax 0.4 mg oral capsule: 1 cap(s) orally once a day (at bedtime) (05 Jul 2019 16:27)  furosemide 20 mg oral tablet: 1 tab(s) orally once a day, As Needed (05 Jul 2019 19:55)  loperamide 2 mg oral capsule: 1 cap(s) orally every 4 hours, As Needed (05 Jul 2019 19:55)  pantoprazole 40 mg oral delayed release tablet: 1 tab(s) orally once a day in am (05 Jul 2019 16:27)  potassium chloride 20 mEq oral tablet, extended release: 1 tab(s) orally once a day as needed on days furosemide is taken (05 Jul 2019 16:27)  Senna 8.6 mg oral tablet: 2 tab(s) orally once a day, As Needed (05 Jul 2019 16:27)  SUNItinib 37.5 mg oral capsule: 1 cap(s) orally once a day (05 Jul 2019 19:55)    INPATIENT MEDICATIONS:  MEDICATIONS  (STANDING):  aspirin enteric coated 81 milliGRAM(s) Oral daily  furosemide   Injectable 40 milliGRAM(s) IV Push every 12 hours  heparin  Injectable 5000 Unit(s) SubCutaneous every 8 hours  midodrine. 5 milliGRAM(s) Oral three times a day  multivitamin 1 Tablet(s) Oral daily  pancrelipase  (CREON 36,000 Lipase Units) 2 Capsule(s) Oral three times a day with meals  pancrelipase  (CREON 36,000 Lipase Units) 1 Capsule(s) Oral daily  pantoprazole    Tablet 40 milliGRAM(s) Oral before breakfast  potassium chloride    Tablet ER 10 milliEquivalent(s) Oral daily  tamsulosin 0.4 milliGRAM(s) Oral at bedtime    MEDICATIONS  (PRN):  ALBUTerol/ipratropium for Nebulization 3 milliLiter(s) Nebulizer every 6 hours PRN Shortness of Breath and/or Wheezing    ALLERGIES:  No Known Allergies    VITAL SIGNS LAST 24 HOURS:  T(C): 36.6 (04 Sep 2019 06:57), Max: 36.7 (04 Sep 2019 00:53)  T(F): 97.9 (04 Sep 2019 06:57), Max: 98.1 (04 Sep 2019 00:53)  HR: 100 (04 Sep 2019 06:57) (88 - 102)  BP: 109/75 (04 Sep 2019 06:57) (109/74 - 123/79)  BP(mean): --  RR: 18 (04 Sep 2019 06:57) (15 - 20)  SpO2: 95% (04 Sep 2019 06:57) (93% - 95%)      PHYSICAL EXAM:  Constitutional: Well-developed, chronically ill-appearing  man with temporal wasting in no apparent distress  Eyes: Sclerae anicteric, conjunctivae normal  ENMT: Mucus membranes moist, no oropharyngeal thrush noted  Neck: No thyroid nodules appreciated, no significant cervical or supraclavicular lymphadenopathy  Respiratory: Breathing nonlabored; clear to auscultation anteriorly   Cardiovascular: Tachycardia, 2/6 systolic murmur  Gastrointestinal: Soft, diffusely tender, distended, normoactive bowel sounds; no hepatosplenomegaly appreciated; no involuntary guarding  Extremities: Anasarca  Neurological: Alert and oriented to person, place and time; no asterixis  Skin: No jaundice  Lymph Nodes: No significant lymphadenopathy  Musculoskeletal: No significant peripheral atrophy  Psychiatric: Affect and mood appropriate      LABS:                        12.2   2.53  )-----------( 200      ( 03 Sep 2019 16:00 )             36.3     PT/INR - ( 03 Sep 2019 16:00 )   PT: 15.0 sec;   INR: 1.29 ratio         PTT - ( 03 Sep 2019 16:00 )  PTT:33.2 sec  09-03    132<L>  |  94<L>  |  12.0  ----------------------------<  158<H>  3.9   |  31.0<H>  |  0.54    Ca    8.4<L>      03 Sep 2019 16:00    TPro  5.8<L>  /  Alb  2.8<L>  /  TBili  0.6  /  DBili  x   /  AST  41<H>  /  ALT  33  /  AlkPhos  365<H>  09-03    LIVER FUNCTIONS - ( 03 Sep 2019 16:00 )  Alb: 2.8 g/dL / Pro: 5.8 g/dL / ALK PHOS: 365 U/L / ALT: 33 U/L / AST: 41 U/L / GGT: x           IMAGING: I personally reviewed the CT A/P, and I agree with the radiologist's interpretation as described below:    < from: CT Abdomen and Pelvis w/ IV Cont (09.03.19 @ 19:02) >  FINDINGS:    The lower chest is remarkable for moderate bilateral pleural effusions   likely increased from the prior study. There is coronary artery   calcification..    The liver is remarkable for small size and nodular contour.. There is   evidence of previous Whipple procedure with cholecystectomy, air in the   biliary tree and stentin the pancreatic duct. Hepatic veins demonstrate   a normal enhancement pattern. Portal veins enhance normally again with   focal narrowing of the proximal main portal vein similar to the prior   examination. There are multiple surgical clips in theupper abdomen   consistent with the Whipple procedure. The spleen  and adrenal glands are   unremarkable. Horseshoe kidney is again seen without hydronephrosis or   urolithiasis. The bladder is partially distended and grossly unremarkable.    There is no bowel obstruction. There is no intraperitoneal free air.    There is ascites and mesenteric edema. The aorta is not aneurysmal. There   is atherosclerotic calcification of the abdominal aorta and its branches.    There is lymphadenopathy again seen in the upper abdomen around the   celiac axis and in the superior retroperitoneal region. The pelvic   structures are unremarkable.    The osseous structures are remarkable for degenerative changes of the   spine    IMPRESSION:    Moderate bilateral pleural effusions have increased from the prior study.  Ascites increased from the prior study  Status post Whipple procedure with pancreatic stent and intrahepatic   biliary air  Nodular liver contour again noted  No change focal narrowing of the main portal vein  No change upper abdominal and upper retroperitoneal lymphadenopathy.  Horseshoe kidney is again appreciated    < end of copied text >

## 2019-09-04 NOTE — DIETITIAN INITIAL EVALUATION ADULT. - NUTRITIONGOAL OUTCOME1
Pt will tolerate and consume >75% of estimated nutrient needs, take 1-3 supps daily, prevent wt loss

## 2019-09-04 NOTE — DIETITIAN INITIAL EVALUATION ADULT. - PHYSICAL APPEARANCE
BMI 20 (based on true weight) NFPE indicated severe muscle/fat wasting (clavicles, temples, orbitals, shoulders, triceps, calves, interosseous)/emaciated

## 2019-09-04 NOTE — PROGRESS NOTE ADULT - PROBLEM SELECTOR PLAN 5
will continue follow up with his oncologist, dr. ramon. next appointment in 1 week, last chemo was about 3 weeks ago as per pt.

## 2019-09-04 NOTE — DIETITIAN INITIAL EVALUATION ADULT. - OTHER INFO
63-year-old man with PMH of Pancreatic cancer s/p whipple procedure in 2017, BPH, presents to ED c/o of worsening lower extremity edema admitted for anasarca and worsening ascites.      Spoke with Pt and family who report significant weight loss history of 100lbs [id number]. UBW 225lb, current weight 145lb (however retaining 20lbs of fluid in lower extremities and abdomen), weight is usually 125lbs without fluid.   Pt with lactose intolerance, nutritional shakes usually gives discomfort- Pt amenable to trying Ensure pudding and Gelatein protein supplement.  Current PO intake fair 50-75%.

## 2019-09-04 NOTE — DIETITIAN INITIAL EVALUATION ADULT. - ETIOLOGY
related to inadequate protein energy intake/ increased needs in setting of pancreatic CA s/p chemo, lack of appetite

## 2019-09-05 ENCOUNTER — RESULT REVIEW (OUTPATIENT)
Age: 64
End: 2019-09-05

## 2019-09-05 ENCOUNTER — TRANSCRIPTION ENCOUNTER (OUTPATIENT)
Age: 64
End: 2019-09-05

## 2019-09-05 VITALS
HEART RATE: 112 BPM | SYSTOLIC BLOOD PRESSURE: 111 MMHG | DIASTOLIC BLOOD PRESSURE: 79 MMHG | RESPIRATION RATE: 18 BRPM | TEMPERATURE: 98 F | OXYGEN SATURATION: 93 %

## 2019-09-05 LAB
ANION GAP SERPL CALC-SCNC: 10 MMOL/L — SIGNIFICANT CHANGE UP (ref 5–17)
BASOPHILS # BLD AUTO: 0.02 K/UL — SIGNIFICANT CHANGE UP (ref 0–0.2)
BASOPHILS NFR BLD AUTO: 0.6 % — SIGNIFICANT CHANGE UP (ref 0–2)
BUN SERPL-MCNC: 17 MG/DL — SIGNIFICANT CHANGE UP (ref 8–20)
CALCIUM SERPL-MCNC: 8.5 MG/DL — LOW (ref 8.6–10.2)
CHLORIDE SERPL-SCNC: 94 MMOL/L — LOW (ref 98–107)
CO2 SERPL-SCNC: 30 MMOL/L — HIGH (ref 22–29)
CREAT SERPL-MCNC: 0.51 MG/DL — SIGNIFICANT CHANGE UP (ref 0.5–1.3)
EOSINOPHIL # BLD AUTO: 0.03 K/UL — SIGNIFICANT CHANGE UP (ref 0–0.5)
EOSINOPHIL NFR BLD AUTO: 1 % — SIGNIFICANT CHANGE UP (ref 0–6)
GLUCOSE SERPL-MCNC: 142 MG/DL — HIGH (ref 70–115)
HCT VFR BLD CALC: 34.7 % — LOW (ref 39–50)
HCV AB S/CO SERPL IA: 0.12 S/CO — SIGNIFICANT CHANGE UP (ref 0–0.99)
HCV AB SERPL-IMP: SIGNIFICANT CHANGE UP
HGB BLD-MCNC: 11.6 G/DL — LOW (ref 13–17)
IMM GRANULOCYTES NFR BLD AUTO: 0 % — SIGNIFICANT CHANGE UP (ref 0–1.5)
LYMPHOCYTES # BLD AUTO: 0.33 K/UL — LOW (ref 1–3.3)
LYMPHOCYTES # BLD AUTO: 10.5 % — LOW (ref 13–44)
MAGNESIUM SERPL-MCNC: 1.9 MG/DL — SIGNIFICANT CHANGE UP (ref 1.6–2.6)
MCHC RBC-ENTMCNC: 33.4 GM/DL — SIGNIFICANT CHANGE UP (ref 32–36)
MCHC RBC-ENTMCNC: 33.9 PG — SIGNIFICANT CHANGE UP (ref 27–34)
MCV RBC AUTO: 101.5 FL — HIGH (ref 80–100)
MONOCYTES # BLD AUTO: 0.51 K/UL — SIGNIFICANT CHANGE UP (ref 0–0.9)
MONOCYTES NFR BLD AUTO: 16.3 % — HIGH (ref 2–14)
NEUTROPHILS # BLD AUTO: 2.24 K/UL — SIGNIFICANT CHANGE UP (ref 1.8–7.4)
NEUTROPHILS NFR BLD AUTO: 71.6 % — SIGNIFICANT CHANGE UP (ref 43–77)
PHOSPHATE SERPL-MCNC: 3.1 MG/DL — SIGNIFICANT CHANGE UP (ref 2.4–4.7)
PLATELET # BLD AUTO: 199 K/UL — SIGNIFICANT CHANGE UP (ref 150–400)
POTASSIUM SERPL-MCNC: 3.7 MMOL/L — SIGNIFICANT CHANGE UP (ref 3.5–5.3)
POTASSIUM SERPL-SCNC: 3.7 MMOL/L — SIGNIFICANT CHANGE UP (ref 3.5–5.3)
RBC # BLD: 3.42 M/UL — LOW (ref 4.2–5.8)
RBC # FLD: 16.7 % — HIGH (ref 10.3–14.5)
SODIUM SERPL-SCNC: 134 MMOL/L — LOW (ref 135–145)
WBC # BLD: 3.13 K/UL — LOW (ref 3.8–10.5)
WBC # FLD AUTO: 3.13 K/UL — LOW (ref 3.8–10.5)

## 2019-09-05 PROCEDURE — 99233 SBSQ HOSP IP/OBS HIGH 50: CPT

## 2019-09-05 PROCEDURE — 99239 HOSP IP/OBS DSCHRG MGMT >30: CPT | Mod: GC

## 2019-09-05 RX ORDER — MIDODRINE HYDROCHLORIDE 2.5 MG/1
2 TABLET ORAL
Qty: 0 | Refills: 0 | DISCHARGE

## 2019-09-05 RX ORDER — FUROSEMIDE 40 MG
1 TABLET ORAL
Qty: 0 | Refills: 0 | DISCHARGE

## 2019-09-05 RX ORDER — LOPERAMIDE HCL 2 MG
1 TABLET ORAL
Qty: 0 | Refills: 0 | DISCHARGE

## 2019-09-05 RX ORDER — ACETAMINOPHEN 500 MG
650 TABLET ORAL EVERY 6 HOURS
Refills: 0 | Status: DISCONTINUED | OUTPATIENT
Start: 2019-09-05 | End: 2019-09-05

## 2019-09-05 RX ORDER — FUROSEMIDE 40 MG
1 TABLET ORAL
Qty: 30 | Refills: 0
Start: 2019-09-05 | End: 2019-10-04

## 2019-09-05 RX ORDER — POTASSIUM CHLORIDE 20 MEQ
1 PACKET (EA) ORAL
Qty: 0 | Refills: 0 | DISCHARGE

## 2019-09-05 RX ORDER — TAMSULOSIN HYDROCHLORIDE 0.4 MG/1
1 CAPSULE ORAL
Qty: 0 | Refills: 0 | DISCHARGE

## 2019-09-05 RX ORDER — FENTANYL CITRATE 50 UG/ML
1 INJECTION INTRAVENOUS
Refills: 0 | Status: DISCONTINUED | OUTPATIENT
Start: 2019-09-05 | End: 2019-09-05

## 2019-09-05 RX ORDER — MIDODRINE HYDROCHLORIDE 2.5 MG/1
1 TABLET ORAL
Qty: 60 | Refills: 0
Start: 2019-09-05 | End: 2019-10-04

## 2019-09-05 RX ORDER — ACETAMINOPHEN 500 MG
650 TABLET ORAL ONCE
Refills: 0 | Status: COMPLETED | OUTPATIENT
Start: 2019-09-05 | End: 2019-09-05

## 2019-09-05 RX ORDER — PANTOPRAZOLE SODIUM 20 MG/1
1 TABLET, DELAYED RELEASE ORAL
Qty: 0 | Refills: 0 | DISCHARGE

## 2019-09-05 RX ADMIN — Medication 2 CAPSULE(S): at 08:30

## 2019-09-05 RX ADMIN — MIDODRINE HYDROCHLORIDE 5 MILLIGRAM(S): 2.5 TABLET ORAL at 11:49

## 2019-09-05 RX ADMIN — Medication 650 MILLIGRAM(S): at 03:20

## 2019-09-05 RX ADMIN — Medication 81 MILLIGRAM(S): at 11:49

## 2019-09-05 RX ADMIN — FENTANYL CITRATE 1 PATCH: 50 INJECTION INTRAVENOUS at 16:35

## 2019-09-05 RX ADMIN — Medication 2 CAPSULE(S): at 17:39

## 2019-09-05 RX ADMIN — Medication 40 MILLIGRAM(S): at 05:04

## 2019-09-05 RX ADMIN — MIDODRINE HYDROCHLORIDE 5 MILLIGRAM(S): 2.5 TABLET ORAL at 05:04

## 2019-09-05 RX ADMIN — HEPARIN SODIUM 5000 UNIT(S): 5000 INJECTION INTRAVENOUS; SUBCUTANEOUS at 13:45

## 2019-09-05 RX ADMIN — Medication 650 MILLIGRAM(S): at 02:26

## 2019-09-05 RX ADMIN — Medication 2 CAPSULE(S): at 11:49

## 2019-09-05 RX ADMIN — PANTOPRAZOLE SODIUM 40 MILLIGRAM(S): 20 TABLET, DELAYED RELEASE ORAL at 05:05

## 2019-09-05 RX ADMIN — HEPARIN SODIUM 5000 UNIT(S): 5000 INJECTION INTRAVENOUS; SUBCUTANEOUS at 05:04

## 2019-09-05 RX ADMIN — Medication 10 MILLIEQUIVALENT(S): at 11:49

## 2019-09-05 RX ADMIN — Medication 1 TABLET(S): at 11:49

## 2019-09-05 RX ADMIN — MIDODRINE HYDROCHLORIDE 5 MILLIGRAM(S): 2.5 TABLET ORAL at 17:39

## 2019-09-05 NOTE — PROGRESS NOTE ADULT - SUBJECTIVE AND OBJECTIVE BOX
Patient is a 63y old  Male who presents with a chief complaint of increased leg edema. (04 Sep 2019 06:57)    INTERVAL/OVERNIGHT EVENTS:    Patient examined at beside. No acute events over night as per nursing staff. Patient refers c/o abdominal distension, LE edema midly pain full edema. Reports is voiding actively and last BM  on yesterday. Pt went for IR guided paracentesis no complications were reported, 760ml were drained and samples were sent    Patient denies chest pain, calf pain, abdominal pain, headaches, fever, chills, dysuria, hematuria, diarrhea, constipation, SOB, palpitations. Rest of ROS not contributory except for above.    Vital Signs Last 24 Hrs  T(C): 36.8 (04 Sep 2019 13:53), Max: 36.8 (04 Sep 2019 13:53)  T(F): 98.2 (04 Sep 2019 13:53), Max: 98.2 (04 Sep 2019 13:53)  HR: 97 (04 Sep 2019 13:53) (88 - 102)  BP: 148/87 (04 Sep 2019 13:53) (109/74 - 148/87)  RR: 18 (04 Sep 2019 13:53) (15 - 18)  SpO2: 94% (04 Sep 2019 13:53) (93% - 95%)    General: NAD.   HEENT: AT, NC. PERRL. intact EOM. no throat erythema or exudate.   Neck: supple. no JVD.  Chest: some basal crackles bilaterally, air entry is fair and no inter costal retractions.    Heart: S1,S2. RRR. systolic heart murmur. 2 to 3 + edema of B/L LE.   Abdomen: soft. + BS, mid abdominal distention , moderate noted, small umbilical hernia noted, reducible, non tender.   Ext: no calf tenderness. moves all ext. without restriction.   Neuro: AAO x3. no focal weakness. no speech disorder. CNs intact.  Skin: no rash noted, no diaphoresis, no cyanosis.   Psych : normal affect, no si/hi.      LABS                          12.0   2.72  )-----------( 177      ( 04 Sep 2019 11:51 )             36.8         09-04    134<L>  |  94<L>  |  13.0  ----------------------------<  157<H>  4.1   |  31.0<H>  |  0.58    Ca    8.8      04 Sep 2019 11:51    TPro  5.8<L>  /  Alb  2.8<L>  /  TBili  0.8  /  DBili  x   /  AST  70<H>  /  ALT  38  /  AlkPhos  424<H>  09-04      CARDIAC MARKERS ( 03 Sep 2019 16:00 )  x     / <0.01 ng/mL / 32 U/L / x     / x            LIVER FUNCTIONS - ( 04 Sep 2019 11:51 )  Alb: 2.8 g/dL / Pro: 5.8 g/dL / ALK PHOS: 424 U/L / ALT: 38 U/L / AST: 70 U/L / GGT: x             PT/INR - ( 03 Sep 2019 16:00 )   PT: 15.0 sec;   INR: 1.29 ratio         PTT - ( 03 Sep 2019 16:00 )  PTT:33.2 sec      MEDICATIONS  (STANDING):  aspirin enteric coated 81 milliGRAM(s) Oral daily  furosemide   Injectable 40 milliGRAM(s) IV Push every 12 hours  heparin  Injectable 5000 Unit(s) SubCutaneous every 8 hours  midodrine. 5 milliGRAM(s) Oral three times a day  multivitamin 1 Tablet(s) Oral daily  pancrelipase  (CREON 36,000 Lipase Units) 2 Capsule(s) Oral three times a day with meals  pancrelipase  (CREON 36,000 Lipase Units) 1 Capsule(s) Oral daily  pantoprazole    Tablet 40 milliGRAM(s) Oral before breakfast  potassium chloride    Tablet ER 10 milliEquivalent(s) Oral daily  tamsulosin 0.4 milliGRAM(s) Oral at bedtime    MEDICATIONS  (PRN):  ALBUTerol/ipratropium for Nebulization 3 milliLiter(s) Nebulizer every 6 hours PRN Shortness of Breath and/or Wheezing
INTERVAL HPI/OVERNIGHT EVENTS: s/p paracentesis neg for SBP. No abdominal pain.     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19  H/O circumcision  S/P ERCP:       Home Medications:  aspirin 81 mg oral delayed release tablet: 1 tab(s) orally once a day (04 Sep 2019 09:01)  atorvastatin 10 mg oral tablet: 1 tab(s) orally once a day (at bedtime) (04 Sep 2019 09:01)  Creon 36,000 units oral delayed release capsule: 2 capsules with every meal, 1 capsule with every snack (04 Sep 2019 09:01)  dicyclomine 10 mg oral capsule: 2 cap(s) orally 4 times a day, As Needed (04 Sep 2019 09:01)  ertapenem 1 g injection: 1 gram(s) injectable once a day  until  (04 Sep 2019 09:01)  Flomax 0.4 mg oral capsule: 1 cap(s) orally once a day (at bedtime) (2019 16:27)  furosemide 20 mg oral tablet: 1 tab(s) orally once a day, As Needed (2019 19:55)  loperamide 2 mg oral capsule: 1 cap(s) orally every 4 hours, As Needed (2019 19:55)  pantoprazole 40 mg oral delayed release tablet: 1 tab(s) orally once a day in am (2019 16:27)  potassium chloride 20 mEq oral tablet, extended release: 1 tab(s) orally once a day as needed on days furosemide is taken (2019 16:27)  Senna 8.6 mg oral tablet: 2 tab(s) orally once a day, As Needed (2019 16:27)  SUNItinib 37.5 mg oral capsule: 1 cap(s) orally once a day (2019 19:55)      MEDICATIONS  (STANDING):  aspirin enteric coated 81 milliGRAM(s) Oral daily  furosemide   Injectable 40 milliGRAM(s) IV Push every 12 hours  heparin  Injectable 5000 Unit(s) SubCutaneous every 8 hours  midodrine. 5 milliGRAM(s) Oral three times a day  multivitamin 1 Tablet(s) Oral daily  pancrelipase  (CREON 36,000 Lipase Units) 2 Capsule(s) Oral three times a day with meals  pancrelipase  (CREON 36,000 Lipase Units) 1 Capsule(s) Oral daily  pantoprazole    Tablet 40 milliGRAM(s) Oral before breakfast  potassium chloride    Tablet ER 10 milliEquivalent(s) Oral daily  tamsulosin 0.4 milliGRAM(s) Oral at bedtime    MEDICATIONS  (PRN):  ALBUTerol/ipratropium for Nebulization 3 milliLiter(s) Nebulizer every 6 hours PRN Shortness of Breath and/or Wheezing      Allergies    No Known Allergies    Intolerances    lactose (Unknown)  Send Ensure Pudding TID, Gelatein TID- RD OK (Unknown)        PHYSICAL EXAM:   Vital Signs:  Vital Signs Last 24 Hrs  T(C): 36.7 (04 Sep 2019 23:54), Max: 36.8 (04 Sep 2019 13:53)  T(F): 98.1 (04 Sep 2019 23:54), Max: 98.2 (04 Sep 2019 13:53)  HR: 105 (05 Sep 2019 04:40) (97 - 108)  BP: 101/65 (05 Sep 2019 04:40) (101/65 - 148/87)  BP(mean): --  RR: 18 (04 Sep 2019 23:54) (18 - 19)  SpO2: 93% (04 Sep 2019 23:54) (92% - 94%)  Daily     Daily Weight in k.7 (04 Sep 2019 15:23)    GENERAL:  no distress  HEENT:  NC/AT,  anicteric  CHEST:   no increased effort, breath sounds clear  HEART:  Regular rhythm  ABDOMEN:  Soft, non-tender, non-distended, normoactive bowel sounds  EXTREMITIES  no cyanosis      LABS:                        12.0   2.72  )-----------( 177      ( 04 Sep 2019 11:51 )             36.8       Hemoglobin: 12.0 g/dL (19 @ 11:51)  Hemoglobin: 12.2 g/dL (19 @ 16:00)          134<L>  |  94<L>  |  13.0  ----------------------------<  157<H>  4.1   |  31.0<H>  |  0.58    Ca    8.8      04 Sep 2019 11:51    TPro  5.8<L>  /  Alb  2.8<L>  /  TBili  0.8  /  DBili  x   /  AST  70<H>  /  ALT  38  /  AlkPhos  424<H>        INR: 1.29 ratio (19 @ 16:00)      Aspartate Aminotransferase (AST/SGOT): 70 U/L (19 @ 11:51)  Alkaline Phosphatase, Serum: 424 U/L (19 @ 11:51)  Alanine Aminotransferase (ALT/SGPT): 38 U/L (19 @ 11:51)  Alkaline Phosphatase, Serum: 365 U/L (19 @ 16:00)  Alanine Aminotransferase (ALT/SGPT): 33 U/L (19 @ 16:00)  Aspartate Aminotransferase (AST/SGOT): 41 U/L (19 @ 16:00)      RADIOLOGY & ADDITIONAL TESTS:  < from: CT Abdomen and Pelvis w/ IV Cont (19 @ 19:02) >  FINDINGS:    The lower chest is remarkable for moderate bilateral pleural effusions   likely increased from the prior study. There is coronary artery   calcification..    The liver is remarkable for small size and nodular contour.. There is   evidence of previous Whipple procedure with cholecystectomy, air in the   biliary tree and stentin the pancreatic duct. Hepatic veins demonstrate   a normal enhancement pattern. Portal veins enhance normally again with   focal narrowing of the proximal main portal vein similar to the prior   examination. There are multiple surgical clips in theupper abdomen   consistent with the Whipple procedure. The spleen  and adrenal glands are   unremarkable. Horseshoe kidney is again seen without hydronephrosis or   urolithiasis. The bladder is partially distended and grossly unremarkable.    There is no bowel obstruction. There is no intraperitoneal free air.    There is ascites and mesenteric edema. The aorta is not aneurysmal. There   is atherosclerotic calcification of the abdominal aorta and its branches.    There is lymphadenopathy again seen in the upper abdomen around the   celiac axis and in the superior retroperitoneal region. The pelvic   structures are unremarkable.    The osseous structures are remarkable for degenerative changes of the   spine    IMPRESSION:    Moderate bilateral pleural effusions have increased from the prior study.  Ascites increased from the prior study  Status post Whipple procedure with pancreatic stent and intrahepatic   biliary air  Nodular liver contour again noted  No change focal narrowing of the main portal vein  No change upper abdominal and upper retroperitoneal lymphadenopathy.  Horseshoe kidney is again appreciated    < end of copied text >
Patient is a 63y old  Male who presents with a chief complaint of increased leg edema. (04 Sep 2019 06:57)    INTERVAL/OVERNIGHT EVENTS:    Patient examined at beside. No acute events over night as per nursing staff. Patient refers c/o abdominal distension, LE edema midly pain full edema. Reports is voiding actively and last BM 2 days ago. Pt went for IR guided paracentesis no complications were reported, 760ml were drained and samples were sent    Patient denies chest pain, calf pain, abdominal pain, headaches, fever, chills, dysuria, hematuria, diarrhea, constipation, SOB, palpitations. Rest of ROS not contributory except for above.    Vital Signs Last 24 Hrs  T(C): 36.7 (04 Sep 2019 23:54), Max: 36.8 (04 Sep 2019 13:53)  T(F): 98.1 (04 Sep 2019 23:54), Max: 98.2 (04 Sep 2019 13:53)  HR: 105 (05 Sep 2019 04:40) (97 - 108)  BP: 101/65 (05 Sep 2019 04:40) (101/65 - 148/87)  RR: 18 (04 Sep 2019 23:54) (18 - 19)  SpO2: 93% (04 Sep 2019 23:54) (92% - 94%)    General: NAD.   HEENT: AT, NC. PERRL. intact EOM. no throat erythema or exudate.   Neck: supple. no JVD.  Chest: some basal crackles bilaterally, air entry is fair and no inter costal retractions.    Heart: S1,S2. RRR. systolic heart murmur. 2 to 3 + edema of B/L LE.   Abdomen: soft. + BS, mid abdominal distention , moderate noted, small umbilical hernia noted, reducible, non tender.   Ext: no calf tenderness. moves all ext. without restriction.   Neuro: AAO x3. no focal weakness. no speech disorder. CNs intact.  Skin: no rash noted, no diaphoresis, no cyanosis.   Psych : normal affect, no si/hi.      LABS                        12.0   2.72  )-----------( 177      ( 04 Sep 2019 11:51 )             36.8         09-04    134<L>  |  94<L>  |  13.0  ----------------------------<  157<H>  4.1   |  31.0<H>  |  0.58    Ca    8.8      04 Sep 2019 11:51    TPro  5.8<L>  /  Alb  2.8<L>  /  TBili  0.8  /  DBili  x   /  AST  70<H>  /  ALT  38  /  AlkPhos  424<H>  09-04      CARDIAC MARKERS ( 03 Sep 2019 16:00 )  x     / <0.01 ng/mL / 32 U/L / x     / x            LIVER FUNCTIONS - ( 04 Sep 2019 11:51 )  Alb: 2.8 g/dL / Pro: 5.8 g/dL / ALK PHOS: 424 U/L / ALT: 38 U/L / AST: 70 U/L / GGT: x             PT/INR - ( 03 Sep 2019 16:00 )   PT: 15.0 sec;   INR: 1.29 ratio         PTT - ( 03 Sep 2019 16:00 )  PTT:33.2 sec      MEDICATIONS  (STANDING):  aspirin enteric coated 81 milliGRAM(s) Oral daily  furosemide   Injectable 40 milliGRAM(s) IV Push every 12 hours  heparin  Injectable 5000 Unit(s) SubCutaneous every 8 hours  midodrine. 5 milliGRAM(s) Oral three times a day  multivitamin 1 Tablet(s) Oral daily  pancrelipase  (CREON 36,000 Lipase Units) 2 Capsule(s) Oral three times a day with meals  pancrelipase  (CREON 36,000 Lipase Units) 1 Capsule(s) Oral daily  pantoprazole    Tablet 40 milliGRAM(s) Oral before breakfast  potassium chloride    Tablet ER 10 milliEquivalent(s) Oral daily  tamsulosin 0.4 milliGRAM(s) Oral at bedtime    MEDICATIONS  (PRN):  ALBUTerol/ipratropium for Nebulization 3 milliLiter(s) Nebulizer every 6 hours PRN Shortness of Breath and/or Wheezing

## 2019-09-05 NOTE — PROGRESS NOTE ADULT - ASSESSMENT
A 63-year-old man with PMH of Pancreatic cancer s/p wipple procedure in 2017, BPH, presents to ED c/o of worsening lower extremity edema admitted for anasarca and worsening ascites.      anasarca, Ascitics secondary to Hypoalbuminemia /Bilateral Lower Extremity Edema/B/L pleural effusion   -Likely multifactorial and liver congestion related.    -Likely 2/2 low albumin levels, might be causing low oncotic pressure and extravasation of fluids  -TTE from  showed EF 55 to 60%. repeat tte pending   -will continue with lasix 40 mg iv q 12hrs.   -Monitor I/O,   -Daily weights   -IR guided done this mornin cc were drained, sample sent to be analyzed   -Well repeat CXR as needed     Pancreatic cancer s/p Wipple procedure   -Follow up with his oncologist, Dr. Vela. next appointment in 1 week   -Last chemo was about 3 weeks ago as per pt.     Hypoalbuminemia / severe protein andreina malnutrition   -Will start Albumin infusion   -Nutritionist consult placed  -MVI daily     BPH  -C/w Flomax    Prophylaxis   -Heparin 5000u SC q8h     Dispo  Likely to be d/c in the next 3-5 days, when medically stable A 63-year-old man with PMH of Pancreatic cancer s/p wipple procedure in 2017, BPH, presents to ED c/o of worsening lower extremity edema admitted for anasarca and worsening ascites.      anasarca, Ascitics secondary to Hypoalbuminemia /Bilateral Lower Extremity Edema/B/L pleural effusion   -Likely multifactorial and liver congestion related.    -Likely 2/2 low albumin levels, might be causing low oncotic pressure and extravasation of fluids  -TTE from  showed EF 55 to 60%. repeat tte pending   -will continue with lasix 40 mg iv q 12hrs.   -Monitor I/O,   -Daily weights   -IR guided done this mornin cc were drained, sample sent to be analyzed   -Well repeat CXR as needed     Pancreatic cancer s/p Wipple procedure   -Follow up with his oncologist, Dr. Vela. next appointment in 1 week   -Last chemo was about 3 weeks ago as per pt.     Hypoalbuminemia / severe protein andreina malnutrition   -Will start Albumin infusion   -Nutritionist consult placed  -MVI daily     BPH  -C/w Flomax    Prophylaxis   -Heparin 5000u SC q8h     Dispo  patient requesting to be dcd  home.

## 2019-09-05 NOTE — DISCHARGE NOTE PROVIDER - NSDCFUSCHEDAPPT_GEN_ALL_CORE_FT
JENIFER NORTH ; 09/11/2019 ; NPP Rad [facility] [address]   JENIFER NORTH ; 09/23/2019 ; NPP [facility]  JENIFER NORTH ; 09/23/2019 ; NPP [facility]  JENIFER NORTH ; 09/24/2019 ; NPP G[facility] [address] JENIFER NORTH ; 09/11/2019 ; NPP [facility] [address]   JENIFER NORTH ; 09/23/2019 ; NPP [facility]  JENIFER NORTH ; 09/23/2019 ; NPP [facility]  JENIFER NORTH ; 09/24/2019 ; NPP G[facility] [address] JENIFER NORTH ; 09/11/2019 ; NPP Rad Med [address]   JENIFER NORTH ; 09/23/2019 ; NPP [facility]  JENIFER NORTH ; 09/23/2019 ; [facility]PP [facility]  JENIFER NORTH ; 09/24/2019 ; NPP G[facility] [address] JENIFER NORTH ; 09/11/2019 ; NPP Rad Med [address]   JENIFER NORTH ; 09/23/2019 ; [facility]PP [facility]  JENIFER NORTH ; 09/23/2019 ; [facility]  JENIFER NORTH ; 09/24/2019 ; NPP G[facility] [address]

## 2019-09-05 NOTE — DISCHARGE NOTE NURSING/CASE MANAGEMENT/SOCIAL WORK - PATIENT PORTAL LINK FT
You can access the FollowMyHealth Patient Portal offered by [facility] by registering at the following website: [email or web address]. By joining [facility] FollowMyHealth portal, you will also be able to view your health information using other applications (apps) compatible with our system.

## 2019-09-05 NOTE — PHYSICAL THERAPY INITIAL EVALUATION ADULT - CRITERIA FOR SKILLED THERAPEUTIC INTERVENTIONS
functional limitations in following categories/predicted duration of therapy intervention/rehab potential/therapy frequency/anticipated discharge recommendation/impairments found

## 2019-09-05 NOTE — PROGRESS NOTE ADULT - ASSESSMENT
63-year-old man with pancreas cancer presenting with anasarca and worsening ascites.   s/p paracentesis. Neg for SBP SAAG 1.9 and low total protein    Recommendations:  - F/U CA 19-9  - F/U cytology from fluid  - Continue Creon    Thanks

## 2019-09-05 NOTE — DISCHARGE NOTE PROVIDER - HOSPITAL COURSE
A 63-year-old man with PMH of Pancreatic cancer s/p wipple procedure in 2017, BPH, presents to ED c/o of worsening lower extremity edema admitted for anasarca and worsening ascites.      Fluid overload multifactorial likely in the setting of liver congestion and hypoalbuminemia     CT abdomen and pelvis showed moderate b/l pleural effusions, ascitis and horshoe kidney that has been already visualized in prior studies     Active diuresis initiated with Lasix 40 BID, I&O and daily weight were closely monitored.     Given increased of abdominal distention, IR consulted, performed a US guided paracentesis, 760 cc of fluid were drained, no complications were reported.    Since patient has been already diagnosed with Pancreatic cancer and has underwent to chemotherapy, we strongly encouraged him to continue to follow up with his outpatient Hem-Onc provider.    Extensive blood work was ordered, including CBC, chemistry, coags. Blood work showed hypoalbuminemia, which might be one the causes for the Anasarca. IV albumin was given, and levels were carefully monitored.    In terms for his chronic BPH, he was continued with his home regimen Flomax, and remained stable while admitted         All electrolyte abnormalities were monitored carefully and corrected as necessary during this hospitalization. At the time of discharge patient was hemodynamically stable and amenable to all terms of discharge. The patient has received verbal instructions from myself regarding discharge plans.         Vital Signs Last 24 Hrs    T(C): 36.7 (05 Sep 2019 07:30), Max: 36.7 (04 Sep 2019 23:54)    T(F): 98.1 (05 Sep 2019 07:30), Max: 98.1 (04 Sep 2019 23:54)    HR: 105 (05 Sep 2019 07:30) (100 - 108)    BP: 103/66 (05 Sep 2019 07:30) (101/65 - 113/81)    RR: 19 (05 Sep 2019 07:30) (18 - 19)    SpO2: 92% (05 Sep 2019 07:30) (92% - 93%)        General: NAD.     HEENT: AT, NC. PERRL. intact EOM. no throat erythema or exudate.     Neck: supple. no JVD.    Chest: some basal crackles bilaterally, air entry is fair and no inter costal retractions.      Heart: S1,S2. RRR. systolic heart murmur. 2 to 3 + edema of B/L LE.     Abdomen: soft. + BS, mid abdominal distention , moderate noted, small umbilical hernia noted, reducible, non tender.     Ext: no calf tenderness. moves all ext. without restriction.     Neuro: AAO x3. no focal weakness. no speech disorder. CNs intact.    Skin: no rash noted, no diaphoresis, no cyanosis.     Psych : normal affect, no si/hi.            Length of Discharge: 45MIN

## 2019-09-05 NOTE — PHYSICAL THERAPY INITIAL EVALUATION ADULT - GAIT PATTERN USED, PT EVAL
decreased gait velocity and activity tolerance, decreased ruby step length, incidental standing rest breaks due to pain

## 2019-09-05 NOTE — DISCHARGE NOTE PROVIDER - CARE PROVIDER_API CALL
Mervat Vela)  HematologyOncology; HospicePalliative Medicine; Internal Medicine  [address] [place] [address]  Phone: [phone]  Fax: [phone]  Follow Up Time: 1-3 days    Emily Moran)  Family Medicine  [address] [place] [address]  Phone: [phone]  Fax: [phone]  Follow Up Time: 1-3 days

## 2019-09-05 NOTE — PROGRESS NOTE ADULT - ATTENDING COMMENTS
plan of care discussed with patient and dtr at bedside.   patient requesting to be dcd home.   discussed with gi,   patient clear for dc.   patien tot follow up as op with gi and oncology

## 2019-09-05 NOTE — DISCHARGE NOTE PROVIDER - PROVIDER TOKENS
PROVIDER:[TOKEN:[[id number]],FOLLOWUP:[1-3 days]],PROVIDER:[TOKEN:[[id number]],FOLLOWUP:[1-3 days]]

## 2019-09-05 NOTE — DISCHARGE NOTE PROVIDER - NSDCCPCAREPLAN_GEN_ALL_CORE_FT
PRINCIPAL DISCHARGE DIAGNOSIS  Diagnosis: Ascites  Assessment and Plan of Treatment: Ascites can also manifest as a result of cancers , called malignant ascites. This type of ascites is typically a manifestation of advanced cancers of the organs in the abdominal cavity, such as, colon cancer , pancreatic cancer , stomach cancer , breast cancer , lymphoma , lung cancer , or ovarian cancer . Pancreatic ascites can be seen in people with chronic (long standing) pancreatitis or inflammation of the pancreas.      SECONDARY DISCHARGE DIAGNOSES  Diagnosis: Hypoalbuminemia  Assessment and Plan of Treatment: You were found to have a low Albumin levels in your body. Albumin infusion was administered    Diagnosis: Benign prostatic hyperplasia, unspecified whether lower urinary tract symptoms present  Assessment and Plan of Treatment: Goal: Prevent urinary  obstruction  - If you experience decrease urine output despite adequate oral hydration, please contact your doctor immediately.  - Take medication as prescribed  - Follow up with primary care physician    Diagnosis: Leg edema  Assessment and Plan of Treatment: Peripheral Edema  Peripheral edema is swelling that is caused by a buildup of fluid. Peripheral edema most often affects the lower legs, ankles, and feet. It can also develop in the arms, hands, and face. The area of the body that has peripheral edema will look swollen. It may also feel heavy or warm. Your clothes may start to feel tight. Pressing on the area may make a temporary dent in your skin. You may not be able to move your arm or leg as much as usual.   There are many causes of peripheral edema. It can be a complication of other diseases, such as congestive heart failure, kidney disease, or a problem with your blood circulation. It also can be a side effect of certain medicines. It often happens to women during pregnancy. Sometimes, the cause is not known. Treating the underlying condition is often the only treatment for peripheral edema.  HOME CARE INSTRUCTIONS  Pay attention to any changes in your symptoms. Take these actions to help with your discomfort:  Raise (elevate) your legs while you are sitting or lying down.  Move around often to prevent stiffness and to lessen swelling. Do not sit or stand for long periods of time.  Wear support stockings as told by your health care provider.  Follow instructions from your health care provider about limiting salt (sodium) in your diet. Sometimes eating less salt can reduce swelling.  Take over-the-counter and prescription medicines only as told by your health care provider. Your health care provider may prescribe medicine to help your body get rid of excess water (diuretic).  Keep all follow-up visits as told by your health care provider. This is important.    Diagnosis: Pancreatic cancer  Assessment and Plan of Treatment: Continue to follow up with an Oncologist (cancer specialist) in order to continue the discussion regarding treatment options and to begin/continue receiving therapy.

## 2019-09-05 NOTE — DISCHARGE NOTE PROVIDER - CARE PROVIDERS DIRECT ADDRESSES
[email or web address]

## 2019-09-09 LAB
CULTURE RESULTS: SIGNIFICANT CHANGE UP
SPECIMEN SOURCE: SIGNIFICANT CHANGE UP

## 2019-09-09 PROCEDURE — 83735 ASSAY OF MAGNESIUM: CPT

## 2019-09-09 PROCEDURE — 83880 ASSAY OF NATRIURETIC PEPTIDE: CPT

## 2019-09-09 PROCEDURE — 97163 PT EVAL HIGH COMPLEX 45 MIN: CPT

## 2019-09-09 PROCEDURE — P9045: CPT

## 2019-09-09 PROCEDURE — 87206 SMEAR FLUORESCENT/ACID STAI: CPT

## 2019-09-09 PROCEDURE — 87075 CULTR BACTERIA EXCEPT BLOOD: CPT

## 2019-09-09 PROCEDURE — 85027 COMPLETE CBC AUTOMATED: CPT

## 2019-09-09 PROCEDURE — 83615 LACTATE (LD) (LDH) ENZYME: CPT

## 2019-09-09 PROCEDURE — 89051 BODY FLUID CELL COUNT: CPT

## 2019-09-09 PROCEDURE — 96374 THER/PROPH/DIAG INJ IV PUSH: CPT

## 2019-09-09 PROCEDURE — 80053 COMPREHEN METABOLIC PANEL: CPT

## 2019-09-09 PROCEDURE — 87102 FUNGUS ISOLATION CULTURE: CPT

## 2019-09-09 PROCEDURE — 84134 ASSAY OF PREALBUMIN: CPT

## 2019-09-09 PROCEDURE — 93005 ELECTROCARDIOGRAM TRACING: CPT

## 2019-09-09 PROCEDURE — 76942 ECHO GUIDE FOR BIOPSY: CPT

## 2019-09-09 PROCEDURE — 36415 COLL VENOUS BLD VENIPUNCTURE: CPT

## 2019-09-09 PROCEDURE — 85730 THROMBOPLASTIN TIME PARTIAL: CPT

## 2019-09-09 PROCEDURE — 82042 OTHER SOURCE ALBUMIN QUAN EA: CPT

## 2019-09-09 PROCEDURE — 86803 HEPATITIS C AB TEST: CPT

## 2019-09-09 PROCEDURE — 87040 BLOOD CULTURE FOR BACTERIA: CPT

## 2019-09-09 PROCEDURE — 87015 SPECIMEN INFECT AGNT CONCNTJ: CPT

## 2019-09-09 PROCEDURE — 85610 PROTHROMBIN TIME: CPT

## 2019-09-09 PROCEDURE — 87116 MYCOBACTERIA CULTURE: CPT

## 2019-09-09 PROCEDURE — 80048 BASIC METABOLIC PNL TOTAL CA: CPT

## 2019-09-09 PROCEDURE — 87070 CULTURE OTHR SPECIMN AEROBIC: CPT

## 2019-09-09 PROCEDURE — 93970 EXTREMITY STUDY: CPT

## 2019-09-09 PROCEDURE — 84157 ASSAY OF PROTEIN OTHER: CPT

## 2019-09-09 PROCEDURE — 71045 X-RAY EXAM CHEST 1 VIEW: CPT

## 2019-09-09 PROCEDURE — 87205 SMEAR GRAM STAIN: CPT

## 2019-09-09 PROCEDURE — 74177 CT ABD & PELVIS W/CONTRAST: CPT

## 2019-09-09 PROCEDURE — 84100 ASSAY OF PHOSPHORUS: CPT

## 2019-09-09 PROCEDURE — 99285 EMERGENCY DEPT VISIT HI MDM: CPT | Mod: 25

## 2019-09-09 PROCEDURE — 82550 ASSAY OF CK (CPK): CPT

## 2019-09-09 PROCEDURE — C1729: CPT

## 2019-09-09 PROCEDURE — 84484 ASSAY OF TROPONIN QUANT: CPT

## 2019-09-10 LAB — NON-GYNECOLOGICAL CYTOLOGY STUDY: SIGNIFICANT CHANGE UP

## 2019-09-11 ENCOUNTER — APPOINTMENT (OUTPATIENT)
Dept: RADIATION ONCOLOGY | Facility: CLINIC | Age: 64
End: 2019-09-11

## 2019-09-11 ENCOUNTER — RX RENEWAL (OUTPATIENT)
Age: 64
End: 2019-09-11

## 2019-09-11 ENCOUNTER — RESULT REVIEW (OUTPATIENT)
Age: 64
End: 2019-09-11

## 2019-09-11 ENCOUNTER — APPOINTMENT (OUTPATIENT)
Dept: HEMATOLOGY ONCOLOGY | Facility: CLINIC | Age: 64
End: 2019-09-11

## 2019-09-11 VITALS
DIASTOLIC BLOOD PRESSURE: 69 MMHG | OXYGEN SATURATION: 90 % | HEART RATE: 97 BPM | SYSTOLIC BLOOD PRESSURE: 90 MMHG | HEIGHT: 66 IN

## 2019-09-11 DIAGNOSIS — C25.9 MALIGNANT NEOPLASM OF PANCREAS, UNSPECIFIED: ICD-10-CM

## 2019-09-11 DIAGNOSIS — G89.3 NEOPLASM RELATED PAIN (ACUTE) (CHRONIC): ICD-10-CM

## 2019-09-11 LAB
BASOPHILS # BLD AUTO: 0 K/UL — SIGNIFICANT CHANGE UP (ref 0–0.2)
BASOPHILS NFR BLD AUTO: 0.2 % — SIGNIFICANT CHANGE UP (ref 0–2)
EOSINOPHIL # BLD AUTO: 0 K/UL — SIGNIFICANT CHANGE UP (ref 0–0.5)
EOSINOPHIL NFR BLD AUTO: 0.3 % — SIGNIFICANT CHANGE UP (ref 0–6)
HCT VFR BLD CALC: 33.8 % — LOW (ref 39–50)
HGB BLD-MCNC: 11.5 G/DL — LOW (ref 13–17)
LYMPHOCYTES # BLD AUTO: 0.3 K/UL — LOW (ref 1–3.3)
LYMPHOCYTES # BLD AUTO: 5 % — LOW (ref 13–44)
MCHC RBC-ENTMCNC: 33.9 G/DL — SIGNIFICANT CHANGE UP (ref 32–36)
MCHC RBC-ENTMCNC: 34.3 PG — HIGH (ref 27–34)
MCV RBC AUTO: 101.1 FL — HIGH (ref 80–100)
MONOCYTES # BLD AUTO: 0.5 K/UL — SIGNIFICANT CHANGE UP (ref 0–0.9)
MONOCYTES NFR BLD AUTO: 9.8 % — SIGNIFICANT CHANGE UP (ref 2–14)
NEUTROPHILS # BLD AUTO: 4.4 K/UL — SIGNIFICANT CHANGE UP (ref 1.8–7.4)
NEUTROPHILS NFR BLD AUTO: 84.7 % — HIGH (ref 43–77)
PLATELET # BLD AUTO: 331 K/UL — SIGNIFICANT CHANGE UP (ref 150–400)
RBC # BLD: 3.34 M/UL — LOW (ref 4.2–5.8)
RBC # FLD: 16 % — HIGH (ref 10.3–14.5)
WBC # BLD: 5.3 K/UL — SIGNIFICANT CHANGE UP (ref 3.8–10.5)
WBC # FLD AUTO: 5.3 K/UL — SIGNIFICANT CHANGE UP (ref 3.8–10.5)

## 2019-09-11 RX ORDER — SPIRONOLACTONE 50 MG/1
50 TABLET ORAL DAILY
Qty: 30 | Refills: 0 | Status: ACTIVE | COMMUNITY
Start: 2019-09-11 | End: 1900-01-01

## 2019-09-11 RX ORDER — LORAZEPAM 0.5 MG/1
0.5 TABLET ORAL
Qty: 30 | Refills: 0 | Status: DISCONTINUED | COMMUNITY
Start: 2019-09-11 | End: 2019-09-11

## 2019-09-11 RX ORDER — FENTANYL 50 UG/H
50 PATCH, EXTENDED RELEASE TRANSDERMAL
Qty: 10 | Refills: 0 | Status: DISCONTINUED | COMMUNITY
Start: 2018-10-04 | End: 2019-09-11

## 2019-09-11 RX ORDER — OXYCODONE 10 MG/1
10 TABLET ORAL
Qty: 150 | Refills: 0 | Status: ACTIVE | COMMUNITY
Start: 2019-09-11 | End: 1900-01-01

## 2019-09-11 RX ORDER — FENTANYL 75 UG/H
75 PATCH, EXTENDED RELEASE TRANSDERMAL
Qty: 10 | Refills: 0 | Status: ACTIVE | COMMUNITY
Start: 2019-09-11 | End: 1900-01-01

## 2019-09-23 ENCOUNTER — APPOINTMENT (OUTPATIENT)
Age: 64
End: 2019-09-23

## 2019-09-23 ENCOUNTER — APPOINTMENT (OUTPATIENT)
Dept: HEMATOLOGY ONCOLOGY | Facility: CLINIC | Age: 64
End: 2019-09-23

## 2019-09-24 ENCOUNTER — APPOINTMENT (OUTPATIENT)
Dept: GASTROENTEROLOGY | Facility: CLINIC | Age: 64
End: 2019-09-24

## 2019-09-24 LAB
CULTURE RESULTS: SIGNIFICANT CHANGE UP
SPECIMEN SOURCE: SIGNIFICANT CHANGE UP

## 2019-10-04 ENCOUNTER — APPOINTMENT (OUTPATIENT)
Dept: HEMATOLOGY ONCOLOGY | Facility: CLINIC | Age: 64
End: 2019-10-04
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
## 2019-10-09 NOTE — RESULTS/DATA
[FreeTextEntry1] : 9/3/19 CT: Moderate bilateral effusions have increased, ascites increased. no changes reported in nodular liver, abdominal and upper retroperitoneal lymphadenopathy. narrowing of proximal main portal vein unchanged. \par \par 6/10/19 CT C/A/P: No evidence of metastatic disease in the thorax. Interval development of trace bilateral pleural effusions. Stable soft tissue surrounding the proximal superior mesenteric artery and interposed between the celiac axis and portal vein which is narrowed. Increase in the small amount of perisplenic ascites. Small amount of perihepatic ascites and moderate amount of pelvic ascites is stable. Interval development of thickening of areas of the colon as described above suggesting colitis.\par \par 4/16/19 CT C/A/P: \par -No evidence of metastatic disease in the thorax\par -Stable disease with soft tissue surrounding the proximal mesenteric artery and interposed between the celiac axis and portal vein\par -Previously identified colitis has resolved. \par \par CT C/A/P 1/23/18:\par No evidence of recurrent or metastatic disease in the chest, abdomen, or pelvis. \par Stable postsurgical anatomy status post Whipple procedure. No evidence of bowel or gastric outlet obstruction. Pancreatic duct stent in place without \par pancreatic duct or biliary dilatation. \par 2 tiny foci of extraluminal air in the right upper quadrant along a prior MUNA drain tract. This is likely secondary to recent drain removal. No drainable fluid collections. \par Small amount of nonocclusive thrombus at the tip of the port catheter within the right brachiocephalic vein. \par

## 2019-10-09 NOTE — ASSESSMENT
[FreeTextEntry1] : 64 y/o gentleman with Factor V Leiden mutation and stage IIB pancreatic cancer (T3N1) with positive CBD margin with peripancreatic soft tissue invasion, s/p Whipple on 11/30/17.  S/p 7 cycles of gemcitabine + Xeloda [2/7/18-6/13/18]. Post chemotherapy scans, 7/2/18 CT scan with local recurrence. Completed chemoradiation with Xeloda from 7/24/18 - 8/30/18.  Foundation One testing with no actionable mutations, MSI-stable. \par 9/20/18 restaging CT scans with POD in upper abdomen and possible colitis. Started liposomal irinotecan 70 mg/m2 w/ Leucovorin 400 mg/m2 and continuous infusion 5 FU 2400 mg/m2 over 46 hrs every 2 weeks on 10/16/18-5/7/19.\par \par 6/10/19 CT scan with no overt progression of disease, but new mild perisplenic ascites, however his  continues to trend up. Findings are worrisome for POD but not yet radiographically evident, possibly peritoneal disease. Recommended Sutent as per Rowena et.al. trial. Began Sutent 37.5 mg daily on 6/25/19, but question actual start date 7/02/19.  Admitted to [facility] 7/05 - 7/10/19 w/ bacteremia sent to MICU on 7/5 with septic shock. He required vasopressor therapy after failed fluid resuscitation. Blood cultures showed e. Coli bacteremia. Discharged on IV antibx via PICC line, to continue through 7/23/19. Has f/u w/ ID on 7/24/19. Per Dr. Ramos,  completed 2 weeks ertapenem after first negative blood culture. Midline from left arm was removed with no bleeding or complication. Source of infection was colitis and mucositis, repeat culture neg so port can be used with no problem. Placed on probiotic therapy and continue dicyclomine, per GI. \par \par Restarted Sutent on 7/29/19-8/18/19. Self stopped Sutent  (~ day 21) due to severe mouth pain, and painful hands. V/S stable. Grade 3 royal erythema, Plantar grade 1. Grade 2 Decubitus Ulcer to coccyx. His fiance reports he did not want to notify the team as he had f/u on 8/26.\par \par Plan:\par 1. Hold Sutent until LFT's known on 8/26/19. Question need for dose reduction based on PPE and mucositis.\par 2. Grade 2 DU on coccyx, foam cushion applied and discussed at length the need to turn and reposition in bed or chair. Apply bacitracin.\par 3. Call or come in sooner for any concerns.

## 2019-10-09 NOTE — REASON FOR VISIT
[Urgent Visit] : an urgent  [Family Member] : family member [Other: _____] : [unfilled] [FreeTextEntry2] : pancreatic cancer

## 2019-10-09 NOTE — PHYSICAL EXAM
[Capable of only limited self care, confined to bed or chair more than 50% of waking hours] : Status 3- Capable of only limited self care, confined to bed or chair more than 50% of waking hours [Thin] : thin [Normal] : affect appropriate [de-identified] : Sallow appearance to skin. ++ Cachexia, frail appearance, in NAD. [de-identified] : sclera non icteric [de-identified] : + xerostomia, + chelitis on L, no ulceration or white patches. [de-identified] : negative cervical, supra/infraclavicular adenopathy. [de-identified] : slightly diminished in bases, not dull to percussion. [de-identified] : moderate bilateral LE edema (not new). 3+ edema extremities [de-identified] : S1/S2 sl tachy rate [de-identified] : without spinal or CVA tenderness. [de-identified] : BS+, soft, nontender, + ventral hernia- appears larger, soft- not nodular. + Distention [de-identified] : Coccyx- erythemic, w/ area of broken skin (shallow) and skin sloughing, no drainage noted.

## 2019-10-26 LAB
CULTURE RESULTS: SIGNIFICANT CHANGE UP
SPECIMEN SOURCE: SIGNIFICANT CHANGE UP

## 2019-11-13 LAB
ALBUMIN SERPL ELPH-MCNC: 2.8 G/DL
ALP BLD-CCNC: 666 U/L
ALT SERPL-CCNC: 55 U/L
ANION GAP SERPL CALC-SCNC: 11 MMOL/L
AST SERPL-CCNC: 47 U/L
BILIRUB SERPL-MCNC: 0.6 MG/DL
BUN SERPL-MCNC: 24 MG/DL
CALCIUM SERPL-MCNC: 9 MG/DL
CHLORIDE SERPL-SCNC: 84 MMOL/L
CO2 SERPL-SCNC: 32 MMOL/L
CREAT SERPL-MCNC: 0.82 MG/DL
GLUCOSE SERPL-MCNC: 142 MG/DL
MAGNESIUM SERPL-MCNC: 2.3 MG/DL
POTASSIUM SERPL-SCNC: 4.1 MMOL/L
PROT SERPL-MCNC: 5.6 G/DL
SODIUM SERPL-SCNC: 127 MMOL/L

## 2019-11-13 NOTE — HISTORY OF PRESENT ILLNESS
[Disease: _____________________] : Disease: [unfilled] [T: ___] : T[unfilled] [AJCC Stage: ____] : AJCC Stage: [unfilled] [N: ___] : N[unfilled] [de-identified] : Final Diagnosis\par 1. Lymph node at hepatic artery, excision:- One lymph node, negative for carcinoma, (0/1)\par 2. Periportal lymph node, excision:- One lymph node, negative for carcinoma, (0/1)\par 3. Gallbladder, cholecystectomy:- Chronic cholecystitis, negative for carcinoma\par - One lymph node, negative for carcinoma, (0/1)\par 4. Periportal lymph node, #2, excision:- One lymph node, negative for carcinoma, (0/1)\par 5. Pancreas, stomach and duodenum, pancreaticoduodenectomy(Whipple procedure):\par - Invasive adenocarcinoma of pancreas, moderately to poorly\par differentiated,pancreatobiliary type\par - Tumor invading into peripancreatic soft tissue and common bile duct\par - Comment bile duct resection margin, positive for adenocarcinoma, with\par extensive perineural invasion only, see comment- Pancreatic neck resection margin, negative for carcinoma\par - 18 lymph nodes, negative for carcinoma, (0/18)\par - AJCC (7th Ed) staging: pT3N1(2/26), see synoptic report,\par see Part 7 and Part 9\par 6. Portion of pancreas at portal vein #1, excision:- Atrophic pancreatitis, negative for carcinoma\par 7. Portion of pancreas at portal vein #2, excision:- Positive for adenocarcinoma, see comment\par 8. Portion of stent, removal:- Stent, gross examination only\par 9. Uncinate at superior mesenteric artery #1, excision:- Metastatic pancreatic adenocarcinoma present in 2 of 2 lymph nodes, (2/2)\par 10. Uncinate at superior mesenteric artery #2, excision:- Two lymph nodes, negative for carcinoma, (0/2)\par 11. Stomach and bowel at gastrojejunostomy site, excision:- Gastric and small bowel tissue, negative for carcinoma\par  [de-identified] : Mr. Galaviz was diagnosed with pancreatic cancer in October 2017, at age 62 year old, after initially presenting with painless jaundice. EUS and ERCP on 10/31/17 revealed a 2.2 cm pancreatic head mass, with no apparent vessel involvement or lymphadenopathy. The CBD was stented. Final pathology was consistent with adenocarcinoma. EUS and ERCP on 10/31/17, which revealed a 2.2 cm pancreatic head mass, with no apparent vessel involvement or lymphadenopathy. The CBD was stented. Final pathology was consistent with adenocarcinoma. S/p Whipple, cholecystectomy, enterectomy x 2 and removal of CBD stent on 11/30/17. Final pathology was 3.1 cm pancreatic adenocarcinoma invading the nadira pancreatic tissue and CBD, and involving 2/26 lymph nodes (T3N1). The CBD resection margin was positive for adenocarcinoma with extensive nadira neural invasion. Mediport was placed on 1/17/18. \par - CT C/A/P 1/23/18: IMPRESSION: \par   No evidence of recurrent or metastatic disease in the chest, abdomen, or pelvis. \par   Stable postsurgical anatomy status post Whipple procedure. No evidence of bowel or gastric outlet obstruction. Pancreatic duct stent in place without \par   pancreatic duct or biliary dilatation. \par   2 tiny foci of extraluminal air in the right upper quadrant along a prior MUNA drain tract. This is likely secondary to recent drain removal. No drainable fluid collections. \par   Small amount of nonocclusive thrombus at the tip of the port catheter within the right brachiocephalic vein. \par \par 4/16/19 CT C/A/P: \par -No evidence of metastatic disease in the thorax\par -Stable disease with soft tissue surrounding the proximal mesenteric artery and interposed between the celiac axis and portal vein\par -Previously identified colitis has resolved. \par \par Treatment:\par 2/7/18 - 6/13/18 adjuvant chemotherapy with gemcitabine + capecitabine (7 cycles)\par 4/12/18 CT C/A/P: no evidence of recurrent or metastatic disease.\par 7/2/18 CT a/p  : new soft tissue inferior to proximal superior mesenteric artery and to right of celiac axis in patient s/p whipple, suspicious for recurrent disease. \par 7/24/18-8/30/18  chemo-radiation with xeloda\par 9/20/18 CT A/P progression of disease in upper abdomen.\par 10/16/18 tx changed to liposomal irinotecan (70 mg/m2) w/ leucovorin 400 mg/m2 and 5FU 2400 mg/m2 over 46 hrs) every 2 weeks.\par 6/20/19 tx changed to Sutent (started on ~ 7/02/19).\par \par  [de-identified] : Seen by Dr. Ayoub,ID, on 7/24/19 (see excerpt of office note below) and Dr. Evans, GI on 7/26. Has been off Sutent since 7/05/19- when admitted w/ septic bacteremia. Denies fevers, no SOB, no CP, appetite is fair, variant GI symptoms, increased gas,  soft/to loosely formed stools, no pain/burning w/ urination. C/o persistent LE edema. Energy level is fair. The remainder of ROS is negative.\par \par Per Dr. Ramos's office note:\par Has been completed 2 weeks ertapenem after his first negative blood culture. Midline from left arm was removed with no bleeding or complication. \par No more antibiotics. will follow up with GI and oncologist. \par Source of infection was colitis and mucositis, repeat culture neg so port can be used with no problem. \par If any fever or worsening of abdominal pain or diarrhea will go to ED. \par \par Per Dr. Evans's office note:\par I am recommending to start probiotic therapy and continue the dicyclomine. I also recommended to continue the pancreatic enzyme supplementation. He will be following up with hematology to consider the anticancer medications. \par

## 2019-11-13 NOTE — PHYSICAL EXAM
[Ambulatory and capable of all self care but unable to carry out any work activities] : Status 2- Ambulatory and capable of all self care but unable to carry out any work activities. Up and about more than 50% of waking hours [Thin] : thin [Normal] : affect appropriate [de-identified] : negative cervical supra/infraclavicular adenopathy [de-identified] : slightly diminished in bases, not dull to percussion. [de-identified] : Appears thinner, interactive, well groomed, in NAD [de-identified] : S1/S2 sl tachy rate [de-identified] : moderate bilateral LE edema [de-identified] : BS+, soft, nontender, not distended  [de-identified] : without spinal or CVA tenderness.

## 2019-11-13 NOTE — PHYSICAL EXAM
[Completely disabled. Cannot carry on any self care. Totally confined to bed or chair] : Status 4- Completely disabled. Cannot carry on any self care. Totally confined to bed or chair [Thin] : thin [de-identified] : sclera non icteric [Normal] : affect appropriate [de-identified] : Sallow appearance to skin. ++ Cachexia, frail appearance, in NAD. [de-identified] : + xerostomia [de-identified] : S1/S2  [de-identified] : slightly diminished in bases, not dull to percussion. [de-identified] : negative cervical, supra/infraclavicular adenopathy. [de-identified] : without spinal or CVA tenderness. [de-identified] : BS+, soft, nontender, + ventral hernia- appears larger, soft- not nodular. [de-identified] : firm  bilateral LE edema (not new) [de-identified] : Coccyx- erythemic, w/ area of broken skin (shallow) and skin sloughing, no drainage noted.

## 2019-11-13 NOTE — ASSESSMENT
[FreeTextEntry1] : 64 y/o gentleman with Factor V Leiden mutation and stage IIB pancreatic cancer (T3N1) with positive CBD margin with peripancreatic soft tissue invasion, s/p Whipple on 11/30/17.  S/p 7 cycles of gemcitabine + Xeloda.  Post chemotherapy scans, 7/2/18 CT scan with local recurrence. Completed chemoradiation with Xeloda from 7/24/18 - 8/30/18.  Foundation One testing with no actionable mutations, MSI-stable. \par 9/20/18 restaging CT scans with POD in upper abdomen and possible colitis. Started liposomal irinotecan 70 mg/m2 w/ Leucovorin 400 mg/m2 and continuous infusion 5 FU 2400 mg/m2 over 46 hrs every 2 weeks on 10/16/18.\par \par 6/10/19 CT scan with no overt progression of disease, but new mild perisplenic ascites, however his  continues to trend up. Findings are worrisome for POD but not yet radiographically evident, possibly peritoneal disease. Recommended Sutent as per Rowena et.al. trial. He began Sutent 37.5 mg daily on 6/25/19, but question actual start date 7/02/19.  Ad[facility] 7/[facility] - 7/10/19 w/ bacteremia sent to MICU on 7/5 with septic shock. He required vasopressor therapy after failed fluid resuscitation. Blood cultures showed e. Coli bacteremia. Discharged on IV antibx via PICC line, to continue through 7/23/19. Had f/u w/ ID on 7/24/19. Per Dr. Ramos,  completed 2 weeks ertapenem after first negative blood culture. Midline from left arm was removed with no bleeding or complication. Source of infection was colitis and mucositis, repeat culture neg so port can be used with no problem. Placed on probiotic therapy and continue dicyclomine, per GI.\par  \par Feels improved since completion of antibx, no further diarrhea. WBC 5.6, ANC 4.5, Hgb 11.4, PLT 387K.\par Discussed w/ Dr. Vela recommendations below.\par \par Plan:\par 1. Resume Sutent \par 2. Labs and RTO w/ NP on 8/26/19. Dr. Slade on 9/23/19.\par 3. Call or come in sooner for any concerns.\par 4. Goals of care: discussed cachectic appearance, recent critical care admission, he clearly states that he wants to proceed w/ treatment.\par 5. RTO in 3 weeks or sooner for any concerning symptoms.

## 2019-11-13 NOTE — HISTORY OF PRESENT ILLNESS
[T: ___] : T[unfilled] [Disease: _____________________] : Disease: [unfilled] [N: ___] : N[unfilled] [de-identified] : Mr. Galaviz was diagnosed with pancreatic cancer in October 2017, at age 62 year old, after initially presenting with painless jaundice. EUS and ERCP on 10/31/17 revealed a 2.2 cm pancreatic head mass, with no apparent vessel involvement or lymphadenopathy. The CBD was stented. Final pathology was consistent with adenocarcinoma. EUS and ERCP on 10/31/17, which revealed a 2.2 cm pancreatic head mass, with no apparent vessel involvement or lymphadenopathy. The CBD was stented. Final pathology was consistent with adenocarcinoma. S/p Whipple, cholecystectomy, enterectomy x 2 and removal of CBD stent on 11/30/17. Final pathology was 3.1 cm pancreatic adenocarcinoma invading the nadira pancreatic tissue and CBD, and involving 2/26 lymph nodes (T3N1). The CBD resection margin was positive for adenocarcinoma with extensive nadira neural invasion. Mediport was placed on 1/17/18. \par - CT C/A/P 1/23/18: IMPRESSION: \par   No evidence of recurrent or metastatic disease in the chest, abdomen, or pelvis. \par   Stable postsurgical anatomy status post Whipple procedure. No evidence of bowel or gastric outlet obstruction. Pancreatic duct stent in place without \par   pancreatic duct or biliary dilatation. \par   2 tiny foci of extraluminal air in the right upper quadrant along a prior MUNA drain tract. This is likely secondary to recent drain removal. No drainable fluid collections. \par   Small amount of nonocclusive thrombus at the tip of the port catheter within the right brachiocephalic vein. \par \par 4/16/19 CT C/A/P: \par -No evidence of metastatic disease in the thorax\par -Stable disease with soft tissue surrounding the proximal mesenteric artery and interposed between the celiac axis and portal vein\par -Previously identified colitis has resolved. \par \par Treatment:\par 2/7/18 - 6/13/18 adjuvant chemotherapy with gemcitabine + capecitabine (7 cycles)\par 4/12/18 CT C/A/P: no evidence of recurrent or metastatic disease.\par 7/2/18 CT a/p  : new soft tissue inferior to proximal superior mesenteric artery and to right of celiac axis in patient s/p whipple, suspicious for recurrent disease. \par 7/24/18-8/30/18  chemo-radiation with xeloda\par 9/20/18 CT A/P progression of disease in upper abdomen.\par 10/16/18 tx changed to liposomal irinotecan (70 mg/m2) w/ leucovorin 400 mg/m2 and 5FU 2400 mg/m2 over 46 hrs) every 2 weeks.\par 6/20/19 tx changed to Sutent (started on ~ 7/02/19).\par \par  [AJCC Stage: ____] : AJCC Stage: [unfilled] [de-identified] : Received hydration on 8/23/19. He is eating and drinking very little. He appears cachectic, continues to have fluid retention. He notes no fevers, appetite is poor, he is bed/chair for at least 75% of day. Has no SOB, no CP,  chronic alt.  constipation/diarrhea, no pain/burning w/ urination. ++ LE edema.  Energy level is very poor. He is reliant on his fiance for assist w/ most of his ADLs. The remainder of ROS is negative.\par  [de-identified] : Final Diagnosis\par 1. Lymph node at hepatic artery, excision:- One lymph node, negative for carcinoma, (0/1)\par 2. Periportal lymph node, excision:- One lymph node, negative for carcinoma, (0/1)\par 3. Gallbladder, cholecystectomy:- Chronic cholecystitis, negative for carcinoma\par - One lymph node, negative for carcinoma, (0/1)\par 4. Periportal lymph node, #2, excision:- One lymph node, negative for carcinoma, (0/1)\par 5. Pancreas, stomach and duodenum, pancreaticoduodenectomy(Whipple procedure):\par - Invasive adenocarcinoma of pancreas, moderately to poorly\par differentiated,pancreatobiliary type\par - Tumor invading into peripancreatic soft tissue and common bile duct\par - Comment bile duct resection margin, positive for adenocarcinoma, with\par extensive perineural invasion only, see comment- Pancreatic neck resection margin, negative for carcinoma\par - 18 lymph nodes, negative for carcinoma, (0/18)\par - AJCC (7th Ed) staging: pT3N1(2/26), see synoptic report,\par see Part 7 and Part 9\par 6. Portion of pancreas at portal vein #1, excision:- Atrophic pancreatitis, negative for carcinoma\par 7. Portion of pancreas at portal vein #2, excision:- Positive for adenocarcinoma, see comment\par 8. Portion of stent, removal:- Stent, gross examination only\par 9. Uncinate at superior mesenteric artery #1, excision:- Metastatic pancreatic adenocarcinoma present in 2 of 2 lymph nodes, (2/2)\par 10. Uncinate at superior mesenteric artery #2, excision:- Two lymph nodes, negative for carcinoma, (0/2)\par 11. Stomach and bowel at gastrojejunostomy site, excision:- Gastric and small bowel tissue, negative for carcinoma\par

## 2019-11-13 NOTE — REASON FOR VISIT
[Family Member] : family member [Urgent Visit] : an urgent  [FreeTextEntry2] : pancreatic cancer [Other: _____] : [unfilled]

## 2019-12-28 NOTE — H&P PST ADULT - OTHER CARE PROVIDERS
Pt. is a 23 yo M with a hx of travel diarrhea 1 month ago after visiting [place] on Cipro with resolution of symptoms now on amoxicillin s/p wisdom teeth extraction with diarrhea for a few days.  Pt. came to the ED due to upper abdominal stomach cramping so severe that he could not breathe which has now resolved.  Patient had temp 98-99F and feels very dehydrated.  He was brought in by family for evaluation.
Dr Jose Mae cardiologist

## 2019-12-30 NOTE — PRE-OP CHECKLIST - STERILIZATION AFFIRMATION
EMERGENCY DEPARTMENT HISTORY AND PHYSICAL EXAM    Date: 12/29/2019  Patient Name: Lesly Wilde    History of Presenting Illness     Chief Complaint   Patient presents with   [id number]         History Provided By: Patient's Mother    Chief Complaint: eye pain  Duration: 2 Days  Timing:  Acute  Location: left eye  Quality: Tightness  Severity: 5year old mother states patient has been rubbing his eyes  Modifying Factors: none  Associated Symptoms: yellow crusty exudate  Eye lid swelling    HPI: Lesly Wilde is a 5 y.o. male with a PMH of No significant past medical history who presents with pink eye for 2 redmond Mother noted swelling of eyelid and dried drainage on side of eye. Denies recent URI. PCP: Marko Shafer MD    Current Outpatient Medications   Medication Sig Dispense Refill    erythromycin (ILOTYCIN) ophthalmic ointment Apply 1/2 inch to both eyes 4 times a day for 1 week 1 Tube 0    AEROCHAMBER PLUS FLOW-VU,M MSK spcr U UTD WITH ALBUTEROL  2    budesonide (PULMICORT) 0.5 mg/2 mL nbsp INHALE 1 VIAL USING A NEBULIZER BID  3    fluticasone propionate (CHILDREN'S FLONASE ALLERGY RLF) 50 mcg/actuation nasal spray 1 [place]ori by Nasal route daily. 1 Bottle 3    guaiFENesin (ROBITUSSIN) 100 mg/5 mL liquid Take 5 mL by mouth every four (4) hours as needed for Cough. 120 mL 0    inhalational spacing device Use with inhalers(albuterol inhaler ) 2 Device 2    albuterol (PROVENTIL HFA, VENTOLIN HFA, PROAIR HFA) 90 mcg/actuation inhaler Take 2 Puffs by inhalation every four (4) hours as needed for Wheezing or Shortness of Breath (chest pain/persistent coughing). 2 Inhaler 2    albuterol (PROVENTIL VENTOLIN) 2.5 mg /3 mL (0.083 %) nebu 3 mL by Nebulization route every four (4) hours. As needed for persistent cough/chest pain/shortness of breath/wheezing 50 Each 1    loratadine (CLARITIN) 5 mg/5 mL syrup Take 5 mL by mouth daily for 120 days.  150 mL 3       Past History     Past Medical History:  Past Medical History:   Diagnosis Date    Asthma     Delivery normal     Environmental allergies 4/30/2019    Premature birth     45 weeks, NICU for observation    Respiratory abnormalities     wheezing       Past Surgical History:  Past Surgical History:   Procedure Laterality Date    HX CIRCUMCISION      HX UROLOGICAL      Circumcision       Family History:  Family History   Problem Relation Age of Onset    Asthma Mother     Hypertension Mother     Allergic Rhinitis Mother     High Cholesterol Mother     Diabetes Maternal Grandmother     Hypertension Maternal Grandmother        Social History:  Social History     Tobacco Use    Smoking status: Never Smoker    Smokeless tobacco: Never Used   Substance Use Topics    Alcohol use: Not on file    Drug use: Not on file       Allergies: Allergies   Allergen Reactions    Amoxicillin Rash     Erythematous papular rash - ? Allergy vs. Viral exanthem. Review of Systems   Review of Systems   Constitutional: Negative for activity change, appetite change, chills, crying, fever and irritability. HENT: Negative for congestion, ear pain, rhinorrhea and sore throat. Eyes: Positive for discharge, redness and itching. Respiratory: Negative for cough, wheezing and stridor. Cardiovascular: Negative for cyanosis. Gastrointestinal: Negative for abdominal pain, nausea and vomiting. Musculoskeletal: Negative for back pain, neck pain and neck stiffness. Skin: Negative for color change, pallor and rash. Neurological: Negative for seizures. All other systems reviewed and are negative. Physical Exam     Vitals:    12/29/19 1941   Pulse: 106   Resp: 20   Temp: 98.1 °F (36.7 °C)   SpO2: 99%   Weight: (!) 26.1 kg   Height: (!) 113 cm     Physical Exam  Vitals signs and nursing note reviewed. Constitutional:       General: He is active. Appearance: He is well-developed.    HENT:      Right Ear: Tympanic membrane normal.      Left Ear: Tympanic membrane normal.      Nose: Nose normal.      Mouth/Throat:      Mouth: Mucous membranes are moist.      Pharynx: Oropharynx is clear. Tonsils: No tonsillar exudate. Eyes:      General:         Right eye: No discharge. Left eye: Discharge present. Conjunctiva/sclera: Conjunctivae normal.      Pupils: Pupils are equal, round, and reactive to light. Comments: Left eyelid swelling left conjunctiva injected   Neck:      Musculoskeletal: Normal range of motion and neck supple. Cardiovascular:      Rate and Rhythm: Normal rate and regular rhythm. Heart sounds: No murmur. Pulmonary:      Effort: Pulmonary effort is normal. No respiratory distress or retractions. Breath sounds: Normal breath sounds. No wheezing or rhonchi. Abdominal:      General: Bowel sounds are normal. There is no distension. Palpations: Abdomen is soft. Tenderness: There is no tenderness. There is no guarding or rebound. Musculoskeletal: Normal range of motion. General: No deformity. Skin:     General: Skin is warm. Findings: No petechiae. Rash is not purpuric. Neurological:      Mental Status: He is alert. Diagnostic Study Results     Labs -   No results found for this or any previous visit (from the past 12 hour(s)). Radiologic Studies -   No orders to display     CT Results  (Last 48 hours)    None        CXR Results  (Last 48 hours)    None            Medical Decision Making   I am the first provider for this patient. I reviewed the vital signs, available nursing notes, past medical history, past surgical history, family history and social history. Vital Signs-Reviewed the patient's vital signs. Records Reviewed: Nursing Notes            Disposition:      DISCHARGE NOTE:     The patient has been re-evaluated and is ready for discharge. Reviewed available results with patient's parent or guardian. Counseled pt's parent or guardian on diagnosis and care plan. Pt's parent or guardian has expressed understanding, and all questions have been answered. Pt's parent or guardian agrees with plan and agrees to F/U as recommended, or return to the ED if the pt's sxs worsen. Discharge instructions have been provided and explained to the pt's parent or guardian, along with reasons to return to the ED. .  Follow-up Information     Follow up With Specialties Details Why Contact Info    [place], MD Pediatrics In 3 days If symptoms worsen [address]  [phone]            Discharge Medication List as of 12/29/2019  8:50 PM      START taking these medications    Details   erythromycin (ILOTYCIN) ophthalmic ointment Apply 1/2 inch to both eyes 4 times a day for 1 week, Normal, Disp-1 Tube, R-0         CONTINUE these medications which have NOT CHANGED    Details   AEROCHAMBER PLUS FLOW-KETTY BURGESS MSK spcr U UTD WITH ALBUTEROL, Historical Med, R-2, ALESSANDRO      budesonide (PULMICORT) 0.5 mg/2 mL nbsp INHALE 1 VIAL USING A NEBULIZER BID, Historical Med, R-3      fluticasone propionate (CHILDREN'S FLONASE ALLERGY RLF) 50 mcg/actuation nasal spray 1 Cylinder by Nasal route daily. , Normal, Disp-1 Bottle, R-3      guaiFENesin (ROBITUSSIN) 100 mg/5 mL liquid Take 5 mL by mouth every four (4) hours as needed for Cough., Normal, Disp-120 mL, R-0      inhalational spacing device Use with inhalers(albuterol inhaler ), Normal, Disp-2 Device, R-2      albuterol (PROVENTIL HFA, VENTOLIN HFA, PROAIR HFA) 90 mcg/actuation inhaler Take 2 Puffs by inhalation every four (4) hours as needed for Wheezing or Shortness of Breath (chest pain/persistent coughing). , Normal1 for home/1 for schoolDisp-2 Inhaler, R-2      albuterol (PROVENTIL VENTOLIN) 2.5 mg /3 mL (0.083 %) nebu 3 mL by Nebulization route every four (4) hours.  As needed for persistent cough/chest pain/shortness of breath/wheezing, Normal, Disp-50 Each, R-1      loratadine (CLARITIN) 5 mg/5 mL syrup Take 5 mL by mouth daily for 120 days. , Normal, Disp-150 mL, R-3             Provider Notes (Medical Decision Making):   DDX conjunctivitis viral v bacterial  Procedures:  Procedures    Please note that this dictation was completed with Dragon, computer voice recognition software. Quite often unanticipated grammatical, syntax, homophones, and other interpretive errors are inadvertently transcribed by the computer software. Please disregard these errors. Additionally, please excuse any errors that have escaped final proofreading. Diagnosis     Clinical Impression:   1.  Bacterial conjunctivitis of left eye n/a

## 2020-07-20 NOTE — DISCHARGE NOTE PROVIDER - HOSPITAL COURSE
63yr old male with PMH Asthma, Factor V Leiden deficiency,  Pancreatic Cancer s/p Whipple surgery in Nov 2017, s/p radiation, on 3rd line chemotherapy started on 7/2 after failed prior 2 chemo regimen was admitted to MICU on 7/5 with septic shock. He required vasopressor therapy after failed fluid resuscitation . Leucopenia, uptrending lactate and was persistently hypotensive. He was started on empiric antibiotics for possible pneumonia with CXR s/o Right medial basal consolidation. Was started on po midodrine on 7/6 and weaned off vasopressors since 7/6.  Blood cultures showed e. Coli bacteremia, and CT abd in Jun 2019 showed diffuse colitis with nadira-splenic and hepatic, moderate pelvic ascites. GI and ID consulted. Blood cultures resulted as esbl e coli and continued on iv abx. Patient switched o iv invanz and will have midline placed for iv abx until 7.13. of note Patient also developed herpetic lesions and will be sent home on po acyclovir.         patient is now stable for dc home with home care and advised to follow up with oncology and ID.         high risk  of readmission due to multiple comorbidtiies        time spent on dc 32 minutrs 98.8

## 2020-10-22 NOTE — CHART NOTE - NSCHARTNOTESELECT_GEN_ALL_CORE
Refill x one month  Tell pt needs someone to help her if she needs them   Post Procedure check/Event Note

## 2020-12-21 PROBLEM — Z87.440 HISTORY OF URINARY TRACT INFECTION: Status: RESOLVED | Noted: 2019-05-22 | Resolved: 2020-12-21

## 2021-01-07 NOTE — PRE-OP CHECKLIST - TEMPERATURE IN FAHRENHEIT (DEGREES F)
Quality 226: Preventive Care And Screening: Tobacco Use: Screening And Cessation Intervention: Patient screened for tobacco use, is a smoker AND received Cessation Counseling Quality 154 Part A: Falls: Risk Assessment (Should Be Reported With Measure 155.): Falls risk assessment completed and documented in the past 12 months. Quality 155 (Denominator): Falls Plan Of Care: Plan of Care not Documented, Reason not Otherwise Specified 96.8 Quality 154 Part B: Falls: Risk Screening (Should Be Reported With Measure 155.): Patient screened for future fall risk; documentation of no falls in the past year or only one fall without injury in the past year Quality 47: Advance Care Plan: Advance Care Planning discussed and documented in the medical record; patient did not wish or was not able to name a surrogate decision maker or provide an advance care plan. Quality 431: Preventive Care And Screening: Unhealthy Alcohol Use - Screening: Patient screened for unhealthy alcohol use using a single question and scores less than 2 times per year Quality 111:Pneumonia Vaccination Status For Older Adults: Pneumococcal Vaccination not Administered or Previously Received, Reason not Otherwise Specified Quality 110: Preventive Care And Screening: Influenza Immunization: Influenza Immunization Administered during Influenza season Detail Level: Detailed

## 2021-12-01 NOTE — DISCHARGE NOTE ADULT - THE PATIENT HAS
difficulty remembering Clindamycin Counseling: I counseled the patient regarding use of clindamycin as an antibiotic for prophylactic and/or therapeutic purposes. Clindamycin is active against numerous classes of bacteria, including skin bacteria. Side effects may include nausea, diarrhea, gastrointestinal upset, rash, hives, yeast infections, and in rare cases, colitis.

## 2021-12-05 NOTE — CONSULT NOTE ADULT - SUBJECTIVE AND OBJECTIVE BOX
PRELIMINARY NOTE, PLEASE AWAIT FINALIZATION AND ATTESTATION. HISTORY OF PRESENT ILLNESS: This is a steven 63-year-old man with a past medical history of pancreas cancer status post Whipple in 2017 with positive margins on his third line of chemotherapy started earlier this month who presented to the Emergency Department with a three day history of feeling unwell, mild cough, providers, and a high fever.  He reported feeling fatigued and had diarrhea over the last few months, for which he has been taking loperamide and had negative stool studies.  CT scan from last month indicated colitis, and he had a recent colonoscopy.    ROS: A 14-point review of systems was completed and was otherwise negative save what was reported in the HPI.    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19  H/O circumcision  S/P ERCP:     SOCIAL HISTORY:  - TOBACCO: Denies  - ALCOHOL: Denies  - ILLICIT DRUG USE: Denies    FAMILY HISTORY:  No known history of gastrointestinal or liver disease;  Family history of CABG  Family history of heart disease  Family history of lymphoma      HOME MEDICATIONS:  aspirin 81 mg oral delayed release tablet: 1 tab(s) orally once a day (2019 16:27)  atorvastatin 10 mg oral tablet: 1 tab(s) orally once a day (at bedtime) (2019 16:27)  Creon 36,000 units oral delayed release capsule: 2 capsules with every meal, 1 capsule with every snack (2019 16:27)  dicyclomine 10 mg oral capsule: 2 cap(s) orally 4 times a day, As Needed (2019 16:27)  fentaNYL 37.5 mcg/hr transdermal film, extended release: 1 film(s) transdermal every 72 hours (2019 19:55)  Flomax 0.4 mg oral capsule: 1 cap(s) orally once a day (at bedtime) (2019 16:27)  furosemide 20 mg oral tablet: 1 tab(s) orally once a day, As Needed (2019 19:55)  loperamide 2 mg oral capsule: 1 cap(s) orally every 4 hours, As Needed (2019 19:55)  pantoprazole 40 mg oral delayed release tablet: 1 tab(s) orally once a day in am (2019 16:27)  potassium chloride 20 mEq oral tablet, extended release: 1 tab(s) orally once a day as needed on days furosemide is taken (2019 16:27)  Senna 8.6 mg oral tablet: 2 tab(s) orally once a day, As Needed (2019 16:27)  simethicone 80 mg oral tablet, chewable: 1 tab(s) orally 4 times a day (after meals and at bedtime), As Needed (2019 19:55)  SUNItinib 37.5 mg oral capsule: 1 cap(s) orally once a day (2019 19:55)    INPATIENT MEDICATIONS:  MEDICATIONS  (STANDING):  ascorbic acid IVPB 1500 milliGRAM(s) IV Intermittent every 6 hours  aspirin  chewable 81 milliGRAM(s) Oral daily  atorvastatin 10 milliGRAM(s) Oral at bedtime  cefTRIAXone   IVPB 2000 milliGRAM(s) IV Intermittent every 24 hours  chlorhexidine 2% Cloths 1 Application(s) Topical <User Schedule>  dextrose 5%. 1000 milliLiter(s) (50 mL/Hr) IV Continuous <Continuous>  dextrose 50% Injectable 12.5 Gram(s) IV Push once  dextrose 50% Injectable 25 Gram(s) IV Push once  dextrose 50% Injectable 25 Gram(s) IV Push once  enoxaparin Injectable 40 milliGRAM(s) SubCutaneous daily  fentaNYL   Patch  12 MICROgram(s)/Hr 1 Patch Transdermal every 72 hours  fentaNYL   Patch  25 MICROgram(s)/Hr 1 Patch Transdermal every 72 hours  hydrocortisone sodium succinate Injectable 50 milliGRAM(s) IV Push every 6 hours  insulin lispro (HumaLOG) corrective regimen sliding scale   SubCutaneous Before meals and at bedtime  metroNIDAZOLE    Tablet 500 milliGRAM(s) Oral every 8 hours  midodrine 10 milliGRAM(s) Oral every 8 hours  pancrelipase  (CREON 36,000 Lipase Units) 1 Capsule(s) Oral daily  pancrelipase  (CREON 36,000 Lipase Units) 2 Capsule(s) Oral <User Schedule>  tamsulosin 0.4 milliGRAM(s) Oral at bedtime  thiamine IVPB 200 milliGRAM(s) IV Intermittent <User Schedule>    MEDICATIONS  (PRN):  dextrose 40% Gel 15 Gram(s) Oral once PRN Blood Glucose LESS THAN 70 milliGRAM(s)/deciliter  glucagon  Injectable 1 milliGRAM(s) IntraMuscular once PRN Glucose LESS THAN 70 milligrams/deciliter    ALLERGIES:  No Known Allergies    VITAL SIGNS LAST 24 HOURS:  T(C): 36.9 (2019 23:47), Max: 37.3 (2019 19:33)  T(F): 98.5 (2019 23:47), Max: 99.2 (2019 19:33)  HR: 90 (2019 00:20) (57 - 96)  BP: 91/53 (2019 00:20) (78/59 - 129/71)  BP(mean): 64 (2019 00:20) (62 - 95)  RR: 19 (2019 00:20) (9 - 38)  SpO2: 97% (2019 00:20) (93% - 99%)      19 @ 07:01  -  19 @ 07:00  --------------------------------------------------------  IN: 3034.4 mL / OUT: 2500 mL / NET: 534.4 mL    19 @ :  -  19 @ 00:59  --------------------------------------------------------  IN: 2994 mL / OUT: 0 mL / NET: 2994 mL        19 @ 07:01  -  19 @ 07:00  --------------------------------------------------------  IN: 3034.4 mL / OUT: 2500 mL / NET: 534.4 mL    19 @ 07:  -  19 @ 00:59  --------------------------------------------------------  IN: 2994 mL / OUT: 0 mL / NET: 2994 mL      PHYSICAL EXAM:  Constitutional: Well-developed, well-nourished, in no apparent distress  Eyes: Sclerae anicteric, conjunctivae normal  ENMT: Mucus membranes moist, no oropharyngeal thrush noted  Neck: No thyroid nodules appreciated, no significant cervical or supraclavicular lymphadenopathy  Respiratory: Breathing nonlabored; clear to auscultation  Cardiovascular: Regular rate and rhythm  Gastrointestinal: Soft, nontender, nondistended, normoactive bowel sounds; no hepatosplenomegaly appreciated; no rebound tenderness or involuntary guarding  Extremities: No clubbing, cyanosis or edema  Neurological: Alert and oriented to person, place and time; no asterixis  Skin: No jaundice  Lymph Nodes: No significant lymphadenopathy  Musculoskeletal: No significant peripheral atrophy  Psychiatric: Affect and mood appropriate      LABS:                        12.3   10.17 )-----------( 259      ( 2019 05:00 )             37.2     PT/INR - ( 2019 15:38 )   PT: 14.1 sec;   INR: 1.22 ratio         PTT - ( 2019 15:38 )  PTT:32.5 sec      141  |  107  |  11.0  ----------------------------<  147<H>  4.2   |  24.0  |  0.69    Ca    8.0<L>      2019 05:00  Phos  3.3     07-  Mg     1.8     -06    TPro  5.4<L>  /  Alb  3.0<L>  /  TBili  1.0  /  DBili  x   /  AST  106<H>  /  ALT  57<H>  /  AlkPhos  652<H>      LIVER FUNCTIONS - ( 2019 15:38 )  Alb: 3.0 g/dL / Pro: 5.4 g/dL / ALK PHOS: 652 U/L / ALT: 57 U/L / AST: 106 U/L / GGT: x           Urinalysis Basic - ( 2019 15:34 )    Color: Yellow / Appearance: Clear / S.010 / pH: x  Gluc: x / Ketone: Negative  / Bili: Negative / Urobili: Negative mg/dL   Blood: x / Protein: Negative mg/dL / Nitrite: Negative   Leuk Esterase: Negative / RBC: x / WBC x   Sq Epi: x / Non Sq Epi: x / Bacteria: x        Culture - Blood (collected 2019 17:22)  Source: .Blood  Preliminary Report (2019 09:54):    Growth in aerobic and anaerobic bottles: Gram Negative Rods    Aerobic Bottle: 8.07 Hours to positivity    Anaerobic Bottle: 8.18 Hours to positivity    .    TYPE: (C=Critical, N=Notification, A=Abnormal) C    TESTS:  _ GS    DATE/TIME CALLED: _ 2019 09:53:11    CALLED TO: Arthur Barlow RN    READ BACK (2 Patient Identifiers)(Y/N): _ Y    READ BACK VALUES (Y/N): _ Y    CALLED BY: Arthur Su    Culture - Blood (collected 2019 17:22)  Source: .Blood  Preliminary Report (2019 09:49):    Growth in aerobic and anaerobic bottles: Gram Negative Rods    Aerobic Bottle: 7.37 Hours to positivity    Anaerobic Bottle: 8.18 Hours to positivity    .    ***Blood Panel PCR results on this specimen are available    approximately 3 hours after the Gram stain result.***    Gram stain, PCR, and/or culture results may not always    correspond due to difference in methodologies.    ************************************************************    This PCR assay was performed using [employer].    The following targets are tested for: Enterococcus,    vancomycin resistant enterococci, Listeria monocytogenes,    coagulase negative staphylococci, S. aureus,    methicillin resistant S. aureus, Streptococcus agalactiae    (Group B), S. pneumoniae, S. pyogenes (Group A),    Acinetobacter baumannii, Enterobacter cloacae, E. coli,    Klebsiella oxytoca, K. pneumoniae, Proteus sp.,    Serratia marcescens, Haemophilus influenzae,    Neisseria meningitidis, Pseudomonas aeruginosa, Candida    albicans, C. glabrata, C krusei, C parapsilosis,    C. tropicalis and the KPC resistance gene.    "Due to technical problems, Proteus sp. will Not be reported as part of    the BCID panel until further notice"    .    TYPE: (C=Critical, N=Notification, A=Abnormal) C    TESTS:  _ GS    DATE/TIME CALLED: _2019 09:48:00    CALLED TO: Arthur Barlow RN    READ BACK (2 Patient Identifiers)(Y/N): _ Y    READ BACK VALUES (Y/N): _ Y    CALLED BY: Arthur Su  Organism: Blood Culture PCR (2019 09:50)  Organism: Blood Culture PCR (2019 09:50)    Culture - Urine (collected 2019 15:35)  Source: .Urine  Final Report (2019 15:39):    No growth      IMAGING:  I personally reviewed the CXR, and I agree with the radiologist's interpretation as described below:    < from: Xray Chest 1 View-PORTABLE IMMEDIATE (19 @ 16:34) >  FINDINGS:   The lungs  show RIGHT lower lobe medial basilar airspace consolidation.   Remaining lung parenchyma clear.    Mediport catheter tip in SVC.    The heart and mediastinum are within normal limits.    Visualized osseous structures are intact.        IMPRESSION: RIGHT medial basilar airspace consolidation.      < end of copied text > normal

## 2021-12-25 NOTE — H&P ADULT - PROBLEM SELECTOR PLAN 2
[address] care of pt at this time. Assessment complete. Pt alert and oriented x 4. Shows no sign of distress. Fall risk arm band in place. Denies SOB and chest pain. Pt lungs diminished with exp wheezing bilaterally. Cap refill  less than 3 seconds. Pt denies numbness and tingling to all extremities. Stated pain 0/10. Pt has 20 G IV to L wrist. Pt has no dressing skin intact . On pradaxa for VTE  Incentive spirometer at bedside. Pt encouraged to continue use of IS. Pt verbalized understanding. Call light and possessions within reach. Bed locked and in low position. Will continue to monitor. very likely multifactorial and liver congestion related.    Last echo from july 6 , 2019 shoiwed :   1. Left ventricular ejection fraction, by visual estimation, is 55 to   60%.   2. Normal global left ventricular systolic function.   3. Spectral Doppler shows impaired relaxation pattern of left   ventricular myocardial filling (Grade I diastolic dysfunction).   4. Mild mitral annular calcification.   5. Thickening of the anterior and posterior mitral valve leaflets.   6. Mild to moderate aortic regurgitation.   7. Dilatation of the ascending aorta.   8. The aortic valve mean gradient is 12.9 mmHg consistent with mild   aortic stenosis.  will continue with lasix 40 mg iv q 12hrs.   will repeat echo as his pleural effusion have enlarged from before.

## 2022-02-24 NOTE — PATIENT PROFILE ADULT - NSPROMUTANXFEARADDRESSFT_GEN_A_NUR
Received pt from ed, aaox4, complains of 4/10 generalized 'neuropathy' pain. Pt denies chest pain. Pt has red bumps on left arm and some little bumps around ankles, pt has reddened groin folds and butt crack. Discussed care plan with pt and she verbalized understanding- will monitor. communication

## 2022-04-08 NOTE — ED PROVIDER NOTE - MUSCULOSKELETAL, MLM
89 y/o female with a PMHx of LBBB, HTN, SSS treated with pacemaker presents to the ED c/o generalized weakness & worsening difficulty ambulating x 4 weeks and intermittent palpitations for a "long time" including this morning. Pt reports she noted her whole body was shaking when she attempted to ambulate this morning but could not. No LOC. Pt reports she is having difficulty walking due to leg stiffness and worsening lower extremity swelling. Pt reports she started taking a diuretic 8 days ago and the swelling has increased significantly. +decreased appetite x 1 week. Denies CP, SOB, cough, weakness, N/V/D, fever, and any other symptoms. Pt reports she has been urinating more since starting the diuretic. No other complaints at this time.   PCP: Dr. Charlene Barrow; Cardiologist: Dr. Graves Spine appears normal, range of motion is not limited, no muscle or joint tenderness

## 2023-01-01 NOTE — ED PROVIDER NOTE - NEURO NEGATIVE STATEMENT, MLM
Female no loss of consciousness, no gait abnormality, no headache, no sensory deficits, and no weakness.

## 2023-01-25 NOTE — DISCHARGE NOTE ADULT - HOSPITAL COURSE
60 y/o  male with hx of DM, HTN, HLD, FmHx of early CAD, with a pancreatic mass s/p ERCP with stent insertion and mass bx. - planned for likely whipple surgery with Dr. Curry.    1. Pre-operative evaluation - the patient has multiple risk factors for CAD, and has some change in exertional symptoms. He underwent an exercise nuclear stress test yesterday, which showed small area of mild ischemia, and a possible old silent MI. I discussed options with the patient. Since the ischemia is a small territory and is mild, and since he will likely need to undergo and extensive surgery very soon, of his options, his best option is to minimize his cardiac surgical risk medically. Undergoing cardiac cath and having a stent, if necessary, will delay surgery and increase surgical bleeding risk. He understands this and agrees with the plan.   HTN - Will decrease valsartan to 80mg and start Toprol. 62 y/o  male with hx of DM, HTN, HLD, FmHx of early CAD, with a pancreatic mass s/p ERCP with stent insertion and mass bx. - planned for likely whipple surgery with Dr. Curry.    1. Pre-operative evaluation - the patient has multiple risk factors for CAD, and has some change in exertional symptoms. He underwent an exercise nuclear stress test yesterday, which showed small area of mild ischemia, and a possible old silent MI. I discussed options with the patient. Since the ischemia is a small territory and is mild, and since he will likely need to undergo and extensive surgery very soon, of his options, his best option is to minimize his cardiac surgical risk medically. Undergoing cardiac cath and having a stent, if necessary, will delay surgery and increase surgical bleeding risk. He understands this and agrees with the plan.   HTN - decrease valsartan to 80mg and start Toprol. Stage 2: Number Of Blocks?: 1

## 2023-03-14 NOTE — PHYSICAL THERAPY INITIAL EVALUATION ADULT - LEVEL OF INDEPENDENCE: STAND/SIT, REHAB EVAL
Orthopedic Surgery Brief Op Discharge    Procedure:   Right ankle ORIF    Pre-op diagnosis:   Right ankle fracture    Postop diagnosis: same    Surgeon:   Dr. Alonso Puga, DO    Fluids: see anesthesia documentation    Anesthesia: mac+regional    Complications: none      Patient presented to [facility] on day of scheduled surgery and Right ankle ORIF, please see operative report for further detail. Patient did well postoperatively and was found to be fit for discharge on POD# 0.  Their pain was controlled.  The patient met all discharge criteria.  The patient was discharged home in stable condition. Patient was given paper prescriptions.  They were given a follow-up appointment in 2 weeks in clinic for a wound check and range of motion check.  All questions and concerns of the patient were answered to their satisfaction.    Condition: Patient tolerated procedure well, was extubated, and returned to the recovery unit in stable condition.     Post Op Instructions    -maintain dressing clean and dry until followup  -nonweightbearing surgical extremity  -multimodal pain control  -return to clinic in 2 weeks for wound recheck and suture removal    Please call our team with questions or concerns    Dr. Holly Puga  [facility]  Pager: [phone]        
independent

## 2023-05-26 NOTE — PATIENT PROFILE ADULT. - BLOOD AVOIDANCE/RESTRICTIONS, PROFILE
of 5/24/2023  3:38 AM                 (Please note that portions of this note were completed with a voice recognition program.  Efforts were made to edit the dictations but occasionally words are mis-transcribed.)    Corie Rogers MD (electronically signed)           Corie Rogers MD  05/26/23 6698 none

## 2023-08-21 NOTE — CONSULT NOTE ADULT - PROBLEM SELECTOR RECOMMENDATION 9
no - plan for EUS/FNA and ERCP tomorrow  - obtain MRI abd   - patient will need CT chest to evaluate for distant mets  - patient will need surgical consultation for possible resection - GI to call - plan for EUS/FNA and ERCP tomorrow  - obtain MRI abd   - patient will need CT chest to evaluate for distant mets  - patient will need surgical consultation for possible resection

## 2023-11-21 NOTE — ED ADULT TRIAGE NOTE - BSA (M2)
Consent: The patient's consent was obtained including but not limited to risks of crusting, scabbing, blistering, scarring, darker or lighter pigmentary change, recurrence, incomplete removal and infection. Duration Of Freeze Thaw-Cycle (Seconds): 0 Show Applicator Variable?: Yes Render Note In Bullet Format When Appropriate: No Post-Care Instructions: I reviewed with the patient in detail post-care instructions. Patient is to wear sunprotection, and avoid picking at any of the treated lesions. Pt may apply Vaseline to crusted or scabbing areas. Detail Level: Detailed 1.64

## 2024-07-24 NOTE — ASSESSMENT
Spoke to mom, scheduled appointments.   [FreeTextEntry1] : 64 y/o gentleman with Factor V Leiden mutation and stage IIB pancreatic cancer (T3N1) with positive CBD margin with peripancreatic soft tissue invasion, s/p Whipple on 11/30/17.  S/p 7 cycles of gemcitabine + Xeloda.  Post chemotherapy scans, 7/2/18 CT scan with local recurrence. Completed chemoradiation with Xeloda from 7/24/18 - 8/30/18.  Foundation One testing with no actionable mutations, MSI-stable. \par 9/20/18 restaging CT scans with POD in upper abdomen and possible colitis. Started liposomal irinotecan 70 mg/m2 w/ Leucovorin 400 mg/m2 and continuous infusion 5 FU 2400 mg/m2 over 46 hrs every 2 weeks on 10/16/18.\par \par 6/10/19 CT scan with no overt progression of disease, but new mild perisplenic ascites, however his  continues to trend up. Findings are worrisome for POD but not yet radiographically evident, possibly peritoneal disease. Recommended Sutent as per Rowena et.al. trial. Began Sutent 37.5 mg daily on 6/25/19, but question actual start date 7/02/19.  Admitted to [facility] 7/05 - 7/10/19 w/ bacteremia sent to MICU on 7/5 with septic shock. He required vasopressor therapy after failed fluid resuscitation. Blood cultures showed e. Coli bacteremia. Discharged on IV antibx via PICC line, to continue through 7/23/19. Has f/u w/ ID on 7/24/19. Per Dr. Ramos,  completed 2 weeks ertapenem after first negative blood culture. Midline from left arm was removed with no bleeding or complication. Source of infection was colitis and mucositis, repeat culture neg so port can be used with no problem. Placed on probiotic therapy and continue dicyclomine, per GI. Restarted Sutent on 7/29/19.\par  \par He was just seen 4 days ago. Has been off Sutent for ~ 9-10 days, due to severe mouth pain, and painful hands. WBC today 2.4, ANC 2.0, ALbumin is 2.6. V/S stable. Very frail, cachectic appearance. Discussed w/ Dr. Slade continue off Sutent. \par \par Plan:\par 1. Discontinue Sutent.\par 2. Goals of care: he denies his deterioration. Wants to continue tx if at all possible.  \par 3. F/u w/ Dr. Slade on 9/11/19 or sooner for any concerns.

## 2025-04-29 NOTE — ED PROVIDER NOTE - CPE EDP PSYCH NORM
[FreeTextEntry1] :  moderate 18 rdi 33  Obstructive sleep apnea syndrome presently tolerating CPAP. Chronic fatigue may be related to above. Recurrent respiratory infections essentially resolved.  None recent.  normal...

## 2025-07-08 NOTE — ED ADULT NURSE NOTE - CAS EDN DISCHARGE INTERVENTIONS
Continued Stay / Assessment/Plan of Care Note         Active Substitute Decision Maker (SDM)    There are no active Substitute Decision Maker (SDM) on file.       Progress note:  Patient chart reviewed and patient discussed in OFT meeting. Patient is scheduled for I&D on 7/11/2025 with Dr. Borges at 1300. Cm will continue to follow.     Current Patient Status: Inpatient    See SW/ flowsheets for other objective data.    Disposition Recommendations:  Preliminary discharge destination:    SW/ recommendation for discharge: Home, DME    Destination Pharmacy:        Discharge Plan/Needs:     Continued Care and Services - Admitted Since 7/3/2025    No active coordination exists for this encounter.       Prior To Hospitalization:    Living Situation: Spouse, Children and residing at House  Support Systems: Spouse, Family members   Home Devices/Equipment: None   Mobility Assist Devices: None   Type of Service Prior to Hospitalization: None    Patient/Family discharge goal (s):  Home     Prior Function     Transfers: Independent (07/06/25 0921 : Eva Ortiz OT)          Current Function  Last Filed Values         Value Time User    Current Function  significantly below baseline level of function 7/7/2025  4:26 PM Tasneem Suggs, PT    Therapy Impairments  balance; edema; ROM; strength; pain; sensation 7/7/2025  4:26 PM Tasneem Suggs, PT    ADLs Requiring Support  bed mobility; transfers; ambulation; stairs 7/7/2025  4:26 PM Tasneem Suggs, PT            Therapy Recommendations for Discharge:   PT:      Last Filed Values         Value Time User    PT Discharge Needs  intensive daily therapy 7/7/2025  4:26 PM Tasneem Suggs, PT          OT:      Last Filed Values         Value Time User    OT Discharge Needs  therapy 5 or more times per week 7/6/2025 12:44 PM Eva Ortiz OT            Mobility Equipment Recommended for Discharge: 2ww, manual w/c with elevating leg rests         IV intact

## 2025-07-10 NOTE — ED STATDOCS - NS ED ATTENDING STATEMENT MOD
Conjunctivae and eyelids appear normal, Sclerae: White without injection or jaundice. Attending Only

## 2025-07-12 NOTE — ED ADULT TRIAGE NOTE - HEART RATE (BEATS/MIN)
nAai here for udenyca injection today.     Patient denies any concerns with previous injections.  See MAR for injection details.   Patient tolerated procedure well.   Patient discharged in stable, ambulatory condition.     130

## 2025-07-15 NOTE — PRE-OP CHECKLIST - BETA DATE TIME
Post-Operative Instructions    PRESCRIPTIONS: your post-operative medications have been handed to you or sent to the pharmacy you indicated at your pre-operative visit.  If you have any difficulty obtaining your post-operative medications or have any questions, please call the office at [phone].      PAIN MANAGEMENT: You should expect to have discomfort for the first week or so after surgery. Pain medication should be taken to help alleviate the pain so that you are comfortable and can participate in physical therapy.  Take the medication as directed.  You may decrease the amount of pain medication, as tolerated, when pain improves.  You must exercise caution when operating a motor vehicle. You have been prescribed one or more of the following as indicated on your Med Rec form thta the Nurse will go over with you:  [ x ] Oxycodone 5mg 1-2 tablets by mouth every 4 to 6 hours as needed for pain  Oxycodone is a short-acting pain medication routinely prescribed after surgery.  It is the pain medication found in “Percocet,” which is a combination of oxycodone and Tylenol.  If you were prescribed oxycodone, you may take Tylenol in addition to this medication, if needed for pain control.  [  ] Oxycontin 10mg by mouth every 12 hours for 5 days  Oxycontin is a long-acting pain medication.  It is sometimes prescribed after longer surgeries. If you were prescribed this medication, you should take it twice a day for the first 5 days after surgery to help with pain control.  [  ] Vicodin or Norco (Hydrocodone 5mg/Tylenol 325mg) 1-2 tablets by mouth every 4-6 hours as needed for pain  Vicodin and Norco are short acting pain medications sometimes prescribed after surgery.  If you were prescribed this medication, you may take 1-2 tablets every 4-6 hours as needed for pain.  This medication already contains Tylenol.  You should NOT take any additional Tylenol (acetaminophen) if you are taking these medications.    NAUSEA: Nausea is a common side effect of anesthesia and pain medications.  You may take the below medication if you are experiencing nausea after surgery.  If you continue to experience nausea or vomiting more than 24 hours after surgery, please call the office.  [ x ] Ondansetron 4mg by mouth every 4 hours as needed for nausea    CONSTIPATION: common side effect of anesthesia and pain medications.  You should take Colace three times daily, as long as you are taking narcotic pain medications after surgery, such as oxycodone, oxycontin, vicodin, or norco.  [ x ] Colace 100mg by mouth three times daily    DVT PROPHYLAXIS (PREVENTION OF BLOOD CLOTS): Aspirin EC can reduce the risk of blood clots after surgery, particularly after surgery on the legs, ankles and feet.  If you have been prescribed Aspirn, it is essential that you take this medication.  If you already are on an anticoagulant (blood thinner) such as Xarelto, Coumadin, Warfarin, Eliquis - you should resume you home "blood-thinner" in place of the Aspirn.  [ x ] Aspirin 81 mg ENTERIC COATED (EC) by mouth twice daily for 2-4 weeks (depending on surgical procedure)    ACTIVITY: You should be up and moving as much and as often as possible! Do NOT walk or put bodyweight on your splint or surgical side. Use Crutches or walker. You must keep your bandage/splint dry. Do NOT get it wet. IF you shower it MUST be covered tightly with a garbage bag. Otherwise sponge bath is advised. You do NOT want wet bandages on your skin as they can cause skin breakdown under the splint.    BANDAGE: Your bandage will be changed in the office. Do NOT remove it yourself. IF it gets damaged or wet (soaked) call. Follow up about 7-10 days in office or as otherwise advised by Dr. Lyons if different. 21-Feb-2018 10:00